# Patient Record
Sex: FEMALE | Race: WHITE | NOT HISPANIC OR LATINO | Employment: OTHER | URBAN - METROPOLITAN AREA
[De-identification: names, ages, dates, MRNs, and addresses within clinical notes are randomized per-mention and may not be internally consistent; named-entity substitution may affect disease eponyms.]

---

## 2017-01-12 ENCOUNTER — TELEPHONE (OUTPATIENT)
Dept: FAMILY MEDICINE | Facility: CLINIC | Age: 82
End: 2017-01-12

## 2017-01-12 NOTE — TELEPHONE ENCOUNTER
----- Message from Kavitha Odom sent at 1/12/2017  9:15 AM CST -----  Contact: self  Patient [phone] was discharged from [facility] on Tuesday 01 10 17 from congestive heart failure/pneumonia and shingles and is to followup with Dr Alas in two weeks from today  01 12 17/please advise

## 2017-01-14 DIAGNOSIS — F32.A DEPRESSION: ICD-10-CM

## 2017-01-16 RX ORDER — SERTRALINE HYDROCHLORIDE 50 MG/1
TABLET, FILM COATED ORAL
Qty: 30 TABLET | Refills: 10 | Status: SHIPPED | OUTPATIENT
Start: 2017-01-16 | End: 2019-10-01 | Stop reason: CLARIF

## 2017-01-18 ENCOUNTER — TELEPHONE (OUTPATIENT)
Dept: FAMILY MEDICINE | Facility: CLINIC | Age: 82
End: 2017-01-18

## 2017-01-18 RX ORDER — TELMISARTAN 80 MG/1
80 TABLET ORAL DAILY
COMMUNITY
End: 2017-10-05

## 2017-01-18 NOTE — TELEPHONE ENCOUNTER
Received request to change Telmisartan 80mg to 90 day supply from [facility] pharmacy. Med not on mar-spoke to patient-was given in [facility] by Dr. Arora- she picked up refill 1/11/17-patient has f/u appt 1/26/17-med added to mar.

## 2017-01-23 ENCOUNTER — CLINICAL SUPPORT (OUTPATIENT)
Dept: AUDIOLOGY | Facility: CLINIC | Age: 82
End: 2017-01-23
Payer: MEDICARE

## 2017-01-23 ENCOUNTER — OFFICE VISIT (OUTPATIENT)
Dept: OTOLARYNGOLOGY | Facility: CLINIC | Age: 82
End: 2017-01-23
Payer: MEDICARE

## 2017-01-23 ENCOUNTER — DOCUMENTATION ONLY (OUTPATIENT)
Dept: FAMILY MEDICINE | Facility: CLINIC | Age: 82
End: 2017-01-23

## 2017-01-23 VITALS — HEIGHT: 58 IN | WEIGHT: 99.19 LBS | BODY MASS INDEX: 20.82 KG/M2

## 2017-01-23 DIAGNOSIS — H90.A31 MIXED CONDUCTIVE AND SENSORINEURAL HEARING LOSS OF RIGHT EAR WITH RESTRICTED HEARING OF LEFT EAR: Primary | ICD-10-CM

## 2017-01-23 PROCEDURE — 99214 OFFICE O/P EST MOD 30 MIN: CPT | Mod: PBBFAC | Performed by: OTOLARYNGOLOGY

## 2017-01-23 PROCEDURE — 92567 TYMPANOMETRY: CPT | Mod: PBBFAC | Performed by: AUDIOLOGIST-HEARING AID FITTER

## 2017-01-23 PROCEDURE — 99999 PR PBB SHADOW E&M-EST. PATIENT-LVL IV: CPT | Mod: PBBFAC,,, | Performed by: OTOLARYNGOLOGY

## 2017-01-23 PROCEDURE — 99499 UNLISTED E&M SERVICE: CPT | Mod: S$PBB,,, | Performed by: OTOLARYNGOLOGY

## 2017-01-23 PROCEDURE — 92557 COMPREHENSIVE HEARING TEST: CPT | Mod: PBBFAC | Performed by: AUDIOLOGIST-HEARING AID FITTER

## 2017-01-23 NOTE — PROGRESS NOTES
Pre-Visit Chart Review  For Appointment Scheduled on 1-26-17    Health Maintenance Due   Topic Date Due    DEXA SCAN  11/26/1975    Colonoscopy  09/15/2016    Lipid Panel  01/21/2017

## 2017-01-23 NOTE — PROGRESS NOTES
Cheyanne Gomes was seen in the clinic today for a hearing evaluation. Ms. Gomes reported hearing loss worse in her right ear. She also stated she has bilateral tinnitus.      Audiological testing revealed a moderate to severe sensorineural hearing loss in the left ear and a moderately severe rising to moderate sloping to profound mixed hearing loss in the right ear. A speech reception threshold was obtained at 50 dBHL in the left ear and 55 dBHL in the right ear. Speech discrimination was 68%, bilaterally.    Tympanometry revealed a rounded, shallow Type A tympanogram in the left ear and a flat Type B tympanogram in the right ear.    Recommendations:  1. Otologic evaluation  2. Annual hearing evaluation  3. Hearing aid consult following medical clearance

## 2017-01-23 NOTE — PROGRESS NOTES
"    Pt had 3:00 appt.    Resident in to see patient @ 3:12. I went to see pt @ 3:20.    Pt left office without being examined for "waiting too long" @ 3:12          No charge.    F/U Dr Lu.  "

## 2017-01-26 ENCOUNTER — OFFICE VISIT (OUTPATIENT)
Dept: FAMILY MEDICINE | Facility: CLINIC | Age: 82
End: 2017-01-26
Payer: MEDICARE

## 2017-01-26 ENCOUNTER — HISTORICAL (OUTPATIENT)
Dept: ADMINISTRATIVE | Facility: HOSPITAL | Age: 82
End: 2017-01-26

## 2017-01-26 VITALS
DIASTOLIC BLOOD PRESSURE: 63 MMHG | BODY MASS INDEX: 22.17 KG/M2 | WEIGHT: 105.63 LBS | TEMPERATURE: 98 F | SYSTOLIC BLOOD PRESSURE: 141 MMHG | RESPIRATION RATE: 16 BRPM | HEIGHT: 58 IN | HEART RATE: 55 BPM | OXYGEN SATURATION: 98 %

## 2017-01-26 DIAGNOSIS — M05.759: ICD-10-CM

## 2017-01-26 DIAGNOSIS — I35.0 AORTIC VALVE STENOSIS, MODERATE: ICD-10-CM

## 2017-01-26 DIAGNOSIS — I10 HYPERTENSION, POOR CONTROL: ICD-10-CM

## 2017-01-26 DIAGNOSIS — M35.00 SJOGREN'S SYNDROME: ICD-10-CM

## 2017-01-26 DIAGNOSIS — I50.9 CHRONIC CONGESTIVE HEART FAILURE, UNSPECIFIED CONGESTIVE HEART FAILURE TYPE: Primary | ICD-10-CM

## 2017-01-26 DIAGNOSIS — D64.9 ANEMIA, UNSPECIFIED TYPE: ICD-10-CM

## 2017-01-26 DIAGNOSIS — E78.5 HYPERLIPIDEMIA, UNSPECIFIED HYPERLIPIDEMIA TYPE: ICD-10-CM

## 2017-01-26 LAB
ALBUMIN SERPL-MCNC: 2.6 G/DL (ref 3.1–4.7)
ALP SERPL-CCNC: 81 IU/L (ref 40–104)
ALT (SGPT): 21 IU/L (ref 3–33)
AST SERPL-CCNC: 46 IU/L (ref 10–40)
BASOPHILS NFR BLD: 0 K/UL (ref 0–0.2)
BASOPHILS NFR BLD: 0.5 %
BILIRUB SERPL-MCNC: 0.4 MG/DL (ref 0.3–1)
BNP SERPL-MCNC: 1033 PG/ML (ref 0–100)
BUN SERPL-MCNC: 17 MG/DL (ref 8–20)
CALCIUM SERPL-MCNC: 8 MG/DL (ref 7.7–10.4)
CHLORIDE: 103 MMOL/L (ref 98–110)
CO2 SERPL-SCNC: 24.8 MMOL/L (ref 22.8–31.6)
CREATININE: 0.98 MG/DL (ref 0.6–1.4)
CRP SERPL-MCNC: 0.25 MG/DL (ref 0–1.4)
EOSINOPHIL NFR BLD: 0 %
EOSINOPHIL NFR BLD: 0 K/UL (ref 0–0.7)
ERYTHROCYTE [DISTWIDTH] IN BLOOD BY AUTOMATED COUNT: 18.6 % (ref 11.7–14.9)
GLUCOSE: 75 MG/DL (ref 70–99)
GRAN #: 3.8 K/UL (ref 1.4–6.5)
GRAN%: 58.4 %
HCT VFR BLD AUTO: 26.1 % (ref 36–48)
HGB BLD-MCNC: 8.2 G/DL (ref 12–15)
IMMATURE GRANS (ABS): 0 K/UL (ref 0–1)
IMMATURE GRANULOCYTES: 0.6 %
LYMPH #: 2 K/UL (ref 1.2–3.4)
LYMPH%: 31.3 %
MCH RBC QN AUTO: 27.1 PG (ref 25–35)
MCHC RBC AUTO-ENTMCNC: 31.4 G/DL (ref 31–36)
MCV RBC AUTO: 86.1 FL (ref 79–98)
MONO #: 0.6 K/UL (ref 0.1–0.6)
MONO%: 9.2 %
NUCLEATED RBCS: 0 %
PLATELET # BLD AUTO: 172 K/UL (ref 140–440)
PMV BLD AUTO: 9.5 FL (ref 8.8–12.7)
POTASSIUM SERPL-SCNC: 4.5 MMOL/L (ref 3.5–5)
PROT SERPL-MCNC: 5.8 G/DL (ref 6–8.2)
RBC # BLD AUTO: 3.03 M/UL (ref 3.5–5.5)
SODIUM: 136 MMOL/L (ref 134–144)
VITAMIN D, 1,25 (OH)2: 32.2 NG/ML (ref 30–100)
WBC # BLD AUTO: 6.5 K/UL (ref 5–10)

## 2017-01-26 PROCEDURE — 99214 OFFICE O/P EST MOD 30 MIN: CPT | Mod: S$PBB,,, | Performed by: FAMILY MEDICINE

## 2017-01-26 PROCEDURE — 99999 PR PBB SHADOW E&M-EST. PATIENT-LVL III: CPT | Mod: PBBFAC,,, | Performed by: FAMILY MEDICINE

## 2017-01-26 PROCEDURE — 99213 OFFICE O/P EST LOW 20 MIN: CPT | Mod: PBBFAC,PO | Performed by: FAMILY MEDICINE

## 2017-01-26 RX ORDER — EPINEPHRINE 0.22MG
200 AEROSOL WITH ADAPTER (ML) INHALATION DAILY
Status: ON HOLD | COMMUNITY
End: 2020-06-03

## 2017-01-26 NOTE — PROGRESS NOTES
Subjective:       Patient ID: Cheyanne Gomes is a 81 y.o. female.    Chief Complaint: Hospital Follow Up    HPI Comments: 81 year old female recently admitted to [facility] January 3-10 for acute exacerbation of CHF due to aortic stenosis compounded by high output failure due to anemia.  She responded to diuresis and also had a workup for DVT and PE that was negative.  Gi did egd finding atrophic gastritis and a single polyp on colonoscopy that was limited by poor prep but no active bleeding was found.  She is feeling fairly well now with some shortness of breath at baseline levels.  She has a follow up with  this afternoon for rheumatoid arthritis and sjogren's syndrome and will be doing lab for them and for Dr. Blair afterward at [facility].  She is due for a lipid panel and we will give her an order to do at [facility] at the same time.    Past Medical History:    Aortic valve stenosis, moderate                               Cannon's esophagus                                           Cataract                                                        Comment:ou done    CHF (congestive heart failure)                                  Comment:EFx 35%, Dr. Spencer 12/19/13    Colitis                                                       Compression fracture                                          GERD (gastroesophageal reflux disease)                        Hyperlipidemia                                                Hypertension                                                  Occlusive coronary artery disease                             Osteoarthritis                                                  Comment:lumbar DDD    Pneumonia                                       01/03/2017    Rheumatoid arthritis                                          Shingles                                        01/03/2017    Sjogren syndrome                                              Past Surgical History:    CHOLECYSTECTOMY                                                  SECTION, CLASSIC                                        Comment:x 2    CORONARY ARTERY BYPASS GRAFT                                     Comment:3 vessel    APPENDECTOMY                                                   HYSTERECTOMY                                                   OOPHORECTOMY                                                   eyes                                                             Comment:rk ou    CATARACT EXTRACTION                                              Comment:ou od d 11-15-12/    BACK SURGERY                                                   COLONOSCOPY                                      09/15/2011      Comment:Dr Anaya     SPINE SURGERY                                    2013       Comment:Silvino Mckeon    Yag Capsulotomy                                 Right 14       FRACTURE SURGERY                                 2016      Comment:Dr Guido left hip     COLONOSCOPY                                      01/10/2017      Comment:[facility] report sent to scanning      Current Outpatient Prescriptions:     amlodipine (NORVASC) 5 MG tablet, Take 5 mg by mouth 2 (two) times daily., Disp: , Rfl:     ascorbic acid (VITAMIN C) 500 MG tablet, Take 500 mg by mouth once daily., Disp: , Rfl:     aspirin 81 mg Tab, Take 1 tablet by mouth every evening. Every day, Disp: , Rfl:     atorvastatin (LIPITOR) 40 MG tablet, Take 40 mg by mouth once daily. , Disp: , Rfl:     BYSTOLIC 5 mg Tab, Take 2.5 mg by mouth every other day. , Disp: , Rfl:     CALCIUM CARB/VIT D3/MINERALS (CALCIUM-VITAMIN D ORAL), Take 1,200 mg by mouth 2 (two) times daily. Calcium 1200 with D 3 1000, Disp: , Rfl:     clopidogrel (PLAVIX) 75 mg tablet, Take 75 mg by mouth once daily. Every morning, Disp: , Rfl:     coenzyme Q10 100 mg capsule, Take 100 mg by mouth once daily., Disp: , Rfl:     cranberry 500 mg Cap, Take 1 capsule by mouth once daily., Disp: , Rfl:      DOCUSATE CALCIUM (STOOL SOFTENER ORAL), Take 200 mg by mouth every evening. , Disp: , Rfl:     fluticasone (FLONASE) 50 mcg/actuation nasal spray, 2 sprays by Each Nare route once daily. (Patient taking differently: 2 sprays by Each Nare route daily as needed. ), Disp: 16 g, Rfl: 0    furosemide (LASIX) 40 MG tablet, Take 40 mg by mouth once daily. Dr MAMADOU Yung [facility], Disp: , Rfl:     hydroxychloroquine (PLAQUENIL) 200 mg tablet, Take 200 mg by mouth 2 (two) times daily., Disp: , Rfl: 1    isosorbide mononitrate (IMDUR) 60 MG 24 hr tablet, Take 90 mg by mouth once daily. Every day, Disp: , Rfl:     krill-omega-3-dha-epa-lipids (KRILL OIL) 181-54-04-50 mg Cap, Take 1 each by mouth once daily. Braxton krill 300mg qd, Disp: , Rfl:     lactobacillus acidophilus (PROBIOTIC) 10 billion cell Cap, Take 1 each by mouth once daily. 10 x digestive care - probiotic, Disp: , Rfl:     magnesium oxide (MAG-OX) 400 mg tablet, Take 400 mg by mouth 2 (two) times daily., Disp: , Rfl:     MULTIVITAMIN WITH MINERALS (ONE-A-DAY 50 PLUS) Tab, Take 1 tablet by mouth once daily. Every day, Disp: , Rfl:     nitroGLYCERIN (NITROLINGUAL) 0.4 mg/dose spray, Place 1 spray under the tongue every 5 (five) minutes as needed. PRN, Disp: , Rfl:     pantoprazole (PROTONIX) 40 MG tablet, Take 40 mg by mouth once daily. , Disp: , Rfl:     potassium chloride SA (K-DUR,KLOR-CON) 10 MEQ tablet, Take 1 tablet by mouth once daily., Disp: , Rfl: 3    promethazine (PHENERGAN) 25 MG tablet, Take 1 tablet (25 mg total) by mouth 2 (two) times daily as needed for Nausea., Disp: 60 tablet, Rfl: 1    ranitidine (ZANTAC) 150 MG tablet, Take 150 mg by mouth once daily. Every evening, Disp: , Rfl:     salsalate (DISALCID) 750 MG Tab, Take 1,500 mg by mouth 2 (two) times daily. , Disp: , Rfl:     sertraline (ZOLOFT) 50 MG tablet, TAKE 1 TABLET (50 MG TOTAL) BY MOUTH ONCE DAILY., Disp: 30 tablet, Rfl: 10    telmisartan (MICARDIS) 80 MG Tab, Take  80 mg by mouth once daily., Disp: , Rfl:     tramadol (ULTRAM) 50 mg tablet, Take 50 mg by mouth every 8 (eight) hours as needed. , Disp: , Rfl:     trazodone (DESYREL) 50 MG tablet, Take 0.5 tablets (25 mg total) by mouth nightly as needed for Insomnia., Disp: 30 tablet, Rfl: 1    vitamin E 400 unit Tab, Take 400 mg by mouth once daily. Every day, Disp: , Rfl:     (DISCONTINUED) hydrochlorothiazide (HYDRODIURIL) 25 MG tablet, Take 25 mg by mouth once daily., Disp: , Rfl:     DEXA SCAN due on 11/26/1975  Lipid Panel due on 01/21/2017      Review of Systems   Constitutional: Negative for chills, fatigue and fever.   Respiratory: Positive for shortness of breath. Negative for cough, chest tightness, wheezing and stridor.    Cardiovascular: Negative for chest pain and palpitations.   Gastrointestinal: Positive for anal bleeding (occasional withknown hemorrhoids). Negative for blood in stool, constipation and diarrhea.   Neurological: Negative for dizziness and light-headedness.       Objective:      Physical Exam   Constitutional: She appears well-developed and well-nourished. No distress.   Elevated blood pressure   Eyes: No scleral icterus.   Cardiovascular: Normal rate and regular rhythm.  Exam reveals no gallop. Friction rub: prominent AS murmur right sternal border.    Murmur heard.  Pulmonary/Chest: Effort normal and breath sounds normal. No respiratory distress. She has no wheezes. She has no rales. She exhibits no tenderness.   Abdominal: Soft. Bowel sounds are normal. She exhibits no distension and no mass. There is no tenderness. There is no rebound and no guarding. No hernia.   Skin: She is not diaphoretic.   Psychiatric: She has a normal mood and affect. Her behavior is normal. Judgment and thought content normal.   Nursing note and vitals reviewed.      Assessment:       1. Chronic congestive heart failure, unspecified congestive heart failure type    2. Aortic valve stenosis, moderate    3.  Hypertension, poor control    4. Hyperlipidemia, unspecified hyperlipidemia type    5. Anemia, unspecified type    6. Rheumatoid arthritis involving hip with positive rheumatoid factor, unspecified laterality    7. Sjogren's syndrome        Plan:       1. Chronic congestive heart failure, unspecified congestive heart failure type  Followed by Dr. Blair, stable/controlled    2. Aortic valve stenosis, moderate    3. Hypertension, poor control  Probably best control possible, need to beware renal impact of meds    4. Hyperlipidemia, unspecified hyperlipidemia type  Ordered for [facility] lab    5. Anemia, unspecified type  Probably due to chronic disease but small bowel angiodysplasia also possible    6. 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 Perez    7. Sjogren's syndrome  Followed by

## 2017-01-30 ENCOUNTER — TELEPHONE (OUTPATIENT)
Dept: FAMILY MEDICINE | Facility: CLINIC | Age: 82
End: 2017-01-30

## 2017-01-30 NOTE — TELEPHONE ENCOUNTER
----- Message from Hugh Thorpe sent at 1/30/2017 12:13 PM CST -----  Contact: Patient   Patient requested a call back regarding lab results that have been submitted to your office    - Please call her at [phone]

## 2017-01-30 NOTE — TELEPHONE ENCOUNTER
Patient notified of results. Patient has an appt on Thurs with Dr. Blair, per patient Elvia with Dr. Erazo office will send lab results to Dr. Blair's office

## 2017-01-30 NOTE — TELEPHONE ENCOUNTER
Still anemic, not enough for a transfusion.  Still has positive test for heart failure but I don't have prior results so I don't know if up or down.  Vitamin D is good, kidney function is fair.

## 2017-02-02 ENCOUNTER — TELEPHONE (OUTPATIENT)
Dept: FAMILY MEDICINE | Facility: CLINIC | Age: 82
End: 2017-02-02

## 2017-02-13 ENCOUNTER — TELEPHONE (OUTPATIENT)
Dept: FAMILY MEDICINE | Facility: CLINIC | Age: 82
End: 2017-02-13

## 2017-02-13 ENCOUNTER — DOCUMENTATION ONLY (OUTPATIENT)
Dept: FAMILY MEDICINE | Facility: CLINIC | Age: 82
End: 2017-02-13

## 2017-02-13 NOTE — PROGRESS NOTES
Pre-Visit Chart Review  For Appointment Scheduled on 2-14-17    Health Maintenance Due   Topic Date Due    DEXA SCAN  11/26/1975

## 2017-02-13 NOTE — TELEPHONE ENCOUNTER
----- Message from Nikki Freedman sent at 2/13/2017  9:47 AM CST -----  Contact: Self-  [phone]  Patient fell last week, asking for orders for x-ray for upper back pain.Thanks!

## 2017-02-14 ENCOUNTER — OFFICE VISIT (OUTPATIENT)
Dept: FAMILY MEDICINE | Facility: CLINIC | Age: 82
End: 2017-02-14
Payer: MEDICARE

## 2017-02-14 VITALS
RESPIRATION RATE: 16 BRPM | BODY MASS INDEX: 20.22 KG/M2 | TEMPERATURE: 98 F | HEIGHT: 58 IN | OXYGEN SATURATION: 95 % | DIASTOLIC BLOOD PRESSURE: 65 MMHG | SYSTOLIC BLOOD PRESSURE: 136 MMHG | HEART RATE: 64 BPM | WEIGHT: 96.31 LBS

## 2017-02-14 DIAGNOSIS — S20.229A CONTUSION, BACK, UNSPECIFIED LATERALITY, INITIAL ENCOUNTER: ICD-10-CM

## 2017-02-14 DIAGNOSIS — S40.012A CONTUSION SHOULDER/ARM, LEFT, INITIAL ENCOUNTER: ICD-10-CM

## 2017-02-14 DIAGNOSIS — S40.022A CONTUSION SHOULDER/ARM, LEFT, INITIAL ENCOUNTER: ICD-10-CM

## 2017-02-14 DIAGNOSIS — D64.9 ANEMIA, UNSPECIFIED TYPE: ICD-10-CM

## 2017-02-14 DIAGNOSIS — M81.0 OSTEOPOROSIS: ICD-10-CM
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
## 2017-02-14 DIAGNOSIS — S20.219A CONTUSION, CHEST WALL, UNSPECIFIED LATERALITY, INITIAL ENCOUNTER: ICD-10-CM

## 2017-02-14 PROCEDURE — 99213 OFFICE O/P EST LOW 20 MIN: CPT | Mod: PBBFAC,PO | Performed by: FAMILY MEDICINE

## 2017-02-14 PROCEDURE — 99999 PR PBB SHADOW E&M-EST. PATIENT-LVL III: CPT | Mod: PBBFAC,,, | Performed by: FAMILY MEDICINE

## 2017-02-14 PROCEDURE — 99214 OFFICE O/P EST MOD 30 MIN: CPT | Mod: S$PBB,,, | Performed by: FAMILY MEDICINE

## 2017-02-14 RX ORDER — ELECTROLYTES/DEXTROSE
1 SOLUTION, ORAL ORAL DAILY
COMMUNITY

## 2017-02-14 RX ORDER — LIDOCAINE HCL 4 G/100G
CREAM TOPICAL 2 TIMES DAILY
COMMUNITY
End: 2017-06-01 | Stop reason: ALTCHOICE

## 2017-02-14 RX ORDER — FERROUS GLUCONATE 324(38)MG
324 TABLET ORAL 2 TIMES DAILY
Status: ON HOLD | COMMUNITY
End: 2019-10-04 | Stop reason: SDUPTHER

## 2017-02-14 NOTE — MR AVS SNAPSHOT
[facility]  [address]  [place] [address]  Phone: [phone]  Fax: [phone]                  Cheyanne Gomes   2017 2:00 PM   Office Visit    Description:  Female : 1935   Provider:  Romeo Alas MD   Department:  [facility]           Reason for Visit     Fall     Back Pain           Diagnoses this Visit        Comments    Fall, initial encounter    -  Primary     Contusion shoulder/arm, left, initial encounter         Contusion, back, unspecified laterality, initial encounter         Contusion, chest wall, unspecified laterality, initial encounter         Osteoporosis         Anemia, unspecified type                To Do List           Future Appointments        Provider Department Dept Phone    3/13/2017 8:45 AM Osito Zabala MD [facility] - Otorhinolaryngology [phone]      Goals (5 Years of Data)     None      [facility] On [facility]     [facility] On Call [facility] Care Line - / Assistance  Registered nurses in the [facility] On Call Center provide clinical advisement, health education, appointment booking, and other advisory services.  Call for this free service at [phone].             Medications           Message regarding Medications     Verify the changes and/or additions to your medication regime listed below are the same as discussed with your clinician today.  If any of these changes or additions are incorrect, please notify your healthcare provider.        STOP taking these medications     ascorbic acid (VITAMIN C) 500 MG tablet Take 500 mg by mouth once daily.    cranberry 500 mg Cap Take 1 capsule by mouth once daily.    clopidogrel (PLAVIX) 75 mg tablet Take 75 mg by mouth once daily. Every morning    salsalate (DISALCID) 750 MG Tab Take 1,500 mg by mouth 2 (two) times daily.     ranitidine (ZANTAC) 150 MG tablet Take 150 mg by mouth once daily. Every evening           Verify that the below list of medications is an accurate  representation of the medications you are currently taking.  If none reported, the list may be blank. If incorrect, please contact your healthcare provider. Carry this list with you in case of emergency.           Current Medications     amlodipine (NORVASC) 5 MG tablet Take 5 mg by mouth 2 (two) times daily.    ascorbic acid-multivit-min (EMERGEN-C) 1,000 mg PwEP Take 1 Package by mouth once daily.    aspirin 81 mg Tab Take 1 tablet by mouth every evening. Every day    atorvastatin (LIPITOR) 40 MG tablet Take 40 mg by mouth once daily.     biotin 5 mg Cap Take 1 tablet by mouth 2 (two) times daily.    BYSTOLIC 5 mg Tab Take 2.5 mg by mouth every other day.     CALCIUM CARB/VIT D3/MINERALS (CALCIUM-VITAMIN D ORAL) Take 600 mg by mouth 2 (two) times daily. Calcium 1200 with D 3 1000    coenzyme Q10 100 mg capsule Take 100 mg by mouth once daily.    CRANBERRY CONC/ASCORBIC ACID (CRANBERRY PLUS VITAMIN C ORAL) Take 1 capsule by mouth once daily.    DOCUSATE CALCIUM (STOOL SOFTENER ORAL) Take 200 mg by mouth every evening.     FERROUS FUMARATE/VIT BCOMP,C (SUPER B COMPLEX ORAL) Take 1 tablet by mouth once daily.    ferrous gluconate (FERGON) 324 MG tablet Take 324 mg by mouth daily with breakfast.    fluticasone (FLONASE) 50 mcg/actuation nasal spray 2 sprays by Each Nare route once daily.    furosemide (LASIX) 40 MG tablet Take 40 mg by mouth once daily. Dr MAMADOU Yung [facility]    hydroxychloroquine (PLAQUENIL) 200 mg tablet Take 200 mg by mouth 2 (two) times daily.    isosorbide mononitrate (IMDUR) 60 MG 24 hr tablet Take 90 mg by mouth once daily. Every day    krill-omega-3-dha-epa-lipids (KRILL OIL) 557-31-28-50 mg Cap Take 1,000 mg by mouth once daily. Braxton krill 300mg qd     LACTOBACILLUS ACIDOPHILUS (PROBIOTIC ORAL) Take 1 each by mouth once daily.    lactobacillus acidophilus (PROBIOTIC) 10 billion cell Cap Take 1 each by mouth once daily. 1.5 billion    lidocaine HCl (ASPERCREME, LIDOCAINE,) 4 % Crea Apply  "topically 2 (two) times daily.    magnesium oxide (MAG-OX) 400 mg tablet Take 400 mg by mouth 2 (two) times daily.    MULTIVITAMIN WITH MINERALS (ONE-A-DAY 50 PLUS) Tab Take 1 tablet by mouth once daily. Every day    nitroGLYCERIN (NITROLINGUAL) 0.4 mg/dose spray Place 1 spray under the tongue every 5 (five) minutes as needed. PRN    pantoprazole (PROTONIX) 40 MG tablet Take 40 mg by mouth 2 (two) times daily.     potassium chloride SA (K-DUR,KLOR-CON) 10 MEQ tablet Take by mouth once daily. 3 tab q am / 1 tab q pm    promethazine (PHENERGAN) 25 MG tablet Take 1 tablet (25 mg total) by mouth 2 (two) times daily as needed for Nausea.    sertraline (ZOLOFT) 50 MG tablet TAKE 1 TABLET (50 MG TOTAL) BY MOUTH ONCE DAILY.    telmisartan (MICARDIS) 80 MG Tab Take 80 mg by mouth once daily.    tramadol (ULTRAM) 50 mg tablet Take 50 mg by mouth every 8 (eight) hours as needed.     trazodone (DESYREL) 50 MG tablet Take 0.5 tablets (25 mg total) by mouth nightly as needed for Insomnia.    VIT C/E/ZN/COPPR/LUTEIN/ZEAXAN (PRESERVISION AREDS 2 ORAL) Take 1 tablet by mouth once daily. [facility] Ocutabs with Lutein 2  Mg.    vitamin E 400 unit Tab Take 400 mg by mouth once daily. Every day           Clinical Reference Information           Your Vitals Were     BP Pulse Temp Resp Height Weight    136/65 (BP Location: Right arm, Patient Position: Sitting, BP Method: Automatic) 64 98.3 °F (36.8 °C) (Oral) 16 4' 10" (1.473 m) 43.7 kg (96 lb 5.5 oz)    SpO2 BMI             95% 20.14 kg/m2         Blood Pressure          Most Recent Value    BP  136/65      Allergies as of 2/14/2017     Macrodantin  [Nitrofurantoin Macrocrystalline]    Penicillins    Sulfa (Sulfonamide Antibiotics)      Immunizations Administered on Date of Encounter - 2/14/2017     None      Orders Placed During Today's Visit      Normal Orders This Visit    Ambulatory Referral to Physical/Occupational Therapy       Language Assistance Services     ATTENTION: " Language assistance services are available, free of charge. Please call [phone].      ATENCIÓN: Si habla monique, tiene a morales disposición servicios gratuitos de asistencia lingüística. Llame al [phone].     CHÚ Ý: N?u b?n nói Ti?ng Vi?t, có các d?ch v? h? tr? ngôn ng? mi?n phí dành cho b?n. G?i s? [phone].         [facility] complies with applicable Federal civil rights laws and does not discriminate on the basis of race, color, national origin, age, disability, or sex.

## 2017-02-14 NOTE — PROGRESS NOTES
Subjective:       Patient ID: Cheyanne Gomes is a 81 y.o. female.    Chief Complaint: Fall and Back Pain    HPI Comments: 81 year old female in for hospital follow up from an admission to [facility] 2/4-5/2017.  She presented with chest pain and shortness of breath with anemia and a prior negative gi workup including colonoscopy and egd.  She was transfused with resolution of symptoms and discharged the following day.  The next day she was staying with her daughter, got up at night to go to the bathroom and fell on a ceramic tile floor hitting her knees, left wrist, and falling back onto her spine and left shoulder.  She has some pain but no problems breathing and no crepitus.  She is markedly osteoporotic and has only two thoracic vertebrae that have not had vertebroplasty.  Her lower ribs sit on the pelvic brim.    She is on iron and has a follow up with dr. Pal for the anemia on march 8.    Past Medical History:    Anemia                                                          Comment:Dr Berkowitz     Aortic valve stenosis, moderate                               Cannon's esophagus                                           Cataract                                                        Comment:ou done    CHF (congestive heart failure)                                  Comment:EFx 35%, Dr. Spencer 12/19/13    Colitis                                                       Compression fracture                                          GERD (gastroesophageal reflux disease)                        Hyperlipidemia                                                Hypertension                                                  Occlusive coronary artery disease                             Osteoarthritis                                                  Comment:lumbar DDD    Pneumonia                                       01/03/2017    Rheumatoid arthritis                                          Shingles                                         2017    Sjogren syndrome                                              Past Surgical History:    CHOLECYSTECTOMY                                                 SECTION, CLASSIC                                        Comment:x 2    CORONARY ARTERY BYPASS GRAFT                                     Comment:3 vessel    APPENDECTOMY                                                   HYSTERECTOMY                                                   OOPHORECTOMY                                                   eyes                                                             Comment:rk ou    CATARACT EXTRACTION                                              Comment:ou od d 11-15-12//    BACK SURGERY                                                   COLONOSCOPY                                      09/15/2011      Comment:Dr Anaya     SPINE SURGERY                                    2013       Comment:Silvino Mckeon    Yag Capsulotomy                                 Right 14       FRACTURE SURGERY                                 2016      Comment:Dr Guido left hip     COLONOSCOPY                                      01/10/2017      Comment:[facility] report sent to scanning)      Current Outpatient Prescriptions:     amlodipine (NORVASC) 5 MG tablet, Take 5 mg by mouth 2 (two) times daily., Disp: , Rfl:     ascorbic acid-multivit-min (EMERGEN-C) 1,000 mg PwEP, Take 1 Package by mouth once daily., Disp: , Rfl:     aspirin 81 mg Tab, Take 1 tablet by mouth every evening. Every day, Disp: , Rfl:     atorvastatin (LIPITOR) 40 MG tablet, Take 40 mg by mouth once daily. , Disp: , Rfl:     biotin 5 mg Cap, Take 1 tablet by mouth 2 (two) times daily., Disp: , Rfl:     BYSTOLIC 5 mg Tab, Take 2.5 mg by mouth every other day. , Disp: , Rfl:     CALCIUM CARB/VIT D3/MINERALS (CALCIUM-VITAMIN D ORAL), Take 600 mg by mouth 2 (two) times daily. Calcium 1200 with D 3 1000, Disp: , Rfl:     coenzyme Q10  100 mg capsule, Take 100 mg by mouth once daily., Disp: , Rfl:     CRANBERRY CONC/ASCORBIC ACID (CRANBERRY PLUS VITAMIN C ORAL), Take 1 capsule by mouth once daily., Disp: , Rfl:     DOCUSATE CALCIUM (STOOL SOFTENER ORAL), Take 200 mg by mouth every evening. , Disp: , Rfl:     FERROUS FUMARATE/VIT BCOMP,C (SUPER B COMPLEX ORAL), Take 1 tablet by mouth once daily., Disp: , Rfl:     ferrous gluconate (FERGON) 324 MG tablet, Take 324 mg by mouth daily with breakfast., Disp: , Rfl:     fluticasone (FLONASE) 50 mcg/actuation nasal spray, 2 sprays by Each Nare route once daily. (Patient taking differently: 2 sprays by Each Nare route daily as needed. ), Disp: 16 g, Rfl: 0    furosemide (LASIX) 40 MG tablet, Take 40 mg by mouth once daily. Dr MAMADOU Yung [facility], Disp: , Rfl:     hydroxychloroquine (PLAQUENIL) 200 mg tablet, Take 200 mg by mouth 2 (two) times daily., Disp: , Rfl: 1    isosorbide mononitrate (IMDUR) 60 MG 24 hr tablet, Take 90 mg by mouth once daily. Every day, Disp: , Rfl:     krill-omega-3-dha-epa-lipids (KRILL OIL) 289-07-34-50 mg Cap, Take 1,000 mg by mouth once daily. Braxton krill 300mg qd , Disp: , Rfl:     LACTOBACILLUS ACIDOPHILUS (PROBIOTIC ORAL), Take 1 each by mouth once daily., Disp: , Rfl:     lactobacillus acidophilus (PROBIOTIC) 10 billion cell Cap, Take 1 each by mouth once daily. 1.5 billion, Disp: , Rfl:     lidocaine HCl (ASPERCREME, LIDOCAINE,) 4 % Crea, Apply topically 2 (two) times daily., Disp: , Rfl:     magnesium oxide (MAG-OX) 400 mg tablet, Take 400 mg by mouth 2 (two) times daily., Disp: , Rfl:     MULTIVITAMIN WITH MINERALS (ONE-A-DAY 50 PLUS) Tab, Take 1 tablet by mouth once daily. Every day, Disp: , Rfl:     nitroGLYCERIN (NITROLINGUAL) 0.4 mg/dose spray, Place 1 spray under the tongue every 5 (five) minutes as needed. PRN, Disp: , Rfl:     pantoprazole (PROTONIX) 40 MG tablet, Take 40 mg by mouth 2 (two) times daily. , Disp: , Rfl:     potassium chloride SA  (K-DUR,KLOR-CON) 10 MEQ tablet, Take by mouth once daily. 3 tab q am / 1 tab q pm, Disp: , Rfl: 3    promethazine (PHENERGAN) 25 MG tablet, Take 1 tablet (25 mg total) by mouth 2 (two) times daily as needed for Nausea., Disp: 60 tablet, Rfl: 1    sertraline (ZOLOFT) 50 MG tablet, TAKE 1 TABLET (50 MG TOTAL) BY MOUTH ONCE DAILY., Disp: 30 tablet, Rfl: 10    telmisartan (MICARDIS) 80 MG Tab, Take 80 mg by mouth once daily., Disp: , Rfl:     tramadol (ULTRAM) 50 mg tablet, Take 50 mg by mouth every 8 (eight) hours as needed. , Disp: , Rfl:     trazodone (DESYREL) 50 MG tablet, Take 0.5 tablets (25 mg total) by mouth nightly as needed for Insomnia., Disp: 30 tablet, Rfl: 1    VIT C/E/ZN/COPPR/LUTEIN/ZEAXAN (PRESERVISION AREDS 2 ORAL), Take 1 tablet by mouth once daily. [facility] Ocutabs with Lutein 2  Mg., Disp: , Rfl:     vitamin E 400 unit Tab, Take 400 mg by mouth once daily. Every day, Disp: , Rfl:     (DISCONTINUED) hydrochlorothiazide (HYDRODIURIL) 25 MG tablet, Take 25 mg by mouth once daily., Disp: , Rfl:     DEXA SCAN due on 11/26/1975-DONE 5/25/15 AT [facility]      Fall     Back Pain   Associated symptoms include chest pain (posterior chest wall pain).     Review of Systems   Cardiovascular: Positive for chest pain (posterior chest wall pain).   Musculoskeletal: Positive for arthralgias, back pain and myalgias.   Neurological: Negative for dizziness and light-headedness.       Objective:      Physical Exam   Constitutional: She is oriented to person, place, and time. She appears well-developed. No distress.   Good blood pressure control     HENT:   Head: Normocephalic and atraumatic.   Cardiovascular: Normal rate and regular rhythm.    Pulmonary/Chest: Effort normal. No respiratory distress. She has no wheezes. She has no rales. She exhibits tenderness (left posterior chest wall, no crepitus, good air movement without pain).   Musculoskeletal:        Cervical back: She exhibits no tenderness and  no bony tenderness.        Thoracic back: She exhibits deformity. She exhibits no tenderness, no bony tenderness and no spasm.        Lumbar back: She exhibits deformity (kyphoscoliosis). She exhibits no tenderness, no bony tenderness and no swelling.   Neurological: She is alert and oriented to person, place, and time.   Skin: She is not diaphoretic.   Contusions left arm   Psychiatric: She has a normal mood and affect. Her behavior is normal. Judgment and thought content normal.   Nursing note and vitals reviewed.      Assessment:       1. Fall, initial encounter    2. Contusion shoulder/arm, left, initial encounter    3. Contusion, back, unspecified laterality, initial encounter    4. Contusion, chest wall, unspecified laterality, initial encounter    5. Osteoporosis    6. Anemia, unspecified type        Plan:       1. Fall, initial encounter  Does not appear to have any fractures, no local bony tenderness, good movement without pain or crepitus.  Offered to do x-ray but she declined (daughter tried to talk her into it.)  - Ambulatory Referral to Physical/Occupational Therapy    2. Contusion shoulder/arm, left, initial encounter  - Ambulatory Referral to Physical/Occupational Therapy    3. Contusion, back, unspecified laterality, initial encounter  - Ambulatory Referral to Physical/Occupational Therapy    4. Contusion, chest wall, unspecified laterality, initial encounter  - Ambulatory Referral to Physical/Occupational Therapy    5. Osteoporosis  - Ambulatory Referral to Physical/Occupational Therapy    6. Anemia, unspecified type

## 2017-02-21 ENCOUNTER — PATIENT MESSAGE (OUTPATIENT)
Dept: FAMILY MEDICINE | Facility: CLINIC | Age: 82
End: 2017-02-21

## 2017-02-22 RX ORDER — DICLOFENAC SODIUM 10 MG/G
2 GEL TOPICAL 4 TIMES DAILY
Qty: 100 G | Refills: 1 | Status: SHIPPED | OUTPATIENT
Start: 2017-02-22 | End: 2017-06-01 | Stop reason: ALTCHOICE

## 2017-02-27 ENCOUNTER — HISTORICAL (OUTPATIENT)
Dept: ADMINISTRATIVE | Facility: HOSPITAL | Age: 82
End: 2017-02-27

## 2017-03-13 ENCOUNTER — OFFICE VISIT (OUTPATIENT)
Dept: OTOLARYNGOLOGY | Facility: CLINIC | Age: 82
End: 2017-03-13
Payer: MEDICARE

## 2017-03-13 VITALS — BODY MASS INDEX: 20.36 KG/M2 | WEIGHT: 97 LBS | HEIGHT: 58 IN

## 2017-03-13 DIAGNOSIS — H91.13 PRESBYCUSIS OF BOTH EARS: Primary | ICD-10-CM

## 2017-03-13 DIAGNOSIS — H90.A11 CONDUCTIVE HEARING LOSS OF RIGHT EAR WITH RESTRICTED HEARING OF LEFT EAR: ICD-10-CM

## 2017-03-13 DIAGNOSIS — H71.91 CHOLESTEATOMA OF RIGHT EAR: ICD-10-CM

## 2017-03-13 PROCEDURE — 99999 PR PBB SHADOW E&M-EST. PATIENT-LVL IV: CPT | Mod: PBBFAC,,, | Performed by: OTOLARYNGOLOGY

## 2017-03-13 PROCEDURE — 92504 EAR MICROSCOPY EXAMINATION: CPT | Mod: S$PBB,,, | Performed by: OTOLARYNGOLOGY

## 2017-03-13 PROCEDURE — 99214 OFFICE O/P EST MOD 30 MIN: CPT | Mod: S$PBB,,, | Performed by: OTOLARYNGOLOGY

## 2017-03-13 PROCEDURE — 99214 OFFICE O/P EST MOD 30 MIN: CPT | Mod: PBBFAC | Performed by: OTOLARYNGOLOGY

## 2017-03-13 PROCEDURE — 92504 EAR MICROSCOPY EXAMINATION: CPT | Mod: PBBFAC | Performed by: OTOLARYNGOLOGY

## 2017-03-13 NOTE — PROGRESS NOTES
Subjective:       Patient ID: Cheyanne Gomes is a 81 y.o. female.    Chief Complaint: Tinnitus    HPI  Review of Systems    Objective:      Physical Exam    Assessment:       1. Presbycusis of both ears    2. 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 Cholesteatoma of right ear        Plan:

## 2017-03-13 NOTE — LETTER
March 13, 2017      Mak Lu MD  [address]  [place] [address]           [facility] - Otorhinol[facility]ology  [address]  [facility] [address]  Phone: [phone]  Fax: [phone]          Patient: Cheyanne Gomes   MR Number: [id number]   YOB: 1935   Date of Visit: 3/13/2017       Dear Dr. Mak Lu:    Thank you for referring Cheyanne Gomes to me for evaluation. Attached you will find relevant portions of my assessment and plan of care.    If you have questions, please do not hesitate to call me. I look forward to following Cheyanne Gomes along with you.    Sincerely,    Osito Zabala MD    Enclosure  CC:  No Recipients    If you would like to receive this communication electronically, please contact [email or web address] or [phone] to request more information on [employer] access.    For providers and/or their staff who would like to refer a patient to [facility], please contact us through our one-stop-shop provider referral line, [facility], at [phone].    If you feel you have received this communication in error or would no longer like to receive these types of communications, please e-mail [email or web address]

## 2017-03-13 NOTE — PROGRESS NOTES
Subjective:       Patient ID: Cheyanne Gomes is a 81 y.o. female.    Chief Complaint: Tinnitus    HPI: Ref DR Lu.    Has B HL/ fullness.    R PET 1 yr ago no help.    Recent CT with KATRIN Meraz.    Past Medical History: Patient has a past medical history of Anemia; Aortic valve stenosis, moderate; Cannon's esophagus; Cataract; CHF (congestive heart failure); Colitis; Compression fracture; GERD (gastroesophageal reflux disease); Hyperlipidemia; Hypertension; Occlusive coronary artery disease; Osteoarthritis; Pneumonia (2017); Rheumatoid arthritis; Shingles (2017); and Sjogren syndrome.    Past Surgical History: Patient has a past surgical history that includes Cholecystectomy;  section, classic; Coronary artery bypass graft; Appendectomy; Hysterectomy; Oophorectomy; eyes; Cataract extraction; Back surgery; Colonoscopy (09/15/2011); Spine surgery (2013); Yag Capsulotomy (Right, 14); Fracture surgery (2016); and Colonoscopy (01/10/2017).    Social History: Patient reports that she quit smoking about 46 years ago. She has a 5.00 pack-year smoking history. She has never used smokeless tobacco. She reports that she does not drink alcohol or use illicit drugs.    Family History: family history includes Arthritis in her brother; Early death in her son; Fibromyalgia in her sister; Heart disease in her brother, father, and mother; Hypertension in her daughter, father, and sister; Pulmonary embolism in her sister; Scleroderma in her sister. There is no history of Amblyopia, Blindness, Cancer, Cataracts, Diabetes, Glaucoma, Macular degeneration, Retinal detachment, Strabismus, Stroke, or Thyroid disease.    Medications:   Current Outpatient Prescriptions   Medication Sig    amlodipine (NORVASC) 5 MG tablet Take 5 mg by mouth 2 (two) times daily.    ascorbic acid-multivit-min (EMERGEN-C) 1,000 mg PwEP Take 1 Package by mouth once daily.    aspirin 81 mg Tab Take 1 tablet by mouth  every evening. Every day    atorvastatin (LIPITOR) 40 MG tablet Take 40 mg by mouth once daily.     biotin 5 mg Cap Take 1 tablet by mouth 2 (two) times daily.    BYSTOLIC 5 mg Tab Take 2.5 mg by mouth every other day.     CALCIUM CARB/VIT D3/MINERALS (CALCIUM-VITAMIN D ORAL) Take 600 mg by mouth 2 (two) times daily. Calcium 1200 with D 3 1000    coenzyme Q10 100 mg capsule Take 100 mg by mouth once daily.    CRANBERRY CONC/ASCORBIC ACID (CRANBERRY PLUS VITAMIN C ORAL) Take 1 capsule by mouth once daily.    diclofenac sodium (VOLTAREN) 1 % Gel Apply 2 g topically 4 (four) times daily.    DOCUSATE CALCIUM (STOOL SOFTENER ORAL) Take 200 mg by mouth every evening.     FERROUS FUMARATE/VIT BCOMP,C (SUPER B COMPLEX ORAL) Take 1 tablet by mouth once daily.    ferrous gluconate (FERGON) 324 MG tablet Take 324 mg by mouth daily with breakfast.    fluticasone (FLONASE) 50 mcg/actuation nasal spray 2 sprays by Each Nare route once daily. (Patient taking differently: 2 sprays by Each Nare route daily as needed. )    furosemide (LASIX) 40 MG tablet Take 40 mg by mouth once daily. Dr MAMADOU Yung [facility]    hydroxychloroquine (PLAQUENIL) 200 mg tablet Take 200 mg by mouth 2 (two) times daily.    isosorbide mononitrate (IMDUR) 60 MG 24 hr tablet Take 90 mg by mouth once daily. Every day    krill-omega-3-dha-epa-lipids (KRILL OIL) 512-27-63-50 mg Cap Take 1,000 mg by mouth once daily. Braxton krill 300mg qd     LACTOBACILLUS ACIDOPHILUS (PROBIOTIC ORAL) Take 1 each by mouth once daily.    lactobacillus acidophilus (PROBIOTIC) 10 billion cell Cap Take 1 each by mouth once daily. 1.5 billion    lidocaine HCl (ASPERCREME, LIDOCAINE,) 4 % Crea Apply topically 2 (two) times daily.    magnesium oxide (MAG-OX) 400 mg tablet Take 400 mg by mouth 2 (two) times daily.    MULTIVITAMIN WITH MINERALS (ONE-A-DAY 50 PLUS) Tab Take 1 tablet by mouth once daily. Every day    nitroGLYCERIN (NITROLINGUAL) 0.4 mg/dose spray Place 1  spray under the tongue every 5 (five) minutes as needed. PRN    pantoprazole (PROTONIX) 40 MG tablet Take 40 mg by mouth 2 (two) times daily.     potassium chloride SA (K-DUR,KLOR-CON) 10 MEQ tablet Take by mouth once daily. 3 tab q am / 1 tab q pm    promethazine (PHENERGAN) 25 MG tablet Take 1 tablet (25 mg total) by mouth 2 (two) times daily as needed for Nausea.    sertraline (ZOLOFT) 50 MG tablet TAKE 1 TABLET (50 MG TOTAL) BY MOUTH ONCE DAILY.    telmisartan (MICARDIS) 80 MG Tab Take 80 mg by mouth once daily.    tramadol (ULTRAM) 50 mg tablet Take 50 mg by mouth every 8 (eight) hours as needed.     trazodone (DESYREL) 50 MG tablet Take 0.5 tablets (25 mg total) by mouth nightly as needed for Insomnia.    VIT C/E/ZN/COPPR/LUTEIN/ZEAXAN (PRESERVISION AREDS 2 ORAL) Take 1 tablet by mouth once daily. [facility] Ocutabs with Lutein 2  Mg.    vitamin E 400 unit Tab Take 400 mg by mouth once daily. Every day    [DISCONTINUED] hydrochlorothiazide (HYDRODIURIL) 25 MG tablet Take 25 mg by mouth once daily.     No current facility-administered medications for this visit.        Allergies: Patient is allergic to macrodantin  [nitrofurantoin macrocrystalline]; penicillins; and sulfa (sulfonamide antibiotics).      Review of Systems   Constitutional: Negative.    HENT: Positive for hearing loss. Negative for ear discharge, ear pain and tinnitus.    Neurological: Negative for dizziness, seizures, syncope, facial asymmetry, speech difficulty, weakness, light-headedness and headaches.       Objective:      Physical Exam   Constitutional: She appears well-developed and well-nourished. No distress.   HENT:   Head: Normocephalic and atraumatic.   Right Ear: No lacerations. No drainage, swelling or tenderness. No foreign bodies. No mastoid tenderness. Tympanic membrane is scarred and perforated. Tympanic membrane is not injected, not erythematous, not retracted and not bulging. Tympanic membrane mobility is  normal. No middle ear effusion. No hemotympanum. Decreased hearing is noted.   Left Ear: No lacerations. No drainage, swelling or tenderness. No foreign bodies. No mastoid tenderness. Tympanic membrane is not injected, not scarred, not perforated, not erythematous, not retracted and not bulging. Tympanic membrane mobility is normal.  No middle ear effusion. No hemotympanum. No decreased hearing is noted.   Ears:    Mouth/Throat: Oropharynx is clear and moist. No oropharyngeal exudate.   Eyes: Pupils are equal, round, and reactive to light. Right eye exhibits normal extraocular motion and no nystagmus. Left eye exhibits normal extraocular motion and no nystagmus.   Neck: Normal range of motion.   Neurological: She is alert. She has normal strength. She displays no atrophy and no tremor. No cranial nerve deficit or sensory deficit. She exhibits normal muscle tone. She displays a negative Romberg sign. She displays no seizure activity. Coordination and gait normal.   Skin: She is not diaphoretic.           Procedure note:    The patient was brought to the minor procedure room and placed in the supine position. The operating video microscope was brought on to the field and the right ear canal, tympanic membrane and other structures were visualized and shown the the patient and family. The patient tolerated the procedure well and there were no complications.      Audio: B SNHL, slt CHL Right  Assessment:       1. Presbycusis of both ears    2. 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 Cholesteatoma of right ear        Plan:         Patient to see Audiology for hearing aid evaluation.    Cheyanne was seen today for tinnitus.    Diagnoses and all orders for this visit:    Presbycusis of both ears  -     Ambulatory referral to Audiology  -     MRI Brain W WO Contrast; Future  -     Creatinine, serum; Future    Conductive hearing loss of right ear with restricted hearing of left  ear    Cholesteatoma of right ear        DWI scan to ramirez BOYKIN for Chol.

## 2017-03-20 ENCOUNTER — HOSPITAL ENCOUNTER (OUTPATIENT)
Dept: RADIOLOGY | Facility: HOSPITAL | Age: 82
Discharge: HOME | End: 2017-03-20
Attending: OTOLARYNGOLOGY
Payer: MEDICARE

## 2017-03-20 DIAGNOSIS — H91.13 PRESBYCUSIS OF BOTH EARS: ICD-10-CM

## 2017-03-20 PROCEDURE — 25500020 PHARM REV CODE 255: Performed by: OTOLARYNGOLOGY

## 2017-03-20 PROCEDURE — 70553 MRI BRAIN STEM W/O & W/DYE: CPT | Mod: 26,,, | Performed by: RADIOLOGY

## 2017-03-20 PROCEDURE — A9585 GADOBUTROL INJECTION: HCPCS | Performed by: OTOLARYNGOLOGY

## 2017-03-20 PROCEDURE — 70553 MRI BRAIN STEM W/O & W/DYE: CPT | Mod: TC

## 2017-03-20 RX ORDER — GADOBUTROL 604.72 MG/ML
INJECTION INTRAVENOUS
Status: DISPENSED
Start: 2017-03-20 | End: 2017-03-20

## 2017-03-20 RX ORDER — GADOBUTROL 604.72 MG/ML
4 INJECTION INTRAVENOUS
Status: COMPLETED | OUTPATIENT
Start: 2017-03-20 | End: 2017-03-20

## 2017-03-20 RX ADMIN — GADOBUTROL 4 ML: 604.72 INJECTION INTRAVENOUS at 09:03

## 2017-03-22 ENCOUNTER — PATIENT MESSAGE (OUTPATIENT)
Dept: OTOLARYNGOLOGY | Facility: CLINIC | Age: 82
End: 2017-03-22

## 2017-03-22 ENCOUNTER — TELEPHONE (OUTPATIENT)
Dept: OTOLARYNGOLOGY | Facility: CLINIC | Age: 82
End: 2017-03-22

## 2017-06-01 ENCOUNTER — OFFICE VISIT (OUTPATIENT)
Dept: RHEUMATOLOGY | Facility: CLINIC | Age: 82
End: 2017-06-01
Payer: MEDICARE

## 2017-06-01 VITALS
WEIGHT: 110.38 LBS | DIASTOLIC BLOOD PRESSURE: 96 MMHG | BODY MASS INDEX: 22.25 KG/M2 | SYSTOLIC BLOOD PRESSURE: 172 MMHG | HEIGHT: 59 IN

## 2017-06-01 DIAGNOSIS — M35.00 SJOGREN'S SYNDROME, WITH UNSPECIFIED ORGAN INVOLVEMENT: Primary | ICD-10-CM

## 2017-06-01 PROCEDURE — 99213 OFFICE O/P EST LOW 20 MIN: CPT | Mod: ,,, | Performed by: INTERNAL MEDICINE

## 2017-06-01 RX ORDER — TRAMADOL HYDROCHLORIDE 50 MG/1
100 TABLET ORAL EVERY 6 HOURS PRN
Qty: 180 TABLET | Refills: 3 | Status: SHIPPED | OUTPATIENT
Start: 2017-06-01 | End: 2017-12-07 | Stop reason: SDUPTHER

## 2017-06-01 RX ORDER — HYDROXYCHLOROQUINE SULFATE 200 MG/1
200 TABLET, FILM COATED ORAL 2 TIMES DAILY
Qty: 60 TABLET | Refills: 3 | Status: SHIPPED | OUTPATIENT
Start: 2017-06-01 | End: 2017-10-05 | Stop reason: SDUPTHER

## 2017-06-01 NOTE — PROGRESS NOTES
[facility] RHEUMATOLOGY            PROGRESS NOTE      Subjective:       Patient ID:   NAME: Cheyanne Gomes : 1935     81 y.o. female    Referring Doc: Self, Aaareferral  Other Physicians:    Chief Complaint:  Disease Management (follow up)  Sjogrens,Osteoarthritis,Osteoporosis    History of Present Illness:     Patient returns today for a regularly scheduled follow-up visit.     The patient is doing well. No rashes: No mucosal ulcerations.  No persistent numbness or tingling sensation in her extremities. No weight loss or night sweats            ROS:   GEN: normal without any fever, night sweats or weight loss  HEENT: normal with no HA's, sore throat, stiff neck, changes in vision,no mouth ulcers,no sicca symptoms,no scalp tenderness,jaw claudication  CV: normal with no CP, SOB, PND, SALINAS or orthopnea,no palpitations  PULM: normal with no SOB, cough, hemoptysis, sputum or pleuritic pain  GI: normal with no abdominal pain, nausea, vomiting, constipation, diarrhea, melanotic stools, BRBPR, or hematemesis,no dysphagia  : normal with no hematuria, dysuria  NEURO:no paresthesias,tremors,headaches,visual disturbances  SKIN: normal with no rash, erythema, bruising, or swelling,no Raynauds,no photosensitivity  MUSCULOSKELETAL:no joint swelling,prolonged AM stiffness,no back pain  Allergies:  Review of patient's allergies indicates:   Allergen Reactions    Macrodantin  [nitrofurantoin macrocrystalline]      Other reaction(s): very sickly    Penicillins      Other reaction(s): swelling  Other reaction(s): red skin discolorati    Sulfa (sulfonamide antibiotics)      Other reaction(s): very sickly       Medications:    Current Outpatient Prescriptions:     amlodipine (NORVASC) 5 MG tablet, Take 5 mg by mouth 2 (two) times daily., Disp: , Rfl:     ascorbic acid-multivit-min (EMERGEN-C) 1,000 mg PwEP, Take 1 Package by mouth once daily., Disp: , Rfl:     aspirin 81 mg Tab, Take 1 tablet by mouth every  evening. Every day, Disp: , Rfl:     atorvastatin (LIPITOR) 40 MG tablet, Take 40 mg by mouth once daily. , Disp: , Rfl:     biotin 5 mg Cap, Take 1 tablet by mouth 2 (two) times daily., Disp: , Rfl:     BYSTOLIC 5 mg Tab, Take 2.5 mg by mouth every other day. , Disp: , Rfl:     CALCIUM CARB/VIT D3/MINERALS (CALCIUM-VITAMIN D ORAL), Take 600 mg by mouth 2 (two) times daily. Calcium 1200 with D 3 1000, Disp: , Rfl:     coenzyme Q10 100 mg capsule, Take 100 mg by mouth once daily., Disp: , Rfl:     CRANBERRY CONC/ASCORBIC ACID (CRANBERRY PLUS VITAMIN C ORAL), Take 1 capsule by mouth once daily., Disp: , Rfl:     DOCUSATE CALCIUM (STOOL SOFTENER ORAL), Take 200 mg by mouth every evening. , Disp: , Rfl:     FERROUS FUMARATE/VIT BCOMP,C (SUPER B COMPLEX ORAL), Take 1 tablet by mouth once daily., Disp: , Rfl:     ferrous gluconate (FERGON) 324 MG tablet, Take 324 mg by mouth daily with breakfast., Disp: , Rfl:     fluticasone (FLONASE) 50 mcg/actuation nasal spray, 2 sprays by Each Nare route once daily. (Patient taking differently: 2 sprays by Each Nare route daily as needed. ), Disp: 16 g, Rfl: 0    furosemide (LASIX) 40 MG tablet, Take 40 mg by mouth once daily. Dr MAMADOU Yung [facility], Disp: , Rfl:     hydroxychloroquine (PLAQUENIL) 200 mg tablet, Take 200 mg by mouth 2 (two) times daily., Disp: , Rfl: 1    isosorbide mononitrate (IMDUR) 60 MG 24 hr tablet, Take 90 mg by mouth once daily. Every day, Disp: , Rfl:     krill-omega-3-dha-epa-lipids (KRILL OIL) 186-96-15-50 mg Cap, Take 1,000 mg by mouth once daily. Braxton krill 300mg qd , Disp: , Rfl:     LACTOBACILLUS ACIDOPHILUS (PROBIOTIC ORAL), Take 1 each by mouth once daily., Disp: , Rfl:     lactobacillus acidophilus (PROBIOTIC) 10 billion cell Cap, Take 1 each by mouth once daily. 1.5 billion, Disp: , Rfl:     magnesium oxide (MAG-OX) 400 mg tablet, Take 400 mg by mouth 2 (two) times daily., Disp: , Rfl:     MULTIVITAMIN WITH MINERALS (ONE-A-DAY  "50 PLUS) Tab, Take 1 tablet by mouth once daily. Every day, Disp: , Rfl:     nitroGLYCERIN (NITROLINGUAL) 0.4 mg/dose spray, Place 1 spray under the tongue every 5 (five) minutes as needed. PRN, Disp: , Rfl:     pantoprazole (PROTONIX) 40 MG tablet, Take 40 mg by mouth 2 (two) times daily. , Disp: , Rfl:     potassium chloride SA (K-DUR,KLOR-CON) 10 MEQ tablet, Take by mouth once daily. 3 tab q am / 1 tab q pm, Disp: , Rfl: 3    promethazine (PHENERGAN) 25 MG tablet, Take 1 tablet (25 mg total) by mouth 2 (two) times daily as needed for Nausea., Disp: 60 tablet, Rfl: 1    sertraline (ZOLOFT) 50 MG tablet, TAKE 1 TABLET (50 MG TOTAL) BY MOUTH ONCE DAILY., Disp: 30 tablet, Rfl: 10    telmisartan (MICARDIS) 80 MG Tab, Take 80 mg by mouth once daily., Disp: , Rfl:     tramadol (ULTRAM) 50 mg tablet, Take 50 mg by mouth every 8 (eight) hours as needed. , Disp: , Rfl:     trazodone (DESYREL) 50 MG tablet, Take 0.5 tablets (25 mg total) by mouth nightly as needed for Insomnia., Disp: 30 tablet, Rfl: 1    VIT C/E/ZN/COPPR/LUTEIN/ZEAXAN (PRESERVISION AREDS 2 ORAL), Take 1 tablet by mouth once daily. [facility] Ocutabs with Lutein 2  Mg., Disp: , Rfl:     vitamin E 400 unit Tab, Take 400 mg by mouth once daily. Every day, Disp: , Rfl:     PMHx/PSHx Updates:        LAST DEXA : 2 yrs ago (due this yr)    Last Eye Exam: UTD      Objective:     Vitals:  Blood pressure (!) 172/96, height 4' 11" (1.499 m), weight 50.1 kg (110 lb 6.4 oz).    Physical Examination:   GEN: no apparent distress, comfortable; AAOx3  HEAD: atraumatic and normocephalic  EYES: no pallor, no icterus, PERRLA  ENT: OMM, no pharyngeal erythema, ,slight  mucosal dryness or ulcerations  NECK: no masses, thyroid normal, trachea midline, no LAD/LN's, supple  CV: RRR with no murmur; normal pulse; normal S1 and S2; no pedal edema  CHEST: Normal respiratory effort; CTAB; normal breath sounds; no wheeze or crackles  ABDOM: nontender and " nondistended; soft; normal bowel sounds; no rebound/guarding  MUSC/Skeletal: ROM normal; no crepitus; joints normal; no deformities or arthropathy    Physical Exam  EXTREM: no clubbing, cyanosis, inflammation or swelling  SKIN: no rashes, lesions, ulcers, petechiae or subcutaneous nodules,no sclerodactyly,no Raynaud's.no periungual erythema    NEURO: grossly intact;  WNL; AAOx3; no tremors  PSYCH: normal mood, affect and behaviorLNs  LYMPH: normal cervical, supraclavicular          Labs:   Lab Results   Component Value Date    WBC 6.5 01/26/2017    HGB 8.2 (L) 01/26/2017    HCT 26.1 (L) 01/26/2017    MCV 86.1 01/26/2017     01/26/2017    CMP  Sodium   Date Value Ref Range Status   01/26/2017 136 134 - 144 mmol/L      Potassium   Date Value Ref Range Status   01/26/2017 4.5 3.5 - 5.0 mmol/L      Chloride   Date Value Ref Range Status   01/26/2017 103 98 - 110 mmol/L      CO2   Date Value Ref Range Status   01/26/2017 24.8 22.8 - 31.6 mmol/L      Glucose   Date Value Ref Range Status   01/26/2017 75 70 - 99 mg/dL      BUN, Bld   Date Value Ref Range Status   01/26/2017 17 8 - 20 mg/dL      Creatinine   Date Value Ref Range Status   03/13/2017 1.1 0.5 - 1.4 mg/dL Final   01/26/2017 0.98 0.60 - 1.40 mg/dL      Calcium   Date Value Ref Range Status   01/26/2017 8.0 7.7 - 10.4 mg/dL      Total Protein   Date Value Ref Range Status   01/26/2017 5.8 (L) 6.0 - 8.2 g/dL      Albumin   Date Value Ref Range Status   01/26/2017 2.6 (L) 3.1 - 4.7 g/dL      Total Bilirubin   Date Value Ref Range Status   01/26/2017 0.4 0.3 - 1.0 mg/dL      Alkaline Phosphatase   Date Value Ref Range Status   01/26/2017 81 40 - 104 IU/L      AST   Date Value Ref Range Status   01/26/2017 46 (H) 10 - 40 IU/L      ALT   Date Value Ref Range Status   02/26/2015 12 10 - 44 U/L Final     I have reviewed all available lab results and radiology reports.    Radiology/Diagnostic Studies:        Assessment/Plan:   (1) 81 y.o. female with diagnosis of   Sjogrens,OA She is doing well.      (2) Anemia..had GI work up,Fe supplements with marked improvement..Hgb now 11.8      3) Elevated BP..will see cardiologist soon      Discussion:     I have explained all of the above in detail and the patient understands all of the current recommendation(s). I have answered all of ashley questions to the best of my ability and to her complete satisfaction.   The patient is to continue with the current management plan Continue current medications. . Return to clinic in 3-4 months     Electronically signed by Deepak Erazo MD

## 2017-06-07 ENCOUNTER — OFFICE VISIT (OUTPATIENT)
Dept: HEMATOLOGY/ONCOLOGY | Facility: CLINIC | Age: 82
End: 2017-06-07
Payer: MEDICARE

## 2017-06-07 VITALS
BODY MASS INDEX: 22.4 KG/M2 | SYSTOLIC BLOOD PRESSURE: 130 MMHG | DIASTOLIC BLOOD PRESSURE: 69 MMHG | RESPIRATION RATE: 16 BRPM | WEIGHT: 111.13 LBS | TEMPERATURE: 98 F | HEART RATE: 51 BPM | HEIGHT: 59 IN

## 2017-06-07 DIAGNOSIS — D63.8 ANEMIA OF OTHER CHRONIC DISEASE: Chronic | ICD-10-CM

## 2017-06-07 PROCEDURE — 99213 OFFICE O/P EST LOW 20 MIN: CPT | Mod: ,,, | Performed by: INTERNAL MEDICINE

## 2017-06-07 PROCEDURE — 1159F MED LIST DOCD IN RCRD: CPT | Mod: ,,, | Performed by: INTERNAL MEDICINE

## 2017-06-07 PROCEDURE — 1157F ADVNC CARE PLAN IN RCRD: CPT | Mod: ,,, | Performed by: INTERNAL MEDICINE

## 2017-06-07 PROCEDURE — 1126F AMNT PAIN NOTED NONE PRSNT: CPT | Mod: ,,, | Performed by: INTERNAL MEDICINE

## 2017-06-07 NOTE — PROGRESS NOTES
PROGRESS NOTE    Subjective:       Patient ID: Cheyanne Gomes is a 81 y.o. female.    Chief Complaint:  Follow-up      History of Present Illness:   Cheyanne Gomes is a 81 y.o. female who presents with a history of anemia of chronic disease, significant. 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  Feeling well otherwise.         Family and Social history reviewed and is unchanged from 8/9/2016.       ROS:  Review of Systems   Constitutional: Negative for fever.   Respiratory: Negative for shortness of breath.    Cardiovascular: Negative for chest pain and leg swelling.   Gastrointestinal: Negative for abdominal pain and blood in stool.   Genitourinary: Negative for hematuria.   Skin: Negative for rash.          Current Outpatient Prescriptions:     amlodipine (NORVASC) 5 MG tablet, Take 5 mg by mouth 2 (two) times daily., Disp: , Rfl:     ascorbic acid-multivit-min (EMERGEN-C) 1,000 mg PwEP, Take 1 Package by mouth once daily., Disp: , Rfl:     aspirin 81 mg Tab, Take 1 tablet by mouth every evening. Every day, Disp: , Rfl:     atorvastatin (LIPITOR) 40 MG tablet, Take 40 mg by mouth once daily. , Disp: , Rfl:     biotin 5 mg Cap, Take 1 tablet by mouth 2 (two) times daily., Disp: , Rfl:     BYSTOLIC 5 mg Tab, Take 2.5 mg by mouth every other day. , Disp: , Rfl:     CALCIUM CARB/VIT D3/MINERALS (CALCIUM-VITAMIN D ORAL), Take 600 mg by mouth 2 (two) times daily. Calcium 1200 with D 3 1000, Disp: , Rfl:     coenzyme Q10 100 mg capsule, Take 100 mg by mouth once daily., Disp: , Rfl:     CRANBERRY CONC/ASCORBIC ACID (CRANBERRY PLUS VITAMIN C ORAL), Take 1 capsule by mouth once daily., Disp: , Rfl:     DOCUSATE CALCIUM (STOOL SOFTENER ORAL), Take 200 mg by mouth every evening. , Disp: , Rfl:     FERROUS FUMARATE/VIT BCOMP,C (SUPER B COMPLEX ORAL), Take 1 tablet by mouth once daily., Disp: , Rfl:     ferrous gluconate (FERGON)  324 MG tablet, Take 324 mg by mouth daily with breakfast., Disp: , Rfl:     fluticasone (FLONASE) 50 mcg/actuation nasal spray, 2 sprays by Each Nare route once daily. (Patient taking differently: 2 sprays by Each Nare route daily as needed. ), Disp: 16 g, Rfl: 0    furosemide (LASIX) 40 MG tablet, Take 40 mg by mouth once daily. Dr MAMADOU Yung [facility], Disp: , Rfl:     hydroxychloroquine (PLAQUENIL) 200 mg tablet, Take 1 tablet (200 mg total) by mouth 2 (two) times daily., Disp: 60 tablet, Rfl: 3    isosorbide mononitrate (IMDUR) 60 MG 24 hr tablet, Take 90 mg by mouth once daily. Every day, Disp: , Rfl:     krill-omega-3-dha-epa-lipids (KRILL OIL) 510-28-73-50 mg Cap, Take 1,000 mg by mouth once daily. Braxton krill 300mg qd , Disp: , Rfl:     LACTOBACILLUS ACIDOPHILUS (PROBIOTIC ORAL), Take 1 each by mouth once daily., Disp: , Rfl:     lactobacillus acidophilus (PROBIOTIC) 10 billion cell Cap, Take 1 each by mouth once daily. 1.5 billion, Disp: , Rfl:     magnesium oxide (MAG-OX) 400 mg tablet, Take 400 mg by mouth 2 (two) times daily., Disp: , Rfl:     MULTIVITAMIN WITH MINERALS (ONE-A-DAY 50 PLUS) Tab, Take 1 tablet by mouth once daily. Every day, Disp: , Rfl:     nitroGLYCERIN (NITROLINGUAL) 0.4 mg/dose spray, Place 1 spray under the tongue every 5 (five) minutes as needed. PRN, Disp: , Rfl:     pantoprazole (PROTONIX) 40 MG tablet, Take 40 mg by mouth 2 (two) times daily. , Disp: , Rfl:     potassium chloride SA (K-DUR,KLOR-CON) 10 MEQ tablet, Take by mouth once daily. 3 tab q am / 1 tab q pm, Disp: , Rfl: 3    promethazine (PHENERGAN) 25 MG tablet, Take 1 tablet (25 mg total) by mouth 2 (two) times daily as needed for Nausea., Disp: 60 tablet, Rfl: 1    sertraline (ZOLOFT) 50 MG tablet, TAKE 1 TABLET (50 MG TOTAL) BY MOUTH ONCE DAILY., Disp: 30 tablet, Rfl: 10    telmisartan (MICARDIS) 80 MG Tab, Take 80 mg by mouth once daily., Disp: , Rfl:     tramadol (ULTRAM) 50 mg tablet, Take 2 tablets  "(100 mg total) by mouth every 6 (six) hours as needed., Disp: 180 tablet, Rfl: 3    trazodone (DESYREL) 50 MG tablet, Take 0.5 tablets (25 mg total) by mouth nightly as needed for Insomnia., Disp: 30 tablet, Rfl: 1    VIT C/E/ZN/COPPR/LUTEIN/ZEAXAN (PRESERVISION AREDS 2 ORAL), Take 1 tablet by mouth once daily. [facility] Ocutabs with Lutein 2  Mg., Disp: , Rfl:     vitamin E 400 unit Tab, Take 400 mg by mouth once daily. Every day, Disp: , Rfl:         Objective:       Physical Examination:     /69 (BP Location: Right arm, Patient Position: Sitting, BP Method: Automatic)   Pulse (!) 51   Temp 98.1 °F (36.7 °C) (Oral)   Resp 16   Ht 4' 11" (1.499 m)   Wt 50.4 kg (111 lb 1.6 oz)   BMI 22.44 kg/m²     Physical Exam   Constitutional: She appears well-developed and well-nourished.   HENT:   Head: Normocephalic and atraumatic.   Right Ear: External ear normal.   Left Ear: External ear normal.   Mouth/Throat: Oropharynx is clear and moist.   Eyes: Conjunctivae are normal. Pupils are equal, round, and reactive to light.   Neck: No tracheal deviation present. No thyromegaly present.   Cardiovascular: Normal rate, regular rhythm and normal heart sounds.    Pulmonary/Chest: Effort normal and breath sounds normal.   Abdominal: Soft. Bowel sounds are normal. She exhibits no distension and no mass. There is no tenderness.   Musculoskeletal: She exhibits no edema.   Neurological:   Neuro intact througout   Skin: No rash noted.   Psychiatric: She has a normal mood and affect. Her behavior is normal. Judgment and thought content normal.       Labs:   No results found for this or any previous visit (from the past 336 hour(s)).  CMP  Sodium   Date Value Ref Range Status   01/26/2017 136 134 - 144 mmol/L      Potassium   Date Value Ref Range Status   01/26/2017 4.5 3.5 - 5.0 mmol/L      Chloride   Date Value Ref Range Status   01/26/2017 103 98 - 110 mmol/L      CO2   Date Value Ref Range Status   01/26/2017 " 24.8 22.8 - 31.6 mmol/L      Glucose   Date Value Ref Range Status   01/26/2017 75 70 - 99 mg/dL      BUN, Bld   Date Value Ref Range Status   01/26/2017 17 8 - 20 mg/dL      Creatinine   Date Value Ref Range Status   03/13/2017 1.1 0.5 - 1.4 mg/dL Final   01/26/2017 0.98 0.60 - 1.40 mg/dL      Calcium   Date Value Ref Range Status   01/26/2017 8.0 7.7 - 10.4 mg/dL      Total Protein   Date Value Ref Range Status   01/26/2017 5.8 (L) 6.0 - 8.2 g/dL      Albumin   Date Value Ref Range Status   01/26/2017 2.6 (L) 3.1 - 4.7 g/dL      Total Bilirubin   Date Value Ref Range Status   01/26/2017 0.4 0.3 - 1.0 mg/dL      Alkaline Phosphatase   Date Value Ref Range Status   01/26/2017 81 40 - 104 IU/L      AST   Date Value Ref Range Status   01/26/2017 46 (H) 10 - 40 IU/L      ALT   Date Value Ref Range Status   02/26/2015 12 10 - 44 U/L Final     Anion Gap   Date Value Ref Range Status   03/25/2015 12 8 - 16 mmol/L Final   03/25/2015 12 8 - 16 mmol/L Final     eGFR if    Date Value Ref Range Status   03/13/2017 54.4 (A) >60 mL/min/1.73 m^2 Final     eGFR if non    Date Value Ref Range Status   03/13/2017 47.2 (A) >60 mL/min/1.73 m^2 Final     Comment:     Calculation used to obtain the estimated glomerular filtration  rate (eGFR) is the CKD-EPI equation. Since race is unknown   in our information system, the eGFR values for   -American and Non--American patients are given   for each creatinine result.       No results found for: CEA  No results found for: PSA        Assessment/Plan:   Anemia of other chronic disease  Patient has had a significant anemia and requried blood transfusion in Feb of this year.  Labs done in [place] on May 18, 2017 show a Hb of 11.8g/dl.  Patient feeling much better at this point as well.       Had EGD which showed gastritis and colon which showed a polyp but no active site of bleeding.  Need capsule endo report from Dr. Seals but it appears that she  may have AVMs.     Continue iron treatment and lab check      Discussion:     Return in about 6 months (around 12/7/2017).      Electronically signed by David Robb

## 2017-06-07 NOTE — ASSESSMENT & PLAN NOTE
Patient has had a significant anemia and requried blood transfusion in Feb of this year.  Labs done in [place] on May 18, 2017 show a Hb of 11.8g/dl.  Patient feeling much better at this point as well.       Had EGD which showed gastritis and colon which showed a polyp but no active site of bleeding.  Need capsule endo report from Dr. Seals but it appears that she may have AVMs.     Continue iron treatment and lab check

## 2017-09-13 NOTE — PATIENT INSTRUCTIONS
Controlling High Blood Pressure  High blood pressure (hypertension) is often called the silent killer. This is because many people who have it dont know it. High blood pressure is defined as 140/90 mm Hg or higher. Know your blood pressure and remember to check it regularly. Doing so can save your life. Here are some things you can do to help control your blood pressure.    Choose heart-healthy foods  · Select low-salt, low-fat foods. Limit sodium intake to 2,400 mg per day or the amount suggested by your healthcare provider.  · Limit canned, dried, cured, packaged, and fast foods. These can contain a lot of salt.  · Eat 8 to 10 servings of fruits and vegetables every day.  · Choose lean meats, fish, or chicken.  · Eat whole-grain pasta, brown rice, and beans.  · Eat 2 to 3 servings of low-fat or fat-free dairy products.  · Ask your doctor about the DASH eating plan. This plan helps reduce blood pressure.  · When you go to a restaurant, ask that your meal be prepared with no added salt.  Maintain a healthy weight  · Ask your healthcare provider how many calories to eat a day. Then stick to that number.  · Ask your healthcare provider what weight range is healthiest for you. If you are overweight, a weight loss of only 3% to 5% of your body weight can help lower blood pressure. Generally, a good weight loss goal is to lose 10% of your body weight in a year.  · Limit snacks and sweets.  · Get regular exercise.  Get up and get active  · Choose activities you enjoy. Find ones you can do with friends or family. This includes bicycling, dancing, walking, and jogging.  · Park farther away from building entrances.  · Use stairs instead of the elevator.  · When you can, walk or bike instead of driving.  · [place] leaves, garden, or do household repairs.  · Be active at a moderate to vigorous level of physical activity for at least 40 minutes for a minimum of 3 to 4 days a week.   Manage stress  · Make time to relax and enjoy  Pt developed chills, oral temp of 37.2 and tachycardic.  Pt has stomach pain, 5/10. PA Omid made aware. life. Find time to laugh.  · Communicate your concerns with your loved ones and your healthcare provider.  · Visit with family and friends, and keep up with hobbies.  Limit alcohol and quit smoking  · Men should have no more than 2 drinks per day.  · Women should have no more than 1 drink per day.  · Talk with your healthcare provider about quitting smoking. Smoking significantly increases your risk for heart disease and stroke. Ask your healthcare provider about community smoking cessation programs and other options.  Medicines  If lifestyle changes arent enough, your healthcare provider may prescribe high blood pressure medicine. Take all medicines as prescribed. If you have any questions about your medicines, ask your healthcare provider before stopping or changing them.   © 3660-7568 [employer] [address] [place] [address], [place] [address]. All rights reserved. This information is not intended as a substitute for professional medical care. Always follow your healthcare professional's instructions.

## 2017-09-30 ENCOUNTER — NURSE TRIAGE (OUTPATIENT)
Dept: ADMINISTRATIVE | Facility: CLINIC | Age: 82
End: 2017-09-30

## 2017-09-30 ENCOUNTER — OFFICE VISIT (OUTPATIENT)
Dept: URGENT CARE | Facility: CLINIC | Age: 82
End: 2017-09-30
Payer: MEDICARE

## 2017-09-30 VITALS
WEIGHT: 107 LBS | TEMPERATURE: 98 F | HEART RATE: 62 BPM | SYSTOLIC BLOOD PRESSURE: 175 MMHG | HEIGHT: 58 IN | RESPIRATION RATE: 16 BRPM | DIASTOLIC BLOOD PRESSURE: 76 MMHG | BODY MASS INDEX: 22.46 KG/M2

## 2017-09-30 DIAGNOSIS — R10.9 ABDOMINAL PAIN, UNSPECIFIED LOCATION: Primary | ICD-10-CM

## 2017-09-30 PROCEDURE — 1125F AMNT PAIN NOTED PAIN PRSNT: CPT | Mod: S$GLB,,, | Performed by: INTERNAL MEDICINE

## 2017-09-30 PROCEDURE — 1157F ADVNC CARE PLAN IN RCRD: CPT | Mod: S$GLB,,, | Performed by: INTERNAL MEDICINE

## 2017-09-30 PROCEDURE — 1159F MED LIST DOCD IN RCRD: CPT | Mod: S$GLB,,, | Performed by: INTERNAL MEDICINE

## 2017-09-30 PROCEDURE — 3077F SYST BP >= 140 MM HG: CPT | Mod: S$GLB,,, | Performed by: INTERNAL MEDICINE

## 2017-09-30 PROCEDURE — 3078F DIAST BP <80 MM HG: CPT | Mod: S$GLB,,, | Performed by: INTERNAL MEDICINE

## 2017-09-30 PROCEDURE — 99213 OFFICE O/P EST LOW 20 MIN: CPT | Mod: S$GLB,,, | Performed by: INTERNAL MEDICINE

## 2017-09-30 NOTE — PROGRESS NOTES
"Subjective:       Patient ID: Cheyanne Gomes is a 81 y.o. female.    Vitals:  height is 4' 10" (1.473 m) and weight is 48.5 kg (107 lb). Her oral temperature is 97.9 °F (36.6 °C). Her blood pressure is 175/76 (abnormal) and her pulse is 62. Her respiration is 16.     Chief Complaint: Abdominal Pain (pt c/o LLQ pain x 3-4 day pt states she has a BM this morning , but not a regular one . States she feels like she has gas); Weakness; Fatigue; and Fever (pt states sge did not actually check her temp)    Persistent left abdominal pain for several days.  The only change in bowel habits is that she may have several movements in one day that are slightly loose.  She denies any change in diet but has decrease appetite.  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  She has a history of diverticulosis but not diverticulitis.      Abdominal Pain   This is a new problem. The current episode started in the past 7 days. The onset quality is gradual. The problem occurs daily. The problem has been gradually worsening. The pain is located in the LLQ and LUQ. The pain is at a severity of 3/10. The pain is moderate. The quality of the pain is aching, colicky and cramping. The abdominal pain radiates to the LLQ. Associated symptoms include constipation and a fever. Pertinent negatives include no belching, diarrhea, dysuria, hematochezia, melena, nausea or vomiting. Nothing aggravates the pain. The pain is relieved by bowel movements and passing flatus. The treatment provided mild relief. Her past medical history is significant for GERD.   Fatigue   Associated symptoms include abdominal pain, a change in bowel habit, fatigue, a fever and weakness. Pertinent negatives include no chest pain, chills, coughing, nausea or vomiting.   Fever    Associated symptoms include abdominal pain. Pertinent negatives include no chest pain, coughing, diarrhea, nausea, urinary pain or vomiting.     Review of " Systems   Constitution: Positive for fatigue, fever, weakness and malaise/fatigue. Negative for chills.   Cardiovascular: Negative for chest pain.   Respiratory: Negative for cough and shortness of breath.    Musculoskeletal: Negative for back pain.   Gastrointestinal: Positive for abdominal pain, change in bowel habit and constipation. Negative for diarrhea, hematochezia, melena, nausea and vomiting.   Genitourinary: Negative for dysuria.       Objective:      Physical Exam   Constitutional: She is oriented to person, place, and time. She appears well-developed and well-nourished. No distress.   HENT:   Head: Normocephalic and atraumatic.   Right Ear: External ear normal.   Left Ear: External ear normal.   Nose: Nose normal.   Mouth/Throat: Mucous membranes are normal.   Eyes: Conjunctivae and lids are normal. Pupils are equal, round, and reactive to light. No scleral icterus.   Neck: Trachea normal and full passive range of motion without pain. Neck supple.   Cardiovascular: Normal rate, regular rhythm and normal heart sounds.    Pulmonary/Chest: Effort normal and breath sounds normal. No respiratory distress.   Abdominal: Soft. Normal appearance and bowel sounds are normal. She exhibits no distension, no abdominal bruit, no pulsatile midline mass and no mass. There is tenderness. There is guarding. There is no rebound.   Diffuse left abdominal TTP without rebound (Upper greater than lower).  BS's quiet   Musculoskeletal: Normal range of motion. She exhibits no edema or tenderness.   Lymphadenopathy:     She has no cervical adenopathy.   Neurological: She is alert and oriented to person, place, and time. She has normal strength.   Skin: Skin is warm, dry and intact. No rash noted. She is not diaphoretic.   Psychiatric: She has a normal mood and affect. Her speech is normal. Cognition and memory are normal.   Nursing note and vitals reviewed.      Assessment:       1. Abdominal pain, unspecified location         Plan:         Abdominal pain, unspecified location    Other orders  -     Refer to Emergency Dept.

## 2017-09-30 NOTE — TELEPHONE ENCOUNTER
Reason for Disposition   [1] MILD-MODERATE pain AND [2] constant AND [3] present > 2 hours    Protocols used: [facility] - FEMALE-A-AH

## 2017-09-30 NOTE — TELEPHONE ENCOUNTER
"  Answer Assessment - Initial Assessment Questions  1. LOCATION: "Where does it hurt?"       Lower abdominal (under panty line)  2. RADIATION: "Does the pain shoot anywhere else?" (e.g., lower back, groin, thighs)      No   3. ONSET: "When did the pain begin?" (e.g., minutes, hours or days ago)       Three days ago  4. SUDDEN: "Gradual or sudden onset?"      Gradual   5. PATTERN "Does the pain come and go, or is it constant?"     - If constant: "Is it getting better, staying the same, or worsening?"       (Note: Constant means the pain never goes away completely; most serious pain is constant and gets worse over time)      - If intermittent: "How long does it last?" "Do you have pain now?"      (Note: Intermittent means the pain goes away completely between bouts)      Constant   6. SEVERITY: "How bad is the pain?"  (e.g., Scale 1-10; mild, moderate, or severe)    - MILD (1-3): doesn't interfere with normal activities, area soft and not tender to touch     - MODERATE (4-7): interferes with normal activities or awakens from sleep, tender to touch     - SEVERE (8-10): excruciating pain, doubled over, unable to do any normal activities       4/10  7. RECURRENT SYMPTOM: "Have you ever had this type of pelvic pain before?" If so, ask: "When was the last time?" and "What happened that time?"       Yes years ago was informed by MD "possibly had diverticulitis"    8. CAUSE: "What do you think is causing the pelvic pain?"      See aove  9. RELIEVING/AGGRAVATING FACTORS: "What makes it better or worse?" (e.g., activity/rest, sexual intercourse, voiding, passing stool)      Unanswered   10. OTHER SYMPTOMS: "Has there been any other symptoms?" (e.g., fever, vaginal bleeding, diarrhea, constipation, or voiding problems?"        No   11. PREGNANCY: "Is there any chance you are pregnant?" "When was your last menstrual period?"        --Age Consideration--    Protocols used: [facility] PAIN - FEMALE-A-AH    "

## 2017-10-03 ENCOUNTER — TELEPHONE (OUTPATIENT)
Dept: URGENT CARE | Facility: CLINIC | Age: 82
End: 2017-10-03

## 2017-10-05 ENCOUNTER — OFFICE VISIT (OUTPATIENT)
Dept: RHEUMATOLOGY | Facility: CLINIC | Age: 82
End: 2017-10-05
Payer: MEDICARE

## 2017-10-05 VITALS
HEIGHT: 58 IN | BODY MASS INDEX: 23.16 KG/M2 | WEIGHT: 110.31 LBS | DIASTOLIC BLOOD PRESSURE: 66 MMHG | SYSTOLIC BLOOD PRESSURE: 120 MMHG | RESPIRATION RATE: 16 BRPM

## 2017-10-05 DIAGNOSIS — M81.0 AGE RELATED OSTEOPOROSIS, UNSPECIFIED PATHOLOGICAL FRACTURE PRESENCE: ICD-10-CM

## 2017-10-05 DIAGNOSIS — M35.01 SJOGREN'S SYNDROME WITH KERATOCONJUNCTIVITIS SICCA: ICD-10-CM

## 2017-10-05 DIAGNOSIS — M35.00 SJOGREN'S SYNDROME, WITH UNSPECIFIED ORGAN INVOLVEMENT: ICD-10-CM

## 2017-10-05 DIAGNOSIS — M05.759: Primary | ICD-10-CM

## 2017-10-05 PROCEDURE — 99213 OFFICE O/P EST LOW 20 MIN: CPT | Mod: ,,, | Performed by: INTERNAL MEDICINE

## 2017-10-05 RX ORDER — HYDROXYCHLOROQUINE SULFATE 200 MG/1
200 TABLET, FILM COATED ORAL 2 TIMES DAILY
Qty: 60 TABLET | Refills: 3 | Status: SHIPPED | OUTPATIENT
Start: 2017-10-05 | End: 2018-03-06 | Stop reason: SDUPTHER

## 2017-10-05 RX ORDER — GABAPENTIN 100 MG/1
100 CAPSULE ORAL NIGHTLY
Qty: 30 CAPSULE | Refills: 3 | Status: SHIPPED | OUTPATIENT
Start: 2017-10-05 | End: 2017-11-06

## 2017-10-05 NOTE — PROGRESS NOTES
[facility] RHEUMATOLOGY            PROGRESS NOTE      Subjective:       Patient ID:   NAME: Cheyanne Gomes : 1935     81 y.o. female    Referring Doc: No ref. provider found  Other Physicians:    Chief Complaint:  Sjogrens Syndrome      History of Present Illness:     Patient returns today for a regularly scheduled follow-up visit for  Sjogren's/RA and Osteoporosis    The patientIs doing well except for pain in her feet mainly at night. She has a history of neuropathy in her feet. No chest pains cough shortness of breath. No joint swelling or significant pain            ROS:   GEN:  No  fever, night sweats . weight is stable   some fatigue  SKIN: no rashes, no bruising, no ulcerations, no Raynaud's  HEENT: no HA's, No visual changes, no mucosal ulcers, + sicca symptoms,  CV:   no CP, SOB, PND, SALINAS, no orthopnea, no palpitations  PULM: normal with no SOB, cough, hemoptysis, sputum or pleuritic pain  GI:  no abdominal pain, nausea, vomiting, constipation, diarrhea, melanotic stools, BRBPR, hematemesis, no dysphagia  :   no dysuria  NEURO: + paresthesias in feet, No headaches, visual disturbances, muscle weakness  MUSCULOSKELETAL:no joint swelling, prolonged AM stiffness, no back pain, no muscle pain  Allergies:  Review of patient's allergies indicates:   Allergen Reactions    Macrodantin  [nitrofurantoin macrocrystalline]      Other reaction(s): very sickly    Penicillins      Other reaction(s): swelling  Other reaction(s): red skin discolorati    Sulfa (sulfonamide antibiotics)      Other reaction(s): very sickly       Medications:    Current Outpatient Prescriptions:     ascorbic acid-multivit-min (EMERGEN-C) 1,000 mg PwEP, Take 1 Package by mouth once daily., Disp: , Rfl:     aspirin 81 mg Tab, Take 1 tablet by mouth every evening. Every day, Disp: , Rfl:     atorvastatin (LIPITOR) 40 MG tablet, Take 40 mg by mouth once daily. , Disp: , Rfl:     biotin 5 mg Cap, Take 1 tablet by mouth 2 (two)  times daily., Disp: , Rfl:     CALCIUM CARB/VIT D3/MINERALS (CALCIUM-VITAMIN D ORAL), Take 600 mg by mouth 2 (two) times daily. Calcium 1200 with D 3 1000, Disp: , Rfl:     coenzyme Q10 100 mg capsule, Take 100 mg by mouth once daily., Disp: , Rfl:     CRANBERRY CONC/ASCORBIC ACID (CRANBERRY PLUS VITAMIN C ORAL), Take 1 capsule by mouth once daily., Disp: , Rfl:     DOCUSATE CALCIUM (STOOL SOFTENER ORAL), Take 200 mg by mouth every evening. , Disp: , Rfl:     FERROUS FUMARATE/VIT BCOMP,C (SUPER B COMPLEX ORAL), Take 1 tablet by mouth once daily., Disp: , Rfl:     ferrous gluconate (FERGON) 324 MG tablet, Take 324 mg by mouth daily with breakfast., Disp: , Rfl:     fluticasone (FLONASE) 50 mcg/actuation nasal spray, 2 sprays by Each Nare route once daily. (Patient taking differently: 2 sprays by Each Nare route daily as needed. ), Disp: 16 g, Rfl: 0    hydroxychloroquine (PLAQUENIL) 200 mg tablet, Take 1 tablet (200 mg total) by mouth 2 (two) times daily., Disp: 60 tablet, Rfl: 3    isosorbide mononitrate (IMDUR) 60 MG 24 hr tablet, Take 90 mg by mouth once daily. Every day, Disp: , Rfl:     krill-omega-3-dha-epa-lipids (KRILL OIL) 350-62-81-50 mg Cap, Take 1,000 mg by mouth once daily. Braxton krill 300mg qd , Disp: , Rfl:     lactobacillus acidophilus (PROBIOTIC) 10 billion cell Cap, Take 1 each by mouth once daily. 1.5 billion, Disp: , Rfl:     magnesium oxide (MAG-OX) 400 mg tablet, Take 400 mg by mouth 2 (two) times daily., Disp: , Rfl:     MULTIVITAMIN WITH MINERALS (ONE-A-DAY 50 PLUS) Tab, Take 1 tablet by mouth once daily. Every day, Disp: , Rfl:     nitroGLYCERIN (NITROLINGUAL) 0.4 mg/dose spray, Place 1 spray under the tongue every 5 (five) minutes as needed. PRN, Disp: , Rfl:     potassium chloride SA (K-DUR,KLOR-CON) 10 MEQ tablet, Take by mouth once daily. 3 tab q am / 1 tab q pm, Disp: , Rfl: 3    promethazine (PHENERGAN) 25 MG tablet, Take 1 tablet (25 mg total) by mouth 2 (two) times  "daily as needed for Nausea., Disp: 60 tablet, Rfl: 1    sacubitril-valsartan (ENTRESTO) 49-51 mg per tablet, Take 1 tablet by mouth., Disp: , Rfl:     sertraline (ZOLOFT) 50 MG tablet, TAKE 1 TABLET (50 MG TOTAL) BY MOUTH ONCE DAILY., Disp: 30 tablet, Rfl: 10    tramadol (ULTRAM) 50 mg tablet, Take 2 tablets (100 mg total) by mouth every 6 (six) hours as needed., Disp: 180 tablet, Rfl: 3    vitamin E 400 unit Tab, Take 400 mg by mouth once daily. Every day, Disp: , Rfl:     gabapentin (NEURONTIN) 100 MG capsule, Take 1 capsule (100 mg total) by mouth every evening., Disp: 30 capsule, Rfl: 3    PMHx/PSHx Updates:        No results found for this or any previous visit.  No results found for this or any previous visit.  Last Eye Exam      Objective:     Vitals:  Blood pressure 120/66, resp. rate 16, height 4' 10" (1.473 m), weight 50 kg (110 lb 4.8 oz).    Physical Examination:   GEN: no apparent distress, comfortable; AAOx3  SKIN: no rashes,no ulceration, no Raynaud's, no petechiae, no SQ nodules,  HEAD: normal  EYES: no pallor, no icterus,   NECK: no masses, thyroid normal, trachea midline, no LAD/LN's, supple  CV: RRR with no murmur; l S1 and S2 reg. ,no gallop no rubs,   CHEST: Normal respiratory effort; CTAB; normal breath sounds; no wheeze or crackles  ABDOM: nontender and nondistended; soft; no masses; no rebound/guarding  MUSC/Skeletal: ROM normal; no crepitus; joints without synovitis,  no deformities  No joint swelling or tenderness of PIP, MCP, wrist, elbow, shoulder, or knee joints  EXTREM: no clubbing, cyanosis, no edema,normal  pulses   NEURO: grossly intact; motor WNL; AAOx3; ,   PSYCH: normal mood, affect and behavior  LYMPH: normal cervical, supraclavicular          Labs:   Lab Results   Component Value Date    WBC 6.5 01/26/2017    HGB 8.2 (L) 01/26/2017    HCT 26.1 (L) 01/26/2017    MCV 86.1 01/26/2017     01/26/2017    " CMP  @[facility](NA,K,CL,CO2,GLU,BUN,Creatinine,Calcium,PROT,Albumin,Bilitot,Alkphos,AST,ALT,CRP,ESR,RF,CCP,VANCE,SSA,CPK,uric acid) )@  I have reviewed all available lab results and radiology reports.    Radiology/Diagnostic Studies:        Assessment/Plan:   (1) 81 y.o. female with diagnosis of Sjogrens /RA  Neuropathy in feet  Can try Capsacin topical and [id number] hs  Had recent labs,pt will bring results    Osteoporosis she is due for his second infusion of Reclast She will need a bone density test as well            Discussion:     I have explained all of the above in detail and the patient understands all of the current recommendation(s). I have answered all questions to the best of my ability and to their complete satisfaction.       The patient is to continue with the current management plan         RTC in 1 month        Electronically signed by Deepak Erazo MD

## 2017-10-12 ENCOUNTER — IMMUNIZATION (OUTPATIENT)
Dept: FAMILY MEDICINE | Facility: CLINIC | Age: 82
End: 2017-10-12
Payer: MEDICARE

## 2017-10-12 PROCEDURE — G0008 ADMIN INFLUENZA VIRUS VAC: HCPCS | Mod: PBBFAC,PO

## 2017-11-06 ENCOUNTER — OFFICE VISIT (OUTPATIENT)
Dept: RHEUMATOLOGY | Facility: CLINIC | Age: 82
End: 2017-11-06
Payer: MEDICARE

## 2017-11-06 VITALS
SYSTOLIC BLOOD PRESSURE: 156 MMHG | HEIGHT: 59 IN | WEIGHT: 109 LBS | DIASTOLIC BLOOD PRESSURE: 78 MMHG | BODY MASS INDEX: 21.97 KG/M2

## 2017-11-06 DIAGNOSIS — M54.9 BACK PAIN, UNSPECIFIED BACK LOCATION, UNSPECIFIED BACK PAIN LATERALITY, UNSPECIFIED CHRONICITY: ICD-10-CM

## 2017-11-06 DIAGNOSIS — M05.759: ICD-10-CM

## 2017-11-06 DIAGNOSIS — M35.01 SJOGREN'S SYNDROME WITH KERATOCONJUNCTIVITIS SICCA: Primary | ICD-10-CM

## 2017-11-06 DIAGNOSIS — R53.83 FATIGUE, UNSPECIFIED TYPE: ICD-10-CM

## 2017-11-06 LAB
ALBUMIN SERPL-MCNC: 4.1 G/DL (ref 3.1–4.7)
ALP SERPL-CCNC: 51 IU/L (ref 40–104)
ALT (SGPT): 30 IU/L (ref 3–33)
AST SERPL-CCNC: 44 IU/L (ref 10–40)
BASOPHILS NFR BLD: 0.1 K/UL (ref 0–0.2)
BASOPHILS NFR BLD: 0.6 %
BILIRUB SERPL-MCNC: 0.7 MG/DL (ref 0.3–1)
BUN SERPL-MCNC: 23 MG/DL (ref 8–20)
CALCIUM SERPL-MCNC: 9.6 MG/DL (ref 7.7–10.4)
CHLORIDE: 101 MMOL/L (ref 98–110)
CO2 SERPL-SCNC: 30.2 MMOL/L (ref 22.8–31.6)
CREATININE: 1.11 MG/DL (ref 0.6–1.4)
CRP SERPL-MCNC: 0.02 MG/DL (ref 0–1.4)
EOSINOPHIL NFR BLD: 0 %
EOSINOPHIL NFR BLD: 0 K/UL (ref 0–0.7)
ERYTHROCYTE [DISTWIDTH] IN BLOOD BY AUTOMATED COUNT: 13.6 % (ref 11.7–14.9)
GLUCOSE: 90 MG/DL (ref 70–99)
GRAN #: 5.1 K/UL (ref 1.4–6.5)
GRAN%: 64.8 %
HCT VFR BLD AUTO: 35.8 % (ref 36–48)
HGB BLD-MCNC: 11.7 G/DL (ref 12–15)
IMMATURE GRANS (ABS): 0 K/UL (ref 0–1)
IMMATURE GRANULOCYTES: 0.5 %
LYMPH #: 2.1 K/UL (ref 1.2–3.4)
LYMPH%: 26.5 %
MCH RBC QN AUTO: 29.8 PG (ref 25–35)
MCHC RBC AUTO-ENTMCNC: 32.7 G/DL (ref 31–36)
MCV RBC AUTO: 91.1 FL (ref 79–98)
MONO #: 0.6 K/UL (ref 0.1–0.6)
MONO%: 7.6 %
NUCLEATED RBCS: 0 %
PLATELET # BLD AUTO: 158 K/UL (ref 140–440)
PMV BLD AUTO: 9.3 FL (ref 8.8–12.7)
POTASSIUM SERPL-SCNC: 4.8 MMOL/L (ref 3.5–5)
PROT SERPL-MCNC: 6.7 G/DL (ref 6–8.2)
RBC # BLD AUTO: 3.93 M/UL (ref 3.5–5.5)
SODIUM: 139 MMOL/L (ref 134–144)
TSH SERPL DL<=0.005 MIU/L-ACNC: 2.1 ULU/ML (ref 0.3–5.6)
URATE SERPL-MCNC: 4 MG/DL (ref 2.6–7.8)
WBC # BLD AUTO: 7.9 K/UL (ref 5–10)

## 2017-11-06 PROCEDURE — 99214 OFFICE O/P EST MOD 30 MIN: CPT | Mod: ,,, | Performed by: INTERNAL MEDICINE

## 2017-11-06 RX ORDER — AMLODIPINE BESYLATE 5 MG/1
5 TABLET ORAL DAILY
Refills: 5 | COMMUNITY
Start: 2017-10-23 | End: 2021-02-11

## 2017-11-06 NOTE — PROGRESS NOTES
[facility] RHEUMATOLOGY            PROGRESS NOTE      Subjective:       Patient ID:   NAME: Cheyanne Gomes : 1935     81 y.o. female    Referring Doc: No ref. provider found  Other Physicians:    Chief Complaint:  No chief complaint on file.      History of Present Illness:     Patient returns today for a regularly scheduled follow-up visit for RA/Sjogrens/OA      The patient is experiencing some abdominal distension.  No fevers,no cough,no SOB  No abd pain  Low back pain with no paresthesias.  Did not tolerate Neurontin  No peripheral joint complaint  Fatigue,interrupted sleep        ROS:   GEN:  No  fever, night sweats . weight is stable   + fatigue  SKIN: no rashes, no bruising, no ulcerations, no Raynaud's  HEENT: no HA's, No visual changes, no mucosal ulcers, + sicca symptoms,  CV:   no CP, SOB, PND, SALIANS, no orthopnea, no palpitations  PULM: normal with no SOB, cough, hemoptysis, sputum or pleuritic pain  GI:  no abdominal pain, nausea, vomiting, constipation, diarrhea, melanotic stools, BRBPR, hematemesis, no dysphagia  :   no dysuria  NEURO: no paresthesias, headaches, visual disturbances, muscle weakness  MUSCULOSKELETAL:no joint swelling, prolonged AM stiffness, + back pain, no muscle pain  Allergies:  Review of patient's allergies indicates:   Allergen Reactions    Macrodantin  [nitrofurantoin macrocrystalline]      Other reaction(s): very sickly    Penicillins      Other reaction(s): swelling  Other reaction(s): red skin discolorati    Sulfa (sulfonamide antibiotics)      Other reaction(s): very sickly       Medications:    Current Outpatient Prescriptions:     amLODIPine (NORVASC) 5 MG tablet, Take 5 mg by mouth 2 (two) times daily., Disp: , Rfl: 5    aspirin 81 mg Tab, Take 1 tablet by mouth every evening. Every day, Disp: , Rfl:     atorvastatin (LIPITOR) 40 MG tablet, Take 40 mg by mouth once daily. , Disp: , Rfl:     biotin 5 mg Cap, Take 1 tablet by mouth 2 (two) times daily.,  Disp: , Rfl:     CALCIUM CARB/VIT D3/MINERALS (CALCIUM-VITAMIN D ORAL), Take 600 mg by mouth 2 (two) times daily. Calcium 1200 with D 3 1000, Disp: , Rfl:     coenzyme Q10 100 mg capsule, Take 100 mg by mouth once daily., Disp: , Rfl:     CRANBERRY CONC/ASCORBIC ACID (CRANBERRY PLUS VITAMIN C ORAL), Take 1 capsule by mouth once daily., Disp: , Rfl:     DOCUSATE CALCIUM (STOOL SOFTENER ORAL), Take 200 mg by mouth every evening. , Disp: , Rfl:     FERROUS FUMARATE/VIT BCOMP,C (SUPER B COMPLEX ORAL), Take 1 tablet by mouth once daily., Disp: , Rfl:     ferrous gluconate (FERGON) 324 MG tablet, Take 324 mg by mouth daily with breakfast., Disp: , Rfl:     fluticasone (FLONASE) 50 mcg/actuation nasal spray, 2 sprays by Each Nare route once daily. (Patient taking differently: 2 sprays by Each Nare route daily as needed. ), Disp: 16 g, Rfl: 0    hydroxychloroquine (PLAQUENIL) 200 mg tablet, Take 1 tablet (200 mg total) by mouth 2 (two) times daily., Disp: 60 tablet, Rfl: 3    isosorbide mononitrate (IMDUR) 60 MG 24 hr tablet, Take 90 mg by mouth once daily. Every day, Disp: , Rfl:     krill-omega-3-dha-epa-lipids (KRILL OIL) 311-95-28-50 mg Cap, Take 1,000 mg by mouth once daily. Braxton krill 300mg qd , Disp: , Rfl:     lactobacillus acidophilus (PROBIOTIC) 10 billion cell Cap, Take 1 each by mouth once daily. 1.5 billion, Disp: , Rfl:     magnesium oxide (MAG-OX) 400 mg tablet, Take 400 mg by mouth 2 (two) times daily., Disp: , Rfl:     MULTIVITAMIN WITH MINERALS (ONE-A-DAY 50 PLUS) Tab, Take 1 tablet by mouth once daily. Every day, Disp: , Rfl:     nitroGLYCERIN (NITROLINGUAL) 0.4 mg/dose spray, Place 1 spray under the tongue every 5 (five) minutes as needed. PRN, Disp: , Rfl:     potassium chloride SA (K-DUR,KLOR-CON) 10 MEQ tablet, Take by mouth once daily. 3 tab q am / 1 tab q pm, Disp: , Rfl: 3    promethazine (PHENERGAN) 25 MG tablet, Take 1 tablet (25 mg total) by mouth 2 (two) times daily as needed  "for Nausea., Disp: 60 tablet, Rfl: 1    sacubitril-valsartan (ENTRESTO) 49-51 mg per tablet, Take 1 tablet by mouth., Disp: , Rfl:     sertraline (ZOLOFT) 50 MG tablet, TAKE 1 TABLET (50 MG TOTAL) BY MOUTH ONCE DAILY., Disp: 30 tablet, Rfl: 10    tramadol (ULTRAM) 50 mg tablet, Take 2 tablets (100 mg total) by mouth every 6 (six) hours as needed., Disp: 180 tablet, Rfl: 3    vitamin E 400 unit Tab, Take 400 mg by mouth once daily. Every day, Disp: , Rfl:     PMHx/PSHx Updates:      Objective:     Vitals:  Blood pressure (!) 156/78, height 4' 11" (1.499 m), weight 49.4 kg (109 lb).    Physical Examination:   GEN: no apparent distress, comfortable; AAOx3  SKIN: no rashes,no ulceration, no Raynaud's, no petechiae, no SQ nodules,  HEAD: normal  EYES: no pallor, no icterus  NECK: no masses, thyroid normal, trachea midline, no LAD/LN's, supple  CV: RRR with no murmur; l S1 and S2 reg. ,no gallop no rubs,   CHEST: Normal respiratory effort; CTAB; normal breath sounds; no wheeze or crackles  ABDOM:  soft; no masses; no rebound/guarding,sl tenderness LLQ  MUSC/Skeletal: ROM normal; no crepitus; joints without synovitis,  no deformities  No joint swelling or tenderness of PIP, MCP, wrist, elbow, shoulder, or knee joints  EXTREM: no clubbing, cyanosis, no edema,normal  pulses   NEURO: grossly intact; motor WNL; AAOx3; ,   PSYCH: normal mood, affect and behavior  LYMPH: normal cervical, supraclavicular          Labs:   Lab Results   Component Value Date    WBC 6.5 01/26/2017    HGB 8.2 (L) 01/26/2017    HCT 26.1 (L) 01/26/2017    MCV 86.1 01/26/2017     01/26/2017    CMP  @[facility](NA,K,CL,CO2,GLU,BUN,Creatinine,Calcium,PROT,Albumin,Bilitot,Alkphos,AST,ALT,CRP,ESR,RF,CCP,VANCE,SSA,CPK,uric acid) )@  I have reviewed all available lab results and radiology reports.    Radiology/Diagnostic Studies:        Assessment/Plan:   (1) 81 y.o. female with diagnosis of Sjogrens/RA/OA  This is stable.    BP sl elevated.She is to " monitor BP at home and contact PCP if it remains elevated        Discussion:     I have explained all of the above in detail and the patient understands all of the current recommendation(s). I have answered all questions to the best of my ability and to their complete satisfaction.       The patient is to continue with the current management plan         RTC in   her months or before when necessary      Electronically signed by Deepak Erazo MD

## 2017-11-14 ENCOUNTER — OFFICE VISIT (OUTPATIENT)
Dept: OPTOMETRY | Facility: CLINIC | Age: 82
End: 2017-11-14
Payer: MEDICARE

## 2017-11-14 DIAGNOSIS — Z96.1 PSEUDOPHAKIA: ICD-10-CM

## 2017-11-14 DIAGNOSIS — M35.01 KCS (KERATOCONJUNCTIVITIS SICCA): Primary | ICD-10-CM

## 2017-11-14 DIAGNOSIS — D31.31 CHOROIDAL NEVUS, RIGHT: ICD-10-CM

## 2017-11-14 DIAGNOSIS — H52.7 REFRACTIVE ERROR: ICD-10-CM

## 2017-11-14 DIAGNOSIS — H35.89 RPE MOTTLING OF MACULA: ICD-10-CM

## 2017-11-14 PROCEDURE — 92014 COMPRE OPH EXAM EST PT 1/>: CPT | Mod: S$PBB,,, | Performed by: OPTOMETRIST

## 2017-11-14 PROCEDURE — 99999 PR PBB SHADOW E&M-EST. PATIENT-LVL II: CPT | Mod: PBBFAC,,, | Performed by: OPTOMETRIST

## 2017-11-14 PROCEDURE — 99212 OFFICE O/P EST SF 10 MIN: CPT | Mod: PBBFAC,PO | Performed by: OPTOMETRIST

## 2017-11-14 NOTE — PROGRESS NOTES
HPI     Presenting Complaint: Pt here today for yearly eye exam. Pt states vision   has been stable. Pt states vision does get blurry when dry eyes get worse.    (+) Eyes started itching over the last 2 weeks and uses OTC allergy drops   2 x day    Ophthalmic medication / drops:   (+) Systane gel at bedtime  (+) Art tears 2 x day both eyes     (-) headaches  (-) diplopia   (-) flashes / (-) floaters      Last edited by Frankie Carias, OD on 11/14/2017  2:40 PM. (History)            Assessment /Plan     For exam results, see Encounter Report.    KCS (keratoconjunctivitis sicca)    Choroidal nevus, right    RPE mottling of macula    Pseudophakia    Refractive error      KCS OU, diffuse SPK. Discussed treatment options, pt prefers to continue OTC aggressive artificial tears for another week before starting prescription drops. Recommend OTC Systane gel twice daily, OTC preservative free tears at least four times day. Return if worsening.     Choroidal nevus OD, small, stable from previous. Monitor yearly.    Mild RPE mottling, stable OU. Continue OTC AREDs vitamins, no smoking. Fish and green leafy vegetables. Monitor yearly.    S/p cataract extraction with good results. Pt denies refraction, happy with current specs. Monitor yearly.      RTC in 1 year for comprehensive eye exam, or sooner prn.

## 2017-11-27 ENCOUNTER — OFFICE VISIT (OUTPATIENT)
Dept: OPTOMETRY | Facility: CLINIC | Age: 82
End: 2017-11-27
Payer: MEDICARE

## 2017-11-27 ENCOUNTER — OFFICE VISIT (OUTPATIENT)
Dept: HEMATOLOGY/ONCOLOGY | Facility: CLINIC | Age: 82
End: 2017-11-27
Payer: MEDICARE

## 2017-11-27 DIAGNOSIS — D63.8 ANEMIA OF CHRONIC DISEASE: Chronic | ICD-10-CM

## 2017-11-27 DIAGNOSIS — M35.01 KCS (KERATOCONJUNCTIVITIS SICCA): Primary | ICD-10-CM

## 2017-11-27 PROCEDURE — 99999 PR PBB SHADOW E&M-EST. PATIENT-LVL II: CPT | Mod: PBBFAC,,, | Performed by: OPTOMETRIST

## 2017-11-27 PROCEDURE — 99212 OFFICE O/P EST SF 10 MIN: CPT | Mod: PBBFAC,PO | Performed by: OPTOMETRIST

## 2017-11-27 PROCEDURE — 92012 INTRM OPH EXAM EST PATIENT: CPT | Mod: S$PBB,,, | Performed by: OPTOMETRIST

## 2017-11-27 PROCEDURE — 99213 OFFICE O/P EST LOW 20 MIN: CPT | Mod: ,,, | Performed by: INTERNAL MEDICINE

## 2017-11-27 RX ORDER — FLUOROMETHOLONE 1 MG/ML
1 SUSPENSION/ DROPS OPHTHALMIC 4 TIMES DAILY
Qty: 5 ML | Refills: 1 | Status: SHIPPED | OUTPATIENT
Start: 2017-11-27 | End: 2018-07-09

## 2017-11-27 NOTE — PROGRESS NOTES
PROGRESS NOTE    Subjective:       Patient ID: Cheyanne Gomes is a 82 y.o. female.    Chief Complaint:  Follow-up and Results (scans and labs in epic)  anemia follow up.     History of Present Illness:   Cheyanne Gomes is a 82 y.o. female who presents with a history of anemia of chronic disease.  No new complaints at this point.        Family and Social history reviewed and is unchanged from 8/9/2016.       ROS:  Review of Systems   Constitutional: Negative for fever.   Respiratory: Negative for shortness of breath.    Cardiovascular: Negative for chest pain and leg swelling.   Gastrointestinal: Negative for abdominal pain and blood in stool.   Genitourinary: Negative for hematuria.   Skin: Negative for rash.          Current Outpatient Prescriptions:     amLODIPine (NORVASC) 5 MG tablet, Take 5 mg by mouth 2 (two) times daily., Disp: , Rfl: 5    aspirin 81 mg Tab, Take 1 tablet by mouth every evening. Every day, Disp: , Rfl:     atorvastatin (LIPITOR) 40 MG tablet, Take 40 mg by mouth once daily. , Disp: , Rfl:     biotin 5 mg Cap, Take 1 tablet by mouth 2 (two) times daily., Disp: , Rfl:     CALCIUM CARB/VIT D3/MINERALS (CALCIUM-VITAMIN D ORAL), Take 600 mg by mouth 2 (two) times daily. Calcium 1200 with D 3 1000, Disp: , Rfl:     coenzyme Q10 100 mg capsule, Take 100 mg by mouth once daily., Disp: , Rfl:     CRANBERRY CONC/ASCORBIC ACID (CRANBERRY PLUS VITAMIN C ORAL), Take 1 capsule by mouth once daily., Disp: , Rfl:     DOCUSATE CALCIUM (STOOL SOFTENER ORAL), Take 200 mg by mouth every evening. , Disp: , Rfl:     FERROUS FUMARATE/VIT BCOMP,C (SUPER B COMPLEX ORAL), Take 1 tablet by mouth once daily., Disp: , Rfl:     ferrous gluconate (FERGON) 324 MG tablet, Take 324 mg by mouth daily with breakfast., Disp: , Rfl:     fluticasone (FLONASE) 50 mcg/actuation nasal spray, 2 sprays by Each Nare route once daily. (Patient taking differently:  2 sprays by Each Nare route daily as needed. ), Disp: 16 g, Rfl: 0    hydroxychloroquine (PLAQUENIL) 200 mg tablet, Take 1 tablet (200 mg total) by mouth 2 (two) times daily., Disp: 60 tablet, Rfl: 3    isosorbide mononitrate (IMDUR) 60 MG 24 hr tablet, Take 90 mg by mouth once daily. Every day, Disp: , Rfl:     krill-omega-3-dha-epa-lipids (KRILL OIL) 147-66-37-50 mg Cap, Take 1,000 mg by mouth once daily. Braxton krill 300mg qd , Disp: , Rfl:     lactobacillus acidophilus (PROBIOTIC) 10 billion cell Cap, Take 1 each by mouth once daily. 1.5 billion, Disp: , Rfl:     magnesium oxide (MAG-OX) 400 mg tablet, Take 400 mg by mouth 2 (two) times daily., Disp: , Rfl:     MULTIVITAMIN WITH MINERALS (ONE-A-DAY 50 PLUS) Tab, Take 1 tablet by mouth once daily. Every day, Disp: , Rfl:     nitroGLYCERIN (NITROLINGUAL) 0.4 mg/dose spray, Place 1 spray under the tongue every 5 (five) minutes as needed. PRN, Disp: , Rfl:     potassium chloride SA (K-DUR,KLOR-CON) 10 MEQ tablet, Take by mouth once daily. 3 tab q am / 1 tab q pm, Disp: , Rfl: 3    promethazine (PHENERGAN) 25 MG tablet, Take 1 tablet (25 mg total) by mouth 2 (two) times daily as needed for Nausea., Disp: 60 tablet, Rfl: 1    sacubitril-valsartan (ENTRESTO) 49-51 mg per tablet, Take 1 tablet by mouth., Disp: , Rfl:     sertraline (ZOLOFT) 50 MG tablet, TAKE 1 TABLET (50 MG TOTAL) BY MOUTH ONCE DAILY., Disp: 30 tablet, Rfl: 10    tramadol (ULTRAM) 50 mg tablet, Take 2 tablets (100 mg total) by mouth every 6 (six) hours as needed., Disp: 180 tablet, Rfl: 3    vitamin E 400 unit Tab, Take 400 mg by mouth once daily. Every day, Disp: , Rfl:         Objective:       Physical Examination:     There were no vitals taken for this visit.    Physical Exam   Constitutional: She appears well-developed and well-nourished.   HENT:   Head: Normocephalic and atraumatic.   Right Ear: External ear normal.   Left Ear: External ear normal.   Mouth/Throat: Oropharynx is clear  and moist.   Eyes: Conjunctivae are normal. Pupils are equal, round, and reactive to light.   Neck: No tracheal deviation present. No thyromegaly present.   Cardiovascular: Normal rate, regular rhythm and normal heart sounds.    Pulmonary/Chest: Effort normal and breath sounds normal.   Abdominal: Soft. Bowel sounds are normal. She exhibits no distension and no mass. There is no tenderness.   Musculoskeletal: She exhibits no edema.   Neurological:   Neuro intact througout   Skin: No rash noted.   Psychiatric: She has a normal mood and affect. Her behavior is normal. Judgment and thought content normal.       Labs:   No results found for this or any previous visit (from the past 336 hour(s)).  CMP  Sodium   Date Value Ref Range Status   11/06/2017 139 134 - 144 mmol/L      Potassium   Date Value Ref Range Status   11/06/2017 4.8 3.5 - 5.0 mmol/L      Chloride   Date Value Ref Range Status   11/06/2017 101 98 - 110 mmol/L      CO2   Date Value Ref Range Status   11/06/2017 30.2 22.8 - 31.6 mmol/L      Glucose   Date Value Ref Range Status   11/06/2017 90 70 - 99 mg/dL      BUN, Bld   Date Value Ref Range Status   11/06/2017 23 (H) 8 - 20 mg/dL      Creatinine   Date Value Ref Range Status   11/06/2017 1.11 0.60 - 1.40 mg/dL      Calcium   Date Value Ref Range Status   11/06/2017 9.6 7.7 - 10.4 mg/dL      Total Protein   Date Value Ref Range Status   11/06/2017 6.7 6.0 - 8.2 g/dL      Albumin   Date Value Ref Range Status   11/06/2017 4.1 3.1 - 4.7 g/dL      Total Bilirubin   Date Value Ref Range Status   11/06/2017 0.7 0.3 - 1.0 mg/dL      Alkaline Phosphatase   Date Value Ref Range Status   11/06/2017 51 40 - 104 IU/L      AST   Date Value Ref Range Status   11/06/2017 44 (H) 10 - 40 IU/L      ALT   Date Value Ref Range Status   02/26/2015 12 10 - 44 U/L Final     Anion Gap   Date Value Ref Range Status   03/25/2015 12 8 - 16 mmol/L Final   03/25/2015 12 8 - 16 mmol/L Final     eGFR if    Date Value  Ref Range Status   03/13/2017 54.4 (A) >60 mL/min/1.73 m^2 Final     eGFR if non    Date Value Ref Range Status   03/13/2017 47.2 (A) >60 mL/min/1.73 m^2 Final     Comment:     Calculation used to obtain the estimated glomerular filtration  rate (eGFR) is the CKD-EPI equation. Since race is unknown   in our information system, the eGFR values for   -American and Non--American patients are given   for each creatinine result.       No results found for: CEA  No results found for: PSA        Assessment/Plan:   Anemia of chronic disease  Patient is doing well.  Hb was 11.7g/dl on November 7th, 2017.  No new symptoms at this time and no bleeding.  Patient also likely has some component of iron def. Given her Dx of AVM.  Will continue Fergon 324mg bid as she is doing now and will check a ferritin prior to next visit. In 6 months.      Discussion:     Return in about 6 months (around 5/27/2018).      Electronically signed by David Robb

## 2017-11-27 NOTE — LETTER
November 27, 2017      Romeo Alas MD  [address]  [place] [address]           [facility]  [address]  [address]  [facility] [address]  Phone: [phone]  Fax: [phone]          Patient: Cheyanne Gomes   MR Number: [id number]   YOB: 1935   Date of Visit: 11/27/2017       Dear Dr. Romeo Alas:    Thank you for referring Cheyanne Gomes to me for evaluation. Attached you will find relevant portions of my assessment and plan of care.    If you have questions, please do not hesitate to call me. I look forward to following Cheyanne Gomes along with you.    Sincerely,    David Pal MD    Enclosure  CC:  No Recipients    If you would like to receive this communication electronically, please contact [email or web address] or [phone] to request more information on [facility] Link access.    For providers and/or their staff who would like to refer a patient to [facility], please contact us through our one-stop-shop provider referral line, [facility], at [phone].    If you feel you have received this communication in error or would no longer like to receive these types of communications, please e-mail [email or web address]

## 2017-11-27 NOTE — PROGRESS NOTES
HPI     Presenting Complaint: Pt here today because both eyes are still painful   and irritated Right worse than left. Pt states eyes are sensitive to the   sunlight. Pt states using OTC art tears.     (+) Art tears 3 to 4 day both eyes  (+) Art tear gel in the morning and night    (-) headaches  (-) diplopia   (-) flashes / (-) floaters      Last edited by Frankie Carias, OD on 11/27/2017  2:23 PM. (History)            Assessment /Plan     For exam results, see Encounter Report.    KCS (keratoconjunctivitis sicca)  -     fluorometholone 0.1% (FML) 0.1 % DrpS; Place 1 drop into both eyes 4 (four) times daily.  Dispense: 5 mL; Refill: 1      KCS OU, no alleviated with aggressive lubrication. Start fml 0.1%: 1 gt qid OU, shake well. Continue OTC artificial tears throughout day. Dispensed [employer] brochure for pt info, will discuss long-term management at f/u.       RTC in 2 weeks for OTTO f/u, sooner prn.

## 2017-11-27 NOTE — ASSESSMENT & PLAN NOTE
Patient is doing well.  Hb was 11.7g/dl on November 7th, 2017.  No new symptoms at this time and no bleeding.  Patient also likely has some component of iron def. Given her Dx of AVM.  Will continue Fergon 324mg bid as she is doing now and will check a ferritin prior to next visit. In 6 months.

## 2017-12-07 DIAGNOSIS — M35.00 SJOGREN'S SYNDROME, WITH UNSPECIFIED ORGAN INVOLVEMENT: ICD-10-CM

## 2017-12-11 ENCOUNTER — OFFICE VISIT (OUTPATIENT)
Dept: OPTOMETRY | Facility: CLINIC | Age: 82
End: 2017-12-11
Payer: MEDICARE

## 2017-12-11 DIAGNOSIS — M35.01 KCS (KERATOCONJUNCTIVITIS SICCA): Primary | ICD-10-CM

## 2017-12-11 PROCEDURE — 99212 OFFICE O/P EST SF 10 MIN: CPT | Mod: PBBFAC,PO | Performed by: OPTOMETRIST

## 2017-12-11 PROCEDURE — 99999 PR PBB SHADOW E&M-EST. PATIENT-LVL II: CPT | Mod: PBBFAC,,, | Performed by: OPTOMETRIST

## 2017-12-11 PROCEDURE — 92012 INTRM OPH EXAM EST PATIENT: CPT | Mod: S$PBB,,, | Performed by: OPTOMETRIST

## 2017-12-11 RX ORDER — TRAMADOL HYDROCHLORIDE 50 MG/1
TABLET ORAL
Qty: 180 TABLET | Refills: 2 | Status: SHIPPED | OUTPATIENT
Start: 2017-12-11 | End: 2018-03-06 | Stop reason: SDUPTHER

## 2017-12-11 NOTE — PROGRESS NOTES
HPI     Presenting Complaint: Pt here today for dry eye follow. Pt sates eyes   feel much better. Pt still have occasional itching both eyes.     (+) FML  1 drop 3 to 4 times day both eyes  (+) OTC art tears 3 to 4 times a day both eyes    (-) headaches  (-) diplopia   (-) flashes / (-) floaters      Last edited by Frankie Carias, OD on 12/11/2017  9:48 AM. (History)            Assessment /Plan     For exam results, see Encounter Report.    KCS (keratoconjunctivitis sicca)      Improved KCS. Start taper FML: 1 gt tid OU x 1 week, then decrease to 1 gt bid OU x 1 week, then decrease to 1 gt qd OU x 1 week, then d/c drops. Continue OTC artificial tears qid OU. OTC Zaditor twice daily prn for itching. Return if worsening or no resolution.

## 2018-03-06 ENCOUNTER — OFFICE VISIT (OUTPATIENT)
Dept: RHEUMATOLOGY | Facility: CLINIC | Age: 83
End: 2018-03-06
Payer: MEDICARE

## 2018-03-06 VITALS — SYSTOLIC BLOOD PRESSURE: 140 MMHG | DIASTOLIC BLOOD PRESSURE: 70 MMHG

## 2018-03-06 DIAGNOSIS — M35.00 SJOGREN'S SYNDROME, WITH UNSPECIFIED ORGAN INVOLVEMENT: Primary | ICD-10-CM

## 2018-03-06 PROCEDURE — 99213 OFFICE O/P EST LOW 20 MIN: CPT | Mod: ,,, | Performed by: INTERNAL MEDICINE

## 2018-03-06 RX ORDER — TRAMADOL HYDROCHLORIDE 50 MG/1
TABLET ORAL
Qty: 180 TABLET | Refills: 2 | Status: SHIPPED | OUTPATIENT
Start: 2018-03-06 | End: 2018-07-21 | Stop reason: SDUPTHER

## 2018-03-06 RX ORDER — HYDROXYCHLOROQUINE SULFATE 200 MG/1
200 TABLET, FILM COATED ORAL 2 TIMES DAILY
Qty: 60 TABLET | Refills: 3 | Status: SHIPPED | OUTPATIENT
Start: 2018-03-06 | End: 2018-09-30 | Stop reason: SDUPTHER

## 2018-03-06 NOTE — PROGRESS NOTES
[facility] RHEUMATOLOGY            PROGRESS NOTE      Subjective:       Patient ID:   NAME: Cheyanne Gomes : 1935     82 y.o. female    Referring Doc: No ref. provider found  Other Physicians:    Chief Complaint:  No chief complaint on file.      History of Present Illness:     Patient returns today for a regularly scheduled follow-up visit forSjogren syndrome       The patient is doing well. No pain or swelling in joints. No skin rashes. No chronic cough.  She has experienced some paresthesias in her hands mainly at night.          ROS:   GEN:  No  fever, night sweats . weight is stable   Some fatigue  SKIN: no rashes, no bruising, no ulcerations, no Raynaud's  HEENT: no HA's, No visual changes, no mucosal ulcers, no sicca symptoms,  CV:   no CP, SOB, PND, +SALINAS, no orthopnea, no palpitations  PULM: normal with no SOB, cough, hemoptysis, sputum or pleuritic pain  GI:  no abdominal pain, nausea, vomiting, constipation, diarrhea, melanotic stools, BRBPR, hematemesis, no dysphagia  :   no dysuria  NEURO: + paresthesias, headaches, visual disturbances, muscle weakness  MUSCULOSKELETAL:no joint swelling, prolonged AM stiffness, no back pain, no muscle pain  Allergies:  Review of patient's allergies indicates:   Allergen Reactions    Macrodantin  [nitrofurantoin macrocrystalline]      Other reaction(s): very sickly    Penicillins      Other reaction(s): swelling  Other reaction(s): red skin discolorati    Sulfa (sulfonamide antibiotics)      Other reaction(s): very sickly       Medications:    Current Outpatient Prescriptions:     amLODIPine (NORVASC) 5 MG tablet, Take 5 mg by mouth 2 (two) times daily., Disp: , Rfl: 5    aspirin 81 mg Tab, Take 1 tablet by mouth every evening. Every day, Disp: , Rfl:     atorvastatin (LIPITOR) 40 MG tablet, Take 40 mg by mouth once daily. , Disp: , Rfl:     biotin 5 mg Cap, Take 1 tablet by mouth 2 (two) times daily., Disp: , Rfl:     CALCIUM CARB/VIT D3/MINERALS  (CALCIUM-VITAMIN D ORAL), Take 600 mg by mouth 2 (two) times daily. Calcium 1200 with D 3 1000, Disp: , Rfl:     coenzyme Q10 100 mg capsule, Take 100 mg by mouth once daily., Disp: , Rfl:     CRANBERRY CONC/ASCORBIC ACID (CRANBERRY PLUS VITAMIN C ORAL), Take 1 capsule by mouth once daily., Disp: , Rfl:     DOCUSATE CALCIUM (STOOL SOFTENER ORAL), Take 200 mg by mouth every evening. , Disp: , Rfl:     FERROUS FUMARATE/VIT BCOMP,C (SUPER B COMPLEX ORAL), Take 1 tablet by mouth once daily., Disp: , Rfl:     ferrous gluconate (FERGON) 324 MG tablet, Take 324 mg by mouth daily with breakfast., Disp: , Rfl:     fluorometholone 0.1% (FML) 0.1 % DrpS, Place 1 drop into both eyes 4 (four) times daily., Disp: 5 mL, Rfl: 1    fluticasone (FLONASE) 50 mcg/actuation nasal spray, 2 sprays by Each Nare route once daily. (Patient taking differently: 2 sprays by Each Nare route daily as needed. ), Disp: 16 g, Rfl: 0    hydroxychloroquine (PLAQUENIL) 200 mg tablet, Take 1 tablet (200 mg total) by mouth 2 (two) times daily., Disp: 60 tablet, Rfl: 3    isosorbide mononitrate (IMDUR) 60 MG 24 hr tablet, Take 90 mg by mouth once daily. Every day, Disp: , Rfl:     krill-omega-3-dha-epa-lipids (KRILL OIL) 559-87-51-50 mg Cap, Take 1,000 mg by mouth once daily. Braxton krill 300mg qd , Disp: , Rfl:     lactobacillus acidophilus (PROBIOTIC) 10 billion cell Cap, Take 1 each by mouth once daily. 1.5 billion, Disp: , Rfl:     magnesium oxide (MAG-OX) 400 mg tablet, Take 400 mg by mouth 2 (two) times daily., Disp: , Rfl:     MULTIVITAMIN WITH MINERALS (ONE-A-DAY 50 PLUS) Tab, Take 1 tablet by mouth once daily. Every day, Disp: , Rfl:     nitroGLYCERIN (NITROLINGUAL) 0.4 mg/dose spray, Place 1 spray under the tongue every 5 (five) minutes as needed. PRN, Disp: , Rfl:     potassium chloride SA (K-DUR,KLOR-CON) 10 MEQ tablet, Take by mouth once daily. 3 tab q am / 1 tab q pm, Disp: , Rfl: 3    promethazine (PHENERGAN) 25 MG tablet,  Take 1 tablet (25 mg total) by mouth 2 (two) times daily as needed for Nausea., Disp: 60 tablet, Rfl: 1    sacubitril-valsartan (ENTRESTO) 49-51 mg per tablet, Take 1 tablet by mouth., Disp: , Rfl:     sertraline (ZOLOFT) 50 MG tablet, TAKE 1 TABLET (50 MG TOTAL) BY MOUTH ONCE DAILY., Disp: 30 tablet, Rfl: 10    traMADol (ULTRAM) 50 mg tablet, TAKE TWO TABLETS BY MOUTH EVERY 6 HOURS AS NEEDED, Disp: 180 tablet, Rfl: 2    vitamin E 400 unit Tab, Take 400 mg by mouth once daily. Every day, Disp: , Rfl:     PMHx/PSHx Updates:        Last Eye Exam: UTD      Objective:     Vitals:  There were no vitals taken for this visit. /70  Physical Examination:   GEN: no apparent distress, comfortable; AAOx3  SKIN: no rashes,no ulceration, no Raynaud's, no petechiae, no SQ nodules,  HEAD: normal  EYES: no pallor, no icterus,  NECK: no masses, thyroid normal, trachea midline, no LAD/LN's, supple  CV: RRR with unchanged  murmur; l S1 and S2 reg. ,no gallop no rubs,   CHEST: Normal respiratory effort; CTAB; normal breath sounds; no wheeze or crackles  MUSC/Skeletal: ROM normal; no crepitus; joints without synovitis,  no deformities  No joint swelling or tenderness of PIP, MCP, wrist, elbow, shoulder, or knee joints  EXTREM: no clubbing, cyanosis, no edema,normal  pulses   NEURO: grossly intact; motor WNL; AAOx3; ,   PSYCH: normal mood, affect and behavior  LYMPH: normal cervical, supraclavicular          Labs:   Lab Results   Component Value Date    WBC 7.9 11/06/2017    HGB 11.7 (L) 11/06/2017    HCT 35.8 (L) 11/06/2017    MCV 91.1 11/06/2017     11/06/2017    CMP  @LASTLAB(NA,K,CL,CO2,GLU,BUN,Creatinine,Calcium,PROT,Albumin,Bilitot,Alkphos,AST,ALT,CRP,ESR,RF,CCP,VANCE,SSA,CPK,uric acid) )@  I have reviewed all available lab results and radiology reports.    Radiology/Diagnostic Studies:        Assessment/Plan:   (1) 82 y.o. female with diagnosis of Sjogren syndrome. This is stable.  2) osteoporosis. She is due for  Reclast infusion. Will do CMP soon and if normal will write  orders for infusion  3) osteoarthritis. This is stable            Discussion:     I have explained all of the above in detail and the patient understands all of the current recommendation(s). I have answered all questions to the best of my ability and to their complete satisfaction.       The patient is to continue with the current management plan         RTC in   4 months or before if needed      Electronically signed by Deepak Erazo MD

## 2018-05-29 ENCOUNTER — OFFICE VISIT (OUTPATIENT)
Dept: HEMATOLOGY/ONCOLOGY | Facility: CLINIC | Age: 83
End: 2018-05-29
Payer: MEDICARE

## 2018-05-29 VITALS
BODY MASS INDEX: 21.9 KG/M2 | HEIGHT: 59 IN | DIASTOLIC BLOOD PRESSURE: 60 MMHG | WEIGHT: 108.63 LBS | SYSTOLIC BLOOD PRESSURE: 132 MMHG | HEART RATE: 67 BPM | TEMPERATURE: 98 F

## 2018-05-29 DIAGNOSIS — D63.8 ANEMIA OF CHRONIC DISEASE: Chronic | ICD-10-CM

## 2018-05-29 PROCEDURE — 99213 OFFICE O/P EST LOW 20 MIN: CPT | Mod: ,,, | Performed by: INTERNAL MEDICINE

## 2018-05-29 NOTE — ASSESSMENT & PLAN NOTE
Hb is 11.9g/dl on April 6th this year and 12.4g/dl on May 7th.  Patient looks and feels good.  Will observe her counts at this point and see her again in six months.

## 2018-05-29 NOTE — PROGRESS NOTES
PROGRESS NOTE    Subjective:       Patient ID: Cheyanne Gomes is a 82 y.o. female.    Chief Complaint:  Follow-up and Results  anemia follow up.     History of Present Illness:   Cheyanne Gomes is a 82 y.o. female who presents with a history of anemia of chronic disease. Patient is feeling well. No new complaints.       Family and Social history reviewed and is unchanged from 8/9/2016.       ROS:  Review of Systems   Constitutional: Negative for fever.   Respiratory: Negative for shortness of breath.    Cardiovascular: Negative for chest pain and leg swelling.   Gastrointestinal: Negative for abdominal pain and blood in stool.   Genitourinary: Negative for hematuria.   Skin: Negative for rash.          Current Outpatient Prescriptions:     amLODIPine (NORVASC) 5 MG tablet, Take 5 mg by mouth 2 (two) times daily., Disp: , Rfl: 5    aspirin 81 mg Tab, Take 1 tablet by mouth every evening. Every day, Disp: , Rfl:     atorvastatin (LIPITOR) 40 MG tablet, Take 40 mg by mouth once daily. , Disp: , Rfl:     biotin 5 mg Cap, Take 1 tablet by mouth 2 (two) times daily., Disp: , Rfl:     CALCIUM CARB/VIT D3/MINERALS (CALCIUM-VITAMIN D ORAL), Take 600 mg by mouth 2 (two) times daily. Calcium 1200 with D 3 1000, Disp: , Rfl:     coenzyme Q10 100 mg capsule, Take 100 mg by mouth once daily., Disp: , Rfl:     CRANBERRY CONC/ASCORBIC ACID (CRANBERRY PLUS VITAMIN C ORAL), Take 1 capsule by mouth once daily., Disp: , Rfl:     DOCUSATE CALCIUM (STOOL SOFTENER ORAL), Take 200 mg by mouth every evening. , Disp: , Rfl:     FERROUS FUMARATE/VIT BCOMP,C (SUPER B COMPLEX ORAL), Take 1 tablet by mouth once daily., Disp: , Rfl:     ferrous gluconate (FERGON) 324 MG tablet, Take 324 mg by mouth daily with breakfast., Disp: , Rfl:     fluorometholone 0.1% (FML) 0.1 % DrpS, Place 1 drop into both eyes 4 (four) times daily., Disp: 5 mL, Rfl: 1    fluticasone (FLONASE)  "50 mcg/actuation nasal spray, 2 sprays by Each Nare route once daily. (Patient taking differently: 2 sprays by Each Nare route daily as needed. ), Disp: 16 g, Rfl: 0    hydroxychloroquine (PLAQUENIL) 200 mg tablet, Take 1 tablet (200 mg total) by mouth 2 (two) times daily., Disp: 60 tablet, Rfl: 3    isosorbide mononitrate (IMDUR) 60 MG 24 hr tablet, Take 90 mg by mouth once daily. Every day, Disp: , Rfl:     krill-omega-3-dha-epa-lipids (KRILL OIL) 290-28-09-50 mg Cap, Take 1,000 mg by mouth once daily. Braxton krill 300mg qd , Disp: , Rfl:     lactobacillus acidophilus (PROBIOTIC) 10 billion cell Cap, Take 1 each by mouth once daily. 1.5 billion, Disp: , Rfl:     magnesium oxide (MAG-OX) 400 mg tablet, Take 400 mg by mouth 2 (two) times daily., Disp: , Rfl:     MULTIVITAMIN WITH MINERALS (ONE-A-DAY 50 PLUS) Tab, Take 1 tablet by mouth once daily. Every day, Disp: , Rfl:     nitroGLYCERIN (NITROLINGUAL) 0.4 mg/dose spray, Place 1 spray under the tongue every 5 (five) minutes as needed. PRN, Disp: , Rfl:     potassium chloride SA (K-DUR,KLOR-CON) 10 MEQ tablet, Take by mouth once daily. 3 tab q am / 1 tab q pm, Disp: , Rfl: 3    promethazine (PHENERGAN) 25 MG tablet, Take 1 tablet (25 mg total) by mouth 2 (two) times daily as needed for Nausea., Disp: 60 tablet, Rfl: 1    sacubitril-valsartan (ENTRESTO) 49-51 mg per tablet, Take 1 tablet by mouth., Disp: , Rfl:     sertraline (ZOLOFT) 50 MG tablet, TAKE 1 TABLET (50 MG TOTAL) BY MOUTH ONCE DAILY., Disp: 30 tablet, Rfl: 10    traMADol (ULTRAM) 50 mg tablet, TAKE TWO TABLETS BY MOUTH EVERY 6 HOURS AS NEEDED, Disp: 180 tablet, Rfl: 2    vitamin E 400 unit Tab, Take 400 mg by mouth once daily. Every day, Disp: , Rfl:         Objective:       Physical Examination:     /60   Pulse 67   Temp 98.3 °F (36.8 °C)   Ht 4' 11" (1.499 m)   Wt 49.3 kg (108 lb 9.6 oz)   BMI 21.93 kg/m²     Physical Exam   Constitutional: She appears well-developed and " well-nourished.   HENT:   Head: Normocephalic and atraumatic.   Right Ear: External ear normal.   Left Ear: External ear normal.   Mouth/Throat: Oropharynx is clear and moist.   Eyes: Conjunctivae are normal. Pupils are equal, round, and reactive to light.   Neck: No tracheal deviation present. No thyromegaly present.   Cardiovascular: Normal rate, regular rhythm and normal heart sounds.    Pulmonary/Chest: Effort normal and breath sounds normal.   Abdominal: Soft. Bowel sounds are normal. She exhibits no distension and no mass. There is no tenderness.   Musculoskeletal: She exhibits no edema.   Neurological:   Neuro intact througout   Skin: No rash noted.   Psychiatric: She has a normal mood and affect. Her behavior is normal. Judgment and thought content normal.       Labs:   No results found for this or any previous visit (from the past 336 hour(s)).  CMP  Sodium   Date Value Ref Range Status   11/06/2017 139 134 - 144 mmol/L      Potassium   Date Value Ref Range Status   11/06/2017 4.8 3.5 - 5.0 mmol/L      Chloride   Date Value Ref Range Status   11/06/2017 101 98 - 110 mmol/L      CO2   Date Value Ref Range Status   11/06/2017 30.2 22.8 - 31.6 mmol/L      Glucose   Date Value Ref Range Status   11/06/2017 90 70 - 99 mg/dL      BUN, Bld   Date Value Ref Range Status   11/06/2017 23 (H) 8 - 20 mg/dL      Creatinine   Date Value Ref Range Status   11/06/2017 1.11 0.60 - 1.40 mg/dL      Calcium   Date Value Ref Range Status   11/06/2017 9.6 7.7 - 10.4 mg/dL      Total Protein   Date Value Ref Range Status   11/06/2017 6.7 6.0 - 8.2 g/dL      Albumin   Date Value Ref Range Status   11/06/2017 4.1 3.1 - 4.7 g/dL      Total Bilirubin   Date Value Ref Range Status   11/06/2017 0.7 0.3 - 1.0 mg/dL      Alkaline Phosphatase   Date Value Ref Range Status   11/06/2017 51 40 - 104 IU/L      AST   Date Value Ref Range Status   11/06/2017 44 (H) 10 - 40 IU/L      ALT   Date Value Ref Range Status   02/26/2015 12 10 - 44 U/L  Final     Anion Gap   Date Value Ref Range Status   03/25/2015 12 8 - 16 mmol/L Final   03/25/2015 12 8 - 16 mmol/L Final     eGFR if    Date Value Ref Range Status   03/13/2017 54.4 (A) >60 mL/min/1.73 m^2 Final     eGFR if non    Date Value Ref Range Status   03/13/2017 47.2 (A) >60 mL/min/1.73 m^2 Final     Comment:     Calculation used to obtain the estimated glomerular filtration  rate (eGFR) is the CKD-EPI equation. Since race is unknown   in our information system, the eGFR values for   -American and Non--American patients are given   for each creatinine result.       No results found for: CEA  No results found for: PSA        Assessment/Plan:   Anemia of chronic disease  Hb is 11.9g/dl on April 6th this year and 12.4g/dl on May 7th.  Patient looks and feels good.  Will observe her counts at this point and see her again in six months.      Discussion:     Follow-up in about 6 months (around 11/29/2018).      Electronically signed by David Robb

## 2018-07-09 ENCOUNTER — OFFICE VISIT (OUTPATIENT)
Dept: RHEUMATOLOGY | Facility: CLINIC | Age: 83
End: 2018-07-09
Payer: MEDICARE

## 2018-07-09 VITALS
WEIGHT: 106.31 LBS | SYSTOLIC BLOOD PRESSURE: 132 MMHG | DIASTOLIC BLOOD PRESSURE: 64 MMHG | BODY MASS INDEX: 21.47 KG/M2

## 2018-07-09 DIAGNOSIS — M35.00 SJOGREN'S SYNDROME, WITH UNSPECIFIED ORGAN INVOLVEMENT: Primary | ICD-10-CM

## 2018-07-09 PROCEDURE — 99213 OFFICE O/P EST LOW 20 MIN: CPT | Mod: ,,, | Performed by: INTERNAL MEDICINE

## 2018-07-09 RX ORDER — HYDRALAZINE HYDROCHLORIDE 25 MG/1
25 TABLET, FILM COATED ORAL 3 TIMES DAILY
Refills: 4 | COMMUNITY
Start: 2018-06-18 | End: 2018-10-08

## 2018-07-09 RX ORDER — METOPROLOL SUCCINATE 25 MG/1
12.5 TABLET, EXTENDED RELEASE ORAL DAILY
Refills: 4 | Status: ON HOLD | COMMUNITY
Start: 2018-06-13 | End: 2019-10-04 | Stop reason: HOSPADM

## 2018-07-09 RX ORDER — FUROSEMIDE 20 MG/1
TABLET ORAL
COMMUNITY
End: 2018-10-08

## 2018-07-09 RX ORDER — ISOSORBIDE DINITRATE 20 MG/1
TABLET ORAL
Refills: 4 | COMMUNITY
Start: 2018-06-18 | End: 2018-12-11 | Stop reason: DRUGHIGH

## 2018-07-09 NOTE — PROGRESS NOTES
[facility] RHEUMATOLOGY            PROGRESS NOTE      Subjective:       Patient ID:   NAME: Cheyanne Gomes : 1935     82 y.o. female    Referring Doc: No ref. provider found  Other Physicians:    Chief Complaint:  Sjogren's syndrome      History of Present Illness:     Patient returns today for a regularly scheduled follow-up visit for Sjogren's syndrome and osteoporosis.      The patient is doing well overall. No chest pains cough or shortness of breath. Some fatigue and diffuse musculoskeletal pain. No rashes or joint swelling.            ROS:   GEN:  No  fever, night sweats . weight is stable   + fatigue  SKIN: no rashes, no bruising, no ulcerations, no Raynaud's  HEENT: no HA's, No visual changes, no mucosal ulcers, + sicca symptoms,  CV:   no CP, SOB, PND, SALINAS, no orthopnea, no palpitations  PULM: normal with no SOB, cough, hemoptysis, sputum or pleuritic pain  GI:  no abdominal pain, nausea, vomiting, constipation, diarrhea, melanotic stools, BRBPR, hematemesis, no dysphagia  :   no dysuria  NEURO: no paresthesias, headaches, visual disturbances, muscle weakness  MUSCULOSKELETAL:no joint swelling, prolonged AM stiffness, no back pain, + muscle pain  Allergies:  Review of patient's allergies indicates:   Allergen Reactions    Macrodantin  [nitrofurantoin macrocrystalline]      Other reaction(s): very sickly    Penicillins      Other reaction(s): swelling  Other reaction(s): red skin discolorati    Sulfa (sulfonamide antibiotics)      Other reaction(s): very sickly       Medications:    Current Outpatient Prescriptions:     amLODIPine (NORVASC) 5 MG tablet, Take 5 mg by mouth 2 (two) times daily., Disp: , Rfl: 5    aspirin 81 mg Tab, Take 1 tablet by mouth every evening. Every day, Disp: , Rfl:     biotin 5 mg Cap, Take 1 tablet by mouth 2 (two) times daily., Disp: , Rfl:     CALCIUM CARB/VIT D3/MINERALS (CALCIUM-VITAMIN D ORAL), Take 600 mg by mouth 2 (two) times daily. Calcium 1200 with  D 3 1000, Disp: , Rfl:     coenzyme Q10 100 mg capsule, Take 100 mg by mouth once daily., Disp: , Rfl:     CRANBERRY CONC/ASCORBIC ACID (CRANBERRY PLUS VITAMIN C ORAL), Take 1 capsule by mouth once daily., Disp: , Rfl:     DOCUSATE CALCIUM (STOOL SOFTENER ORAL), Take 200 mg by mouth every evening. , Disp: , Rfl:     FERROUS FUMARATE/VIT BCOMP,C (SUPER B COMPLEX ORAL), Take 1 tablet by mouth once daily., Disp: , Rfl:     ferrous gluconate (FERGON) 324 MG tablet, Take 324 mg by mouth daily with breakfast., Disp: , Rfl:     fluticasone (FLONASE) 50 mcg/actuation nasal spray, 2 sprays by Each Nare route once daily. (Patient taking differently: 2 sprays by Each Nare route daily as needed. ), Disp: 16 g, Rfl: 0    furosemide (LASIX) 20 MG tablet, furosemide 20 mg tablet  1 qd, Disp: , Rfl:     furosemide (LASIX) 40 MG tablet, furosemide 40 mg tablet  Take 1 tablet every day by oral route., Disp: , Rfl:     hydrALAZINE (APRESOLINE) 25 MG tablet, Take 25 mg by mouth 3 (three) times daily., Disp: , Rfl: 4    hydroxychloroquine (PLAQUENIL) 200 mg tablet, Take 1 tablet (200 mg total) by mouth 2 (two) times daily., Disp: 60 tablet, Rfl: 3    isosorbide dinitrate (ISORDIL) 10 MG tablet, TAKE ONE TABLET BY MOUTH rid, Disp: , Rfl: 4    isosorbide mononitrate (IMDUR) 60 MG 24 hr tablet, Take 90 mg by mouth once daily. Every day, Disp: , Rfl:     krill-omega-3-dha-epa-lipids (KRILL OIL) 743-92-46-50 mg Cap, Take 1,000 mg by mouth once daily. Braxton krill 300mg qd , Disp: , Rfl:     lactobacillus acidophilus (PROBIOTIC) 10 billion cell Cap, Take 1 each by mouth once daily. 1.5 billion, Disp: , Rfl:     magnesium oxide (MAG-OX) 400 mg tablet, Take 400 mg by mouth 2 (two) times daily., Disp: , Rfl:     metoprolol succinate (TOPROL-XL) 25 MG 24 hr tablet, take ONE-HALF tab BY MOUTH EVERY DAY, Disp: , Rfl: 4    MULTIVITAMIN WITH MINERALS (ONE-A-DAY 50 PLUS) Tab, Take 1 tablet by mouth once daily. Every day, Disp: , Rfl:      nitroGLYCERIN (NITROLINGUAL) 0.4 mg/dose spray, Place 1 spray under the tongue every 5 (five) minutes as needed. PRN, Disp: , Rfl:     potassium chloride SA (K-DUR,KLOR-CON) 10 MEQ tablet, Take by mouth once daily. 3 tab q am / 1 tab q pm, Disp: , Rfl: 3    promethazine (PHENERGAN) 25 MG tablet, Take 1 tablet (25 mg total) by mouth 2 (two) times daily as needed for Nausea., Disp: 60 tablet, Rfl: 1    sacubitril-valsartan (ENTRESTO) 49-51 mg per tablet, Take 1 tablet by mouth., Disp: , Rfl:     sertraline (ZOLOFT) 50 MG tablet, TAKE 1 TABLET (50 MG TOTAL) BY MOUTH ONCE DAILY., Disp: 30 tablet, Rfl: 10    traMADol (ULTRAM) 50 mg tablet, TAKE TWO TABLETS BY MOUTH EVERY 6 HOURS AS NEEDED, Disp: 180 tablet, Rfl: 2    vitamin E 400 unit Tab, Take 400 mg by mouth once daily. Every day, Disp: , Rfl:     PMHx/PSHx Updates:        Objective:     Vitals:  Blood pressure 132/64, weight 48.2 kg (106 lb 4.8 oz).    Physical Examination:   GEN: no apparent distress, comfortable; AAOx3  SKIN: no rashes,no ulceration, no Raynaud's, no petechiae, no SQ nodules,  HEAD: normal  EYES: no pallor, no icterus  NECK: no masses, thyroid normal, trachea midline, no LAD/LN's, supple  CV: RRR with no murmur; l S1 and S2 reg. ,no gallop no rubs,   CHEST: Normal respiratory effort; CTAB; normal breath sounds; no wheeze or crackles  MUSC/Skeletal: ROM normal; no crepitus; joints without synovitis,  no deformities  No joint swelling or tenderness of PIP, MCP, wrist, elbow, shoulder, or knee joints  EXTREM: no clubbing, cyanosis, no edema,normal  pulses   NEURO: grossly intact; motor WNL; AAOx3;   PSYCH: normal mood, affect and behavior  LYMPH: normal cervical, supraclavicular          Labs:   Lab Results   Component Value Date    WBC 7.9 11/06/2017    HGB 11.7 (L) 11/06/2017    HCT 35.8 (L) 11/06/2017    MCV 91.1 11/06/2017     11/06/2017     CMP  @LASTLAB(NA,K,CL,CO2,GLU,BUN,Creatinine,Calcium,PROT,Albumin,Bilitot,Alkphos,AST,ALT,CRP,ESR,RF,CCP,VANCE,SSA,CPK,uric acid) )@  I have reviewed all available lab results and radiology reports.    Radiology/Diagnostic Studies:        Assessment/Plan:   (1) 82 y.o. female with diagnosis of  Sjogren's syndrome and osteoporosis.  Sjogren's appears stable    PLAN: Prolia  every 6 months if  CMP okay.            Discussion:     I have explained all of the above in detail and the patient understands all of the current recommendation(s). I have answered all questions to the best of my ability and to their complete satisfaction.       The patient is to continue with the current management plan         RTC in   1 month.      Electronically signed by Deepak Erazo MD

## 2018-07-19 ENCOUNTER — OFFICE VISIT (OUTPATIENT)
Dept: FAMILY MEDICINE | Facility: CLINIC | Age: 83
End: 2018-07-19
Attending: FAMILY MEDICINE
Payer: MEDICARE

## 2018-07-19 VITALS
TEMPERATURE: 98 F | BODY MASS INDEX: 21.29 KG/M2 | RESPIRATION RATE: 15 BRPM | OXYGEN SATURATION: 95 % | HEART RATE: 55 BPM | HEIGHT: 59 IN | WEIGHT: 105.63 LBS | SYSTOLIC BLOOD PRESSURE: 116 MMHG | DIASTOLIC BLOOD PRESSURE: 60 MMHG

## 2018-07-19 DIAGNOSIS — E78.5 HYPERLIPIDEMIA, UNSPECIFIED HYPERLIPIDEMIA TYPE: ICD-10-CM

## 2018-07-19 DIAGNOSIS — I10 GOOD HYPERTENSION CONTROL: ICD-10-CM

## 2018-07-19 DIAGNOSIS — I35.0 AORTIC VALVE STENOSIS, MODERATE: ICD-10-CM

## 2018-07-19 DIAGNOSIS — Z23 IMMUNIZATION DUE: ICD-10-CM

## 2018-07-19 DIAGNOSIS — I25.10 OCCLUSIVE CORONARY ARTERY DISEASE: Primary | ICD-10-CM

## 2018-07-19 DIAGNOSIS — K22.70 BARRETT'S ESOPHAGUS WITHOUT DYSPLASIA: ICD-10-CM

## 2018-07-19 DIAGNOSIS — M05.759: ICD-10-CM

## 2018-07-19 DIAGNOSIS — I50.9 CONGESTIVE HEART FAILURE, UNSPECIFIED HF CHRONICITY, UNSPECIFIED HEART FAILURE TYPE: ICD-10-CM

## 2018-07-19 PROCEDURE — 99213 OFFICE O/P EST LOW 20 MIN: CPT | Mod: PBBFAC,PO | Performed by: FAMILY MEDICINE

## 2018-07-19 PROCEDURE — 99999 PR PBB SHADOW E&M-EST. PATIENT-LVL III: CPT | Mod: PBBFAC,,, | Performed by: FAMILY MEDICINE

## 2018-07-19 PROCEDURE — 99214 OFFICE O/P EST MOD 30 MIN: CPT | Mod: S$PBB,,, | Performed by: FAMILY MEDICINE

## 2018-07-19 PROCEDURE — G0009 ADMIN PNEUMOCOCCAL VACCINE: HCPCS | Mod: PBBFAC,PO

## 2018-07-19 NOTE — PROGRESS NOTES
Subjective:       Patient ID: Cheyanne Gomes is a 82 y.o. female.    Chief Complaint: Annual Exam    82-year-old female with multiple medical problems coming in for general checkup.  She has a history of coronary artery disease with congestive heart failure and aortic stenosis followed by Dr. Blair, hypertension, hyperlipidemia, rheumatoid arthritis with Sjogren's syndrome and osteoarthritis followed by Dr. Perez and reflux with Cannon's esophagus followed by Dr. Seals and Dr. Pal.  She brings in some labs most of which are satisfactory although her BNP was 878 which is up a bit.  She did not have a lipid panel which she is somewhat due for and she is also due for her final Pneumovax 23 which is a bit late.  Her only complaint at this time is of little difficulty swallowing which occurs only in the afternoons and evenings.  In the morning and lunch she is able to swallow with no difficulty but she gets a feeling of things hanging up in the lower throat area later in the day.  She doesn't seem to notice any problems with swallowing earlier to suggest that there is some inflammation occurring with earlier meals.  She's not had an EGD with Dr. Seals in some time and I would suggest it's time for a follow-up.  Her last colonoscopy was January 10, 2017 and she's having no problems there and no sign of recurrence of ischemic colitis.  She has picked up 9 pounds since I last saw her and her weight is back into the normal range.    Past Medical History:  No date: Anemia      Comment: Dr Berkowitz   No date: Aortic valve stenosis, moderate  No date: Cannon's esophagus  No date: Cataract      Comment: ou done  No date: CHF (congestive heart failure)      Comment: EFx 35%, Dr. Spencer 12/19/13  No date: Colitis  No date: Compression fracture  No date: GERD (gastroesophageal reflux disease)  No date: Hyperlipidemia  No date: Hypertension  No date: Occlusive coronary artery disease  No date: Osteoarthritis       Comment: lumbar DDD  2017: Pneumonia  No date: Rheumatoid arthritis  2017: Shingles  No date: Sjogren syndrome    Past Surgical History:  No date: APPENDECTOMY  No date: BACK SURGERY  No date: CATARACT EXTRACTION      Comment: ou od d 11-15-12//  No date:  SECTION, CLASSIC      Comment: x 2  No date: CHOLECYSTECTOMY  09/15/2011: COLONOSCOPY      Comment: Dr Anaay   01/10/2017: COLONOSCOPY      Comment: [facility] report sent to scanning  No date: CORONARY ARTERY BYPASS GRAFT      Comment: 3 vessel  No date: eyes      Comment: rk ou  2016: FRACTURE SURGERY      Comment: Dr Guido left hip   No date: HYSTERECTOMY  No date: OOPHORECTOMY  2013: SPINE SURGERY      Comment: Silvino Mckeon  14: Yag Capsulotomy Right'    Current Outpatient Prescriptions on File Prior to Visit:  amLODIPine (NORVASC) 5 MG tablet, Take 5 mg by mouth 2 (two) times daily., Disp: , Rfl: 5  aspirin 81 mg Tab, Take 1 tablet by mouth every evening. Every day, Disp: , Rfl:   biotin 5 mg Cap, Take 1 tablet by mouth 2 (two) times daily., Disp: , Rfl:   CALCIUM CARB/VIT D3/MINERALS (CALCIUM-VITAMIN D ORAL), Take 600 mg by mouth 2 (two) times daily. Calcium 1200 with D 3 1000, Disp: , Rfl:   coenzyme Q10 100 mg capsule, Take 100 mg by mouth once daily., Disp: , Rfl:   CRANBERRY CONC/ASCORBIC ACID (CRANBERRY PLUS VITAMIN C ORAL), Take 1 capsule by mouth once daily., Disp: , Rfl:   DOCUSATE CALCIUM (STOOL SOFTENER ORAL), Take 200 mg by mouth every evening. , Disp: , Rfl:   FERROUS FUMARATE/VIT BCOMP,C (SUPER B COMPLEX ORAL), Take 1 tablet by mouth once daily., Disp: , Rfl:   ferrous gluconate (FERGON) 324 MG tablet, Take 324 mg by mouth daily with breakfast., Disp: , Rfl:   fluticasone (FLONASE) 50 mcg/actuation nasal spray, 2 sprays by Each Nare route once daily. (Patient taking differently: 2 sprays by Each Nare route daily as needed. ), Disp: 16 g, Rfl: 0  furosemide (LASIX) 20 MG tablet, furosemide 20 mg tablet   1 qd, Disp:  , Rfl:   furosemide (LASIX) 40 MG tablet, furosemide 40 mg tablet   Take 1 tablet every day by oral route., Disp: , Rfl:   hydrALAZINE (APRESOLINE) 25 MG tablet, Take 25 mg by mouth 3 (three) times daily., Disp: , Rfl: 4  hydroxychloroquine (PLAQUENIL) 200 mg tablet, Take 1 tablet (200 mg total) by mouth 2 (two) times daily., Disp: 60 tablet, Rfl: 3  isosorbide dinitrate (ISORDIL) 10 MG tablet, TAKE ONE TABLET BY MOUTH rid, Disp: , Rfl: 4  isosorbide mononitrate (IMDUR) 60 MG 24 hr tablet, Take 90 mg by mouth once daily. Every day, Disp: , Rfl:   krill-omega-3-dha-epa-lipids (KRILL OIL) 538-06-17-50 mg Cap, Take 1,000 mg by mouth once daily. Braxton krill 300mg qd , Disp: , Rfl:   lactobacillus acidophilus (PROBIOTIC) 10 billion cell Cap, Take 1 each by mouth once daily. 1.5 billion, Disp: , Rfl:   magnesium oxide (MAG-OX) 400 mg tablet, Take 400 mg by mouth 2 (two) times daily., Disp: , Rfl:   metoprolol succinate (TOPROL-XL) 25 MG 24 hr tablet, take ONE-HALF tab BY MOUTH EVERY DAY, Disp: , Rfl: 4  MULTIVITAMIN WITH MINERALS (ONE-A-DAY 50 PLUS) Tab, Take 1 tablet by mouth once daily. Every day, Disp: , Rfl:   nitroGLYCERIN (NITROLINGUAL) 0.4 mg/dose spray, Place 1 spray under the tongue every 5 (five) minutes as needed. PRN, Disp: , Rfl:   potassium chloride SA (K-DUR,KLOR-CON) 10 MEQ tablet, Take by mouth once daily. 3 tab q am / 1 tab q pm, Disp: , Rfl: 3  promethazine (PHENERGAN) 25 MG tablet, Take 1 tablet (25 mg total) by mouth 2 (two) times daily as needed for Nausea., Disp: 60 tablet, Rfl: 1  sacubitril-valsartan (ENTRESTO) 49-51 mg per tablet, Take 1 tablet by mouth., Disp: , Rfl:   sertraline (ZOLOFT) 50 MG tablet, TAKE 1 TABLET (50 MG TOTAL) BY MOUTH ONCE DAILY., Disp: 30 tablet, Rfl: 10  traMADol (ULTRAM) 50 mg tablet, TAKE TWO TABLETS BY MOUTH EVERY 6 HOURS AS NEEDED, Disp: 180 tablet, Rfl: 2  vitamin E 400 unit Tab, Take 400 mg by mouth once daily. Every day, Disp: , Rfl:   (DISCONTINUED)  hydrochlorothiazide (HYDRODIURIL) 25 MG tablet, Take 25 mg by mouth once daily., Disp: , Rfl:     No current facility-administered medications on file prior to visit.       Pneumococcal (65+)(2 of 2 - PPSV23) due on 10/27/2017  Lipid Panel due on 02/05/2018-she will get copies of her cholesterol panel done at [facility] under the orders of  recently for us        Review of Systems   Constitutional: Negative for activity change and unexpected weight change.   HENT: Positive for trouble swallowing. Negative for hearing loss and rhinorrhea.    Eyes: Negative for discharge and visual disturbance.   Respiratory: Negative for chest tightness and wheezing.    Cardiovascular: Negative for chest pain and palpitations.   Gastrointestinal: Negative for blood in stool, constipation, diarrhea and vomiting.   Endocrine: Negative for polydipsia and polyuria.   Genitourinary: Negative for difficulty urinating, dysuria, hematuria and menstrual problem.   Musculoskeletal: Positive for arthralgias and joint swelling. Negative for neck pain.   Neurological: Positive for weakness. Negative for headaches.   Psychiatric/Behavioral: Negative for confusion and dysphoric mood.       Objective:      Physical Exam   Constitutional: She is oriented to person, place, and time. She appears well-developed and well-nourished. No distress.   Good blood pressure control  Mild bradycardia  Afebrile  Normal weight with a BMI of 21.3 she is up 9.3 pounds from her last visit with me February 14, 2017   HENT:   Head: Normocephalic and atraumatic.   Right Ear: External ear normal.   Left Ear: External ear normal.   Nose: Nose normal.   Mouth/Throat: Oropharynx is clear and moist. No oropharyngeal exudate.   Eyes: Conjunctivae and EOM are normal. Pupils are equal, round, and reactive to light. Right eye exhibits no discharge. Left eye exhibits no discharge. No scleral icterus.   Neck: Normal range of motion. Neck supple. No  JVD present. No tracheal deviation present. No thyromegaly present.   Cardiovascular: Normal rate and regular rhythm.  Exam reveals no gallop and no friction rub.    Murmur (low pitched decrescendo holosystolic at the upper right sternal border  consistent with mild aortic stenosi) heard.  Carotid pulses 2+ no bruit   Pulmonary/Chest: Effort normal and breath sounds normal. No respiratory distress. She has no wheezes. She has no rales. She exhibits no tenderness.   Abdominal: Soft. Bowel sounds are normal. She exhibits no distension and no mass. There is no tenderness. There is no rebound and no guarding.   Musculoskeletal: Normal range of motion. She exhibits no edema or tenderness.   Lymphadenopathy:     She has no cervical adenopathy.   Neurological: She is alert and oriented to person, place, and time. She has normal reflexes. She displays normal reflexes. No cranial nerve deficit or sensory deficit. She exhibits normal muscle tone.   Skin: Skin is warm and dry. No rash noted. She is not diaphoretic. No erythema.   Psychiatric: She has a normal mood and affect. Her behavior is normal. Judgment and thought content normal.   Nursing note and vitals reviewed.      Assessment:       1. Occlusive coronary artery disease    2. Congestive heart failure, unspecified HF chronicity, unspecified heart failure type    3. Hyperlipidemia, unspecified hyperlipidemia type    4. Good hypertension control    5. Aortic valve stenosis, moderate    6. Rheumatoid arthritis involving hip with positive rheumatoid factor, unspecified laterality    7. 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    8. Immunization due    9. BMI 21.0-21.9, adult        Plan:       1. Occlusive coronary artery disease  Asymptomatic with no active angina, followed by cardiology    2. Congestive heart failure, unspecified HF chronicity, unspecified heart failure type  Asymptomatic with no orthopnea or shortness of breath on exertion.  Last BNP elevated but may be  due to some decline in renal function as well.  Followed by cardiology    3. Hyperlipidemia, unspecified hyperlipidemia type  We'll attempt to get records before we have to repeat lab    4. Good hypertension control  Good control, no changes needed    5. Aortic valve stenosis, moderate  Appears stable and asymptomatic.  Patient reports cardiology did echocardiogram and not too distant past.     6. Rheumatoid arthritis involving hip with positive rheumatoid factor, unspecified laterality   Followed by rheumatology, no acute inflammatory changes present    7. 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  Followed by GI    8. Immunization due  Given  - (In Office Administered) Pneumococcal Polysaccharide Vaccine (23 Valent) (SQ/IM)    9. BMI 21.0-21.9, adult  Improved         Patient readiness: acceptance and barriers:none    During the course of the visit the patient was educated and counseled about the following:     Hypertension:   Medication: no change.  Dietary sodium restriction.  Regular aerobic exercise.    Goals: Hypertension: Reduce Blood Pressure    Did patient meet goals/outcomes: Yes    The following self management tools provided: blood pressure log    Patient Instructions (the written plan) was given to the patient/family.     Time spent with patient: 45 minutes

## 2018-07-20 DIAGNOSIS — M35.00 SJOGREN'S SYNDROME, WITH UNSPECIFIED ORGAN INVOLVEMENT: ICD-10-CM

## 2018-07-21 RX ORDER — TRAMADOL HYDROCHLORIDE 50 MG/1
TABLET ORAL
Qty: 90 TABLET | Refills: 3 | Status: SHIPPED | OUTPATIENT
Start: 2018-07-21 | End: 2018-08-20

## 2018-08-08 ENCOUNTER — OFFICE VISIT (OUTPATIENT)
Dept: RHEUMATOLOGY | Facility: CLINIC | Age: 83
End: 2018-08-08
Payer: MEDICARE

## 2018-08-08 VITALS
BODY MASS INDEX: 21.05 KG/M2 | WEIGHT: 104.19 LBS | SYSTOLIC BLOOD PRESSURE: 128 MMHG | DIASTOLIC BLOOD PRESSURE: 62 MMHG

## 2018-08-08 DIAGNOSIS — M35.01 SJOGREN'S SYNDROME WITH KERATOCONJUNCTIVITIS SICCA: Primary | ICD-10-CM

## 2018-08-08 LAB
ALBUMIN SERPL-MCNC: 4.1 G/DL (ref 3.1–4.7)
ALP SERPL-CCNC: 59 IU/L (ref 40–104)
ALT (SGPT): 42 IU/L (ref 3–33)
AST SERPL-CCNC: 50 IU/L (ref 10–40)
BASOPHILS NFR BLD: 0.1 K/UL (ref 0–0.2)
BASOPHILS NFR BLD: 0.8 %
BILIRUB SERPL-MCNC: 0.6 MG/DL (ref 0.3–1)
BUN SERPL-MCNC: 25 MG/DL (ref 8–20)
CALCIUM SERPL-MCNC: 10 MG/DL (ref 7.7–10.4)
CHLORIDE: 100 MMOL/L (ref 98–110)
CO2 SERPL-SCNC: 32 MMOL/L (ref 22.8–31.6)
CREATININE: 1.5 MG/DL (ref 0.6–1.4)
CRP SERPL-MCNC: 1.01 MG/DL (ref 0–1.4)
EOSINOPHIL NFR BLD: 0 %
EOSINOPHIL NFR BLD: 0 K/UL (ref 0–0.7)
ERYTHROCYTE [DISTWIDTH] IN BLOOD BY AUTOMATED COUNT: 13.4 % (ref 11.7–14.9)
GLUCOSE: 97 MG/DL (ref 70–99)
GRAN #: 4.5 K/UL (ref 1.4–6.5)
GRAN%: 68.1 %
HCT VFR BLD AUTO: 33.9 % (ref 36–48)
HGB BLD-MCNC: 10.8 G/DL (ref 12–15)
IMMATURE GRANS (ABS): 0.1 K/UL (ref 0–1)
IMMATURE GRANULOCYTES: 0.8 %
LYMPH #: 1.4 K/UL (ref 1.2–3.4)
LYMPH%: 21.1 %
MCH RBC QN AUTO: 29.1 PG (ref 25–35)
MCHC RBC AUTO-ENTMCNC: 31.9 G/DL (ref 31–36)
MCV RBC AUTO: 91.4 FL (ref 79–98)
MONO #: 0.6 K/UL (ref 0.1–0.6)
MONO%: 9.2 %
NUCLEATED RBCS: 0 %
PLATELET # BLD AUTO: 190 K/UL (ref 140–440)
PMV BLD AUTO: 9.5 FL (ref 8.8–12.7)
POTASSIUM SERPL-SCNC: 4.6 MMOL/L (ref 3.5–5)
PROT SERPL-MCNC: 7 G/DL (ref 6–8.2)
RBC # BLD AUTO: 3.71 M/UL (ref 3.5–5.5)
SODIUM: 140 MMOL/L (ref 134–144)
WBC # BLD AUTO: 6.6 K/UL (ref 5–10)

## 2018-08-08 PROCEDURE — 99213 OFFICE O/P EST LOW 20 MIN: CPT | Mod: ,,, | Performed by: INTERNAL MEDICINE

## 2018-08-08 RX ORDER — SACUBITRIL AND VALSARTAN 97; 103 MG/1; MG/1
1 TABLET, FILM COATED ORAL 2 TIMES DAILY
Status: ON HOLD | COMMUNITY
Start: 2018-08-06 | End: 2019-10-04 | Stop reason: HOSPADM

## 2018-08-08 NOTE — PROGRESS NOTES
[facility] RHEUMATOLOGY            PROGRESS NOTE      Subjective:       Patient ID:   NAME: Cheyanne Gomes : 1935     82 y.o. female    Referring Doc: No ref. provider found  Other Physicians:    Chief Complaint:  Sjogren's syndrome      History of Present Illness:     Patient returns today for a regularly scheduled follow-up visit  for  Sjogren's syndrome .     The patient past been experiencing increased dryness of her eyes and dryness of her mouth. No chest pains cough: Some SALINAS .  No GI complaints.   No rashes or joint swelling .  No paresthesias          ROS:   GEN:  No  fever, night sweats . weight is stable   + fatigue  SKIN: no rashes, no bruising, no ulcerations, no Raynaud's  HEENT: no HA's, No visual changes, no mucosal ulcers, + sicca symptoms,  CV:   no CP, SOB, PND,+ SALINAS, no orthopnea, no palpitations  PULM: normal with no SOB, cough, hemoptysis, sputum or pleuritic pain  GI:  no abdominal pain, nausea, vomiting, constipation, diarrhea, melanotic stools, BRBPR, hematemesis, no dysphagia  :   no dysuria  NEURO: no paresthesias, headaches, visual disturbances, muscle weakness  MUSCULOSKELETAL:no joint swelling, prolonged AM stiffness, + chronic ,mild back pain, + occ  muscle pain  Allergies:  Review of patient's allergies indicates:   Allergen Reactions    Macrodantin  [nitrofurantoin macrocrystalline]      Other reaction(s): very sickly    Penicillins      Other reaction(s): swelling  Other reaction(s): red skin discolorati    Sulfa (sulfonamide antibiotics)      Other reaction(s): very sickly       Medications:    Current Outpatient Prescriptions:     amLODIPine (NORVASC) 5 MG tablet, Take 5 mg by mouth 2 (two) times daily., Disp: , Rfl: 5    aspirin 81 mg Tab, Take 1 tablet by mouth every evening. Every day, Disp: , Rfl:     biotin 5 mg Cap, Take 1 tablet by mouth 2 (two) times daily., Disp: , Rfl:     CALCIUM CARB/VIT D3/MINERALS (CALCIUM-VITAMIN D ORAL), Take 600 mg by mouth 2  (two) times daily. Calcium 1200 with D 3 1000, Disp: , Rfl:     coenzyme Q10 100 mg capsule, Take 100 mg by mouth once daily., Disp: , Rfl:     CRANBERRY CONC/ASCORBIC ACID (CRANBERRY PLUS VITAMIN C ORAL), Take 1 capsule by mouth once daily., Disp: , Rfl:     DOCUSATE CALCIUM (STOOL SOFTENER ORAL), Take 200 mg by mouth every evening. , Disp: , Rfl:     FERROUS FUMARATE/VIT BCOMP,C (SUPER B COMPLEX ORAL), Take 1 tablet by mouth once daily., Disp: , Rfl:     ferrous gluconate (FERGON) 324 MG tablet, Take 324 mg by mouth daily with breakfast., Disp: , Rfl:     fluticasone (FLONASE) 50 mcg/actuation nasal spray, 2 sprays by Each Nare route once daily. (Patient taking differently: 2 sprays by Each Nare route daily as needed. ), Disp: 16 g, Rfl: 0    furosemide (LASIX) 20 MG tablet, furosemide 20 mg tablet  1 qd, Disp: , Rfl:     furosemide (LASIX) 40 MG tablet, furosemide 40 mg tablet  Take 1 tablet every day by oral route., Disp: , Rfl:     hydrALAZINE (APRESOLINE) 25 MG tablet, Take 25 mg by mouth 3 (three) times daily., Disp: , Rfl: 4    hydroxychloroquine (PLAQUENIL) 200 mg tablet, Take 1 tablet (200 mg total) by mouth 2 (two) times daily., Disp: 60 tablet, Rfl: 3    isosorbide dinitrate (ISORDIL) 20 MG tablet, TAKE ONE TABLET BY MOUTH tid, Disp: , Rfl: 4    krill-omega-3-dha-epa-lipids (KRILL OIL) 675-64-45-50 mg Cap, Take 1,000 mg by mouth once daily. Braxton krill 300mg qd , Disp: , Rfl:     lactobacillus acidophilus (PROBIOTIC) 10 billion cell Cap, Take 1 each by mouth once daily. 1.5 billion, Disp: , Rfl:     magnesium oxide (MAG-OX) 400 mg tablet, Take 400 mg by mouth 2 (two) times daily., Disp: , Rfl:     metoprolol succinate (TOPROL-XL) 25 MG 24 hr tablet, take ONE-HALF tab BY MOUTH EVERY DAY, Disp: , Rfl: 4    MULTIVITAMIN WITH MINERALS (ONE-A-DAY 50 PLUS) Tab, Take 1 tablet by mouth once daily. Every day, Disp: , Rfl:     nitroGLYCERIN (NITROLINGUAL) 0.4 mg/dose spray, Place 1 spray under the  tongue every 5 (five) minutes as needed. PRN, Disp: , Rfl:     potassium chloride SA (K-DUR,KLOR-CON) 10 MEQ tablet, Take by mouth once daily. 3 tab q am / 1 tab q pm, Disp: , Rfl: 3    promethazine (PHENERGAN) 25 MG tablet, Take 1 tablet (25 mg total) by mouth 2 (two) times daily as needed for Nausea., Disp: 60 tablet, Rfl: 1    sertraline (ZOLOFT) 50 MG tablet, TAKE 1 TABLET (50 MG TOTAL) BY MOUTH ONCE DAILY., Disp: 30 tablet, Rfl: 10    traMADol (ULTRAM) 50 mg tablet, TAKE TWO TABLETS BY MOUTH EVERY 6 HOURS AS NEEDED, Disp: 90 tablet, Rfl: 3    vitamin E 400 unit Tab, Take 400 mg by mouth once daily. Every day, Disp: , Rfl:     ENTRESTO  mg per tablet, , Disp: , Rfl:     PMHx/PSHx Updates:      Last Eye Exam ; UTD      Objective:     Vitals:  Blood pressure 128/62, weight 47.3 kg (104 lb 3.2 oz).    Physical Examination:   GEN: no apparent distress, comfortable; AAOx3  SKIN: no rashes,no ulceration, no Raynaud's, no petechiae, no SQ nodules,  HEAD: normal  EYES: no pallor, no icterus,   ENT:  ,+ mucosal dryness no ulcerations  NECK: no masses, thyroid normal, trachea midline, no LAD/LN's, supple  CV: RRR with + murmur; l S1 and S2 reg. ,no gallop no rubs,   CHEST: Normal respiratory effort; CTAB; normal breath sounds; no wheeze or crackles  ABDOM: nontender and nondistended; soft; no masses; no rebound/guarding  MUSC/Skeletal: ROM normal; no crepitus; joints without synovitis,  no deformities  No joint swelling or tenderness of PIP, MCP, wrist, elbow, shoulder, or knee joints  EXTREM: no clubbing, cyanosis, no edema,normal  pulses   NEURO: grossly intact; motor WNL; AAOx3; ,   PSYCH: normal mood, affect and behavior  LYMPH: normal cervical, supraclavicular          Labs:   Lab Results   Component Value Date    WBC 6.6 08/08/2018    HGB 10.8 (L) 08/08/2018    HCT 33.9 (L) 08/08/2018    MCV 91.4 08/08/2018     08/08/2018     CMP  @LASTLAB(NA,K,CL,CO2,GLU,BUN,Creatinine,Calcium,PROT,Albumin,Bilitot,Alkphos,AST,ALT,CRP,ESR,RF,CCP,VANCE,SSA,CPK,uric acid) )@  I have reviewed all available lab results and radiology reports.    Radiology/Diagnostic Studies:        Assessment/Plan:   (1) 82 y.o. female with diagnosis of Sjogren's syndrome.  Worsening of sicca sxs.  PLAN: see ophthalmologist might benefit from tear duct plugging  2) osteoarthritis stable.Symptomatic but better with Tylenol            Discussion:     I have explained all of the above in detail and the patient understands all of the current recommendation(s). I have answered all questions to the best of my ability and to their complete satisfaction.       The patient is to continue with the current management plan         RTC in         Electronically signed by Deepak Erazo MD

## 2018-08-09 DIAGNOSIS — R79.89 ELEVATED LIVER FUNCTION TESTS: Primary | ICD-10-CM

## 2018-08-09 DIAGNOSIS — Z79.899 ENCOUNTER FOR LONG-TERM (CURRENT) USE OF MEDICATIONS: ICD-10-CM

## 2018-08-09 NOTE — PROGRESS NOTES
Patient notified of results. Patient instructed no NSAIDS. Patient denies taking any NSAIDS. Lab order to repeat CMP mailed to patient. Copy of labs faxed to patients pcp.

## 2018-08-30 RX ORDER — TRAMADOL HYDROCHLORIDE 50 MG/1
TABLET ORAL
Qty: 180 TABLET | Refills: 2 | Status: SHIPPED | OUTPATIENT
Start: 2018-08-30 | End: 2019-02-19 | Stop reason: SDUPTHER

## 2018-09-11 ENCOUNTER — TELEPHONE (OUTPATIENT)
Dept: FAMILY MEDICINE | Facility: CLINIC | Age: 83
End: 2018-09-11

## 2018-09-11 NOTE — TELEPHONE ENCOUNTER
Cough, sporadic hoarseness, nasal mucus is clear, afebrile-no other symptoms- taken Claritin for 2 wks- advised to try Mucinex or the DM and push fluids-most likely viral.

## 2018-09-11 NOTE — TELEPHONE ENCOUNTER
----- Message from Alejandra Gomez sent at 9/11/2018  3:18 PM CDT -----  Contact: EDWARDO DOHERTY [[id number]]            Name of Who is Calling:EDWARDO DOHERTY [[id number]]      What is the request in detail: Returning a call.    Can the clinic reply by [facility]: no      What Number to [facility] Back [facility] not [facility] [phone]

## 2018-09-11 NOTE — TELEPHONE ENCOUNTER
----- Message from Virginia Ghotra sent at 9/11/2018 12:47 PM CDT -----  Contact: Patient  Type:  Same Day Appointment Request    Caller is requesting a same day appointment.  Caller declined first available appointment listed below.      Name of Caller:  Cheyanne  When is the first available appointment?  10/5/18  Symptoms:  Hoarsness  Best Call Back Number:  [phone]  Additional Information:   Patient is requesting to be seen tomorrow. Please advise. Thanks.

## 2018-09-29 DIAGNOSIS — M35.00 SJOGREN'S SYNDROME, WITH UNSPECIFIED ORGAN INVOLVEMENT: ICD-10-CM

## 2018-09-30 ENCOUNTER — EXTERNAL CHRONIC CARE MANAGEMENT (OUTPATIENT)
Dept: PRIMARY CARE CLINIC | Facility: CLINIC | Age: 83
End: 2018-09-30
Payer: MEDICARE

## 2018-09-30 PROCEDURE — 99490 CHRNC CARE MGMT STAFF 1ST 20: CPT | Mod: S$PBB,,, | Performed by: FAMILY MEDICINE

## 2018-09-30 PROCEDURE — 99490 CHRNC CARE MGMT STAFF 1ST 20: CPT | Mod: PBBFAC,PO | Performed by: FAMILY MEDICINE

## 2018-09-30 RX ORDER — HYDROXYCHLOROQUINE SULFATE 200 MG/1
200 TABLET, FILM COATED ORAL 2 TIMES DAILY
Qty: 60 TABLET | Refills: 3 | Status: SHIPPED | OUTPATIENT
Start: 2018-09-30 | End: 2019-04-10 | Stop reason: SDUPTHER

## 2018-10-08 PROBLEM — S72.351A DISPLACED COMMINUTED FRACTURE OF SHAFT OF RIGHT FEMUR, INITIAL ENCOUNTER FOR CLOSED FRACTURE: Status: ACTIVE | Noted: 2018-10-08

## 2018-10-11 PROBLEM — D62 ACUTE BLOOD LOSS ANEMIA: Status: ACTIVE | Noted: 2018-10-11

## 2018-10-12 PROBLEM — D62 ACUTE BLOOD LOSS ANEMIA: Status: RESOLVED | Noted: 2018-10-11 | Resolved: 2018-10-12

## 2018-10-31 ENCOUNTER — EXTERNAL CHRONIC CARE MANAGEMENT (OUTPATIENT)
Dept: PRIMARY CARE CLINIC | Facility: CLINIC | Age: 83
End: 2018-10-31
Payer: MEDICARE

## 2018-10-31 PROCEDURE — 99490 CHRNC CARE MGMT STAFF 1ST 20: CPT | Mod: S$PBB,,, | Performed by: FAMILY MEDICINE

## 2018-10-31 PROCEDURE — 99490 CHRNC CARE MGMT STAFF 1ST 20: CPT | Mod: PBBFAC,PO | Performed by: FAMILY MEDICINE

## 2018-11-27 RX ORDER — GABAPENTIN 100 MG/1
100 CAPSULE ORAL NIGHTLY
Qty: 30 CAPSULE | Refills: 3 | Status: SHIPPED | OUTPATIENT
Start: 2018-11-27 | End: 2019-03-02 | Stop reason: SDUPTHER

## 2018-11-28 ENCOUNTER — OFFICE VISIT (OUTPATIENT)
Dept: HEMATOLOGY/ONCOLOGY | Facility: CLINIC | Age: 83
End: 2018-11-28
Payer: MEDICARE

## 2018-11-28 VITALS
BODY MASS INDEX: 19.41 KG/M2 | DIASTOLIC BLOOD PRESSURE: 70 MMHG | TEMPERATURE: 98 F | HEART RATE: 65 BPM | WEIGHT: 96.13 LBS | SYSTOLIC BLOOD PRESSURE: 128 MMHG | RESPIRATION RATE: 20 BRPM

## 2018-11-28 DIAGNOSIS — D63.8 ANEMIA OF CHRONIC DISEASE: Chronic | ICD-10-CM

## 2018-11-28 PROCEDURE — 99213 OFFICE O/P EST LOW 20 MIN: CPT | Mod: ,,, | Performed by: INTERNAL MEDICINE

## 2018-11-28 NOTE — LETTER
November 28, 2018      Romeo Alas MD  [address]  [place] [address]           [facility] - Hematology Oncology  [address]  [address]  [place] [address]  Phone: [phone]  Fax: [phone]          Patient: Cheyanne Gomes   MR Number: [id number]   YOB: 1935   Date of Visit: 11/28/2018       Dear Dr. Romeo Alas:    Thank you for referring Cheyanne Gomes to me for evaluation. Attached you will find relevant portions of my assessment and plan of care.    If you have questions, please do not hesitate to call me. I look forward to following Cheyanne Gomes along with you.    Sincerely,    David Pal MD    Enclosure  CC:  No Recipients    If you would like to receive this communication electronically, please contact [email or web address] or [phone] to request more information on [facility] access.    For providers and/or their staff who would like to refer a patient to [facility], please contact us through our one-stop-shop provider referral line, [facility], at [phone].    If you feel you have received this communication in error or would no longer like to receive these types of communications, please e-mail [email or web address]

## 2018-11-28 NOTE — PROGRESS NOTES
PROGRESS NOTE    Subjective:       Patient ID: Cheyanne Gomes is a 83 y.o. female.    Chief Complaint:  Follow-up and Results  anemia follow up.     History of Present Illness:   Cheyanne Gomes is a 83 y.o. female who presents with a history of anemia of chronic disease. Patient is feeling well. No new complaints although she fell and fractured her right hip and had surgery since last visit.        Family and Social history reviewed and is unchanged from 8/9/2016.       ROS:  Review of Systems   Constitutional: Negative for fever.   Respiratory: Negative for shortness of breath.    Cardiovascular: Negative for chest pain and leg swelling.   Gastrointestinal: Negative for abdominal pain and blood in stool.   Genitourinary: Negative for hematuria.   Skin: Negative for rash.          Current Outpatient Medications:     amLODIPine (NORVASC) 5 MG tablet, Take 5 mg by mouth once daily. , Disp: , Rfl: 5    aspirin 81 mg Tab, Take 1 tablet by mouth every evening. Every day, Disp: , Rfl:     biotin 5 mg Cap, Take 1 tablet by mouth 2 (two) times daily., Disp: , Rfl:     CALCIUM CARB/VIT D3/MINERALS (CALCIUM-VITAMIN D ORAL), Take 600 mg by mouth 2 (two) times daily. Calcium 1200 with D 3 1000, Disp: , Rfl:     coenzyme Q10 100 mg capsule, Take 100 mg by mouth once daily., Disp: , Rfl:     CRANBERRY CONC/ASCORBIC ACID (CRANBERRY PLUS VITAMIN C ORAL), Take 1 capsule by mouth once daily., Disp: , Rfl:     DOCUSATE CALCIUM (STOOL SOFTENER ORAL), Take 200 mg by mouth every evening. , Disp: , Rfl:     ENTRESTO  mg per tablet, , Disp: , Rfl:     FERROUS FUMARATE/VIT BCOMP,C (SUPER B COMPLEX ORAL), Take 1 tablet by mouth once daily., Disp: , Rfl:     ferrous gluconate (FERGON) 324 MG tablet, Take 324 mg by mouth 2 (two) times daily. At noon and in the evening, Disp: , Rfl:     fluticasone (FLONASE) 50 mcg/actuation nasal spray, 2 sprays by Each Nare  route once daily. (Patient taking differently: 2 sprays by Each Nare route daily as needed. ), Disp: 16 g, Rfl: 0    furosemide (LASIX) 40 MG tablet, furosemide 40 mg tablet  Take 1 tablet every day by oral route., Disp: , Rfl:     gabapentin (NEURONTIN) 100 MG capsule, Take 1 capsule (100 mg total) by mouth every evening., Disp: 30 capsule, Rfl: 3    hydroxychloroquine (PLAQUENIL) 200 mg tablet, Take 1 tablet (200 mg total) by mouth 2 (two) times daily., Disp: 60 tablet, Rfl: 3    isosorbide dinitrate (ISORDIL) 20 MG tablet, TAKE ONE TABLET BY MOUTH tid, Disp: , Rfl: 4    krill-omega-3-dha-epa-lipids (KRILL OIL) 615-25-25-50 mg Cap, Take 1,000 mg by mouth once daily. Braxton krill 300mg qd , Disp: , Rfl:     lactobacillus acidophilus (PROBIOTIC) 10 billion cell Cap, Take 1 each by mouth once daily. 1.5 billion, Disp: , Rfl:     magnesium oxide (MAG-OX) 400 mg tablet, Take 400 mg by mouth 2 (two) times daily., Disp: , Rfl:     metoprolol succinate (TOPROL-XL) 25 MG 24 hr tablet, take ONE-HALF tab BY MOUTH EVERY DAY, Disp: , Rfl: 4    MULTIVITAMIN WITH MINERALS (ONE-A-DAY 50 PLUS) Tab, Take 1 tablet by mouth once daily. Every day, Disp: , Rfl:     nitroGLYCERIN (NITROLINGUAL) 0.4 mg/dose spray, Place 1 spray under the tongue every 5 (five) minutes as needed. PRN, Disp: , Rfl:     pantoprazole (PROTONIX) 40 MG tablet, Take 40 mg by mouth 2 (two) times daily., Disp: , Rfl:     potassium chloride SA (K-DUR,KLOR-CON) 10 MEQ tablet, Take 10 mEq by mouth 2 (two) times daily. , Disp: , Rfl: 3    promethazine (PHENERGAN) 25 MG tablet, Take 1 tablet (25 mg total) by mouth 2 (two) times daily as needed for Nausea., Disp: 60 tablet, Rfl: 1    sertraline (ZOLOFT) 50 MG tablet, TAKE 1 TABLET (50 MG TOTAL) BY MOUTH ONCE DAILY., Disp: 30 tablet, Rfl: 10    traMADol (ULTRAM) 50 mg tablet, TAKE TWO TABLETS BY MOUTH EVERY 6 HOURS AS NEEDED, Disp: 180 tablet, Rfl: 2    vitamin E 400 unit Tab, Take 400 mg by mouth once  daily. Every day, Disp: , Rfl:         Objective:       Physical Examination:     /70   Pulse 65   Temp 98 °F (36.7 °C)   Resp 20   Wt 43.6 kg (96 lb 1.6 oz)   LMP  (LMP Unknown)   BMI 19.41 kg/m²     Physical Exam   Constitutional: She appears well-developed and well-nourished.   HENT:   Head: Normocephalic and atraumatic.   Right Ear: External ear normal.   Left Ear: External ear normal.   Mouth/Throat: Oropharynx is clear and moist.   Eyes: Conjunctivae are normal. Pupils are equal, round, and reactive to light.   Neck: No tracheal deviation present. No thyromegaly present.   Cardiovascular: Normal rate, regular rhythm and normal heart sounds.   Pulmonary/Chest: Effort normal and breath sounds normal.   Abdominal: Soft. Bowel sounds are normal. She exhibits no distension and no mass. There is no tenderness.   Musculoskeletal: She exhibits no edema.   Neurological:   Neuro intact througout   Skin: No rash noted.   Psychiatric: She has a normal mood and affect. Her behavior is normal. Judgment and thought content normal.       Labs:   No results found for this or any previous visit (from the past 336 hour(s)).  CMP  Sodium   Date Value Ref Range Status   10/11/2018 137 136 - 145 mmol/L Final   08/08/2018 140 134 - 144 mmol/L      Potassium   Date Value Ref Range Status   10/11/2018 4.4 3.5 - 5.1 mmol/L Final     Chloride   Date Value Ref Range Status   10/11/2018 105 95 - 110 mmol/L Final   08/08/2018 100 98 - 110 mmol/L      CO2   Date Value Ref Range Status   10/11/2018 26 22 - 31 mmol/L Final     Glucose   Date Value Ref Range Status   10/11/2018 100 70 - 110 mg/dL Final     Comment:     The ADA recommends the following guidelines for fasting glucose:  Normal:       less than 100 mg/dL  Prediabetes:  100 mg/dL to 125 mg/dL  Diabetes:     126 mg/dL or higher     08/08/2018 97 70 - 99 mg/dL      BUN, Bld   Date Value Ref Range Status   10/11/2018 20 (H) 7 - 18 mg/dL Final     Creatinine   Date Value  Ref Range Status   10/11/2018 0.84 0.50 - 1.40 mg/dL Final   08/08/2018 1.50 (H) 0.60 - 1.40 mg/dL      Calcium   Date Value Ref Range Status   10/11/2018 8.6 8.4 - 10.2 mg/dL Final     Total Protein   Date Value Ref Range Status   10/08/2018 5.7 (L) 6.0 - 8.4 g/dL Final     Albumin   Date Value Ref Range Status   10/08/2018 3.3 (L) 3.5 - 5.2 g/dL Final   08/08/2018 4.1 3.1 - 4.7 g/dL      Total Bilirubin   Date Value Ref Range Status   10/08/2018 0.4 0.2 - 1.3 mg/dL Final     Alkaline Phosphatase   Date Value Ref Range Status   10/08/2018 57 38 - 145 U/L Final     AST   Date Value Ref Range Status   10/08/2018 69 (H) 14 - 36 U/L Final     ALT   Date Value Ref Range Status   10/08/2018 62 (H) 10 - 44 U/L Final     Anion Gap   Date Value Ref Range Status   10/11/2018 6 (L) 8 - 16 mmol/L Final     eGFR if    Date Value Ref Range Status   10/11/2018 >60 >60 mL/min/1.73 m^2 Final     eGFR if non    Date Value Ref Range Status   10/11/2018 >60 >60 mL/min/1.73 m^2 Final     Comment:     Calculation used to obtain the estimated glomerular filtration  rate (eGFR) is the CKD-EPI equation.        No results found for: CEA  No results found for: PSA        Assessment/Plan:   Anemia of chronic disease  Hb done on 11/26 this year showed her Hb at 12/6g/dl.  She if feeling well and recovering well from hip surgery.  Will continue to watch her on a six month basis and watch CBC at that time as well.      Discussion:     Follow-up in about 6 months (around 5/28/2019).      Electronically signed by David Robb

## 2018-11-28 NOTE — ASSESSMENT & PLAN NOTE
Hb done on 11/26 this year showed her Hb at 12/6g/dl.  She if feeling well and recovering well from hip surgery.  Will continue to watch her on a six month basis and watch CBC at that time as well.

## 2018-11-30 ENCOUNTER — EXTERNAL CHRONIC CARE MANAGEMENT (OUTPATIENT)
Dept: PRIMARY CARE CLINIC | Facility: CLINIC | Age: 83
End: 2018-11-30
Payer: MEDICARE

## 2018-11-30 PROCEDURE — 99490 CHRNC CARE MGMT STAFF 1ST 20: CPT | Mod: S$PBB,,, | Performed by: FAMILY MEDICINE

## 2018-11-30 PROCEDURE — 99490 CHRNC CARE MGMT STAFF 1ST 20: CPT | Mod: PBBFAC,PO | Performed by: FAMILY MEDICINE

## 2018-12-11 ENCOUNTER — OFFICE VISIT (OUTPATIENT)
Dept: RHEUMATOLOGY | Facility: CLINIC | Age: 83
End: 2018-12-11
Payer: MEDICARE

## 2018-12-11 VITALS — DIASTOLIC BLOOD PRESSURE: 89 MMHG | BODY MASS INDEX: 19.51 KG/M2 | WEIGHT: 96.63 LBS | SYSTOLIC BLOOD PRESSURE: 141 MMHG

## 2018-12-11 DIAGNOSIS — M35.01 SJOGREN'S SYNDROME WITH KERATOCONJUNCTIVITIS SICCA: Primary | ICD-10-CM

## 2018-12-11 DIAGNOSIS — M81.0 OSTEOPOROSIS, UNSPECIFIED OSTEOPOROSIS TYPE, UNSPECIFIED PATHOLOGICAL FRACTURE PRESENCE: ICD-10-CM

## 2018-12-11 LAB
ALBUMIN SERPL-MCNC: 3.9 G/DL (ref 3.1–4.7)
ALP SERPL-CCNC: 78 IU/L (ref 40–104)
ALT (SGPT): 45 IU/L (ref 3–33)
AST SERPL-CCNC: 63 IU/L (ref 10–40)
BASOPHILS NFR BLD: 0 K/UL (ref 0–0.2)
BASOPHILS NFR BLD: 0.7 %
BILIRUB SERPL-MCNC: 0.4 MG/DL (ref 0.3–1)
BUN SERPL-MCNC: 18 MG/DL (ref 8–20)
CALCIUM SERPL-MCNC: 9.9 MG/DL (ref 7.7–10.4)
CHLORIDE: 96 MMOL/L (ref 98–110)
CO2 SERPL-SCNC: 33.6 MMOL/L (ref 22.8–31.6)
CREATININE: 0.95 MG/DL (ref 0.6–1.4)
CRP SERPL-MCNC: 0.14 MG/DL (ref 0–1.4)
EOSINOPHIL NFR BLD: 0 %
EOSINOPHIL NFR BLD: 0 K/UL (ref 0–0.7)
ERYTHROCYTE [DISTWIDTH] IN BLOOD BY AUTOMATED COUNT: 14.4 % (ref 11.7–14.9)
GLUCOSE: 97 MG/DL (ref 70–99)
GRAN #: 3.6 K/UL (ref 1.4–6.5)
GRAN%: 64 %
HCT VFR BLD AUTO: 37 % (ref 36–48)
HGB BLD-MCNC: 11.2 G/DL (ref 12–15)
IMMATURE GRANS (ABS): 0 K/UL (ref 0–1)
IMMATURE GRANULOCYTES: 0.4 %
LYMPH #: 1.5 K/UL (ref 1.2–3.4)
LYMPH%: 26.6 %
MCH RBC QN AUTO: 28 PG (ref 25–35)
MCHC RBC AUTO-ENTMCNC: 30.3 G/DL (ref 31–36)
MCV RBC AUTO: 92.5 FL (ref 79–98)
MONO #: 0.5 K/UL (ref 0.1–0.6)
MONO%: 8.3 %
NUCLEATED RBCS: 0 %
PLATELET # BLD AUTO: 157 K/UL (ref 140–440)
PMV BLD AUTO: 10.3 FL (ref 8.8–12.7)
POTASSIUM SERPL-SCNC: 5.4 MMOL/L (ref 3.5–5)
PROT SERPL-MCNC: 6.6 G/DL (ref 6–8.2)
RBC # BLD AUTO: 4 M/UL (ref 3.5–5.5)
SODIUM: 139 MMOL/L (ref 134–144)
VITAMIN D, 1,25 (OH)2: 58.3 NG/ML (ref 30–100)
WBC # BLD AUTO: 5.6 K/UL (ref 5–10)

## 2018-12-11 PROCEDURE — 99213 OFFICE O/P EST LOW 20 MIN: CPT | Mod: ,,, | Performed by: INTERNAL MEDICINE

## 2018-12-11 RX ORDER — ISOSORBIDE DINITRATE 10 MG/1
10 TABLET ORAL 3 TIMES DAILY
Refills: 5 | COMMUNITY
Start: 2018-11-19 | End: 2020-11-02

## 2018-12-11 NOTE — PROGRESS NOTES
[facility] RHEUMATOLOGY            PROGRESS NOTE      Subjective:       Patient ID:   NAME: Cheyanne Gomes : 1935     83 y.o. female    Referring Doc: No ref. provider found  Other Physicians:    Chief Complaint:  Sjogren's syndrome      History of Present Illness:     Patient returns today for a regularly scheduled follow-up visit for    Sjogren's syndrome and Osteoporosis    The patient is doing well recovering from surgery for right hip fracture. No chest pains cough or shortness of breath. No GI complaints. No mucosal ulcerations. No paresthesias or joint swelling.            ROS:   GEN:  No  fever, night sweats . weight is stable   + fatigue  SKIN: no rashes, no bruising, no ulcerations, no Raynaud's  HEENT: no HA's, No visual changes, no mucosal ulcers, no sicca symptoms,  CV:   no CP, SOB, PND, SALINAS, no orthopnea, no palpitations  PULM: normal with no SOB, cough, hemoptysis, sputum or pleuritic pain  GI:  no abdominal pain, nausea, vomiting, constipation, diarrhea, melanotic stools, BRBPR, hematemesis, no dysphagia  :   no dysuria  NEURO: no paresthesias, headaches, visual disturbances, muscle weakness  MUSCULOSKELETAL:no joint swelling, prolonged AM stiffness, + back pain, no muscle pain  Allergies:  Review of patient's allergies indicates:   Allergen Reactions    Macrodantin  [nitrofurantoin macrocrystalline]      Other reaction(s): very sickly    Penicillins      Other reaction(s): swelling  Other reaction(s): red skin discolorati    Sulfa (sulfonamide antibiotics)      Other reaction(s): very sickly       Medications:    Current Outpatient Medications:     amLODIPine (NORVASC) 5 MG tablet, Take 5 mg by mouth once daily. , Disp: , Rfl: 5    aspirin 81 mg Tab, Take 1 tablet by mouth every evening. Every day, Disp: , Rfl:     CALCIUM CARB/VIT D3/MINERALS (CALCIUM-VITAMIN D ORAL), Take 600 mg by mouth 2 (two) times daily. Calcium 1200 with D 3 1000, Disp: , Rfl:     coenzyme Q10 100 mg  capsule, Take 100 mg by mouth once daily., Disp: , Rfl:     CRANBERRY CONC/ASCORBIC ACID (CRANBERRY PLUS VITAMIN C ORAL), Take 1 capsule by mouth once daily., Disp: , Rfl:     DOCUSATE CALCIUM (STOOL SOFTENER ORAL), Take 200 mg by mouth every evening. , Disp: , Rfl:     ENTRESTO  mg per tablet, , Disp: , Rfl:     FERROUS FUMARATE/VIT BCOMP,C (SUPER B COMPLEX ORAL), Take 1 tablet by mouth once daily., Disp: , Rfl:     ferrous gluconate (FERGON) 324 MG tablet, Take 324 mg by mouth 2 (two) times daily. At noon and in the evening, Disp: , Rfl:     fluticasone (FLONASE) 50 mcg/actuation nasal spray, 2 sprays by Each Nare route once daily. (Patient taking differently: 2 sprays by Each Nare route daily as needed. ), Disp: 16 g, Rfl: 0    FUROSEMIDE ORAL, furosemide 60 mg tablet  Take 1 tablet every day by oral route., Disp: , Rfl:     gabapentin (NEURONTIN) 100 MG capsule, Take 1 capsule (100 mg total) by mouth every evening., Disp: 30 capsule, Rfl: 3    hydroxychloroquine (PLAQUENIL) 200 mg tablet, Take 1 tablet (200 mg total) by mouth 2 (two) times daily., Disp: 60 tablet, Rfl: 3    isosorbide dinitrate (ISORDIL) 10 MG tablet, Take 10 mg by mouth 3 (three) times daily., Disp: , Rfl: 5    lactobacillus acidophilus (PROBIOTIC) 10 billion cell Cap, Take 1 each by mouth once daily. 1.5 billion, Disp: , Rfl:     magnesium oxide (MAG-OX) 400 mg tablet, Take 400 mg by mouth 2 (two) times daily., Disp: , Rfl:     metoprolol succinate (TOPROL-XL) 25 MG 24 hr tablet, take ONE-HALF tab BY MOUTH EVERY DAY, Disp: , Rfl: 4    MULTIVITAMIN WITH MINERALS (ONE-A-DAY 50 PLUS) Tab, Take 1 tablet by mouth once daily. Every day, Disp: , Rfl:     nitroGLYCERIN (NITROLINGUAL) 0.4 mg/dose spray, Place 1 spray under the tongue every 5 (five) minutes as needed. PRN, Disp: , Rfl:     pantoprazole (PROTONIX) 40 MG tablet, Take 40 mg by mouth 2 (two) times daily., Disp: , Rfl:     potassium chloride SA (K-DUR,KLOR-CON) 10 MEQ  tablet, Take 10 mEq by mouth 2 (two) times daily. , Disp: , Rfl: 3    promethazine (PHENERGAN) 25 MG tablet, Take 1 tablet (25 mg total) by mouth 2 (two) times daily as needed for Nausea., Disp: 60 tablet, Rfl: 1    sertraline (ZOLOFT) 50 MG tablet, TAKE 1 TABLET (50 MG TOTAL) BY MOUTH ONCE DAILY., Disp: 30 tablet, Rfl: 10    traMADol (ULTRAM) 50 mg tablet, TAKE TWO TABLETS BY MOUTH EVERY 6 HOURS AS NEEDED, Disp: 180 tablet, Rfl: 2    vitamin E 400 unit Tab, Take 400 mg by mouth once daily. Every day, Disp: , Rfl:     biotin 5 mg Cap, Take 1 tablet by mouth 2 (two) times daily., Disp: , Rfl:     krill-omega-3-dha-epa-lipids (KRILL OIL) 039-63-96-50 mg Cap, Take 1,000 mg by mouth once daily. Braxton krill 300mg qd , Disp: , Rfl:     PMHx/PSHx Updates:        Objective:     Vitals:  Blood pressure (!) 141/89, weight 43.8 kg (96 lb 9.6 oz).    Physical Examination:   GEN: no apparent distress, comfortable; AAOx3  SKIN: no rashes,no ulceration, no Raynaud's, no petechiae, no SQ nodules,  HEAD: normal  EYES: no pallor, no icterus,  NECK: no masses, thyroid normal, trachea midline, no LAD/LN's, supple  CV: RRR with no murmur; l S1 and S2 reg. ,no gallop no rubs,   CHEST: Normal respiratory effort; CTAB; normal breath sounds; no wheeze or crackles  MUSC/Skeletal: ROM normal; no crepitus; joints without synovitis,  no deformities  No joint swelling or tenderness of PIP, MCP, wrist, elbow, shoulder, or knee joints  EXTREM: no clubbing, cyanosis, no edema,normal  pulses   NEURO: grossly intact; motor WNL; AAOx3;   PSYCH: normal mood, affect and behavior  LYMPH: normal cervical, supraclavicular          Labs:   Lab Results   Component Value Date    WBC 7.22 10/12/2018    HGB 7.0 (L) 10/12/2018    HCT 21.5 (L) 10/12/2018    MCV 89 10/12/2018    PLT 83 (L) 10/12/2018    CMP  @LASTLAB(NA,K,CL,CO2,GLU,BUN,Creatinine,Calcium,PROT,Albumin,Bilitot,Alkphos,AST,ALT,CRP,ESR,RF,CCP,VANCE,SSA,CPK,uric acid) )@  I have reviewed all  available lab results and radiology reports.    Radiology/Diagnostic Studies:        Assessment/Plan:   (1) 83 y.o. female with diagnosis of Sjogren's syndrome..stable  OA/FM stable  3) s/p R hip fx  4)osteoporosis. She was on Reclast; last infusion about a year ago. We'll switch to Prolia every 6 months.    CBC CMP vitamin D    Eye exam is up-to-date      Discussion:     I have explained all of the above in detail and the patient understands all of the current recommendation(s). I have answered all questions to the best of my ability and to their complete satisfaction.       The patient is to continue with the current management plan         RTC in   4 months      Electronically signed by Deepak Erazo MD

## 2018-12-11 NOTE — PROGRESS NOTES
Patient notified of elevated potassium level. Instructed to stop any potassium supplements and repeat potassium level on Thursday. Pt verbalizes  understanding of all. Pt has order for BMP.

## 2018-12-31 ENCOUNTER — EXTERNAL CHRONIC CARE MANAGEMENT (OUTPATIENT)
Dept: PRIMARY CARE CLINIC | Facility: CLINIC | Age: 83
End: 2018-12-31
Payer: MEDICARE

## 2018-12-31 PROCEDURE — 99490 PR CHRONIC CARE MGMT, 1ST 20 MIN: ICD-10-PCS | Mod: S$PBB,,, | Performed by: FAMILY MEDICINE

## 2018-12-31 PROCEDURE — 99490 CHRNC CARE MGMT STAFF 1ST 20: CPT | Mod: PBBFAC,PO | Performed by: FAMILY MEDICINE

## 2018-12-31 PROCEDURE — 99490 CHRNC CARE MGMT STAFF 1ST 20: CPT | Mod: S$PBB,,, | Performed by: FAMILY MEDICINE

## 2019-02-20 RX ORDER — TRAMADOL HYDROCHLORIDE 50 MG/1
TABLET ORAL
Qty: 180 TABLET | Refills: 1 | Status: SHIPPED | OUTPATIENT
Start: 2019-02-20 | End: 2019-04-10

## 2019-02-28 ENCOUNTER — EXTERNAL CHRONIC CARE MANAGEMENT (OUTPATIENT)
Dept: PRIMARY CARE CLINIC | Facility: CLINIC | Age: 84
End: 2019-02-28
Payer: MEDICARE

## 2019-02-28 PROCEDURE — 99490 CHRNC CARE MGMT STAFF 1ST 20: CPT | Mod: S$PBB,,, | Performed by: FAMILY MEDICINE

## 2019-02-28 PROCEDURE — 99490 CHRNC CARE MGMT STAFF 1ST 20: CPT | Mod: PBBFAC,PO | Performed by: FAMILY MEDICINE

## 2019-02-28 PROCEDURE — 99490 PR CHRONIC CARE MGMT, 1ST 20 MIN: ICD-10-PCS | Mod: S$PBB,,, | Performed by: FAMILY MEDICINE

## 2019-03-02 RX ORDER — GABAPENTIN 100 MG/1
100 CAPSULE ORAL NIGHTLY
Qty: 30 CAPSULE | Refills: 3 | Status: SHIPPED | OUTPATIENT
Start: 2019-03-02 | End: 2019-07-01 | Stop reason: SDUPTHER

## 2019-03-11 ENCOUNTER — TELEPHONE (OUTPATIENT)
Dept: GASTROENTEROLOGY | Facility: CLINIC | Age: 84
End: 2019-03-11

## 2019-03-19 ENCOUNTER — TELEPHONE (OUTPATIENT)
Dept: GASTROENTEROLOGY | Facility: CLINIC | Age: 84
End: 2019-03-19

## 2019-03-19 NOTE — TELEPHONE ENCOUNTER
Spoke w pt. Will obtain scopes from 2015 and 2013 from yaneli Breaux. Once received will give pt a call back to schedule. Pt aware of schedule 3 mths out.

## 2019-03-25 NOTE — LETTER
May 29, 2018      Romeo Alas MD  [address]  [place] [address]           [facility] Hematology Oncology  [address]  [address]  [place] [address]  Phone: [phone]  Fax: [phone]          Patient: Cheyanne Gomes   MR Number: [id number]   YOB: 1935   Date of Visit: 5/29/2018       Dear Dr. Romeo Alas:    Thank you for referring Cheyanne Gomes to me for evaluation. Attached you will find relevant portions of my assessment and plan of care.    If you have questions, please do not hesitate to call me. I look forward to following Cheyanne Gomes along with you.    Sincerely,    David Pal MD    Enclosure  CC:  No Recipients    If you would like to receive this communication electronically, please contact [email or web address] or [phone] to request more information on [employer] Link access.    For providers and/or their staff who would like to refer a patient to [facility], please contact us through our one-stop-shop provider referral line, [facility] [place], at [phone].    If you feel you have received this communication in error or would no longer like to receive these types of communications, please e-mail [email or web address]         
Neurologic evaluation

## 2019-03-27 ENCOUNTER — TELEPHONE (OUTPATIENT)
Dept: GASTROENTEROLOGY | Facility: CLINIC | Age: 84
End: 2019-03-27

## 2019-03-27 NOTE — TELEPHONE ENCOUNTER
Called pt and scheduled NP appointment for 6/18/19 at 9:30 am. Discussed with pt that Dr. Oglesby is a consultant who will send them back to their general GI provider once their symptoms improved and plan of care is established. Pt was told the logistics of the appointment (arrival time, length of visit, total time spent at facility, and Jael Iyer, NP role). Pt was also told that Dr. Oglesby will review their previous workup but may order additional test and perform her own procedures and that this may require an overnight stay at a local hotel to complete the workup.

## 2019-03-31 ENCOUNTER — EXTERNAL CHRONIC CARE MANAGEMENT (OUTPATIENT)
Dept: PRIMARY CARE CLINIC | Facility: CLINIC | Age: 84
End: 2019-03-31
Payer: MEDICARE

## 2019-03-31 PROCEDURE — 99490 PR CHRONIC CARE MGMT, 1ST 20 MIN: ICD-10-PCS | Mod: S$PBB,,, | Performed by: FAMILY MEDICINE

## 2019-03-31 PROCEDURE — 99490 CHRNC CARE MGMT STAFF 1ST 20: CPT | Mod: PBBFAC,PO | Performed by: FAMILY MEDICINE

## 2019-03-31 PROCEDURE — 99490 CHRNC CARE MGMT STAFF 1ST 20: CPT | Mod: S$PBB,,, | Performed by: FAMILY MEDICINE

## 2019-04-10 ENCOUNTER — OFFICE VISIT (OUTPATIENT)
Dept: RHEUMATOLOGY | Facility: CLINIC | Age: 84
End: 2019-04-10
Payer: MEDICARE

## 2019-04-10 VITALS — WEIGHT: 97.19 LBS | SYSTOLIC BLOOD PRESSURE: 100 MMHG | DIASTOLIC BLOOD PRESSURE: 61 MMHG | BODY MASS INDEX: 19.63 KG/M2

## 2019-04-10 DIAGNOSIS — M35.00 SJOGREN'S SYNDROME WITHOUT EXTRAGLANDULAR INVOLVEMENT: Primary | ICD-10-CM

## 2019-04-10 DIAGNOSIS — M81.0 OSTEOPOROSIS, UNSPECIFIED OSTEOPOROSIS TYPE, UNSPECIFIED PATHOLOGICAL FRACTURE PRESENCE: ICD-10-CM

## 2019-04-10 DIAGNOSIS — M35.00 SJOGREN'S SYNDROME, WITH UNSPECIFIED ORGAN INVOLVEMENT: ICD-10-CM

## 2019-04-10 PROCEDURE — 99213 PR OFFICE/OUTPT VISIT, EST, LEVL III, 20-29 MIN: ICD-10-PCS | Mod: ,,, | Performed by: INTERNAL MEDICINE

## 2019-04-10 PROCEDURE — 99213 OFFICE O/P EST LOW 20 MIN: CPT | Mod: ,,, | Performed by: INTERNAL MEDICINE

## 2019-04-10 RX ORDER — HYDROXYCHLOROQUINE SULFATE 200 MG/1
200 TABLET, FILM COATED ORAL DAILY
Qty: 30 TABLET | Refills: 3 | Status: SHIPPED | OUTPATIENT
Start: 2019-04-10 | End: 2019-07-06 | Stop reason: SDUPTHER

## 2019-04-10 RX ORDER — TRAMADOL HYDROCHLORIDE 50 MG/1
TABLET ORAL
Qty: 120 TABLET | Refills: 3 | Status: SHIPPED | OUTPATIENT
Start: 2019-04-10 | End: 2019-08-28

## 2019-04-10 NOTE — PROGRESS NOTES
[facility] RHEUMATOLOGY            PROGRESS NOTE      Subjective:       Patient ID:   NAME: Cheyanne Gomes : 1935     83 y.o. female    Referring Doc: No ref. provider found  Other Physicians:    Chief Complaint:  Sjogren's syndrome      History of Present Illness:     Patient returns today for a regularly scheduled follow-up visit for  Sjogren's     The patient is feeling fatigued, ,arthralgias.  No CP,no cough,No change in SALINAS  No GI complaints.No rashes.+ sicca  No Raynaud's          ROS:   GEN:  No  fever, night sweats . weight is stable   + fatigue  SKIN: no rashes, no bruising, no ulcerations, no Raynaud's  HEENT: no HA's, No visual changes, no mucosal ulcers, + sicca symptoms,  CV:   no CP, SOB, PND,+ SALINAS, no orthopnea, no palpitations  PULM: normal with no SOB, cough, hemoptysis, sputum or pleuritic pain  GI:  no abdominal pain, nausea, vomiting, constipation, diarrhea, melanotic stools, BRBPR, hematemesis, no dysphagia  :   no dysuria  NEURO: no paresthesias, headaches, visual disturbances, muscle weakness  MUSCULOSKELETAL:no joint swelling, prolonged AM stiffness, no back pain, + muscle pain  Allergies:  Review of patient's allergies indicates:   Allergen Reactions    Macrodantin  [nitrofurantoin macrocrystalline]      Other reaction(s): very sickly    Penicillins      Other reaction(s): swelling  Other reaction(s): red skin discolorati    Sulfa (sulfonamide antibiotics)      Other reaction(s): very sickly       Medications:    Current Outpatient Medications:     amLODIPine (NORVASC) 5 MG tablet, Take 5 mg by mouth once daily. , Disp: , Rfl: 5    aspirin 81 mg Tab, Take 1 tablet by mouth every evening. Every day, Disp: , Rfl:     biotin 5 mg Cap, Take 1 tablet by mouth 2 (two) times daily., Disp: , Rfl:     CALCIUM CARB/VIT D3/MINERALS (CALCIUM-VITAMIN D ORAL), Take 600 mg by mouth 2 (two) times daily. Calcium 1200 with D 3 1000, Disp: , Rfl:     coenzyme Q10 100 mg capsule, Take  100 mg by mouth once daily., Disp: , Rfl:     CRANBERRY CONC/ASCORBIC ACID (CRANBERRY PLUS VITAMIN C ORAL), Take 1 capsule by mouth once daily., Disp: , Rfl:     DOCUSATE CALCIUM (STOOL SOFTENER ORAL), Take 200 mg by mouth every evening. , Disp: , Rfl:     ENTRESTO  mg per tablet, , Disp: , Rfl:     FERROUS FUMARATE/VIT BCOMP,C (SUPER B COMPLEX ORAL), Take 1 tablet by mouth once daily., Disp: , Rfl:     ferrous gluconate (FERGON) 324 MG tablet, Take 324 mg by mouth 2 (two) times daily. At noon and in the evening, Disp: , Rfl:     fluticasone (FLONASE) 50 mcg/actuation nasal spray, 2 sprays by Each Nare route once daily. (Patient taking differently: 2 sprays by Each Nare route daily as needed. ), Disp: 16 g, Rfl: 0    FUROSEMIDE ORAL, furosemide 60 mg tablet  Take 1 tablet every day by oral route., Disp: , Rfl:     gabapentin (NEURONTIN) 100 MG capsule, Take 1 capsule (100 mg total) by mouth every evening., Disp: 30 capsule, Rfl: 3    hydroxychloroquine (PLAQUENIL) 200 mg tablet, Take 1 tablet (200 mg total) by mouth 2 (two) times daily. (Patient taking differently: Take 200 mg by mouth once daily. ), Disp: 60 tablet, Rfl: 3    isosorbide dinitrate (ISORDIL) 10 MG tablet, Take 10 mg by mouth 3 (three) times daily., Disp: , Rfl: 5    krill-omega-3-dha-epa-lipids (KRILL OIL) 597-66-09-50 mg Cap, Take 1,000 mg by mouth once daily. Braxton krill 300mg qd , Disp: , Rfl:     lactobacillus acidophilus (PROBIOTIC) 10 billion cell Cap, Take 1 each by mouth once daily. 1.5 billion, Disp: , Rfl:     magnesium oxide (MAG-OX) 400 mg tablet, Take 400 mg by mouth 2 (two) times daily., Disp: , Rfl:     metoprolol succinate (TOPROL-XL) 25 MG 24 hr tablet, take ONE-HALF tab BY MOUTH EVERY DAY, Disp: , Rfl: 4    MULTIVITAMIN WITH MINERALS (ONE-A-DAY 50 PLUS) Tab, Take 1 tablet by mouth once daily. Every day, Disp: , Rfl:     nitroGLYCERIN (NITROLINGUAL) 0.4 mg/dose spray, Place 1 spray under the tongue every 5 (five)  minutes as needed. PRN, Disp: , Rfl:     pantoprazole (PROTONIX) 40 MG tablet, Take 40 mg by mouth 2 (two) times daily., Disp: , Rfl:     potassium chloride SA (K-DUR,KLOR-CON) 10 MEQ tablet, Take 10 mEq by mouth 2 (two) times daily. , Disp: , Rfl: 3    promethazine (PHENERGAN) 25 MG tablet, Take 1 tablet (25 mg total) by mouth 2 (two) times daily as needed for Nausea., Disp: 60 tablet, Rfl: 1    sertraline (ZOLOFT) 50 MG tablet, TAKE 1 TABLET (50 MG TOTAL) BY MOUTH ONCE DAILY., Disp: 30 tablet, Rfl: 10    traMADol (ULTRAM) 50 mg tablet, TAKE TWO TABLETS BY MOUTH EVERY 6 HOURS AS NEEDED, Disp: 180 tablet, Rfl: 1    vitamin E 400 unit Tab, Take 400 mg by mouth once daily. Every day, Disp: , Rfl:     PMHx/PSHx Updates:      Last Eye Exam: recently ok      Objective:     Vitals:  Blood pressure 100/61, weight 44.1 kg (97 lb 3.2 oz).    Physical Examination:   GEN: no apparent distress, comfortable; AAOx3  SKIN: no rashes,no ulceration, no Raynaud's, no petechiae, no SQ nodules,  HEAD: normal  EYES: no pallor, no icterus,  NECK: no masses, thyroid normal, trachea midline, no LAD/LN's, supple  CV: RRR with no murmur; l S1 and S2 reg. ,no gallop no rubs,   CHEST: Normal respiratory effort; CTAB; normal breath sounds; no wheeze or crackles  ABDOM: nontender and nondistended; soft; no masses; no rebound/guarding  MUSC/Skeletal: ROM normal; no crepitus; joints without synovitis,  + deformities  No joint swelling or tenderness of PIP, MCP, wrist, elbow, shoulder, or knee joints  EXTREM: no clubbing, cyanosis, no edema,normal  pulses   NEURO: grossly intact; motor WNL; AAOx3; ,  PSYCH: normal mood, affect and behavior  LYMPH: normal cervical, supraclavicular          Labs:   Lab Results   Component Value Date    WBC 5.6 12/11/2018    HGB 11.2 (L) 12/11/2018    HCT 37.0 12/11/2018    MCV 92.5 12/11/2018     12/11/2018     CMP  @LASTLAB(NA,K,CL,CO2,GLU,BUN,Creatinine,Calcium,PROT,Albumin,Bilitot,Alkphos,AST,ALT,CRP,ESR,RF,CCP,VANCE,SSA,CPK,uric acid) )@  I have reviewed all available lab results and radiology reports.    Radiology/Diagnostic Studies:        Assessment/Plan:   (1) 83 y.o. female with diagnosis of Sjogren's  She is stable  2)Osteoarthritis  3) Osteoporosis.On Prolia  Next DEXA due in October  CBC ok CMP ,creat 1.3    UA,TSH ,U acid      Discussion:     I have explained all of the above in detail and the patient understands all of the current recommendation(s). I have answered all questions to the best of my ability and to their complete satisfaction.       The patient is to continue with the current management plan         RTC in   4 months      Electronically signed by Deepak Erazo MD

## 2019-04-30 ENCOUNTER — EXTERNAL CHRONIC CARE MANAGEMENT (OUTPATIENT)
Dept: PRIMARY CARE CLINIC | Facility: CLINIC | Age: 84
End: 2019-04-30
Payer: MEDICARE

## 2019-04-30 PROCEDURE — 99490 CHRNC CARE MGMT STAFF 1ST 20: CPT | Mod: S$PBB,,, | Performed by: FAMILY MEDICINE

## 2019-04-30 PROCEDURE — 99490 PR CHRONIC CARE MGMT, 1ST 20 MIN: ICD-10-PCS | Mod: S$PBB,,, | Performed by: FAMILY MEDICINE

## 2019-04-30 PROCEDURE — 99490 CHRNC CARE MGMT STAFF 1ST 20: CPT | Mod: PBBFAC,PO | Performed by: FAMILY MEDICINE

## 2019-05-17 ENCOUNTER — TELEPHONE (OUTPATIENT)
Dept: HEMATOLOGY/ONCOLOGY | Facility: CLINIC | Age: 84
End: 2019-05-17

## 2019-05-17 NOTE — TELEPHONE ENCOUNTER
Called patient to see if she completed her labs. Left her a message to make sure she completes them before her appointment Wed 5/22 @ 10:15

## 2019-05-23 ENCOUNTER — OFFICE VISIT (OUTPATIENT)
Dept: HEMATOLOGY/ONCOLOGY | Facility: CLINIC | Age: 84
End: 2019-05-23
Payer: MEDICARE

## 2019-05-23 VITALS
SYSTOLIC BLOOD PRESSURE: 116 MMHG | WEIGHT: 97.38 LBS | TEMPERATURE: 98 F | HEART RATE: 60 BPM | DIASTOLIC BLOOD PRESSURE: 72 MMHG | RESPIRATION RATE: 20 BRPM | BODY MASS INDEX: 19.67 KG/M2

## 2019-05-23 DIAGNOSIS — D50.0 IRON DEFICIENCY ANEMIA DUE TO CHRONIC BLOOD LOSS: ICD-10-CM

## 2019-05-23 DIAGNOSIS — I50.9 CONGESTIVE HEART FAILURE, UNSPECIFIED HF CHRONICITY, UNSPECIFIED HEART FAILURE TYPE: ICD-10-CM

## 2019-05-23 DIAGNOSIS — D63.8 ANEMIA OF CHRONIC DISEASE: Chronic | ICD-10-CM

## 2019-05-23 PROCEDURE — 99214 OFFICE O/P EST MOD 30 MIN: CPT | Mod: ,,, | Performed by: INTERNAL MEDICINE

## 2019-05-23 PROCEDURE — 99214 PR OFFICE/OUTPT VISIT, EST, LEVL IV, 30-39 MIN: ICD-10-PCS | Mod: ,,, | Performed by: INTERNAL MEDICINE

## 2019-05-23 NOTE — ASSESSMENT & PLAN NOTE
Hb is 10g/dl on 5/13 and patient doing ok from this standpoint.  She has multifactorial anemia and acd is contributing.

## 2019-05-23 NOTE — PROGRESS NOTES
PROGRESS NOTE    Subjective:       Patient ID: Cheyanne Gomes is a 83 y.o. female.    Chief Complaint:  No chief complaint on file.  anemia follow up.     History of Present Illness:   Cheyanne Gomes is a 83 y.o. female who presents with a history of anemia of chronic disease.  Patient is doing well and her hip has recovered nicely.  More active now.          Family and Social history reviewed and is unchanged from 8/9/2016.       ROS:  Review of Systems   Constitutional: Negative for fever.   Respiratory: Negative for shortness of breath.    Cardiovascular: Negative for chest pain and leg swelling.   Gastrointestinal: Negative for abdominal pain and blood in stool.   Genitourinary: Negative for hematuria.   Skin: Negative for rash.          Current Outpatient Medications:     amLODIPine (NORVASC) 5 MG tablet, Take 5 mg by mouth once daily. , Disp: , Rfl: 5    aspirin 81 mg Tab, Take 1 tablet by mouth every evening. Every day, Disp: , Rfl:     biotin 5 mg Cap, Take 1 tablet by mouth 2 (two) times daily., Disp: , Rfl:     CALCIUM CARB/VIT D3/MINERALS (CALCIUM-VITAMIN D ORAL), Take 600 mg by mouth 2 (two) times daily. Calcium 1200 with D 3 1000, Disp: , Rfl:     coenzyme Q10 100 mg capsule, Take 100 mg by mouth once daily., Disp: , Rfl:     CRANBERRY CONC/ASCORBIC ACID (CRANBERRY PLUS VITAMIN C ORAL), Take 1 capsule by mouth once daily., Disp: , Rfl:     DOCUSATE CALCIUM (STOOL SOFTENER ORAL), Take 200 mg by mouth every evening. , Disp: , Rfl:     ENTRESTO  mg per tablet, , Disp: , Rfl:     FERROUS FUMARATE/VIT BCOMP,C (SUPER B COMPLEX ORAL), Take 1 tablet by mouth once daily., Disp: , Rfl:     ferrous gluconate (FERGON) 324 MG tablet, Take 324 mg by mouth 2 (two) times daily. At noon and in the evening, Disp: , Rfl:     fluticasone (FLONASE) 50 mcg/actuation nasal spray, 2 sprays by Each Nare route once daily. (Patient taking  differently: 2 sprays by Each Nare route daily as needed. ), Disp: 16 g, Rfl: 0    FUROSEMIDE ORAL, furosemide 60 mg tablet  Take 1 tablet every day by oral route., Disp: , Rfl:     gabapentin (NEURONTIN) 100 MG capsule, Take 1 capsule (100 mg total) by mouth every evening., Disp: 30 capsule, Rfl: 3    hydroxychloroquine (PLAQUENIL) 200 mg tablet, Take 1 tablet (200 mg total) by mouth once daily., Disp: 30 tablet, Rfl: 3    isosorbide dinitrate (ISORDIL) 10 MG tablet, Take 10 mg by mouth 3 (three) times daily., Disp: , Rfl: 5    krill-omega-3-dha-epa-lipids (KRILL OIL) 305-56-27-50 mg Cap, Take 1,000 mg by mouth once daily. Braxton krill 300mg qd , Disp: , Rfl:     lactobacillus acidophilus (PROBIOTIC) 10 billion cell Cap, Take 1 each by mouth once daily. 1.5 billion, Disp: , Rfl:     magnesium oxide (MAG-OX) 400 mg tablet, Take 400 mg by mouth 2 (two) times daily., Disp: , Rfl:     metoprolol succinate (TOPROL-XL) 25 MG 24 hr tablet, take ONE-HALF tab BY MOUTH EVERY DAY, Disp: , Rfl: 4    MULTIVITAMIN WITH MINERALS (ONE-A-DAY 50 PLUS) Tab, Take 1 tablet by mouth once daily. Every day, Disp: , Rfl:     nitroGLYCERIN (NITROLINGUAL) 0.4 mg/dose spray, Place 1 spray under the tongue every 5 (five) minutes as needed. PRN, Disp: , Rfl:     pantoprazole (PROTONIX) 40 MG tablet, Take 40 mg by mouth 2 (two) times daily., Disp: , Rfl:     potassium chloride SA (K-DUR,KLOR-CON) 10 MEQ tablet, Take 10 mEq by mouth 2 (two) times daily. , Disp: , Rfl: 3    promethazine (PHENERGAN) 25 MG tablet, Take 1 tablet (25 mg total) by mouth 2 (two) times daily as needed for Nausea., Disp: 60 tablet, Rfl: 1    sertraline (ZOLOFT) 50 MG tablet, TAKE 1 TABLET (50 MG TOTAL) BY MOUTH ONCE DAILY., Disp: 30 tablet, Rfl: 10    traMADol (ULTRAM) 50 mg tablet, 1-2 po bid prn, Disp: 120 tablet, Rfl: 3    vitamin E 400 unit Tab, Take 400 mg by mouth once daily. Every day, Disp: , Rfl:         Objective:       Physical Examination:     BP  116/72   Pulse 60   Temp 98.1 °F (36.7 °C) (Oral)   Resp 20   Wt 44.2 kg (97 lb 6.4 oz)   LMP  (LMP Unknown)   BMI 19.67 kg/m²     Physical Exam   Constitutional: She appears well-developed and well-nourished.   HENT:   Head: Normocephalic and atraumatic.   Right Ear: External ear normal.   Left Ear: External ear normal.   Mouth/Throat: Oropharynx is clear and moist.   Eyes: Pupils are equal, round, and reactive to light. Conjunctivae are normal.   Neck: No tracheal deviation present. No thyromegaly present.   Cardiovascular: Normal rate, regular rhythm and normal heart sounds.   Pulmonary/Chest: Effort normal and breath sounds normal.   Abdominal: Soft. Bowel sounds are normal. She exhibits no distension and no mass. There is no tenderness.   Musculoskeletal: She exhibits no edema.   Neurological:   Neuro intact througout   Skin: No rash noted.   Psychiatric: She has a normal mood and affect. Her behavior is normal. Judgment and thought content normal.       Labs:   No results found for this or any previous visit (from the past 336 hour(s)).  CMP  Sodium   Date Value Ref Range Status   12/11/2018 139 134 - 144 mmol/L      Potassium   Date Value Ref Range Status   12/11/2018 5.4 (H) 3.5 - 5.0 mmol/L      Chloride   Date Value Ref Range Status   12/11/2018 96 (L) 98 - 110 mmol/L      CO2   Date Value Ref Range Status   12/11/2018 33.6 (H) 22.8 - 31.6 mmol/L      Glucose   Date Value Ref Range Status   12/11/2018 97 70 - 99 mg/dL      BUN, Bld   Date Value Ref Range Status   12/11/2018 18 8 - 20 mg/dL      Creatinine   Date Value Ref Range Status   12/11/2018 0.95 0.60 - 1.40 mg/dL      Calcium   Date Value Ref Range Status   12/11/2018 9.9 7.7 - 10.4 mg/dL      Total Protein   Date Value Ref Range Status   12/11/2018 6.6 6.0 - 8.2 g/dL      Albumin   Date Value Ref Range Status   12/11/2018 3.9 3.1 - 4.7 g/dL      Total Bilirubin   Date Value Ref Range Status   12/11/2018 0.4 0.3 - 1.0 mg/dL      Alkaline  Phosphatase   Date Value Ref Range Status   12/11/2018 78 40 - 104 IU/L      AST   Date Value Ref Range Status   12/11/2018 63 (H) 10 - 40 IU/L      ALT   Date Value Ref Range Status   10/08/2018 62 (H) 10 - 44 U/L Final     Anion Gap   Date Value Ref Range Status   10/11/2018 6 (L) 8 - 16 mmol/L Final     eGFR if    Date Value Ref Range Status   10/11/2018 >60 >60 mL/min/1.73 m^2 Final     eGFR if non    Date Value Ref Range Status   10/11/2018 >60 >60 mL/min/1.73 m^2 Final     Comment:     Calculation used to obtain the estimated glomerular filtration  rate (eGFR) is the CKD-EPI equation.        No results found for: CEA  No results found for: PSA        Assessment/Plan:   Anemia of chronic disease  Hb is 10g/dl on 5/13 and patient doing ok from this standpoint.  She has multifactorial anemia and acd is contributing.      Iron deficiency anemia due to chronic blood loss  Patient has one iron treatment last week and will have another this week.  She has not seen bleeding and overall feels ok.  Will get colonoscopy done and EGD in July this year.  Will plan on having her back after this to eval result of testing.      CHF (congestive heart failure)  Must always keep this in consideration when patient needs to get blood as volume overload can become an issues.  She seems well compensated currently and discussed this today.     Discussion:     Follow up in about 6 months (around 11/23/2019).      Electronically signed by David Robb

## 2019-05-23 NOTE — ASSESSMENT & PLAN NOTE
Must always keep this in consideration when patient needs to get blood as volume overload can become an issues.  She seems well compensated currently and discussed this today.

## 2019-05-23 NOTE — ASSESSMENT & PLAN NOTE
Patient has one iron treatment last week and will have another this week.  She has not seen bleeding and overall feels ok.  Will get colonoscopy done and EGD in July this year.  Will plan on having her back after this to eval result of testing.

## 2019-05-31 ENCOUNTER — EXTERNAL CHRONIC CARE MANAGEMENT (OUTPATIENT)
Dept: PRIMARY CARE CLINIC | Facility: CLINIC | Age: 84
End: 2019-05-31
Payer: MEDICARE

## 2019-05-31 PROCEDURE — 99490 CHRNC CARE MGMT STAFF 1ST 20: CPT | Mod: S$PBB,,, | Performed by: FAMILY MEDICINE

## 2019-05-31 PROCEDURE — 99490 PR CHRONIC CARE MGMT, 1ST 20 MIN: ICD-10-PCS | Mod: S$PBB,,, | Performed by: FAMILY MEDICINE

## 2019-05-31 PROCEDURE — 99490 CHRNC CARE MGMT STAFF 1ST 20: CPT | Mod: PBBFAC,PO | Performed by: FAMILY MEDICINE

## 2019-06-18 ENCOUNTER — OFFICE VISIT (OUTPATIENT)
Dept: GASTROENTEROLOGY | Facility: CLINIC | Age: 84
End: 2019-06-18
Payer: MEDICARE

## 2019-06-18 ENCOUNTER — TELEPHONE (OUTPATIENT)
Dept: GASTROENTEROLOGY | Facility: CLINIC | Age: 84
End: 2019-06-18

## 2019-06-18 ENCOUNTER — LAB VISIT (OUTPATIENT)
Dept: LAB | Facility: HOSPITAL | Age: 84
End: 2019-06-18
Payer: MEDICARE

## 2019-06-18 VITALS
HEART RATE: 52 BPM | WEIGHT: 95 LBS | SYSTOLIC BLOOD PRESSURE: 106 MMHG | DIASTOLIC BLOOD PRESSURE: 61 MMHG | BODY MASS INDEX: 19.94 KG/M2 | HEIGHT: 58 IN

## 2019-06-18 DIAGNOSIS — R13.19 ESOPHAGEAL DYSPHAGIA: Primary | ICD-10-CM

## 2019-06-18 DIAGNOSIS — D50.9 IRON DEFICIENCY ANEMIA, UNSPECIFIED IRON DEFICIENCY ANEMIA TYPE: ICD-10-CM

## 2019-06-18 DIAGNOSIS — R74.8 ELEVATED LIVER ENZYMES: ICD-10-CM

## 2019-06-18 DIAGNOSIS — R19.7 DIARRHEA, UNSPECIFIED TYPE: ICD-10-CM

## 2019-06-18 DIAGNOSIS — R79.0 LOW MAGNESIUM LEVEL: ICD-10-CM

## 2019-06-18 DIAGNOSIS — K21.00 GASTROESOPHAGEAL REFLUX DISEASE WITH ESOPHAGITIS: ICD-10-CM

## 2019-06-18 DIAGNOSIS — R14.0 BLOATING: ICD-10-CM

## 2019-06-18 DIAGNOSIS — R13.19 ESOPHAGEAL DYSPHAGIA: ICD-10-CM

## 2019-06-18 LAB
FERRITIN SERPL-MCNC: 1290 NG/ML (ref 20–300)
IGA SERPL-MCNC: 354 MG/DL (ref 40–350)
IRON SERPL-MCNC: 56 UG/DL (ref 30–160)
MAGNESIUM SERPL-MCNC: 2.3 MG/DL (ref 1.6–2.6)
SATURATED IRON: 18 % (ref 20–50)
TOTAL IRON BINDING CAPACITY: 315 UG/DL (ref 250–450)
TRANSFERRIN SERPL-MCNC: 213 MG/DL (ref 200–375)
TSH SERPL DL<=0.005 MIU/L-ACNC: 1.71 UIU/ML (ref 0.4–4)

## 2019-06-18 PROCEDURE — 99205 PR OFFICE/OUTPT VISIT, NEW, LEVL V, 60-74 MIN: ICD-10-PCS | Mod: S$PBB,,, | Performed by: NURSE PRACTITIONER

## 2019-06-18 PROCEDURE — 83540 ASSAY OF IRON: CPT

## 2019-06-18 PROCEDURE — 99205 OFFICE O/P NEW HI 60 MIN: CPT | Mod: S$PBB,,, | Performed by: NURSE PRACTITIONER

## 2019-06-18 PROCEDURE — 83735 ASSAY OF MAGNESIUM: CPT

## 2019-06-18 PROCEDURE — 82784 ASSAY IGA/IGD/IGG/IGM EACH: CPT

## 2019-06-18 PROCEDURE — 99999 PR PBB SHADOW E&M-EST. PATIENT-LVL V: ICD-10-PCS | Mod: PBBFAC,,, | Performed by: NURSE PRACTITIONER

## 2019-06-18 PROCEDURE — 84443 ASSAY THYROID STIM HORMONE: CPT

## 2019-06-18 PROCEDURE — 86803 HEPATITIS C AB TEST: CPT

## 2019-06-18 PROCEDURE — 87340 HEPATITIS B SURFACE AG IA: CPT

## 2019-06-18 PROCEDURE — 99999 PR PBB SHADOW E&M-EST. PATIENT-LVL V: CPT | Mod: PBBFAC,,, | Performed by: NURSE PRACTITIONER

## 2019-06-18 PROCEDURE — 86706 HEP B SURFACE ANTIBODY: CPT

## 2019-06-18 PROCEDURE — 83516 IMMUNOASSAY NONANTIBODY: CPT

## 2019-06-18 PROCEDURE — 99215 OFFICE O/P EST HI 40 MIN: CPT | Mod: PBBFAC | Performed by: NURSE PRACTITIONER

## 2019-06-18 PROCEDURE — 82728 ASSAY OF FERRITIN: CPT

## 2019-06-18 PROCEDURE — 86704 HEP B CORE ANTIBODY TOTAL: CPT

## 2019-06-18 PROCEDURE — 86790 VIRUS ANTIBODY NOS: CPT

## 2019-06-18 NOTE — PROGRESS NOTES
[facility] Consultation Note    Reason for Consult:    Chief Complaint   Patient presents with    Heartburn    Chest Pain    Dysphagia    Gas    Bloated    Diarrhea    Melena    Weight Loss         PCP:   Romeo Alas   [address] / [place] [address]    Referring MD:  Fany Seals [place]  [address] [place] [address]      HPI:  Cheyanne Gomes is a 83 y.o. female with a PMH of CHF, HTN, HLD, aortic valve stenosis, RA, WILLIAMS, Sjogren's, raynauds, s/p angelica referred to motility clinic for second opinion regarding the following problems:    GERD.  Patient reports bothersome heartburn  Onset:10+yrs  Retrosternal pyrosis:yes  Regurgitation:yes  Belching:yes  Frequency: only if forgetting to take PPI; rarely  Improve with:    Well controlled with pantoprazole 40 mg once or twice daily.    Cannon's esophagus. Diagnosed several years ago.     Chest pain. Angina. Followed by cardiology. Only occurs rarely.     Dysphagia.  Reports difficulty swallowing.  Onset of symptoms:5 yrs ago  Problems with solids:yes. And pills  Problems with liquids:no  Choking while eating:no   Coughing while eating: no  Location: [place]  Frequency:  1-2 days weekly  Improves with:no improvement with dilation.  Some improvement with washing with liquids, chin tuck  Narcotic pain meds:tramadol  History of food impactions requiring ED visit:no  History of allergies:medications  History of seasonal allergies:yes  History of food allergies:no  History of eczema:no    Gas and bloating.   Onset:several yrs ago  Bloating: yes  Excessive gas: yes  Abdominal distension: yes   Symptoms get worse after meals:yes  Symptoms get worse as the day progresses:  yes  Consumes lactose:yes  Consumes artificial sugars:yes    Diarrhea.  Reports loose to watery stools on occasion.     Symptoms started:several yrs ago  Eldridge: 6  Frequency:few times monthly with 3-4 BM/day  Nocturnal symptoms: on  occ.  On probiotic    Some improvement with imodium PRN  Has not tried lomotil   cholestyramine  Not a candidate for viberzi d/t s/p angelica    Fecal incontinence: yes. No awareness. Once every 2-3 months. Postprandial.    H/o C.diff in 2013 after back sx. Treated with abx with complete s/s resolution.      Black stool since on iron x 1.5 yrs.     Weight loss.  Reports unintentional weight loss.   Lost (lb):  30 lbs. Weight stable for for the last year  Period of time: since 2013. usually after surgeries    Depression. On zoloft 50 mg daily. Per cardiology. Has not seen psych.    Insomnia. Awakes several times nightly to void urine. Is able to fall back asleep quickly. Has not seen a sleep specialist.     WILLIAMS. On PO iron 325 mg BID. Also had 2 iron infusions recently. Is scheduled for EGD/colonoscopy in 7/2019 per Dr. Seals.    RA. Back. Sjogren's. Raynaud's. On plaquenil 200 mg daily, on tramadol 100 mg am and on occasion 100 mg later in the day. per rheumatology    Neuropathy. On gabapentin 100 mg HS. Per rheumatology    Hypokalemia. On K 10 mEq BID. Hypomagnesia. On mag 400 mg BID.  Felt to be d/t diuretic use. Per cards.    Elevated liver enzymes in 2018.     Denies  nausea, vomiting, early satiety, abdominal pain,  constipation, BRBPR,  Anxiety    Total visit time was 90 minutes, more than 50% of which was spent in face-to-face counseling with patient regarding symptoms, diagnostic results, prognosis, risks and benefits of treatment options, instructions for management, importance of compliance with chosen treatment options, risk factor reduction, stress reduction, coping strategies.      Previous Studies:   Per pt MBSS 2019: stricture in esophagus, no therapy recommended.  EGD 10/5/18: small HH. mild Schatzki ring in lower 1/3 esophagus dilated with 54 fr Villeda. [place] grade A esophagitis (reflux changes. Chr inflamm. Foveolar hyperplasia). Atrophic gastric mucosa (reactive gastropathy). Nl duodenum.   MRI brain  3/20/17: Moderate material within the right mastoid air cells and likely the middle ear, which is nonspecific.  In the postoperative setting much of this most certainly represents scar tissue.  No associated restricted diffusion to favor cholesteatoma.  VCE 3/14/17: mid gastritis.  Small bowel xanthoma  Ct chest 2/27/17: RESOLUTION OF THE BILATERAL PLEURAL EFFUSIONS AND AIRSPACE OPACITIES IN THE LOWER LUNGS WITH RESIDUAL AREAS OF SUBSEGMENTAL ATELECTASIS IN THE RIGHT MIDDLE LOBE LINGULA AND LOWER LUNGS.  Chest xray 1/3/17: atelectasis vs infiltrate, tiny pleural effusion. Prior cabg  CT chest 1/3/17: renal cysts. Right lower lung lobe consolidation suspect acute infection but cannot r/o malignancy  Colon 1/3/17: PPTC. Tortuous colon. Small IH. SC tics. 4 mm TC polyp (TA).   EGD 1/3/17: irreg z line (min chr infamm). Atrophic gastric mucosa w cobblestoning (min chr gastritis). s/p gastric ulcer. Nl duodenum.   EGD 5/13/15:irreg z line (?). Gastric inflammation w adherent blood (?). Nl duodenum.   EGD 9/30/13: gastric atrophy. Gastric nodule (?). Grade A esophagitis. Nodular duodenum (?).   Colon 9/15/11: GPTC. SC and DC tics. Rpt 5 yrs  EGD 1/11/11: esophageal mucosal changes c/w short seg franciscos (-). Nl stomach (mild reactive gastropathy). Nl duodenum (-).  Retroperitoneal US 12/17/10: septated renal cysts    Labs:  12/11/18: vit D nl, ESR nl, CRP nl, CMP K high 5.4, cl low 96, alt high 45, ast high 63, CBC hgb low 11.2,   11/6/17: TSH nl    Relevant Surgeries:  Cholecystectomy (1964)      ROS:  ROS   Constitutional: No fevers, no chills, + night sweats, no weight loss  ENT: No congestion, no rhinorrhea, + chronic sinus problems  CV: No chest pain, no palpitations  Pulm: No cough, + shortness of breath  Ophtho: + blurry vision, + eye redness  GI: see HPI  Derm: No rash  Heme: No lymphadenopathy, + bruising  MSK: No joint pain, no joint swelling, + Raynauds  : No dysuria, + frequent urination, no blood in  urine  Endo:+ cold intolerance  Neuro: + dizziness, no syncope, no seizure  Psych: No anxiety, no depression        Medical History:   Past Medical History:   Diagnosis Date    Abdominal hernia     Anemia     Dr Berkowitz     Aortic valve stenosis, moderate     Cannon's esophagus     Cataract     ou done    CHF (congestive heart failure)     EFx 35%, Dr. Spencer 13    Colitis     Compression fracture     Diverticulosis     GERD (gastroesophageal reflux disease)     Hyperlipidemia     Hypertension     Irritable bowel syndrome     Occlusive coronary artery disease     Osteoarthritis     lumbar DDD    Pneumonia 2017    Rheumatoid arteritis     Rheumatoid arthritis     Shingles 2017    Sjogren syndrome     Ulcerative colitis         Surgical History:   Past Surgical History:   Procedure Laterality Date    APPENDECTOMY      BACK SURGERY      CATARACT EXTRACTION      ou od d 11-15-12//     SECTION, CLASSIC      x 2    CHOLECYSTECTOMY      COLONOSCOPY  09/15/2011    Dr Anaya     COLONOSCOPY  01/10/2017    [facility] report sent to scanning    CORONARY ARTERY BYPASS GRAFT      3 vessel    CORONARY ARTERY BYPASS GRAFT      EXTRACTION, CATARACT, WITH IOL INSERTION Right 11/15/2012    [facility]    EXTRACTION, CATARACT, WITH IOL INSERTION Left 2012    [facility]    eyes      rk ou    FRACTURE SURGERY  2016    Dr Guido left hip     HYSTERECTOMY      INSERTION, INTRAMEDULLARY KELSI, FEMUR, TROCHANTER Right 10/8/2018    [facility] OR    OOPHORECTOMY      SPINE SURGERY  2013    Silvino Mckeon    UPPER GASTROINTESTINAL ENDOSCOPY      Yag Capsulotomy Right 14        Family History:   Family History   Problem Relation Age of Onset    Hypertension Father     Heart disease Father         MI    Heart disease Mother         aortic stenosis    Skin  cancer Mother     Heart disease Brother         2 brothers CABG    Arthritis Brother     Crohn's disease Brother     Hypertension Sister     Fibromyalgia Sister     Scleroderma Sister     Pulmonary embolism Sister     Hypertension Daughter     Early death Son         accident    Lupus Cousin     Lupus Cousin     Irritable bowel syndrome Sister     Crohn's disease Brother     Crohn's disease Brother     Amblyopia Neg Hx     Blindness Neg Hx     Cancer Neg Hx     Cataracts Neg Hx     Diabetes Neg Hx     Glaucoma Neg Hx     Macular degeneration Neg Hx     Retinal detachment Neg Hx     Strabismus Neg Hx     Stroke Neg Hx     Thyroid disease Neg Hx     Celiac disease Neg Hx     Cirrhosis Neg Hx     Psoriasis Neg Hx     Melanoma Neg Hx     Multiple sclerosis Neg Hx     Rheum arthritis Neg Hx     Tuberculosis Neg Hx     Lymphoma Neg Hx     Ulcerative colitis Neg Hx     Moses's disease Neg Hx     Stomach cancer Neg Hx     Rectal cancer Neg Hx     Liver disease Neg Hx     Liver cancer Neg Hx     Inflammatory bowel disease Neg Hx     Hemochromatosis Neg Hx     Esophageal cancer Neg Hx     Cystic fibrosis Neg Hx     Colon cancer Neg Hx         Social History:   Social History     Socioeconomic History    Marital status:      Spouse name: Not on file    Number of children: Not on file    Years of education: Not on file    Highest education level: Not on file   Occupational History    Not on file   Social Needs    Financial resource strain: Not on file    Food insecurity:     Worry: Not on file     Inability: Not on file    Transportation needs:     Medical: Not on file     Non-medical: Not on file   Tobacco Use    Smoking status: Former Smoker     Packs/day: 1.00     Years: 5.00     Pack years: 5.00     Last attempt to quit: 1971     Years since quittin.4    Smokeless tobacco: Never Used   Substance and Sexual Activity    Alcohol use: No    Drug use: No     Sexual activity: Not on file   Lifestyle    Physical activity:     Days per week: Not on file     Minutes per session: Not on file    Stress: Not on file   Relationships    Social connections:     Talks on phone: Not on file     Gets together: Not on file     Attends Anabaptism service: Not on file     Active member of club or organization: Not on file     Attends meetings of clubs or organizations: Not on file     Relationship status: Not on file   Other Topics Concern    Not on file   Social History Narrative    Not on file        Review of patient's allergies indicates:   Allergen Reactions    Macrodantin  [nitrofurantoin macrocrystalline]      Other reaction(s): very sickly    Penicillins      Other reaction(s): swelling  Other reaction(s): red skin discolorati    Sulfa (sulfonamide antibiotics)      Other reaction(s): very sickly       Current Outpatient Medications   Medication Sig Dispense Refill    amLODIPine (NORVASC) 5 MG tablet Take 5 mg by mouth once daily.   5    aspirin 81 mg Tab Take 1 tablet by mouth every evening. Every day      biotin 5 mg Cap Take 1 tablet by mouth 2 (two) times daily.      CALCIUM CARB/VIT D3/MINERALS (CALCIUM-VITAMIN D ORAL) Take 600 mg by mouth 2 (two) times daily. Calcium 1200 with D 3 1000      coenzyme Q10 100 mg capsule Take 100 mg by mouth once daily.      CRANBERRY CONC/ASCORBIC ACID (CRANBERRY PLUS VITAMIN C ORAL) Take 1 capsule by mouth once daily.      ENTRESTO  mg per tablet       FERROUS FUMARATE/VIT BCOMP,C (SUPER B COMPLEX ORAL) Take 1 tablet by mouth once daily.      ferrous gluconate (FERGON) 324 MG tablet Take 324 mg by mouth 2 (two) times daily. At noon and in the evening      FUROSEMIDE ORAL furosemide 60 mg tablet   Take 1 tablet every day by oral route.      gabapentin (NEURONTIN) 100 MG capsule Take 1 capsule (100 mg total) by mouth every evening. 30 capsule 3    hydroxychloroquine (PLAQUENIL) 200 mg tablet Take 1 tablet (200 mg  "total) by mouth once daily. 30 tablet 3    isosorbide dinitrate (ISORDIL) 10 MG tablet Take 10 mg by mouth 3 (three) times daily.  5    lactobacillus acidophilus (PROBIOTIC) 10 billion cell Cap Take 1 each by mouth once daily. 1.5 billion      magnesium oxide (MAG-OX) 400 mg tablet Take 400 mg by mouth 2 (two) times daily.      metoprolol succinate (TOPROL-XL) 25 MG 24 hr tablet take ONE-HALF tab BY MOUTH EVERY DAY  4    MULTIVITAMIN WITH MINERALS (ONE-A-DAY 50 PLUS) Tab Take 1 tablet by mouth once daily. Every day      pantoprazole (PROTONIX) 40 MG tablet Take 40 mg by mouth 2 (two) times daily.      potassium chloride SA (K-DUR,KLOR-CON) 10 MEQ tablet Take 10 mEq by mouth 2 (two) times daily.   3    sertraline (ZOLOFT) 50 MG tablet TAKE 1 TABLET (50 MG TOTAL) BY MOUTH ONCE DAILY. 30 tablet 10    traMADol (ULTRAM) 50 mg tablet 1-2 po bid prn 120 tablet 3    vitamin E 400 unit Tab Take 400 mg by mouth once daily. Every day      DOCUSATE CALCIUM (STOOL SOFTENER ORAL) Take 200 mg by mouth every evening.       fluticasone (FLONASE) 50 mcg/actuation nasal spray 2 sprays by Each Nare route once daily. (Patient taking differently: 2 sprays by Each Nare route daily as needed. ) 16 g 0    krill-omega-3-dha-epa-lipids (KRILL OIL) 900-43-73-50 mg Cap Take 1,000 mg by mouth once daily. Braxton krill 300mg qd       nitroGLYCERIN (NITROLINGUAL) 0.4 mg/dose spray Place 1 spray under the tongue every 5 (five) minutes as needed. PRN      promethazine (PHENERGAN) 25 MG tablet Take 1 tablet (25 mg total) by mouth 2 (two) times daily as needed for Nausea. 60 tablet 1     No current facility-administered medications for this visit.         Objective Findings:  Vital Signs:  /61   Pulse (!) 52   Ht 4' 10" (1.473 m)   Wt 43.1 kg (95 lb 0.3 oz)   LMP  (LMP Unknown)   BMI 19.86 kg/m²   Body mass index is 19.86 kg/m².    Physical Exam:  General appearance: alert, cooperative, no distress  HENT: Normocephalic, " atraumatic, neck symmetrical, no nasal discharge  Eyes: conjunctivae/corneas clear, PERRL, EOM's intact  Lungs: clear to auscultation bilaterally, no dullness to percussion bilaterally  Heart: regular rate and rhythm without rub; no displacement of the PMI  Abdomen: soft, non-tender; bowel sounds normoactive; no organomegaly  Extremities: extremities symmetric; no clubbing, cyanosis, or edema  Integument: Skin color, texture, turgor normal; no rashes; hair distrubution normal  Neurologic: Alert and oriented X 3, normal strength, normal coordination and gait  Psychiatric: no pressured speech; normal affect; no evidence of impaired cognition    Labs:  Lab Results   Component Value Date    WBC 5.6 12/11/2018    HGB 11.2 (L) 12/11/2018    HCT 37.0 12/11/2018    MCV 92.5 12/11/2018     12/11/2018     Lab Results   Component Value Date    FERRITIN 66 08/11/2009     Lab Results   Component Value Date     12/11/2018    K 5.4 (H) 12/11/2018    CL 96 (L) 12/11/2018    CO2 33.6 (H) 12/11/2018    GLU 97 12/11/2018    BUN 18 12/11/2018    CREATININE 0.95 12/11/2018    CALCIUM 9.9 12/11/2018    PROT 6.6 12/11/2018    ALBUMIN 3.9 12/11/2018    BILITOT 0.4 12/11/2018    ALKPHOS 78 12/11/2018    AST 63 (H) 12/11/2018    ALT 62 (H) 10/08/2018     Lab Results   Component Value Date    TSH 2.10 11/06/2017     Lab Results   Component Value Date    SEDRATE 33 (H) 02/26/2015     Lab Results   Component Value Date    CRP 0.14 12/11/2018     Lab Results   Component Value Date    HGBA1C 5.3 02/26/2015           Assessment and Plan:  Cheyanne Gomes is a 83 y.o. female with a PMH of CHF, HTN, HLD, aortic valve stenosis, RA, WILLIAMS, Sjogren's, raynauds, s/p angelica referred to motility clinic for second opinion regarding the following problems:    GERD.small hiatal hernia.  LA grade A esophagitis. Well controlled with pantoprazole 40 mg once or twice daily.    Cannon's esophagus. Diagnosed several years ago. Recent EGD w bx  negative for francisco's.    Chest pain. Angina. Followed by cardiology. Only occurs rarely.     Dysphagia.  PT reports MBSS with evidence of stricture.  On Tramadol  No improvement with dilation.    Some improvement with washing with liquids, chin tuck  -EGD w e/GEJ/d bx, endoFlip and possible dilation  -Esophageal manometry  -Esophagram with 13 mm barium tablet    Gas and bloating. Distention.   -Eliminate lactose and artifical sugars.    Diarrhea.  Infrequent.   On probiotic  Not a candidate for viberzi d/t s/p angelica  Some improvement with imodium PRN. Continue.  -Has not tried lomotil, cholestyramine    Fecal incontinence. No awareness. Once every 2-3 months. Postprandial.    History of C.diff in 2013 after back sxurgery. Treated with abx with complete resolution of symptoms.      Black stool since on iron x 1.5 yrs.     Weight loss.  Lost 30 lbs since 2013 (after back surgeries). Weight stable for the last year.    Depression. On zoloft 50 mg daily. Per cardiology. Has not seen psych.    Insomnia. Awakes several times nightly to void urine. Is able to fall back asleep quickly. Has not seen a sleep specialist.     WILLIAMS. On PO iron 325 mg BID. Also had 2 iron infusions recently. Previous VCE unrevealing. Is scheduled for EGD/colonoscopy in 7/2019 per Dr. Saels.  -Check labs.  -Proceed with colonoscopy with Dr. Seals    RA. Back. Sjogren's. Raynaud's. On plaquenil 200 mg daily, on tramadol 100 mg am and on occasion 100 mg later in the day. per rheumatology    Neuropathy. On gabapentin 100 mg HS. Per rheumatology    Hypokalemia. On K 10 mEq BID. Hypomagnesia. On mag 400 mg BID.  Felt to be due to diuretic use. Per cards.  -Discussed the possibility that PPI use can be negatively contributing to low magnesium levels.  -Check magnesium level  -Will consider changing PPI to H2RA pending EGD results.    Elevated liver enzymes in 2018.   -Check labs  -Check abd US    *MA to reach out to cardiologist for clearance prior to  EGD*    Follow up in about 3 months (around 9/18/2019) for Motility w ELISE Burnette and 5 months with Dr. Oglesby.    1. Esophageal dysphagia    2. Gastroesophageal reflux disease with esophagitis    3. Bloating    4. Diarrhea, unspecified type    5. Elevated liver enzymes    6. Low magnesium level    7. Iron deficiency anemia, unspecified iron deficiency anemia type          Order summary:  Orders Placed This Encounter    US Abdomen Complete    FL Esophagram Complete    TSH    TISSUE TRANSGLUTAMINASE (TTG), IGA    IgA    Magnesium    Ferritin    Iron and TIBC    Hepatitis B Surface Antibody, Qual/Quant    Hepatitis B surface antigen    Hepatitis B core antibody, total    Hepatitis C antibody    Hepatitis A antibody, IgG    Case request GI: EGD (ESOPHAGOGASTRODUODENOSCOPY)    Case request GI: MANOMETRY, ESOPHAGUS, WITH IMPEDANCE MEASUREMENT         Thank you so much for allowing me to participate in the care of Cheyanne DAVID Bob, KATHERINE, FNP-C    I have personally reviewed history, performed physical exam, and educated the patient.  I have reviewed and agree with today's findings and the care plan outlined by Jael Bob NP. PLAN: EGD  E/gej/g/d bx w Flip pending cardiac clearance, manometry, esophagogram, US, labs, colon w ref MD, elim lactose/art sugars.        Zuleyka Oglesby MD

## 2019-06-18 NOTE — LETTER
June 18, 2019        Fany Seals MD  [address]  [facility] [address]             [facility]  [address]  [facility] [address]  Phone: [phone]  Fax: [phone]   Patient: Cheyanne Gomes   MR Number: [id number]   YOB: 1935   Date of Visit: 6/18/2019       Dear Dr. Seals:    Thank you for referring Cheyanne Gomes to me for evaluation. Attached you will find relevant portions of my assessment and plan of care.    If you have questions, please do not hesitate to call me. I look forward to following Cheyanne Gomes along with you.    Sincerely,      Dr. Oglesby            CC  No Recipients    Enclosure

## 2019-06-18 NOTE — TELEPHONE ENCOUNTER
MOTILITY CLINIC PROCEDURE ORDERS    CLEARANCE FOR PROCEDURES:  Cardiac    PROCEDURES  EGD with EndoFlip   Esophageal manometry with impedance - dysphagia       FLOOR:     2nd Floor    Reason for 2nd Floor:   Other: CHF, aortic stenosis, angina    PREP  Standard Prep    MEDICATIONS  Motility Studies (esophageal manometry/anorectal manometry)  Hold Narcotics x 3 days   Hold TCA x 3 days  Propofol only. No fentanyl of benzodiazepine during sedation . If additional sedation needed, discuss with Dr. Oglesby.    ORDER OF TESTING:  Day 1: EGD, then manometry  Day 2:esophagram

## 2019-06-19 LAB
HBV SURFACE AB SER QL IA: NEGATIVE
HBV SURFACE AB SERPL IA-ACNC: <3 MIU/ML
TTG IGA SER-ACNC: 8 UNITS

## 2019-06-20 LAB
HBV CORE AB SERPL QL IA: NEGATIVE
HBV SURFACE AG SERPL QL IA: NEGATIVE
HCV AB SERPL QL IA: NEGATIVE
HEPATITIS A ANTIBODY, IGG: POSITIVE

## 2019-06-27 DIAGNOSIS — R74.8 ELEVATED LIVER ENZYMES: Primary | ICD-10-CM

## 2019-06-27 DIAGNOSIS — R79.89 ELEVATED FERRITIN: ICD-10-CM

## 2019-06-28 ENCOUNTER — TELEPHONE (OUTPATIENT)
Dept: GASTROENTEROLOGY | Facility: CLINIC | Age: 84
End: 2019-06-28

## 2019-06-28 NOTE — TELEPHONE ENCOUNTER
----- Message from Jael Bob NP sent at 6/27/2019  3:09 PM CDT -----  Referral to hepatology placed. Pt informed via [facility]. Please coordinate visit with PT.    Thanks  ELISE Arzola

## 2019-06-30 ENCOUNTER — EXTERNAL CHRONIC CARE MANAGEMENT (OUTPATIENT)
Dept: PRIMARY CARE CLINIC | Facility: CLINIC | Age: 84
End: 2019-06-30
Payer: MEDICARE

## 2019-06-30 PROCEDURE — 99490 CHRNC CARE MGMT STAFF 1ST 20: CPT | Mod: S$PBB,,, | Performed by: FAMILY MEDICINE

## 2019-06-30 PROCEDURE — 99490 CHRNC CARE MGMT STAFF 1ST 20: CPT | Mod: PBBFAC,PO | Performed by: FAMILY MEDICINE

## 2019-06-30 PROCEDURE — 99490 PR CHRONIC CARE MGMT, 1ST 20 MIN: ICD-10-PCS | Mod: S$PBB,,, | Performed by: FAMILY MEDICINE

## 2019-07-01 RX ORDER — GABAPENTIN 100 MG/1
100 CAPSULE ORAL NIGHTLY
Qty: 30 CAPSULE | Refills: 3 | Status: SHIPPED | OUTPATIENT
Start: 2019-07-01 | End: 2019-11-18 | Stop reason: SDUPTHER

## 2019-07-06 DIAGNOSIS — M35.00 SJOGREN'S SYNDROME, WITH UNSPECIFIED ORGAN INVOLVEMENT: ICD-10-CM

## 2019-07-08 RX ORDER — HYDROXYCHLOROQUINE SULFATE 200 MG/1
200 TABLET, FILM COATED ORAL DAILY
Qty: 30 TABLET | Refills: 3 | Status: SHIPPED | OUTPATIENT
Start: 2019-07-08 | End: 2019-08-28

## 2019-07-22 ENCOUNTER — PROCEDURE VISIT (OUTPATIENT)
Dept: HEPATOLOGY | Facility: CLINIC | Age: 84
End: 2019-07-22
Attending: PHYSICIAN ASSISTANT
Payer: MEDICARE

## 2019-07-22 ENCOUNTER — OFFICE VISIT (OUTPATIENT)
Dept: HEPATOLOGY | Facility: CLINIC | Age: 84
End: 2019-07-22
Payer: MEDICARE

## 2019-07-22 ENCOUNTER — PATIENT OUTREACH (OUTPATIENT)
Dept: ADMINISTRATIVE | Facility: HOSPITAL | Age: 84
End: 2019-07-22

## 2019-07-22 ENCOUNTER — LAB VISIT (OUTPATIENT)
Dept: LAB | Facility: HOSPITAL | Age: 84
End: 2019-07-22
Payer: MEDICARE

## 2019-07-22 VITALS
HEIGHT: 58 IN | OXYGEN SATURATION: 99 % | BODY MASS INDEX: 20.36 KG/M2 | HEART RATE: 62 BPM | SYSTOLIC BLOOD PRESSURE: 131 MMHG | WEIGHT: 97 LBS | RESPIRATION RATE: 18 BRPM | DIASTOLIC BLOOD PRESSURE: 60 MMHG

## 2019-07-22 DIAGNOSIS — R74.8 ELEVATED LIVER ENZYMES: ICD-10-CM

## 2019-07-22 DIAGNOSIS — K76.9 LIVER DISEASE: ICD-10-CM

## 2019-07-22 DIAGNOSIS — R14.0 ABDOMINAL DISTENSION: ICD-10-CM

## 2019-07-22 DIAGNOSIS — R74.8 ELEVATED LIVER ENZYMES: Primary | ICD-10-CM

## 2019-07-22 DIAGNOSIS — R79.89 ELEVATED FERRITIN: ICD-10-CM

## 2019-07-22 LAB
ALBUMIN SERPL BCP-MCNC: 4 G/DL (ref 3.5–5.2)
ALP SERPL-CCNC: 82 U/L (ref 55–135)
ALT SERPL W/O P-5'-P-CCNC: 25 U/L (ref 10–44)
ANION GAP SERPL CALC-SCNC: 10 MMOL/L (ref 8–16)
AST SERPL-CCNC: 50 U/L (ref 10–40)
BASOPHILS # BLD AUTO: 0.06 K/UL (ref 0–0.2)
BASOPHILS NFR BLD: 1 % (ref 0–1.9)
BILIRUB DIRECT SERPL-MCNC: 0.2 MG/DL (ref 0.1–0.3)
BILIRUB SERPL-MCNC: 0.3 MG/DL (ref 0.1–1)
BUN SERPL-MCNC: 41 MG/DL (ref 8–23)
CALCIUM SERPL-MCNC: 9.7 MG/DL (ref 8.7–10.5)
CHLORIDE SERPL-SCNC: 94 MMOL/L (ref 95–110)
CO2 SERPL-SCNC: 34 MMOL/L (ref 23–29)
CREAT SERPL-MCNC: 1.4 MG/DL (ref 0.5–1.4)
DIFFERENTIAL METHOD: ABNORMAL
EOSINOPHIL # BLD AUTO: 0.1 K/UL (ref 0–0.5)
EOSINOPHIL NFR BLD: 2.1 % (ref 0–8)
ERYTHROCYTE [DISTWIDTH] IN BLOOD BY AUTOMATED COUNT: 14.4 % (ref 11.5–14.5)
EST. GFR  (AFRICAN AMERICAN): 40.1 ML/MIN/1.73 M^2
EST. GFR  (NON AFRICAN AMERICAN): 34.8 ML/MIN/1.73 M^2
GLUCOSE SERPL-MCNC: 95 MG/DL (ref 70–110)
HCT VFR BLD AUTO: 34.8 % (ref 37–48.5)
HGB BLD-MCNC: 11 G/DL (ref 12–16)
IGG SERPL-MCNC: 718 MG/DL (ref 650–1600)
IMM GRANULOCYTES # BLD AUTO: 0.02 K/UL (ref 0–0.04)
IMM GRANULOCYTES NFR BLD AUTO: 0.3 % (ref 0–0.5)
INR PPP: 1 (ref 0.8–1.2)
IRON SERPL-MCNC: 59 UG/DL (ref 30–160)
LYMPHOCYTES # BLD AUTO: 1.7 K/UL (ref 1–4.8)
LYMPHOCYTES NFR BLD: 26.5 % (ref 18–48)
MCH RBC QN AUTO: 29.6 PG (ref 27–31)
MCHC RBC AUTO-ENTMCNC: 31.6 G/DL (ref 32–36)
MCV RBC AUTO: 94 FL (ref 82–98)
MONOCYTES # BLD AUTO: 0.6 K/UL (ref 0.3–1)
MONOCYTES NFR BLD: 8.8 % (ref 4–15)
NEUTROPHILS # BLD AUTO: 3.8 K/UL (ref 1.8–7.7)
NEUTROPHILS NFR BLD: 61.3 % (ref 38–73)
NRBC BLD-RTO: 0 /100 WBC
PLATELET # BLD AUTO: 164 K/UL (ref 150–350)
PMV BLD AUTO: 9.6 FL (ref 9.2–12.9)
POTASSIUM SERPL-SCNC: 3.5 MMOL/L (ref 3.5–5.1)
PROT SERPL-MCNC: 7.1 G/DL (ref 6–8.4)
PROTHROMBIN TIME: 10 SEC (ref 9–12.5)
RBC # BLD AUTO: 3.71 M/UL (ref 4–5.4)
SATURATED IRON: 17 % (ref 20–50)
SODIUM SERPL-SCNC: 138 MMOL/L (ref 136–145)
TOTAL IRON BINDING CAPACITY: 354 UG/DL (ref 250–450)
TRANSFERRIN SERPL-MCNC: 239 MG/DL (ref 200–375)
WBC # BLD AUTO: 6.26 K/UL (ref 3.9–12.7)

## 2019-07-22 PROCEDURE — 80053 COMPREHEN METABOLIC PANEL: CPT

## 2019-07-22 PROCEDURE — 86039 ANTINUCLEAR ANTIBODIES (ANA): CPT

## 2019-07-22 PROCEDURE — 82784 ASSAY IGA/IGD/IGG/IGM EACH: CPT

## 2019-07-22 PROCEDURE — 99999 PR PBB SHADOW E&M-EST. PATIENT-LVL IV: CPT | Mod: PBBFAC,,, | Performed by: PHYSICIAN ASSISTANT

## 2019-07-22 PROCEDURE — 36415 COLL VENOUS BLD VENIPUNCTURE: CPT

## 2019-07-22 PROCEDURE — 99214 OFFICE O/P EST MOD 30 MIN: CPT | Mod: PBBFAC,25 | Performed by: PHYSICIAN ASSISTANT

## 2019-07-22 PROCEDURE — 91200 LIVER ELASTOGRAPHY: CPT | Mod: 26,S$PBB,, | Performed by: PHYSICIAN ASSISTANT

## 2019-07-22 PROCEDURE — 86235 NUCLEAR ANTIGEN ANTIBODY: CPT | Mod: 59

## 2019-07-22 PROCEDURE — 91200 PR LIVER ELASTOGRAPHY W/OUT IMAG W/INTERP & REPORT: ICD-10-PCS | Mod: 26,S$PBB,, | Performed by: PHYSICIAN ASSISTANT

## 2019-07-22 PROCEDURE — 86038 ANTINUCLEAR ANTIBODIES: CPT

## 2019-07-22 PROCEDURE — 80321 ALCOHOLS BIOMARKERS 1OR 2: CPT

## 2019-07-22 PROCEDURE — 99999 PR PBB SHADOW E&M-EST. PATIENT-LVL IV: ICD-10-PCS | Mod: PBBFAC,,, | Performed by: PHYSICIAN ASSISTANT

## 2019-07-22 PROCEDURE — 85025 COMPLETE CBC W/AUTO DIFF WBC: CPT

## 2019-07-22 PROCEDURE — 85610 PROTHROMBIN TIME: CPT

## 2019-07-22 PROCEDURE — 86235 NUCLEAR ANTIGEN ANTIBODY: CPT

## 2019-07-22 PROCEDURE — 83540 ASSAY OF IRON: CPT

## 2019-07-22 PROCEDURE — 86256 FLUORESCENT ANTIBODY TITER: CPT | Mod: 91

## 2019-07-22 PROCEDURE — 91200 LIVER ELASTOGRAPHY: CPT | Mod: PBBFAC | Performed by: PHYSICIAN ASSISTANT

## 2019-07-22 PROCEDURE — 99204 OFFICE O/P NEW MOD 45 MIN: CPT | Mod: S$PBB,,, | Performed by: PHYSICIAN ASSISTANT

## 2019-07-22 PROCEDURE — 82248 BILIRUBIN DIRECT: CPT

## 2019-07-22 PROCEDURE — 99204 PR OFFICE/OUTPT VISIT, NEW, LEVL IV, 45-59 MIN: ICD-10-PCS | Mod: S$PBB,,, | Performed by: PHYSICIAN ASSISTANT

## 2019-07-22 RX ORDER — METOLAZONE 2.5 MG/1
2.5 TABLET ORAL
Status: ON HOLD | COMMUNITY
End: 2019-09-29 | Stop reason: HOSPADM

## 2019-07-22 NOTE — PROGRESS NOTES
I have reviewed and concur with the BRAD's history, physical, assessment, and plan.  I have personally interviewed and examined the patient at bedside.  See below addendum for my evaluation and additional findings.     83 y.o. female that presents for evaluation of elevated liver tests.  Patient with known history of systolic heart failure.   Initial work-up for chronic liver disease negative.  We will complete serologic work-up.  Fibroscan for fibrosis staging     Patient will return to clinic with EVELYN Piper

## 2019-07-22 NOTE — PROGRESS NOTES
HEPATOLOGY CLINIC VISIT NOTE     REFERRING PROVIDER: Jael Bob    REASON FOR VISIT: elevated liver enzymes     HISTORY: This is a 83 y.o. White female here for evaluation of elevated liver enzymes and ferritin, referred by GI. On most recent labs, AST 63, ALT 45. She has normal synthetic liver function. Enzymes have been elevated since 2017, AST >ALT.  Ferritin 1,290, previously 66 in 2009, saturated iron 18. 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 She takes PO Iron BID, recent iron infusion x 2.     No know h/o liver disease. Elevated liver enzymes since 01/2017.  H/o CHF, EF 35% in 2013, seeing cardiology once a month, now going to every other month. No recent abdominal imaging or formal fibrosis staging.     H/o Sjogren's, followed by rheumatology     Pt denies signs of hepatic decompensation including: jaundice, dark urine, hematemesis, melena, slowed mentation.    H/o abdominal distention, well controlled on diuretics. Pt has never required paracentesis     Liver staging:  No formal staging  AST 63, ALT 45  Normal synthetic liver function  PLTs     Past Medical History:   Diagnosis Date    Abdominal hernia     Anemia     Dr Berkowitz     Aortic valve stenosis, moderate     Cannon's esophagus     Cataract     ou done    CHF (congestive heart failure)     EFx 35%, Dr. Spencer 12/19/13    Colitis     Compression fracture     Diverticulosis     GERD (gastroesophageal reflux disease)     Hyperlipidemia     Hypertension     Irritable bowel syndrome     Occlusive coronary artery disease     Osteoarthritis     lumbar DDD    Pneumonia 01/03/2017    Rheumatoid arteritis     Rheumatoid arthritis     Shingles 01/03/2017    Sjogren syndrome     Ulcerative colitis      Past Surgical History:   Procedure Laterality Date    APPENDECTOMY      BACK SURGERY      CATARACT  EXTRACTION      ou od d 11-15-12//     SECTION, CLASSIC      x 2    CHOLECYSTECTOMY      COLONOSCOPY  09/15/2011    Dr Anaya     COLONOSCOPY  01/10/2017    [facility] report sent to scanning    CORONARY ARTERY BYPASS GRAFT      3 vessel    CORONARY ARTERY BYPASS GRAFT      EXTRACTION, CATARACT, WITH IOL INSERTION Right 11/15/2012    [facility]    EXTRACTION, CATARACT, WITH IOL INSERTION Left 2012    [facility]    eyes      rk ou    FRACTURE SURGERY  2016    Dr Guido left hip     HYSTERECTOMY      INSERTION, INTRAMEDULLARY KELSI, FEMUR, TROCHANTER Right 10/8/2018    [facility] OR    OOPHORECTOMY      SPINE SURGERY  2013    Silvino Mike    UPPER GASTROINTESTINAL ENDOSCOPY      Yag Capsulotomy Right 14     FAMILY HISTORY: Negative for liver disease    SOCIAL HISTORY:   Drove school bus for 32, still does office work     Social History     Tobacco Use   Smoking Status Former Smoker    Packs/day: 1.00    Years: 5.00    Pack years: 5.00    Last attempt to quit: 1971    Years since quittin.5   Smokeless Tobacco Never Used     Social History     Substance and Sexual Activity   Alcohol Use No   glass wine on occasion     Social History     Substance and Sexual Activity   Drug Use No     ROS:   No fever, chills, weight loss, fatigue  No chest pain, palpitations, dyspnea, cough  No abdominal pain,  nausea, vomiting  No skin rashes   No headaches, visual changes  No lower extremity edema    PHYSICAL EXAM:  Friendly White female, in no acute distress; alert and oriented to person, place and time  VITALS: reviewed  HEENT: Sclerae anicteric.   NECK: Supple  CVS: Regular rate and rhythm. No murmurs  LUNGS: Normal respiratory effort. Clear bilaterally  ABDOMEN: Flat, soft, nontender. No organomegaly or masses. (+) abdominal distention, non bulging flanks.   SKIN: Warm and  dry. No jaundice, No obvious rashes.   EXTREMITIES: No lower extremity edema  NEURO/PSYCH: Normal gate. Memory intact. Thought and speech pattern appropriate. Behavior normal. No depression or anxiety noted.    RECENT LABS:  Lab Results   Component Value Date    WBC 5.6 12/11/2018    HGB 11.2 (L) 12/11/2018     12/11/2018     Lab Results   Component Value Date    INR 1.0 10/08/2018     Lab Results   Component Value Date    AST 63 (H) 12/11/2018    ALT 62 (H) 10/08/2018    BILITOT 0.4 12/11/2018    ALBUMIN 3.9 12/11/2018    ALKPHOS 78 12/11/2018    CREATININE 0.95 12/11/2018    BUN 18 12/11/2018     12/11/2018    K 5.4 (H) 12/11/2018     RECENT IMAGING:  U/S abdomen    ASSESSMENT  83 y.o. White female with:  1. ELEVATED LIVER ENZYMES  -- etiology unclear, does have h/o AI disease with Sjogren's  -- h/o CHF, last EF 2013 at 35%, h/o occlusive CAD  -- negative viral hep panel  -- attempt fibroscan, U/S with doppler  -- likely will require q6 month HCC screening    2. ELEVATED FERRITIN  -- suspect reactive related to iron infusions and exogenous iron supplementation    3. ABDOMINAL DISTENTION  -- h/o abdominal distention and CHF, suspect cardiac ascites, not enough today for diagnostic para  -- U/S with doppler    PLAN:  1. Labs today  2. U/S, fibroscan  3. F/u TBD     Thank you for allowing me to participate in the care of Cheyanne Gomez PA-C

## 2019-07-22 NOTE — LETTER
July 22, 2019      Jael Bob, ELISE  [address]  [facility] [address]           [facility] - Hepatology  [address]  [facility] [address]  Phone: [phone]  Fax: [phone]          Patient: Cheyanne Gomes   MR Number: [id number]   YOB: 1935   Date of Visit: 7/22/2019       Dear Jael Bob:    Thank you for referring Cheyanne Gomes to me for evaluation. Attached you will find relevant portions of my assessment and plan of care.    If you have questions, please do not hesitate to call me. I look forward to following Cheyanne Gomes along with you.    Sincerely,    Robert Gomez PA-C    Enclosure  CC:  No Recipients    If you would like to receive this communication electronically, please contact [email or web address] or [phone] to request more information on [employer] access.    For providers and/or their staff who would like to refer a patient to [facility], please contact us through our one-stop-shop provider referral line, [facility], at [phone].    If you feel you have received this communication in error or would no longer like to receive these types of communications, please e-mail [email or web address]

## 2019-07-23 LAB
ANA SER QL IF: POSITIVE
ANA TITR SER IF: NORMAL {TITER}
MITOCHONDRIA AB TITR SER IF: NORMAL {TITER}

## 2019-07-23 NOTE — PROCEDURES
Procedures   Vibration-controlled Transient Elastography Procedure (Fibroscan)    Name: Cheyanne Gomes  Date of Procedure : 2019   :: Robert Gomez PA-C  Diagnosis: CRYPTOGENIC    Probe: M    Findings  Median liver stiffness score: 19.1 KPa  CAP readin dB/m    IQR/med: 8 %    Interpretation  Fibrosis interpretation is based on medial liver stiffness - Kilopascal (kPa).     Fibrosis stage: F4     Steatosis interpretation is based on controlled attenuation parameter - (dB/m).    Steatosis grade: <S1       Robert Gomez PA-C  Hepatology/HCV  [facility] [place]

## 2019-07-24 ENCOUNTER — TELEPHONE (OUTPATIENT)
Dept: ENDOSCOPY | Facility: HOSPITAL | Age: 84
End: 2019-07-24

## 2019-07-24 ENCOUNTER — TELEPHONE (OUTPATIENT)
Dept: GASTROENTEROLOGY | Facility: CLINIC | Age: 84
End: 2019-07-24

## 2019-07-24 LAB
ANTI SM ANTIBODY: 0.56 EU (ref 0–19.99)
ANTI SM/RNP ANTIBODY: 2 EU (ref 0–19.99)
ANTI-SM INTERPRETATION: NEGATIVE
ANTI-SM/RNP INTERPRETATION: NEGATIVE
ANTI-SSA ANTIBODY: 34.93 EU (ref 0–19.99)
ANTI-SSA INTERPRETATION: POSITIVE
ANTI-SSB ANTIBODY: 0.8 EU (ref 0–19.99)
ANTI-SSB INTERPRETATION: NEGATIVE
DSDNA AB SER-ACNC: ABNORMAL [IU]/ML
SMOOTH MUSCLE AB TITR SER IF: NORMAL {TITER}

## 2019-07-25 LAB
A1AT PHENOTYP SERPL-IMP: NORMAL BANDS
A1AT SERPL NEPH-MCNC: 158 MG/DL (ref 100–190)
A2 MACROGLOB SERPL-MCNC: 232 MG/DL (ref 100–280)
ALT SERPL W P-5'-P-CCNC: 24 U/L (ref 7–45)
APO A-I SERPL-MCNC: 188 MG/DL
BILIRUB SERPL-MCNC: 0.2 MG/DL
BIOPREDICTIVE SERIAL NUMBER: ABNORMAL
FIBROSIS STAGE SERPL QL: ABNORMAL
FIBROTEST INTERPRETATION: ABNORMAL
FIBROTEST-ACTITEST COMMENT: ABNORMAL
GGT SERPL-CCNC: 45 U/L (ref 5–36)
HAPTOGLOB SERPL-MCNC: 116 MG/DL (ref 30–200)
LIVER FIBR SCORE SERPL CALC.FIBROSURE: 0.22
NECROINFLAMMAT INTERP: ABNORMAL
NECROINFLAMMATORY ACT GRADE SERPL QL: ABNORMAL
NECROINFLAMMATORY ACT SCORE SERPL: 0.09

## 2019-07-26 ENCOUNTER — TELEPHONE (OUTPATIENT)
Dept: ENDOSCOPY | Facility: HOSPITAL | Age: 84
End: 2019-07-26

## 2019-07-26 NOTE — TELEPHONE ENCOUNTER
Patient scheduled for EGd w/Endoflip procedure on 10/15/19 at 9:00 am on 2nd floor endoscopy Unit with Dr. Oglesby, and Esophageal Manometry procedure will follow EGD procedure on 10/15/19 on 4th floor Endoscopy Unit.

## 2019-07-29 LAB — PHOSPHATIDYLETHANOL (PETH): NEGATIVE NG/ML

## 2019-08-01 ENCOUNTER — HOSPITAL ENCOUNTER (OUTPATIENT)
Dept: RADIOLOGY | Facility: HOSPITAL | Age: 84
Discharge: HOME | End: 2019-08-01
Attending: PHYSICIAN ASSISTANT
Payer: MEDICARE

## 2019-08-01 DIAGNOSIS — K76.9 LIVER DISEASE: ICD-10-CM

## 2019-08-01 DIAGNOSIS — R74.8 ELEVATED LIVER ENZYMES: ICD-10-CM

## 2019-08-01 PROCEDURE — 93975 VASCULAR STUDY: CPT | Mod: 26,,, | Performed by: RADIOLOGY

## 2019-08-01 PROCEDURE — 93975 VASCULAR STUDY: CPT | Mod: TC

## 2019-08-01 PROCEDURE — 93975 US ABDOMEN COMP WITH DOPPLER (XPD): ICD-10-PCS | Mod: 26,,, | Performed by: RADIOLOGY

## 2019-08-06 ENCOUNTER — TELEPHONE (OUTPATIENT)
Dept: FAMILY MEDICINE | Facility: CLINIC | Age: 84
End: 2019-08-06

## 2019-08-06 ENCOUNTER — HOSPITAL ENCOUNTER (OUTPATIENT)
Facility: HOSPITAL | Age: 84
Discharge: HOME | End: 2019-08-08
Attending: EMERGENCY MEDICINE | Admitting: INTERNAL MEDICINE
Payer: MEDICARE

## 2019-08-06 ENCOUNTER — TELEPHONE (OUTPATIENT)
Dept: HEPATOLOGY | Facility: CLINIC | Age: 84
End: 2019-08-06

## 2019-08-06 DIAGNOSIS — I50.9 CONGESTIVE HEART FAILURE, UNSPECIFIED HF CHRONICITY, UNSPECIFIED HEART FAILURE TYPE: ICD-10-CM

## 2019-08-06 DIAGNOSIS — I50.9 ACUTE ON CHRONIC CONGESTIVE HEART FAILURE, UNSPECIFIED HEART FAILURE TYPE: Primary | ICD-10-CM

## 2019-08-06 DIAGNOSIS — K74.00 LIVER FIBROSIS: ICD-10-CM

## 2019-08-06 DIAGNOSIS — R74.8 ELEVATED LIVER ENZYMES: Primary | ICD-10-CM

## 2019-08-06 DIAGNOSIS — R06.02 SOB (SHORTNESS OF BREATH): ICD-10-CM

## 2019-08-06 DIAGNOSIS — I50.9 CHF (CONGESTIVE HEART FAILURE): ICD-10-CM

## 2019-08-06 PROBLEM — N18.4 CKD (CHRONIC KIDNEY DISEASE), STAGE IV: Status: ACTIVE | Noted: 2019-08-06

## 2019-08-06 LAB
ALBUMIN SERPL BCP-MCNC: 4.4 G/DL (ref 3.5–5.2)
ALP SERPL-CCNC: 66 U/L (ref 55–135)
ALT SERPL W/O P-5'-P-CCNC: 25 U/L (ref 10–44)
ANION GAP SERPL CALC-SCNC: 10 MMOL/L (ref 8–16)
AST SERPL-CCNC: 45 U/L (ref 10–40)
BASOPHILS # BLD AUTO: 0.05 K/UL (ref 0–0.2)
BASOPHILS NFR BLD: 1 % (ref 0–1.9)
BILIRUB SERPL-MCNC: 0.7 MG/DL (ref 0.1–1)
BNP SERPL-MCNC: 1927 PG/ML (ref 0–99)
BUN SERPL-MCNC: 42 MG/DL (ref 8–23)
CALCIUM SERPL-MCNC: 10.2 MG/DL (ref 8.7–10.5)
CHLORIDE SERPL-SCNC: 91 MMOL/L (ref 95–110)
CO2 SERPL-SCNC: 35 MMOL/L (ref 23–29)
CREAT SERPL-MCNC: 1.8 MG/DL (ref 0.5–1.4)
DIFFERENTIAL METHOD: ABNORMAL
EOSINOPHIL # BLD AUTO: 0.2 K/UL (ref 0–0.5)
EOSINOPHIL NFR BLD: 3.1 % (ref 0–8)
ERYTHROCYTE [DISTWIDTH] IN BLOOD BY AUTOMATED COUNT: 13.7 % (ref 11.5–14.5)
EST. GFR  (AFRICAN AMERICAN): 29.6 ML/MIN/1.73 M^2
EST. GFR  (NON AFRICAN AMERICAN): 25.7 ML/MIN/1.73 M^2
GLUCOSE SERPL-MCNC: 211 MG/DL (ref 70–110)
HCT VFR BLD AUTO: 33.1 % (ref 37–48.5)
HGB BLD-MCNC: 10.7 G/DL (ref 12–16)
IMM GRANULOCYTES # BLD AUTO: 0.02 K/UL (ref 0–0.04)
IMM GRANULOCYTES NFR BLD AUTO: 0.4 % (ref 0–0.5)
INR PPP: 1.1
LYMPHOCYTES # BLD AUTO: 1.2 K/UL (ref 1–4.8)
LYMPHOCYTES NFR BLD: 23.3 % (ref 18–48)
MCH RBC QN AUTO: 30.5 PG (ref 27–31)
MCHC RBC AUTO-ENTMCNC: 32.3 G/DL (ref 32–36)
MCV RBC AUTO: 94 FL (ref 82–98)
MONOCYTES # BLD AUTO: 0.5 K/UL (ref 0.3–1)
MONOCYTES NFR BLD: 9.6 % (ref 4–15)
NEUTROPHILS # BLD AUTO: 3.3 K/UL (ref 1.8–7.7)
NEUTROPHILS NFR BLD: 62.6 % (ref 38–73)
NRBC BLD-RTO: 0 /100 WBC
PLATELET # BLD AUTO: 166 K/UL (ref 150–350)
PMV BLD AUTO: 9.6 FL (ref 9.2–12.9)
POTASSIUM SERPL-SCNC: 3.9 MMOL/L (ref 3.5–5.1)
PROT SERPL-MCNC: 7.1 G/DL (ref 6–8.4)
PROTHROMBIN TIME: 13.6 SEC (ref 11.7–14)
RBC # BLD AUTO: 3.51 M/UL (ref 4–5.4)
SODIUM SERPL-SCNC: 136 MMOL/L (ref 136–145)
TROPONIN I SERPL DL<=0.01 NG/ML-MCNC: 0.45 NG/ML (ref 0.02–0.5)
WBC # BLD AUTO: 5.19 K/UL (ref 3.9–12.7)

## 2019-08-06 PROCEDURE — 93005 ELECTROCARDIOGRAM TRACING: CPT

## 2019-08-06 PROCEDURE — 85025 COMPLETE CBC W/AUTO DIFF WBC: CPT

## 2019-08-06 PROCEDURE — 63600175 PHARM REV CODE 636 W HCPCS: Performed by: EMERGENCY MEDICINE

## 2019-08-06 PROCEDURE — 80053 COMPREHEN METABOLIC PANEL: CPT

## 2019-08-06 PROCEDURE — 96374 THER/PROPH/DIAG INJ IV PUSH: CPT

## 2019-08-06 PROCEDURE — G0378 HOSPITAL OBSERVATION PER HR: HCPCS

## 2019-08-06 PROCEDURE — 83880 ASSAY OF NATRIURETIC PEPTIDE: CPT

## 2019-08-06 PROCEDURE — 85610 PROTHROMBIN TIME: CPT

## 2019-08-06 PROCEDURE — 99285 EMERGENCY DEPT VISIT HI MDM: CPT | Mod: 25

## 2019-08-06 PROCEDURE — 84484 ASSAY OF TROPONIN QUANT: CPT

## 2019-08-06 RX ORDER — FUROSEMIDE 10 MG/ML
40 INJECTION INTRAMUSCULAR; INTRAVENOUS
Status: COMPLETED | OUTPATIENT
Start: 2019-08-06 | End: 2019-08-06

## 2019-08-06 RX ADMIN — FUROSEMIDE 40 MG: 10 INJECTION, SOLUTION INTRAMUSCULAR; INTRAVENOUS at 04:08

## 2019-08-06 NOTE — TELEPHONE ENCOUNTER
----- Message from Robert Gomez PA-C sent at 8/6/2019 11:22 AM CDT -----  HCC screening with clinic visit in 6 months  Thanks

## 2019-08-06 NOTE — ED PROVIDER NOTES
Encounter Date: 2019       History     Chief Complaint   Patient presents with    Shortness of Breath     ONSET FRIDAY    Fatigue     Patient presents complaining of shortness of breath and weakness. Patient states she has been feeling increasingly weak for the last 5 days.  Patient states yesterday she nearly fainted she was so weak.  She complains of persisting worsening shortness of breath. She has a history of congestive heart failure.  Patient's cardiologist is Dr. Lopez.  She denies fever.  She denies nausea or vomiting.  He denies abdominal pain or chest pain. No headache. No sore throat        Review of patient's allergies indicates:   Allergen Reactions    Macrodantin  [nitrofurantoin macrocrystalline]      Other reaction(s): very sickly    Penicillins      Other reaction(s): swelling  Other reaction(s): red skin discolorati    Sulfa (sulfonamide antibiotics)      Other reaction(s): very sickly     Past Medical History:   Diagnosis Date    Abdominal hernia     Anemia     Dr Berkowitz     Aortic valve stenosis, moderate     Cannon's esophagus     Cataract     ou done    CHF (congestive heart failure)     EFx 35%, Dr. Spencer 13    Colitis     Compression fracture     Diverticulosis     GERD (gastroesophageal reflux disease)     Hyperlipidemia     Hypertension     Irritable bowel syndrome     Occlusive coronary artery disease     Osteoarthritis     lumbar DDD    Pneumonia 2017    Rheumatoid arteritis     Rheumatoid arthritis     Shingles 2017    Sjogren syndrome     Ulcerative colitis      Past Surgical History:   Procedure Laterality Date    APPENDECTOMY      BACK SURGERY      CATARACT EXTRACTION      ou od d 11-15-12/     SECTION, CLASSIC      x 2    CHOLECYSTECTOMY      COLONOSCOPY  09/15/2011    Dr Anaya     COLONOSCOPY  01/10/2017    [facility] report sent to scanning    CORONARY ARTERY BYPASS GRAFT      3 vessel    CORONARY ARTERY BYPASS  GRAFT      EXTRACTION, CATARACT, WITH IOL INSERTION Right 11/15/2012    [facility]    EXTRACTION, CATARACT, WITH IOL INSERTION Left 8/23/2012    [facility]    eyes      rk ou    FRACTURE SURGERY  06/21/2016    Dr Guido left hip     HYSTERECTOMY      INSERTION, INTRAMEDULLARY KELSI, FEMUR, TROCHANTER Right 10/8/2018    [facility]    OOPHORECTOMY      SPINE SURGERY  8-    Silvino Mckeon    UPPER GASTROINTESTINAL ENDOSCOPY      Yag Capsulotomy Right 9/25/14     Family History   Problem Relation Age of Onset    Hypertension Father     Heart disease Father         MI    Heart disease Mother         aortic stenosis    Skin cancer Mother     Heart disease Brother         2 brothers CABG    Arthritis Brother     Crohn's disease Brother     Hypertension Sister     Fibromyalgia Sister     Scleroderma Sister     Pulmonary embolism Sister     Hypertension Daughter     Early death Son         accident    Lupus Cousin     Lupus Cousin     Irritable bowel syndrome Sister     Crohn's disease Brother     Crohn's disease Brother     Amblyopia Neg Hx     Blindness Neg Hx     Cancer Neg Hx     Cataracts Neg Hx     Diabetes Neg Hx     Glaucoma Neg Hx     Macular degeneration Neg Hx     Retinal detachment Neg Hx     Strabismus Neg Hx     Stroke Neg Hx     Thyroid disease Neg Hx     Celiac disease Neg Hx     Cirrhosis Neg Hx     Psoriasis Neg Hx     Melanoma Neg Hx     Multiple sclerosis Neg Hx     Rheum arthritis Neg Hx     Tuberculosis Neg Hx     Lymphoma Neg Hx     Ulcerative colitis Neg Hx     Moses's disease Neg Hx     Stomach cancer Neg Hx     Rectal cancer Neg Hx     Liver disease Neg Hx     Liver cancer Neg Hx     Inflammatory bowel disease Neg Hx     Hemochromatosis Neg Hx     Esophageal cancer Neg Hx     Cystic fibrosis Neg Hx     Colon cancer Neg Hx       Social History     Tobacco Use    Smoking status: Former Smoker     Packs/day: 1.00     Years: 5.00     Pack years: 5.00     Last attempt to quit: 1971     Years since quittin.6    Smokeless tobacco: Never Used   Substance Use Topics    Alcohol use: No    Drug use: No     Review of Systems   Respiratory: Positive for shortness of breath.    Neurological: Positive for weakness.   All other systems reviewed and are negative.      Physical Exam     Initial Vitals [19 1457]   BP Pulse Resp Temp SpO2   136/63 (!) 58 18 98.2 °F (36.8 °C) 99 %      MAP       --         Physical Exam    Nursing note and vitals reviewed.  Constitutional: She appears well-developed and well-nourished.   HENT:   Head: Normocephalic and atraumatic.   Eyes: EOM are normal. Pupils are equal, round, and reactive to light.   Neck: Normal range of motion. Neck supple.   Cardiovascular: Normal rate, regular rhythm, normal heart sounds and intact distal pulses.   Pulmonary/Chest: No respiratory distress.   Diminished breath sounds bilateral   Musculoskeletal: Normal range of motion. She exhibits no edema or tenderness.   Neurological: She is alert and oriented to person, place, and time. She has normal strength and normal reflexes. She displays normal reflexes. No cranial nerve deficit or sensory deficit.   Skin: Skin is warm and dry. Capillary refill takes less than 2 seconds.   Psychiatric: She has a normal mood and affect. Her behavior is normal. Judgment and thought content normal.         ED Course   Procedures  Labs Reviewed   CBC W/ AUTO DIFFERENTIAL - Abnormal; Notable for the following components:       Result Value    RBC 3.51 (*)     Hemoglobin 10.7 (*)     Hematocrit 33.1 (*)     All other components within normal limits   COMPREHENSIVE METABOLIC PANEL - Abnormal; Notable for the following components:    Chloride 91 (*)     CO2 35 (*)     Glucose 211 (*)     BUN, Bld 42 (*)     Creatinine 1.8 (*)     AST 45 (*)      eGFR if  29.6 (*)     eGFR if non  25.7 (*)     All other components within normal limits   B-TYPE NATRIURETIC PEPTIDE - Abnormal; Notable for the following components:    BNP 1,927 (*)     All other components within normal limits   TROPONIN I   PROTIME-INR        ECG Results          EKG 12-lead (Shortness of Breath) Age > 50 (In process)  Result time 08/06/19 16:49:50    In process by Interface, Lab In [facility] (08/06/19 16:49:50)                 Narrative:    Test Reason : R06.02,    Vent. Rate : 057 BPM     Atrial Rate : 057 BPM     P-R Int : 176 ms          QRS Dur : 106 ms      QT Int : 426 ms       P-R-T Axes : 103 -06 132 degrees     QTc Int : 414 ms    Sinus bradycardia  LVH with repolarization abnormality  Abnormal ECG  When compared with ECG of 20-SEP-2013 16:00,  Vent. rate has decreased BY  32 BPM  QRS duration has increased  ST no longer elevated in Inferior leads  Non-specific change in ST segment in Anterior leads  T wave inversion less evident in Lateral leads    Referred By: [email or web address]   SELF           Confirmed By:                   In process by Interface, Lab In [facility] (08/06/19 15:46:19)                 Narrative:    Test Reason : R06.02,    Vent. Rate : 057 BPM     Atrial Rate : 057 BPM     P-R Int : 176 ms          QRS Dur : 106 ms      QT Int : 426 ms       P-R-T Axes : 103 -06 132 degrees     QTc Int : 414 ms    Sinus bradycardia  LVH with repolarization abnormality  Abnormal ECG  When compared with ECG of 20-SEP-2013 16:00,  Vent. rate has decreased BY  32 BPM  QRS duration has increased  ST no longer elevated in Inferior leads  Non-specific change in ST segment in Anterior leads  T wave inversion less evident in Lateral leads    Referred By: [email or web address]   SELF           Confirmed By:                             Imaging Results          X-Ray Chest PA And Lateral (Final result)  Result time 08/06/19 16:37:38    Final result by Nicanor Ying IV, MD (08/06/19  16:37:38)                 Impression:      Stable cardiomegaly and mild central pulmonary vascular prominence.    Scattered linear atelectasis or scarring within the lower lung zones.      Electronically signed by: Nicanor Ying IV., MD  Date:    08/06/2019  Time:    16:37             Narrative:    EXAMINATION:  XR CHEST PA AND LATERAL    CLINICAL HISTORY:  Shortness of breath    COMPARISON:  01/13/2019.    FINDINGS:  The heart is mildly enlarged but stable.  Mild central pulmonary vascular prominence is unchanged.  There are surgical changes within the sternum.  There is arteriosclerotic calcification within the aortic arch.    There are minor scattered changes of platelike atelectasis or scarring within the lower lung zones.  No confluent parenchymal infiltrates are observed.  No significant pleural fluid accumulation is demonstrated.  There has been multilevel vertebroplasty within the thoracolumbar spine.  There is an unchanged compression deformity within the lower thoracic spine.  Surgical changes are noted within the lumbar spine.                                 Medical Decision Making:   ED Management:  Patient presenting with weakness in shortness of breath.  Patient's workup is consistent with congestive heart failure which is the likely etiology.  There is central prominent vascular congestion as well as a markedly elevated BNP.  Patient already on diuretics at home.  She will require IV diuresis.  She remains clinically stable.                   ED Course as of Aug 06 1657   Tue Aug 06, 2019   1627 Patient's BNP elevated at 1927  Which is about twice as much as 2 years ago    [AP]   1629 This is a    [AP]      ED Course User Index  [AP] Giancarlo Snider MD     Clinical Impression:       ICD-10-CM ICD-9-CM   1. Congestive heart failure, unspecified HF chronicity, unspecified heart failure type I50.9 428.0   2. SOB (shortness of breath) R06.02 786.05                                Giancarlo Snider MD  08/06/19  [id number]

## 2019-08-06 NOTE — TELEPHONE ENCOUNTER
VALERIE Cameron Staff   Phone Number: [phone]             Please see message below    Previous Messages      ----- Message -----   From: Sury Calderon   Sent: 8/6/2019  12:27 PM   To: Chayo Cameron LPN   Subject: pt question                                       Cheyanne Gomes   MRN:[id number]     Hi, The pt reports to be feeling weak, dizzy and to be having some nausea since Friday.  Pt would like to see if she can have labs drawn.     Thank you   Sury Calderon LPN-Care Coordinator   [facility]   [phone] ext [phone]         Called patient- she is on her way to emergency room.

## 2019-08-07 LAB
ANION GAP SERPL CALC-SCNC: 10 MMOL/L (ref 8–16)
BNP SERPL-MCNC: 1457 PG/ML (ref 0–99)
BUN SERPL-MCNC: 39 MG/DL (ref 8–23)
CALCIUM SERPL-MCNC: 9.4 MG/DL (ref 8.7–10.5)
CHLORIDE SERPL-SCNC: 91 MMOL/L (ref 95–110)
CO2 SERPL-SCNC: 35 MMOL/L (ref 23–29)
CREAT SERPL-MCNC: 1.5 MG/DL (ref 0.5–1.4)
ERYTHROCYTE [DISTWIDTH] IN BLOOD BY AUTOMATED COUNT: 13.4 % (ref 11.5–14.5)
EST. GFR  (AFRICAN AMERICAN): 36.9 ML/MIN/1.73 M^2
EST. GFR  (NON AFRICAN AMERICAN): 32 ML/MIN/1.73 M^2
GLUCOSE SERPL-MCNC: 94 MG/DL (ref 70–110)
HCT VFR BLD AUTO: 32.9 % (ref 37–48.5)
HGB BLD-MCNC: 10.6 G/DL (ref 12–16)
MAGNESIUM SERPL-MCNC: 2 MG/DL (ref 1.6–2.6)
MCH RBC QN AUTO: 29.6 PG (ref 27–31)
MCHC RBC AUTO-ENTMCNC: 32.2 G/DL (ref 32–36)
MCV RBC AUTO: 92 FL (ref 82–98)
PHOSPHATE SERPL-MCNC: 4.6 MG/DL (ref 2.7–4.5)
PLATELET # BLD AUTO: 141 K/UL (ref 150–350)
PMV BLD AUTO: 9.3 FL (ref 9.2–12.9)
POTASSIUM SERPL-SCNC: 3 MMOL/L (ref 3.5–5.1)
POTASSIUM SERPL-SCNC: 3.8 MMOL/L (ref 3.5–5.1)
RBC # BLD AUTO: 3.58 M/UL (ref 4–5.4)
SODIUM SERPL-SCNC: 136 MMOL/L (ref 136–145)
WBC # BLD AUTO: 4.53 K/UL (ref 3.9–12.7)

## 2019-08-07 PROCEDURE — 93005 ELECTROCARDIOGRAM TRACING: CPT

## 2019-08-07 PROCEDURE — 25000003 PHARM REV CODE 250: Performed by: INTERNAL MEDICINE

## 2019-08-07 PROCEDURE — 83735 ASSAY OF MAGNESIUM: CPT

## 2019-08-07 PROCEDURE — G0378 HOSPITAL OBSERVATION PER HR: HCPCS

## 2019-08-07 PROCEDURE — 84100 ASSAY OF PHOSPHORUS: CPT

## 2019-08-07 PROCEDURE — 36415 COLL VENOUS BLD VENIPUNCTURE: CPT

## 2019-08-07 PROCEDURE — 80048 BASIC METABOLIC PNL TOTAL CA: CPT

## 2019-08-07 PROCEDURE — 63600175 PHARM REV CODE 636 W HCPCS: Performed by: INTERNAL MEDICINE

## 2019-08-07 PROCEDURE — 83880 ASSAY OF NATRIURETIC PEPTIDE: CPT

## 2019-08-07 PROCEDURE — 85027 COMPLETE CBC AUTOMATED: CPT

## 2019-08-07 PROCEDURE — 84132 ASSAY OF SERUM POTASSIUM: CPT | Mod: 59

## 2019-08-07 RX ORDER — ONDANSETRON 2 MG/ML
4 INJECTION INTRAMUSCULAR; INTRAVENOUS EVERY 8 HOURS PRN
Status: DISCONTINUED | OUTPATIENT
Start: 2019-08-07 | End: 2019-08-08 | Stop reason: HOSPADM

## 2019-08-07 RX ORDER — ENOXAPARIN SODIUM 100 MG/ML
30 INJECTION SUBCUTANEOUS EVERY 24 HOURS
Status: DISCONTINUED | OUTPATIENT
Start: 2019-08-07 | End: 2019-08-08 | Stop reason: HOSPADM

## 2019-08-07 RX ORDER — POTASSIUM CHLORIDE 20 MEQ/1
40 TABLET, EXTENDED RELEASE ORAL ONCE
Status: COMPLETED | OUTPATIENT
Start: 2019-08-07 | End: 2019-08-07

## 2019-08-07 RX ORDER — POTASSIUM CHLORIDE 750 MG/1
10 CAPSULE, EXTENDED RELEASE ORAL ONCE
Status: DISCONTINUED | OUTPATIENT
Start: 2019-08-07 | End: 2019-08-07 | Stop reason: CLARIF

## 2019-08-07 RX ORDER — GABAPENTIN 100 MG/1
100 CAPSULE ORAL NIGHTLY
Status: DISCONTINUED | OUTPATIENT
Start: 2019-08-07 | End: 2019-08-08 | Stop reason: HOSPADM

## 2019-08-07 RX ORDER — ASPIRIN 81 MG/1
81 TABLET ORAL ONCE
Status: COMPLETED | OUTPATIENT
Start: 2019-08-07 | End: 2019-08-07

## 2019-08-07 RX ORDER — FUROSEMIDE 10 MG/ML
40 INJECTION INTRAMUSCULAR; INTRAVENOUS ONCE
Status: COMPLETED | OUTPATIENT
Start: 2019-08-07 | End: 2019-08-07

## 2019-08-07 RX ORDER — LANOLIN ALCOHOL/MO/W.PET/CERES
400 CREAM (GRAM) TOPICAL 2 TIMES DAILY
Status: DISCONTINUED | OUTPATIENT
Start: 2019-08-07 | End: 2019-08-08 | Stop reason: HOSPADM

## 2019-08-07 RX ORDER — PANTOPRAZOLE SODIUM 40 MG/1
40 TABLET, DELAYED RELEASE ORAL 2 TIMES DAILY
Status: DISCONTINUED | OUTPATIENT
Start: 2019-08-07 | End: 2019-08-08 | Stop reason: HOSPADM

## 2019-08-07 RX ORDER — POLYETHYLENE GLYCOL 3350 17 G/17G
17 POWDER, FOR SOLUTION ORAL DAILY
Status: DISCONTINUED | OUTPATIENT
Start: 2019-08-07 | End: 2019-08-08 | Stop reason: HOSPADM

## 2019-08-07 RX ORDER — AMLODIPINE BESYLATE 5 MG/1
5 TABLET ORAL DAILY
Status: DISCONTINUED | OUTPATIENT
Start: 2019-08-07 | End: 2019-08-08 | Stop reason: HOSPADM

## 2019-08-07 RX ORDER — FUROSEMIDE 10 MG/ML
40 INJECTION INTRAMUSCULAR; INTRAVENOUS DAILY
Status: DISCONTINUED | OUTPATIENT
Start: 2019-08-07 | End: 2019-08-08 | Stop reason: HOSPADM

## 2019-08-07 RX ORDER — PROMETHAZINE HYDROCHLORIDE 12.5 MG/1
12.5 TABLET ORAL EVERY 6 HOURS PRN
Status: DISCONTINUED | OUTPATIENT
Start: 2019-08-07 | End: 2019-08-08 | Stop reason: HOSPADM

## 2019-08-07 RX ORDER — POTASSIUM CHLORIDE 750 MG/1
10 CAPSULE, EXTENDED RELEASE ORAL 2 TIMES DAILY
Status: DISCONTINUED | OUTPATIENT
Start: 2019-08-07 | End: 2019-08-08 | Stop reason: HOSPADM

## 2019-08-07 RX ORDER — ACETAMINOPHEN 325 MG/1
650 TABLET ORAL EVERY 4 HOURS PRN
Status: DISCONTINUED | OUTPATIENT
Start: 2019-08-07 | End: 2019-08-08 | Stop reason: HOSPADM

## 2019-08-07 RX ORDER — SODIUM CHLORIDE 0.9 % (FLUSH) 0.9 %
10 SYRINGE (ML) INJECTION
Status: DISCONTINUED | OUTPATIENT
Start: 2019-08-07 | End: 2019-08-08 | Stop reason: HOSPADM

## 2019-08-07 RX ORDER — ISOSORBIDE DINITRATE 10 MG/1
10 TABLET ORAL 3 TIMES DAILY
Status: DISCONTINUED | OUTPATIENT
Start: 2019-08-07 | End: 2019-08-08 | Stop reason: HOSPADM

## 2019-08-07 RX ORDER — POTASSIUM CHLORIDE 20 MEQ/1
40 TABLET, EXTENDED RELEASE ORAL ONCE
Status: DISCONTINUED | OUTPATIENT
Start: 2019-08-07 | End: 2019-08-07 | Stop reason: CLARIF

## 2019-08-07 RX ORDER — PROMETHAZINE HYDROCHLORIDE 25 MG/1
25 TABLET ORAL 2 TIMES DAILY PRN
Status: DISCONTINUED | OUTPATIENT
Start: 2019-08-07 | End: 2019-08-08 | Stop reason: HOSPADM

## 2019-08-07 RX ORDER — HYDROXYCHLOROQUINE SULFATE 200 MG/1
200 TABLET, FILM COATED ORAL DAILY
Status: DISCONTINUED | OUTPATIENT
Start: 2019-08-07 | End: 2019-08-08 | Stop reason: HOSPADM

## 2019-08-07 RX ORDER — SERTRALINE HYDROCHLORIDE 50 MG/1
50 TABLET, FILM COATED ORAL DAILY
Status: DISCONTINUED | OUTPATIENT
Start: 2019-08-07 | End: 2019-08-08 | Stop reason: HOSPADM

## 2019-08-07 RX ORDER — METOLAZONE 2.5 MG/1
2.5 TABLET ORAL
Status: DISCONTINUED | OUTPATIENT
Start: 2019-08-07 | End: 2019-08-08 | Stop reason: HOSPADM

## 2019-08-07 RX ADMIN — METOPROLOL SUCCINATE 12.5 MG: 25 TABLET, EXTENDED RELEASE ORAL at 08:08

## 2019-08-07 RX ADMIN — NITROGLYCERIN 0.5 INCH: 20 OINTMENT TOPICAL at 02:08

## 2019-08-07 RX ADMIN — POTASSIUM CHLORIDE 40 MEQ: 20 TABLET, EXTENDED RELEASE ORAL at 08:08

## 2019-08-07 RX ADMIN — MAGNESIUM OXIDE 400 MG: 400 TABLET ORAL at 08:08

## 2019-08-07 RX ADMIN — FUROSEMIDE 40 MG: 10 INJECTION, SOLUTION INTRAMUSCULAR; INTRAVENOUS at 08:08

## 2019-08-07 RX ADMIN — PANTOPRAZOLE SODIUM 40 MG: 40 TABLET, DELAYED RELEASE ORAL at 08:08

## 2019-08-07 RX ADMIN — SACUBITRIL AND VALSARTAN 2 TABLET: 49; 51 TABLET, FILM COATED ORAL at 09:08

## 2019-08-07 RX ADMIN — MAGNESIUM OXIDE 400 MG: 400 TABLET ORAL at 01:08

## 2019-08-07 RX ADMIN — MAGNESIUM OXIDE 400 MG: 400 TABLET ORAL at 09:08

## 2019-08-07 RX ADMIN — FUROSEMIDE 40 MG: 10 INJECTION, SOLUTION INTRAMUSCULAR; INTRAVENOUS at 09:08

## 2019-08-07 RX ADMIN — ISOSORBIDE DINITRATE 10 MG: 10 TABLET ORAL at 04:08

## 2019-08-07 RX ADMIN — POTASSIUM CHLORIDE 10 MEQ: 750 CAPSULE, EXTENDED RELEASE ORAL at 09:08

## 2019-08-07 RX ADMIN — GABAPENTIN 100 MG: 100 CAPSULE ORAL at 12:08

## 2019-08-07 RX ADMIN — SERTRALINE HYDROCHLORIDE 50 MG: 50 TABLET ORAL at 09:08

## 2019-08-07 RX ADMIN — PANTOPRAZOLE SODIUM 40 MG: 40 TABLET, DELAYED RELEASE ORAL at 09:08

## 2019-08-07 RX ADMIN — ISOSORBIDE DINITRATE 10 MG: 10 TABLET ORAL at 08:08

## 2019-08-07 RX ADMIN — ENOXAPARIN SODIUM 30 MG: 100 INJECTION SUBCUTANEOUS at 06:08

## 2019-08-07 RX ADMIN — METOLAZONE 2.5 MG: 2.5 TABLET ORAL at 06:08

## 2019-08-07 RX ADMIN — HYDROXYCHLOROQUINE SULFATE 200 MG: 200 TABLET, FILM COATED ORAL at 09:08

## 2019-08-07 RX ADMIN — GABAPENTIN 100 MG: 100 CAPSULE ORAL at 08:08

## 2019-08-07 RX ADMIN — POTASSIUM CHLORIDE 40 MEQ: 20 TABLET, EXTENDED RELEASE ORAL at 11:08

## 2019-08-07 RX ADMIN — AMLODIPINE BESYLATE 5 MG: 5 TABLET ORAL at 09:08

## 2019-08-07 RX ADMIN — ASPIRIN 81 MG: 81 TABLET, COATED ORAL at 12:08

## 2019-08-07 RX ADMIN — ISOSORBIDE DINITRATE 10 MG: 10 TABLET ORAL at 09:08

## 2019-08-07 RX ADMIN — POLYETHYLENE GLYCOL 3350 17 G: 17 POWDER, FOR SOLUTION ORAL at 09:08

## 2019-08-07 NOTE — ASSESSMENT & PLAN NOTE
Known CHF  BNP 1927(h)  Received Furosamide 40 mg IV in the ER at 1700,   Consulted Dr Blair, recommended monitoring overnight, Advised to give 2nd dose at 8am tomorrow as pt is CKD 3-4   Repeat BNP in am  NTG 1/2 inch paste x 1  I/O monitoring  Cardiac monitoring  EKG in am as well  No need for ECHO as pt recently had Echo with Dr Blair

## 2019-08-07 NOTE — HPI
The patient is an 83-year-old female with history of coronary artery disease, chronic systolic congestive heart failure an ejection fraction of approximately 20% and an apical aneurysm.  Approximately 4 days ago she started feeling weak.  Approximately 2 days ago she went to charge and then went to the pharmacy and she thought she was going to pass out.  When she got home she had an episode of watery diarrhea.  Monday morning she will call point she had dry heaves.  Yesterday she continues to have the same symptoms of generalized weakness fatigue and shortness of breath.  She recently had a colonoscopy done in the early part of July she had 2 polyps removed

## 2019-08-07 NOTE — SUBJECTIVE & OBJECTIVE
Past Medical History:   Diagnosis Date    Abdominal hernia     Anemia     Dr Berkowitz     Aortic valve stenosis, moderate     Cannon's esophagus     Cataract     ou done    CHF (congestive heart failure)     EFx 35%, Dr. Spencer 13    Colitis     Compression fracture     Diverticulosis     Encounter for blood transfusion     GERD (gastroesophageal reflux disease)     Hyperlipidemia     Hypertension     Irritable bowel syndrome     Occlusive coronary artery disease     Osteoarthritis     lumbar DDD    Pneumonia 2017    Rheumatoid arteritis     Rheumatoid arthritis     Shingles 2017    Sjogren syndrome     Ulcerative colitis        Past Surgical History:   Procedure Laterality Date    APPENDECTOMY      BACK SURGERY      CARDIAC SURGERY      CABGx3 in     CATARACT EXTRACTION      ou od d 11-15-12//     SECTION, CLASSIC      x 2    CHOLECYSTECTOMY      COLONOSCOPY  09/15/2011    Dr Anaya     COLONOSCOPY  01/10/2017    [facility] report sent to scanning    CORONARY ANGIOPLASTY      PCI x 1 in     CORONARY ARTERY BYPASS GRAFT      3 vessel    CORONARY ARTERY BYPASS GRAFT      EXTRACTION, CATARACT, WITH IOL INSERTION Right 11/15/2012    [facility]    EXTRACTION, CATARACT, WITH IOL INSERTION Left 2012    [facility]    eyes      rk ou    FRACTURE SURGERY  2016    Dr Guido left hip     FRACTURE SURGERY  2018    Right Hip    HYSTERECTOMY      tubal ligation, oopherectomy    INSERTION, INTRAMEDULLARY KELSI, FEMUR, TROCHANTER Right 10/8/2018    [facility] OR    OOPHORECTOMY      SPINE SURGERY  2013    Silvino Mckeon    UPPER GASTROINTESTINAL ENDOSCOPY      Yag Capsulotomy Right 14       Review of patient's allergies indicates:   Allergen Reactions    Macrodantin  [nitrofurantoin macrocrystalline]      Other reaction(s): very sickly     Penicillins      Other reaction(s): swelling  Other reaction(s): red skin discolorati    Sulfa (sulfonamide antibiotics)      Other reaction(s): very sickly       No current facility-administered medications on file prior to encounter.      Current Outpatient Medications on File Prior to Encounter   Medication Sig    amLODIPine (NORVASC) 5 MG tablet Take 5 mg by mouth once daily.     aspirin 81 mg Tab Take 1 tablet by mouth every evening. Every day    biotin 5 mg Cap Take 1 tablet by mouth 2 (two) times daily.    CALCIUM CARB/VIT D3/MINERALS (CALCIUM-VITAMIN D ORAL) Take 600 mg by mouth 2 (two) times daily. Calcium 1200 with D 3 1000    coenzyme Q10 100 mg capsule Take 100 mg by mouth once daily.    CRANBERRY CONC/ASCORBIC ACID (CRANBERRY PLUS VITAMIN C ORAL) Take 1 capsule by mouth once daily.    DOCUSATE CALCIUM (STOOL SOFTENER ORAL) Take 200 mg by mouth every evening.     ENTRESTO  mg per tablet Take 1 tablet by mouth 2 (two) times daily.     FERROUS FUMARATE/VIT BCOMP,C (SUPER B COMPLEX ORAL) Take 1 tablet by mouth once daily.    ferrous gluconate (FERGON) 324 MG tablet Take 324 mg by mouth 2 (two) times daily. At noon and in the evening    fluticasone (FLONASE) 50 mcg/actuation nasal spray 2 sprays by Each Nare route once daily. (Patient taking differently: 2 sprays by Each Nare route daily as needed. )    FUROSEMIDE ORAL furosemide 60 mg tablet   Take 1 tablet every day by oral route.    gabapentin (NEURONTIN) 100 MG capsule Take 1 capsule (100 mg total) by mouth every evening.    hydroxychloroquine (PLAQUENIL) 200 mg tablet Take 1 tablet (200 mg total) by mouth once daily.    isosorbide dinitrate (ISORDIL) 10 MG tablet Take 10 mg by mouth 3 (three) times daily.    krill-omega-3-dha-epa-lipids (KRILL OIL) 906-20-74-50 mg Cap Take 1,000 mg by mouth once daily. Braxton krill 300mg qd     lactobacillus acidophilus (PROBIOTIC) 10 billion cell Cap Take 1 each by mouth once daily. 1.5  billion    magnesium oxide (MAG-OX) 400 mg tablet Take 400 mg by mouth 2 (two) times daily.    metOLazone (ZAROXOLYN) 2.5 MG tablet Take 2.5 mg by mouth every Mon, Wed, Fri.    metoprolol succinate (TOPROL-XL) 25 MG 24 hr tablet take ONE-HALF tab BY MOUTH EVERY DAY    MULTIVITAMIN WITH MINERALS (ONE-A-DAY 50 PLUS) Tab Take 1 tablet by mouth once daily. Every day    nitroGLYCERIN (NITROLINGUAL) 0.4 mg/dose spray Place 1 spray under the tongue every 5 (five) minutes as needed. PRN    pantoprazole (PROTONIX) 40 MG tablet Take 40 mg by mouth 2 (two) times daily.    potassium chloride SA (K-DUR,KLOR-CON) 10 MEQ tablet Take 10 mEq by mouth 2 (two) times daily.     promethazine (PHENERGAN) 25 MG tablet Take 1 tablet (25 mg total) by mouth 2 (two) times daily as needed for Nausea.    sertraline (ZOLOFT) 50 MG tablet TAKE 1 TABLET (50 MG TOTAL) BY MOUTH ONCE DAILY.    traMADol (ULTRAM) 50 mg tablet 1-2 po bid prn    vitamin E 400 unit Tab Take 400 mg by mouth once daily. Every day    [DISCONTINUED] hydrochlorothiazide (HYDRODIURIL) 25 MG tablet Take 25 mg by mouth once daily.     Family History     Problem Relation (Age of Onset)    Arthritis Brother    Crohn's disease Brother, Brother, Brother    Early death Son    Fibromyalgia Sister    Heart disease Father, Mother, Brother    Hypertension Father, Sister, Daughter    Irritable bowel syndrome Sister    Lupus Cousin, Cousin    Pulmonary embolism Sister    Scleroderma Sister    Skin cancer Mother        Tobacco Use    Smoking status: Former Smoker     Packs/day: 1.00     Years: 5.00     Pack years: 5.00     Last attempt to quit: 1971     Years since quittin.6    Smokeless tobacco: Never Used   Substance and Sexual Activity    Alcohol use: No    Drug use: No    Sexual activity: Not on file     Review of Systems   Constitutional: Positive for activity change (in the last 5 days due to SOB and weakness). Negative for chills, fatigue and fever.   HENT:  Negative for mouth sores, sore throat and trouble swallowing.    Eyes: Negative for pain, discharge and visual disturbance.   Respiratory: Positive for shortness of breath (progessively getting worse x 5 days ). Negative for chest tightness and wheezing.    Cardiovascular: Positive for leg swelling (off and on, not currently). Negative for chest pain and palpitations.   Gastrointestinal: Negative for abdominal pain, constipation, diarrhea, nausea and vomiting.   Endocrine: Negative for cold intolerance and heat intolerance.   Genitourinary: Negative for difficulty urinating and menstrual problem.   Musculoskeletal: Positive for back pain (multiple vertebral surgeries for compression fractures ). Negative for arthralgias and gait problem.   Skin: Negative for rash and wound.        Easy bruising    Allergic/Immunologic: Negative for environmental allergies and food allergies.   Neurological: Positive for weakness (with SOB last 5 days ). Negative for dizziness and speech difficulty.   Hematological: Bruises/bleeds easily.   Psychiatric/Behavioral: Negative for agitation, behavioral problems and suicidal ideas.     Objective:     Vital Signs (Most Recent):  Temp: 98.2 °F (36.8 °C) (08/06/19 1457)  Pulse: (!) 52 (08/06/19 1700)  Resp: (!) 22 (08/06/19 1700)  BP: (!) 114/55 (08/06/19 1700)  SpO2: (!) 94 % (08/06/19 1700) Vital Signs (24h Range):  Temp:  [98.2 °F (36.8 °C)] 98.2 °F (36.8 °C)  Pulse:  [52-58] 52  Resp:  [18-31] 22  SpO2:  [94 %-100 %] 94 %  BP: (114-157)/(55-87) 114/55     Weight: 44.5 kg (98 lb)  Body mass index is 29.91 kg/m².    Physical Exam   Constitutional: She is oriented to person, place, and time. She appears well-developed and well-nourished. No distress.   Good historian    HENT:   Head: Normocephalic and atraumatic.   Eyes: Pupils are equal, round, and reactive to light. Conjunctivae and EOM are normal.   Neck: Normal range of motion. Neck supple.   Cardiovascular: Normal rate, regular rhythm  and intact distal pulses. Exam reveals no gallop and no friction rub.   Murmur (2/6 ystolic murmur ) heard.  Pulmonary/Chest: Effort normal and breath sounds normal.   Fine basal crackles    Abdominal: Soft. Bowel sounds are normal.   Musculoskeletal: Normal range of motion. She exhibits no edema.   Neurological: She is alert and oriented to person, place, and time.   Skin: Skin is warm and dry. No rash noted.   Multiple ecchymoses B.L forearms, right dorsum of the foot    Psychiatric: She has a normal mood and affect.   Nursing note and vitals reviewed.        CRANIAL NERVES     CN III, IV, VI   Pupils are equal, round, and reactive to light.  Extraocular motions are normal.        Significant Labs: All pertinent labs within the past 24 hours have been reviewed.    Significant Imaging: I have reviewed all pertinent imaging results/findings within the past 24 hours.

## 2019-08-07 NOTE — HPI
82 y/o White female with known hx of CHF, presented to ER with 5 days complaint of SOB and weakness. Pt states the SOB got progressively worse with weakness yesterday that she thought she may pass out, but she didn't. Pt states she is very compliant with her meds and also with her salt intake as she knows it exacerbates her CHF. Pt denies recent ankle or leg swelling, though reports she get ankle swelling from time to time.   States she recently had ECHO done with Dr Blair. Pt knows all her meds.   Known Hx of HTN, HLP (not on statins anymore), CAD with CABG x 3 and PCI x1 and RA   Pt denies CP

## 2019-08-07 NOTE — PLAN OF CARE
08/07/19 1407   Discharge Assessment   Assessment Type Discharge Planning Assessment   Confirmed/corrected address and phone number on facesheet? Yes   Assessment information obtained from? Patient;Medical Record   Communicated expected length of stay with patient/caregiver yes   Prior to hospitilization cognitive status: Alert/Oriented   Prior to hospitalization functional status: Assistive Equipment   Facility Arrived From: Home   Lives With child(billy), adult   Able to Return to Prior Arrangements yes   Is patient able to care for self after discharge? Yes   Who are your caregiver(s) and their phone number(s)? Pt is  and has one living adult child.  She has not addressed advance directives and lists Lizabeth Ivory, sister ([phone]) and Brandi Falcon, daughter ([phone]) as her discharge advocates if needed.   Patient's perception of discharge disposition home or selfcare   Readmission Within the Last 30 Days no previous admission in last 30 days   Patient currently being followed by outpatient case management? No   Patient currently receives any other outside agency services? No   Equipment Currently Used at Home walker, rolling;cane, straight;raised toilet;shower chair   Do you have any problems affording any of your prescribed medications? No   Is the patient taking medications as prescribed? yes   Does the patient have transportation home? Yes   Transportation Anticipated family or friend will provide   Does the patient receive services at the [facility]? No   Discharge Plan A Home  (Pt refusing HH at this time and reports she will follow up with outpatient services after discharge.  )   DME Needed Upon Discharge  none   Patient/Family in Agreement with Plan yes

## 2019-08-07 NOTE — H&P
[facility]  History & Physical    Patient Name: Cheyanne Gomes  MRN: [id number]  Admission Date: 8/6/2019  Attending Physician: Quincy Liu MD   Primary Care Provider: Romeo Alas MD         Patient information was obtained from ER, Patient and her daughter .     Subjective:     Principal Problem:Acute exacerbation of CHF (congestive heart failure)    Chief Complaint:   Chief Complaint   Patient presents with    Shortness of Breath     ONSET FRIDAY    Fatigue        HPI: 82 y/o White female with known hx of CHF, presented to ER with 5 days complaint of SOB and weakness. Pt states the SOB got progressively worse with weakness yesterday that she thought she may pass out, but she didn't. Pt states she is very compliant with her meds and also with her salt intake as she knows it exacerbates her CHF. Pt denies recent ankle or leg swelling, though reports she get ankle swelling from time to time.   States she recently had ECHO done with Dr Blair. Pt knows all her meds.   Known Hx of HTN, HLP (not on statins anymore), CAD with CABG x 3 and PCI x1 and RA   Pt denies CP    Past Medical History:   Diagnosis Date    Abdominal hernia     Anemia     Dr Berkowitz     Aortic valve stenosis, moderate     Cannon's esophagus     Cataract     ou done    CHF (congestive heart failure)     EFx 35%, Dr. Spencer 12/19/13    Colitis     Compression fracture     Diverticulosis     Encounter for blood transfusion     GERD (gastroesophageal reflux disease)     Hyperlipidemia     Hypertension     Irritable bowel syndrome     Occlusive coronary artery disease     Osteoarthritis     lumbar DDD    Pneumonia 01/03/2017    Rheumatoid arteritis     Rheumatoid arthritis     Shingles 01/03/2017    Sjogren syndrome     Ulcerative colitis        Past Surgical History:   Procedure Laterality Date    APPENDECTOMY      BACK SURGERY      CARDIAC SURGERY      CABGx3 in 2000    CATARACT  EXTRACTION      ou od d 11-15-12/     SECTION, CLASSIC      x 2    CHOLECYSTECTOMY      COLONOSCOPY  09/15/2011    Dr Anaya     COLONOSCOPY  01/10/2017    [facility] report sent to scanning    CORONARY ANGIOPLASTY      PCI x 1 in     CORONARY ARTERY BYPASS GRAFT      3 vessel    CORONARY ARTERY BYPASS GRAFT      EXTRACTION, CATARACT, WITH IOL INSERTION Right 11/15/2012    [facility]    EXTRACTION, CATARACT, WITH IOL INSERTION Left 2012    [facility] at [facility]    eyes      rk ou    FRACTURE SURGERY  2016    Dr Guido left hip     FRACTURE SURGERY  2018    Right Hip    HYSTERECTOMY      tubal ligation, oopherectomy    INSERTION, INTRAMEDULLARY KELSI, FEMUR, TROCHANTER Right 10/8/2018    [facility]    OOPHORECTOMY      SPINE SURGERY  2013    Silvino Mckeon    UPPER GASTROINTESTINAL ENDOSCOPY      Yag Capsulotomy Right 14       Review of patient's allergies indicates:   Allergen Reactions    Macrodantin  [nitrofurantoin macrocrystalline]      Other reaction(s): very sickly    Penicillins      Other reaction(s): swelling  Other reaction(s): red skin discolorati    Sulfa (sulfonamide antibiotics)      Other reaction(s): very sickly       No current facility-administered medications on file prior to encounter.      Current Outpatient Medications on File Prior to Encounter   Medication Sig    amLODIPine (NORVASC) 5 MG tablet Take 5 mg by mouth once daily.     aspirin 81 mg Tab Take 1 tablet by mouth every evening. Every day    biotin 5 mg Cap Take 1 tablet by mouth 2 (two) times daily.    CALCIUM CARB/VIT D3/MINERALS (CALCIUM-VITAMIN D ORAL) Take 600 mg by mouth 2 (two) times daily. Calcium 1200 with D 3 1000    coenzyme Q10 100 mg capsule Take 100 mg by mouth once daily.    CRANBERRY CONC/ASCORBIC ACID (CRANBERRY PLUS VITAMIN C ORAL) Take 1 capsule by mouth once daily.     DOCUSATE CALCIUM (STOOL SOFTENER ORAL) Take 200 mg by mouth every evening.     ENTRESTO  mg per tablet Take 1 tablet by mouth 2 (two) times daily.     FERROUS FUMARATE/VIT BCOMP,C (SUPER B COMPLEX ORAL) Take 1 tablet by mouth once daily.    ferrous gluconate (FERGON) 324 MG tablet Take 324 mg by mouth 2 (two) times daily. At noon and in the evening    fluticasone (FLONASE) 50 mcg/actuation nasal spray 2 sprays by Each Nare route once daily. (Patient taking differently: 2 sprays by Each Nare route daily as needed. )    FUROSEMIDE ORAL furosemide 60 mg tablet   Take 1 tablet every day by oral route.    gabapentin (NEURONTIN) 100 MG capsule Take 1 capsule (100 mg total) by mouth every evening.    hydroxychloroquine (PLAQUENIL) 200 mg tablet Take 1 tablet (200 mg total) by mouth once daily.    isosorbide dinitrate (ISORDIL) 10 MG tablet Take 10 mg by mouth 3 (three) times daily.    krill-omega-3-dha-epa-lipids (KRILL OIL) 812-48-24-50 mg Cap Take 1,000 mg by mouth once daily. Braxton krill 300mg qd     lactobacillus acidophilus (PROBIOTIC) 10 billion cell Cap Take 1 each by mouth once daily. 1.5 billion    magnesium oxide (MAG-OX) 400 mg tablet Take 400 mg by mouth 2 (two) times daily.    metOLazone (ZAROXOLYN) 2.5 MG tablet Take 2.5 mg by mouth every Mon, Wed, Fri.    metoprolol succinate (TOPROL-XL) 25 MG 24 hr tablet take ONE-HALF tab BY MOUTH EVERY DAY    MULTIVITAMIN WITH MINERALS (ONE-A-DAY 50 PLUS) Tab Take 1 tablet by mouth once daily. Every day    nitroGLYCERIN (NITROLINGUAL) 0.4 mg/dose spray Place 1 spray under the tongue every 5 (five) minutes as needed. PRN    pantoprazole (PROTONIX) 40 MG tablet Take 40 mg by mouth 2 (two) times daily.    potassium chloride SA (K-DUR,KLOR-CON) 10 MEQ tablet Take 10 mEq by mouth 2 (two) times daily.     promethazine (PHENERGAN) 25 MG tablet Take 1 tablet (25 mg total) by mouth 2 (two) times daily as needed for Nausea.    sertraline (ZOLOFT) 50 MG  tablet TAKE 1 TABLET (50 MG TOTAL) BY MOUTH ONCE DAILY.    traMADol (ULTRAM) 50 mg tablet 1-2 po bid prn    vitamin E 400 unit Tab Take 400 mg by mouth once daily. Every day    [DISCONTINUED] hydrochlorothiazide (HYDRODIURIL) 25 MG tablet Take 25 mg by mouth once daily.     Family History     Problem Relation (Age of Onset)    Arthritis Brother    Crohn's disease Brother, Brother, Brother    Early death Son    Fibromyalgia Sister    Heart disease Father, Mother, Brother    Hypertension Father, Sister, Daughter    Irritable bowel syndrome Sister    Lupus Cousin, Cousin    Pulmonary embolism Sister    Scleroderma Sister    Skin cancer Mother        Tobacco Use    Smoking status: Former Smoker     Packs/day: 1.00     Years: 5.00     Pack years: 5.00     Last attempt to quit: 1971     Years since quittin.6    Smokeless tobacco: Never Used   Substance and Sexual Activity    Alcohol use: No    Drug use: No    Sexual activity: Not on file     Review of Systems   Constitutional: Positive for activity change (in the last 5 days due to SOB and weakness). Negative for chills, fatigue and fever.   HENT: Negative for mouth sores, sore throat and trouble swallowing.    Eyes: Negative for pain, discharge and visual disturbance.   Respiratory: Positive for shortness of breath (progessively getting worse x 5 days ). Negative for chest tightness and wheezing.    Cardiovascular: Positive for leg swelling (off and on, not currently). Negative for chest pain and palpitations.   Gastrointestinal: Negative for abdominal pain, constipation, diarrhea, nausea and vomiting.   Endocrine: Negative for cold intolerance and heat intolerance.   Genitourinary: Negative for difficulty urinating and menstrual problem.   Musculoskeletal: Positive for back pain (multiple vertebral surgeries for compression fractures ). Negative for arthralgias and gait problem.   Skin: Negative for rash and wound.        Easy bruising     Allergic/Immunologic: Negative for environmental allergies and food allergies.   Neurological: Positive for weakness (with SOB last 5 days ). Negative for dizziness and speech difficulty.   Hematological: Bruises/bleeds easily.   Psychiatric/Behavioral: Negative for agitation, behavioral problems and suicidal ideas.     Objective:     Vital Signs (Most Recent):  Temp: 98.2 °F (36.8 °C) (08/06/19 1457)  Pulse: (!) 52 (08/06/19 1700)  Resp: (!) 22 (08/06/19 1700)  BP: (!) 114/55 (08/06/19 1700)  SpO2: (!) 94 % (08/06/19 1700) Vital Signs (24h Range):  Temp:  [98.2 °F (36.8 °C)] 98.2 °F (36.8 °C)  Pulse:  [52-58] 52  Resp:  [18-31] 22  SpO2:  [94 %-100 %] 94 %  BP: (114-157)/(55-87) 114/55     Weight: 44.5 kg (98 lb)  Body mass index is 29.91 kg/m².    Physical Exam   Constitutional: She is oriented to person, place, and time. She appears well-developed and well-nourished. No distress.   Good historian    HENT:   Head: Normocephalic and atraumatic.   Eyes: Pupils are equal, round, and reactive to light. Conjunctivae and EOM are normal.   Neck: Normal range of motion. Neck supple.   Cardiovascular: Normal rate, regular rhythm and intact distal pulses. Exam reveals no gallop and no friction rub.   Murmur (2/6 ystolic murmur ) heard.  Pulmonary/Chest: Effort normal and breath sounds normal.   Fine basal crackles    Abdominal: Soft. Bowel sounds are normal.   Musculoskeletal: Normal range of motion. She exhibits no edema.   Neurological: She is alert and oriented to person, place, and time.   Skin: Skin is warm and dry. No rash noted.   Multiple ecchymoses B.L forearms, right dorsum of the foot    Psychiatric: She has a normal mood and affect.   Nursing note and vitals reviewed.        CRANIAL NERVES     CN III, IV, VI   Pupils are equal, round, and reactive to light.  Extraocular motions are normal.        Significant Labs: All pertinent labs within the past 24 hours have been reviewed.    Significant Imaging: I have  reviewed all pertinent imaging results/findings within the past 24 hours.    Assessment/Plan:     * Acute exacerbation of CHF (congestive heart failure)  Known CHF  BNP 1927(h)  Received Furosamide 40 mg IV in the ER at 1700,   Consulted Dr Blair, recommended monitoring overnight, Advised to give 2nd dose at 8am tomorrow as pt is CKD 3-4   Repeat BNP in am  NTG 1/2 inch paste x 1  I/O monitoring  Cardiac monitoring  EKG in am as well  No need for ECHO as pt recently had Echo with Dr Blair       Hypertension  Cont home meds  NTG 1/2 inch paste topical per Dr Blair   Repeat CMP in am with Mag and Phos       CKD (chronic kidney disease), stage IV  Chronic  Will continue monitoring  BMP in am       DVT Prophylaxis:  Lovenox renal dose       Patient Active Problem List   Diagnosis    Chronic anticoagulation    Sjogren's syndrome    High risk medication use    Choroidal nevus - Right Eye    History of non-cataract eye surgery    Rheumatoid arthritis    Hypokalemia    Hypophosphatemia    Hypomagnesemia    Hyponatremia    Hypertension    Intravascular volume depletion    GERD (gastroesophageal reflux disease)    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    Acute exacerbation of CHF (congestive heart failure)    Aortic valve stenosis, moderate    Occlusive coronary artery disease    Ischemic colitis    Hyperlipidemia    Anemia of chronic disease    Displaced comminuted fracture of shaft of right femur, initial encounter for closed fracture    Iron deficiency anemia due to chronic blood loss    CKD (chronic kidney disease), stage IV       VTE Risk Mitigation (From admission, onward)    None             Quincy Liu MD  Department of Hospital Medicine   [facility]

## 2019-08-07 NOTE — SUBJECTIVE & OBJECTIVE
Past Medical History:   Diagnosis Date    Abdominal hernia     Anemia     Dr Berkowitz     Aortic valve stenosis, moderate     Cannon's esophagus     Cataract     ou done    CHF (congestive heart failure)     EFx 35%, Dr. Spencer 13    Colitis     Compression fracture     Diverticulosis     Encounter for blood transfusion     GERD (gastroesophageal reflux disease)     Hyperlipidemia     Hypertension     Irritable bowel syndrome     Occlusive coronary artery disease     Osteoarthritis     lumbar DDD    Pneumonia 2017    Rheumatoid arteritis     Rheumatoid arthritis     Shingles 2017    Sjogren syndrome     Ulcerative colitis        Past Surgical History:   Procedure Laterality Date    APPENDECTOMY      BACK SURGERY      CARDIAC SURGERY      CABGx3 in     CATARACT EXTRACTION      ou od d 11-15-12//     SECTION, CLASSIC      x 2    CHOLECYSTECTOMY      COLONOSCOPY  09/15/2011    Dr Anaya     COLONOSCOPY  01/10/2017    [facility] scanning    CORONARY ANGIOPLASTY      PCI x 1 in     CORONARY ARTERY BYPASS GRAFT      3 vessel    CORONARY ARTERY BYPASS GRAFT      EXTRACTION, CATARACT, WITH IOL INSERTION Right 11/15/2012    [facility]    EXTRACTION, CATARACT, WITH IOL INSERTION Left 2012    [facility]    eyes      rk ou    FRACTURE SURGERY  2016    Dr Guido left hip     FRACTURE SURGERY  2018    Right Hip    HYSTERECTOMY      tubal ligation, oopherectomy    INSERTION, INTRAMEDULLARY KELSI, FEMUR, TROCHANTER Right 10/8/2018    [facility]    OOPHORECTOMY      SPINE SURGERY  2013    Silvino Mckeon    UPPER GASTROINTESTINAL ENDOSCOPY      Yag Capsulotomy Right 14       Review of patient's allergies indicates:   Allergen Reactions    Macrodantin  [nitrofurantoin macrocrystalline]      Other reaction(s): very sickly     Penicillins      Other reaction(s): swelling  Other reaction(s): red skin discolorati    Sulfa (sulfonamide antibiotics)      Other reaction(s): very sickly       No current facility-administered medications on file prior to encounter.      Current Outpatient Medications on File Prior to Encounter   Medication Sig    amLODIPine (NORVASC) 5 MG tablet Take 5 mg by mouth once daily.     aspirin 81 mg Tab Take 1 tablet by mouth every evening. Every day    biotin 5 mg Cap Take 1 tablet by mouth 2 (two) times daily.    CALCIUM CARB/VIT D3/MINERALS (CALCIUM-VITAMIN D ORAL) Take 600 mg by mouth 2 (two) times daily. Calcium 1200 with D 3 1000    coenzyme Q10 100 mg capsule Take 100 mg by mouth once daily.    CRANBERRY CONC/ASCORBIC ACID (CRANBERRY PLUS VITAMIN C ORAL) Take 1 capsule by mouth once daily.    DOCUSATE CALCIUM (STOOL SOFTENER ORAL) Take 200 mg by mouth every evening.     ENTRESTO  mg per tablet Take 1 tablet by mouth 2 (two) times daily.     FERROUS FUMARATE/VIT BCOMP,C (SUPER B COMPLEX ORAL) Take 1 tablet by mouth once daily.    ferrous gluconate (FERGON) 324 MG tablet Take 324 mg by mouth 2 (two) times daily. At noon and in the evening    fluticasone (FLONASE) 50 mcg/actuation nasal spray 2 sprays by Each Nare route once daily. (Patient taking differently: 2 sprays by Each Nare route daily as needed. )    FUROSEMIDE ORAL furosemide 60 mg tablet   Take 1 tablet every day by oral route.    gabapentin (NEURONTIN) 100 MG capsule Take 1 capsule (100 mg total) by mouth every evening.    hydroxychloroquine (PLAQUENIL) 200 mg tablet Take 1 tablet (200 mg total) by mouth once daily.    isosorbide dinitrate (ISORDIL) 10 MG tablet Take 10 mg by mouth 3 (three) times daily.    krill-omega-3-dha-epa-lipids (KRILL OIL) 779-78-68-50 mg Cap Take 1,000 mg by mouth once daily. Braxton krill 300mg qd     lactobacillus acidophilus (PROBIOTIC) 10 billion cell Cap Take 1 each by mouth once daily. 1.5  billion    magnesium oxide (MAG-OX) 400 mg tablet Take 400 mg by mouth 2 (two) times daily.    metOLazone (ZAROXOLYN) 2.5 MG tablet Take 2.5 mg by mouth every Mon, Wed, Fri.    metoprolol succinate (TOPROL-XL) 25 MG 24 hr tablet take ONE-HALF tab BY MOUTH EVERY DAY    MULTIVITAMIN WITH MINERALS (ONE-A-DAY 50 PLUS) Tab Take 1 tablet by mouth once daily. Every day    nitroGLYCERIN (NITROLINGUAL) 0.4 mg/dose spray Place 1 spray under the tongue every 5 (five) minutes as needed. PRN    pantoprazole (PROTONIX) 40 MG tablet Take 40 mg by mouth 2 (two) times daily.    potassium chloride SA (K-DUR,KLOR-CON) 10 MEQ tablet Take 10 mEq by mouth 2 (two) times daily.     promethazine (PHENERGAN) 25 MG tablet Take 1 tablet (25 mg total) by mouth 2 (two) times daily as needed for Nausea.    sertraline (ZOLOFT) 50 MG tablet TAKE 1 TABLET (50 MG TOTAL) BY MOUTH ONCE DAILY.    traMADol (ULTRAM) 50 mg tablet 1-2 po bid prn    vitamin E 400 unit Tab Take 400 mg by mouth once daily. Every day    [DISCONTINUED] hydrochlorothiazide (HYDRODIURIL) 25 MG tablet Take 25 mg by mouth once daily.     Family History     Problem Relation (Age of Onset)    Arthritis Brother    Crohn's disease Brother, Brother, Brother    Early death Son    Fibromyalgia Sister    Heart disease Father, Mother, Brother    Hypertension Father, Sister, Daughter    Irritable bowel syndrome Sister    Lupus Cousin, Cousin    Pulmonary embolism Sister    Scleroderma Sister    Skin cancer Mother        Tobacco Use    Smoking status: Former Smoker     Packs/day: 1.00     Years: 5.00     Pack years: 5.00     Last attempt to quit: 1971     Years since quittin.6    Smokeless tobacco: Never Used   Substance and Sexual Activity    Alcohol use: Yes     Alcohol/week: 0.6 oz     Types: 1 Glasses of wine per week    Drug use: Yes     Frequency: 1.0 times per week    Sexual activity: Not Currently     Partners: Male     Review of Systems   Constitution:  Positive for malaise/fatigue and weight gain. Negative for fever.   Cardiovascular: Positive for dyspnea on exertion and paroxysmal nocturnal dyspnea. Negative for chest pain and leg swelling.   Respiratory: Positive for shortness of breath. Negative for cough.    Endocrine: Positive for heat intolerance.   Skin: Negative.  Negative for flushing and rash.   Musculoskeletal: Positive for muscle cramps. Negative for back pain.   Gastrointestinal: Positive for diarrhea and nausea. Negative for abdominal pain and constipation.   Genitourinary: Negative for dysuria and frequency.   Neurological: Positive for dizziness and weakness.   Psychiatric/Behavioral: Negative.  Negative for altered mental status and hallucinations.     Objective:     Vital Signs (Most Recent):  Temp: 98.3 °F (36.8 °C) (08/07/19 1122)  Pulse: (!) 57 (08/07/19 1122)  Resp: 20 (08/07/19 1122)  BP: 127/62 (08/07/19 1122)  SpO2: 96 % (08/07/19 1122) Vital Signs (24h Range):  Temp:  [98 °F (36.7 °C)-98.8 °F (37.1 °C)] 98.3 °F (36.8 °C)  Pulse:  [49-88] 57  Resp:  [14-31] 20  SpO2:  [94 %-100 %] 96 %  BP: (112-157)/(54-87) 127/62     Weight: 42 kg (92 lb 9.5 oz)  Body mass index is 27.11 kg/m².    SpO2: 96 %  O2 Device (Oxygen Therapy): nasal cannula      Intake/Output Summary (Last 24 hours) at 8/7/2019 1149  Last data filed at 8/7/2019 1122  Gross per 24 hour   Intake 360 ml   Output 500 ml   Net -140 ml       Lines/Drains/Airways     Peripheral Intravenous Line                 Peripheral IV - Single Lumen 08/06/19 1603 20 G Left Antecubital less than 1 day                Physical Exam   Constitutional: She is oriented to person, place, and time. She appears well-developed and well-nourished.   HENT:   Head: Normocephalic and atraumatic.   Eyes: Pupils are equal, round, and reactive to light. Conjunctivae and EOM are normal.   Cardiovascular: Normal rate and regular rhythm.   Murmur (Grade 3/6 systolic murmur at the base) heard.  Pulmonary/Chest:  Effort normal. She has rales (Bilateral basal crackles).   Abdominal: Soft. Bowel sounds are normal.   Musculoskeletal: Normal range of motion. She exhibits no edema.   Neurological: She is alert and oriented to person, place, and time.   Skin: Skin is warm and dry.   Psychiatric: She has a normal mood and affect. Her behavior is normal. Judgment and thought content normal.   Nursing note and vitals reviewed.      Significant Labs:   BMP:   Recent Labs   Lab 08/06/19  1515 08/07/19  0430   * 94    136   K 3.9 3.0*   CL 91* 91*   CO2 35* 35*   BUN 42* 39*   CREATININE 1.8* 1.5*   CALCIUM 10.2 9.4   MG  --  2.0   , CMP   Recent Labs   Lab 08/06/19  1515 08/07/19  0430    136   K 3.9 3.0*   CL 91* 91*   CO2 35* 35*   * 94   BUN 42* 39*   CREATININE 1.8* 1.5*   CALCIUM 10.2 9.4   PROT 7.1  --    ALBUMIN 4.4  --    BILITOT 0.7  --    ALKPHOS 66  --    AST 45*  --    ALT 25  --    ANIONGAP 10 10   ESTGFRAFRICA 29.6* 36.9*   EGFRNONAA 25.7* 32.0*    and CBC   Recent Labs   Lab 08/06/19  1515 08/07/19  0430   WBC 5.19 4.53   HGB 10.7* 10.6*   HCT 33.1* 32.9*    141*       Significant Imaging: X-Ray: CXR: X-Ray Chest 1 View (CXR): No results found for this visit on 08/06/19.     Impression       Stable cardiomegaly and mild central pulmonary vascular prominence.    Scattered linear atelectasis or scarring within the lower lung zones.

## 2019-08-07 NOTE — HOSPITAL COURSE
She was admitted to the hospital treated with IV Lasix in the emergency room she had a good diuresis during the night and so far this morning.  She is feeling a little bit better today although continues to feel somewhat weak.

## 2019-08-07 NOTE — CONSULTS
[facility]  Cardiology  Consult Note    Patient Name: Cheyanne Gomes  MRN: [id number]  Admission Date: 8/6/2019  Hospital Length of Stay: 0 days  Code Status: Full Code   Attending Provider: Quincy Liu MD   Consulting Provider: Tera Blair MD  Primary Care Physician: Romeo Alas MD  Principal Problem:Acute exacerbation of CHF (congestive heart failure)    Patient information was obtained from patient and ER records.     Consults  Subjective:     Chief Complaint:  Shortness of breath and generalized weakness     HPI:   The patient is an 83-year-old female with history of coronary artery disease, chronic systolic congestive heart failure an ejection fraction of approximately 20% and an apical aneurysm.  Approximately 4 days ago she started feeling weak.  Approximately 2 days ago she went to charge and then went to the pharmacy and she thought she was going to pass out.  When she got home she had an episode of watery diarrhea.  Monday morning she will call point she had dry heaves.  Yesterday she continues to have the same symptoms of generalized weakness fatigue and shortness of breath.  She recently had a colonoscopy done in the early part of July she had 2 polyps removed    Past Medical History:   Diagnosis Date    Abdominal hernia     Anemia     Dr Berkowitz     Aortic valve stenosis, moderate     Cannon's esophagus     Cataract     ou done    CHF (congestive heart failure)     EFx 35%, Dr. Spencer 12/19/13    Colitis     Compression fracture     Diverticulosis     Encounter for blood transfusion     GERD (gastroesophageal reflux disease)     Hyperlipidemia     Hypertension     Irritable bowel syndrome     Occlusive coronary artery disease     Osteoarthritis     lumbar DDD    Pneumonia 01/03/2017    Rheumatoid arteritis     Rheumatoid arthritis     Shingles 01/03/2017    Sjogren syndrome     Ulcerative colitis        Past Surgical History:   Procedure Laterality  Date    APPENDECTOMY      BACK SURGERY      CARDIAC SURGERY      CABGx3 in     CATARACT EXTRACTION      ou od d 11-15-12/     SECTION, CLASSIC      x 2    CHOLECYSTECTOMY      COLONOSCOPY  09/15/2011    Dr Anaya     COLONOSCOPY  01/10/2017    [facility] report sent to scanning    CORONARY ANGIOPLASTY      PCI x 1 in     CORONARY ARTERY BYPASS GRAFT      3 vessel    CORONARY ARTERY BYPASS GRAFT      EXTRACTION, CATARACT, WITH IOL INSERTION Right 11/15/2012    [facility]    EXTRACTION, CATARACT, WITH IOL INSERTION Left 2012    [facility]    eyes      rk ou    FRACTURE SURGERY  2016    Dr Guido left hip     FRACTURE SURGERY  2018    Right Hip    HYSTERECTOMY      tubal ligation, oopherectomy    INSERTION, INTRAMEDULLARY KELSI, FEMUR, TROCHANTER Right 10/8/2018    [facility]    OOPHORECTOMY      SPINE SURGERY  2013    Silvino Mckeon    UPPER GASTROINTESTINAL ENDOSCOPY      Yag Capsulotomy Right 14       Review of patient's allergies indicates:   Allergen Reactions    Macrodantin  [nitrofurantoin macrocrystalline]      Other reaction(s): very sickly    Penicillins      Other reaction(s): swelling  Other reaction(s): red skin discolorati    Sulfa (sulfonamide antibiotics)      Other reaction(s): very sickly       No current facility-administered medications on file prior to encounter.      Current Outpatient Medications on File Prior to Encounter   Medication Sig    amLODIPine (NORVASC) 5 MG tablet Take 5 mg by mouth once daily.     aspirin 81 mg Tab Take 1 tablet by mouth every evening. Every day    biotin 5 mg Cap Take 1 tablet by mouth 2 (two) times daily.    CALCIUM CARB/VIT D3/MINERALS (CALCIUM-VITAMIN D ORAL) Take 600 mg by mouth 2 (two) times daily. Calcium 1200 with D 3 1000    coenzyme Q10 100 mg capsule Take 100 mg by mouth once daily.     CRANBERRY CONC/ASCORBIC ACID (CRANBERRY PLUS VITAMIN C ORAL) Take 1 capsule by mouth once daily.    DOCUSATE CALCIUM (STOOL SOFTENER ORAL) Take 200 mg by mouth every evening.     ENTRESTO  mg per tablet Take 1 tablet by mouth 2 (two) times daily.     FERROUS FUMARATE/VIT BCOMP,C (SUPER B COMPLEX ORAL) Take 1 tablet by mouth once daily.    ferrous gluconate (FERGON) 324 MG tablet Take 324 mg by mouth 2 (two) times daily. At noon and in the evening    fluticasone (FLONASE) 50 mcg/actuation nasal spray 2 sprays by Each Nare route once daily. (Patient taking differently: 2 sprays by Each Nare route daily as needed. )    FUROSEMIDE ORAL furosemide 60 mg tablet   Take 1 tablet every day by oral route.    gabapentin (NEURONTIN) 100 MG capsule Take 1 capsule (100 mg total) by mouth every evening.    hydroxychloroquine (PLAQUENIL) 200 mg tablet Take 1 tablet (200 mg total) by mouth once daily.    isosorbide dinitrate (ISORDIL) 10 MG tablet Take 10 mg by mouth 3 (three) times daily.    krill-omega-3-dha-epa-lipids (KRILL OIL) 737-35-48-50 mg Cap Take 1,000 mg by mouth once daily. Braxton krill 300mg qd     lactobacillus acidophilus (PROBIOTIC) 10 billion cell Cap Take 1 each by mouth once daily. 1.5 billion    magnesium oxide (MAG-OX) 400 mg tablet Take 400 mg by mouth 2 (two) times daily.    metOLazone (ZAROXOLYN) 2.5 MG tablet Take 2.5 mg by mouth every Mon, Wed, Fri.    metoprolol succinate (TOPROL-XL) 25 MG 24 hr tablet take ONE-HALF tab BY MOUTH EVERY DAY    MULTIVITAMIN WITH MINERALS (ONE-A-DAY 50 PLUS) Tab Take 1 tablet by mouth once daily. Every day    nitroGLYCERIN (NITROLINGUAL) 0.4 mg/dose spray Place 1 spray under the tongue every 5 (five) minutes as needed. PRN    pantoprazole (PROTONIX) 40 MG tablet Take 40 mg by mouth 2 (two) times daily.    potassium chloride SA (K-DUR,KLOR-CON) 10 MEQ tablet Take 10 mEq by mouth 2 (two) times daily.     promethazine (PHENERGAN) 25 MG tablet Take 1 tablet  (25 mg total) by mouth 2 (two) times daily as needed for Nausea.    sertraline (ZOLOFT) 50 MG tablet TAKE 1 TABLET (50 MG TOTAL) BY MOUTH ONCE DAILY.    traMADol (ULTRAM) 50 mg tablet 1-2 po bid prn    vitamin E 400 unit Tab Take 400 mg by mouth once daily. Every day    [DISCONTINUED] hydrochlorothiazide (HYDRODIURIL) 25 MG tablet Take 25 mg by mouth once daily.     Family History     Problem Relation (Age of Onset)    Arthritis Brother    Crohn's disease Brother, Brother, Brother    Early death Son    Fibromyalgia Sister    Heart disease Father, Mother, Brother    Hypertension Father, Sister, Daughter    Irritable bowel syndrome Sister    Lupus Cousin, Cousin    Pulmonary embolism Sister    Scleroderma Sister    Skin cancer Mother        Tobacco Use    Smoking status: Former Smoker     Packs/day: 1.00     Years: 5.00     Pack years: 5.00     Last attempt to quit: 1971     Years since quittin.6    Smokeless tobacco: Never Used   Substance and Sexual Activity    Alcohol use: Yes     Alcohol/week: 0.6 oz     Types: 1 Glasses of wine per week    Drug use: Yes     Frequency: 1.0 times per week    Sexual activity: Not Currently     Partners: Male     Review of Systems   Constitution: Positive for malaise/fatigue and weight gain. Negative for fever.   Cardiovascular: Positive for dyspnea on exertion and paroxysmal nocturnal dyspnea. Negative for chest pain and leg swelling.   Respiratory: Positive for shortness of breath. Negative for cough.    Endocrine: Positive for heat intolerance.   Skin: Negative.  Negative for flushing and rash.   Musculoskeletal: Positive for muscle cramps. Negative for back pain.   Gastrointestinal: Positive for diarrhea and nausea. Negative for abdominal pain and constipation.   Genitourinary: Negative for dysuria and frequency.   Neurological: Positive for dizziness and weakness.   Psychiatric/Behavioral: Negative.  Negative for altered mental status and hallucinations.      Objective:     Vital Signs (Most Recent):  Temp: 98.3 °F (36.8 °C) (08/07/19 1122)  Pulse: (!) 57 (08/07/19 1122)  Resp: 20 (08/07/19 1122)  BP: 127/62 (08/07/19 1122)  SpO2: 96 % (08/07/19 1122) Vital Signs (24h Range):  Temp:  [98 °F (36.7 °C)-98.8 °F (37.1 °C)] 98.3 °F (36.8 °C)  Pulse:  [49-88] 57  Resp:  [14-31] 20  SpO2:  [94 %-100 %] 96 %  BP: (112-157)/(54-87) 127/62     Weight: 42 kg (92 lb 9.5 oz)  Body mass index is 27.11 kg/m².    SpO2: 96 %  O2 Device (Oxygen Therapy): nasal cannula      Intake/Output Summary (Last 24 hours) at 8/7/2019 1149  Last data filed at 8/7/2019 1122  Gross per 24 hour   Intake 360 ml   Output 500 ml   Net -140 ml       Lines/Drains/Airways     Peripheral Intravenous Line                 Peripheral IV - Single Lumen 08/06/19 1603 20 G Left Antecubital less than 1 day                Physical Exam   Constitutional: She is oriented to person, place, and time. She appears well-developed and well-nourished.   HENT:   Head: Normocephalic and atraumatic.   Eyes: Pupils are equal, round, and reactive to light. Conjunctivae and EOM are normal.   Cardiovascular: Normal rate and regular rhythm.   Murmur (Grade 3/6 systolic murmur at the base) heard.  Pulmonary/Chest: Effort normal. She has rales (Bilateral basal crackles).   Abdominal: Soft. Bowel sounds are normal.   Musculoskeletal: Normal range of motion. She exhibits no edema.   Neurological: She is alert and oriented to person, place, and time.   Skin: Skin is warm and dry.   Psychiatric: She has a normal mood and affect. Her behavior is normal. Judgment and thought content normal.   Nursing note and vitals reviewed.      Significant Labs:   BMP:   Recent Labs   Lab 08/06/19  1515 08/07/19  0430   * 94    136   K 3.9 3.0*   CL 91* 91*   CO2 35* 35*   BUN 42* 39*   CREATININE 1.8* 1.5*   CALCIUM 10.2 9.4   MG  --  2.0   , CMP   Recent Labs   Lab 08/06/19  1515 08/07/19  0430    136   K 3.9 3.0*   CL 91* 91*   CO2  35* 35*   * 94   BUN 42* 39*   CREATININE 1.8* 1.5*   CALCIUM 10.2 9.4   PROT 7.1  --    ALBUMIN 4.4  --    BILITOT 0.7  --    ALKPHOS 66  --    AST 45*  --    ALT 25  --    ANIONGAP 10 10   ESTGFRAFRICA 29.6* 36.9*   EGFRNONAA 25.7* 32.0*    and CBC   Recent Labs   Lab 08/06/19  1515 08/07/19  0430   WBC 5.19 4.53   HGB 10.7* 10.6*   HCT 33.1* 32.9*    141*       Significant Imaging: X-Ray: CXR: X-Ray Chest 1 View (CXR): No results found for this visit on 08/06/19.     Impression       Stable cardiomegaly and mild central pulmonary vascular prominence.    Scattered linear atelectasis or scarring within the lower lung zones.           Assessment and Plan:     * Acute exacerbation of CHF (congestive heart failure)  Acute on chronic systolic heart failure.  Doing better on IV diuretics    CKD (chronic kidney disease), stage IV  Has improved with IV diuretics.  Follow renal function.    Anemia of chronic disease  Stable    Hypertension  Better control since admission    Hypokalemia  Will give supplement potassium        VTE Risk Mitigation (From admission, onward)        Ordered     enoxaparin injection 30 mg  Daily      08/07/19 0036     IP VTE HIGH RISK PATIENT  Once      08/07/19 0036          Thank you for allowing us to participate in her care I will follow-up with patient. Please contact us if you have any additional questions.    Tera Blair MD  Cardiology   [facility]

## 2019-08-08 VITALS
RESPIRATION RATE: 16 BRPM | WEIGHT: 90.19 LBS | HEIGHT: 55 IN | TEMPERATURE: 99 F | BODY MASS INDEX: 20.87 KG/M2 | HEART RATE: 63 BPM | OXYGEN SATURATION: 95 % | SYSTOLIC BLOOD PRESSURE: 128 MMHG | DIASTOLIC BLOOD PRESSURE: 61 MMHG

## 2019-08-08 LAB
ANION GAP SERPL CALC-SCNC: 10 MMOL/L (ref 8–16)
BNP SERPL-MCNC: 1156 PG/ML (ref 0–99)
BUN SERPL-MCNC: 47 MG/DL (ref 8–23)
CALCIUM SERPL-MCNC: 8.9 MG/DL (ref 8.7–10.5)
CHLORIDE SERPL-SCNC: 94 MMOL/L (ref 95–110)
CO2 SERPL-SCNC: 32 MMOL/L (ref 23–29)
CREAT SERPL-MCNC: 1.5 MG/DL (ref 0.5–1.4)
ERYTHROCYTE [DISTWIDTH] IN BLOOD BY AUTOMATED COUNT: 13.4 % (ref 11.5–14.5)
EST. GFR  (AFRICAN AMERICAN): 36.9 ML/MIN/1.73 M^2
EST. GFR  (NON AFRICAN AMERICAN): 32 ML/MIN/1.73 M^2
GLUCOSE SERPL-MCNC: 104 MG/DL (ref 70–110)
HCT VFR BLD AUTO: 34.8 % (ref 37–48.5)
HGB BLD-MCNC: 11.5 G/DL (ref 12–16)
MAGNESIUM SERPL-MCNC: 2.1 MG/DL (ref 1.6–2.6)
MCH RBC QN AUTO: 29.7 PG (ref 27–31)
MCHC RBC AUTO-ENTMCNC: 33 G/DL (ref 32–36)
MCV RBC AUTO: 90 FL (ref 82–98)
PHOSPHATE SERPL-MCNC: 3.2 MG/DL (ref 2.7–4.5)
PLATELET # BLD AUTO: 169 K/UL (ref 150–350)
PMV BLD AUTO: 9.7 FL (ref 9.2–12.9)
POTASSIUM SERPL-SCNC: 4.1 MMOL/L (ref 3.5–5.1)
RBC # BLD AUTO: 3.87 M/UL (ref 4–5.4)
SODIUM SERPL-SCNC: 136 MMOL/L (ref 136–145)
WBC # BLD AUTO: 5.25 K/UL (ref 3.9–12.7)

## 2019-08-08 PROCEDURE — 85027 COMPLETE CBC AUTOMATED: CPT

## 2019-08-08 PROCEDURE — 80048 BASIC METABOLIC PNL TOTAL CA: CPT

## 2019-08-08 PROCEDURE — 36415 COLL VENOUS BLD VENIPUNCTURE: CPT

## 2019-08-08 PROCEDURE — 83735 ASSAY OF MAGNESIUM: CPT

## 2019-08-08 PROCEDURE — 84100 ASSAY OF PHOSPHORUS: CPT

## 2019-08-08 PROCEDURE — 83880 ASSAY OF NATRIURETIC PEPTIDE: CPT

## 2019-08-08 PROCEDURE — 63600175 PHARM REV CODE 636 W HCPCS: Performed by: INTERNAL MEDICINE

## 2019-08-08 PROCEDURE — G0378 HOSPITAL OBSERVATION PER HR: HCPCS

## 2019-08-08 PROCEDURE — 25000003 PHARM REV CODE 250: Performed by: INTERNAL MEDICINE

## 2019-08-08 RX ADMIN — ISOSORBIDE DINITRATE 10 MG: 10 TABLET ORAL at 09:08

## 2019-08-08 RX ADMIN — ISOSORBIDE DINITRATE 10 MG: 10 TABLET ORAL at 04:08

## 2019-08-08 RX ADMIN — HYDROXYCHLOROQUINE SULFATE 200 MG: 200 TABLET, FILM COATED ORAL at 09:08

## 2019-08-08 RX ADMIN — AMLODIPINE BESYLATE 5 MG: 5 TABLET ORAL at 09:08

## 2019-08-08 RX ADMIN — MAGNESIUM OXIDE 400 MG: 400 TABLET ORAL at 09:08

## 2019-08-08 RX ADMIN — SACUBITRIL AND VALSARTAN 2 TABLET: 49; 51 TABLET, FILM COATED ORAL at 09:08

## 2019-08-08 RX ADMIN — PANTOPRAZOLE SODIUM 40 MG: 40 TABLET, DELAYED RELEASE ORAL at 09:08

## 2019-08-08 RX ADMIN — SERTRALINE HYDROCHLORIDE 50 MG: 50 TABLET ORAL at 09:08

## 2019-08-08 RX ADMIN — ENOXAPARIN SODIUM 30 MG: 100 INJECTION SUBCUTANEOUS at 05:08

## 2019-08-08 RX ADMIN — POTASSIUM CHLORIDE 10 MEQ: 750 CAPSULE, EXTENDED RELEASE ORAL at 10:08

## 2019-08-08 RX ADMIN — FUROSEMIDE 40 MG: 10 INJECTION, SOLUTION INTRAMUSCULAR; INTRAVENOUS at 09:08

## 2019-08-08 NOTE — SUBJECTIVE & OBJECTIVE
Interval History: Pt seen and examined today. Was sleeping comfortably on 1 pillow. Reports she feels much better today as compared to yesterday. Discussed Dr Casillas's plan for 1 more dose of lasix today, with repeat KCL 40 meq. Discussed if all looks good by tomorrow, she may be discharged home. Pt agrees with the plan     Review of Systems   Constitutional: Negative for chills, fatigue and fever. Activity change: improving.   HENT: Negative for mouth sores, sore throat and trouble swallowing.    Eyes: Negative for pain, discharge and visual disturbance.   Respiratory: Positive for shortness of breath (mild today). Negative for chest tightness and wheezing.    Cardiovascular: Positive for leg swelling (off and on, not currently). Negative for chest pain and palpitations.   Gastrointestinal: Negative for abdominal pain, constipation, diarrhea, nausea and vomiting.   Endocrine: Negative for cold intolerance and heat intolerance.   Genitourinary: Negative for difficulty urinating and menstrual problem.   Musculoskeletal: Positive for back pain (multiple vertebral surgeries for compression fractures ). Negative for arthralgias and gait problem.   Skin: Negative for rash and wound.        Easy bruising    Allergic/Immunologic: Negative for environmental allergies and food allergies.   Neurological: Positive for weakness ( improving ). Negative for dizziness and speech difficulty.   Hematological: Bruises/bleeds easily.   Psychiatric/Behavioral: Negative for agitation, behavioral problems and suicidal ideas.     Objective:     Vital Signs (Most Recent):  Temp: 98.4 °F (36.9 °C) (08/07/19 1900)  Pulse: 61 (08/07/19 1900)  Resp: 16 (08/07/19 1900)  BP: (!) 117/58 (08/07/19 1900)  SpO2: (!) 94 % (08/07/19 1900) Vital Signs (24h Range):  Temp:  [98 °F (36.7 °C)-99 °F (37.2 °C)] 98.4 °F (36.9 °C)  Pulse:  [49-88] 61  Resp:  [16-22] 16  SpO2:  [94 %-100 %] 94 %  BP: (114-157)/(58-76) 117/58     Weight: 42 kg (92 lb 9.5  oz)  Body mass index is 27.11 kg/m².    Intake/Output Summary (Last 24 hours) at 8/7/2019 2213  Last data filed at 8/7/2019 1258  Gross per 24 hour   Intake 600 ml   Output 500 ml   Net 100 ml      Physical Exam   Constitutional: She is oriented to person, place, and time. She appears well-developed and well-nourished. No distress.   Good historian    HENT:   Head: Normocephalic and atraumatic.   Eyes: Pupils are equal, round, and reactive to light. Conjunctivae and EOM are normal.   Neck: Normal range of motion. Neck supple.   Cardiovascular: Normal rate, regular rhythm and intact distal pulses. Exam reveals no gallop and no friction rub.   Murmur (2/6 ystolic murmur ) heard.  Pulmonary/Chest: Effort normal and breath sounds normal.   Fine basal crackles    Abdominal: Soft. Bowel sounds are normal.   Musculoskeletal: Normal range of motion. She exhibits no edema.   Neurological: She is alert and oriented to person, place, and time.   Skin: Skin is warm and dry. No rash noted.   Multiple ecchymoses B.L forearms, right dorsum of the foot    Psychiatric: She has a normal mood and affect.   Nursing note and vitals reviewed.      Significant Labs: All pertinent labs within the past 24 hours have been reviewed.    Significant Imaging: I have reviewed all pertinent imaging results/findings within the past 24 hours.

## 2019-08-08 NOTE — PROGRESS NOTES
[facility] Medicine  Progress Note    Patient Name: Cheyanne Gomes  MRN: [id number]  Patient Class: OP- Observation   Admission Date: 8/6/2019  Length of Stay: 0 days  Attending Physician: Quincy Liu MD  Primary Care Provider: Romeo Alas MD        Subjective:     Principal Problem:Acute exacerbation of CHF (congestive heart failure)      HPI:  82 y/o White female with known hx of CHF, presented to ER with 5 days complaint of SOB and weakness. Pt states the SOB got progressively worse with weakness yesterday that she thought she may pass out, but she didn't. Pt states she is very compliant with her meds and also with her salt intake as she knows it exacerbates her CHF. Pt denies recent ankle or leg swelling, though reports she get ankle swelling from time to time.   States she recently had ECHO done with Dr Blair. Pt knows all her meds.   Known Hx of HTN, HLP (not on statins anymore), CAD with CABG x 3 and PCI x1 and RA   Pt denies CP    Overview/Hospital Course:  No notes on file    Interval History: Pt seen and examined today. Was sleeping comfortably on 1 pillow. Reports she feels much better today as compared to yesterday. Discussed Dr Casillas's plan for 1 more dose of lasix today, with repeat KCL 40 meq. Discussed if all looks good by tomorrow, she may be discharged home. Pt agrees with the plan     Review of Systems   Constitutional: Negative for chills, fatigue and fever. Activity change: improving.   HENT: Negative for mouth sores, sore throat and trouble swallowing.    Eyes: Negative for pain, discharge and visual disturbance.   Respiratory: Positive for shortness of breath (mild today). Negative for chest tightness and wheezing.    Cardiovascular: Positive for leg swelling (off and on, not currently). Negative for chest pain and palpitations.   Gastrointestinal: Negative for abdominal pain, constipation, diarrhea, nausea and vomiting.   Endocrine: Negative for cold  intolerance and heat intolerance.   Genitourinary: Negative for difficulty urinating and menstrual problem.   Musculoskeletal: Positive for back pain (multiple vertebral surgeries for compression fractures ). Negative for arthralgias and gait problem.   Skin: Negative for rash and wound.        Easy bruising    Allergic/Immunologic: Negative for environmental allergies and food allergies.   Neurological: Positive for weakness ( improving ). Negative for dizziness and speech difficulty.   Hematological: Bruises/bleeds easily.   Psychiatric/Behavioral: Negative for agitation, behavioral problems and suicidal ideas.     Objective:     Vital Signs (Most Recent):  Temp: 98.4 °F (36.9 °C) (08/07/19 1900)  Pulse: 61 (08/07/19 1900)  Resp: 16 (08/07/19 1900)  BP: (!) 117/58 (08/07/19 1900)  SpO2: (!) 94 % (08/07/19 1900) Vital Signs (24h Range):  Temp:  [98 °F (36.7 °C)-99 °F (37.2 °C)] 98.4 °F (36.9 °C)  Pulse:  [49-88] 61  Resp:  [16-22] 16  SpO2:  [94 %-100 %] 94 %  BP: (114-157)/(58-76) 117/58     Weight: 42 kg (92 lb 9.5 oz)  Body mass index is 27.11 kg/m².    Intake/Output Summary (Last 24 hours) at 8/7/2019 2213  Last data filed at 8/7/2019 1258  Gross per 24 hour   Intake 600 ml   Output 500 ml   Net 100 ml      Physical Exam   Constitutional: She is oriented to person, place, and time. She appears well-developed and well-nourished. No distress.   Good historian    HENT:   Head: Normocephalic and atraumatic.   Eyes: Pupils are equal, round, and reactive to light. Conjunctivae and EOM are normal.   Neck: Normal range of motion. Neck supple.   Cardiovascular: Normal rate, regular rhythm and intact distal pulses. Exam reveals no gallop and no friction rub.   Murmur (2/6 ystolic murmur ) heard.  Pulmonary/Chest: Effort normal and breath sounds normal.   Fine basal crackles    Abdominal: Soft. Bowel sounds are normal.   Musculoskeletal: Normal range of motion. She exhibits no edema.   Neurological: She is alert and  oriented to person, place, and time.   Skin: Skin is warm and dry. No rash noted.   Multiple ecchymoses B.L forearms, right dorsum of the foot    Psychiatric: She has a normal mood and affect.   Nursing note and vitals reviewed.      Significant Labs: All pertinent labs within the past 24 hours have been reviewed.    Significant Imaging: I have reviewed all pertinent imaging results/findings within the past 24 hours.      Assessment/Plan:      * Acute exacerbation of CHF (congestive heart failure)  Known CHF  Pt seen by Dr Blair   BNP  1457, was 1927(h) on admit  Received Furosamide 40 mg IV this am and recommended 1 more dose this evening by by Rossy   Will replace K as well  Kidney functions improved over night   Repeat BNP in am  Cardiac monitoring  EKG stable   No need for ECHO as pt recently had Echo with Dr Blair       Hypertension  Cont home meds  NTG 1/2 inch paste topical per Dr Blair yesterday   Repeat BMP in am with Mag and Phos in AM    CKD (chronic kidney disease), stage IV  Chronic, Improving  Will continue monitoring  BMP in am         Patient Active Problem List   Diagnosis    Chronic anticoagulation    Sjogren's syndrome    High risk medication use    Choroidal nevus - Right Eye    History of non-cataract eye surgery    Rheumatoid arthritis    Hypokalemia    Hypophosphatemia    Hypomagnesemia    Hyponatremia    Hypertension    Intravascular volume depletion    GERD (gastroesophageal reflux disease)    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    Acute exacerbation of CHF (congestive heart failure)    Aortic valve stenosis, moderate    Occlusive coronary artery disease    Ischemic colitis    Hyperlipidemia    Anemia of chronic disease    Displaced comminuted fracture of shaft of right femur, initial encounter for closed fracture    Iron deficiency anemia due to chronic blood loss    CKD (chronic kidney disease), stage IV     VTE Risk Mitigation (From admission,  onward)        Ordered     enoxaparin injection 30 mg  Daily      08/07/19 0036     IP VTE HIGH RISK PATIENT  Once      08/07/19 0036                Quincy Liu MD  Department of Hospital Medicine   [facility]

## 2019-08-08 NOTE — ASSESSMENT & PLAN NOTE
Acute on chronic systolic heart failure.  Doing better on IV diuretics and clinically she feels she is ready to go home

## 2019-08-08 NOTE — PROGRESS NOTES
[facility]  Cardiology  Progress Note    Patient Name: Cheyanne Gomes  MRN: [id number]  Admission Date: 8/6/2019  Hospital Length of Stay: 0 days  Code Status: Full Code   Attending Physician: Quincy Liu MD   Primary Care Physician: Romeo Alas MD  Expected Discharge Date: 8/8/2019  Principal Problem:Acute exacerbation of CHF (congestive heart failure)    Subjective:     Hospital Course:   She was admitted to the hospital treated with IV Lasix in the emergency room she had a good diuresis during the night and so far this morning.  She is feeling a little bit better today although continues to feel somewhat weak.    Interval History: continues to have some weakness although her shortness of breath has improved.  She feels she is ready to go home.  She is going to stay with her sister that lives nearby for few days before she goes to home.    Review of Systems   Constitution: Negative. Negative for fever and weight gain.   Cardiovascular: Negative.  Negative for chest pain and leg swelling.   Respiratory: Negative.  Negative for cough and shortness of breath.    Skin: Negative.  Negative for flushing and rash.   Musculoskeletal: Negative.  Negative for back pain and muscle cramps.   Gastrointestinal: Negative.  Negative for abdominal pain and constipation.   Neurological: Positive for weakness. Negative for dizziness.   Psychiatric/Behavioral: Negative.  Negative for altered mental status and hallucinations.     Objective:     Vital Signs (Most Recent):  Temp: 98.6 °F (37 °C) (08/08/19 1500)  Pulse: 63 (08/08/19 1500)  Resp: 16 (08/08/19 1500)  BP: 128/61 (08/08/19 1500)  SpO2: 95 % (08/08/19 1500) Vital Signs (24h Range):  Temp:  [97.9 °F (36.6 °C)-98.9 °F (37.2 °C)] 98.6 °F (37 °C)  Pulse:  [54-98] 63  Resp:  [14-16] 16  SpO2:  [94 %-98 %] 95 %  BP: (117-149)/(56-65) 128/61     Weight: 40.9 kg (90 lb 2.7 oz)  Body mass index is 26.4 kg/m².     SpO2: 95 %  O2 Device (Oxygen Therapy): room  air      Intake/Output Summary (Last 24 hours) at 8/8/2019 1639  Last data filed at 8/8/2019 0452  Gross per 24 hour   Intake 240 ml   Output 600 ml   Net -360 ml       Lines/Drains/Airways     Peripheral Intravenous Line                 Peripheral IV - Single Lumen 08/06/19 1603 20 G Left Antecubital 2 days                Physical Exam   Constitutional: She is oriented to person, place, and time. She appears well-developed and well-nourished.   HENT:   Head: Normocephalic and atraumatic.   Eyes: Pupils are equal, round, and reactive to light. Conjunctivae and EOM are normal.   Cardiovascular: Normal rate and regular rhythm.   Murmur (Grade 2/6 systolic murmur at the base) heard.  Pulmonary/Chest: Effort normal and breath sounds normal.   Abdominal: Soft. Bowel sounds are normal.   Musculoskeletal: Normal range of motion.   Neurological: She is alert and oriented to person, place, and time.   Skin: Skin is warm and dry.   Psychiatric: She has a normal mood and affect. Her behavior is normal. Judgment and thought content normal.   Nursing note and vitals reviewed.      Significant Labs:   BMP:   Recent Labs   Lab 08/07/19  0430 08/07/19 2138 08/08/19 0429   GLU 94  --  104     --  136   K 3.0* 3.8 4.1   CL 91*  --  94*   CO2 35*  --  32*   BUN 39*  --  47*   CREATININE 1.5*  --  1.5*   CALCIUM 9.4  --  8.9   MG 2.0  --  2.1    and CMP   Recent Labs   Lab 08/07/19 0430 08/07/19 2138 08/08/19 0429     --  136   K 3.0* 3.8 4.1   CL 91*  --  94*   CO2 35*  --  32*   GLU 94  --  104   BUN 39*  --  47*   CREATININE 1.5*  --  1.5*   CALCIUM 9.4  --  8.9   ANIONGAP 10  --  10   ESTGFRAFRICA 36.9*  --  36.9*   EGFRNONAA 32.0*  --  32.0*       Significant Imaging:     Assessment and Plan:         * Acute exacerbation of CHF (congestive heart failure)  Acute on chronic systolic heart failure.  Doing better on IV diuretics and clinically she feels she is ready to go home    CKD (chronic kidney disease), stage  IV  Has improved with IV diuretics.  Stable    Anemia of chronic disease  Stable    Hypertension  Better control since admission    Hypokalemia  Will give supplement potassium        VTE Risk Mitigation (From admission, onward)        Ordered     enoxaparin injection 30 mg  Daily      08/07/19 0036     IP VTE HIGH RISK PATIENT  Once      08/07/19 0036          Tera Blair MD  Cardiology  [facility]

## 2019-08-08 NOTE — ASSESSMENT & PLAN NOTE
Known CHF  Pt seen by Dr Blair   BNP  1457, was 1927(h) on admit  Received Furosamide 40 mg IV this am and recommended 1 more dose this evening by by Rossy   Will replace K as well  Kidney functions improved over night   Repeat BNP in am  Cardiac monitoring  EKG stable   No need for ECHO as pt recently had Echo with Dr Blair

## 2019-08-08 NOTE — NURSING
D/c instructions provided to pt. Pt verbalized understanding of information provided. Pt transported downstairs to private vehicle via wheelchair without incident. IV and telemetry box removed

## 2019-08-08 NOTE — SUBJECTIVE & OBJECTIVE
Interval History: continues to have some weakness although her shortness of breath has improved.  She feels she is ready to go home.  She is going to stay with her sister that lives nearby for few days before she goes to home.    Review of Systems   Constitution: Negative. Negative for fever and weight gain.   Cardiovascular: Negative.  Negative for chest pain and leg swelling.   Respiratory: Negative.  Negative for cough and shortness of breath.    Skin: Negative.  Negative for flushing and rash.   Musculoskeletal: Negative.  Negative for back pain and muscle cramps.   Gastrointestinal: Negative.  Negative for abdominal pain and constipation.   Neurological: Positive for weakness. Negative for dizziness.   Psychiatric/Behavioral: Negative.  Negative for altered mental status and hallucinations.     Objective:     Vital Signs (Most Recent):  Temp: 98.6 °F (37 °C) (08/08/19 1500)  Pulse: 63 (08/08/19 1500)  Resp: 16 (08/08/19 1500)  BP: 128/61 (08/08/19 1500)  SpO2: 95 % (08/08/19 1500) Vital Signs (24h Range):  Temp:  [97.9 °F (36.6 °C)-98.9 °F (37.2 °C)] 98.6 °F (37 °C)  Pulse:  [54-98] 63  Resp:  [14-16] 16  SpO2:  [94 %-98 %] 95 %  BP: (117-149)/(56-65) 128/61     Weight: 40.9 kg (90 lb 2.7 oz)  Body mass index is 26.4 kg/m².     SpO2: 95 %  O2 Device (Oxygen Therapy): room air      Intake/Output Summary (Last 24 hours) at 8/8/2019 1639  Last data filed at 8/8/2019 0452  Gross per 24 hour   Intake 240 ml   Output 600 ml   Net -360 ml       Lines/Drains/Airways     Peripheral Intravenous Line                 Peripheral IV - Single Lumen 08/06/19 1603 20 G Left Antecubital 2 days                Physical Exam   Constitutional: She is oriented to person, place, and time. She appears well-developed and well-nourished.   HENT:   Head: Normocephalic and atraumatic.   Eyes: Pupils are equal, round, and reactive to light. Conjunctivae and EOM are normal.   Cardiovascular: Normal rate and regular rhythm.   Murmur (Grade  2/6 systolic murmur at the base) heard.  Pulmonary/Chest: Effort normal and breath sounds normal.   Abdominal: Soft. Bowel sounds are normal.   Musculoskeletal: Normal range of motion.   Neurological: She is alert and oriented to person, place, and time.   Skin: Skin is warm and dry.   Psychiatric: She has a normal mood and affect. Her behavior is normal. Judgment and thought content normal.   Nursing note and vitals reviewed.      Significant Labs:   BMP:   Recent Labs   Lab 08/07/19 0430 08/07/19 2138 08/08/19 0429   GLU 94  --  104     --  136   K 3.0* 3.8 4.1   CL 91*  --  94*   CO2 35*  --  32*   BUN 39*  --  47*   CREATININE 1.5*  --  1.5*   CALCIUM 9.4  --  8.9   MG 2.0  --  2.1    and CMP   Recent Labs   Lab 08/07/19 0430 08/07/19 2138 08/08/19 0429     --  136   K 3.0* 3.8 4.1   CL 91*  --  94*   CO2 35*  --  32*   GLU 94  --  104   BUN 39*  --  47*   CREATININE 1.5*  --  1.5*   CALCIUM 9.4  --  8.9   ANIONGAP 10  --  10   ESTGFRAFRICA 36.9*  --  36.9*   EGFRNONAA 32.0*  --  32.0*       Significant Imaging:

## 2019-08-08 NOTE — ASSESSMENT & PLAN NOTE
Cont home meds  NTG 1/2 inch paste topical per Dr Blair yesterday   Repeat BMP in am with Mag and Phos in AM

## 2019-08-08 NOTE — DISCHARGE SUMMARY
[facility] Medicine  Discharge Summary      Patient Name: Cheyanne Gomes  MRN: [id number]  Admission Date: 8/6/2019  Hospital Length of Stay: 0 days  Discharge Date and Time:  08/08/2019 5:17 PM  Attending Physician: Quincy Adame MD   Discharging Provider: Quincy Adame MD  Primary Care Provider: Romeo Alas MD      HPI:   82 y/o White female with known hx of CHF, presented to ER with 5 days complaint of SOB and weakness. Pt states the SOB got progressively worse with weakness yesterday that she thought she may pass out, but she didn't. Pt states she is very compliant with her meds and also with her salt intake as she knows it exacerbates her CHF. Pt denies recent ankle or leg swelling, though reports she get ankle swelling from time to time.   States she recently had ECHO done with Dr Blair. Pt knows all her meds.   Known Hx of HTN, HLP (not on statins anymore), CAD with CABG x 3 and PCI x1 and RA   Pt denies CP    * No surgery found *      Hospital Course:   83-year-old white female was admitted with congestive heart failure and weakness.  Over the hospital stay, patient was given IV furosemide b.i.d. yesterday and it improved her BNP as well as her shortness of breath.  Potassium was replaced.  Patient still complained of slight weakness.  No chest pain, no shortness of breath today, no headache, patient was evaluated by Dr. Blair and recommended discharge today.  Patient will be staying with her sister for couple of days before going back home.     Consults:   Consults (From admission, onward)        Status Ordering Provider     Inpatient consult to Cardiology  Once     Provider:  Tera Blair MD    Acknowledged QUINCY ADAME     Inpatient consult to Social Work/Case Management  Once     Provider:  (Not yet assigned)    Completed QUINCY ADAME          No new Assessment & Plan notes have been filed under this hospital service since the last note was generated.  Service:  Hospital Medicine    Final Active Diagnoses:    Diagnosis Date Noted POA    Hypertension [I10]  Yes    CKD (chronic kidney disease), stage IV [N18.4] 08/06/2019 Yes      Problems Resolved During this Admission:    Diagnosis Date Noted Date Resolved POA    PRINCIPAL PROBLEM:  Acute exacerbation of CHF (congestive heart failure) [I50.9]  08/08/2019 Yes       Discharged Condition: stable    Disposition: Home or Self Care    Follow Up:  Follow-up Information     Tera Blair MD. Schedule an appointment as soon as possible for a visit in 2 weeks.    Specialties:  Cardiovascular Disease, Cardiology  Contact information:  [address]  [address]  [facility]  [place] [address]  [phone]             Romeo Alas MD. Schedule an appointment as soon as possible for a visit in 2 weeks.    Specialty:  Family Medicine  Why:  If symptoms worsen, As needed  Contact information:  [address]  [place] [address]  [phone]                 Patient Instructions:      Diet Cardiac     Notify your health care provider if you experience any of the following:  temperature >100.4     Notify your health care provider if you experience any of the following:  difficulty breathing or increased cough     Notify your health care provider if you experience any of the following:  persistent dizziness, light-headedness, or visual disturbances     Activity as tolerated       Significant Diagnostic Studies: Labs: All labs within the past 24 hours have been reviewed    Pending Diagnostic Studies:     None         Medications:  Reconciled Home Medications:      Medication List      CHANGE how you take these medications    fluticasone propionate 50 mcg/actuation nasal spray  Commonly known as:  FLONASE  2 sprays by Each Nare route once daily.  What changed:    · when to take this  · reasons to take this        CONTINUE taking these medications    amLODIPine 5 MG tablet  Commonly known as:  NORVASC  Take 5 mg by mouth  once daily.     aspirin 81 mg Tab  Take 1 tablet by mouth every evening. Every day     biotin 5 mg Cap  Take 1 tablet by mouth 2 (two) times daily.     CALCIUM-VITAMIN D ORAL  Take 600 mg by mouth 2 (two) times daily. Calcium 1200 with D 3 1000     coenzyme Q10 100 mg capsule  Take 100 mg by mouth once daily.     CRANBERRY PLUS VITAMIN C ORAL  Take 1 capsule by mouth once daily.     ENTRESTO  mg per tablet  Generic drug:  sacubitril-valsartan  Take 1 tablet by mouth 2 (two) times daily.     ferrous gluconate 324 MG tablet  Commonly known as:  FERGON  Take 324 mg by mouth 2 (two) times daily. At noon and in the evening     FUROSEMIDE ORAL  furosemide 60 mg tablet   Take 1 tablet every day by oral route.     gabapentin 100 MG capsule  Commonly known as:  NEURONTIN  Take 1 capsule (100 mg total) by mouth every evening.     hydroxychloroquine 200 mg tablet  Commonly known as:  PLAQUENIL  Take 1 tablet (200 mg total) by mouth once daily.     isosorbide dinitrate 10 MG tablet  Commonly known as:  ISORDIL  Take 10 mg by mouth 3 (three) times daily.     KRILL -19-50-50 mg Cap  Generic drug:  krill-omega-3-dha-epa-lipids  Take 1,000 mg by mouth once daily. Braxton krill 300mg qd     magnesium oxide 400 mg (241.3 mg magnesium) tablet  Commonly known as:  MAG-OX  Take 400 mg by mouth 2 (two) times daily.     metOLazone 2.5 MG tablet  Commonly known as:  ZAROXOLYN  Take 2.5 mg by mouth every Mon, Wed, Fri.     metoprolol succinate 25 MG 24 hr tablet  Commonly known as:  TOPROL-XL  take ONE-HALF tab BY MOUTH EVERY DAY     NITROLINGUAL 400 mcg/spray spray  Generic drug:  nitroGLYCERIN 0.4 MG/DOSE TL SPRY  Place 1 spray under the tongue every 5 (five) minutes as needed. PRN     ONE-A-DAY 50 PLUS tablet  Generic drug:  geriatric multivitamin-min  Take 1 tablet by mouth once daily. Every day     pantoprazole 40 MG tablet  Commonly known as:  PROTONIX  Take 40 mg by mouth 2 (two) times daily.     potassium chloride SA 10  MEQ tablet  Commonly known as:  K-DUR,KLOR-CON  Take 10 mEq by mouth 2 (two) times daily.     PROBIOTIC 10 billion cell Cap  Generic drug:  Lactobacillus acidophilus  Take 1 each by mouth once daily. 1.5 billion     promethazine 25 MG tablet  Commonly known as:  PHENERGAN  Take 1 tablet (25 mg total) by mouth 2 (two) times daily as needed for Nausea.     sertraline 50 MG tablet  Commonly known as:  ZOLOFT  TAKE 1 TABLET (50 MG TOTAL) BY MOUTH ONCE DAILY.     STOOL SOFTENER ORAL  Take 200 mg by mouth every evening.     SUPER B COMPLEX ORAL  Take 1 tablet by mouth once daily.     traMADol 50 mg tablet  Commonly known as:  ULTRAM  1-2 po bid prn     vitamin E 400 unit Tab  Take 400 mg by mouth once daily. Every day            Indwelling Lines/Drains at time of discharge:   Lines/Drains/Airways          None          Time spent on the discharge of patient: 33 minutes  Patient was seen and examined on the date of discharge and determined to be suitable for discharge.         Quincy Liu MD  Department of Hospital Medicine  [facility]

## 2019-08-08 NOTE — HOSPITAL COURSE
83-year-old white female was admitted with congestive heart failure and weakness.  Over the hospital stay, patient was given IV furosemide b.i.d. yesterday and it improved her BNP as well as her shortness of breath.  Potassium was replaced.  Patient still complained of slight weakness.  No chest pain, no shortness of breath today, no headache, patient was evaluated by Dr. Blair and recommended discharge today.  Patient will be staying with her sister for couple of days before going back home.

## 2019-08-09 NOTE — PLAN OF CARE
08/09/19 0957   Final Note   Assessment Type Final Discharge Note   Anticipated Discharge Disposition Home     Pt discharged home on 8/8/2019 with no needs.

## 2019-08-12 DIAGNOSIS — N18.9 CHRONIC KIDNEY DISEASE, UNSPECIFIED CKD STAGE: ICD-10-CM

## 2019-08-28 ENCOUNTER — OFFICE VISIT (OUTPATIENT)
Dept: RHEUMATOLOGY | Facility: CLINIC | Age: 84
End: 2019-08-28
Payer: MEDICARE

## 2019-08-28 VITALS — DIASTOLIC BLOOD PRESSURE: 73 MMHG | BODY MASS INDEX: 29.05 KG/M2 | WEIGHT: 99.19 LBS | SYSTOLIC BLOOD PRESSURE: 123 MMHG

## 2019-08-28 DIAGNOSIS — M35.00 SJOGREN'S SYNDROME, WITH UNSPECIFIED ORGAN INVOLVEMENT: ICD-10-CM

## 2019-08-28 PROCEDURE — 99213 PR OFFICE/OUTPT VISIT, EST, LEVL III, 20-29 MIN: ICD-10-PCS | Mod: S$GLB,,, | Performed by: INTERNAL MEDICINE

## 2019-08-28 PROCEDURE — 99213 OFFICE O/P EST LOW 20 MIN: CPT | Mod: S$GLB,,, | Performed by: INTERNAL MEDICINE

## 2019-08-28 RX ORDER — DEXLANSOPRAZOLE 60 MG/1
60 CAPSULE, DELAYED RELEASE ORAL DAILY
Refills: 11 | COMMUNITY
Start: 2019-08-14

## 2019-08-28 RX ORDER — TRAMADOL HYDROCHLORIDE 50 MG/1
TABLET ORAL
Qty: 120 TABLET | Refills: 3 | Status: SHIPPED | OUTPATIENT
Start: 2019-08-28 | End: 2019-10-01 | Stop reason: CLARIF

## 2019-08-28 RX ORDER — HYDROXYCHLOROQUINE SULFATE 200 MG/1
200 TABLET, FILM COATED ORAL DAILY
Qty: 30 TABLET | Refills: 3 | Status: ON HOLD | OUTPATIENT
Start: 2019-08-28 | End: 2020-06-03

## 2019-08-28 RX ORDER — ONDANSETRON 4 MG/1
4 TABLET, FILM COATED ORAL EVERY 8 HOURS PRN
Refills: 2 | COMMUNITY
Start: 2019-08-23

## 2019-08-28 NOTE — PROGRESS NOTES
[facility] RHEUMATOLOGY            PROGRESS NOTE      Subjective:       Patient ID:   NAME: Cheyanne Gomes : 1935     83 y.o. female    Referring Doc: No ref. provider found  Other Physicians:    Chief Complaint:  Sjogren's syndrome      History of Present Illness:     Patient returns today for a regularly scheduled follow-up visit for  Sjogren's     The patient has been  in the hospital since her last office visit for CHF. No chronic cough.No rashes. No chest pains  + fatigue        ROS:   GEN:  No  fever, night sweats . weight is stable   + fatigue  SKIN: no rashes, no bruising, no ulcerations, no Raynaud's  HEENT: no HA's, No visual changes, no mucosal ulcers, + sicca symptoms,  CV:   no CP, SOB, PND, +SALINAS, no orthopnea, no palpitations  PULM: normal with no SOB, cough, hemoptysis, sputum or pleuritic pain  GI:  no abdominal pain, nausea, vomiting, constipation, diarrhea, melanotic stools, BRBPR, hematemesis, no dysphagia  :   no dysuria  NEURO: no paresthesias, headaches, visual disturbances, muscle weakness  MUSCULOSKELETAL:no joint swelling, prolonged AM stiffness, + back pain, + muscle pain  Allergies:  Review of patient's allergies indicates:   Allergen Reactions    Macrodantin  [nitrofurantoin macrocrystalline]      Other reaction(s): very sickly    Penicillins      Other reaction(s): swelling  Other reaction(s): red skin discolorati    Sulfa (sulfonamide antibiotics)      Other reaction(s): very sickly       Medications:    Current Outpatient Medications:     amLODIPine (NORVASC) 5 MG tablet, Take 5 mg by mouth once daily. , Disp: , Rfl: 5    aspirin 81 mg Tab, Take 1 tablet by mouth every evening. Every day, Disp: , Rfl:     biotin 5 mg Cap, Take 1 tablet by mouth 2 (two) times daily., Disp: , Rfl:     CALCIUM CARB/VIT D3/MINERALS (CALCIUM-VITAMIN D ORAL), Take 600 mg by mouth 2 (two) times daily. Calcium 1200 with D 3 1000, Disp: , Rfl:     coenzyme Q10 100 mg capsule, Take 100 mg  by mouth once daily., Disp: , Rfl:     CRANBERRY CONC/ASCORBIC ACID (CRANBERRY PLUS VITAMIN C ORAL), Take 1 capsule by mouth once daily., Disp: , Rfl:     DEXILANT 60 mg capsule, Take 1 capsule(s) every day by oral route in the morning for 30 days., Disp: , Rfl: 11    DOCUSATE CALCIUM (STOOL SOFTENER ORAL), Take 200 mg by mouth every evening. , Disp: , Rfl:     ENTRESTO  mg per tablet, Take 1 tablet by mouth 2 (two) times daily. , Disp: , Rfl:     FERROUS FUMARATE/VIT BCOMP,C (SUPER B COMPLEX ORAL), Take 1 tablet by mouth once daily., Disp: , Rfl:     ferrous gluconate (FERGON) 324 MG tablet, Take 324 mg by mouth 2 (two) times daily. At noon and in the evening, Disp: , Rfl:     fluticasone (FLONASE) 50 mcg/actuation nasal spray, 2 sprays by Each Nare route once daily. (Patient taking differently: 2 sprays by Each Nare route daily as needed. ), Disp: 16 g, Rfl: 0    FUROSEMIDE ORAL, furosemide 60 mg tablet  Take 1 tablet every day by oral route., Disp: , Rfl:     gabapentin (NEURONTIN) 100 MG capsule, Take 1 capsule (100 mg total) by mouth every evening., Disp: 30 capsule, Rfl: 3    hydroxychloroquine (PLAQUENIL) 200 mg tablet, Take 1 tablet (200 mg total) by mouth once daily., Disp: 30 tablet, Rfl: 3    isosorbide dinitrate (ISORDIL) 10 MG tablet, Take 10 mg by mouth 3 (three) times daily., Disp: , Rfl: 5    krill-omega-3-dha-epa-lipids (KRILL OIL) 482-18-60-50 mg Cap, Take 1,000 mg by mouth once daily. Braxton krill 300mg qd , Disp: , Rfl:     lactobacillus acidophilus (PROBIOTIC) 10 billion cell Cap, Take 1 each by mouth once daily. 1.5 billion, Disp: , Rfl:     magnesium oxide (MAG-OX) 400 mg tablet, Take 400 mg by mouth 2 (two) times daily., Disp: , Rfl:     metOLazone (ZAROXOLYN) 2.5 MG tablet, Take 2.5 mg by mouth every Mon, Wed, Fri., Disp: , Rfl:     metoprolol succinate (TOPROL-XL) 25 MG 24 hr tablet, take ONE-HALF tab BY MOUTH EVERY DAY, Disp: , Rfl: 4    MULTIVITAMIN WITH MINERALS  (ONE-A-DAY 50 PLUS) Tab, Take 1 tablet by mouth once daily. Every day, Disp: , Rfl:     nitroGLYCERIN (NITROLINGUAL) 0.4 mg/dose spray, Place 1 spray under the tongue every 5 (five) minutes as needed. PRN, Disp: , Rfl:     ondansetron (ZOFRAN) 4 MG tablet, Take 1 tablet(s) 3 times a day by oral route as needed for 15 days., Disp: , Rfl: 2    potassium chloride SA (K-DUR,KLOR-CON) 10 MEQ tablet, Take 10 mEq by mouth 2 (two) times daily. , Disp: , Rfl: 3    promethazine (PHENERGAN) 25 MG tablet, Take 1 tablet (25 mg total) by mouth 2 (two) times daily as needed for Nausea., Disp: 60 tablet, Rfl: 1    sertraline (ZOLOFT) 50 MG tablet, TAKE 1 TABLET (50 MG TOTAL) BY MOUTH ONCE DAILY., Disp: 30 tablet, Rfl: 10    traMADol (ULTRAM) 50 mg tablet, 1-2 po bid prn, Disp: 120 tablet, Rfl: 3    vitamin E 400 unit Tab, Take 400 mg by mouth once daily. Every day, Disp: , Rfl:     PMHx/PSHx Updates:          Objective:     Vitals:  Blood pressure 123/73, weight 45 kg (99 lb 3.2 oz).    Physical Examination:   GEN: no apparent distress, comfortable; AAOx3  SKIN: no rashes,no ulceration, no Raynaud's, no petechiae, no SQ nodules,  HEAD: normal  EYES: no pallor, no icterus,   ENT:  ,+ mucosal dryness or ulcerations  NECK: no masses, thyroid normal, trachea midline, no LAD/LN's, supple  CV: RRR with no murmur; l S1 and S2 reg. ,no gallop no rubs,   CHEST: Normal respiratory effort; CTAB; normal breath sounds; no wheeze or crackles  ABDOM: nontender ; soft; no masses; no rebound/guarding  MUSC/Skeletal: ROM normal; no crepitus; joints without synovitis,  no deformities  No joint swelling or tenderness of PIP, MCP, wrist, elbow, shoulder, or knee joints  EXTREM: no clubbing, cyanosis, no edema,normal  pulses   NEURO: grossly intact; motor WNL; AAOx3; ,  PSYCH: normal mood, affect and behavior  LYMPH: normal cervical, supraclavicular          Labs:   Lab Results   Component Value Date    WBC 5.25 08/08/2019    HGB 11.5 (L)  08/08/2019    HCT 34.8 (L) 08/08/2019    MCV 90 08/08/2019     08/08/2019    CMP  @LASTLAB(NA,K,CL,CO2,GLU,BUN,Creatinine,Calcium,PROT,Albumin,Bilitot,Alkphos,AST,ALT,CRP,ESR,RF,CCP,VANCE,SSA,CPK,uric acid) )@  I have reviewed all available lab results and radiology reports.    Radiology/Diagnostic Studies:        Assessment/Plan:   (1) 83 y.o. female with diagnosis of  Sjogren's syndrome. She is stable  Reviewed recent lab work. CBC CMP okay.              Discussion:     I have explained all of the above in detail and the patient understands all of the current recommendation(s). I have answered all questions to the best of my ability and to their complete satisfaction.       The patient is to continue with the current management plan         RTC in   4 months      Electronically signed by Deepak Erazo MD

## 2019-08-31 ENCOUNTER — EXTERNAL CHRONIC CARE MANAGEMENT (OUTPATIENT)
Dept: PRIMARY CARE CLINIC | Facility: CLINIC | Age: 84
End: 2019-08-31
Payer: MEDICARE

## 2019-08-31 PROCEDURE — 99490 CHRNC CARE MGMT STAFF 1ST 20: CPT | Mod: S$PBB,,, | Performed by: FAMILY MEDICINE

## 2019-08-31 PROCEDURE — 99490 PR CHRONIC CARE MGMT, 1ST 20 MIN: ICD-10-PCS | Mod: S$PBB,,, | Performed by: FAMILY MEDICINE

## 2019-08-31 PROCEDURE — 99490 CHRNC CARE MGMT STAFF 1ST 20: CPT | Mod: PBBFAC,PO | Performed by: FAMILY MEDICINE

## 2019-09-11 ENCOUNTER — TELEPHONE (OUTPATIENT)
Dept: HEMATOLOGY/ONCOLOGY | Facility: CLINIC | Age: 84
End: 2019-09-11

## 2019-09-11 DIAGNOSIS — D64.9 SYMPTOMATIC ANEMIA: Primary | ICD-10-CM

## 2019-09-11 RX ORDER — FUROSEMIDE 10 MG/ML
40 INJECTION INTRAMUSCULAR; INTRAVENOUS
Status: CANCELLED | OUTPATIENT
Start: 2019-09-11

## 2019-09-11 RX ORDER — HYDROCODONE BITARTRATE AND ACETAMINOPHEN 500; 5 MG/1; MG/1
TABLET ORAL ONCE
Status: CANCELLED | OUTPATIENT
Start: 2019-09-11 | End: 2019-09-11

## 2019-09-11 RX ORDER — ACETAMINOPHEN 325 MG/1
650 TABLET ORAL
Status: CANCELLED | OUTPATIENT
Start: 2019-09-11

## 2019-09-11 NOTE — TELEPHONE ENCOUNTER
Spoke to Lizabeth Ivory about getting a blood transfusion set up for Ms. Du. Ana Laura will type and cross tomorrow and transfuse the 2 units Friday. She will be staying at her daughter [facility] here the telephone number is [phone]

## 2019-09-12 ENCOUNTER — LAB VISIT (OUTPATIENT)
Dept: LAB | Facility: HOSPITAL | Age: 84
End: 2019-09-12
Attending: INTERNAL MEDICINE
Payer: MEDICARE

## 2019-09-12 DIAGNOSIS — D64.9 SYMPTOMATIC ANEMIA: ICD-10-CM

## 2019-09-12 LAB
ABO + RH BLD: NORMAL
BLD GP AB SCN CELLS X3 SERPL QL: NORMAL

## 2019-09-12 PROCEDURE — 36415 COLL VENOUS BLD VENIPUNCTURE: CPT

## 2019-09-12 PROCEDURE — 86850 RBC ANTIBODY SCREEN: CPT

## 2019-09-12 PROCEDURE — 86920 COMPATIBILITY TEST SPIN: CPT

## 2019-09-13 ENCOUNTER — INFUSION (OUTPATIENT)
Dept: INFUSION THERAPY | Facility: HOSPITAL | Age: 84
End: 2019-09-13
Attending: INTERNAL MEDICINE
Payer: MEDICARE

## 2019-09-13 VITALS
SYSTOLIC BLOOD PRESSURE: 153 MMHG | DIASTOLIC BLOOD PRESSURE: 84 MMHG | TEMPERATURE: 98 F | OXYGEN SATURATION: 97 % | RESPIRATION RATE: 18 BRPM | HEART RATE: 59 BPM

## 2019-09-13 DIAGNOSIS — R74.8 ELEVATED LIVER ENZYMES: ICD-10-CM

## 2019-09-13 DIAGNOSIS — K74.00 LIVER FIBROSIS: ICD-10-CM

## 2019-09-13 DIAGNOSIS — D64.9 SYMPTOMATIC ANEMIA: ICD-10-CM

## 2019-09-13 LAB
BLD PROD TYP BPU: NORMAL
BLOOD UNIT EXPIRATION DATE: NORMAL
BLOOD UNIT TYPE CODE: 5100
BLOOD UNIT TYPE: NORMAL
CODING SYSTEM: NORMAL
DISPENSE STATUS: NORMAL
NUM UNITS TRANS PACKED RBC: NORMAL

## 2019-09-13 PROCEDURE — 25000003 PHARM REV CODE 250: Performed by: INTERNAL MEDICINE

## 2019-09-13 PROCEDURE — 36430 TRANSFUSION BLD/BLD COMPNT: CPT

## 2019-09-13 PROCEDURE — P9016 RBC LEUKOCYTES REDUCED: HCPCS

## 2019-09-13 PROCEDURE — 96374 THER/PROPH/DIAG INJ IV PUSH: CPT

## 2019-09-13 PROCEDURE — 63600175 PHARM REV CODE 636 W HCPCS: Performed by: INTERNAL MEDICINE

## 2019-09-13 RX ORDER — ACETAMINOPHEN 325 MG/1
650 TABLET ORAL
Status: COMPLETED | OUTPATIENT
Start: 2019-09-13 | End: 2019-09-13

## 2019-09-13 RX ORDER — HYDROCODONE BITARTRATE AND ACETAMINOPHEN 500; 5 MG/1; MG/1
TABLET ORAL ONCE
Status: DISCONTINUED | OUTPATIENT
Start: 2019-09-13 | End: 2019-09-29 | Stop reason: HOSPADM

## 2019-09-13 RX ORDER — FUROSEMIDE 10 MG/ML
40 INJECTION INTRAMUSCULAR; INTRAVENOUS
Status: DISCONTINUED | OUTPATIENT
Start: 2019-09-13 | End: 2019-10-04 | Stop reason: HOSPADM

## 2019-09-13 RX ADMIN — ACETAMINOPHEN 650 MG: 325 TABLET ORAL at 07:09

## 2019-09-13 RX ADMIN — FUROSEMIDE 40 MG: 10 INJECTION, SOLUTION INTRAMUSCULAR; INTRAVENOUS at 11:09

## 2019-09-13 NOTE — PROGRESS NOTES
1130 completed prbc's without incident. Vsstable. Saline lock d/c'd with catheter intact. D/c'd home with family.

## 2019-09-27 ENCOUNTER — HOSPITAL ENCOUNTER (OUTPATIENT)
Facility: HOSPITAL | Age: 84
Discharge: HOME | End: 2019-09-29
Attending: EMERGENCY MEDICINE | Admitting: INTERNAL MEDICINE
Payer: MEDICARE

## 2019-09-27 DIAGNOSIS — I50.9 CHF (CONGESTIVE HEART FAILURE): Primary | ICD-10-CM

## 2019-09-27 DIAGNOSIS — R07.9 CHEST PAIN: ICD-10-CM

## 2019-09-27 PROBLEM — R06.02 SHORTNESS OF BREATH: Status: ACTIVE | Noted: 2019-09-27

## 2019-09-27 LAB
ALBUMIN SERPL BCP-MCNC: 3.9 G/DL (ref 3.5–5.2)
ALP SERPL-CCNC: 69 U/L (ref 55–135)
ALT SERPL W/O P-5'-P-CCNC: 32 U/L (ref 10–44)
ANION GAP SERPL CALC-SCNC: 8 MMOL/L (ref 8–16)
AST SERPL-CCNC: 84 U/L (ref 10–40)
BASOPHILS # BLD AUTO: 0.06 K/UL (ref 0–0.2)
BASOPHILS NFR BLD: 0.9 % (ref 0–1.9)
BILIRUB SERPL-MCNC: 0.6 MG/DL (ref 0.1–1)
BNP SERPL-MCNC: 1750 PG/ML (ref 0–99)
BUN SERPL-MCNC: 45 MG/DL (ref 8–23)
CALCIUM SERPL-MCNC: 9 MG/DL (ref 8.7–10.5)
CHLORIDE SERPL-SCNC: 96 MMOL/L (ref 95–110)
CO2 SERPL-SCNC: 32 MMOL/L (ref 23–29)
CREAT SERPL-MCNC: 1.8 MG/DL (ref 0.5–1.4)
DIFFERENTIAL METHOD: ABNORMAL
EOSINOPHIL # BLD AUTO: 0.2 K/UL (ref 0–0.5)
EOSINOPHIL NFR BLD: 2.3 % (ref 0–8)
ERYTHROCYTE [DISTWIDTH] IN BLOOD BY AUTOMATED COUNT: 13.5 % (ref 11.5–14.5)
EST. GFR  (AFRICAN AMERICAN): 29.6 ML/MIN/1.73 M^2
EST. GFR  (NON AFRICAN AMERICAN): 25.7 ML/MIN/1.73 M^2
GLUCOSE SERPL-MCNC: 94 MG/DL (ref 70–110)
HCT VFR BLD AUTO: 30.2 % (ref 37–48.5)
HGB BLD-MCNC: 9.7 G/DL (ref 12–16)
IMM GRANULOCYTES # BLD AUTO: 0.03 K/UL (ref 0–0.04)
IMM GRANULOCYTES NFR BLD AUTO: 0.4 % (ref 0–0.5)
INR PPP: 1.1
LYMPHOCYTES # BLD AUTO: 1.4 K/UL (ref 1–4.8)
LYMPHOCYTES NFR BLD: 19.3 % (ref 18–48)
MCH RBC QN AUTO: 29.8 PG (ref 27–31)
MCHC RBC AUTO-ENTMCNC: 32.1 G/DL (ref 32–36)
MCV RBC AUTO: 93 FL (ref 82–98)
MONOCYTES # BLD AUTO: 0.5 K/UL (ref 0.3–1)
MONOCYTES NFR BLD: 7.4 % (ref 4–15)
NEUTROPHILS # BLD AUTO: 4.9 K/UL (ref 1.8–7.7)
NEUTROPHILS NFR BLD: 69.7 % (ref 38–73)
NRBC BLD-RTO: 0 /100 WBC
PLATELET # BLD AUTO: 121 K/UL (ref 150–350)
PMV BLD AUTO: 10.6 FL (ref 9.2–12.9)
POTASSIUM SERPL-SCNC: 4.1 MMOL/L (ref 3.5–5.1)
PROT SERPL-MCNC: 6.5 G/DL (ref 6–8.4)
PROTHROMBIN TIME: 13.3 SEC (ref 11.7–14)
RBC # BLD AUTO: 3.25 M/UL (ref 4–5.4)
SODIUM SERPL-SCNC: 136 MMOL/L (ref 136–145)
TROPONIN I SERPL DL<=0.01 NG/ML-MCNC: 0.53 NG/ML (ref 0.02–0.04)
WBC # BLD AUTO: 7.03 K/UL (ref 3.9–12.7)

## 2019-09-27 PROCEDURE — 99285 EMERGENCY DEPT VISIT HI MDM: CPT | Mod: 25

## 2019-09-27 PROCEDURE — 27000221 HC OXYGEN, UP TO 24 HOURS

## 2019-09-27 PROCEDURE — G0378 HOSPITAL OBSERVATION PER HR: HCPCS

## 2019-09-27 PROCEDURE — 80053 COMPREHEN METABOLIC PANEL: CPT

## 2019-09-27 PROCEDURE — 93005 ELECTROCARDIOGRAM TRACING: CPT

## 2019-09-27 PROCEDURE — 84484 ASSAY OF TROPONIN QUANT: CPT

## 2019-09-27 PROCEDURE — 85610 PROTHROMBIN TIME: CPT

## 2019-09-27 PROCEDURE — 83880 ASSAY OF NATRIURETIC PEPTIDE: CPT

## 2019-09-27 PROCEDURE — 94760 N-INVAS EAR/PLS OXIMETRY 1: CPT

## 2019-09-27 PROCEDURE — 85025 COMPLETE CBC W/AUTO DIFF WBC: CPT

## 2019-09-27 RX ORDER — GABAPENTIN 100 MG/1
100 CAPSULE ORAL NIGHTLY
Status: DISCONTINUED | OUTPATIENT
Start: 2019-09-27 | End: 2019-09-29 | Stop reason: HOSPADM

## 2019-09-27 RX ORDER — ONDANSETRON 4 MG/1
4 TABLET, ORALLY DISINTEGRATING ORAL EVERY 6 HOURS PRN
Status: DISCONTINUED | OUTPATIENT
Start: 2019-09-27 | End: 2019-09-29 | Stop reason: HOSPADM

## 2019-09-27 RX ORDER — NAPROXEN SODIUM 220 MG/1
81 TABLET, FILM COATED ORAL NIGHTLY
Status: DISCONTINUED | OUTPATIENT
Start: 2019-09-27 | End: 2019-09-29 | Stop reason: HOSPADM

## 2019-09-27 RX ORDER — ISOSORBIDE DINITRATE 10 MG/1
10 TABLET ORAL 3 TIMES DAILY
Status: DISCONTINUED | OUTPATIENT
Start: 2019-09-27 | End: 2019-09-29 | Stop reason: HOSPADM

## 2019-09-27 RX ORDER — AMLODIPINE BESYLATE 5 MG/1
5 TABLET ORAL DAILY
Status: DISCONTINUED | OUTPATIENT
Start: 2019-09-28 | End: 2019-09-29 | Stop reason: HOSPADM

## 2019-09-27 RX ORDER — TEMAZEPAM 7.5 MG/1
15 CAPSULE ORAL NIGHTLY PRN
COMMUNITY
End: 2022-02-25

## 2019-09-27 RX ORDER — PROMETHAZINE HYDROCHLORIDE 25 MG/1
25 TABLET ORAL EVERY 4 HOURS PRN
Status: DISCONTINUED | OUTPATIENT
Start: 2019-09-27 | End: 2019-09-29 | Stop reason: HOSPADM

## 2019-09-27 RX ORDER — FERROUS SULFATE 325(65) MG
325 TABLET ORAL 2 TIMES DAILY
Status: DISCONTINUED | OUTPATIENT
Start: 2019-09-27 | End: 2019-09-29 | Stop reason: HOSPADM

## 2019-09-27 RX ORDER — POTASSIUM CHLORIDE 750 MG/1
10 TABLET, EXTENDED RELEASE ORAL NIGHTLY
COMMUNITY
End: 2020-01-24 | Stop reason: SDUPTHER

## 2019-09-27 RX ORDER — ZOLPIDEM TARTRATE 5 MG/1
5 TABLET ORAL NIGHTLY PRN
Status: DISCONTINUED | OUTPATIENT
Start: 2019-09-27 | End: 2019-09-29 | Stop reason: HOSPADM

## 2019-09-27 RX ORDER — SODIUM CHLORIDE 0.9 % (FLUSH) 0.9 %
10 SYRINGE (ML) INJECTION
Status: DISCONTINUED | OUTPATIENT
Start: 2019-09-27 | End: 2019-09-29 | Stop reason: HOSPADM

## 2019-09-27 RX ORDER — PANTOPRAZOLE SODIUM 40 MG/1
40 TABLET, DELAYED RELEASE ORAL DAILY
Status: ON HOLD | COMMUNITY
End: 2020-06-03

## 2019-09-27 RX ORDER — PANTOPRAZOLE SODIUM 40 MG/1
40 TABLET, DELAYED RELEASE ORAL DAILY
Status: DISCONTINUED | OUTPATIENT
Start: 2019-09-28 | End: 2019-09-29 | Stop reason: HOSPADM

## 2019-09-27 RX ORDER — HYDROXYCHLOROQUINE SULFATE 200 MG/1
200 TABLET, FILM COATED ORAL DAILY
Status: DISCONTINUED | OUTPATIENT
Start: 2019-09-28 | End: 2019-09-29 | Stop reason: HOSPADM

## 2019-09-27 RX ORDER — LANOLIN ALCOHOL/MO/W.PET/CERES
400 CREAM (GRAM) TOPICAL 2 TIMES DAILY
Status: DISCONTINUED | OUTPATIENT
Start: 2019-09-27 | End: 2019-09-29 | Stop reason: HOSPADM

## 2019-09-27 RX ORDER — DOCUSATE CALCIUM 240 MG
240 CAPSULE ORAL DAILY
Status: DISCONTINUED | OUTPATIENT
Start: 2019-09-28 | End: 2019-09-29 | Stop reason: HOSPADM

## 2019-09-27 RX ORDER — ACETAMINOPHEN 325 MG/1
650 TABLET ORAL EVERY 4 HOURS PRN
Status: DISCONTINUED | OUTPATIENT
Start: 2019-09-27 | End: 2019-09-29 | Stop reason: HOSPADM

## 2019-09-27 RX ORDER — FUROSEMIDE 40 MG/1
40 TABLET ORAL 2 TIMES DAILY
Status: ON HOLD | COMMUNITY
End: 2019-10-04 | Stop reason: SDUPTHER

## 2019-09-27 RX ORDER — METOLAZONE 2.5 MG/1
2.5 TABLET ORAL
Status: DISCONTINUED | OUTPATIENT
Start: 2019-09-30 | End: 2019-09-28

## 2019-09-27 RX ORDER — FUROSEMIDE 40 MG/1
40 TABLET ORAL 2 TIMES DAILY
Status: DISCONTINUED | OUTPATIENT
Start: 2019-09-28 | End: 2019-09-29 | Stop reason: HOSPADM

## 2019-09-27 RX ORDER — FLUTICASONE PROPIONATE 50 MCG
2 SPRAY, SUSPENSION (ML) NASAL DAILY
Status: DISCONTINUED | OUTPATIENT
Start: 2019-09-28 | End: 2019-09-29 | Stop reason: HOSPADM

## 2019-09-27 RX ORDER — NITROGLYCERIN 400 UG/1
1 SPRAY ORAL EVERY 5 MIN PRN
Status: DISCONTINUED | OUTPATIENT
Start: 2019-09-27 | End: 2019-09-29 | Stop reason: HOSPADM

## 2019-09-27 RX ORDER — SERTRALINE HYDROCHLORIDE 50 MG/1
50 TABLET, FILM COATED ORAL DAILY
Status: DISCONTINUED | OUTPATIENT
Start: 2019-09-28 | End: 2019-09-29 | Stop reason: HOSPADM

## 2019-09-28 ENCOUNTER — CLINICAL SUPPORT (OUTPATIENT)
Dept: CARDIOLOGY | Facility: HOSPITAL | Age: 84
End: 2019-09-28
Attending: INTERNAL MEDICINE
Payer: MEDICARE

## 2019-09-28 PROBLEM — I50.9 ACUTE ON CHRONIC HEART FAILURE: Status: ACTIVE | Noted: 2019-09-28

## 2019-09-28 LAB
ALBUMIN SERPL BCP-MCNC: 3.6 G/DL (ref 3.5–5.2)
ALP SERPL-CCNC: 62 U/L (ref 55–135)
ALT SERPL W/O P-5'-P-CCNC: 28 U/L (ref 10–44)
ANION GAP SERPL CALC-SCNC: 11 MMOL/L (ref 8–16)
ANION GAP SERPL CALC-SCNC: 13 MMOL/L (ref 8–16)
AST SERPL-CCNC: 53 U/L (ref 10–40)
BASOPHILS # BLD AUTO: 0.06 K/UL (ref 0–0.2)
BASOPHILS NFR BLD: 1.3 % (ref 0–1.9)
BILIRUB SERPL-MCNC: 1 MG/DL (ref 0.1–1)
BUN SERPL-MCNC: 44 MG/DL (ref 8–23)
BUN SERPL-MCNC: 44 MG/DL (ref 8–23)
CALCIUM SERPL-MCNC: 8.9 MG/DL (ref 8.7–10.5)
CALCIUM SERPL-MCNC: 9.1 MG/DL (ref 8.7–10.5)
CHLORIDE SERPL-SCNC: 94 MMOL/L (ref 95–110)
CHLORIDE SERPL-SCNC: 95 MMOL/L (ref 95–110)
CK MB SERPL-MCNC: 10.5 NG/ML (ref 0.1–6.5)
CK MB SERPL-MCNC: 8.5 NG/ML (ref 0.1–6.5)
CO2 SERPL-SCNC: 30 MMOL/L (ref 23–29)
CO2 SERPL-SCNC: 31 MMOL/L (ref 23–29)
CREAT SERPL-MCNC: 1.5 MG/DL (ref 0.5–1.4)
CREAT SERPL-MCNC: 1.6 MG/DL (ref 0.5–1.4)
DIFFERENTIAL METHOD: ABNORMAL
EOSINOPHIL # BLD AUTO: 0.2 K/UL (ref 0–0.5)
EOSINOPHIL NFR BLD: 4.3 % (ref 0–8)
ERYTHROCYTE [DISTWIDTH] IN BLOOD BY AUTOMATED COUNT: 13.3 % (ref 11.5–14.5)
EST. GFR  (AFRICAN AMERICAN): 34.1 ML/MIN/1.73 M^2
EST. GFR  (AFRICAN AMERICAN): 36.9 ML/MIN/1.73 M^2
EST. GFR  (NON AFRICAN AMERICAN): 29.6 ML/MIN/1.73 M^2
EST. GFR  (NON AFRICAN AMERICAN): 32 ML/MIN/1.73 M^2
GLUCOSE SERPL-MCNC: 73 MG/DL (ref 70–110)
GLUCOSE SERPL-MCNC: 83 MG/DL (ref 70–110)
HCT VFR BLD AUTO: 29.7 % (ref 37–48.5)
HGB BLD-MCNC: 9.5 G/DL (ref 12–16)
IMM GRANULOCYTES # BLD AUTO: 0.01 K/UL (ref 0–0.04)
IMM GRANULOCYTES NFR BLD AUTO: 0.2 % (ref 0–0.5)
LYMPHOCYTES # BLD AUTO: 1.2 K/UL (ref 1–4.8)
LYMPHOCYTES NFR BLD: 25.9 % (ref 18–48)
MAGNESIUM SERPL-MCNC: 1.9 MG/DL (ref 1.6–2.6)
MCH RBC QN AUTO: 29.6 PG (ref 27–31)
MCHC RBC AUTO-ENTMCNC: 32 G/DL (ref 32–36)
MCV RBC AUTO: 93 FL (ref 82–98)
MONOCYTES # BLD AUTO: 0.4 K/UL (ref 0.3–1)
MONOCYTES NFR BLD: 9.4 % (ref 4–15)
NEUTROPHILS # BLD AUTO: 2.8 K/UL (ref 1.8–7.7)
NEUTROPHILS NFR BLD: 58.9 % (ref 38–73)
NRBC BLD-RTO: 0 /100 WBC
PHOSPHATE SERPL-MCNC: 4.1 MG/DL (ref 2.7–4.5)
PLATELET # BLD AUTO: 110 K/UL (ref 150–350)
PMV BLD AUTO: 10.5 FL (ref 9.2–12.9)
POTASSIUM SERPL-SCNC: 3 MMOL/L (ref 3.5–5.1)
POTASSIUM SERPL-SCNC: 3.2 MMOL/L (ref 3.5–5.1)
PROT SERPL-MCNC: 6.2 G/DL (ref 6–8.4)
RBC # BLD AUTO: 3.21 M/UL (ref 4–5.4)
SODIUM SERPL-SCNC: 137 MMOL/L (ref 136–145)
SODIUM SERPL-SCNC: 137 MMOL/L (ref 136–145)
TROPONIN I SERPL DL<=0.01 NG/ML-MCNC: 0.57 NG/ML (ref 0.02–0.04)
WBC # BLD AUTO: 4.67 K/UL (ref 3.9–12.7)

## 2019-09-28 PROCEDURE — 84100 ASSAY OF PHOSPHORUS: CPT

## 2019-09-28 PROCEDURE — 84484 ASSAY OF TROPONIN QUANT: CPT

## 2019-09-28 PROCEDURE — 36415 COLL VENOUS BLD VENIPUNCTURE: CPT

## 2019-09-28 PROCEDURE — 82553 CREATINE MB FRACTION: CPT

## 2019-09-28 PROCEDURE — 93306 TTE W/DOPPLER COMPLETE: CPT

## 2019-09-28 PROCEDURE — 25000003 PHARM REV CODE 250: Performed by: STUDENT IN AN ORGANIZED HEALTH CARE EDUCATION/TRAINING PROGRAM

## 2019-09-28 PROCEDURE — 82553 CREATINE MB FRACTION: CPT | Mod: 91

## 2019-09-28 PROCEDURE — G0378 HOSPITAL OBSERVATION PER HR: HCPCS

## 2019-09-28 PROCEDURE — 85025 COMPLETE CBC W/AUTO DIFF WBC: CPT

## 2019-09-28 PROCEDURE — 93005 ELECTROCARDIOGRAM TRACING: CPT

## 2019-09-28 PROCEDURE — 25000242 PHARM REV CODE 250 ALT 637 W/ HCPCS: Performed by: NURSE PRACTITIONER

## 2019-09-28 PROCEDURE — 94761 N-INVAS EAR/PLS OXIMETRY MLT: CPT

## 2019-09-28 PROCEDURE — 25000003 PHARM REV CODE 250: Performed by: NURSE PRACTITIONER

## 2019-09-28 PROCEDURE — 25000003 PHARM REV CODE 250: Performed by: INTERNAL MEDICINE

## 2019-09-28 PROCEDURE — 83735 ASSAY OF MAGNESIUM: CPT

## 2019-09-28 PROCEDURE — 80053 COMPREHEN METABOLIC PANEL: CPT

## 2019-09-28 PROCEDURE — 27000221 HC OXYGEN, UP TO 24 HOURS

## 2019-09-28 PROCEDURE — 80048 BASIC METABOLIC PNL TOTAL CA: CPT

## 2019-09-28 RX ORDER — POTASSIUM CHLORIDE 20 MEQ/15ML
60 SOLUTION ORAL
Status: DISCONTINUED | OUTPATIENT
Start: 2019-09-28 | End: 2019-09-29 | Stop reason: HOSPADM

## 2019-09-28 RX ORDER — LANOLIN ALCOHOL/MO/W.PET/CERES
800 CREAM (GRAM) TOPICAL
Status: DISCONTINUED | OUTPATIENT
Start: 2019-09-28 | End: 2019-09-29 | Stop reason: HOSPADM

## 2019-09-28 RX ORDER — ENOXAPARIN SODIUM 100 MG/ML
40 INJECTION SUBCUTANEOUS EVERY 24 HOURS
Status: DISCONTINUED | OUTPATIENT
Start: 2019-09-28 | End: 2019-09-28

## 2019-09-28 RX ORDER — METOLAZONE 2.5 MG/1
2.5 TABLET ORAL
Status: DISCONTINUED | OUTPATIENT
Start: 2019-09-28 | End: 2019-09-29 | Stop reason: HOSPADM

## 2019-09-28 RX ORDER — POTASSIUM CHLORIDE 20 MEQ/15ML
40 SOLUTION ORAL
Status: DISCONTINUED | OUTPATIENT
Start: 2019-09-28 | End: 2019-09-29 | Stop reason: HOSPADM

## 2019-09-28 RX ORDER — POTASSIUM CHLORIDE 20 MEQ/1
20 TABLET, EXTENDED RELEASE ORAL ONCE
Status: COMPLETED | OUTPATIENT
Start: 2019-09-28 | End: 2019-09-28

## 2019-09-28 RX ORDER — SODIUM,POTASSIUM PHOSPHATES 280-250MG
2 POWDER IN PACKET (EA) ORAL
Status: DISCONTINUED | OUTPATIENT
Start: 2019-09-28 | End: 2019-09-29 | Stop reason: HOSPADM

## 2019-09-28 RX ADMIN — ISOSORBIDE DINITRATE 10 MG: 10 TABLET ORAL at 01:09

## 2019-09-28 RX ADMIN — MAGNESIUM OXIDE 400 MG: 400 TABLET ORAL at 09:09

## 2019-09-28 RX ADMIN — POTASSIUM CHLORIDE 20 MEQ: 20 TABLET, EXTENDED RELEASE ORAL at 09:09

## 2019-09-28 RX ADMIN — FERROUS SULFATE TAB 325 MG (65 MG ELEMENTAL FE) 325 MG: 325 (65 FE) TAB at 09:09

## 2019-09-28 RX ADMIN — SERTRALINE HYDROCHLORIDE 50 MG: 50 TABLET ORAL at 09:09

## 2019-09-28 RX ADMIN — METOLAZONE 2.5 MG: 2.5 TABLET ORAL at 09:09

## 2019-09-28 RX ADMIN — MAGNESIUM OXIDE 400 MG: 400 TABLET ORAL at 01:09

## 2019-09-28 RX ADMIN — FERROUS SULFATE TAB 325 MG (65 MG ELEMENTAL FE) 325 MG: 325 (65 FE) TAB at 01:09

## 2019-09-28 RX ADMIN — SACUBITRIL AND VALSARTAN 2 TABLET: 49; 51 TABLET, FILM COATED ORAL at 08:09

## 2019-09-28 RX ADMIN — ASPIRIN 81 MG 81 MG: 81 TABLET ORAL at 09:09

## 2019-09-28 RX ADMIN — LACTOBACILLUS TAB 1 TABLET: TAB at 08:09

## 2019-09-28 RX ADMIN — GABAPENTIN 100 MG: 100 CAPSULE ORAL at 01:09

## 2019-09-28 RX ADMIN — HYDROXYCHLOROQUINE SULFATE 200 MG: 200 TABLET, FILM COATED ORAL at 08:09

## 2019-09-28 RX ADMIN — DOCUSATE CALCIUM 240 MG: 240 CAPSULE, LIQUID FILLED ORAL at 08:09

## 2019-09-28 RX ADMIN — PANTOPRAZOLE SODIUM 40 MG: 40 TABLET, DELAYED RELEASE ORAL at 08:09

## 2019-09-28 RX ADMIN — METOPROLOL SUCCINATE 12.5 MG: 25 TABLET, EXTENDED RELEASE ORAL at 08:09

## 2019-09-28 RX ADMIN — FERROUS SULFATE TAB 325 MG (65 MG ELEMENTAL FE) 325 MG: 325 (65 FE) TAB at 08:09

## 2019-09-28 RX ADMIN — ONDANSETRON 4 MG: 4 TABLET, ORALLY DISINTEGRATING ORAL at 07:09

## 2019-09-28 RX ADMIN — AMLODIPINE BESYLATE 5 MG: 5 TABLET ORAL at 08:09

## 2019-09-28 RX ADMIN — POTASSIUM CHLORIDE 60 MEQ: 20 SOLUTION ORAL at 05:09

## 2019-09-28 RX ADMIN — ZOLPIDEM TARTRATE 5 MG: 5 TABLET ORAL at 09:09

## 2019-09-28 RX ADMIN — ISOSORBIDE DINITRATE 10 MG: 10 TABLET ORAL at 08:09

## 2019-09-28 RX ADMIN — ISOSORBIDE DINITRATE 10 MG: 10 TABLET ORAL at 09:09

## 2019-09-28 RX ADMIN — ISOSORBIDE DINITRATE 10 MG: 10 TABLET ORAL at 03:09

## 2019-09-28 RX ADMIN — GABAPENTIN 100 MG: 100 CAPSULE ORAL at 09:09

## 2019-09-28 RX ADMIN — SACUBITRIL AND VALSARTAN 2 TABLET: 49; 51 TABLET, FILM COATED ORAL at 09:09

## 2019-09-28 RX ADMIN — MAGNESIUM OXIDE 400 MG: 400 TABLET ORAL at 08:09

## 2019-09-28 RX ADMIN — ASPIRIN 81 MG 81 MG: 81 TABLET ORAL at 01:09

## 2019-09-28 RX ADMIN — FUROSEMIDE 40 MG: 40 TABLET ORAL at 08:09

## 2019-09-28 RX ADMIN — FLUTICASONE PROPIONATE 100 MCG: 50 SPRAY, METERED NASAL at 08:09

## 2019-09-28 NOTE — PLAN OF CARE
09/27/19 2014   PRE-TX-O2   O2 Device (Oxygen Therapy) nasal cannula   Flow (L/min) 2   SpO2 96 %   Pulse Oximetry Type Intermittent

## 2019-09-28 NOTE — ASSESSMENT & PLAN NOTE
Due to acute and chronic systolic heart failure  Improved with IV lasix given at another facility  Does not appear to be significantly volume overload  May due to worsening anemia as well  Continue home medication including aspirin, entresto, amlodipine, lasix, metolazone, metoprolol, isosorbide  Continue PPI  Trend cardiac enzymes  Consult cardiology

## 2019-09-28 NOTE — PROGRESS NOTES
"[facility] Medicine  Progress Note    Patient Name: Cheyanne Gomes  MRN: [id number]  Patient Class: OP- Observation   Admission Date: 9/27/2019  Length of Stay: 0 days  Attending Physician: Tracey Bustillos DO  Primary Care Provider: Romeo Alas MD    Subjective:     Principal Problem:Shortness of breath    HPI:  The patient is an 83 year old  female with history of heart failure, CAD-sp CABG in 2000 and PCI in 2007, hypertension, hyperlipidemia, RA, and anemia due to chronic blood loss.  She presented to the emergency department with shortness of breath.  She was initially seen at [facility] in [place].  She was given Lasix IV.  She was noted to have elevated troponin at 0.61. She was subsequently transferred here for further evaluation and treatment.    She states that she has not been feeling well since this morning.  She reports severe "trouble breathing", worse with activities, associated with lightheadedness and nausea around 2:00 p.m..  She went to the emergency department in [place].  She denies weight gain.  She reports some swelling in the left leg ans slight swelling in her right leg. She denies orthopnea or PND.  She reports compliant with her medication.  She denies chest pain. She reports chronic abdominal discomfort, described as a bloating.  She denies vomiting or diarrhea.  She is taking iron supplement.  She states that she had blood transfusion earlier this month.    Repeat troponin here was 0.053 with BNP 1750.  Reviewed of records showed that she has  chronic baseline troponin elevation.    January 13, 2019 04:08 0.667 CH ng/ml   January 12, 2019 22:16 0.826 CH ng/ml   January 12, 2019 16:54 0.673 CH ng/ml   April 8, 2018 04:11 0.129 H ng/ml   April 7, 2018 00:24 0.215 H ng/ml   April 6, 2018 19:28 0.242 H ng/ml   April 6, 2018 12:58 0.236 H ng/ml    Echocardiogram on 1/13/2019:  Severe left ventral dysfunction with distal septal apical and " lateral wall hypokinesis. With overall left ventral ejection fraction of probably around 35-40%. Mild left atrial enlargement Right atrial chamber size is within normal limits Normal right ventricular size and function. Mild thickening of the aortic valve leaflets. Trace attic insufficiency. Mild thickening of mitral valve leaflets noted. Mild to Moderate mitral regurgitation. Tricuspid valve is structurally normal. Mild tricuspid regurgitation. The pulmonic valve was not well visualized. No evidence of any pericardial effusion. Aortic root dimension within normal limits.       Chest radiography here showed no acute pulmonary edema. EKG showed sinus bradycardia, left ventricular hypertrophy, no acute changes.  Currently she reports improvement in her symptoms.      Overview/Hospital Course:  9/28 Transferred from OSH yesterday for continued cardiac work up. Chronically elevated trops. ECHO performed this morning, read pending. Pt seen by cardiology today with recommendations to increase metalazone 3x week, follow up ECHO findings to determine Lexiscan v. Dobuatamine Stress test. Patient seen and examined. Chest pain resolved, SOB improved but still on 2LNC, tolerated diet without n/v or abdominal pain.     Interval History: Denies any CP, SOB, N/V/D, headaches, fever or chills.     Review of Systems   Per interval history, all other systems reviewed and negative.     Objective:     Vital Signs (Most Recent):  Temp: 97.6 °F (36.4 °C) (09/28/19 1100)  Pulse: (!) 58 (09/28/19 1100)  Resp: 20 (09/28/19 1100)  BP: (!) 127/58 (09/28/19 1100)  SpO2: 98 % (09/28/19 1100) Vital Signs (24h Range):  Temp:  [97.6 °F (36.4 °C)-98.4 °F (36.9 °C)] 97.6 °F (36.4 °C)  Pulse:  [45-62] 58  Resp:  [14-24] 20  SpO2:  [94 %-99 %] 98 %  BP: (126-170)/(58-84) 127/58     Weight: 42.2 kg (93 lb 0.6 oz)  Body mass index is 19.44 kg/m².  No intake or output data in the 24 hours ending 09/28/19 1341   Physical Exam   Constitutional: She is  oriented to person, place, and time. No distress.   Thin chronically ill elderly female   HENT:   Head: Normocephalic and atraumatic.   Eyes: EOM are normal.   Neck: Normal range of motion. Neck supple. No JVD present.   Cardiovascular: Normal rate and regular rhythm. Exam reveals no gallop.   Murmur heard.  Pulmonary/Chest: Effort normal. No stridor. She has no wheezes.   Bronchial breath sounds right base   Abdominal: Soft. Bowel sounds are normal.   Genitourinary: Vagina normal.   Musculoskeletal: She exhibits no edema.   Neurological: She is alert and oriented to person, place, and time.   Skin: Skin is warm and dry. Capillary refill takes less than 2 seconds. She is not diaphoretic.   Small bruises in LEs   Psychiatric: She has a normal mood and affect.   Vitals reviewed.      Significant Labs:   BMP:   Recent Labs   Lab 09/28/19 0129 09/28/19  0554   GLU 83 73    137   K 3.2* 3.0*   CL 95 94*   CO2 31* 30*   BUN 44* 44*   CREATININE 1.6* 1.5*   CALCIUM 8.9 9.1   MG 1.9  --      CBC:   Recent Labs   Lab 09/27/19 1914 09/28/19 0129   WBC 7.03 4.67   HGB 9.7* 9.5*   HCT 30.2* 29.7*   * 110*     Cardiac Markers:   Recent Labs   Lab 09/27/19 1914   BNP 1,750*     Troponin:   Recent Labs   Lab 09/27/19 1914 09/28/19  0554   TROPONINI 0.534* 0.575*     All pertinent labs within the past 24 hours have been reviewed.    Significant Imaging: I have reviewed all pertinent imaging results/findings within the past 24 hours.      Assessment/Plan:      * Shortness of breath  Improving  Due to acute and chronic systolic heart failure  Improved with IV lasix given at another facility  Does not appear to be significantly volume overload  Continue home medication including aspirin, entresto, amlodipine, lasix, metolazone, metoprolol, isosorbide  Continue PPI  Trend cardiac enzymes  Consult cardiology      Acute on chronic heart failure  - BNP 1750  - Echo pending  - Strict in/out  - Daily weight to help assess  volume status  -  Metolazone 3 times a week  - Oxygen PRN to keep O2 > 88%  - Cardiology consulted      CKD (chronic kidney disease), stage IV  Monitor  Avoid NSAIDs      Anemia of chronic disease  Monitor      CAD (coronary artery disease)  Denies chest pain at this time  Chronically elevated trop per chart review  Resume home medication  Monitor  Cardiology consulted  ECHO pending  Plans for lexiscan vs dobuatamine stress per cards pending echo    Hypokalemia  Replete prn sliding scale        VTE Risk Mitigation (From admission, onward)         Ordered     Place sequential compression device  Until discontinued      09/27/19 2123     IP VTE HIGH RISK PATIENT  Once      09/27/19 2123              Diet: cardiac then NPO @MN  Code: full  Dispo: follow up cardiology    Tracey Bustillos DO  Department of Hospital Medicine   [facility]

## 2019-09-28 NOTE — H&P
"[facility] Medicine  History & Physical  Date of service: 9/27/2019 at 2100    Patient Name: Cheyanne Gomes  MRN: [id number]  Admission Date: 9/27/2019  Attending Physician: Luís Reno MD   Primary Care Provider: Romeo Alas MD         Patient information was obtained from patient, past medical records and ER records.     Subjective:     Principal Problem:Shortness of breath    Chief Complaint:   Chief Complaint   Patient presents with    Chest Pain     pt transfer from [place] for CHF exacerbation and elevated troponin of 0.61. pt denies any chest pain upon arrival. reports shortness of breath.         HPI: The patient is an 83 year old  female with history of heart failure, CAD-sp CABG in 2000 and PCI in 2007, hypertension, hyperlipidemia, RA, and anemia due to chronic blood loss.  She presented to the emergency department with shortness of breath.  She was initially seen at [facility] in [place].  She was given Lasix IV.  She was noted to have elevated troponin at 0.61. She was subsequently transferred here for further evaluation and treatment.    She states that she has not been feeling well since this morning.  She reports severe "trouble breathing", worse with activities, associated with lightheadedness and nausea around 2:00 p.m..  She went to the emergency department in [place].  She denies weight gain.  She reports some swelling in the left leg ans slight swelling in her right leg. She denies orthopnea or PND.  She reports compliant with her medication.  She denies chest pain. She reports chronic abdominal discomfort, described as a bloating.  She denies vomiting or diarrhea.  She is taking iron supplement.  She states that she had blood transfusion earlier this month.    Repeat troponin here was 0.053 with BNP 1750.  Reviewed of records showed that she has  chronic baseline troponin elevation.    January 13, 2019 04:08 0.667 CH ng/ml   January 12, 2019 " 22:16 0.826 CH ng/ml   January 12, 2019 16:54 0.673 CH ng/ml   April 8, 2018 04:11 0.129 H ng/ml   April 7, 2018 00:24 0.215 H ng/ml   April 6, 2018 19:28 0.242 H ng/ml   April 6, 2018 12:58 0.236 H ng/ml    Echocardiogram on 1/13/2019:  Severe left ventral dysfunction with distal septal apical and lateral wall hypokinesis. With overall left ventral ejection fraction of probably around 35-40%. Mild left atrial enlargement Right atrial chamber size is within normal limits Normal right ventricular size and function. Mild thickening of the aortic valve leaflets. Trace attic insufficiency. Mild thickening of mitral valve leaflets noted. Mild to Moderate mitral regurgitation. Tricuspid valve is structurally normal. Mild tricuspid regurgitation. The pulmonic valve was not well visualized. No evidence of any pericardial effusion. Aortic root dimension within normal limits.       Chest radiography here showed no acute pulmonary edema. EKG showed sinus bradycardia, left ventricular hypertrophy, no acute changes.  Currently she reports improvement in her symptoms.                    Past Medical History:   Diagnosis Date    Abdominal hernia     Anemia     Dr Berkowitz     Aortic valve stenosis, moderate     Cannon's esophagus     Cataract     ou done    CHF (congestive heart failure)     EFx 35%, Dr. Spencer 12/19/13    Colitis     Compression fracture     Diverticulosis     Encounter for blood transfusion     GERD (gastroesophageal reflux disease)     Hyperlipidemia     Hypertension     Irritable bowel syndrome     Occlusive coronary artery disease     Osteoarthritis     lumbar DDD    Pneumonia 01/03/2017    Rheumatoid arteritis     Rheumatoid arthritis     Shingles 01/03/2017    Sjogren syndrome     Ulcerative colitis        Past Surgical History:   Procedure Laterality Date    APPENDECTOMY      BACK SURGERY      CARDIAC SURGERY      CABGx3 in 2000    CATARACT EXTRACTION      ou od d 11-15-12//      SECTION, CLASSIC      x 2    CHOLECYSTECTOMY      COLONOSCOPY  09/15/2011    Dr Anaya     COLONOSCOPY  01/10/2017    [facility] report sent to scanning    CORONARY ANGIOPLASTY      PCI x 1 in     CORONARY ARTERY BYPASS GRAFT      3 vessel    CORONARY ARTERY BYPASS GRAFT      eyes      rk ou    FRACTURE SURGERY  2016    Dr Guido left hip     FRACTURE SURGERY  2018    Right Hip    HYSTERECTOMY      tubal ligation, oopherectomy    INTRAMEDULLARY RODDING OF TROCHANTER OF FEMUR Right 10/8/2018    Procedure: INSERTION, INTRAMEDULLARY KELSI, FEMUR, TROCHANTER;  Surgeon: Valerio Luther MD;  Location: [facility];  Service: Orthopedics;  Laterality: Right;    OOPHORECTOMY      SPINE SURGERY  2013    Silvino Mckeon    UPPER GASTROINTESTINAL ENDOSCOPY      Yag Capsulotomy Right 14       Review of patient's allergies indicates:   Allergen Reactions    Macrodantin  [nitrofurantoin macrocrystalline]      Other reaction(s): very sickly    Penicillins      Other reaction(s): swelling  Other reaction(s): red skin discolorati    Sulfa (sulfonamide antibiotics)      Other reaction(s): very sickly       Current Facility-Administered Medications on File Prior to Encounter   Medication    0.9%  NaCl infusion (for blood administration)    furosemide injection 40 mg     Current Outpatient Medications on File Prior to Encounter   Medication Sig    amLODIPine (NORVASC) 5 MG tablet Take 5 mg by mouth once daily.     aspirin 81 mg Tab Take 1 tablet by mouth every evening. Every day    biotin 5 mg Cap Take 1 tablet by mouth 2 (two) times daily.    CALCIUM CARB/VIT D3/MINERALS (CALCIUM-VITAMIN D ORAL) Take 600 mg by mouth 2 (two) times daily. Calcium 1200 with D 3 1000    coenzyme Q10 100 mg capsule Take 100 mg by mouth once daily.    CRANBERRY CONC/ASCORBIC ACID (CRANBERRY PLUS VITAMIN C ORAL) Take 1 capsule by mouth once daily.    DEXILANT 60 mg capsule Take 1 capsule(s) every day by oral route in the  morning for 30 days.    DOCUSATE CALCIUM (STOOL SOFTENER ORAL) Take 200 mg by mouth every evening.     ENTRESTO  mg per tablet Take 1 tablet by mouth 2 (two) times daily.     FERROUS FUMARATE/VIT BCOMP,C (SUPER B COMPLEX ORAL) Take 1 tablet by mouth once daily.    ferrous gluconate (FERGON) 324 MG tablet Take 324 mg by mouth 2 (two) times daily. At noon and in the evening    furosemide (LASIX) 40 MG tablet Take 40 mg by mouth 2 (two) times daily.    gabapentin (NEURONTIN) 100 MG capsule Take 1 capsule (100 mg total) by mouth every evening.    hydroxychloroquine (PLAQUENIL) 200 mg tablet Take 1 tablet (200 mg total) by mouth once daily.    isosorbide dinitrate (ISORDIL) 10 MG tablet Take 10 mg by mouth 3 (three) times daily.    krill-omega-3-dha-epa-lipids (KRILL OIL) 088-77-84-50 mg Cap Take 1,000 mg by mouth once daily. Braxton krill 300mg qd     lactobacillus acidophilus (PROBIOTIC) 10 billion cell Cap Take 1 each by mouth once daily. 1.5 billion    magnesium oxide (MAG-OX) 400 mg tablet Take 400 mg by mouth 2 (two) times daily.    metOLazone (ZAROXOLYN) 2.5 MG tablet Take 2.5 mg by mouth twice a week.     metoprolol succinate (TOPROL-XL) 25 MG 24 hr tablet Take 12.5 mg by mouth once daily.     MULTIVITAMIN WITH MINERALS (ONE-A-DAY 50 PLUS) Tab Take 1 tablet by mouth once daily. Every day    pantoprazole (PROTONIX) 40 MG tablet Take 40 mg by mouth once daily.    potassium chloride SA (K-DUR,KLOR-CON) 10 MEQ tablet Take 20 mEq by mouth once daily.     potassium chloride SA (K-DUR,KLOR-CON) 10 MEQ tablet Take 10 mEq by mouth every evening.    sertraline (ZOLOFT) 50 MG tablet TAKE 1 TABLET (50 MG TOTAL) BY MOUTH ONCE DAILY.    temazepam (RESTORIL) 7.5 MG Cap Take 15 mg by mouth nightly as needed.    vitamin E 400 unit Tab Take 400 mg by mouth once daily. Every day    fluticasone (FLONASE) 50 mcg/actuation nasal spray 2 sprays by Each Nare route once daily. (Patient taking differently: 2 sprays  by Each Nare route daily as needed. )    nitroGLYCERIN (NITROLINGUAL) 0.4 mg/dose spray Place 1 spray under the tongue every 5 (five) minutes as needed. PRN    ondansetron (ZOFRAN) 4 MG tablet Take 1 tablet(s) 3 times a day by oral route as needed for 15 days.    promethazine (PHENERGAN) 25 MG tablet Take 1 tablet (25 mg total) by mouth 2 (two) times daily as needed for Nausea.    traMADol (ULTRAM) 50 mg tablet 1-2 po bid prn    [DISCONTINUED] FUROSEMIDE ORAL furosemide 60 mg tablet   Take 1 tablet every day by oral route.    [DISCONTINUED] hydrochlorothiazide (HYDRODIURIL) 25 MG tablet Take 25 mg by mouth once daily.     Family History     Problem Relation (Age of Onset)    Arthritis Brother    Crohn's disease Brother, Brother, Brother    Early death Son    Fibromyalgia Sister    Heart disease Father, Mother, Brother    Hypertension Father, Sister, Daughter    Irritable bowel syndrome Sister    Lupus Cousin, Cousin    Pulmonary embolism Sister    Scleroderma Sister    Skin cancer Mother        Tobacco Use    Smoking status: Former Smoker     Packs/day: 1.00     Years: 5.00     Pack years: 5.00     Last attempt to quit: 1971     Years since quittin.7    Smokeless tobacco: Never Used   Substance and Sexual Activity    Alcohol use: Yes     Alcohol/week: 1.0 standard drinks     Types: 1 Glasses of wine per week    Drug use: Yes     Frequency: 1.0 times per week    Sexual activity: Not Currently     Partners: Male     Review of Systems   Constitutional: Positive for fatigue. Negative for activity change, chills, fever and unexpected weight change.   HENT: Negative.    Eyes: Negative.    Respiratory: Positive for shortness of breath. Negative for cough and wheezing.    Cardiovascular: Positive for palpitations and leg swelling. Negative for chest pain.   Gastrointestinal: Positive for nausea. Negative for constipation and vomiting. Abdominal pain: Bloating.   Endocrine: Negative.    Genitourinary:  Negative.    Musculoskeletal: Negative.    Allergic/Immunologic: Negative.    Neurological: Positive for dizziness, weakness and light-headedness.   Hematological: Negative.    Psychiatric/Behavioral: Negative.      Objective:     Vital Signs (Most Recent):  Temp: 98.1 °F (36.7 °C) (09/27/19 1900)  Pulse: (!) 58 (09/27/19 2130)  Resp: 14 (09/27/19 2130)  BP: (!) 147/66 (09/27/19 2130)  SpO2: 95 % (09/27/19 2130) Vital Signs (24h Range):  Temp:  [98.1 °F (36.7 °C)-98.4 °F (36.9 °C)] 98.1 °F (36.7 °C)  Pulse:  [45-59] 58  Resp:  [14-24] 14  SpO2:  [95 %-98 %] 95 %  BP: (132-147)/(61-84) 147/66     Weight: 42.5 kg (93 lb 12.8 oz)  Body mass index is 19.6 kg/m².    Physical Exam   Constitutional: She is oriented to person, place, and time. No distress.   Thin chronically ill elderly female   HENT:   Head: Normocephalic and atraumatic.   Eyes: EOM are normal.   Neck: Normal range of motion. Neck supple. No JVD present.   Cardiovascular: Normal rate and regular rhythm. Exam reveals no gallop.   No murmur heard.  Pulmonary/Chest: Effort normal. No stridor. She has no wheezes.   Bronchial breath sounds right base   Abdominal: Soft. Bowel sounds are normal.   Genitourinary: Vagina normal.   Musculoskeletal: She exhibits no edema.   Neurological: She is alert and oriented to person, place, and time.   Skin: Skin is warm and dry. Capillary refill takes less than 2 seconds. She is not diaphoretic.   Small bruises in LEs   Psychiatric: She has a normal mood and affect.   Vitals reviewed.        CRANIAL NERVES     CN III, IV, VI   Extraocular motions are normal.        Significant Labs:   CBC:   Recent Labs   Lab 09/27/19 1914   WBC 7.03   HGB 9.7*   HCT 30.2*   *     CMP:   Recent Labs   Lab 09/27/19 1914      K 4.1   CL 96   CO2 32*   GLU 94   BUN 45*   CREATININE 1.8*   CALCIUM 9.0   PROT 6.5   ALBUMIN 3.9   BILITOT 0.6   ALKPHOS 69   AST 84*   ALT 32   ANIONGAP 8   EGFRNONAA 25.7*     Cardiac Markers:   Recent  Labs   Lab 09/27/19 1914   BNP 1,750*     Magnesium: No results for input(s): MG in the last 48 hours.  Troponin:   Recent Labs   Lab 09/27/19 1914   TROPONINI 0.534*     All pertinent labs within the past 24 hours have been reviewed.    Significant Imaging:   X-ray Chest Ap Portable    Result Date: 9/27/2019  EXAMINATION: XR CHEST AP PORTABLE CLINICAL HISTORY: chest pain; FINDINGS: Portable chest at 1922 compared with 08/06/2019 shows unchanged enlarged cardiac silhouette size with normal mediastinal contours.  Postsurgical changes of CABG again evident. Linear opacities in left lower lung zones show no significant change.  Right lung is clear.  Pulmonary vasculature is normal. Thoracolumbar spine vertebroplasties are evident with poly methylmethacrylate at numerous levels.     Unchanged left lung base scarring or subsegmental atelectasis and cardiomegaly. Electronically signed by: Lionel Balderas MD Date:    09/27/2019 Time:    19:50      Assessment/Plan:     * Shortness of breath  Due to acute and chronic systolic heart failure  Improved with IV lasix given at another facility  Does not appear to be significantly volume overload  May due to worsening anemia as well  Continue home medication including aspirin, entresto, amlodipine, lasix, metolazone, metoprolol, isosorbide  Continue PPI  Trend cardiac enzymes  Consult cardiology      CKD (chronic kidney disease), stage IV  Monitor  Avoid NSAIDs      Anemia of chronic disease  Monitor      CAD (coronary artery disease)  Denies chest pain  Resume home medication  Monitor      VTE Risk Mitigation (From admission, onward)         Ordered     Place sequential compression device  Until discontinued      09/27/19 2123     IP VTE HIGH RISK PATIENT  Once      09/27/19 2123                   KATHERINE Guillen  Department of Hospital Medicine   [facility]

## 2019-09-28 NOTE — ASSESSMENT & PLAN NOTE
- BNP 1750  - Echo pending  - Strict in/out  - Daily weight to help assess volume status  -  Metolazone 3 times a week  - Oxygen PRN to keep O2 > 88%  - Cardiology consulted

## 2019-09-28 NOTE — HPI
"The patient is an 83 year old  female with history of heart failure, CAD-sp CABG in 2000 and PCI in 2007, hypertension, hyperlipidemia, RA, and anemia due to chronic blood loss.  She presented to the emergency department with shortness of breath.  She was initially seen at [facility] the [facility] in [place].  She was given Lasix IV.  She was noted to have elevated troponin at 0.61. She was subsequently transferred here for further evaluation and treatment.    She states that she has not been feeling well since this morning.  She reports severe "trouble breathing", worse with activities, associated with lightheadedness and nausea around 2:00 p.m..  She went to the emergency department in [place].  She denies weight gain.  She reports some swelling in the left leg ans slight swelling in her right leg. She denies orthopnea or PND.  She reports compliant with her medication.  She denies chest pain. She reports chronic abdominal discomfort, described as a bloating.  She denies vomiting or diarrhea.  She is taking iron supplement.  She states that she had blood transfusion earlier this month.    Repeat troponin here was 0.053 with BNP 1750.  Reviewed of records showed that she has  chronic baseline troponin elevation.    January 13, 2019 04:08 0.667 CH ng/ml   January 12, 2019 22:16 0.826 CH ng/ml   January 12, 2019 16:54 0.673 CH ng/ml   April 8, 2018 04:11 0.129 H ng/ml   April 7, 2018 00:24 0.215 H ng/ml   April 6, 2018 19:28 0.242 H ng/ml   April 6, 2018 12:58 0.236 H ng/ml    Echocardiogram on 1/13/2019:  Severe left ventral dysfunction with distal septal apical and lateral wall hypokinesis. With overall left ventral ejection fraction of probably around 35-40%. Mild left atrial enlargement Right atrial chamber size is within normal limits Normal right ventricular size and function. Mild thickening of the aortic valve leaflets. Trace attic insufficiency. Mild thickening of mitral valve leaflets noted. Mild to " Moderate mitral regurgitation. Tricuspid valve is structurally normal. Mild tricuspid regurgitation. The pulmonic valve was not well visualized. No evidence of any pericardial effusion. Aortic root dimension within normal limits.       Chest radiography here showed no acute pulmonary edema. EKG showed sinus bradycardia, left ventricular hypertrophy, no acute changes.  Currently she reports improvement in her symptoms.

## 2019-09-28 NOTE — ED PROVIDER NOTES
"Encounter Date: 9/27/2019       History     Chief Complaint   Patient presents with    Chest Pain     pt transfer from [facility] for CHF exacerbation and elevated troponin of 0.61. pt denies any chest pain upon arrival. reports shortness of breath.      Chief complaint is shortness of breath HPI the patient states she "got short of breath" earlier today and was seen at [facility].  She was diagnosed with CHF exacerbation and had an elevated troponin.  It was 0.61.  She was transferred here because her cardiologist Dr. Valverde who practices at a hospital.  She describes a tightness across anterior chest radiating to right jaw right side of her neck.  She took an adult aspirin she states.  She also did deny true glycerin spray.  She states at the hospital she received a small baby aspirin and was then transferred here.  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  She has hypertension or diabetes.  She has high cholesterol.  Family history is positive mom had aortic stenosis.  Her father had an MI as well. As I walk into the room the patient is alert cooperative no distress no complaints of pain.  She is comfortable vital signs stable heart rate is bradycardic at 45        Review of patient's allergies indicates:   Allergen Reactions    Macrodantin  [nitrofurantoin macrocrystalline]      Other reaction(s): very sickly    Penicillins      Other reaction(s): swelling  Other reaction(s): red skin discolorati    Sulfa (sulfonamide antibiotics)      Other reaction(s): very sickly     Past Medical History:   Diagnosis Date    Abdominal hernia     Anemia     Dr Berkowitz     Aortic valve stenosis, moderate     Cannon's esophagus     Cataract     ou done    CHF (congestive heart failure)     EFx 35%, Dr. Spencer 12/19/13    Colitis     Compression fracture     Diverticulosis     Encounter for blood transfusion     GERD (gastroesophageal reflux disease)     Hyperlipidemia     " Hypertension     Irritable bowel syndrome     Occlusive coronary artery disease     Osteoarthritis     lumbar DDD    Pneumonia 2017    Rheumatoid arteritis     Rheumatoid arthritis     Shingles 2017    Sjogren syndrome     Ulcerative colitis      Past Surgical History:   Procedure Laterality Date    APPENDECTOMY      BACK SURGERY      CARDIAC SURGERY      CABGx3 in     CATARACT EXTRACTION      ou od d 11-15-12/     SECTION, CLASSIC      x 2    CHOLECYSTECTOMY      COLONOSCOPY  09/15/2011    Dr Anaya     COLONOSCOPY  01/10/2017    [facility] report sent to scanning    CORONARY ANGIOPLASTY      PCI x 1 in     CORONARY ARTERY BYPASS GRAFT      3 vessel    CORONARY ARTERY BYPASS GRAFT      eyes      rk ou    FRACTURE SURGERY  2016    Dr Guido left hip     FRACTURE SURGERY  2018    Right Hip    HYSTERECTOMY      tubal ligation, oopherectomy    INTRAMEDULLARY RODDING OF TROCHANTER OF FEMUR Right 10/8/2018    Procedure: INSERTION, INTRAMEDULLARY KELSI, FEMUR, TROCHANTER;  Surgeon: Valerio Luther MD;  Location: [facility];  Service: Orthopedics;  Laterality: Right;    OOPHORECTOMY      SPINE SURGERY  2013    Silvino Mckeon    UPPER GASTROINTESTINAL ENDOSCOPY      Yag Capsulotomy Right 14     Family History   Problem Relation Age of Onset    Hypertension Father     Heart disease Father         MI    Heart disease Mother         aortic stenosis    Skin cancer Mother     Heart disease Brother         2 brothers CABG    Arthritis Brother     Crohn's disease Brother     Hypertension Sister     Fibromyalgia Sister     Scleroderma Sister     Pulmonary embolism Sister     Hypertension Daughter     Early death Son         accident    Lupus Cousin     Lupus Cousin     Irritable bowel syndrome Sister     Crohn's disease Brother     Crohn's disease Brother     Amblyopia Neg Hx     Blindness Neg Hx     Cancer Neg Hx     Cataracts Neg Hx     Diabetes Neg Hx      Glaucoma Neg Hx     Macular degeneration Neg Hx     Retinal detachment Neg Hx     Strabismus Neg Hx     Stroke Neg Hx     Thyroid disease Neg Hx     Celiac disease Neg Hx     Cirrhosis Neg Hx     Psoriasis Neg Hx     Melanoma Neg Hx     Multiple sclerosis Neg Hx     Rheum arthritis Neg Hx     Tuberculosis Neg Hx     Lymphoma Neg Hx     Ulcerative colitis Neg Hx     Moses's disease Neg Hx     Stomach cancer Neg Hx     Rectal cancer Neg Hx     Liver disease Neg Hx     Liver cancer Neg Hx     Inflammatory bowel disease Neg Hx     Hemochromatosis Neg Hx     Esophageal cancer Neg Hx     Cystic fibrosis Neg Hx     Colon cancer Neg Hx      Social History     Tobacco Use    Smoking status: Former Smoker     Packs/day: 1.00     Years: 5.00     Pack years: 5.00     Last attempt to quit: 1971     Years since quittin.7    Smokeless tobacco: Never Used   Substance Use Topics    Alcohol use: Yes     Alcohol/week: 1.0 standard drinks     Types: 1 Glasses of wine per week    Drug use: Yes     Frequency: 1.0 times per week     Review of Systems   Constitutional: Negative for chills and fever.   HENT: Negative for ear pain, rhinorrhea and sore throat.    Eyes: Negative for pain and visual disturbance.   Respiratory: Negative for cough and shortness of breath.    Cardiovascular: Positive for chest pain. Negative for palpitations.   Gastrointestinal: Negative for abdominal pain, constipation, diarrhea, nausea and vomiting.   Genitourinary: Negative for dysuria, frequency, hematuria and urgency.   Musculoskeletal: Negative for back pain, joint swelling and myalgias.   Skin: Negative for rash.   Neurological: Positive for dizziness. Negative for seizures, weakness and headaches.   Psychiatric/Behavioral: Negative for dysphoric mood. The patient is not nervous/anxious.        Physical Exam     Initial Vitals [19 1900]   BP Pulse Resp Temp SpO2   -- -- -- 98.1 °F (36.7 °C) --      MAP        --         Physical Exam    Nursing note and vitals reviewed.  Constitutional: She appears well-developed and well-nourished.   HENT:   Head: Normocephalic and atraumatic.   Eyes: Conjunctivae, EOM and lids are normal. Pupils are equal, round, and reactive to light.   Neck: Trachea normal and normal range of motion. Neck supple. No thyroid mass and no thyromegaly present.   Cardiovascular: Normal rate, regular rhythm and normal heart sounds.   Pulmonary/Chest: Effort normal and breath sounds normal.   Abdominal: Soft. Normal appearance and bowel sounds are normal. There is no tenderness.   Musculoskeletal: Normal range of motion.   Neurological: She is alert and oriented to person, place, and time. She has normal strength and normal reflexes. No cranial nerve deficit or sensory deficit.   Skin: Skin is warm and dry.   Psychiatric: She has a normal mood and affect. Her speech is normal and behavior is normal. Judgment and thought content normal.         ED Course   Procedures  Labs Reviewed   CBC W/ AUTO DIFFERENTIAL - Abnormal; Notable for the following components:       Result Value    RBC 3.25 (*)     Hemoglobin 9.7 (*)     Hematocrit 30.2 (*)     Platelets 121 (*)     All other components within normal limits   COMPREHENSIVE METABOLIC PANEL - Abnormal; Notable for the following components:    BUN, Bld 45 (*)     Creatinine 1.8 (*)     AST 84 (*)     eGFR if  29.6 (*)     eGFR if non  25.7 (*)     All other components within normal limits   B-TYPE NATRIURETIC PEPTIDE - Abnormal; Notable for the following components:    BNP 1,750 (*)     All other components within normal limits   TROPONIN I - Abnormal; Notable for the following components:    Troponin I 0.534 (*)     All other components within normal limits    Narrative:        Troponin critical result(s) called and verbal readback obtained   from Dominic Harrison RN ER, 09/27/2019 20:03     Troponin critical result(s) called and  verbal readback obtained   from Dominic Harrison RN ER , 09/27/2019 20:02   PROTIME-INR          Imaging Results          X-Ray Chest AP Portable (Final result)  Result time 09/27/19 19:50:02    Final result by Lionel Balderas MD (09/27/19 19:50:02)                 Impression:      Unchanged left lung base scarring or subsegmental atelectasis and cardiomegaly.      Electronically signed by: Lionel Balderas MD  Date:    09/27/2019  Time:    19:50             Narrative:    EXAMINATION:  XR CHEST AP PORTABLE    CLINICAL HISTORY:  chest pain;    FINDINGS:  Portable chest at 1922 compared with 08/06/2019 shows unchanged enlarged cardiac silhouette size with normal mediastinal contours.  Postsurgical changes of CABG again evident.    Linear opacities in left lower lung zones show no significant change.  Right lung is clear.  Pulmonary vasculature is normal. Thoracolumbar spine vertebroplasties are evident with poly methylmethacrylate at numerous levels.                                              Attending Attestation:             Attending ED Notes:   I discussed the case in detail with the nurse practitioner working tonight.  She will see the patient in consult.                 I have spoken to the hospitalist/consultant and have discussed the patient's chief complaint as well as physical exam and labs.  Pending recommendation.          Clinical Impression:       ICD-10-CM ICD-9-CM   1. Chest pain R07.9 786.50                                Kimber Nolen MD  09/27/19 2052

## 2019-09-28 NOTE — ASSESSMENT & PLAN NOTE
Denies chest pain at this time  Chronically elevated trop per chart review  Resume home medication  Monitor  Cardiology consulted  ECHO pending  Plans for lexiscan vs dobuatamine stress per cards pending echo

## 2019-09-28 NOTE — CONSULTS
[facility]  Cardiology  Consult Note    Patient Name: Cheyanne Gomes  MRN: [id number]  Admission Date: 9/27/2019  Hospital Length of Stay: 0 days  Code Status: Full Code   Attending Provider: Tracey Bustillos DO   Consulting Provider: Regina Giron NP  Primary Care Physician: Romeo Alas MD  Principal Problem:Shortness of breath    Patient information was obtained from patient, past medical records and ER records.     Inpatient consult to Cardiology  Consult performed by: Rodríguez Lanza MD  Consult ordered by: KATHERINE De Leon        Subjective:     REASON FOR CONSULT:  shortens of breath      HPI:  Ms. Gomes is a 83 year old female with past medical history significant for CAD, HFrEF, CKD, anemia, and hypertension. She states that yesterday around 2 pm she started feeling more short of breath than usual. She noticed some dependent edema. She felt she could not get a deep breath and felt tightness in her chest that radiated to her jaw. She denies any associated palpitations, diaphoresis, nausea, or vomiting. She did take 1 NTG. She states she did feel dizzy at that time and when she checked her blood pressure it was 103/58 and the heart rate was 40 bpm. After 1 hour with no relief of symptoms she decided to go to the ER in [facility]. Troponin was 0.6. She was given Lasix IV at that facility and states she began to feel better after that. She was transferred to [facility] for cardiology services. This morning she states she is back to her baseline. Denies any further shortness of breath or chest pain. She is able to lie flat on the bed with 1 pillow. She does tell me that she has baseline shortness of breath that is worsening over the past few months with exertional activities such as walking a short distance. The dizzy feeling has passed. Denies syncopal episodes. Denies palpitations. She reports compliance with all of her medications. She states she does try to watch the salt in her  diet. Denies weight gain. Leg swelling is improved.     Past Medical History:   Diagnosis Date    Abdominal hernia     Anemia     Dr Berkowitz     Aortic valve stenosis, moderate     Cannon's esophagus     Cataract     ou done    CHF (congestive heart failure)     EFx 35%, Dr. Spencer 13    Colitis     Compression fracture     Diverticulosis     Encounter for blood transfusion     GERD (gastroesophageal reflux disease)     Hyperlipidemia     Hypertension     Irritable bowel syndrome     Occlusive coronary artery disease     Osteoarthritis     lumbar DDD    Pneumonia 2017    Rheumatoid arteritis     Rheumatoid arthritis     Shingles 2017    Sjogren syndrome     Ulcerative colitis        Past Surgical History:   Procedure Laterality Date    APPENDECTOMY      BACK SURGERY      CARDIAC SURGERY      CABGx3 in     CATARACT EXTRACTION      ou od d 11-15-12//     SECTION, CLASSIC      x 2    CHOLECYSTECTOMY      COLONOSCOPY  09/15/2011    Dr Anaya     COLONOSCOPY  01/10/2017    [facility] report sent to scanning    CORONARY ANGIOPLASTY      PCI x 1 in     CORONARY ARTERY BYPASS GRAFT      3 vessel    CORONARY ARTERY BYPASS GRAFT      eyes      rk ou    FRACTURE SURGERY  2016    Dr Guido left hip     FRACTURE SURGERY  2018    Right Hip    HYSTERECTOMY      tubal ligation, oopherectomy    INTRAMEDULLARY RODDING OF TROCHANTER OF FEMUR Right 10/8/2018    Procedure: INSERTION, INTRAMEDULLARY KELSI, FEMUR, TROCHANTER;  Surgeon: Valerio Luther MD;  Location: [facility];  Service: Orthopedics;  Laterality: Right;    OOPHORECTOMY      SPINE SURGERY  2013    Silvino Mckeon    UPPER GASTROINTESTINAL ENDOSCOPY      Yag Capsulotomy Right 14       Review of patient's allergies indicates:   Allergen Reactions    Macrodantin  [nitrofurantoin macrocrystalline]      Other reaction(s): very sickly    Penicillins      Other reaction(s): swelling  Other reaction(s):  red skin discolorati    Sulfa (sulfonamide antibiotics)      Other reaction(s): very sickly       No current facility-administered medications on file prior to encounter.      Current Outpatient Medications on File Prior to Encounter   Medication Sig    amLODIPine (NORVASC) 5 MG tablet Take 5 mg by mouth once daily.     aspirin 81 mg Tab Take 1 tablet by mouth every evening. Every day    biotin 5 mg Cap Take 1 tablet by mouth 2 (two) times daily.    CALCIUM CARB/VIT D3/MINERALS (CALCIUM-VITAMIN D ORAL) Take 600 mg by mouth 2 (two) times daily. Calcium 1200 with D 3 1000    coenzyme Q10 100 mg capsule Take 100 mg by mouth once daily.    CRANBERRY CONC/ASCORBIC ACID (CRANBERRY PLUS VITAMIN C ORAL) Take 1 capsule by mouth once daily.    DEXILANT 60 mg capsule Take 1 capsule(s) every day by oral route in the morning for 30 days.    DOCUSATE CALCIUM (STOOL SOFTENER ORAL) Take 200 mg by mouth every evening.     ENTRESTO  mg per tablet Take 1 tablet by mouth 2 (two) times daily.     FERROUS FUMARATE/VIT BCOMP,C (SUPER B COMPLEX ORAL) Take 1 tablet by mouth once daily.    ferrous gluconate (FERGON) 324 MG tablet Take 324 mg by mouth 2 (two) times daily. At noon and in the evening    furosemide (LASIX) 40 MG tablet Take 40 mg by mouth 2 (two) times daily.    gabapentin (NEURONTIN) 100 MG capsule Take 1 capsule (100 mg total) by mouth every evening.    hydroxychloroquine (PLAQUENIL) 200 mg tablet Take 1 tablet (200 mg total) by mouth once daily.    isosorbide dinitrate (ISORDIL) 10 MG tablet Take 10 mg by mouth 3 (three) times daily.    krill-omega-3-dha-epa-lipids (KRILL OIL) 245-85-06-50 mg Cap Take 1,000 mg by mouth once daily. Braxton krill 300mg qd     lactobacillus acidophilus (PROBIOTIC) 10 billion cell Cap Take 1 each by mouth once daily. 1.5 billion    magnesium oxide (MAG-OX) 400 mg tablet Take 400 mg by mouth 2 (two) times daily.    metOLazone (ZAROXOLYN) 2.5 MG tablet Take 2.5 mg by mouth  twice a week.     metoprolol succinate (TOPROL-XL) 25 MG 24 hr tablet Take 12.5 mg by mouth once daily.     MULTIVITAMIN WITH MINERALS (ONE-A-DAY 50 PLUS) Tab Take 1 tablet by mouth once daily. Every day    pantoprazole (PROTONIX) 40 MG tablet Take 40 mg by mouth once daily.    potassium chloride SA (K-DUR,KLOR-CON) 10 MEQ tablet Take 20 mEq by mouth once daily.     potassium chloride SA (K-DUR,KLOR-CON) 10 MEQ tablet Take 10 mEq by mouth every evening.    sertraline (ZOLOFT) 50 MG tablet TAKE 1 TABLET (50 MG TOTAL) BY MOUTH ONCE DAILY.    temazepam (RESTORIL) 7.5 MG Cap Take 15 mg by mouth nightly as needed.    vitamin E 400 unit Tab Take 400 mg by mouth once daily. Every day    fluticasone (FLONASE) 50 mcg/actuation nasal spray 2 sprays by Each Nare route once daily. (Patient taking differently: 2 sprays by Each Nare route daily as needed. )    nitroGLYCERIN (NITROLINGUAL) 0.4 mg/dose spray Place 1 spray under the tongue every 5 (five) minutes as needed. PRN    ondansetron (ZOFRAN) 4 MG tablet Take 1 tablet(s) 3 times a day by oral route as needed for 15 days.    promethazine (PHENERGAN) 25 MG tablet Take 1 tablet (25 mg total) by mouth 2 (two) times daily as needed for Nausea.    traMADol (ULTRAM) 50 mg tablet 1-2 po bid prn    [DISCONTINUED] hydrochlorothiazide (HYDRODIURIL) 25 MG tablet Take 25 mg by mouth once daily.       Scheduled Meds:   amLODIPine  5 mg Oral Daily    aspirin  81 mg Oral QHS    docusate calcium  240 mg Oral Daily    ferrous sulfate  325 mg Oral BID    fluticasone propionate  2 spray Each Nostril Daily    furosemide  40 mg Oral BID    gabapentin  100 mg Oral QHS    hydroxychloroquine  200 mg Oral Daily    isosorbide dinitrate  10 mg Oral TID    Lactobacillus acidoph-L.bulgar  1 tablet Oral Daily    magnesium oxide  400 mg Oral BID    [START ON 9/30/2019] metOLazone  2.5 mg Oral Twice Weekly    metoprolol succinate  12.5 mg Oral Daily    pantoprazole  40 mg Oral  Daily    sacubitril-valsartan  2 tablet Oral BID    sertraline  50 mg Oral Daily     Continuous Infusions:  PRN Meds:.acetaminophen, nitroGLYCERIN 0.4 MG/DOSE TL SPRY, ondansetron, promethazine, sodium chloride 0.9%, zolpidem    Family History     Problem Relation (Age of Onset)    Arthritis Brother    Crohn's disease Brother, Brother, Brother    Early death Son    Fibromyalgia Sister    Heart disease Father, Mother, Brother    Hypertension Father, Sister, Daughter    Irritable bowel syndrome Sister    Lupus Cousin, Cousin    Pulmonary embolism Sister    Scleroderma Sister    Skin cancer Mother          Tobacco Use    Smoking status: Former Smoker     Packs/day: 1.00     Years: 5.00     Pack years: 5.00     Last attempt to quit: 1971     Years since quittin.7    Smokeless tobacco: Never Used   Substance and Sexual Activity    Alcohol use: Yes     Alcohol/week: 1.0 standard drinks     Types: 1 Glasses of wine per week    Drug use: Yes     Frequency: 1.0 times per week    Sexual activity: Not Currently     Partners: Male       ROS   No significant headaches or sore throat or runny nose.   No recent changes in vision.   No recent changes in hearing.  No dysphagia or odynophagia.  Reports shortness of breath at baseline.   Denies any cough or hemoptysis.   Denies any abdominal pain, nausea, vomiting, diarrhea or constipation.   Denies any dysuria or polyuria.   Denies any fevers or chills.   Denies any recent significant weight changes.   Denies bleeding diathesis    Objective:     Vital Signs (Most Recent):  Temp: 97.7 °F (36.5 °C) (19)  Pulse: 62 (19)  Resp: 16 (19)  BP: (!) 169/74 (19)  SpO2: 97 % (19) Vital Signs (24h Range):  Temp:  [97.7 °F (36.5 °C)-98.4 °F (36.9 °C)] 97.7 °F (36.5 °C)  Pulse:  [45-62] 62  Resp:  [14-24] 16  SpO2:  [94 %-99 %] 97 %  BP: (126-170)/(60-84) 169/74     Weight: 42.2 kg (93 lb 0.6 oz)  Body mass index is 19.44  kg/m².    SpO2: 97 %  O2 Device (Oxygen Therapy): room air    No intake or output data in the 24 hours ending 09/28/19 0823    Lines/Drains/Airways     Peripheral Intravenous Line                 Peripheral IV - Single Lumen 09/27/19 1914 18 G Right Forearm less than 1 day                Physical Exam  HEENT: Normocephalic, atraumatic, PERRL, Conjunctiva pink, no scleral icterus.   CVS: S1S2+, RRR, +SM,, rubs or gallops, JVP: Elevated.  LUNGS: Clear  ABDOMEN: Soft, NT, BS+  EXTREMITIES: No cyanosis, clubbing or edema  NEURO: AAO X 3.       Significant Labs:   BMP:   Recent Labs   Lab 09/27/19 1914 09/28/19 0129 09/28/19  0554   GLU 94 83 73    137 137   K 4.1 3.2* 3.0*   CL 96 95 94*   CO2 32* 31* 30*   BUN 45* 44* 44*   CREATININE 1.8* 1.6* 1.5*   CALCIUM 9.0 8.9 9.1   MG  --  1.9  --    , CMP   Recent Labs   Lab 09/27/19 1914 09/28/19 0129 09/28/19  0554    137 137   K 4.1 3.2* 3.0*   CL 96 95 94*   CO2 32* 31* 30*   GLU 94 83 73   BUN 45* 44* 44*   CREATININE 1.8* 1.6* 1.5*   CALCIUM 9.0 8.9 9.1   PROT 6.5 6.2  --    ALBUMIN 3.9 3.6  --    BILITOT 0.6 1.0  --    ALKPHOS 69 62  --    AST 84* 53*  --    ALT 32 28  --    ANIONGAP 8 11 13   ESTGFRAFRICA 29.6* 34.1* 36.9*   EGFRNONAA 25.7* 29.6* 32.0*   , CBC   Recent Labs   Lab 09/27/19 1914 09/28/19 0129   WBC 7.03 4.67   HGB 9.7* 9.5*   HCT 30.2* 29.7*   * 110*   , INR   Recent Labs   Lab 09/27/19 1914   INR 1.1   , Lipid Panel No results for input(s): CHOL, HDL, LDLCALC, TRIG, CHOLHDL in the last 48 hours., Troponin   Recent Labs   Lab 09/27/19 1914   TROPONINI 0.534*   , All pertinent lab results from the last 24 hours have been reviewed. and   Recent Lab Results       09/28/19  0554   09/28/19  0129   09/27/19 1914        Albumin   3.6 3.9     Alkaline Phosphatase   62 69     ALT   28 32     Anion Gap 13 11 8     AST   53 84     Baso #   0.06 0.06     Basophil%   1.3 0.9     BILIRUBIN TOTAL   1.0  Comment:  For infants and newborns,  interpretation of results should be based  on gestational age, weight and in agreement with clinical  observations.  Premature Infant recommended reference ranges:  Up to 24 hours.............<8.0 mg/dL  Up to 48 hours............<12.0 mg/dL  3-5 days..................<15.0 mg/dL  6-29 days.................<15.0 mg/dL   0.6  Comment:  For infants and newborns, interpretation of results should be based  on gestational age, weight and in agreement with clinical  observations.  Premature Infant recommended reference ranges:  Up to 24 hours.............<8.0 mg/dL  Up to 48 hours............<12.0 mg/dL  3-5 days..................<15.0 mg/dL  6-29 days.................<15.0 mg/dL       BNP     1,750  Comment:  Values of less than 100 pg/ml are consistent with non-CHF populations.     BUN, Bld 44 44 45     Calcium 9.1 8.9 9.0     Chloride 94 95 96     CO2 30 31 32     CPK MB 10.5 8.5       Creatinine 1.5 1.6 1.8     Differential Method   Automated Automated     eGFR if  36.9 34.1 29.6     eGFR if non  32.0  Comment:  Calculation used to obtain the estimated glomerular filtration  rate (eGFR) is the CKD-EPI equation.    29.6  Comment:  Calculation used to obtain the estimated glomerular filtration  rate (eGFR) is the CKD-EPI equation.    25.7  Comment:  Calculation used to obtain the estimated glomerular filtration  rate (eGFR) is the CKD-EPI equation.        Eos #   0.2 0.2     Eosinophil%   4.3 2.3     Glucose 73 83 94     Gran # (ANC)   2.8 4.9     Gran%   58.9 69.7     Hematocrit   29.7 30.2     Hemoglobin   9.5 9.7     Immature Grans (Abs)   0.01  Comment:  Mild elevation in immature granulocytes is non specific and   can be seen in a variety of conditions including stress response,   acute inflammation, trauma and pregnancy. Correlation with other   laboratory and clinical findings is essential.   0.03  Comment:  Mild elevation in immature granulocytes is non specific and   can be seen  in a variety of conditions including stress response,   acute inflammation, trauma and pregnancy. Correlation with other   laboratory and clinical findings is essential.       Immature Granulocytes   0.2 0.4     Coumadin Monitoring INR     1.1  Comment:  Coumadin Therapy:  INR: 2.0-3.0 conventional anticoagulation  INR: 2.5-3.5 intensive anticoagulation       Lymph #   1.2 1.4     Lymph%   25.9 19.3     Magnesium   1.9       MCH   29.6 29.8     MCHC   32.0 32.1     MCV   93 93     Mono #   0.4 0.5     Mono%   9.4 7.4     MPV   10.5 10.6     nRBC   0 0     Phosphorus   4.1       Platelets   110 121     Potassium 3.0 3.2 4.1     PROTEIN TOTAL   6.2 6.5     PT     13.3     RBC   3.21 3.25     RDW   13.3 13.5     Sodium 137 137 136     Troponin I     0.534  Comment:  Troponin critical result(s) called and verbal readback obtained   from Dominic Harrison RN ER, 09/27/2019 20:03  Troponin critical result(s) called and verbal readback obtained   from Dominic Harrison RN ER , 09/27/2019 20:02       WBC   4.67 7.03           Significant Imaging: X-Ray: CXR: X-Ray Chest 1 View (CXR): No results found for this visit on 09/27/19.  I have reviewed the imaging all significant imaging for the last 24 hours.  Procedure Component Value Units Date/Time   X-Ray Chest AP Portable [[id number]] Resulted: 09/27/19 1950   Order Status: Completed Updated: 09/27/19 1952   Narrative:     EXAMINATION:  XR CHEST AP PORTABLE    CLINICAL HISTORY:  chest pain;    FINDINGS:  Portable chest at 1922 compared with 08/06/2019 shows unchanged enlarged cardiac silhouette size with normal mediastinal contours.  Postsurgical changes of CABG again evident.    Linear opacities in left lower lung zones show no significant change.  Right lung is clear.  Pulmonary vasculature is normal. Thoracolumbar spine vertebroplasties are evident with poly methylmethacrylate at numerous levels.   Impression:       Unchanged left lung base scarring or subsegmental atelectasis and  cardiomegaly.      Electronically signed by: Lionel Balderas MD  Date: 09/27/2019       Assessment and Plan:     IMPRESSION:    CHF. Systolic. Acute on chronic. Last Echo 1/2019 with LVEF 35-40%.  CAD s/p PCI s/p CABG  Chest tightness.   Elevated troponin. Appears chronic according to review.  Anemia.   Hypokalemia   CKD. Near her baseline.   HTN      RECOMMENDATIONS:  1. Home meds have been resumed, will increase metalazone back to 3x per week.  2. Replace potassium.  3. Check echo, evaluate aortic stenosis and LV function ..  4. Accurate I and Os.   5. Depending on echo findings will plan on Lexiscan vs. Dobutamine stress echo after volume status has been optimized.   6. Further decisions be based upon the hospital course.     Thank you for your consult. I will follow-up with patient. Please contact us if you have any additional questions.    Regina Giron, ELISE  Cardiology   [facility]    I have personally seen and examined the patient. I reviewed the notes, assessments, and/or procedures performed by Ms Regina Giron, I concur with her documentation of Cheyanne Gomes.

## 2019-09-28 NOTE — HOSPITAL COURSE
9/28 Transferred from OSH yesterday for continued cardiac work up. Chronically elevated trops. ECHO performed this morning, read pending. Pt seen by cardiology today with recommendations to increase metalazone 3x week, follow up ECHO findings to determine Lexiscan v. Dobuatamine Stress test. Patient seen and examined. Chest pain resolved, SOB improved but still on 2LNC, tolerated diet without n/v or abdominal pain.

## 2019-09-28 NOTE — SUBJECTIVE & OBJECTIVE
Past Medical History:   Diagnosis Date    Abdominal hernia     Anemia     Dr Berkowitz     Aortic valve stenosis, moderate     Cannon's esophagus     Cataract     ou done    CHF (congestive heart failure)     EFx 35%, Dr. Spencer 13    Colitis     Compression fracture     Diverticulosis     Encounter for blood transfusion     GERD (gastroesophageal reflux disease)     Hyperlipidemia     Hypertension     Irritable bowel syndrome     Occlusive coronary artery disease     Osteoarthritis     lumbar DDD    Pneumonia 2017    Rheumatoid arteritis     Rheumatoid arthritis     Shingles 2017    Sjogren syndrome     Ulcerative colitis        Past Surgical History:   Procedure Laterality Date    APPENDECTOMY      BACK SURGERY      CARDIAC SURGERY      CABGx3 in     CATARACT EXTRACTION      ou od d 11-15-12//     SECTION, CLASSIC      x 2    CHOLECYSTECTOMY      COLONOSCOPY  09/15/2011    Dr Anaya     COLONOSCOPY  01/10/2017    [facility] report sent to scanning    CORONARY ANGIOPLASTY      PCI x 1 in     CORONARY ARTERY BYPASS GRAFT      3 vessel    CORONARY ARTERY BYPASS GRAFT      eyes      rk ou    FRACTURE SURGERY  2016    Dr Guido left hip     FRACTURE SURGERY      Right Hip    HYSTERECTOMY      tubal ligation, oopherectomy    INTRAMEDULLARY RODDING OF TROCHANTER OF FEMUR Right 10/8/2018    Procedure: INSERTION, INTRAMEDULLARY KELSI, FEMUR, TROCHANTER;  Surgeon: Valerio Luther MD;  Location: [facility];  Service: Orthopedics;  Laterality: Right;    OOPHORECTOMY      SPINE SURGERY  2013    Silvino Mike    UPPER GASTROINTESTINAL ENDOSCOPY      Yag Capsulotomy Right 14       Review of patient's allergies indicates:   Allergen Reactions    Macrodantin  [nitrofurantoin macrocrystalline]      Other reaction(s): very sickly    Penicillins      Other reaction(s): swelling  Other reaction(s): red skin discolorati    Sulfa (sulfonamide antibiotics)       Other reaction(s): very sickly       Current Facility-Administered Medications on File Prior to Encounter   Medication    0.9%  NaCl infusion (for blood administration)    furosemide injection 40 mg     Current Outpatient Medications on File Prior to Encounter   Medication Sig    amLODIPine (NORVASC) 5 MG tablet Take 5 mg by mouth once daily.     aspirin 81 mg Tab Take 1 tablet by mouth every evening. Every day    biotin 5 mg Cap Take 1 tablet by mouth 2 (two) times daily.    CALCIUM CARB/VIT D3/MINERALS (CALCIUM-VITAMIN D ORAL) Take 600 mg by mouth 2 (two) times daily. Calcium 1200 with D 3 1000    coenzyme Q10 100 mg capsule Take 100 mg by mouth once daily.    CRANBERRY CONC/ASCORBIC ACID (CRANBERRY PLUS VITAMIN C ORAL) Take 1 capsule by mouth once daily.    DEXILANT 60 mg capsule Take 1 capsule(s) every day by oral route in the morning for 30 days.    DOCUSATE CALCIUM (STOOL SOFTENER ORAL) Take 200 mg by mouth every evening.     ENTRESTO  mg per tablet Take 1 tablet by mouth 2 (two) times daily.     FERROUS FUMARATE/VIT BCOMP,C (SUPER B COMPLEX ORAL) Take 1 tablet by mouth once daily.    ferrous gluconate (FERGON) 324 MG tablet Take 324 mg by mouth 2 (two) times daily. At noon and in the evening    furosemide (LASIX) 40 MG tablet Take 40 mg by mouth 2 (two) times daily.    gabapentin (NEURONTIN) 100 MG capsule Take 1 capsule (100 mg total) by mouth every evening.    hydroxychloroquine (PLAQUENIL) 200 mg tablet Take 1 tablet (200 mg total) by mouth once daily.    isosorbide dinitrate (ISORDIL) 10 MG tablet Take 10 mg by mouth 3 (three) times daily.    krill-omega-3-dha-epa-lipids (KRILL OIL) 098-88-91-50 mg Cap Take 1,000 mg by mouth once daily. Braxton krill 300mg qd     lactobacillus acidophilus (PROBIOTIC) 10 billion cell Cap Take 1 each by mouth once daily. 1.5 billion    magnesium oxide (MAG-OX) 400 mg tablet Take 400 mg by mouth 2 (two) times daily.    metOLazone (ZAROXOLYN) 2.5  MG tablet Take 2.5 mg by mouth twice a week.     metoprolol succinate (TOPROL-XL) 25 MG 24 hr tablet Take 12.5 mg by mouth once daily.     MULTIVITAMIN WITH MINERALS (ONE-A-DAY 50 PLUS) Tab Take 1 tablet by mouth once daily. Every day    pantoprazole (PROTONIX) 40 MG tablet Take 40 mg by mouth once daily.    potassium chloride SA (K-DUR,KLOR-CON) 10 MEQ tablet Take 20 mEq by mouth once daily.     potassium chloride SA (K-DUR,KLOR-CON) 10 MEQ tablet Take 10 mEq by mouth every evening.    sertraline (ZOLOFT) 50 MG tablet TAKE 1 TABLET (50 MG TOTAL) BY MOUTH ONCE DAILY.    temazepam (RESTORIL) 7.5 MG Cap Take 15 mg by mouth nightly as needed.    vitamin E 400 unit Tab Take 400 mg by mouth once daily. Every day    fluticasone (FLONASE) 50 mcg/actuation nasal spray 2 sprays by Each Nare route once daily. (Patient taking differently: 2 sprays by Each Nare route daily as needed. )    nitroGLYCERIN (NITROLINGUAL) 0.4 mg/dose spray Place 1 spray under the tongue every 5 (five) minutes as needed. PRN    ondansetron (ZOFRAN) 4 MG tablet Take 1 tablet(s) 3 times a day by oral route as needed for 15 days.    promethazine (PHENERGAN) 25 MG tablet Take 1 tablet (25 mg total) by mouth 2 (two) times daily as needed for Nausea.    traMADol (ULTRAM) 50 mg tablet 1-2 po bid prn    [DISCONTINUED] FUROSEMIDE ORAL furosemide 60 mg tablet   Take 1 tablet every day by oral route.    [DISCONTINUED] hydrochlorothiazide (HYDRODIURIL) 25 MG tablet Take 25 mg by mouth once daily.     Family History     Problem Relation (Age of Onset)    Arthritis Brother    Crohn's disease Brother, Brother, Brother    Early death Son    Fibromyalgia Sister    Heart disease Father, Mother, Brother    Hypertension Father, Sister, Daughter    Irritable bowel syndrome Sister    Lupus Cousin, Cousin    Pulmonary embolism Sister    Scleroderma Sister    Skin cancer Mother        Tobacco Use    Smoking status: Former Smoker     Packs/day: 1.00      Years: 5.00     Pack years: 5.00     Last attempt to quit: 1971     Years since quittin.7    Smokeless tobacco: Never Used   Substance and Sexual Activity    Alcohol use: Yes     Alcohol/week: 1.0 standard drinks     Types: 1 Glasses of wine per week    Drug use: Yes     Frequency: 1.0 times per week    Sexual activity: Not Currently     Partners: Male     Review of Systems   Constitutional: Positive for fatigue. Negative for activity change, chills, fever and unexpected weight change.   HENT: Negative.    Eyes: Negative.    Respiratory: Positive for shortness of breath. Negative for cough and wheezing.    Cardiovascular: Positive for palpitations and leg swelling. Negative for chest pain.   Gastrointestinal: Positive for nausea. Negative for constipation and vomiting. Abdominal pain: Bloating.   Endocrine: Negative.    Genitourinary: Negative.    Musculoskeletal: Negative.    Allergic/Immunologic: Negative.    Neurological: Positive for dizziness, weakness and light-headedness.   Hematological: Negative.    Psychiatric/Behavioral: Negative.      Objective:     Vital Signs (Most Recent):  Temp: 98.1 °F (36.7 °C) (19)  Pulse: (!) 58 (19)  Resp: 14 (19)  BP: (!) 147/66 (19)  SpO2: 95 % (19) Vital Signs (24h Range):  Temp:  [98.1 °F (36.7 °C)-98.4 °F (36.9 °C)] 98.1 °F (36.7 °C)  Pulse:  [45-59] 58  Resp:  [14-24] 14  SpO2:  [95 %-98 %] 95 %  BP: (132-147)/(61-84) 147/66     Weight: 42.5 kg (93 lb 12.8 oz)  Body mass index is 19.6 kg/m².    Physical Exam   Constitutional: She is oriented to person, place, and time. No distress.   Thin chronically ill elderly female   HENT:   Head: Normocephalic and atraumatic.   Eyes: EOM are normal.   Neck: Normal range of motion. Neck supple. No JVD present.   Cardiovascular: Normal rate and regular rhythm. Exam reveals no gallop.   No murmur heard.  Pulmonary/Chest: Effort normal. No stridor. She has no wheezes.    Bronchial breath sounds right base   Abdominal: Soft. Bowel sounds are normal.   Genitourinary: Vagina normal.   Musculoskeletal: She exhibits no edema.   Neurological: She is alert and oriented to person, place, and time.   Skin: Skin is warm and dry. Capillary refill takes less than 2 seconds. She is not diaphoretic.   Small bruises in LEs   Psychiatric: She has a normal mood and affect.   Vitals reviewed.        CRANIAL NERVES     CN III, IV, VI   Extraocular motions are normal.        Significant Labs:   CBC:   Recent Labs   Lab 09/27/19 1914   WBC 7.03   HGB 9.7*   HCT 30.2*   *     CMP:   Recent Labs   Lab 09/27/19 1914      K 4.1   CL 96   CO2 32*   GLU 94   BUN 45*   CREATININE 1.8*   CALCIUM 9.0   PROT 6.5   ALBUMIN 3.9   BILITOT 0.6   ALKPHOS 69   AST 84*   ALT 32   ANIONGAP 8   EGFRNONAA 25.7*     Cardiac Markers:   Recent Labs   Lab 09/27/19 1914   BNP 1,750*     Magnesium: No results for input(s): MG in the last 48 hours.  Troponin:   Recent Labs   Lab 09/27/19 1914   TROPONINI 0.534*     All pertinent labs within the past 24 hours have been reviewed.    Significant Imaging:   X-ray Chest Ap Portable    Result Date: 9/27/2019  EXAMINATION: XR CHEST AP PORTABLE CLINICAL HISTORY: chest pain; FINDINGS: Portable chest at 1922 compared with 08/06/2019 shows unchanged enlarged cardiac silhouette size with normal mediastinal contours.  Postsurgical changes of CABG again evident. Linear opacities in left lower lung zones show no significant change.  Right lung is clear.  Pulmonary vasculature is normal. Thoracolumbar spine vertebroplasties are evident with poly methylmethacrylate at numerous levels.     Unchanged left lung base scarring or subsegmental atelectasis and cardiomegaly. Electronically signed by: Lionel Balderas MD Date:    09/27/2019 Time:    19:50

## 2019-09-28 NOTE — PLAN OF CARE
Cardiac, vital signs, and lab monitoring. Increase activity as tolerated. Bed alarm on. Watch for falls. Possible stress test in am. Strict I&O. Daily weights.

## 2019-09-28 NOTE — ED NOTES
Patient identifiers for Cheyanne Gomes checked and correct.  LOC:  Cheyanne Gomes is awake, alert, and aware of environment with an appropriate affect. She is oriented x 3 and speaking appropriately.  APPEARANCE:  She is resting comfortably and in no acute distress. She is clean and well groomed, patient's clothing is properly fastened.  SKIN:  The skin is warm and dry. She has normal skin turgor and moist mucus membranes. Skin is intact; no bruising or breakdown noted.  MUSCULOSKELETAL:  She is moving all extremities well, no obvious deformities noted. Pulses intact.   RESPIRATORY:  Airway is open and patent. Respirations are spontaneous and non-labored with normal effort and rate.  CARDIAC:  She has a normal rate and rhythm. Capillary refill < 3 seconds. Pt reports CP prior to arrival.   ABDOMEN:  No distention noted.  Soft and non-tender upon palpation.  NEUROLOGICAL:  PERRL. Facial expression is symmetrical. Hand grasps are equal bilaterally. Normal sensation in all extremities when touched with finger.  Allergies reported:    Review of patient's allergies indicates:   Allergen Reactions    Macrodantin  [nitrofurantoin macrocrystalline]      Other reaction(s): very sickly    Penicillins      Other reaction(s): swelling  Other reaction(s): red skin discolorati    Sulfa (sulfonamide antibiotics)      Other reaction(s): very sickly     OTHER NOTES:    Transfer pt for elevated troponin

## 2019-09-28 NOTE — SUBJECTIVE & OBJECTIVE
Interval History: Denies any CP, SOB, N/V/D, headaches, fever or chills.     Review of Systems   Per interval history, all other systems reviewed and negative.     Objective:     Vital Signs (Most Recent):  Temp: 97.6 °F (36.4 °C) (09/28/19 1100)  Pulse: (!) 58 (09/28/19 1100)  Resp: 20 (09/28/19 1100)  BP: (!) 127/58 (09/28/19 1100)  SpO2: 98 % (09/28/19 1100) Vital Signs (24h Range):  Temp:  [97.6 °F (36.4 °C)-98.4 °F (36.9 °C)] 97.6 °F (36.4 °C)  Pulse:  [45-62] 58  Resp:  [14-24] 20  SpO2:  [94 %-99 %] 98 %  BP: (126-170)/(58-84) 127/58     Weight: 42.2 kg (93 lb 0.6 oz)  Body mass index is 19.44 kg/m².  No intake or output data in the 24 hours ending 09/28/19 1341   Physical Exam   Constitutional: She is oriented to person, place, and time. No distress.   Thin chronically ill elderly female   HENT:   Head: Normocephalic and atraumatic.   Eyes: EOM are normal.   Neck: Normal range of motion. Neck supple. No JVD present.   Cardiovascular: Normal rate and regular rhythm. Exam reveals no gallop.   Murmur heard.  Pulmonary/Chest: Effort normal. No stridor. She has no wheezes.   Bronchial breath sounds right base   Abdominal: Soft. Bowel sounds are normal.   Genitourinary: Vagina normal.   Musculoskeletal: She exhibits no edema.   Neurological: She is alert and oriented to person, place, and time.   Skin: Skin is warm and dry. Capillary refill takes less than 2 seconds. She is not diaphoretic.   Small bruises in LEs   Psychiatric: She has a normal mood and affect.   Vitals reviewed.      Significant Labs:   BMP:   Recent Labs   Lab 09/28/19  0129 09/28/19  0554   GLU 83 73    137   K 3.2* 3.0*   CL 95 94*   CO2 31* 30*   BUN 44* 44*   CREATININE 1.6* 1.5*   CALCIUM 8.9 9.1   MG 1.9  --      CBC:   Recent Labs   Lab 09/27/19 1914 09/28/19  0129   WBC 7.03 4.67   HGB 9.7* 9.5*   HCT 30.2* 29.7*   * 110*     Cardiac Markers:   Recent Labs   Lab 09/27/19 1914   BNP 1,750*     Troponin:   Recent Labs   Lab  09/27/19  1914 09/28/19  0554   TROPONINI 0.534* 0.575*     All pertinent labs within the past 24 hours have been reviewed.    Significant Imaging: I have reviewed all pertinent imaging results/findings within the past 24 hours.

## 2019-09-28 NOTE — ASSESSMENT & PLAN NOTE
Improving  Due to acute and chronic systolic heart failure  Improved with IV lasix given at another facility  Does not appear to be significantly volume overload  Continue home medication including aspirin, entresto, amlodipine, lasix, metolazone, metoprolol, isosorbide  Continue PPI  Trend cardiac enzymes  Consult cardiology

## 2019-09-29 VITALS
HEIGHT: 58 IN | TEMPERATURE: 98 F | OXYGEN SATURATION: 99 % | DIASTOLIC BLOOD PRESSURE: 72 MMHG | SYSTOLIC BLOOD PRESSURE: 167 MMHG | BODY MASS INDEX: 19.53 KG/M2 | HEART RATE: 57 BPM | WEIGHT: 93.06 LBS | RESPIRATION RATE: 18 BRPM

## 2019-09-29 PROBLEM — R06.02 SHORTNESS OF BREATH: Status: RESOLVED | Noted: 2019-09-27 | Resolved: 2019-09-29

## 2019-09-29 LAB
ANION GAP SERPL CALC-SCNC: 9 MMOL/L (ref 8–16)
AORTIC ROOT ANNULUS: 2.53 CM
AORTIC VALVE CUSP SEPERATION: 0.94 CM
AV INDEX (PROSTH): 0.42
AV MEAN GRADIENT: 13 MMHG
AV PEAK GRADIENT: 23 MMHG
AV VALVE AREA: 1.31 CM2
AV VELOCITY RATIO: 44.68
BSA FOR ECHO PROCEDURE: 1.31 M2
BUN SERPL-MCNC: 44 MG/DL (ref 8–23)
CALCIUM SERPL-MCNC: 9 MG/DL (ref 8.7–10.5)
CHLORIDE SERPL-SCNC: 96 MMOL/L (ref 95–110)
CO2 SERPL-SCNC: 32 MMOL/L (ref 23–29)
CREAT SERPL-MCNC: 1.4 MG/DL (ref 0.5–1.4)
CV ECHO LV RWT: 0.9 CM
DOP CALC AO PEAK VEL: 2.38 M/S
DOP CALC AO VTI: 44.03 CM
DOP CALC LVOT AREA: 3.1 CM2
DOP CALC LVOT DIAMETER: 2 CM
DOP CALC LVOT PEAK VEL: 106.33 M/S
DOP CALC LVOT STROKE VOLUME: 57.81 CM3
DOP CALCLVOT PEAK VEL VTI: 18.41 CM
E WAVE DECELERATION TIME: 176.78 MSEC
E/A RATIO: 1.1
E/E' RATIO: 17.33 M/S
ECHO LV POSTERIOR WALL: 1.69 CM (ref 0.6–1.1)
EST. GFR  (AFRICAN AMERICAN): 40.1 ML/MIN/1.73 M^2
EST. GFR  (NON AFRICAN AMERICAN): 34.8 ML/MIN/1.73 M^2
FRACTIONAL SHORTENING: 38 % (ref 28–44)
GLUCOSE SERPL-MCNC: 101 MG/DL (ref 70–110)
INTERVENTRICULAR SEPTUM: 1.47 CM (ref 0.6–1.1)
LEFT ATRIUM SIZE: 4.42 CM
LEFT INTERNAL DIMENSION IN SYSTOLE: 2.31 CM (ref 2.1–4)
LEFT VENTRICLE MASS INDEX: 176 G/M2
LEFT VENTRICULAR INTERNAL DIMENSION IN DIASTOLE: 3.75 CM (ref 3.5–6)
LEFT VENTRICULAR MASS: 231.23 G
LV LATERAL E/E' RATIO: 15.6 M/S
LV SEPTAL E/E' RATIO: 19.5 M/S
MV PEAK A VEL: 0.71 M/S
MV PEAK E VEL: 0.78 M/S
PISA TR MAX VEL: 2.93 M/S
POTASSIUM SERPL-SCNC: 4 MMOL/L (ref 3.5–5.1)
PV PEAK VELOCITY: 89.22 CM/S
RA PRESSURE: 8 MMHG
RIGHT VENTRICULAR END-DIASTOLIC DIMENSION: 360 CM
SODIUM SERPL-SCNC: 137 MMOL/L (ref 136–145)
TDI LATERAL: 0.05 M/S
TDI SEPTAL: 0.04 M/S
TDI: 0.05 M/S
TR MAX PG: 34 MMHG
TROPONIN I SERPL DL<=0.01 NG/ML-MCNC: 0.6 NG/ML (ref 0.02–0.04)
TV REST PULMONARY ARTERY PRESSURE: 42 MMHG

## 2019-09-29 PROCEDURE — G0378 HOSPITAL OBSERVATION PER HR: HCPCS

## 2019-09-29 PROCEDURE — 25000003 PHARM REV CODE 250: Performed by: INTERNAL MEDICINE

## 2019-09-29 PROCEDURE — 25000003 PHARM REV CODE 250: Performed by: NURSE PRACTITIONER

## 2019-09-29 PROCEDURE — 94761 N-INVAS EAR/PLS OXIMETRY MLT: CPT

## 2019-09-29 PROCEDURE — 93005 ELECTROCARDIOGRAM TRACING: CPT

## 2019-09-29 PROCEDURE — 80048 BASIC METABOLIC PNL TOTAL CA: CPT

## 2019-09-29 PROCEDURE — 84484 ASSAY OF TROPONIN QUANT: CPT

## 2019-09-29 RX ORDER — METOLAZONE 2.5 MG/1
2.5 TABLET ORAL
Qty: 12 TABLET | Refills: 11 | Status: SHIPPED | OUTPATIENT
Start: 2019-09-30 | End: 2020-01-24

## 2019-09-29 RX ADMIN — DOCUSATE CALCIUM 240 MG: 240 CAPSULE, LIQUID FILLED ORAL at 08:09

## 2019-09-29 RX ADMIN — AMLODIPINE BESYLATE 5 MG: 5 TABLET ORAL at 08:09

## 2019-09-29 RX ADMIN — SACUBITRIL AND VALSARTAN 2 TABLET: 49; 51 TABLET, FILM COATED ORAL at 08:09

## 2019-09-29 RX ADMIN — PANTOPRAZOLE SODIUM 40 MG: 40 TABLET, DELAYED RELEASE ORAL at 08:09

## 2019-09-29 RX ADMIN — FUROSEMIDE 40 MG: 40 TABLET ORAL at 08:09

## 2019-09-29 RX ADMIN — SERTRALINE HYDROCHLORIDE 50 MG: 50 TABLET ORAL at 08:09

## 2019-09-29 RX ADMIN — METOPROLOL SUCCINATE 12.5 MG: 25 TABLET, EXTENDED RELEASE ORAL at 08:09

## 2019-09-29 RX ADMIN — MAGNESIUM OXIDE 400 MG: 400 TABLET ORAL at 08:09

## 2019-09-29 RX ADMIN — LACTOBACILLUS TAB 1 TABLET: TAB at 08:09

## 2019-09-29 RX ADMIN — HYDROXYCHLOROQUINE SULFATE 200 MG: 200 TABLET, FILM COATED ORAL at 08:09

## 2019-09-29 RX ADMIN — FLUTICASONE PROPIONATE 100 MCG: 50 SPRAY, METERED NASAL at 08:09

## 2019-09-29 RX ADMIN — FERROUS SULFATE TAB 325 MG (65 MG ELEMENTAL FE) 325 MG: 325 (65 FE) TAB at 08:09

## 2019-09-29 RX ADMIN — ISOSORBIDE DINITRATE 10 MG: 10 TABLET ORAL at 08:09

## 2019-09-29 NOTE — PLAN OF CARE
Cardiac, vital signs, and lab monitoring. Strict I&O. Daily weights. Discharge today per patient request. MD aware.

## 2019-09-29 NOTE — DISCHARGE SUMMARY
"[facility]  Discharge Summary    Patient Name: Cheyanne Gomes  MRN: [id number]  Admission Date: 9/27/2019  Hospital Length of Stay: 0 days  Discharge Date and Time:  09/29/2019 9:39 AM  Attending Physician: Tracey Bustillos DO   Discharging Provider: Tracey Bustillos DO  Primary Care Provider: Romeo Alas MD      HPI:   The patient is an 83 year old  female with history of heart failure, CAD-sp CABG in 2000 and PCI in 2007, hypertension, hyperlipidemia, RA, and anemia due to chronic blood loss.  She presented to the emergency department with shortness of breath.  She was initially seen at [facility] in [place].  She was given Lasix IV.  She was noted to have elevated troponin at 0.61. She was subsequently transferred here for further evaluation and treatment.    She states that she has not been feeling well since this morning.  She reports severe "trouble breathing", worse with activities, associated with lightheadedness and nausea around 2:00 p.m..  She went to the emergency department in [place].  She denies weight gain.  She reports some swelling in the left leg ans slight swelling in her right leg. She denies orthopnea or PND.  She reports compliant with her medication.  She denies chest pain. She reports chronic abdominal discomfort, described as a bloating.  She denies vomiting or diarrhea.  She is taking iron supplement.  She states that she had blood transfusion earlier this month.    Repeat troponin here was 0.053 with BNP 1750.  Reviewed of records showed that she has  chronic baseline troponin elevation.    January 13, 2019 04:08 0.667 CH ng/ml   January 12, 2019 22:16 0.826 CH ng/ml   January 12, 2019 16:54 0.673 CH ng/ml   April 8, 2018 04:11 0.129 H ng/ml   April 7, 2018 00:24 0.215 H ng/ml   April 6, 2018 19:28 0.242 H ng/ml   April 6, 2018 12:58 0.236 H ng/ml    Echocardiogram on 1/13/2019:  Severe left ventral dysfunction with distal septal " "apical and lateral wall hypokinesis. With overall left ventral ejection fraction of probably around 35-40%. Mild left atrial enlargement Right atrial chamber size is within normal limits Normal right ventricular size and function. Mild thickening of the aortic valve leaflets. Trace attic insufficiency. Mild thickening of mitral valve leaflets noted. Mild to Moderate mitral regurgitation. Tricuspid valve is structurally normal. Mild tricuspid regurgitation. The pulmonic valve was not well visualized. No evidence of any pericardial effusion. Aortic root dimension within normal limits.       Chest radiography here showed no acute pulmonary edema. EKG showed sinus bradycardia, left ventricular hypertrophy, no acute changes.  Currently she reports improvement in her symptoms.    Hospital Course:   Transferred from OSH yesterday for continued cardiac work up and admitted for acute on chronic congestive heart failure exacerbation.. Chronically elevated trops. ECHO performed this morning, read pending. Pt seen by cardiology today with recommendations to increase metalazone 3x week, follow up ECHO findings to determine Lexiscan v. Dobuatamine Stress test. Patient seen and examined. Chest pain resolved, SOB improved,  tolerated diet, ambulating around room. Patient stated that she did not want further workup here at [facility] and would like to continue further care with her outpatient cardiologist.  All risks were discussed with patient including worsening heart failure, MI,.  Patient voiced understanding of risks and will make appointment with outpatient cardiology.     Physical exam on day of discharge  BP (!) 167/72   Pulse (!) 57   Temp 97.8 °F (36.6 °C) (Oral)   Resp 18   Ht 4' 10" (1.473 m)   Wt 42.2 kg (93 lb 0.6 oz)   LMP  (Exact Date)   SpO2 99%   Breastfeeding? No   BMI 19.44 kg/m²   Physical Exam   Constitutional:  Awake, alert and oriented x 3, NAD  HENT: ncat, mmm, perrla, eomi, neck " normal rom and supple   Cardiovascular: rrr +murmur  Pulmonary/Chest: Effort normal, CTA b/l no wheezes, rales, rhonchi   Abdominal: Soft. +bs, ntnd  Musculoskeletal: Normal range of motion.  no edema, tenderness or deformity.   Neurological:  no focal deficits noted, CN II-XII grossly intact  Skin: Skin is warm and dry. not diaphoretic.   Psychiatric:  normal mood and affect.  behavior is normal. Judgment and thought content normal.   Nursing note and vitals reviewed.      Consults:   Consults (From admission, onward)        Status Ordering Provider     Inpatient consult to Cardiology  Once     Provider:  Tera Blair MD    Completed EDGAR العلي          No new Assessment & Plan notes have been filed under this hospital service since the last note was generated.  Service: Hospital Medicine    Final Active Diagnoses:    Diagnosis Date Noted POA    PRINCIPAL PROBLEM:  Acute on chronic heart failure [I50.9] 09/28/2019 Yes    CKD (chronic kidney disease), stage IV [N18.4] 08/06/2019 Yes    Anemia of chronic disease [D63.8] 06/07/2017 Yes     Chronic    CAD (coronary artery disease) [I25.10]  Yes    Hypokalemia [E87.6] 09/20/2013 Yes      Problems Resolved During this Admission:    Diagnosis Date Noted Date Resolved POA    Shortness of breath [R06.02] 09/27/2019 09/29/2019 Yes       Discharged Condition: stable    Disposition: Home or Self Care    Follow Up:  Follow-up Information     Romeo Alas MD.    Specialty:  Family Medicine  Contact information:  [address] [facility] [id number] [phone]             Tera Blair MD.    Specialties:  Cardiovascular Disease, Cardiology  Contact information:  [address] [facility]  [address]  [facility]  [place] [id number] [phone]                 Patient Instructions:      Diet Cardiac     Notify your health care provider if you experience any of the following:  persistent nausea and vomiting or diarrhea     Notify your health care  provider if you experience any of the following:  severe uncontrolled pain     Notify your health care provider if you experience any of the following:  redness, tenderness, or signs of infection (pain, swelling, redness, odor or green/yellow discharge around incision site)     Notify your health care provider if you experience any of the following:  difficulty breathing or increased cough     Notify your health care provider if you experience any of the following:  severe persistent headache     Notify your health care provider if you experience any of the following:  persistent dizziness, light-headedness, or visual disturbances     Activity as tolerated       Significant Diagnostic Studies:  ECHO:   Moderate concentric left ventricular hypertrophy.  Mildly decreased left ventricular systolic function. The estimated ejection fraction is 45%  Grade II (moderate) left ventricular diastolic dysfunction consistent with pseudonormalization.  Mild right ventricular enlargement.  Low normal right ventricular systolic function.  Mild aortic regurgitation.  Mild-to-moderate aortic valve stenosis.  Aortic valve area is 1.31 cm2; peak velocity is 2.38 m/s; mean gradient is 13 mmHg.  Mild-to-moderate mitral regurgitation.  Mild tricuspid regurgitation.  Mild pulmonic regurgitation.  Intermediate central venous pressure (8 mm Hg).  The estimated PA systolic pressure is 42 mm Hg  Pulmonary hypertension present.  Local segmental wall motion abnormalities.    Medications:  Reconciled Home Medications:      Medication List      CHANGE how you take these medications    fluticasone propionate 50 mcg/actuation nasal spray  Commonly known as:  FLONASE  2 sprays by Each Nare route once daily.  What changed:    · when to take this  · reasons to take this     metOLazone 2.5 MG tablet  Commonly known as:  ZAROXOLYN  Take 1 tablet (2.5 mg total) by mouth 3 (three) times a week.  Start taking on:  September 30, 2019  What changed:  when to  take this        CONTINUE taking these medications    amLODIPine 5 MG tablet  Commonly known as:  NORVASC  Take 5 mg by mouth once daily.     aspirin 81 mg Tab  Take 1 tablet by mouth every evening. Every day     biotin 5 mg Cap  Take 1 tablet by mouth 2 (two) times daily.     CALCIUM-VITAMIN D ORAL  Take 600 mg by mouth 2 (two) times daily. Calcium 1200 with D 3 1000     coenzyme Q10 100 mg capsule  Take 100 mg by mouth once daily.     CRANBERRY PLUS VITAMIN C ORAL  Take 1 capsule by mouth once daily.     DEXILANT 60 mg capsule  Generic drug:  dexlansoprazole  Take 1 capsule(s) every day by oral route in the morning for 30 days.     ENTRESTO  mg per tablet  Generic drug:  sacubitril-valsartan  Take 1 tablet by mouth 2 (two) times daily.     ferrous gluconate 324 MG tablet  Commonly known as:  FERGON  Take 324 mg by mouth 2 (two) times daily. At noon and in the evening     furosemide 40 MG tablet  Commonly known as:  LASIX  Take 40 mg by mouth 2 (two) times daily.     gabapentin 100 MG capsule  Commonly known as:  NEURONTIN  Take 1 capsule (100 mg total) by mouth every evening.     hydroxychloroquine 200 mg tablet  Commonly known as:  PLAQUENIL  Take 1 tablet (200 mg total) by mouth once daily.     isosorbide dinitrate 10 MG tablet  Commonly known as:  ISORDIL  Take 10 mg by mouth 3 (three) times daily.     KRILL -39-11-50 mg Cap  Generic drug:  krill-omega-3-dha-epa-lipids  Take 1,000 mg by mouth once daily. Braxton krill 300mg qd     magnesium oxide 400 mg (241.3 mg magnesium) tablet  Commonly known as:  MAG-OX  Take 400 mg by mouth 2 (two) times daily.     metoprolol succinate 25 MG 24 hr tablet  Commonly known as:  TOPROL-XL  Take 12.5 mg by mouth once daily.     NITROLINGUAL 400 mcg/spray spray  Generic drug:  nitroGLYCERIN 0.4 MG/DOSE TL SPRY  Place 1 spray under the tongue every 5 (five) minutes as needed. PRN     ondansetron 4 MG tablet  Commonly known as:  ZOFRAN  Take 1 tablet(s) 3 times a day by  oral route as needed for 15 days.     ONE-A-DAY 50 PLUS tablet  Generic drug:  geriatric multivitamin-min  Take 1 tablet by mouth once daily. Every day     * potassium chloride SA 10 MEQ tablet  Commonly known as:  K-DUR,KLOR-CON  Take 10 mEq by mouth every evening.     * potassium chloride SA 10 MEQ tablet  Commonly known as:  K-DUR,KLOR-CON  Take 20 mEq by mouth once daily.     PROBIOTIC 10 billion cell Cap  Generic drug:  Lactobacillus acidophilus  Take 1 each by mouth once daily. 1.5 billion     promethazine 25 MG tablet  Commonly known as:  PHENERGAN  Take 1 tablet (25 mg total) by mouth 2 (two) times daily as needed for Nausea.     PROTONIX 40 MG tablet  Generic drug:  pantoprazole  Take 40 mg by mouth once daily.     sertraline 50 MG tablet  Commonly known as:  ZOLOFT  TAKE 1 TABLET (50 MG TOTAL) BY MOUTH ONCE DAILY.     STOOL SOFTENER ORAL  Take 200 mg by mouth every evening.     SUPER B COMPLEX ORAL  Take 1 tablet by mouth once daily.     temazepam 7.5 MG Cap  Commonly known as:  RESTORIL  Take 15 mg by mouth nightly as needed.     traMADol 50 mg tablet  Commonly known as:  ULTRAM  1-2 po bid prn     vitamin E 400 unit Tab  Take 400 mg by mouth once daily. Every day         * This list has 2 medication(s) that are the same as other medications prescribed for you. Read the directions carefully, and ask your doctor or other care provider to review them with you.                Indwelling Lines/Drains at time of discharge:   Lines/Drains/Airways     None                 Time spent on the discharge of patient: 35 minutes  Patient was seen and examined on the date of discharge and determined to be suitable for discharge.         Tracey Bustillos DO  Department of Hospital Medicine  [facility]

## 2019-09-29 NOTE — PROGRESS NOTES
Critical access hospital  Cardiology  Progress Note    Patient Name: Cheyanne Gomes  MRN: [id number]  Admission Date: 9/27/2019  Hospital Length of Stay: 0 days  Code Status: Full Code   Attending Physician: Tracey Bustilols DO   Primary Care Physician: Romeo Alas MD  Expected Discharge Date:   Principal Problem:Shortness of breath    Subjective:       Interval History: Denies any chest pain. Shortness of breath is better.  She wants to go home. She wants to have further wok up as an outpatient.     ROS   Denies any abdominal pain.   Denies any seizures.  Denies any dysuria.      Objective:     Vital Signs (Most Recent):  Temp: 97.5 °F (36.4 °C) (09/29/19 0300)  Pulse: 66 (09/29/19 0300)  Resp: 18 (09/29/19 0300)  BP: (!) 171/73 (09/29/19 0300)  SpO2: 96 % (09/29/19 0300) Vital Signs (24h Range):  Temp:  [97.5 °F (36.4 °C)-98.7 °F (37.1 °C)] 97.5 °F (36.4 °C)  Pulse:  [53-66] 66  Resp:  [18-20] 18  SpO2:  [95 %-98 %] 96 %  BP: (108-171)/(54-73) 171/73     Weight: 42.2 kg (93 lb 0.6 oz)  Body mass index is 19.44 kg/m².    SpO2: 96 %  O2 Device (Oxygen Therapy): nasal cannula      Intake/Output Summary (Last 24 hours) at 9/29/2019 0836  Last data filed at 9/29/2019 0000  Gross per 24 hour   Intake 120 ml   Output --   Net 120 ml       Lines/Drains/Airways     Peripheral Intravenous Line                 Peripheral IV - Single Lumen 09/27/19 1914 18 G Right Forearm 1 day                Scheduled Meds:   amLODIPine  5 mg Oral Daily    aspirin  81 mg Oral QHS    docusate calcium  240 mg Oral Daily    ferrous sulfate  325 mg Oral BID    fluticasone propionate  2 spray Each Nostril Daily    furosemide  40 mg Oral BID    gabapentin  100 mg Oral QHS    hydroxychloroquine  200 mg Oral Daily    isosorbide dinitrate  10 mg Oral TID    Lactobacillus acidoph-L.bulgar  1 tablet Oral Daily    magnesium oxide  400 mg Oral BID    metOLazone  2.5 mg Oral Once per day on Mon Wed Fri    metoprolol succinate  12.5 mg  Oral Daily    pantoprazole  40 mg Oral Daily    sacubitril-valsartan  2 tablet Oral BID    sertraline  50 mg Oral Daily     Continuous Infusions:  PRN Meds:.acetaminophen, magnesium oxide, magnesium oxide, nitroGLYCERIN 0.4 MG/DOSE TL SPRY, ondansetron, potassium chloride 10%, potassium chloride 10%, potassium chloride 10%, potassium, sodium phosphates, potassium, sodium phosphates, potassium, sodium phosphates, promethazine, sodium chloride 0.9%, zolpidem     Physical Exam  HEENT: Normocephalic, atraumatic, PERRL, Conjunctiva pink, no scleral icterus.   CVS: S1S2+, RRR, +SM,, rubs or gallops, JVP: Improved.   LUNGS: Clear  ABDOMEN: Soft, NT, BS+  EXTREMITIES: No cyanosis, clubbing or edema  NEURO: AAO X 3.     Significant Labs:   BMP:   Recent Labs   Lab 09/28/19 0129 09/28/19  0554 09/29/19  0438   GLU 83 73 101    137 137   K 3.2* 3.0* 4.0   CL 95 94* 96   CO2 31* 30* 32*   BUN 44* 44* 44*   CREATININE 1.6* 1.5* 1.4   CALCIUM 8.9 9.1 9.0   MG 1.9  --   --    , CMP   Recent Labs   Lab 09/27/19  1914 09/28/19 0129 09/28/19  0554 09/29/19  0438    137 137 137   K 4.1 3.2* 3.0* 4.0   CL 96 95 94* 96   CO2 32* 31* 30* 32*   GLU 94 83 73 101   BUN 45* 44* 44* 44*   CREATININE 1.8* 1.6* 1.5* 1.4   CALCIUM 9.0 8.9 9.1 9.0   PROT 6.5 6.2  --   --    ALBUMIN 3.9 3.6  --   --    BILITOT 0.6 1.0  --   --    ALKPHOS 69 62  --   --    AST 84* 53*  --   --    ALT 32 28  --   --    ANIONGAP 8 11 13 9   ESTGFRAFRICA 29.6* 34.1* 36.9* 40.1*   EGFRNONAA 25.7* 29.6* 32.0* 34.8*   , CBC   Recent Labs   Lab 09/27/19 1914 09/28/19  0129   WBC 7.03 4.67   HGB 9.7* 9.5*   HCT 30.2* 29.7*   * 110*   , INR   Recent Labs   Lab 09/27/19 1914   INR 1.1   , Lipid Panel No results for input(s): CHOL, HDL, LDLCALC, TRIG, CHOLHDL in the last 48 hours. and Troponin   Recent Labs   Lab 09/27/19 1914 09/28/19  0554 09/29/19  0438   TROPONINI 0.534* 0.575* 0.599*       Significant Imaging: Reviewed  Assessment and Plan:      IMPRESSION:  CHF. Systolic. Acute on chronic. Last Echo 1/2019 with LVEF 35-40%. Repeat echo on 9/27/19 with LVEF 45%.  CAD s/p PCI s/p CABG  Chest tightness.   Elevated troponin. No significant rise or fall.  Anemia.   Hypokalemia   CKD. Near her baseline.   HTN    PLAN:  1. Ok to discharge home as patient refuses further work up and wants to get it done as an outpatient after she sees Dr Blair. She reportedly has an appointment in the next week.   2. Continue her home medications.   3. Discussed with Hospitalist.       Rodríguez Lanza MD  Cardiology  [facility]

## 2019-09-30 ENCOUNTER — EXTERNAL CHRONIC CARE MANAGEMENT (OUTPATIENT)
Dept: PRIMARY CARE CLINIC | Facility: CLINIC | Age: 84
End: 2019-09-30
Payer: MEDICARE

## 2019-09-30 PROCEDURE — 99490 CHRNC CARE MGMT STAFF 1ST 20: CPT | Mod: S$PBB,,, | Performed by: FAMILY MEDICINE

## 2019-09-30 PROCEDURE — 99490 CHRNC CARE MGMT STAFF 1ST 20: CPT | Mod: PBBFAC,PO | Performed by: FAMILY MEDICINE

## 2019-09-30 PROCEDURE — 99490 PR CHRONIC CARE MGMT, 1ST 20 MIN: ICD-10-PCS | Mod: S$PBB,,, | Performed by: FAMILY MEDICINE

## 2019-10-01 ENCOUNTER — TELEPHONE (OUTPATIENT)
Dept: FAMILY MEDICINE | Facility: CLINIC | Age: 84
End: 2019-10-01

## 2019-10-01 ENCOUNTER — HOSPITAL ENCOUNTER (INPATIENT)
Facility: HOSPITAL | Age: 84
LOS: 3 days | Discharge: HOME | DRG: 308 | End: 2019-10-04
Attending: EMERGENCY MEDICINE | Admitting: INTERNAL MEDICINE
Payer: MEDICARE

## 2019-10-01 DIAGNOSIS — R00.1 BRADYCARDIA WITH 31-40 BEATS PER MINUTE: ICD-10-CM

## 2019-10-01 DIAGNOSIS — Z95.0 CARDIAC PACEMAKER: ICD-10-CM

## 2019-10-01 DIAGNOSIS — R07.9 CHEST PAIN, UNSPECIFIED TYPE: ICD-10-CM

## 2019-10-01 DIAGNOSIS — R00.1 AV JUNCTIONAL BRADYCARDIA: Primary | ICD-10-CM

## 2019-10-01 DIAGNOSIS — R07.9 CHEST PAIN: ICD-10-CM

## 2019-10-01 DIAGNOSIS — R00.1 BRADYCARDIA: ICD-10-CM

## 2019-10-01 PROBLEM — E87.5 HYPERKALEMIA: Status: ACTIVE | Noted: 2019-10-01

## 2019-10-01 PROBLEM — I45.9 HEART BLOCK: Status: ACTIVE | Noted: 2019-10-01

## 2019-10-01 PROBLEM — N17.9 AKI (ACUTE KIDNEY INJURY): Status: ACTIVE | Noted: 2019-10-01

## 2019-10-01 PROBLEM — R57.0 CARDIOGENIC SHOCK: Status: ACTIVE | Noted: 2019-10-01

## 2019-10-01 LAB
ALBUMIN SERPL BCP-MCNC: 3.4 G/DL (ref 3.5–5.2)
ALBUMIN SERPL BCP-MCNC: 3.8 G/DL (ref 3.5–5.2)
ALP SERPL-CCNC: 75 U/L (ref 55–135)
ALP SERPL-CCNC: 77 U/L (ref 55–135)
ALT SERPL W/O P-5'-P-CCNC: 40 U/L (ref 10–44)
ALT SERPL W/O P-5'-P-CCNC: 50 U/L (ref 10–44)
AMYLASE SERPL-CCNC: 69 U/L (ref 20–110)
ANION GAP SERPL CALC-SCNC: 10 MMOL/L (ref 8–16)
ANION GAP SERPL CALC-SCNC: 12 MMOL/L (ref 8–16)
APTT PPP: 31.8 SEC (ref 26.2–34.7)
AST SERPL-CCNC: 116 U/L (ref 10–40)
AST SERPL-CCNC: 89 U/L (ref 10–40)
BASOPHILS # BLD AUTO: 0.05 K/UL (ref 0–0.2)
BASOPHILS NFR BLD: 0.8 % (ref 0–1.9)
BILIRUB SERPL-MCNC: 0.6 MG/DL (ref 0.1–1)
BILIRUB SERPL-MCNC: 0.7 MG/DL (ref 0.1–1)
BILIRUB UR QL STRIP: NEGATIVE
BNP SERPL-MCNC: 1821 PG/ML (ref 0–99)
BUN SERPL-MCNC: 54 MG/DL (ref 8–23)
BUN SERPL-MCNC: 54 MG/DL (ref 8–23)
CALCIUM SERPL-MCNC: 8.3 MG/DL (ref 8.7–10.5)
CALCIUM SERPL-MCNC: 8.4 MG/DL (ref 8.7–10.5)
CHLORIDE SERPL-SCNC: 89 MMOL/L (ref 95–110)
CHLORIDE SERPL-SCNC: 95 MMOL/L (ref 95–110)
CK MB SERPL-MCNC: 7.3 NG/ML (ref 0.1–6.5)
CK SERPL-CCNC: 182 U/L (ref 20–180)
CLARITY UR: CLEAR
CO2 SERPL-SCNC: 30 MMOL/L (ref 23–29)
CO2 SERPL-SCNC: 31 MMOL/L (ref 23–29)
COLOR UR: COLORLESS
CREAT SERPL-MCNC: 2.6 MG/DL (ref 0.5–1.4)
CREAT SERPL-MCNC: 2.8 MG/DL (ref 0.5–1.4)
DIFFERENTIAL METHOD: ABNORMAL
EOSINOPHIL # BLD AUTO: 0.2 K/UL (ref 0–0.5)
EOSINOPHIL NFR BLD: 3.3 % (ref 0–8)
ERYTHROCYTE [DISTWIDTH] IN BLOOD BY AUTOMATED COUNT: 13.5 % (ref 11.5–14.5)
EST. GFR  (AFRICAN AMERICAN): 17.3 ML/MIN/1.73 M^2
EST. GFR  (AFRICAN AMERICAN): 19 ML/MIN/1.73 M^2
EST. GFR  (NON AFRICAN AMERICAN): 15 ML/MIN/1.73 M^2
EST. GFR  (NON AFRICAN AMERICAN): 16.5 ML/MIN/1.73 M^2
GLUCOSE SERPL-MCNC: 56 MG/DL (ref 70–110)
GLUCOSE SERPL-MCNC: 92 MG/DL (ref 70–110)
GLUCOSE UR QL STRIP: NEGATIVE
HCT VFR BLD AUTO: 30.6 % (ref 37–48.5)
HGB BLD-MCNC: 9.8 G/DL (ref 12–16)
HGB UR QL STRIP: NEGATIVE
IMM GRANULOCYTES # BLD AUTO: 0.01 K/UL (ref 0–0.04)
IMM GRANULOCYTES NFR BLD AUTO: 0.2 % (ref 0–0.5)
INR PPP: 1
KETONES UR QL STRIP: NEGATIVE
LEUKOCYTE ESTERASE UR QL STRIP: NEGATIVE
LIPASE SERPL-CCNC: 24 U/L (ref 4–60)
LYMPHOCYTES # BLD AUTO: 1.7 K/UL (ref 1–4.8)
LYMPHOCYTES NFR BLD: 27.3 % (ref 18–48)
MAGNESIUM SERPL-MCNC: 2.4 MG/DL (ref 1.6–2.6)
MCH RBC QN AUTO: 29.7 PG (ref 27–31)
MCHC RBC AUTO-ENTMCNC: 32 G/DL (ref 32–36)
MCV RBC AUTO: 93 FL (ref 82–98)
MONOCYTES # BLD AUTO: 0.5 K/UL (ref 0.3–1)
MONOCYTES NFR BLD: 8.5 % (ref 4–15)
NEUTROPHILS # BLD AUTO: 3.8 K/UL (ref 1.8–7.7)
NEUTROPHILS NFR BLD: 59.9 % (ref 38–73)
NITRITE UR QL STRIP: NEGATIVE
NRBC BLD-RTO: 0 /100 WBC
PH UR STRIP: 8 [PH] (ref 5–8)
PLATELET # BLD AUTO: 170 K/UL (ref 150–350)
PMV BLD AUTO: 10.1 FL (ref 9.2–12.9)
POTASSIUM SERPL-SCNC: 3.8 MMOL/L (ref 3.5–5.1)
POTASSIUM SERPL-SCNC: 5.7 MMOL/L (ref 3.5–5.1)
PROT SERPL-MCNC: 6 G/DL (ref 6–8.4)
PROT SERPL-MCNC: 6.5 G/DL (ref 6–8.4)
PROT UR QL STRIP: NEGATIVE
PROTHROMBIN TIME: 13.1 SEC (ref 11.7–14)
RBC # BLD AUTO: 3.3 M/UL (ref 4–5.4)
SODIUM SERPL-SCNC: 131 MMOL/L (ref 136–145)
SODIUM SERPL-SCNC: 136 MMOL/L (ref 136–145)
SP GR UR STRIP: 1 (ref 1–1.03)
TROPONIN I SERPL DL<=0.01 NG/ML-MCNC: 0.43 NG/ML (ref 0.02–0.04)
TROPONIN I SERPL DL<=0.01 NG/ML-MCNC: 0.51 NG/ML (ref 0.02–0.04)
URN SPEC COLLECT METH UR: ABNORMAL
UROBILINOGEN UR STRIP-ACNC: NEGATIVE EU/DL
WBC # BLD AUTO: 6.27 K/UL (ref 3.9–12.7)

## 2019-10-01 PROCEDURE — 94761 N-INVAS EAR/PLS OXIMETRY MLT: CPT

## 2019-10-01 PROCEDURE — 33210 INSERT ELECTRD/PM CATH SNGL: CPT

## 2019-10-01 PROCEDURE — 96376 TX/PRO/DX INJ SAME DRUG ADON: CPT

## 2019-10-01 PROCEDURE — 93005 ELECTROCARDIOGRAM TRACING: CPT

## 2019-10-01 PROCEDURE — 83690 ASSAY OF LIPASE: CPT

## 2019-10-01 PROCEDURE — 85025 COMPLETE CBC W/AUTO DIFF WBC: CPT

## 2019-10-01 PROCEDURE — 80053 COMPREHEN METABOLIC PANEL: CPT | Mod: 91

## 2019-10-01 PROCEDURE — 25000003 PHARM REV CODE 250: Performed by: EMERGENCY MEDICINE

## 2019-10-01 PROCEDURE — 25000242 PHARM REV CODE 250 ALT 637 W/ HCPCS: Performed by: EMERGENCY MEDICINE

## 2019-10-01 PROCEDURE — C1894 INTRO/SHEATH, NON-LASER: HCPCS | Performed by: INTERNAL MEDICINE

## 2019-10-01 PROCEDURE — 84484 ASSAY OF TROPONIN QUANT: CPT | Mod: 91

## 2019-10-01 PROCEDURE — 83735 ASSAY OF MAGNESIUM: CPT

## 2019-10-01 PROCEDURE — 96374 THER/PROPH/DIAG INJ IV PUSH: CPT

## 2019-10-01 PROCEDURE — 20000000 HC ICU ROOM

## 2019-10-01 PROCEDURE — 96375 TX/PRO/DX INJ NEW DRUG ADDON: CPT

## 2019-10-01 PROCEDURE — 82553 CREATINE MB FRACTION: CPT

## 2019-10-01 PROCEDURE — 63600175 PHARM REV CODE 636 W HCPCS: Performed by: INTERNAL MEDICINE

## 2019-10-01 PROCEDURE — 94644 CONT INHLJ TX 1ST HOUR: CPT

## 2019-10-01 PROCEDURE — 99291 CRITICAL CARE FIRST HOUR: CPT

## 2019-10-01 PROCEDURE — 33208 INSRT HEART PM ATRIAL & VENT: CPT

## 2019-10-01 PROCEDURE — 27000221 HC OXYGEN, UP TO 24 HOURS

## 2019-10-01 PROCEDURE — 82550 ASSAY OF CK (CPK): CPT

## 2019-10-01 PROCEDURE — 85730 THROMBOPLASTIN TIME PARTIAL: CPT

## 2019-10-01 PROCEDURE — 84484 ASSAY OF TROPONIN QUANT: CPT

## 2019-10-01 PROCEDURE — 83880 ASSAY OF NATRIURETIC PEPTIDE: CPT

## 2019-10-01 PROCEDURE — 36415 COLL VENOUS BLD VENIPUNCTURE: CPT

## 2019-10-01 PROCEDURE — 81003 URINALYSIS AUTO W/O SCOPE: CPT

## 2019-10-01 PROCEDURE — 80053 COMPREHEN METABOLIC PANEL: CPT

## 2019-10-01 PROCEDURE — 85610 PROTHROMBIN TIME: CPT

## 2019-10-01 PROCEDURE — 63600175 PHARM REV CODE 636 W HCPCS: Performed by: EMERGENCY MEDICINE

## 2019-10-01 PROCEDURE — C1751 CATH, INF, PER/CENT/MIDLINE: HCPCS | Performed by: INTERNAL MEDICINE

## 2019-10-01 PROCEDURE — 25000003 PHARM REV CODE 250: Performed by: INTERNAL MEDICINE

## 2019-10-01 PROCEDURE — 82150 ASSAY OF AMYLASE: CPT

## 2019-10-01 RX ORDER — ATROPINE SULFATE 0.1 MG/ML
0.5 INJECTION INTRAVENOUS ONCE
Status: DISCONTINUED | OUTPATIENT
Start: 2019-10-01 | End: 2019-10-01

## 2019-10-01 RX ORDER — SODIUM CHLORIDE 9 MG/ML
1000 INJECTION, SOLUTION INTRAVENOUS
Status: DISCONTINUED | OUTPATIENT
Start: 2019-10-01 | End: 2019-10-01

## 2019-10-01 RX ORDER — FERROUS SULFATE 325(65) MG
325 TABLET ORAL 2 TIMES DAILY
Status: DISCONTINUED | OUTPATIENT
Start: 2019-10-01 | End: 2019-10-03

## 2019-10-01 RX ORDER — TRAMADOL HYDROCHLORIDE 50 MG/1
50 TABLET ORAL 2 TIMES DAILY PRN
Status: DISCONTINUED | OUTPATIENT
Start: 2019-10-01 | End: 2019-10-04 | Stop reason: HOSPADM

## 2019-10-01 RX ORDER — ACETAMINOPHEN 325 MG/1
650 TABLET ORAL EVERY 4 HOURS PRN
Status: DISCONTINUED | OUTPATIENT
Start: 2019-10-01 | End: 2019-10-04 | Stop reason: HOSPADM

## 2019-10-01 RX ORDER — HYDROXYCHLOROQUINE SULFATE 200 MG/1
200 TABLET, FILM COATED ORAL DAILY
Status: DISCONTINUED | OUTPATIENT
Start: 2019-10-02 | End: 2019-10-04 | Stop reason: HOSPADM

## 2019-10-01 RX ORDER — ATROPINE SULFATE 0.1 MG/ML
1 INJECTION INTRAVENOUS ONCE
Status: COMPLETED | OUTPATIENT
Start: 2019-10-01 | End: 2019-10-01

## 2019-10-01 RX ORDER — ATROPINE SULFATE 0.1 MG/ML
0.5 INJECTION INTRAVENOUS ONCE
Status: COMPLETED | OUTPATIENT
Start: 2019-10-01 | End: 2019-10-01

## 2019-10-01 RX ORDER — PANTOPRAZOLE SODIUM 40 MG/1
40 TABLET, DELAYED RELEASE ORAL DAILY
Status: DISCONTINUED | OUTPATIENT
Start: 2019-10-02 | End: 2019-10-01

## 2019-10-01 RX ORDER — IBUPROFEN 200 MG
16 TABLET ORAL
Status: DISCONTINUED | OUTPATIENT
Start: 2019-10-01 | End: 2019-10-04 | Stop reason: HOSPADM

## 2019-10-01 RX ORDER — ALBUTEROL SULFATE 0.83 MG/ML
10 SOLUTION RESPIRATORY (INHALATION)
Status: COMPLETED | OUTPATIENT
Start: 2019-10-01 | End: 2019-10-01

## 2019-10-01 RX ORDER — LIDOCAINE HYDROCHLORIDE 10 MG/ML
INJECTION, SOLUTION EPIDURAL; INFILTRATION; INTRACAUDAL; PERINEURAL
Status: DISCONTINUED | OUTPATIENT
Start: 2019-10-01 | End: 2019-10-04 | Stop reason: HOSPADM

## 2019-10-01 RX ORDER — LANOLIN ALCOHOL/MO/W.PET/CERES
400 CREAM (GRAM) TOPICAL 2 TIMES DAILY
Status: DISCONTINUED | OUTPATIENT
Start: 2019-10-01 | End: 2019-10-04 | Stop reason: HOSPADM

## 2019-10-01 RX ORDER — SERTRALINE HYDROCHLORIDE 50 MG/1
50 TABLET, FILM COATED ORAL DAILY
Status: DISCONTINUED | OUTPATIENT
Start: 2019-10-02 | End: 2019-10-04 | Stop reason: HOSPADM

## 2019-10-01 RX ORDER — NAPROXEN SODIUM 220 MG/1
81 TABLET, FILM COATED ORAL DAILY
Status: DISCONTINUED | OUTPATIENT
Start: 2019-10-02 | End: 2019-10-04 | Stop reason: HOSPADM

## 2019-10-01 RX ORDER — FUROSEMIDE 40 MG/1
60 TABLET ORAL DAILY
Status: ON HOLD | COMMUNITY
End: 2019-10-04 | Stop reason: HOSPADM

## 2019-10-01 RX ORDER — ATROPINE SULFATE 0.1 MG/ML
1 INJECTION INTRAVENOUS ONCE
Status: DISCONTINUED | OUTPATIENT
Start: 2019-10-01 | End: 2019-10-01

## 2019-10-01 RX ORDER — FLUTICASONE PROPIONATE 50 MCG
2 SPRAY, SUSPENSION (ML) NASAL DAILY PRN
Status: DISCONTINUED | OUTPATIENT
Start: 2019-10-01 | End: 2019-10-04 | Stop reason: HOSPADM

## 2019-10-01 RX ORDER — SODIUM CHLORIDE 0.9 % (FLUSH) 0.9 %
10 SYRINGE (ML) INJECTION
Status: DISCONTINUED | OUTPATIENT
Start: 2019-10-01 | End: 2019-10-04 | Stop reason: HOSPADM

## 2019-10-01 RX ORDER — SERTRALINE HYDROCHLORIDE 50 MG/1
50 TABLET, FILM COATED ORAL DAILY
COMMUNITY
End: 2021-02-11

## 2019-10-01 RX ORDER — IBUPROFEN 200 MG
24 TABLET ORAL
Status: DISCONTINUED | OUTPATIENT
Start: 2019-10-01 | End: 2019-10-04 | Stop reason: HOSPADM

## 2019-10-01 RX ORDER — GABAPENTIN 100 MG/1
100 CAPSULE ORAL NIGHTLY
Status: DISCONTINUED | OUTPATIENT
Start: 2019-10-01 | End: 2019-10-04 | Stop reason: HOSPADM

## 2019-10-01 RX ORDER — INDOMETHACIN 25 MG/1
50 CAPSULE ORAL
Status: COMPLETED | OUTPATIENT
Start: 2019-10-01 | End: 2019-10-01

## 2019-10-01 RX ORDER — POLYETHYLENE GLYCOL 3350 17 G/17G
17 POWDER, FOR SOLUTION ORAL 2 TIMES DAILY PRN
Status: DISCONTINUED | OUTPATIENT
Start: 2019-10-01 | End: 2019-10-04 | Stop reason: HOSPADM

## 2019-10-01 RX ORDER — SODIUM CHLORIDE 9 MG/ML
INJECTION, SOLUTION INTRAVENOUS CONTINUOUS
Status: DISCONTINUED | OUTPATIENT
Start: 2019-10-01 | End: 2019-10-02

## 2019-10-01 RX ORDER — SODIUM CHLORIDE 9 MG/ML
500 INJECTION, SOLUTION INTRAVENOUS
Status: COMPLETED | OUTPATIENT
Start: 2019-10-01 | End: 2019-10-01

## 2019-10-01 RX ORDER — GLUCAGON 1 MG
1 KIT INJECTION
Status: DISCONTINUED | OUTPATIENT
Start: 2019-10-01 | End: 2019-10-04 | Stop reason: HOSPADM

## 2019-10-01 RX ORDER — PANTOPRAZOLE SODIUM 40 MG/1
40 TABLET, DELAYED RELEASE ORAL
Status: DISCONTINUED | OUTPATIENT
Start: 2019-10-02 | End: 2019-10-04 | Stop reason: HOSPADM

## 2019-10-01 RX ORDER — ASPIRIN 325 MG
325 TABLET ORAL
Status: COMPLETED | OUTPATIENT
Start: 2019-10-01 | End: 2019-10-01

## 2019-10-01 RX ORDER — DOPAMINE HYDROCHLORIDE 160 MG/100ML
2 INJECTION, SOLUTION INTRAVENOUS CONTINUOUS
Status: DISCONTINUED | OUTPATIENT
Start: 2019-10-01 | End: 2019-10-03

## 2019-10-01 RX ORDER — TRAMADOL HYDROCHLORIDE 50 MG/1
50 TABLET ORAL 2 TIMES DAILY PRN
COMMUNITY
End: 2019-12-18 | Stop reason: SDUPTHER

## 2019-10-01 RX ORDER — ONDANSETRON 2 MG/ML
4 INJECTION INTRAMUSCULAR; INTRAVENOUS EVERY 6 HOURS PRN
Status: DISCONTINUED | OUTPATIENT
Start: 2019-10-01 | End: 2019-10-04 | Stop reason: HOSPADM

## 2019-10-01 RX ADMIN — ATROPINE SULFATE 1 MG: 0.1 INJECTION PARENTERAL at 05:10

## 2019-10-01 RX ADMIN — DOPAMINE HYDROCHLORIDE 2 MCG/KG/MIN: 160 INJECTION, SOLUTION INTRAVENOUS at 07:10

## 2019-10-01 RX ADMIN — ALBUTEROL SULFATE 10 MG: 2.5 SOLUTION RESPIRATORY (INHALATION) at 05:10

## 2019-10-01 RX ADMIN — DEXTROSE 25 G: 50 INJECTION, SOLUTION INTRAVENOUS at 05:10

## 2019-10-01 RX ADMIN — HUMAN INSULIN 10 UNITS: 100 INJECTION, SOLUTION SUBCUTANEOUS at 05:10

## 2019-10-01 RX ADMIN — SODIUM CHLORIDE 500 ML: 0.9 INJECTION, SOLUTION INTRAVENOUS at 04:10

## 2019-10-01 RX ADMIN — CALCIUM GLUCONATE 1 G: 98 INJECTION, SOLUTION INTRAVENOUS at 05:10

## 2019-10-01 RX ADMIN — ASPIRIN 325 MG ORAL TABLET 325 MG: 325 PILL ORAL at 05:10

## 2019-10-01 RX ADMIN — SODIUM CHLORIDE: 0.9 INJECTION, SOLUTION INTRAVENOUS at 09:10

## 2019-10-01 RX ADMIN — ATROPINE SULFATE 0.5 MG: 0.1 INJECTION PARENTERAL at 05:10

## 2019-10-01 RX ADMIN — SODIUM BICARBONATE 50 MEQ: 84 INJECTION, SOLUTION INTRAVENOUS at 05:10

## 2019-10-01 NOTE — Clinical Note
A dressing is applied to the right femoral vein puncture site. Sheath sutured in with 0 silk. Gauze and tegaderm applied.

## 2019-10-01 NOTE — HPI
The patient is an 83-year-old female with history of coronary artery disease, previous coronary artery bypass, apical aneurysm, recurring admissions for congestive heart failure, hypertension, chronic anemia.  She was recently admitted to [facility] and was discharged home on medical therapy.  This morning she went to work she was not feeling well, she was weak dizzy nauseated and subsequently developed chest pain. She took Phenergan for her nausea and has made her very sleepy and incoherent. The family noted that her pulse was low as well as her blood pressure she was  brought to the emergency room.  She was noted to be in a junctional rhythm rate in the 20s as well as markedly hypotensive.

## 2019-10-01 NOTE — ED NOTES
Cath lab team at the bedside. Pacer pads on HR 53. Pt sating 95% on breathing treatment. Consent being obtained at this time.

## 2019-10-01 NOTE — TELEPHONE ENCOUNTER
----- Message from Tiffani Francis sent at 10/1/2019 10:58 AM CDT -----  Contact: pt  Pt states that she would like for Nurse Santoro to give her a call please...[phone]

## 2019-10-01 NOTE — ED PROVIDER NOTES
Encounter Date: 10/1/2019       History     Chief Complaint   Patient presents with    Chest Pain     ONSET THIS AM,    Abdominal Pain    GEN WEAKNESS     Patient here with reported onset of chest pain this morning states she felt generally weak, short of breath patient was admitted for shortness of breath no released from the hospital on Sunday states that at that time they recommended patient have a angiogram performed however patient's cardiologist was not in town so she did not wish to have the procedure performed they did start her on Zaroxolyn but made no other changes to medications arrival in the emergency department patient's blood pressure is found to be low and her heart rate was in the 30s she is in a junctional rhythm she denies any current chest pain states she just feels weak and sleepy        Review of patient's allergies indicates:   Allergen Reactions    Nitrofurantoin macrocrystalline Nausea And Vomiting     Other reaction(s): very sickly    Penicillins Swelling     Other reaction(s): swelling  Other reaction(s): red skin discolorati    Sulfa (sulfonamide antibiotics) Nausea And Vomiting     Other reaction(s): very sickly     Past Medical History:   Diagnosis Date    Abdominal hernia     Anemia     Dr Berkowitz     Aortic valve stenosis, moderate     Cannon's esophagus     Cataract     ou done    CHF (congestive heart failure)     EFx 35%, Dr. Spencer 12/19/13    Colitis     Compression fracture     Diverticulosis     Encounter for blood transfusion     GERD (gastroesophageal reflux disease)     Hyperlipidemia     Hypertension     Irritable bowel syndrome     Occlusive coronary artery disease     Osteoarthritis     lumbar DDD    Pneumonia 01/03/2017    Rheumatoid arteritis     Rheumatoid arthritis     Shingles 01/03/2017    Sjogren syndrome     Ulcerative colitis      Past Surgical History:   Procedure Laterality Date    APPENDECTOMY      BACK SURGERY      CARDIAC  SURGERY      CABGx3 in     CATARACT EXTRACTION      ou od d 11-15-12//     SECTION, CLASSIC      x 2    CHOLECYSTECTOMY      COLONOSCOPY  09/15/2011    Dr Anaya     COLONOSCOPY  01/10/2017    [facility] report sent to scanning    CORONARY ANGIOPLASTY      PCI x 1 in     CORONARY ARTERY BYPASS GRAFT      3 vessel    CORONARY ARTERY BYPASS GRAFT      eyes      rk ou    FRACTURE SURGERY  2016    Dr Guido left hip     FRACTURE SURGERY  2018    Right Hip    HYSTERECTOMY      tubal ligation, oopherectomy    INTRAMEDULLARY RODDING OF TROCHANTER OF FEMUR Right 10/8/2018    Procedure: INSERTION, INTRAMEDULLARY KELSI, FEMUR, TROCHANTER;  Surgeon: Valerio Luther MD;  Location: [facility];  Service: Orthopedics;  Laterality: Right;    OOPHORECTOMY      SPINE SURGERY  2013    Silvino Mckeon    UPPER GASTROINTESTINAL ENDOSCOPY      Yag Capsulotomy Right 14     Family History   Problem Relation Age of Onset    Hypertension Father     Heart disease Father         MI    Heart disease Mother         aortic stenosis    Skin cancer Mother     Heart disease Brother         2 brothers CABG    Arthritis Brother     Crohn's disease Brother     Hypertension Sister     Fibromyalgia Sister     Scleroderma Sister     Pulmonary embolism Sister     Hypertension Daughter     Early death Son         accident    Lupus Cousin     Lupus Cousin     Irritable bowel syndrome Sister     Crohn's disease Brother     Crohn's disease Brother     Amblyopia Neg Hx     Blindness Neg Hx     Cancer Neg Hx     Cataracts Neg Hx     Diabetes Neg Hx     Glaucoma Neg Hx     Macular degeneration Neg Hx     Retinal detachment Neg Hx     Strabismus Neg Hx     Stroke Neg Hx     Thyroid disease Neg Hx     Celiac disease Neg Hx     Cirrhosis Neg Hx     Psoriasis Neg Hx     Melanoma Neg Hx     Multiple sclerosis Neg Hx     Rheum arthritis Neg Hx     Tuberculosis Neg Hx     Lymphoma Neg Hx     Ulcerative  colitis Neg Hx     Moses's disease Neg Hx     Stomach cancer Neg Hx     Rectal cancer Neg Hx     Liver disease Neg Hx     Liver cancer Neg Hx     Inflammatory bowel disease Neg Hx     Hemochromatosis Neg Hx     Esophageal cancer Neg Hx     Cystic fibrosis Neg Hx     Colon cancer Neg Hx      Social History     Tobacco Use    Smoking status: Former Smoker     Packs/day: 1.00     Years: 5.00     Pack years: 5.00     Last attempt to quit: 1971     Years since quittin.7    Smokeless tobacco: Never Used   Substance Use Topics    Alcohol use: Yes     Alcohol/week: 1.0 standard drinks     Types: 1 Glasses of wine per week    Drug use: Yes     Frequency: 1.0 times per week     Review of Systems   Constitutional: Positive for fatigue. Negative for activity change, chills and fever.   HENT: Negative.    Eyes: Negative.    Respiratory: Positive for shortness of breath. Negative for cough and stridor.    Cardiovascular: Positive for chest pain and leg swelling. Negative for palpitations.   Gastrointestinal: Positive for nausea. Negative for abdominal pain, blood in stool, constipation, diarrhea and rectal pain.   Endocrine: Negative.    Genitourinary: Negative.    Musculoskeletal: Negative.    Skin: Negative.    Allergic/Immunologic: Negative.    Neurological: Negative.    Hematological: Negative.    Psychiatric/Behavioral: Negative.        Physical Exam     Initial Vitals   BP Pulse Resp Temp SpO2   10/01/19 1627 10/01/19 1627 10/01/19 1627 10/01/19 1637 10/01/19 1645   (!) 83/47 (!) 39 18 97.6 °F (36.4 °C) (!) 94 %      MAP       --                Physical Exam    Constitutional: She appears well-developed and well-nourished. She appears lethargic. She appears distressed.   HENT:   Head: Normocephalic and atraumatic.   Right Ear: External ear normal.   Left Ear: External ear normal.   Mouth/Throat: Oropharynx is clear and moist.   Eyes: Conjunctivae and EOM are normal. Pupils are equal, round, and  reactive to light.   Neck: Normal range of motion. Neck supple.   Cardiovascular: Regular rhythm, S1 normal, S2 normal, normal heart sounds and intact distal pulses. Frequent extrasystoles are present.  Bradycardia present.    Pulmonary/Chest: Breath sounds normal.   Abdominal: Soft. Bowel sounds are normal.   Musculoskeletal: Normal range of motion. She exhibits edema.   Neurological: She is oriented to person, place, and time. She appears lethargic. GCS score is 15. GCS eye subscore is 4. GCS verbal subscore is 5. GCS motor subscore is 6.   Skin: Skin is warm and dry. Capillary refill takes less than 2 seconds.   Psychiatric: She has a normal mood and affect. Her behavior is normal.         ED Course   Procedures  Labs Reviewed   TROPONIN I - Abnormal; Notable for the following components:       Result Value    Troponin I 0.511 (*)     All other components within normal limits    Narrative:     Trop   critical result(s) called and verbal readback obtained from   Dr. Farzad Garcia/ED, 10/01/2019 17:40   CK - Abnormal; Notable for the following components:     (*)     All other components within normal limits   COMPREHENSIVE METABOLIC PANEL - Abnormal; Notable for the following components:    Sodium 131 (*)     Potassium 5.7 (*)     Chloride 89 (*)     CO2 30 (*)     BUN, Bld 54 (*)     Creatinine 2.8 (*)     Calcium 8.4 (*)     AST 89 (*)     eGFR if  17.3 (*)     eGFR if non  15.0 (*)     All other components within normal limits   CK-MB - Abnormal; Notable for the following components:    CPK MB 7.3 (*)     All other components within normal limits   CBC W/ AUTO DIFFERENTIAL - Abnormal; Notable for the following components:    RBC 3.30 (*)     Hemoglobin 9.8 (*)     Hematocrit 30.6 (*)     All other components within normal limits   B-TYPE NATRIURETIC PEPTIDE - Abnormal; Notable for the following components:    BNP 1,821 (*)     All other components within normal limits    APTT   PROTIME-INR   AMYLASE   LIPASE   MAGNESIUM   B-TYPE NATRIURETIC PEPTIDE   DRUG SCREEN PANEL, URINE EMERGENCY   URINALYSIS, REFLEX TO URINE CULTURE          Imaging Results          X-Ray Chest AP Portable (Final result)  Result time 10/01/19 17:04:19   Procedure changed from X-Ray Chest PA And Lateral     Final result by Nicanor Ying IV, MD (10/01/19 17:04:19)                 Impression:      Linear atelectasis or scarring in the left lower lung zone.    Stable cardiomegaly and central pulmonary vascular prominence.      Electronically signed by: Nicanor Ying IV., MD  Date:    10/01/2019  Time:    17:04             Narrative:    EXAMINATION:  XR CHEST AP PORTABLE    CLINICAL HISTORY:  Pain; Chest pain, unspecified    COMPARISON:  09/27/2019.    FINDINGS:  The cardiac silhouette is enlarged but stable.  Central pulmonary vascular prominence remains unchanged.  Postoperative changes and arteriosclerosis are again noted.    There are persistent linear opacities in the left lower lung zone.  There are no new confluent infiltrates or significant volume loss.  No significant effusion is observed.  There has been previous multilevel vertebroplasty within the spine.  Instrumentation is observed within the lower lumbar spine.                                 Medical Decision Making:   ED Management:  I consult to Dr. Blair patient received a mg of atropine with little improvement in heart rate her blood work did reveal evidence of congestive heart failure and hyperkalemia I treated her hyperkalemia and administered another mg of atropine patient's heart rate is now 51 but she will have periods of bradycardia and hypotension persists despite fluid resuscitation I have started dopamine the the cath lab has been notified with plans to place a temporary pacemaker              Attending Attestation:         Attending Critical Care:   Critical Care Times:   Direct Patient Care (initial evaluation, reassessments, and  time considering the case)................................................................15 minutes.   Ordering, Reviewing, and Interpreting Diagnostic Studies...............................................................................................................5 minutes.   Documentation..................................................................................................................................................................................5 minutes.   Consultation with other Physicians. .................................................................................................................................................5 minutes.   ==============================================================  · Total Critical Care Time - exclusive of procedural time: 30 minutes.  ==============================================================                 Clinical Impression:       ICD-10-CM ICD-9-CM   1. Chest pain, unspecified type R07.9 786.50   2. Chest pain R07.9 786.50   3. Bradycardia R00.1 427.89   4. Bradycardia with 31-40 beats per minute R00.1 427.89                                Alvaro Maciel MD  10/01/19 1596

## 2019-10-02 ENCOUNTER — DOCUMENTATION ONLY (OUTPATIENT)
Dept: FAMILY MEDICINE | Facility: CLINIC | Age: 84
End: 2019-10-02

## 2019-10-02 PROBLEM — N18.30 ACUTE RENAL FAILURE SUPERIMPOSED ON STAGE 3 CHRONIC KIDNEY DISEASE: Status: ACTIVE | Noted: 2019-10-01

## 2019-10-02 PROBLEM — E87.5 HYPERKALEMIA: Status: RESOLVED | Noted: 2019-10-01 | Resolved: 2019-10-02

## 2019-10-02 PROBLEM — R57.0 CARDIOGENIC SHOCK: Status: RESOLVED | Noted: 2019-10-01 | Resolved: 2019-10-02

## 2019-10-02 LAB
ALBUMIN SERPL BCP-MCNC: 3.2 G/DL (ref 3.5–5.2)
ALP SERPL-CCNC: 68 U/L (ref 55–135)
ALT SERPL W/O P-5'-P-CCNC: 45 U/L (ref 10–44)
ANION GAP SERPL CALC-SCNC: 10 MMOL/L (ref 8–16)
AST SERPL-CCNC: 80 U/L (ref 10–40)
BASOPHILS # BLD AUTO: 0.03 K/UL (ref 0–0.2)
BASOPHILS NFR BLD: 0.6 % (ref 0–1.9)
BILIRUB SERPL-MCNC: 0.6 MG/DL (ref 0.1–1)
BUN SERPL-MCNC: 49 MG/DL (ref 8–23)
CALCIUM SERPL-MCNC: 8.3 MG/DL (ref 8.7–10.5)
CHLORIDE SERPL-SCNC: 96 MMOL/L (ref 95–110)
CK MB SERPL-MCNC: 8.9 NG/ML (ref 0.1–6.5)
CK SERPL-CCNC: 148 U/L (ref 20–180)
CO2 SERPL-SCNC: 31 MMOL/L (ref 23–29)
CREAT SERPL-MCNC: 2.1 MG/DL (ref 0.5–1.4)
DIFFERENTIAL METHOD: ABNORMAL
EOSINOPHIL # BLD AUTO: 0.1 K/UL (ref 0–0.5)
EOSINOPHIL NFR BLD: 1.7 % (ref 0–8)
ERYTHROCYTE [DISTWIDTH] IN BLOOD BY AUTOMATED COUNT: 13.5 % (ref 11.5–14.5)
EST. GFR  (AFRICAN AMERICAN): 24.6 ML/MIN/1.73 M^2
EST. GFR  (NON AFRICAN AMERICAN): 21.3 ML/MIN/1.73 M^2
GLUCOSE SERPL-MCNC: 92 MG/DL (ref 70–110)
GLUCOSE SERPL-MCNC: 95 MG/DL (ref 70–110)
HCT VFR BLD AUTO: 26.9 % (ref 37–48.5)
HGB BLD-MCNC: 8.8 G/DL (ref 12–16)
IMM GRANULOCYTES # BLD AUTO: 0.02 K/UL (ref 0–0.04)
IMM GRANULOCYTES NFR BLD AUTO: 0.4 % (ref 0–0.5)
LYMPHOCYTES # BLD AUTO: 0.9 K/UL (ref 1–4.8)
LYMPHOCYTES NFR BLD: 17.2 % (ref 18–48)
MAGNESIUM SERPL-MCNC: 2 MG/DL (ref 1.6–2.6)
MCH RBC QN AUTO: 30.4 PG (ref 27–31)
MCHC RBC AUTO-ENTMCNC: 32.7 G/DL (ref 32–36)
MCV RBC AUTO: 93 FL (ref 82–98)
MONOCYTES # BLD AUTO: 0.4 K/UL (ref 0.3–1)
MONOCYTES NFR BLD: 6.5 % (ref 4–15)
NEUTROPHILS # BLD AUTO: 4 K/UL (ref 1.8–7.7)
NEUTROPHILS NFR BLD: 73.6 % (ref 38–73)
NRBC BLD-RTO: 0 /100 WBC
PHOSPHATE SERPL-MCNC: 4.6 MG/DL (ref 2.7–4.5)
PLATELET # BLD AUTO: 119 K/UL (ref 150–350)
PMV BLD AUTO: 10.4 FL (ref 9.2–12.9)
POTASSIUM SERPL-SCNC: 3.8 MMOL/L (ref 3.5–5.1)
PROT SERPL-MCNC: 5.5 G/DL (ref 6–8.4)
RBC # BLD AUTO: 2.89 M/UL (ref 4–5.4)
SODIUM SERPL-SCNC: 137 MMOL/L (ref 136–145)
TROPONIN I SERPL DL<=0.01 NG/ML-MCNC: 0.42 NG/ML (ref 0.02–0.04)
WBC # BLD AUTO: 5.41 K/UL (ref 3.9–12.7)

## 2019-10-02 PROCEDURE — 20000000 HC ICU ROOM

## 2019-10-02 PROCEDURE — 94761 N-INVAS EAR/PLS OXIMETRY MLT: CPT

## 2019-10-02 PROCEDURE — 84100 ASSAY OF PHOSPHORUS: CPT

## 2019-10-02 PROCEDURE — 80053 COMPREHEN METABOLIC PANEL: CPT

## 2019-10-02 PROCEDURE — 85025 COMPLETE CBC W/AUTO DIFF WBC: CPT

## 2019-10-02 PROCEDURE — 83735 ASSAY OF MAGNESIUM: CPT

## 2019-10-02 PROCEDURE — 25000003 PHARM REV CODE 250

## 2019-10-02 PROCEDURE — 36415 COLL VENOUS BLD VENIPUNCTURE: CPT

## 2019-10-02 PROCEDURE — 84484 ASSAY OF TROPONIN QUANT: CPT

## 2019-10-02 PROCEDURE — 63600175 PHARM REV CODE 636 W HCPCS: Performed by: INTERNAL MEDICINE

## 2019-10-02 PROCEDURE — 25000003 PHARM REV CODE 250: Performed by: INTERNAL MEDICINE

## 2019-10-02 PROCEDURE — 82553 CREATINE MB FRACTION: CPT

## 2019-10-02 PROCEDURE — 27000221 HC OXYGEN, UP TO 24 HOURS

## 2019-10-02 PROCEDURE — 82550 ASSAY OF CK (CPK): CPT

## 2019-10-02 RX ORDER — POTASSIUM CHLORIDE 20 MEQ/1
20 TABLET, EXTENDED RELEASE ORAL ONCE
Status: COMPLETED | OUTPATIENT
Start: 2019-10-02 | End: 2019-10-02

## 2019-10-02 RX ORDER — CHLORHEXIDINE GLUCONATE ORAL RINSE 1.2 MG/ML
15 SOLUTION DENTAL 2 TIMES DAILY
Status: DISCONTINUED | OUTPATIENT
Start: 2019-10-02 | End: 2019-10-04 | Stop reason: HOSPADM

## 2019-10-02 RX ORDER — ENOXAPARIN SODIUM 100 MG/ML
30 INJECTION SUBCUTANEOUS
Status: DISCONTINUED | OUTPATIENT
Start: 2019-10-02 | End: 2019-10-04 | Stop reason: HOSPADM

## 2019-10-02 RX ORDER — MUPIROCIN 20 MG/G
OINTMENT TOPICAL 2 TIMES DAILY
Status: DISCONTINUED | OUTPATIENT
Start: 2019-10-02 | End: 2019-10-04 | Stop reason: HOSPADM

## 2019-10-02 RX ADMIN — FERROUS SULFATE TAB 325 MG (65 MG ELEMENTAL FE) 325 MG: 325 (65 FE) TAB at 09:10

## 2019-10-02 RX ADMIN — MAGNESIUM OXIDE 400 MG: 400 TABLET ORAL at 09:10

## 2019-10-02 RX ADMIN — MUPIROCIN: 20 OINTMENT TOPICAL at 09:10

## 2019-10-02 RX ADMIN — HYDROXYCHLOROQUINE SULFATE 200 MG: 200 TABLET, FILM COATED ORAL at 09:10

## 2019-10-02 RX ADMIN — POTASSIUM CHLORIDE 20 MEQ: 20 TABLET, EXTENDED RELEASE ORAL at 11:10

## 2019-10-02 RX ADMIN — LACTOBACILLUS TAB 1 TABLET: TAB at 09:10

## 2019-10-02 RX ADMIN — CHLORHEXIDINE GLUCONATE 15 ML: 1.2 RINSE ORAL at 09:10

## 2019-10-02 RX ADMIN — ENOXAPARIN SODIUM 30 MG: 100 INJECTION SUBCUTANEOUS at 09:10

## 2019-10-02 RX ADMIN — GABAPENTIN 100 MG: 100 CAPSULE ORAL at 09:10

## 2019-10-02 RX ADMIN — PANTOPRAZOLE SODIUM 40 MG: 40 TABLET, DELAYED RELEASE ORAL at 06:10

## 2019-10-02 RX ADMIN — ASPIRIN 81 MG 81 MG: 81 TABLET ORAL at 09:10

## 2019-10-02 RX ADMIN — SERTRALINE HYDROCHLORIDE 50 MG: 50 TABLET ORAL at 09:10

## 2019-10-02 NOTE — ASSESSMENT & PLAN NOTE
Chronic condition.  Unclear to me if this could be RA associated heart disease.  Will need to discuss with Dr. Blair.

## 2019-10-02 NOTE — H&P
[facility]  History & Physical    Patient Name: Cheyanne Gomes  MRN: [id number]  Admission Date: 10/1/2019  Attending Physician: Ralhp Brown MD  Primary Care Provider: Romeo Alas MD         Patient information was obtained from patient and ER records.     Subjective:     Principal Problem:AV junctional bradycardia    Chief Complaint:   Chief Complaint   Patient presents with    Chest Pain     ONSET THIS AM,    Abdominal Pain    GEN WEAKNESS        HPI: 83 year old female with PMHx Chronic SHF 45%, CAD s/p Hx of CABG, HTN, HLD, RA presented to the ED with bradycardia.  I am seeing patient after she went to the cath lab and she is currently sedated from the procedure and is unable to provide history. History obtained from chart and Dr. Blair. Patient was recently admitted for elevated troponin.  She was also noted to have HR in the 40s at times and possible acute on chronic HF.  Dr. Ohara saw her, recommended optimization of volume status and then possible stress test. Patient declined further workup stating she felt better and would like to see Dr. Blair in clinic.  Today, she went to work but felt weak, dizzy, nauseated.  Family noted that her pulse rate was low.  Per Dr. Blair, she was found to have a HR in the 20s as well as had a 5 second pause.  She was given Atropine.  Patient was noted to be hypotensive with SBP in the 70s and then started on Dopamine gtt.  Patient was brought to the cath lab and a temporary pacer was put in.  In the cath lab, she was found to actually be on her own rhythm and thus Dr. Connor has the temporary pacemaker as a back up.  Patient is on Metoprolol at home.    Past Medical History:   Diagnosis Date    Abdominal hernia     Anemia     Dr Berkowitz     Aortic valve stenosis, moderate     Cannon's esophagus     Cataract     ou done    CHF (congestive heart failure)     EFx 35%, Dr. Spencer 12/19/13    Colitis      Compression fracture     Diverticulosis     Encounter for blood transfusion     GERD (gastroesophageal reflux disease)     Hyperlipidemia     Hypertension     Irritable bowel syndrome     Occlusive coronary artery disease     Osteoarthritis     lumbar DDD    Pneumonia 2017    Rheumatoid arteritis     Rheumatoid arthritis     Shingles 2017    Sjogren syndrome     Ulcerative colitis        Past Surgical History:   Procedure Laterality Date    APPENDECTOMY      BACK SURGERY      CARDIAC SURGERY      CABGx3 in     CATARACT EXTRACTION      ou od d 11-15-12//     SECTION, CLASSIC      x 2    CHOLECYSTECTOMY      COLONOSCOPY  09/15/2011    Dr Anaya     COLONOSCOPY  01/10/2017    [facility] report sent to scanning    CORONARY ANGIOPLASTY      PCI x 1 in     CORONARY ARTERY BYPASS GRAFT      3 vessel    CORONARY ARTERY BYPASS GRAFT      eyes      rk ou    FRACTURE SURGERY  2016    Dr Guido left hip     FRACTURE SURGERY  2018    Right Hip    HYSTERECTOMY      tubal ligation, oopherectomy    INTRAMEDULLARY RODDING OF TROCHANTER OF FEMUR Right 10/8/2018    Procedure: INSERTION, INTRAMEDULLARY KELSI, FEMUR, TROCHANTER;  Surgeon: Valerio Luther MD;  Location: [facility];  Service: Orthopedics;  Laterality: Right;    OOPHORECTOMY      SPINE SURGERY  2013    Silvino Mckeon    UPPER GASTROINTESTINAL ENDOSCOPY      Yag Capsulotomy Right 14       Review of patient's allergies indicates:   Allergen Reactions    Nitrofurantoin macrocrystalline Nausea And Vomiting     Other reaction(s): very sickly    Penicillins Swelling     Other reaction(s): swelling  Other reaction(s): red skin discolorati    Sulfa (sulfonamide antibiotics) Nausea And Vomiting     Other reaction(s): very sickly       No current facility-administered medications on file prior to encounter.      Current Outpatient Medications on File Prior to Encounter   Medication Sig    amLODIPine (NORVASC) 5 MG tablet  Take 5 mg by mouth once daily.     aspirin 81 mg Tab Take 1 tablet by mouth every evening. Every day    biotin 5 mg Cap Take 1 tablet by mouth 2 (two) times daily.    CALCIUM CARB/VIT D3/MINERALS (CALCIUM-VITAMIN D ORAL) Take 600 mg by mouth 2 (two) times daily. Calcium 1200 with D 3 1000    CRANBERRY CONC/ASCORBIC ACID (CRANBERRY PLUS VITAMIN C ORAL) Take 1 capsule by mouth once daily.    DEXILANT 60 mg capsule Take 60 mg by mouth once daily.     DOCUSATE CALCIUM (STOOL SOFTENER ORAL) Take 200 mg by mouth every evening.     ENTRESTO  mg per tablet Take 1 tablet by mouth 2 (two) times daily.     FERROUS FUMARATE/VIT BCOMP,C (SUPER B COMPLEX ORAL) Take 1 tablet by mouth once daily.    ferrous gluconate (FERGON) 324 MG tablet Take 324 mg by mouth 2 (two) times daily. In the am and at noon    fluticasone (FLONASE) 50 mcg/actuation nasal spray 2 sprays by Each Nare route once daily. (Patient taking differently: 2 sprays by Each Nare route daily as needed. )    furosemide (LASIX) 40 MG tablet Take 60 mg by mouth once daily.    gabapentin (NEURONTIN) 100 MG capsule Take 1 capsule (100 mg total) by mouth every evening.    hydroxychloroquine (PLAQUENIL) 200 mg tablet Take 1 tablet (200 mg total) by mouth once daily.    isosorbide dinitrate (ISORDIL) 10 MG tablet Take 10 mg by mouth 3 (three) times daily.    krill-omega-3-dha-epa-lipids (KRILL OIL) 135-34-85-50 mg Cap Take 1,000 mg by mouth once daily. Braxton krill 300mg qd     lactobacillus acidophilus (PROBIOTIC) 10 billion cell Cap Take 1 each by mouth once daily. 1.5 billion    magnesium oxide (MAG-OX) 400 mg tablet Take 400 mg by mouth 2 (two) times daily.    metoprolol succinate (TOPROL-XL) 25 MG 24 hr tablet Take 12.5 mg by mouth once daily.     MULTIVITAMIN WITH MINERALS (ONE-A-DAY 50 PLUS) Tab Take 1 tablet by mouth once daily. Every day    ondansetron (ZOFRAN) 4 MG tablet Take 4 mg by mouth every 8 (eight) hours as needed for Nausea.      pantoprazole (PROTONIX) 40 MG tablet Take 40 mg by mouth once daily.    potassium chloride SA (K-DUR,KLOR-CON) 10 MEQ tablet Take 20 mEq by mouth once daily.     potassium chloride SA (K-DUR,KLOR-CON) 10 MEQ tablet Take 10 mEq by mouth every evening.    sertraline (ZOLOFT) 50 MG tablet Take 50 mg by mouth once daily.    temazepam (RESTORIL) 7.5 MG Cap Take 15 mg by mouth nightly as needed.    traMADol (ULTRAM) 50 mg tablet Take 50 mg by mouth 2 (two) times daily as needed for Pain. 1-2 tabs    vitamin E 400 unit Tab Take 400 mg by mouth once daily. Every day    coenzyme Q10 100 mg capsule Take 100 mg by mouth once daily.    furosemide (LASIX) 40 MG tablet Take 40 mg by mouth 2 (two) times daily.    metOLazone (ZAROXOLYN) 2.5 MG tablet Take 1 tablet (2.5 mg total) by mouth 3 (three) times a week. (Patient taking differently: Take 2.5 mg by mouth 3 (three) times a week. Monday, Wednesday and Friday)    nitroGLYCERIN (NITROLINGUAL) 0.4 mg/dose spray Place 1 spray under the tongue every 5 (five) minutes as needed. PRN    promethazine (PHENERGAN) 25 MG tablet Take 1 tablet (25 mg total) by mouth 2 (two) times daily as needed for Nausea.    [DISCONTINUED] hydrochlorothiazide (HYDRODIURIL) 25 MG tablet Take 25 mg by mouth once daily.    [DISCONTINUED] sertraline (ZOLOFT) 50 MG tablet TAKE 1 TABLET (50 MG TOTAL) BY MOUTH ONCE DAILY. (Patient taking differently: Take 50 mg by mouth. )    [DISCONTINUED] traMADol (ULTRAM) 50 mg tablet 1-2 po bid prn (Patient taking differently: Take by mouth. 1-2 po bid prn)     Family History     Problem Relation (Age of Onset)    Arthritis Brother    Crohn's disease Brother, Brother, Brother    Early death Son    Fibromyalgia Sister    Heart disease Father, Mother, Brother    Hypertension Father, Sister, Daughter    Irritable bowel syndrome Sister    Lupus Cousin, Cousin    Pulmonary embolism Sister    Scleroderma Sister    Skin cancer Mother        Tobacco Use    Smoking  status: Former Smoker     Packs/day: 1.00     Years: 5.00     Pack years: 5.00     Last attempt to quit: 1971     Years since quittin.7    Smokeless tobacco: Never Used   Substance and Sexual Activity    Alcohol use: Yes     Alcohol/week: 1.0 standard drinks     Types: 1 Glasses of wine per week    Drug use: Yes     Frequency: 1.0 times per week    Sexual activity: Not Currently     Partners: Male     Review of Systems   Unable to perform ROS: Other (Sedated)     Objective:     Vital Signs (Most Recent):  Temp: 98.4 °F (36.9 °C) (10/01/19 2001)  Pulse: 68 (10/01/19 2001)  Resp: 16 (10/01/19 2001)  BP: (!) 96/50 (10/01/19 2001)  SpO2: 98 % (10/01/19 2001) Vital Signs (24h Range):  Temp:  [97.6 °F (36.4 °C)-98.4 °F (36.9 °C)] 98.4 °F (36.9 °C)  Pulse:  [38-89] 68  Resp:  [13-21] 16  SpO2:  [92 %-100 %] 98 %  BP: ()/(41-58) 96/50     Weight: 46.2 kg (101 lb 13.6 oz)  Body mass index is 21.29 kg/m².    Physical Exam   Constitutional: She appears well-developed. No distress.   HENT:   Head: Normocephalic and atraumatic.   Mouth/Throat: Oropharynx is clear and moist.   O2 per NC   Eyes: Pupils are equal, round, and reactive to light. EOM are normal.   Neck: Normal range of motion.   Cardiovascular: Regular rhythm.   No murmur heard.  Pulmonary/Chest: Effort normal and breath sounds normal. She has no wheezes.   Abdominal: Soft. She exhibits no distension. There is no tenderness. There is no rebound and no guarding.   Musculoskeletal: Normal range of motion.   Neurological:   Sedated post procedure   Skin: No rash noted.   BLE mild bruising   Psychiatric:   Unable to assess secondary to sedation     Nursing note and vitals reviewed.        CRANIAL NERVES     CN III, IV, VI   Pupils are equal, round, and reactive to light.  Extraocular motions are normal.        Significant Labs:   CBC:   Recent Labs   Lab 10/01/19  1637   WBC 6.27   HGB 9.8*   HCT 30.6*        CMP:   Recent Labs   Lab  10/01/19  1637   *   K 5.7*   CL 89*   CO2 30*   GLU 92   BUN 54*   CREATININE 2.8*   CALCIUM 8.4*   PROT 6.5   ALBUMIN 3.8   BILITOT 0.7   ALKPHOS 77   AST 89*   ALT 40   ANIONGAP 12   EGFRNONAA 15.0*       Significant Imaging: CXR:  No acute process   Tele at the time of my face to face interview is personally reviewed and is in NSR with rate of 60s.    Assessment/Plan:     * AV junctional bradycardia  S/p temporary pacemaker.  Patient admitted to the MICU as inpatient. Dr. Blair following.  Holding agnes blocking agents and antihypertensives.  Will monitor clinical course to determine if needs a more permanent pacemaker.        CRISTO (acute kidney injury)  CRISTO likely due to bradycardia induced hypotension.  Gentle IVFs given HF. Echo done 9/28/19 and EF 45%.  Monitoring.  I suspect restoration of adequate blood pressure will also improve renal function.  Low threshold to consult renal if not improving. Holding diuretics and nephrotoxic medication.      Hyperkalemia  Likely due to CRISTO.  Repeating labs now.  Holding any potassium supplements.      Hyponatremia  Gentle IVFs.  Repeating electrolytes now and labs in the AM.  Monitoring.       Cardiogenic shock  Secondary to bradycardia. On dopamine gtt.  Continuous hemodynamic monitoring in the MICU.      CKD (chronic kidney disease), stage IV  CRISTO on CKD IV.  See CRISTO.      Anemia of chronic disease  Monitoring.  No acute issues.      Hyperlipidemia  Chronic medical condition.  Continuing home medication.  Monitoring.        CAD (coronary artery disease)  Chronic condition.  Holding some home medication due to bradycardia, shock and CRISTO.      Aortic valve stenosis, moderate    Chronic.  No acute issues.    GERD (gastroesophageal reflux disease)  Chronic medical condition.  Continuing home medication.  Monitoring.        Hypertension  Currently hypotensive due to symptomatic bradycardia.  Holding antihypertensives while on Dopamine gtt.      Rheumatoid  arthritis  Chronic condition.  Unclear to me if this could be RA associated heart disease.  Will need to discuss with Dr. Blair.      Sjogren's syndrome  Chronic condition.        VTE Risk Mitigation (From admission, onward)         Ordered     Place COSTA hose  Until discontinued      10/01/19 1937     IP VTE HIGH RISK PATIENT  Once      10/01/19 1937                   Ralph Brown MD  Department of Hospital Medicine   [facility]

## 2019-10-02 NOTE — ASSESSMENT & PLAN NOTE
Likely prerenal in the setting of bradycardia and hypotension   Continue IVFs (gently given HF)  eGFR improving   Holding diuretics and nephrotoxic medication

## 2019-10-02 NOTE — SUBJECTIVE & OBJECTIVE
Past Medical History:   Diagnosis Date    Abdominal hernia     Anemia     Dr Berkowitz     Aortic valve stenosis, moderate     Cannon's esophagus     Cataract     ou done    CHF (congestive heart failure)     EFx 35%, Dr. Spencer 13    Colitis     Compression fracture     Diverticulosis     Encounter for blood transfusion     GERD (gastroesophageal reflux disease)     Hyperlipidemia     Hypertension     Irritable bowel syndrome     Occlusive coronary artery disease     Osteoarthritis     lumbar DDD    Pneumonia 2017    Rheumatoid arteritis     Rheumatoid arthritis     Shingles 2017    Sjogren syndrome     Ulcerative colitis        Past Surgical History:   Procedure Laterality Date    APPENDECTOMY      BACK SURGERY      CARDIAC SURGERY      CABGx3 in     CATARACT EXTRACTION      ou od d 11-15-12//     SECTION, CLASSIC      x 2    CHOLECYSTECTOMY      COLONOSCOPY  09/15/2011    Dr Anaya     COLONOSCOPY  01/10/2017    [facility] report sent to scanning    CORONARY ANGIOPLASTY      PCI x 1 in     CORONARY ARTERY BYPASS GRAFT      3 vessel    CORONARY ARTERY BYPASS GRAFT      eyes      rk ou    FRACTURE SURGERY  2016    Dr Guido left hip     FRACTURE SURGERY      Right Hip    HYSTERECTOMY      tubal ligation, oopherectomy    INTRAMEDULLARY RODDING OF TROCHANTER OF FEMUR Right 10/8/2018    Procedure: INSERTION, INTRAMEDULLARY KELSI, FEMUR, TROCHANTER;  Surgeon: Valerio Luther MD;  Location: [facility];  Service: Orthopedics;  Laterality: Right;    OOPHORECTOMY      SPINE SURGERY  2013    Silvino Mckeon    UPPER GASTROINTESTINAL ENDOSCOPY      Yag Capsulotomy Right 14       Review of patient's allergies indicates:   Allergen Reactions    Nitrofurantoin macrocrystalline Nausea And Vomiting     Other reaction(s): very sickly    Penicillins Swelling     Other reaction(s): swelling  Other reaction(s): red skin discolorati    Sulfa (sulfonamide  antibiotics) Nausea And Vomiting     Other reaction(s): very sickly       No current facility-administered medications on file prior to encounter.      Current Outpatient Medications on File Prior to Encounter   Medication Sig    amLODIPine (NORVASC) 5 MG tablet Take 5 mg by mouth once daily.     aspirin 81 mg Tab Take 1 tablet by mouth every evening. Every day    biotin 5 mg Cap Take 1 tablet by mouth 2 (two) times daily.    CALCIUM CARB/VIT D3/MINERALS (CALCIUM-VITAMIN D ORAL) Take 600 mg by mouth 2 (two) times daily. Calcium 1200 with D 3 1000    CRANBERRY CONC/ASCORBIC ACID (CRANBERRY PLUS VITAMIN C ORAL) Take 1 capsule by mouth once daily.    DEXILANT 60 mg capsule Take 60 mg by mouth once daily.     DOCUSATE CALCIUM (STOOL SOFTENER ORAL) Take 200 mg by mouth every evening.     ENTRESTO  mg per tablet Take 1 tablet by mouth 2 (two) times daily.     FERROUS FUMARATE/VIT BCOMP,C (SUPER B COMPLEX ORAL) Take 1 tablet by mouth once daily.    ferrous gluconate (FERGON) 324 MG tablet Take 324 mg by mouth 2 (two) times daily. In the am and at noon    fluticasone (FLONASE) 50 mcg/actuation nasal spray 2 sprays by Each Nare route once daily. (Patient taking differently: 2 sprays by Each Nare route daily as needed. )    furosemide (LASIX) 40 MG tablet Take 60 mg by mouth once daily.    gabapentin (NEURONTIN) 100 MG capsule Take 1 capsule (100 mg total) by mouth every evening.    hydroxychloroquine (PLAQUENIL) 200 mg tablet Take 1 tablet (200 mg total) by mouth once daily.    isosorbide dinitrate (ISORDIL) 10 MG tablet Take 10 mg by mouth 3 (three) times daily.    krill-omega-3-dha-epa-lipids (KRILL OIL) 408-75-01-50 mg Cap Take 1,000 mg by mouth once daily. Braxton krill 300mg qd     lactobacillus acidophilus (PROBIOTIC) 10 billion cell Cap Take 1 each by mouth once daily. 1.5 billion    magnesium oxide (MAG-OX) 400 mg tablet Take 400 mg by mouth 2 (two) times daily.    metoprolol succinate  (TOPROL-XL) 25 MG 24 hr tablet Take 12.5 mg by mouth once daily.     MULTIVITAMIN WITH MINERALS (ONE-A-DAY 50 PLUS) Tab Take 1 tablet by mouth once daily. Every day    ondansetron (ZOFRAN) 4 MG tablet Take 4 mg by mouth every 8 (eight) hours as needed for Nausea.     pantoprazole (PROTONIX) 40 MG tablet Take 40 mg by mouth once daily.    potassium chloride SA (K-DUR,KLOR-CON) 10 MEQ tablet Take 20 mEq by mouth once daily.     potassium chloride SA (K-DUR,KLOR-CON) 10 MEQ tablet Take 10 mEq by mouth every evening.    sertraline (ZOLOFT) 50 MG tablet Take 50 mg by mouth once daily.    temazepam (RESTORIL) 7.5 MG Cap Take 15 mg by mouth nightly as needed.    traMADol (ULTRAM) 50 mg tablet Take 50 mg by mouth 2 (two) times daily as needed for Pain. 1-2 tabs    vitamin E 400 unit Tab Take 400 mg by mouth once daily. Every day    coenzyme Q10 100 mg capsule Take 100 mg by mouth once daily.    furosemide (LASIX) 40 MG tablet Take 40 mg by mouth 2 (two) times daily.    metOLazone (ZAROXOLYN) 2.5 MG tablet Take 1 tablet (2.5 mg total) by mouth 3 (three) times a week. (Patient taking differently: Take 2.5 mg by mouth 3 (three) times a week. Monday, Wednesday and Friday)    nitroGLYCERIN (NITROLINGUAL) 0.4 mg/dose spray Place 1 spray under the tongue every 5 (five) minutes as needed. PRN    promethazine (PHENERGAN) 25 MG tablet Take 1 tablet (25 mg total) by mouth 2 (two) times daily as needed for Nausea.    [DISCONTINUED] hydrochlorothiazide (HYDRODIURIL) 25 MG tablet Take 25 mg by mouth once daily.    [DISCONTINUED] sertraline (ZOLOFT) 50 MG tablet TAKE 1 TABLET (50 MG TOTAL) BY MOUTH ONCE DAILY. (Patient taking differently: Take 50 mg by mouth. )    [DISCONTINUED] traMADol (ULTRAM) 50 mg tablet 1-2 po bid prn (Patient taking differently: Take by mouth. 1-2 po bid prn)     Family History     Problem Relation (Age of Onset)    Arthritis Brother    Crohn's disease Brother, Brother, Brother    Early death Son     Fibromyalgia Sister    Heart disease Father, Mother, Brother    Hypertension Father, Sister, Daughter    Irritable bowel syndrome Sister    Lupus Cousin, Cousin    Pulmonary embolism Sister    Scleroderma Sister    Skin cancer Mother        Tobacco Use    Smoking status: Former Smoker     Packs/day: 1.00     Years: 5.00     Pack years: 5.00     Last attempt to quit: 1971     Years since quittin.7    Smokeless tobacco: Never Used   Substance and Sexual Activity    Alcohol use: Yes     Alcohol/week: 1.0 standard drinks     Types: 1 Glasses of wine per week    Drug use: Yes     Frequency: 1.0 times per week    Sexual activity: Not Currently     Partners: Male     Review of Systems   Unable to perform ROS: Other (Sedated)     Objective:     Vital Signs (Most Recent):  Temp: 98.4 °F (36.9 °C) (10/01/19 2001)  Pulse: 68 (10/01/19 2001)  Resp: 16 (10/01/19 2001)  BP: (!) 96/50 (10/01/19 2001)  SpO2: 98 % (10/01/19 2001) Vital Signs (24h Range):  Temp:  [97.6 °F (36.4 °C)-98.4 °F (36.9 °C)] 98.4 °F (36.9 °C)  Pulse:  [38-89] 68  Resp:  [13-21] 16  SpO2:  [92 %-100 %] 98 %  BP: ()/(41-58) 96/50     Weight: 46.2 kg (101 lb 13.6 oz)  Body mass index is 21.29 kg/m².    Physical Exam   Constitutional: She appears well-developed. No distress.   HENT:   Head: Normocephalic and atraumatic.   Mouth/Throat: Oropharynx is clear and moist.   O2 per NC   Eyes: Pupils are equal, round, and reactive to light. EOM are normal.   Neck: Normal range of motion.   Cardiovascular: Regular rhythm.   No murmur heard.  Pulmonary/Chest: Effort normal and breath sounds normal. She has no wheezes.   Abdominal: Soft. She exhibits no distension. There is no tenderness. There is no rebound and no guarding.   Musculoskeletal: Normal range of motion.   Neurological:   Sedated post procedure   Skin: No rash noted.   BLE mild bruising   Psychiatric:   Unable to assess secondary to sedation     Nursing note and vitals  reviewed.        CRANIAL NERVES     CN III, IV, VI   Pupils are equal, round, and reactive to light.  Extraocular motions are normal.        Significant Labs:   CBC:   Recent Labs   Lab 10/01/19  1637   WBC 6.27   HGB 9.8*   HCT 30.6*        CMP:   Recent Labs   Lab 10/01/19  1637   *   K 5.7*   CL 89*   CO2 30*   GLU 92   BUN 54*   CREATININE 2.8*   CALCIUM 8.4*   PROT 6.5   ALBUMIN 3.8   BILITOT 0.7   ALKPHOS 77   AST 89*   ALT 40   ANIONGAP 12   EGFRNONAA 15.0*       Significant Imaging: CXR:  No acute process   Tele at the time of my face to face interview is personally reviewed and is in NSR with rate of 60s.

## 2019-10-02 NOTE — NURSING
Dr perrin present  Removed  Pacemaker and  Venous  Sheath  20 min  Manual pressure tegrederm over 4/4  brant   Pt tolerated  Well

## 2019-10-02 NOTE — ASSESSMENT & PLAN NOTE
Temporary pacemaker placed to the right femoral.  Patient is in normal sinus rhythm of beta-blockers.  We will decrease the rate of the pacemaker to 40 if there is no pacing throughout the day most likely remove the temporary pacemaker later on today.  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

## 2019-10-02 NOTE — PLAN OF CARE
10/02/19 0931   Discharge Assessment   Assessment Type Discharge Planning Assessment   Confirmed/corrected address and phone number on facesheet? Yes   Assessment information obtained from? Patient   Communicated expected length of stay with patient/caregiver no   Prior to hospitilization cognitive status: Alert/Oriented   Prior to hospitalization functional status: Assistive Equipment  (Uses walker and cane all the time when ambulating. )   Current cognitive status: Alert/Oriented   Current Functional Status: Needs Assistance;Partially Dependent   Lives With child(billy), adult  (Livesw with dtg Brandi Falcon and son in law)   Able to Return to Prior Arrangements yes   Is patient able to care for self after discharge? Yes   Who are your caregiver(s) and their phone number(s)? Brandi Falcon [phone]  Lizabeth Ivory [phone]  (Cm gave pt 5 Wishes booklet and explained how to fill out. )   Patient's perception of discharge disposition home or selfcare   Readmission Within the Last 30 Days previous discharge plan unsuccessful;no previous admission in last 30 days   If yes, most recent facility [facility]: [facility]   Patient currently being followed by outpatient case management? No   Patient currently receives any other outside agency services? No   Equipment Currently Used at Home walker, rolling;rollator;cane, straight;shower chair;3-in-1 commode;raised toilet;wheelchair   Do you have any problems affording any of your prescribed medications? No   Is the patient taking medications as prescribed? yes   Does the patient have transportation home? Yes   Transportation Anticipated family or friend will provide   Does the patient receive services at the [facility]? No   Discharge Plan A Home with family   Discharge Plan B Home Health   DME Needed Upon Discharge  none   Patient/Family in Agreement with Plan yes   Readmission Questionnaire   At the time of your discharge, did someone talk to you about what your health problems  were? Yes   At the time of discharge, did someone talk to you about what to watch out for regarding worsening of your health problem? Yes   At the time of discharge, did someone talk to you about what to do if you experienced worsening of your health problem? Yes   At the time of discharge, did someone talk to you about which medication to take when you left the hospital and which ones to stop taking? Yes   At the time of discharge, did someone talk to you about when and where to follow up with a doctor after you left the hospital? Yes   What do you believe caused you to be sick enough to be re-admitted? dizziness and slow heart rate   How often do you need to have someone help you when you read instructions, pamphlets, or other written material from your doctor or pharmacy? Rarely   Do you have problems taking your medications as prescribed? No   Do you have any problems affording any of  your prescribed medications? No   Do you have problems obtaining/receiving your medications? No   Does the patient have transportation to healthcare appointments? Yes   Living Arrangements house   Does the patient have family/friends to help with healtcare needs after discharge? yes   Does your caregiver provide all the help you need? Yes   Are you currently feeling confused? No   Are you currently having problems thinking? No   Are you currently having memory problems? No   Have you felt down, depressed, or hopeless? 0   Have you felt little interest or pleasure in doing things? 1   In the last 7 days, my sleep quality was: fair   Cm to follow until discharge from hospital.

## 2019-10-02 NOTE — PROGRESS NOTES
Critical access hospital Medicine  Progress Note    Patient Name: Cheyanne Gomes  MRN: [id number]  Patient Class: IP- Inpatient   Admission Date: 10/1/2019  Length of Stay: 1 days  Attending Physician: Octavio Evans MD  Primary Care Provider: Romeo Alas MD        Subjective:     Principal Problem:AV junctional bradycardia        HPI:  83 year old female with PMHx Chronic SHF 45%, CAD s/p Hx of CABG, HTN, HLD, RA presented to the ED with bradycardia.  I am seeing patient after she went to the cath lab and she is currently sedated from the procedure and is unable to provide history. History obtained from chart and Dr. Blair. Patient was recently admitted for elevated troponin.  She was also noted to have HR in the 40s at times and possible acute on chronic HF.  Dr. Ohara saw her, recommended optimization of volume status and then possible stress test. Patient declined further workup stating she felt better and would like to see Dr. Blair in clinic.  Today, she went to work but felt weak, dizzy, nauseated.  Family noted that her pulse rate was low.  Per Dr. Blair, she was found to have a HR in the 20s as well as had a 5 second pause.  She was given Atropine.  Patient was noted to be hypotensive with SBP in the 70s and then started on Dopamine gtt.  Patient was brought to the cath lab and a temporary pacer was put in.  In the cath lab, she was found to actually be on her own rhythm and thus Dr. Connor has the temporary pacemaker as a back up.  Patient is on Metoprolol at home.    Overview/Hospital Course:  No notes on file    Interval History:  Patient seen and examined at bedside in ICU after being admitted overnight for junctional bradycardia status post placement of temporary pacemaker.  Patient is awake alert and cooperative this morning.  Denies chest pain/pressure, palpitations, dizziness/lightheadedness or nausea/vomiting.  Denies pain at the right groin site.    ROS is  otherwise negative.  Objective:     Vital Signs (Most Recent):  Temp: 98.2 °F (36.8 °C) (10/02/19 0701)  Pulse: (!) 59 (10/02/19 0742)  Resp: 20 (10/02/19 0742)  BP: (!) 123/56 (10/02/19 0701)  SpO2: 100 % (10/02/19 0742) Vital Signs (24h Range):  Temp:  [97.6 °F (36.4 °C)-98.8 °F (37.1 °C)] 98.2 °F (36.8 °C)  Pulse:  [38-89] 59  Resp:  [13-36] 20  SpO2:  [89 %-100 %] 100 %  BP: ()/(41-61) 123/56     Weight: 45.9 kg (101 lb 3.1 oz)  Body mass index is 21.15 kg/m².    Intake/Output Summary (Last 24 hours) at 10/2/2019 0811  Last data filed at 10/2/2019 0600  Gross per 24 hour   Intake 965.66 ml   Output 1300 ml   Net -334.34 ml      Physical Exam   Constitutional: She is oriented to person, place, and time. She appears well-developed and well-nourished. No distress.   HENT:   Head: Normocephalic and atraumatic.   Mouth/Throat: Oropharynx is clear and moist.   Eyes: Pupils are equal, round, and reactive to light. EOM are normal.   Neck: Neck supple. No thyromegaly present.   Cardiovascular: Regular rhythm. Bradycardia present.   Murmur heard.   Systolic murmur is present with a grade of 3/6.  Pulmonary/Chest: Effort normal and breath sounds normal. She has no wheezes.   Abdominal: Soft. She exhibits no distension. There is no tenderness. There is no rebound and no guarding.   Musculoskeletal: Normal range of motion.   Neurological: She is alert and oriented to person, place, and time. She has normal strength. No sensory deficit.   Skin: Skin is warm and dry. Capillary refill takes less than 2 seconds. No rash noted.   Psychiatric: She has a normal mood and affect. Her behavior is normal.        Nursing note and vitals reviewed.      Significant Labs:   CBC:   Recent Labs   Lab 10/01/19  1637 10/02/19  0138   WBC 6.27 5.41   HGB 9.8* 8.8*   HCT 30.6* 26.9*    119*     CMP:   Recent Labs   Lab 10/01/19  1637 10/01/19  2030 10/02/19  0138   * 136 137   K 5.7* 3.8 3.8   CL 89* 95 96   CO2 30* 31* 31*    GLU 92 56* 92   BUN 54* 54* 49*   CREATININE 2.8* 2.6* 2.1*   CALCIUM 8.4* 8.3* 8.3*   PROT 6.5 6.0 5.5*   ALBUMIN 3.8 3.4* 3.2*   BILITOT 0.7 0.6 0.6   ALKPHOS 77 75 68   AST 89* 116* 80*   ALT 40 50* 45*   ANIONGAP 12 10 10   EGFRNONAA 15.0* 16.5* 21.3*     Cardiac Markers:   Recent Labs   Lab 10/01/19  1637   BNP 1,821*       Significant Imaging: I have reviewed all pertinent imaging results/findings within the past 24 hours.      Assessment/Plan:      * AV junctional bradycardia  S/p temporary pacemaker  Bradycardia - Sick sinus syndrome vs drug induced (metoprolol in the setting of CRISTO on CKD stage 3)    Hold AV agnes blocking agents   Will monitor clinical course to determine if she will need a permanent pacemaker  Anticipate bradycardia should improve with clearance of metoprolol and improvement in renal function       Acute renal failure superimposed on stage 3 chronic kidney disease  Likely prerenal in the setting of bradycardia and hypotension   Continue IVFs (gently given HF)  eGFR improving   Holding diuretics and nephrotoxic medication      Anemia of chronic disease  Monitoring.  No acute issues.  Continue home ferrous sulfate    Hyperlipidemia  Chronic medical condition.  Continuing home medication.  Monitoring.        CAD (coronary artery disease)  Chronic condition  Continue home aspirin       Aortic valve stenosis, moderate  Aware   Chronic issue    GERD (gastroesophageal reflux disease)  Chronic medical condition.  Continuing home medication.  Monitoring.        Hypertension  Stable   Resume home meds when able except for AV agnes blocking agents       Rheumatoid arthritis  Chronic condition  Continue home hydroxychloroquine    Sjogren's syndrome  Chronic condition.      VTE Risk Mitigation (From admission, onward)         Ordered     Place COSTA hose  Until discontinued      10/01/19 1937     IP VTE HIGH RISK PATIENT  Once      10/01/19 1937                      Octavio Evans MD  Department of  [facility] Medicine   [facility]

## 2019-10-02 NOTE — CONSULTS
ELECTROLYTE MANAGEMENT PROGRESS NOTE    Objective:  83 y.o., female, Actual Body Weight = 45.9 kg (101 lb 3.1 oz)    The patient has the following labs:  Lab Results   Component Value Date    K 3.8 10/02/2019    MG 2.0 10/02/2019    PHOS 4.6 (H) 10/02/2019    CALCIUM 8.3 (L) 10/02/2019    ALBUMIN 3.2 (L) 10/02/2019    CREATININE 2.1 (H) 10/02/2019      Diet:  Cardiac  IV Access:  Peripheral    Assessment:    Electrolyte Deficiency:  Hypokalemia    Patient type:  Critical Care    Plan:  Replace potassium with Potassium chloride 20 mEq PO     Will continue to monitor electrolytes daily.    Thank you for allowing us to participate in this patient's care.     Jeannie Contreras, Yu 10/2/2019 10:21 AM  ID Clinical Pharmacist, Ext [phone] or [phone]

## 2019-10-02 NOTE — PROGRESS NOTES
Pre-Visit Chart Review  For Appointment Scheduled on 10-10-19    Health Maintenance Due   Topic Date Due    Lipid Panel  05/07/2019

## 2019-10-02 NOTE — ASSESSMENT & PLAN NOTE
S/p temporary pacemaker  Bradycardia - Sick sinus syndrome vs drug induced (metoprolol in the setting of CRISTO on CKD stage 3)    Hold AV agnes blocking agents   Will monitor clinical course to determine if she will need a permanent pacemaker  Anticipate bradycardia should improve with clearance of metoprolol and improvement in renal function

## 2019-10-02 NOTE — HPI
83 year old female with PMHx Chronic SHF 45%, CAD s/p Hx of CABG, HTN, HLD, RA presented to the ED with bradycardia.  I am seeing patient after she went to the cath lab and she is currently sedated from the procedure and is unable to provide history. History obtained from chart and Dr. Blair. Patient was recently admitted for elevated troponin.  She was also noted to have HR in the 40s at times and possible acute on chronic HF.  Dr. Ohara saw her, recommended optimization of volume status and then possible stress test. Patient declined further workup stating she felt better and would like to see Dr. Blair in clinic.  Today, she went to work but felt weak, dizzy, nauseated.  Family noted that her pulse rate was low.  Per Dr. Blair, she was found to have a HR in the 20s as well as had a 5 second pause.  She was given Atropine.  Patient was noted to be hypotensive with SBP in the 70s and then started on Dopamine gtt.  Patient was brought to the cath lab and a temporary pacer was put in.  In the cath lab, she was found to actually be on her own rhythm and thus Dr. Connor has the temporary pacemaker as a back up.  Patient is on Metoprolol at home.

## 2019-10-02 NOTE — ASSESSMENT & PLAN NOTE
Elevation of troponin probably secondary to CHF as well as transient ischemia secondary to Lukasz arrhythmias.

## 2019-10-02 NOTE — HOSPITAL COURSE
She was treated with IV atropine, IV fluids, with some improvement of her symptoms.  She remained hypotensive bradycardic and was decided to bring her to the cardiac catheterization laboratory for pacemaker implant.  She was started on dopamine.  By the time she arrived to the cath lab her heart rate was approximately 57 beats per minute pressure in the low 100s.  A temporary pacemaker was placed through the right femoral vein.

## 2019-10-02 NOTE — SUBJECTIVE & OBJECTIVE
Past Medical History:   Diagnosis Date    Abdominal hernia     Anemia     Dr Berkowitz     Aortic valve stenosis, moderate     Cannon's esophagus     Cataract     ou done    CHF (congestive heart failure)     EFx 35%, Dr. Spencer 13    Colitis     Compression fracture     Diverticulosis     Encounter for blood transfusion     GERD (gastroesophageal reflux disease)     Hyperlipidemia     Hypertension     Irritable bowel syndrome     Occlusive coronary artery disease     Osteoarthritis     lumbar DDD    Pneumonia 2017    Rheumatoid arteritis     Rheumatoid arthritis     Shingles 2017    Sjogren syndrome     Ulcerative colitis        Past Surgical History:   Procedure Laterality Date    APPENDECTOMY      BACK SURGERY      CARDIAC SURGERY      CABGx3 in     CATARACT EXTRACTION      ou od d 11-15-12//     SECTION, CLASSIC      x 2    CHOLECYSTECTOMY      COLONOSCOPY  09/15/2011    Dr Anaya     COLONOSCOPY  01/10/2017    [facility] report sent to scanning    CORONARY ANGIOPLASTY      PCI x 1 in     CORONARY ARTERY BYPASS GRAFT      3 vessel    CORONARY ARTERY BYPASS GRAFT      eyes      rk ou    FRACTURE SURGERY  2016    Dr Guido left hip     FRACTURE SURGERY      Right Hip    HYSTERECTOMY      tubal ligation, oopherectomy    INTRAMEDULLARY RODDING OF TROCHANTER OF FEMUR Right 10/8/2018    Procedure: INSERTION, INTRAMEDULLARY KELSI, FEMUR, TROCHANTER;  Surgeon: Valerio Luther MD;  Location: [facility];  Service: Orthopedics;  Laterality: Right;    OOPHORECTOMY      SPINE SURGERY  2013    Silvino Mckeon    UPPER GASTROINTESTINAL ENDOSCOPY      Yag Capsulotomy Right 14       Review of patient's allergies indicates:   Allergen Reactions    Nitrofurantoin macrocrystalline Nausea And Vomiting     Other reaction(s): very sickly    Penicillins Swelling     Other reaction(s): swelling  Other reaction(s): red skin discolorati    Sulfa (sulfonamide  antibiotics) Nausea And Vomiting     Other reaction(s): very sickly       Current Facility-Administered Medications on File Prior to Encounter   Medication    furosemide injection 40 mg     Current Outpatient Medications on File Prior to Encounter   Medication Sig    amLODIPine (NORVASC) 5 MG tablet Take 5 mg by mouth once daily.     aspirin 81 mg Tab Take 1 tablet by mouth every evening. Every day    biotin 5 mg Cap Take 1 tablet by mouth 2 (two) times daily.    CALCIUM CARB/VIT D3/MINERALS (CALCIUM-VITAMIN D ORAL) Take 600 mg by mouth 2 (two) times daily. Calcium 1200 with D 3 1000    CRANBERRY CONC/ASCORBIC ACID (CRANBERRY PLUS VITAMIN C ORAL) Take 1 capsule by mouth once daily.    DEXILANT 60 mg capsule Take 60 mg by mouth once daily.     DOCUSATE CALCIUM (STOOL SOFTENER ORAL) Take 200 mg by mouth every evening.     ENTRESTO  mg per tablet Take 1 tablet by mouth 2 (two) times daily.     FERROUS FUMARATE/VIT BCOMP,C (SUPER B COMPLEX ORAL) Take 1 tablet by mouth once daily.    ferrous gluconate (FERGON) 324 MG tablet Take 324 mg by mouth 2 (two) times daily. In the am and at noon    fluticasone (FLONASE) 50 mcg/actuation nasal spray 2 sprays by Each Nare route once daily. (Patient taking differently: 2 sprays by Each Nare route daily as needed. )    furosemide (LASIX) 40 MG tablet Take 60 mg by mouth once daily.    gabapentin (NEURONTIN) 100 MG capsule Take 1 capsule (100 mg total) by mouth every evening.    hydroxychloroquine (PLAQUENIL) 200 mg tablet Take 1 tablet (200 mg total) by mouth once daily.    isosorbide dinitrate (ISORDIL) 10 MG tablet Take 10 mg by mouth 3 (three) times daily.    krill-omega-3-dha-epa-lipids (KRILL OIL) 046-11-13-50 mg Cap Take 1,000 mg by mouth once daily. Braxton krill 300mg qd     lactobacillus acidophilus (PROBIOTIC) 10 billion cell Cap Take 1 each by mouth once daily. 1.5 billion    magnesium oxide (MAG-OX) 400 mg tablet Take 400 mg by mouth 2 (two) times  daily.    metoprolol succinate (TOPROL-XL) 25 MG 24 hr tablet Take 12.5 mg by mouth once daily.     MULTIVITAMIN WITH MINERALS (ONE-A-DAY 50 PLUS) Tab Take 1 tablet by mouth once daily. Every day    ondansetron (ZOFRAN) 4 MG tablet Take 4 mg by mouth every 8 (eight) hours as needed for Nausea.     pantoprazole (PROTONIX) 40 MG tablet Take 40 mg by mouth once daily.    potassium chloride SA (K-DUR,KLOR-CON) 10 MEQ tablet Take 20 mEq by mouth once daily.     potassium chloride SA (K-DUR,KLOR-CON) 10 MEQ tablet Take 10 mEq by mouth every evening.    sertraline (ZOLOFT) 50 MG tablet Take 50 mg by mouth once daily.    temazepam (RESTORIL) 7.5 MG Cap Take 15 mg by mouth nightly as needed.    traMADol (ULTRAM) 50 mg tablet Take 50 mg by mouth 2 (two) times daily as needed for Pain. 1-2 tabs    vitamin E 400 unit Tab Take 400 mg by mouth once daily. Every day    coenzyme Q10 100 mg capsule Take 100 mg by mouth once daily.    furosemide (LASIX) 40 MG tablet Take 40 mg by mouth 2 (two) times daily.    metOLazone (ZAROXOLYN) 2.5 MG tablet Take 1 tablet (2.5 mg total) by mouth 3 (three) times a week. (Patient taking differently: Take 2.5 mg by mouth 3 (three) times a week. Monday, Wednesday and Friday)    nitroGLYCERIN (NITROLINGUAL) 0.4 mg/dose spray Place 1 spray under the tongue every 5 (five) minutes as needed. PRN    promethazine (PHENERGAN) 25 MG tablet Take 1 tablet (25 mg total) by mouth 2 (two) times daily as needed for Nausea.    [DISCONTINUED] hydrochlorothiazide (HYDRODIURIL) 25 MG tablet Take 25 mg by mouth once daily.    [DISCONTINUED] sertraline (ZOLOFT) 50 MG tablet TAKE 1 TABLET (50 MG TOTAL) BY MOUTH ONCE DAILY. (Patient taking differently: Take 50 mg by mouth. )    [DISCONTINUED] traMADol (ULTRAM) 50 mg tablet 1-2 po bid prn (Patient taking differently: Take by mouth. 1-2 po bid prn)     Family History     Problem Relation (Age of Onset)    Arthritis Brother    Crohn's disease Brother,  Brother, Brother    Early death Son    Fibromyalgia Sister    Heart disease Father, Mother, Brother    Hypertension Father, Sister, Daughter    Irritable bowel syndrome Sister    Lupus Cousin, Cousin    Pulmonary embolism Sister    Scleroderma Sister    Skin cancer Mother        Tobacco Use    Smoking status: Former Smoker     Packs/day: 1.00     Years: 5.00     Pack years: 5.00     Last attempt to quit: 1971     Years since quittin.7    Smokeless tobacco: Never Used   Substance and Sexual Activity    Alcohol use: Yes     Alcohol/week: 1.0 standard drinks     Types: 1 Glasses of wine per week    Drug use: Yes     Frequency: 1.0 times per week    Sexual activity: Not Currently     Partners: Male     Review of Systems   Unable to perform ROS: mental status change     Objective:     Vital Signs (Most Recent):  Temp: 97.6 °F (36.4 °C) (10/01/19 172)  Pulse: (!) 50 (10/01/19 182)  Resp: 20 (10/01/19 175)  BP: (!) 102/51 (10/01/19 183)  SpO2: 97 % (10/01/19 1820) Vital Signs (24h Range):  Temp:  [97.6 °F (36.4 °C)] 97.6 °F (36.4 °C)  Pulse:  [38-89] 50  Resp:  [13-21] 20  SpO2:  [92 %-100 %] 97 %  BP: ()/(41-58) 102/51     Weight: 42.2 kg (93 lb)  Body mass index is 19.44 kg/m².    SpO2: 97 %  O2 Device (Oxygen Therapy): nasal cannula      Intake/Output Summary (Last 24 hours) at 10/1/2019 1909  Last data filed at 10/1/2019 1657  Gross per 24 hour   Intake 500 ml   Output --   Net 500 ml       Lines/Drains/Airways     Drain                 Sheath 10/01/19 1844 Right less than 1 day          Peripheral Intravenous Line                 Peripheral IV - Single Lumen 10/01/19 1806 18 G Left Antecubital less than 1 day                Physical Exam   Constitutional:   S sleepy slurred speech   Cardiovascular:   Murmur: Grade 4 /6 systolic murmur at the base.  Pulmonary/Chest: She has rales.   Abdominal: Soft. Bowel sounds are normal.   Musculoskeletal: She exhibits no edema or deformity.   Skin: Skin is  warm and dry.   Nursing note and vitals reviewed.      Significant Labs:   CMP   Recent Labs   Lab 10/01/19  1637   *   K 5.7*   CL 89*   CO2 30*   GLU 92   BUN 54*   CREATININE 2.8*   CALCIUM 8.4*   PROT 6.5   ALBUMIN 3.8   BILITOT 0.7   ALKPHOS 77   AST 89*   ALT 40   ANIONGAP 12   ESTGFRAFRICA 17.3*   EGFRNONAA 15.0*   , CBC   Recent Labs   Lab 10/01/19  1637   WBC 6.27   HGB 9.8*   HCT 30.6*       and Troponin   Recent Labs   Lab 10/01/19  1637   TROPONINI 0.511*       Significant Imaging: X-Ray: CXR: X-Ray Chest 1 View (CXR): No results found for this visit on 10/01/19.

## 2019-10-02 NOTE — ASSESSMENT & PLAN NOTE
CRISTO likely due to bradycardia induced hypotension.  Gentle IVFs given HF. Echo done 9/28/19 and EF 45%.  Monitoring.  I suspect restoration of adequate blood pressure will also improve renal function.  Low threshold to consult renal if not improving. Holding diuretics and nephrotoxic medication.

## 2019-10-02 NOTE — SUBJECTIVE & OBJECTIVE
Interval History:  She is feeling better today.  Denies any shortness of breath and chest pain. In the nausea has resolved.    Review of Systems   Constitution: Negative. Negative for fever and weight gain.   Eyes: Negative for blurred vision and double vision.   Cardiovascular: Negative.  Negative for chest pain and leg swelling.   Respiratory: Negative.  Negative for cough and shortness of breath.    Skin: Negative.  Negative for flushing and rash.   Musculoskeletal: Negative.  Negative for back pain and muscle cramps.   Gastrointestinal: Negative.  Negative for abdominal pain and constipation.   Neurological: Negative.  Negative for dizziness and weakness.   Psychiatric/Behavioral: Negative.  Negative for altered mental status and hallucinations.     Objective:     Vital Signs (Most Recent):  Temp: 98.2 °F (36.8 °C) (10/02/19 0701)  Pulse: (!) 59 (10/02/19 0742)  Resp: 20 (10/02/19 0742)  BP: (!) 123/56 (10/02/19 0701)  SpO2: 100 % (10/02/19 0742) Vital Signs (24h Range):  Temp:  [97.6 °F (36.4 °C)-98.8 °F (37.1 °C)] 98.2 °F (36.8 °C)  Pulse:  [38-89] 59  Resp:  [13-36] 20  SpO2:  [89 %-100 %] 100 %  BP: ()/(41-61) 123/56     Weight: 45.9 kg (101 lb 3.1 oz)  Body mass index is 21.15 kg/m².     SpO2: 100 %  O2 Device (Oxygen Therapy): nasal cannula      Intake/Output Summary (Last 24 hours) at 10/2/2019 0930  Last data filed at 10/2/2019 0600  Gross per 24 hour   Intake 965.66 ml   Output 1300 ml   Net -334.34 ml       Lines/Drains/Airways     Drain                 Sheath 10/01/19 1844 Right less than 1 day         Urethral Catheter 10/01/19 2030 Non-latex 16 Fr. less than 1 day          Peripheral Intravenous Line                 Peripheral IV - Single Lumen 10/01/19 1800 20 G Anterior;Right Forearm less than 1 day         Peripheral IV - Single Lumen 10/01/19 1806 18 G Left Antecubital less than 1 day                Physical Exam   Constitutional: She is oriented to person, place, and time. She appears  well-developed and well-nourished.   HENT:   Head: Normocephalic and atraumatic.   Eyes: Pupils are equal, round, and reactive to light. Conjunctivae and EOM are normal.   Cardiovascular: Normal rate and regular rhythm.   Murmur: Grade 2/6 systolic murmur at the base.  Pulmonary/Chest: Effort normal and breath sounds normal.   Abdominal: Soft. Bowel sounds are normal.   Musculoskeletal: Normal range of motion.   Neurological: She is alert and oriented to person, place, and time.   Skin: Skin is warm and dry.   Psychiatric: She has a normal mood and affect. Her behavior is normal. Judgment and thought content normal.   Nursing note and vitals reviewed.      Significant Labs:   BMP:   Recent Labs   Lab 10/01/19  1637 10/01/19  2030 10/02/19  0138   GLU 92 56* 92   * 136 137   K 5.7* 3.8 3.8   CL 89* 95 96   CO2 30* 31* 31*   BUN 54* 54* 49*   CREATININE 2.8* 2.6* 2.1*   CALCIUM 8.4* 8.3* 8.3*   MG 2.4  --  2.0   , CMP   Recent Labs   Lab 10/01/19  1637 10/01/19  2030 10/02/19  0138   * 136 137   K 5.7* 3.8 3.8   CL 89* 95 96   CO2 30* 31* 31*   GLU 92 56* 92   BUN 54* 54* 49*   CREATININE 2.8* 2.6* 2.1*   CALCIUM 8.4* 8.3* 8.3*   PROT 6.5 6.0 5.5*   ALBUMIN 3.8 3.4* 3.2*   BILITOT 0.7 0.6 0.6   ALKPHOS 77 75 68   AST 89* 116* 80*   ALT 40 50* 45*   ANIONGAP 12 10 10   ESTGFRAFRICA 17.3* 19.0* 24.6*   EGFRNONAA 15.0* 16.5* 21.3*   , CBC   Recent Labs   Lab 10/01/19  1637 10/02/19  0138   WBC 6.27 5.41   HGB 9.8* 8.8*   HCT 30.6* 26.9*    119*    and Troponin   Recent Labs   Lab 10/01/19  1637 10/01/19  2030 10/02/19  0138   TROPONINI 0.511* 0.434* 0.417*       Significant Imaging: X-Ray: CXR: X-Ray Chest 1 View (CXR): No results found for this visit on 10/01/19.    Coronary arteriogram performed on 2016 was reviewed.  The patient had a patent saphenous vein graft to the 1st diagonal,  the obtuse marginal as well as the LIMA to the left anterior descending artery.  The ventriculogram showed of the  apical aneurysm.

## 2019-10-02 NOTE — ASSESSMENT & PLAN NOTE
S/p temporary pacemaker.  Patient admitted to the MICU as inpatient. Dr. Blair following.  Holding agnes blocking agents and antihypertensives.  Will monitor clinical course to determine if needs a more permanent pacemaker.

## 2019-10-02 NOTE — PROGRESS NOTES
[facility]  Cardiology  Progress Note    Patient Name: Cheyanne Gomes  MRN: [id number]  Admission Date: 10/1/2019  Hospital Length of Stay: 1 days  Code Status: Full Code   Attending Physician: Octavio Evans MD   Primary Care Physician: Romeo Alas MD  Expected Discharge Date:   Principal Problem:AV junctional bradycardia    Subjective:     Hospital Course:   She was treated with IV atropine, IV fluids, with some improvement of her symptoms.  She remained hypotensive bradycardic and was decided to bring her to the cardiac catheterization laboratory for pacemaker implant.  She was started on dopamine.  By the time she arrived to the cath lab her heart rate was approximately 57 beats per minute pressure in the low 100s.  A temporary pacemaker was placed through the right femoral vein.    Interval History:  She is feeling better today.  Denies any shortness of breath and chest pain. In the nausea has resolved.    Review of Systems   Constitution: Negative. Negative for fever and weight gain.   Eyes: Negative for blurred vision and double vision.   Cardiovascular: Negative.  Negative for chest pain and leg swelling.   Respiratory: Negative.  Negative for cough and shortness of breath.    Skin: Negative.  Negative for flushing and rash.   Musculoskeletal: Negative.  Negative for back pain and muscle cramps.   Gastrointestinal: Negative.  Negative for abdominal pain and constipation.   Neurological: Negative.  Negative for dizziness and weakness.   Psychiatric/Behavioral: Negative.  Negative for altered mental status and hallucinations.     Objective:     Vital Signs (Most Recent):  Temp: 98.2 °F (36.8 °C) (10/02/19 0701)  Pulse: (!) 59 (10/02/19 0742)  Resp: 20 (10/02/19 0742)  BP: (!) 123/56 (10/02/19 0701)  SpO2: 100 % (10/02/19 0742) Vital Signs (24h Range):  Temp:  [97.6 °F (36.4 °C)-98.8 °F (37.1 °C)] 98.2 °F (36.8 °C)  Pulse:  [38-89] 59  Resp:  [13-36] 20  SpO2:  [89 %-100 %] 100 %  BP:  ()/(41-61) 123/56     Weight: 45.9 kg (101 lb 3.1 oz)  Body mass index is 21.15 kg/m².     SpO2: 100 %  O2 Device (Oxygen Therapy): nasal cannula      Intake/Output Summary (Last 24 hours) at 10/2/2019 0930  Last data filed at 10/2/2019 0600  Gross per 24 hour   Intake 965.66 ml   Output 1300 ml   Net -334.34 ml       Lines/Drains/Airways     Drain                 Sheath 10/01/19 1844 Right less than 1 day         Urethral Catheter 10/01/19 2030 Non-latex 16 Fr. less than 1 day          Peripheral Intravenous Line                 Peripheral IV - Single Lumen 10/01/19 1800 20 G Anterior;Right Forearm less than 1 day         Peripheral IV - Single Lumen 10/01/19 1806 18 G Left Antecubital less than 1 day                Physical Exam   Constitutional: She is oriented to person, place, and time. She appears well-developed and well-nourished.   HENT:   Head: Normocephalic and atraumatic.   Eyes: Pupils are equal, round, and reactive to light. Conjunctivae and EOM are normal.   Cardiovascular: Normal rate and regular rhythm.   Murmur: Grade 2/6 systolic murmur at the base.  Pulmonary/Chest: Effort normal and breath sounds normal.   Abdominal: Soft. Bowel sounds are normal.   Musculoskeletal: Normal range of motion.   Neurological: She is alert and oriented to person, place, and time.   Skin: Skin is warm and dry.   Psychiatric: She has a normal mood and affect. Her behavior is normal. Judgment and thought content normal.   Nursing note and vitals reviewed.      Significant Labs:   BMP:   Recent Labs   Lab 10/01/19  1637 10/01/19  2030 10/02/19  0138   GLU 92 56* 92   * 136 137   K 5.7* 3.8 3.8   CL 89* 95 96   CO2 30* 31* 31*   BUN 54* 54* 49*   CREATININE 2.8* 2.6* 2.1*   CALCIUM 8.4* 8.3* 8.3*   MG 2.4  --  2.0   , CMP   Recent Labs   Lab 10/01/19  1637 10/01/19  2030 10/02/19  0138   * 136 137   K 5.7* 3.8 3.8   CL 89* 95 96   CO2 30* 31* 31*   GLU 92 56* 92   BUN 54* 54* 49*   CREATININE 2.8* 2.6*  2.1*   CALCIUM 8.4* 8.3* 8.3*   PROT 6.5 6.0 5.5*   ALBUMIN 3.8 3.4* 3.2*   BILITOT 0.7 0.6 0.6   ALKPHOS 77 75 68   AST 89* 116* 80*   ALT 40 50* 45*   ANIONGAP 12 10 10   ESTGFRAFRICA 17.3* 19.0* 24.6*   EGFRNONAA 15.0* 16.5* 21.3*   , CBC   Recent Labs   Lab 10/01/19  1637 10/02/19  0138   WBC 6.27 5.41   HGB 9.8* 8.8*   HCT 30.6* 26.9*    119*    and Troponin   Recent Labs   Lab 10/01/19  1637 10/01/19  2030 10/02/19  0138   TROPONINI 0.511* 0.434* 0.417*       Significant Imaging: X-Ray: CXR: X-Ray Chest 1 View (CXR): No results found for this visit on 10/01/19.    Coronary arteriogram performed on 2016 was reviewed.  The patient had a patent saphenous vein graft to the 1st diagonal,  the obtuse marginal as well as the LIMA to the left anterior descending artery.  The ventriculogram showed of the apical aneurysm.    Assessment and Plan:       * AV junctional bradycardia  Temporary pacemaker placed to the right femoral.  Patient is in normal sinus rhythm of beta-blockers.  We will decrease the rate of the pacemaker to 40 if there is no pacing throughout the day most likely remove the temporary pacemaker later on today.  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    Acute renal failure superimposed on stage 3 chronic kidney disease  Renal function has been improving since admission.    Anemia of chronic disease  Stable    CAD (coronary artery disease)  Elevation of troponin probably secondary to CHF as well as transient ischemia secondary to Lukasz arrhythmias.    Aortic valve stenosis, moderate  Degree of aortic stenosis remained stable    Hypertension  Of medication.  She appears to be normotensive now.  Of dopamine.        VTE Risk Mitigation (From admission, onward)         Ordered     Place COSTA hose  Until discontinued       10/01/19 1937     IP VTE HIGH RISK PATIENT  Once      10/01/19 1937                Tera Blair MD  Cardiology  On license of [facility]

## 2019-10-02 NOTE — ASSESSMENT & PLAN NOTE
Currently hypotensive due to symptomatic bradycardia.  Holding antihypertensives while on Dopamine gtt.

## 2019-10-02 NOTE — CONSULTS
[facility]  Cardiology  Consult Note    Patient Name: Cheyanne Gomes  MRN: [id number]  Admission Date: 10/1/2019  Hospital Length of Stay: 0 days  Code Status: Prior   Attending Provider: Alvaro Maciel MD   Consulting Provider: Tera Blair MD  Primary Care Physician: Romeo Alas MD  Principal Problem:AV junctional bradycardia    Patient information was obtained from relative(s) and ER records.     Consults  Subjective:     Chief Complaint:  Weakness nausea vomiting     HPI:   The patient is an 83-year-old female with history of coronary artery disease, previous coronary artery bypass, apical aneurysm, recurring admissions for congestive heart failure, hypertension, chronic anemia.  She was recently admitted to [facility] and was discharged home on medical therapy.  This morning she went to work she was not feeling well, she was weak dizzy nauseated and subsequently developed chest pain. She took Phenergan for her nausea and has made her very sleepy and incoherent. The family noted that her pulse was low as well as her blood pressure she was  brought to the emergency room.  She was noted to be in a junctional rhythm rate in the 20s as well as markedly hypotensive.    Past Medical History:   Diagnosis Date    Abdominal hernia     Anemia     Dr Berkowitz     Aortic valve stenosis, moderate     Cannon's esophagus     Cataract     ou done    CHF (congestive heart failure)     EFx 35%, Dr. Spencer 12/19/13    Colitis     Compression fracture     Diverticulosis     Encounter for blood transfusion     GERD (gastroesophageal reflux disease)     Hyperlipidemia     Hypertension     Irritable bowel syndrome     Occlusive coronary artery disease     Osteoarthritis     lumbar DDD    Pneumonia 01/03/2017    Rheumatoid arteritis     Rheumatoid arthritis     Shingles 01/03/2017    Sjogren syndrome     Ulcerative colitis        Past Surgical History:   Procedure  Laterality Date    APPENDECTOMY      BACK SURGERY      CARDIAC SURGERY      CABGx3 in     CATARACT EXTRACTION      ou od d 11-15-/     SECTION, CLASSIC      x 2    CHOLECYSTECTOMY      COLONOSCOPY  09/15/2011    Dr Anaya     COLONOSCOPY  01/10/2017    [facility] report sent to scanning    CORONARY ANGIOPLASTY      PCI x 1 in     CORONARY ARTERY BYPASS GRAFT      3 vessel    CORONARY ARTERY BYPASS GRAFT      eyes      rk ou    FRACTURE SURGERY  2016    Dr Guido left hip     FRACTURE SURGERY  2018    Right Hip    HYSTERECTOMY      tubal ligation, oopherectomy    INTRAMEDULLARY RODDING OF TROCHANTER OF FEMUR Right 10/8/2018    Procedure: INSERTION, INTRAMEDULLARY KELSI, FEMUR, TROCHANTER;  Surgeon: Valerio Luther MD;  Location[facility];  Service: Orthopedics;  Laterality: Right;    OOPHORECTOMY      SPINE SURGERY  2013    Silvino Mckeon    UPPER GASTROINTESTINAL ENDOSCOPY      Yag Capsulotomy Right 14       Review of patient's allergies indicates:   Allergen Reactions    Nitrofurantoin macrocrystalline Nausea And Vomiting     Other reaction(s): very sickly    Penicillins Swelling     Other reaction(s): swelling  Other reaction(s): red skin discolorati    Sulfa (sulfonamide antibiotics) Nausea And Vomiting     Other reaction(s): very sickly       Current Facility-Administered Medications on File Prior to Encounter   Medication    furosemide injection 40 mg     Current Outpatient Medications on File Prior to Encounter   Medication Sig    amLODIPine (NORVASC) 5 MG tablet Take 5 mg by mouth once daily.     aspirin 81 mg Tab Take 1 tablet by mouth every evening. Every day    biotin 5 mg Cap Take 1 tablet by mouth 2 (two) times daily.    CALCIUM CARB/VIT D3/MINERALS (CALCIUM-VITAMIN D ORAL) Take 600 mg by mouth 2 (two) times daily. Calcium 1200 with D 3 1000    CRANBERRY CONC/ASCORBIC ACID (CRANBERRY PLUS VITAMIN C ORAL) Take 1 capsule by mouth once daily.    DEXILANT 60 mg  capsule Take 60 mg by mouth once daily.     DOCUSATE CALCIUM (STOOL SOFTENER ORAL) Take 200 mg by mouth every evening.     ENTRESTO  mg per tablet Take 1 tablet by mouth 2 (two) times daily.     FERROUS FUMARATE/VIT BCOMP,C (SUPER B COMPLEX ORAL) Take 1 tablet by mouth once daily.    ferrous gluconate (FERGON) 324 MG tablet Take 324 mg by mouth 2 (two) times daily. In the am and at noon    fluticasone (FLONASE) 50 mcg/actuation nasal spray 2 sprays by Each Nare route once daily. (Patient taking differently: 2 sprays by Each Nare route daily as needed. )    furosemide (LASIX) 40 MG tablet Take 60 mg by mouth once daily.    gabapentin (NEURONTIN) 100 MG capsule Take 1 capsule (100 mg total) by mouth every evening.    hydroxychloroquine (PLAQUENIL) 200 mg tablet Take 1 tablet (200 mg total) by mouth once daily.    isosorbide dinitrate (ISORDIL) 10 MG tablet Take 10 mg by mouth 3 (three) times daily.    krill-omega-3-dha-epa-lipids (KRILL OIL) 327-96-26-50 mg Cap Take 1,000 mg by mouth once daily. Braxton krill 300mg qd     lactobacillus acidophilus (PROBIOTIC) 10 billion cell Cap Take 1 each by mouth once daily. 1.5 billion    magnesium oxide (MAG-OX) 400 mg tablet Take 400 mg by mouth 2 (two) times daily.    metoprolol succinate (TOPROL-XL) 25 MG 24 hr tablet Take 12.5 mg by mouth once daily.     MULTIVITAMIN WITH MINERALS (ONE-A-DAY 50 PLUS) Tab Take 1 tablet by mouth once daily. Every day    ondansetron (ZOFRAN) 4 MG tablet Take 4 mg by mouth every 8 (eight) hours as needed for Nausea.     pantoprazole (PROTONIX) 40 MG tablet Take 40 mg by mouth once daily.    potassium chloride SA (K-DUR,KLOR-CON) 10 MEQ tablet Take 20 mEq by mouth once daily.     potassium chloride SA (K-DUR,KLOR-CON) 10 MEQ tablet Take 10 mEq by mouth every evening.    sertraline (ZOLOFT) 50 MG tablet Take 50 mg by mouth once daily.    temazepam (RESTORIL) 7.5 MG Cap Take 15 mg by mouth nightly as needed.    traMADol  (ULTRAM) 50 mg tablet Take 50 mg by mouth 2 (two) times daily as needed for Pain. 1-2 tabs    vitamin E 400 unit Tab Take 400 mg by mouth once daily. Every day    coenzyme Q10 100 mg capsule Take 100 mg by mouth once daily.    furosemide (LASIX) 40 MG tablet Take 40 mg by mouth 2 (two) times daily.    metOLazone (ZAROXOLYN) 2.5 MG tablet Take 1 tablet (2.5 mg total) by mouth 3 (three) times a week. (Patient taking differently: Take 2.5 mg by mouth 3 (three) times a week. Monday, Wednesday and Friday)    nitroGLYCERIN (NITROLINGUAL) 0.4 mg/dose spray Place 1 spray under the tongue every 5 (five) minutes as needed. PRN    promethazine (PHENERGAN) 25 MG tablet Take 1 tablet (25 mg total) by mouth 2 (two) times daily as needed for Nausea.    [DISCONTINUED] hydrochlorothiazide (HYDRODIURIL) 25 MG tablet Take 25 mg by mouth once daily.    [DISCONTINUED] sertraline (ZOLOFT) 50 MG tablet TAKE 1 TABLET (50 MG TOTAL) BY MOUTH ONCE DAILY. (Patient taking differently: Take 50 mg by mouth. )    [DISCONTINUED] traMADol (ULTRAM) 50 mg tablet 1-2 po bid prn (Patient taking differently: Take by mouth. 1-2 po bid prn)     Family History     Problem Relation (Age of Onset)    Arthritis Brother    Crohn's disease Brother, Brother, Brother    Early death Son    Fibromyalgia Sister    Heart disease Father, Mother, Brother    Hypertension Father, Sister, Daughter    Irritable bowel syndrome Sister    Lupus Cousin, Cousin    Pulmonary embolism Sister    Scleroderma Sister    Skin cancer Mother        Tobacco Use    Smoking status: Former Smoker     Packs/day: 1.00     Years: 5.00     Pack years: 5.00     Last attempt to quit: 1971     Years since quittin.7    Smokeless tobacco: Never Used   Substance and Sexual Activity    Alcohol use: Yes     Alcohol/week: 1.0 standard drinks     Types: 1 Glasses of wine per week    Drug use: Yes     Frequency: 1.0 times per week    Sexual activity: Not Currently     Partners: Male      Review of Systems   Unable to perform ROS: mental status change     Objective:     Vital Signs (Most Recent):  Temp: 97.6 °F (36.4 °C) (10/01/19 1726)  Pulse: (!) 50 (10/01/19 1820)  Resp: 20 (10/01/19 1751)  BP: (!) 102/51 (10/01/19 1832)  SpO2: 97 % (10/01/19 1820) Vital Signs (24h Range):  Temp:  [97.6 °F (36.4 °C)] 97.6 °F (36.4 °C)  Pulse:  [38-89] 50  Resp:  [13-21] 20  SpO2:  [92 %-100 %] 97 %  BP: ()/(41-58) 102/51     Weight: 42.2 kg (93 lb)  Body mass index is 19.44 kg/m².    SpO2: 97 %  O2 Device (Oxygen Therapy): nasal cannula      Intake/Output Summary (Last 24 hours) at 10/1/2019 1909  Last data filed at 10/1/2019 1657  Gross per 24 hour   Intake 500 ml   Output --   Net 500 ml       Lines/Drains/Airways     Drain                 Sheath 10/01/19 1844 Right less than 1 day          Peripheral Intravenous Line                 Peripheral IV - Single Lumen 10/01/19 1806 18 G Left Antecubital less than 1 day                Physical Exam   Constitutional:   S sleepy slurred speech   Cardiovascular:   Murmur: Grade 4 /6 systolic murmur at the base.  Pulmonary/Chest: She has rales.   Abdominal: Soft. Bowel sounds are normal.   Musculoskeletal: She exhibits no edema or deformity.   Skin: Skin is warm and dry.   Nursing note and vitals reviewed.      Significant Labs:   CMP   Recent Labs   Lab 10/01/19  1637   *   K 5.7*   CL 89*   CO2 30*   GLU 92   BUN 54*   CREATININE 2.8*   CALCIUM 8.4*   PROT 6.5   ALBUMIN 3.8   BILITOT 0.7   ALKPHOS 77   AST 89*   ALT 40   ANIONGAP 12   ESTGFRAFRICA 17.3*   EGFRNONAA 15.0*   , CBC   Recent Labs   Lab 10/01/19  1637   WBC 6.27   HGB 9.8*   HCT 30.6*       and Troponin   Recent Labs   Lab 10/01/19  1637   TROPONINI 0.511*       Significant Imaging: X-Ray: CXR: X-Ray Chest 1 View (CXR): No results found for this visit on 10/01/19.    Assessment and Plan:     * AV junctional bradycardia  Temporary pacemaker placed to the right femoral.  The patient is on  IV dopamine    CKD (chronic kidney disease), stage IV  A stable    Anemia of chronic disease  Stable    Hypertension  Hypotension at present on dopamine        VTE Risk Mitigation (From admission, onward)    None          Thank you for your consult. I will follow-up with patient. Please contact us if you have any additional questions.    Tera Blair MD  Cardiology   [facility]

## 2019-10-02 NOTE — SUBJECTIVE & OBJECTIVE
Interval History:  Patient seen and examined at bedside in ICU after being admitted overnight for junctional bradycardia status post placement of temporary pacemaker.  Patient is awake alert and cooperative this morning.  Denies chest pain/pressure, palpitations, dizziness/lightheadedness or nausea/vomiting.  Denies pain at the right groin site.    ROS is otherwise negative.  Objective:     Vital Signs (Most Recent):  Temp: 98.2 °F (36.8 °C) (10/02/19 0701)  Pulse: (!) 59 (10/02/19 0742)  Resp: 20 (10/02/19 0742)  BP: (!) 123/56 (10/02/19 0701)  SpO2: 100 % (10/02/19 0742) Vital Signs (24h Range):  Temp:  [97.6 °F (36.4 °C)-98.8 °F (37.1 °C)] 98.2 °F (36.8 °C)  Pulse:  [38-89] 59  Resp:  [13-36] 20  SpO2:  [89 %-100 %] 100 %  BP: ()/(41-61) 123/56     Weight: 45.9 kg (101 lb 3.1 oz)  Body mass index is 21.15 kg/m².    Intake/Output Summary (Last 24 hours) at 10/2/2019 0811  Last data filed at 10/2/2019 0600  Gross per 24 hour   Intake 965.66 ml   Output 1300 ml   Net -334.34 ml      Physical Exam   Constitutional: She is oriented to person, place, and time. She appears well-developed and well-nourished. No distress.   HENT:   Head: Normocephalic and atraumatic.   Mouth/Throat: Oropharynx is clear and moist.   Eyes: Pupils are equal, round, and reactive to light. EOM are normal.   Neck: Neck supple. No thyromegaly present.   Cardiovascular: Regular rhythm. Bradycardia present.   Murmur heard.   Systolic murmur is present with a grade of 3/6.  Pulmonary/Chest: Effort normal and breath sounds normal. She has no wheezes.   Abdominal: Soft. She exhibits no distension. There is no tenderness. There is no rebound and no guarding.   Musculoskeletal: Normal range of motion.   Neurological: She is alert and oriented to person, place, and time. She has normal strength. No sensory deficit.   Skin: Skin is warm and dry. Capillary refill takes less than 2 seconds. No rash noted.   Psychiatric: She has a normal mood and  affect. Her behavior is normal.        Nursing note and vitals reviewed.      Significant Labs:   CBC:   Recent Labs   Lab 10/01/19  1637 10/02/19  0138   WBC 6.27 5.41   HGB 9.8* 8.8*   HCT 30.6* 26.9*    119*     CMP:   Recent Labs   Lab 10/01/19  1637 10/01/19  2030 10/02/19  0138   * 136 137   K 5.7* 3.8 3.8   CL 89* 95 96   CO2 30* 31* 31*   GLU 92 56* 92   BUN 54* 54* 49*   CREATININE 2.8* 2.6* 2.1*   CALCIUM 8.4* 8.3* 8.3*   PROT 6.5 6.0 5.5*   ALBUMIN 3.8 3.4* 3.2*   BILITOT 0.7 0.6 0.6   ALKPHOS 77 75 68   AST 89* 116* 80*   ALT 40 50* 45*   ANIONGAP 12 10 10   EGFRNONAA 15.0* 16.5* 21.3*     Cardiac Markers:   Recent Labs   Lab 10/01/19  1637   BNP 1,821*       Significant Imaging: I have reviewed all pertinent imaging results/findings within the past 24 hours.

## 2019-10-03 PROBLEM — R07.9 CHEST PAIN: Status: ACTIVE | Noted: 2019-10-03

## 2019-10-03 LAB
ALBUMIN SERPL BCP-MCNC: 3.3 G/DL (ref 3.5–5.2)
ALP SERPL-CCNC: 68 U/L (ref 55–135)
ALT SERPL W/O P-5'-P-CCNC: 33 U/L (ref 10–44)
ANION GAP SERPL CALC-SCNC: 7 MMOL/L (ref 8–16)
AST SERPL-CCNC: 48 U/L (ref 10–40)
BASOPHILS # BLD AUTO: 0.05 K/UL (ref 0–0.2)
BASOPHILS NFR BLD: 1 % (ref 0–1.9)
BILIRUB SERPL-MCNC: 0.7 MG/DL (ref 0.1–1)
BUN SERPL-MCNC: 33 MG/DL (ref 8–23)
CALCIUM SERPL-MCNC: 8.2 MG/DL (ref 8.7–10.5)
CHLORIDE SERPL-SCNC: 98 MMOL/L (ref 95–110)
CO2 SERPL-SCNC: 31 MMOL/L (ref 23–29)
CREAT SERPL-MCNC: 1.2 MG/DL (ref 0.5–1.4)
DIFFERENTIAL METHOD: ABNORMAL
EOSINOPHIL # BLD AUTO: 0.2 K/UL (ref 0–0.5)
EOSINOPHIL NFR BLD: 4.1 % (ref 0–8)
ERYTHROCYTE [DISTWIDTH] IN BLOOD BY AUTOMATED COUNT: 13.3 % (ref 11.5–14.5)
EST. GFR  (AFRICAN AMERICAN): 48.3 ML/MIN/1.73 M^2
EST. GFR  (NON AFRICAN AMERICAN): 41.9 ML/MIN/1.73 M^2
GLUCOSE SERPL-MCNC: 96 MG/DL (ref 70–110)
HCT VFR BLD AUTO: 28.9 % (ref 37–48.5)
HGB BLD-MCNC: 9.2 G/DL (ref 12–16)
IMM GRANULOCYTES # BLD AUTO: 0.01 K/UL (ref 0–0.04)
IMM GRANULOCYTES NFR BLD AUTO: 0.2 % (ref 0–0.5)
LYMPHOCYTES # BLD AUTO: 0.9 K/UL (ref 1–4.8)
LYMPHOCYTES NFR BLD: 17.7 % (ref 18–48)
MAGNESIUM SERPL-MCNC: 2 MG/DL (ref 1.6–2.6)
MCH RBC QN AUTO: 29.4 PG (ref 27–31)
MCHC RBC AUTO-ENTMCNC: 31.8 G/DL (ref 32–36)
MCV RBC AUTO: 92 FL (ref 82–98)
MONOCYTES # BLD AUTO: 0.4 K/UL (ref 0.3–1)
MONOCYTES NFR BLD: 8.7 % (ref 4–15)
NEUTROPHILS # BLD AUTO: 3.5 K/UL (ref 1.8–7.7)
NEUTROPHILS NFR BLD: 68.3 % (ref 38–73)
NRBC BLD-RTO: 0 /100 WBC
PHOSPHATE SERPL-MCNC: 2.6 MG/DL (ref 2.7–4.5)
PLATELET # BLD AUTO: 130 K/UL (ref 150–350)
PMV BLD AUTO: 9.8 FL (ref 9.2–12.9)
POTASSIUM SERPL-SCNC: 3.6 MMOL/L (ref 3.5–5.1)
PROT SERPL-MCNC: 5.8 G/DL (ref 6–8.4)
RBC # BLD AUTO: 3.13 M/UL (ref 4–5.4)
SODIUM SERPL-SCNC: 136 MMOL/L (ref 136–145)
WBC # BLD AUTO: 5.08 K/UL (ref 3.9–12.7)

## 2019-10-03 PROCEDURE — 25000003 PHARM REV CODE 250: Performed by: INTERNAL MEDICINE

## 2019-10-03 PROCEDURE — 84100 ASSAY OF PHOSPHORUS: CPT

## 2019-10-03 PROCEDURE — 80053 COMPREHEN METABOLIC PANEL: CPT

## 2019-10-03 PROCEDURE — 94761 N-INVAS EAR/PLS OXIMETRY MLT: CPT

## 2019-10-03 PROCEDURE — 85025 COMPLETE CBC W/AUTO DIFF WBC: CPT

## 2019-10-03 PROCEDURE — 63600175 PHARM REV CODE 636 W HCPCS: Performed by: INTERNAL MEDICINE

## 2019-10-03 PROCEDURE — 83735 ASSAY OF MAGNESIUM: CPT

## 2019-10-03 PROCEDURE — 63600175 PHARM REV CODE 636 W HCPCS

## 2019-10-03 PROCEDURE — 21400001 HC TELEMETRY ROOM

## 2019-10-03 PROCEDURE — 25000003 PHARM REV CODE 250

## 2019-10-03 PROCEDURE — 27000221 HC OXYGEN, UP TO 24 HOURS

## 2019-10-03 PROCEDURE — 36415 COLL VENOUS BLD VENIPUNCTURE: CPT

## 2019-10-03 RX ORDER — FUROSEMIDE 40 MG/1
40 TABLET ORAL DAILY
Status: DISCONTINUED | OUTPATIENT
Start: 2019-10-03 | End: 2019-10-04 | Stop reason: HOSPADM

## 2019-10-03 RX ORDER — FERROUS SULFATE 325(65) MG
325 TABLET ORAL DAILY
Status: DISCONTINUED | OUTPATIENT
Start: 2019-10-03 | End: 2019-10-04 | Stop reason: HOSPADM

## 2019-10-03 RX ORDER — POTASSIUM CHLORIDE 20 MEQ/1
20 TABLET, EXTENDED RELEASE ORAL ONCE
Status: COMPLETED | OUTPATIENT
Start: 2019-10-03 | End: 2019-10-03

## 2019-10-03 RX ORDER — AMLODIPINE BESYLATE 5 MG/1
5 TABLET ORAL DAILY
Status: DISCONTINUED | OUTPATIENT
Start: 2019-10-03 | End: 2019-10-04 | Stop reason: HOSPADM

## 2019-10-03 RX ADMIN — GABAPENTIN 100 MG: 100 CAPSULE ORAL at 09:10

## 2019-10-03 RX ADMIN — ASPIRIN 81 MG 81 MG: 81 TABLET ORAL at 08:10

## 2019-10-03 RX ADMIN — FERROUS SULFATE TAB 325 MG (65 MG ELEMENTAL FE) 325 MG: 325 (65 FE) TAB at 08:10

## 2019-10-03 RX ADMIN — PANTOPRAZOLE SODIUM 40 MG: 40 TABLET, DELAYED RELEASE ORAL at 05:10

## 2019-10-03 RX ADMIN — SERTRALINE HYDROCHLORIDE 50 MG: 50 TABLET ORAL at 08:10

## 2019-10-03 RX ADMIN — POTASSIUM CHLORIDE 20 MEQ: 20 TABLET, EXTENDED RELEASE ORAL at 08:10

## 2019-10-03 RX ADMIN — TRAZODONE HYDROCHLORIDE 25 MG: 50 TABLET ORAL at 09:10

## 2019-10-03 RX ADMIN — AMLODIPINE BESYLATE 5 MG: 5 TABLET ORAL at 03:10

## 2019-10-03 RX ADMIN — LACTOBACILLUS TAB 1 TABLET: TAB at 08:10

## 2019-10-03 RX ADMIN — ENOXAPARIN SODIUM 30 MG: 100 INJECTION SUBCUTANEOUS at 09:10

## 2019-10-03 RX ADMIN — FUROSEMIDE 40 MG: 40 TABLET ORAL at 08:10

## 2019-10-03 RX ADMIN — SACUBITRIL AND VALSARTAN 1 TABLET: 24; 26 TABLET, FILM COATED ORAL at 08:10

## 2019-10-03 RX ADMIN — MAGNESIUM OXIDE 400 MG: 400 TABLET ORAL at 08:10

## 2019-10-03 RX ADMIN — CHLORHEXIDINE GLUCONATE 15 ML: 1.2 RINSE ORAL at 08:10

## 2019-10-03 RX ADMIN — CHLORHEXIDINE GLUCONATE 15 ML: 1.2 RINSE ORAL at 09:10

## 2019-10-03 RX ADMIN — MUPIROCIN: 20 OINTMENT TOPICAL at 09:10

## 2019-10-03 RX ADMIN — HYDROXYCHLOROQUINE SULFATE 200 MG: 200 TABLET, FILM COATED ORAL at 08:10

## 2019-10-03 RX ADMIN — SODIUM PHOSPHATE, MONOBASIC, MONOHYDRATE AND SODIUM PHOSPHATE, DIBASIC, ANHYDROUS 15 MMOL: 276; 142 INJECTION, SOLUTION INTRAVENOUS at 01:10

## 2019-10-03 RX ADMIN — SACUBITRIL AND VALSARTAN 1 TABLET: 24; 26 TABLET, FILM COATED ORAL at 09:10

## 2019-10-03 RX ADMIN — MAGNESIUM OXIDE 400 MG: 400 TABLET ORAL at 09:10

## 2019-10-03 NOTE — ASSESSMENT & PLAN NOTE
Her blood pressure is starting to go up.  We will resume lower doses of her previous medications.  If her pressure is stable today she could conceivable go home and follow up in the office on Monday.  She was instructed if her blood pressure is greater than 150 mm of mercury resume amlodipine 5 mg daily.

## 2019-10-03 NOTE — PROGRESS NOTES
[facility]  Progress Note    Patient Name: Cheyanne Gomes  MRN: [id number]  Patient Class: IP- Inpatient   Admission Date: 10/1/2019  Length of Stay: 2 days  Attending Physician: Octavio Evans MD  Primary Care Provider: Romeo Alas MD        Subjective:     Principal Problem:AV junctional bradycardia        HPI:  83 year old female with PMHx Chronic SHF 45%, CAD s/p Hx of CABG, HTN, HLD, RA presented to the ED with bradycardia.  I am seeing patient after she went to the cath lab and she is currently sedated from the procedure and is unable to provide history. History obtained from chart and Dr. Blair. Patient was recently admitted for elevated troponin.  She was also noted to have HR in the 40s at times and possible acute on chronic HF.  Dr. Ohara saw her, recommended optimization of volume status and then possible stress test. Patient declined further workup stating she felt better and would like to see Dr. Blair in clinic.  Today, she went to work but felt weak, dizzy, nauseated.  Family noted that her pulse rate was low.  Per Dr. Blair, she was found to have a HR in the 20s as well as had a 5 second pause.  She was given Atropine.  Patient was noted to be hypotensive with SBP in the 70s and then started on Dopamine gtt.  Patient was brought to the cath lab and a temporary pacer was put in.  In the cath lab, she was found to actually be on her own rhythm and thus Dr. Connor has the temporary pacemaker as a back up.  Patient is on Metoprolol at home.    Overview/Hospital Course:  No notes on file    Interval History: No acute overnight events. Heart rate improved now that she is off metoprolol. Temporary pacemaker removed yesterday. Blood pressure increasing; home BP meds being restarted as tolerated. Denies chest pain, SOB or lightheadedness.       Objective:     Vital Signs (Most Recent):  Temp: 98.9 °F (37.2 °C) (10/03/19 1500)  Pulse: 70 (10/03/19  1500)  Resp: 20 (10/03/19 1500)  BP: (!) 157/72 (10/03/19 1500)  SpO2: 95 % (10/03/19 1500) Vital Signs (24h Range):  Temp:  [98 °F (36.7 °C)-98.9 °F (37.2 °C)] 98.9 °F (37.2 °C)  Pulse:  [29-75] 70  Resp:  [10-32] 20  SpO2:  [83 %-100 %] 95 %  BP: (123-176)/(58-77) 157/72     Weight: 45.9 kg (101 lb 3.1 oz)  Body mass index is 21.15 kg/m².    Intake/Output Summary (Last 24 hours) at 10/3/2019 1545  Last data filed at 10/3/2019 0800  Gross per 24 hour   Intake 240 ml   Output 600 ml   Net -360 ml      Physical Exam   Constitutional: She is oriented to person, place, and time. She appears well-developed and well-nourished. No distress.   HENT:   Head: Normocephalic and atraumatic.   Mouth/Throat: Oropharynx is clear and moist.   Eyes: Pupils are equal, round, and reactive to light. EOM are normal.   Neck: Neck supple. No thyromegaly present.   Cardiovascular: Normal rate and regular rhythm.   Murmur heard.   Systolic murmur is present with a grade of 3/6.  Pulmonary/Chest: Effort normal and breath sounds normal. She has no wheezes.   Abdominal: Soft. She exhibits no distension. There is no tenderness. There is no rebound and no guarding.   Musculoskeletal: Normal range of motion.   Neurological: She is alert and oriented to person, place, and time. She has normal strength. No sensory deficit.   Skin: Skin is warm and dry. Capillary refill takes less than 2 seconds. No rash noted.   Psychiatric: She has a normal mood and affect. Her behavior is normal.        Nursing note and vitals reviewed.      Significant Labs:   CBC:   Recent Labs   Lab 10/01/19  1637 10/02/19  0138 10/03/19  0340   WBC 6.27 5.41 5.08   HGB 9.8* 8.8* 9.2*   HCT 30.6* 26.9* 28.9*    119* 130*     CMP:   Recent Labs   Lab 10/01/19  2030 10/02/19  0138 10/03/19  0340    137 136   K 3.8 3.8 3.6   CL 95 96 98   CO2 31* 31* 31*   GLU 56* 92 96   BUN 54* 49* 33*   CREATININE 2.6* 2.1* 1.2   CALCIUM 8.3* 8.3* 8.2*   PROT 6.0 5.5* 5.8*    ALBUMIN 3.4* 3.2* 3.3*   BILITOT 0.6 0.6 0.7   ALKPHOS 75 68 68   * 80* 48*   ALT 50* 45* 33   ANIONGAP 10 10 7*   EGFRNONAA 16.5* 21.3* 41.9*       Significant Imaging: I have reviewed all pertinent imaging results/findings within the past 24 hours.      Assessment/Plan:      * AV junctional bradycardia  S/p temporary pacemaker which has been since removed   Improved   Bradycardia - Likely drug induced (metoprolol in the setting of CRISTO on CKD stage 3)    Hold AV agnes blocking agents          Acute renal failure superimposed on stage 3 chronic kidney disease  Likely prerenal in the setting of bradycardia and hypotension   eGFR improving   Hold diuretics and nephrotoxic medication      Anemia of chronic disease  Monitoring.  No acute issues.  Continue home ferrous sulfate    Hyperlipidemia  Chronic medical condition.  Continuing home medication.  Monitoring.        CAD (coronary artery disease)  Chronic condition  Continue home aspirin       Aortic valve stenosis, moderate  Aware   Chronic issue    GERD (gastroesophageal reflux disease)  Chronic medical condition.  Continuing home medication.  Monitoring.        Hypertension  Stable   Resume home meds as tolerated     Rheumatoid arthritis  Chronic condition  Continue home hydroxychloroquine    Sjogren's syndrome  Chronic condition.        VTE Risk Mitigation (From admission, onward)         Ordered     enoxaparin injection 30 mg  Every 24 hours (non-standard times)      10/02/19 1616     Place COSTA hose  Until discontinued      10/01/19 1937     IP VTE HIGH RISK PATIENT  Once      10/01/19 1937                      Octavio Evans MD  Department of Hospital Medicine   [facility]

## 2019-10-03 NOTE — SUBJECTIVE & OBJECTIVE
Interval History:  She is feeling better today denies any chest pains or any shortness of breath.  She denies any weakness.    Review of Systems   Constitution: Negative. Negative for fever and weight gain.   Cardiovascular: Negative.  Negative for chest pain and leg swelling.   Respiratory: Negative.  Negative for cough and shortness of breath.    Skin: Negative.  Negative for flushing and rash.   Musculoskeletal: Negative.  Negative for back pain and muscle cramps.   Gastrointestinal: Negative.  Negative for abdominal pain and constipation.   Neurological: Negative.  Negative for dizziness and weakness.   Psychiatric/Behavioral: Negative.  Negative for altered mental status and hallucinations.     Objective:     Vital Signs (Most Recent):  Temp: 98 °F (36.7 °C) (10/03/19 0600)  Pulse: 65 (10/03/19 0600)  Resp: 16 (10/03/19 0600)  BP: (!) 166/67 (10/03/19 0600)  SpO2: 100 % (10/03/19 0600) Vital Signs (24h Range):  Temp:  [98 °F (36.7 °C)-98.6 °F (37 °C)] 98 °F (36.7 °C)  Pulse:  [29-75] 65  Resp:  [10-34] 16  SpO2:  [83 %-100 %] 100 %  BP: (123-166)/(58-86) 166/67     Weight: 45.9 kg (101 lb 3.1 oz)  Body mass index is 21.15 kg/m².     SpO2: 100 %  O2 Device (Oxygen Therapy): nasal cannula      Intake/Output Summary (Last 24 hours) at 10/3/2019 0804  Last data filed at 10/2/2019 1800  Gross per 24 hour   Intake 480 ml   Output 300 ml   Net 180 ml       Lines/Drains/Airways     Peripheral Intravenous Line                 Peripheral IV - Single Lumen 10/01/19 1800 20 G Anterior;Right Forearm 1 day         Peripheral IV - Single Lumen 10/01/19 1806 18 G Left Antecubital 1 day                Physical Exam   Constitutional: She is oriented to person, place, and time. She appears well-developed and well-nourished.   HENT:   Head: Normocephalic and atraumatic.   Eyes: Pupils are equal, round, and reactive to light. Conjunctivae and EOM are normal.   Cardiovascular: Normal rate, regular rhythm and normal heart sounds.    Murmur: Grade 3/6 systolic murmur at the base.  Pulmonary/Chest: Effort normal and breath sounds normal.   Abdominal: Soft. Bowel sounds are normal.   Musculoskeletal: Normal range of motion.   Neurological: She is alert and oriented to person, place, and time.   Skin: Skin is warm and dry.   Psychiatric: She has a normal mood and affect. Her behavior is normal. Judgment and thought content normal.   Nursing note and vitals reviewed.      Significant Labs:   BMP:   Recent Labs   Lab 10/01/19  1637 10/01/19  2030 10/02/19  0138 10/03/19  0340   GLU 92 56* 92 96   * 136 137 136   K 5.7* 3.8 3.8 3.6   CL 89* 95 96 98   CO2 30* 31* 31* 31*   BUN 54* 54* 49* 33*   CREATININE 2.8* 2.6* 2.1* 1.2   CALCIUM 8.4* 8.3* 8.3* 8.2*   MG 2.4  --  2.0 2.0   , CMP   Recent Labs   Lab 10/01/19  2030 10/02/19  0138 10/03/19  0340    137 136   K 3.8 3.8 3.6   CL 95 96 98   CO2 31* 31* 31*   GLU 56* 92 96   BUN 54* 49* 33*   CREATININE 2.6* 2.1* 1.2   CALCIUM 8.3* 8.3* 8.2*   PROT 6.0 5.5* 5.8*   ALBUMIN 3.4* 3.2* 3.3*   BILITOT 0.6 0.6 0.7   ALKPHOS 75 68 68   * 80* 48*   ALT 50* 45* 33   ANIONGAP 10 10 7*   ESTGFRAFRICA 19.0* 24.6* 48.3*   EGFRNONAA 16.5* 21.3* 41.9*    and CBC   Recent Labs   Lab 10/01/19  1637 10/02/19  0138 10/03/19  0340   WBC 6.27 5.41 5.08   HGB 9.8* 8.8* 9.2*   HCT 30.6* 26.9* 28.9*    119* 130*       Significant Imaging: X-Ray: CXR: X-Ray Chest 1 View (CXR): No results found for this visit on 10/01/19.

## 2019-10-03 NOTE — PROGRESS NOTES
[facility]  Cardiology  Progress Note    Patient Name: Cheyanne Gomes  MRN: [id number]  Admission Date: 10/1/2019  Hospital Length of Stay: 2 days  Code Status: Full Code   Attending Physician: Octavio Evans MD   Primary Care Physician: Romeo Alas MD  Expected Discharge Date:   Principal Problem:AV junctional bradycardia    Subjective:     Hospital Course:   She was treated with IV atropine, IV fluids, with some improvement of her symptoms.  She remained hypotensive bradycardic and was decided to bring her to the cardiac catheterization laboratory for pacemaker implant.  She was started on dopamine.  By the time she arrived to the cath lab her heart rate was approximately 57 beats per minute pressure in the low 100s.  A temporary pacemaker was placed through the right femoral vein.    Interval History:  She is feeling better today denies any chest pains or any shortness of breath.  She denies any weakness.    Review of Systems   Constitution: Negative. Negative for fever and weight gain.   Cardiovascular: Negative.  Negative for chest pain and leg swelling.   Respiratory: Negative.  Negative for cough and shortness of breath.    Skin: Negative.  Negative for flushing and rash.   Musculoskeletal: Negative.  Negative for back pain and muscle cramps.   Gastrointestinal: Negative.  Negative for abdominal pain and constipation.   Neurological: Negative.  Negative for dizziness and weakness.   Psychiatric/Behavioral: Negative.  Negative for altered mental status and hallucinations.     Objective:     Vital Signs (Most Recent):  Temp: 98 °F (36.7 °C) (10/03/19 0600)  Pulse: 65 (10/03/19 0600)  Resp: 16 (10/03/19 0600)  BP: (!) 166/67 (10/03/19 0600)  SpO2: 100 % (10/03/19 0600) Vital Signs (24h Range):  Temp:  [98 °F (36.7 °C)-98.6 °F (37 °C)] 98 °F (36.7 °C)  Pulse:  [29-75] 65  Resp:  [10-34] 16  SpO2:  [83 %-100 %] 100 %  BP: (123-166)/(58-86) 166/67     Weight: 45.9 kg (101 lb 3.1 oz)  Body  mass index is 21.15 kg/m².     SpO2: 100 %  O2 Device (Oxygen Therapy): nasal cannula      Intake/Output Summary (Last 24 hours) at 10/3/2019 0804  Last data filed at 10/2/2019 1800  Gross per 24 hour   Intake 480 ml   Output 300 ml   Net 180 ml       Lines/Drains/Airways     Peripheral Intravenous Line                 Peripheral IV - Single Lumen 10/01/19 1800 20 G Anterior;Right Forearm 1 day         Peripheral IV - Single Lumen 10/01/19 1806 18 G Left Antecubital 1 day                Physical Exam   Constitutional: She is oriented to person, place, and time. She appears well-developed and well-nourished.   HENT:   Head: Normocephalic and atraumatic.   Eyes: Pupils are equal, round, and reactive to light. Conjunctivae and EOM are normal.   Cardiovascular: Normal rate, regular rhythm and normal heart sounds.   Murmur: Grade 3/6 systolic murmur at the base.  Pulmonary/Chest: Effort normal and breath sounds normal.   Abdominal: Soft. Bowel sounds are normal.   Musculoskeletal: Normal range of motion.   Neurological: She is alert and oriented to person, place, and time.   Skin: Skin is warm and dry.   Psychiatric: She has a normal mood and affect. Her behavior is normal. Judgment and thought content normal.   Nursing note and vitals reviewed.      Significant Labs:   BMP:   Recent Labs   Lab 10/01/19  1637 10/01/19  2030 10/02/19  0138 10/03/19  0340   GLU 92 56* 92 96   * 136 137 136   K 5.7* 3.8 3.8 3.6   CL 89* 95 96 98   CO2 30* 31* 31* 31*   BUN 54* 54* 49* 33*   CREATININE 2.8* 2.6* 2.1* 1.2   CALCIUM 8.4* 8.3* 8.3* 8.2*   MG 2.4  --  2.0 2.0   , CMP   Recent Labs   Lab 10/01/19  2030 10/02/19  0138 10/03/19  0340    137 136   K 3.8 3.8 3.6   CL 95 96 98   CO2 31* 31* 31*   GLU 56* 92 96   BUN 54* 49* 33*   CREATININE 2.6* 2.1* 1.2   CALCIUM 8.3* 8.3* 8.2*   PROT 6.0 5.5* 5.8*   ALBUMIN 3.4* 3.2* 3.3*   BILITOT 0.6 0.6 0.7   ALKPHOS 75 68 68   * 80* 48*   ALT 50* 45* 33   ANIONGAP 10 10 7*    ESTGFRAFRICA 19.0* 24.6* 48.3*   EGFRNONAA 16.5* 21.3* 41.9*    and CBC   Recent Labs   Lab 10/01/19  1637 10/02/19  0138 10/03/19  0340   WBC 6.27 5.41 5.08   HGB 9.8* 8.8* 9.2*   HCT 30.6* 26.9* 28.9*    119* 130*       Significant Imaging: X-Ray: CXR: X-Ray Chest 1 View (CXR): No results found for this visit on 10/01/19.    Assessment and Plan:         * AV junctional bradycardia  Temporary pacemaker placed to the right femoral.  Patient is in normal sinus rhythm of beta-blockers.  We will decrease the rate of the pacemaker to 40 if there is no pacing throughout the day most likely remove the temporary pacemaker later on today.  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    She has remained in normal sinus rhythm off metoprolol.  She can be discharged home off metoprolol and follow up as an outpatient.    Acute renal failure superimposed on stage 3 chronic kidney disease  Renal function has been improving since admission.    Anemia of chronic disease  Stable    CAD (coronary artery disease)  Elevation of troponin probably secondary to CHF as well as transient ischemia secondary to Lukasz arrhythmias.    Aortic valve stenosis, moderate  Degree of aortic stenosis remained stable    GERD (gastroesophageal reflux disease)  Stable    Hypertension  Her blood pressure is starting to go up.  We will resume lower doses of her previous medications.  If her pressure is stable today she could conceivable go home and follow up in the office on Monday.  She was instructed if her blood pressure is greater than 150 mm of mercury resume amlodipine 5 mg daily.        VTE Risk Mitigation (From admission, onward)         Ordered     enoxaparin injection 30 mg  Every 24 hours (non-standard times)      10/02/19 1616     Place COSTA hose  Until  discontinued      10/01/19 1937     IP VTE HIGH RISK PATIENT  Once      10/01/19 1937                Tera Blair MD  [facility]

## 2019-10-03 NOTE — PROGRESS NOTES
ELECTROLYTE MANAGEMENT PROGRESS NOTE    Objective:  83 y.o., female, Actual Body Weight = 45.9 kg (101 lb 3.1 oz)    The patient has the following labs:  Lab Results   Component Value Date    K 3.6 10/03/2019    MG 2.0 10/03/2019    PHOS 2.6 (L) 10/03/2019    CALCIUM 8.2 (L) 10/03/2019    ALBUMIN 3.3 (L) 10/03/2019    CREATININE 1.2 10/03/2019      Diet:  Cardiac  IV Access:  Peripheral    Assessment:  Electrolyte Deficiency:  Hypokalemia, Hypophosphatemia  Corrected calcium for low albumin = 8.76    Patient type:  Critical Care    Plan:  Potassium supplemental was ordered by Dr. Blair this morning.  Potassium chloride 40 mEq PO was given at 08:57.  Replace phosphorus with Sodium phosphate 15 mMol IVPB IVPB.    Will continue to monitor electrolytes daily.    Thank you for allowing us to participate in this patient's care.     Jeannie Contreras, Yu 10/3/2019 11:18 AM  ID Clinical Pharmacist, Ext [phone]

## 2019-10-03 NOTE — CARE UPDATE
10/02/19 2103   Patient Assessment/Suction   Level of Consciousness (AVPU) alert   Respiratory Effort Unlabored   Expansion/Accessory Muscles/Retractions no use of accessory muscles   All Lung Fields Breath Sounds clear   PRE-TX-O2   O2 Device (Oxygen Therapy) nasal cannula   $ Is the patient on Low Flow Oxygen? Yes   Flow (L/min) 3   SpO2 96 %   Pulse Oximetry Type Continuous   Pulse 69   Resp (!) 23

## 2019-10-03 NOTE — ASSESSMENT & PLAN NOTE
Likely prerenal in the setting of bradycardia and hypotension   eGFR improving   Hold diuretics and nephrotoxic medication

## 2019-10-03 NOTE — SUBJECTIVE & OBJECTIVE
Interval History: No acute overnight events. Heart rate improved now that she is off metoprolol. Temporary pacemaker removed yesterday. Blood pressure increasing; home BP meds being restarted as tolerated. Denies chest pain, SOB or lightheadedness.       Objective:     Vital Signs (Most Recent):  Temp: 98.9 °F (37.2 °C) (10/03/19 1500)  Pulse: 70 (10/03/19 1500)  Resp: 20 (10/03/19 1500)  BP: (!) 157/72 (10/03/19 1500)  SpO2: 95 % (10/03/19 1500) Vital Signs (24h Range):  Temp:  [98 °F (36.7 °C)-98.9 °F (37.2 °C)] 98.9 °F (37.2 °C)  Pulse:  [29-75] 70  Resp:  [10-32] 20  SpO2:  [83 %-100 %] 95 %  BP: (123-176)/(58-77) 157/72     Weight: 45.9 kg (101 lb 3.1 oz)  Body mass index is 21.15 kg/m².    Intake/Output Summary (Last 24 hours) at 10/3/2019 1545  Last data filed at 10/3/2019 0800  Gross per 24 hour   Intake 240 ml   Output 600 ml   Net -360 ml      Physical Exam   Constitutional: She is oriented to person, place, and time. She appears well-developed and well-nourished. No distress.   HENT:   Head: Normocephalic and atraumatic.   Mouth/Throat: Oropharynx is clear and moist.   Eyes: Pupils are equal, round, and reactive to light. EOM are normal.   Neck: Neck supple. No thyromegaly present.   Cardiovascular: Normal rate and regular rhythm.   Murmur heard.   Systolic murmur is present with a grade of 3/6.  Pulmonary/Chest: Effort normal and breath sounds normal. She has no wheezes.   Abdominal: Soft. She exhibits no distension. There is no tenderness. There is no rebound and no guarding.   Musculoskeletal: Normal range of motion.   Neurological: She is alert and oriented to person, place, and time. She has normal strength. No sensory deficit.   Skin: Skin is warm and dry. Capillary refill takes less than 2 seconds. No rash noted.   Psychiatric: She has a normal mood and affect. Her behavior is normal.        Nursing note and vitals reviewed.      Significant Labs:   CBC:   Recent Labs   Lab 10/01/19  1637  10/02/19  0138 10/03/19  0340   WBC 6.27 5.41 5.08   HGB 9.8* 8.8* 9.2*   HCT 30.6* 26.9* 28.9*    119* 130*     CMP:   Recent Labs   Lab 10/01/19  2030 10/02/19  0138 10/03/19  0340    137 136   K 3.8 3.8 3.6   CL 95 96 98   CO2 31* 31* 31*   GLU 56* 92 96   BUN 54* 49* 33*   CREATININE 2.6* 2.1* 1.2   CALCIUM 8.3* 8.3* 8.2*   PROT 6.0 5.5* 5.8*   ALBUMIN 3.4* 3.2* 3.3*   BILITOT 0.6 0.6 0.7   ALKPHOS 75 68 68   * 80* 48*   ALT 50* 45* 33   ANIONGAP 10 10 7*   EGFRNONAA 16.5* 21.3* 41.9*       Significant Imaging: I have reviewed all pertinent imaging results/findings within the past 24 hours.

## 2019-10-03 NOTE — PLAN OF CARE
10/03/19 1200   Patient Assessment/Suction   Level of Consciousness (AVPU) alert   Respiratory Effort Normal;Unlabored   Expansion/Accessory Muscles/Retractions no use of accessory muscles   All Lung Fields Breath Sounds clear   Rhythm/Pattern, Respiratory pattern regular   PRE-TX-O2   O2 Device (Oxygen Therapy) nasal cannula   $ Is the patient on Low Flow Oxygen? Yes   Flow (L/min) 3   SpO2 97 %   Pulse Oximetry Type Continuous   $ Pulse Oximetry - Multiple Charge Pulse Oximetry - Multiple   Pulse 67   BP (!) 176/76

## 2019-10-03 NOTE — ASSESSMENT & PLAN NOTE
S/p temporary pacemaker which has been since removed   Improved   Bradycardia - Likely drug induced (metoprolol in the setting of CRISTO on CKD stage 3)    Hold AV agnes blocking agents

## 2019-10-04 VITALS
TEMPERATURE: 98 F | HEART RATE: 44 BPM | DIASTOLIC BLOOD PRESSURE: 67 MMHG | BODY MASS INDEX: 19.2 KG/M2 | WEIGHT: 91.5 LBS | SYSTOLIC BLOOD PRESSURE: 162 MMHG | RESPIRATION RATE: 20 BRPM | OXYGEN SATURATION: 96 % | HEIGHT: 58 IN

## 2019-10-04 PROBLEM — R07.9 CHEST PAIN: Status: RESOLVED | Noted: 2019-10-03 | Resolved: 2019-10-04

## 2019-10-04 PROBLEM — N17.9 ACUTE RENAL FAILURE SUPERIMPOSED ON STAGE 3 CHRONIC KIDNEY DISEASE: Status: RESOLVED | Noted: 2019-10-01 | Resolved: 2019-10-04

## 2019-10-04 PROBLEM — R00.1 AV JUNCTIONAL BRADYCARDIA: Status: RESOLVED | Noted: 2019-10-01 | Resolved: 2019-10-04

## 2019-10-04 PROBLEM — N18.30 ACUTE RENAL FAILURE SUPERIMPOSED ON STAGE 3 CHRONIC KIDNEY DISEASE: Status: RESOLVED | Noted: 2019-10-01 | Resolved: 2019-10-04

## 2019-10-04 LAB
ALBUMIN SERPL BCP-MCNC: 3.1 G/DL (ref 3.5–5.2)
ALP SERPL-CCNC: 55 U/L (ref 55–135)
ALT SERPL W/O P-5'-P-CCNC: 26 U/L (ref 10–44)
ANION GAP SERPL CALC-SCNC: 9 MMOL/L (ref 8–16)
AST SERPL-CCNC: 42 U/L (ref 10–40)
BASOPHILS # BLD AUTO: 0.03 K/UL (ref 0–0.2)
BASOPHILS NFR BLD: 0.7 % (ref 0–1.9)
BILIRUB SERPL-MCNC: 0.8 MG/DL (ref 0.1–1)
BUN SERPL-MCNC: 21 MG/DL (ref 8–23)
CALCIUM SERPL-MCNC: 7.9 MG/DL (ref 8.7–10.5)
CHLORIDE SERPL-SCNC: 98 MMOL/L (ref 95–110)
CO2 SERPL-SCNC: 31 MMOL/L (ref 23–29)
CREAT SERPL-MCNC: 1 MG/DL (ref 0.5–1.4)
DIFFERENTIAL METHOD: ABNORMAL
EOSINOPHIL # BLD AUTO: 0.3 K/UL (ref 0–0.5)
EOSINOPHIL NFR BLD: 5.8 % (ref 0–8)
ERYTHROCYTE [DISTWIDTH] IN BLOOD BY AUTOMATED COUNT: 13.3 % (ref 11.5–14.5)
EST. GFR  (AFRICAN AMERICAN): >60 ML/MIN/1.73 M^2
EST. GFR  (NON AFRICAN AMERICAN): 52.2 ML/MIN/1.73 M^2
GLUCOSE SERPL-MCNC: 90 MG/DL (ref 70–110)
HCT VFR BLD AUTO: 27.8 % (ref 37–48.5)
HGB BLD-MCNC: 9 G/DL (ref 12–16)
IMM GRANULOCYTES # BLD AUTO: 0.02 K/UL (ref 0–0.04)
IMM GRANULOCYTES NFR BLD AUTO: 0.4 % (ref 0–0.5)
LYMPHOCYTES # BLD AUTO: 0.9 K/UL (ref 1–4.8)
LYMPHOCYTES NFR BLD: 19.9 % (ref 18–48)
MAGNESIUM SERPL-MCNC: 1.9 MG/DL (ref 1.6–2.6)
MCH RBC QN AUTO: 29.8 PG (ref 27–31)
MCHC RBC AUTO-ENTMCNC: 32.4 G/DL (ref 32–36)
MCV RBC AUTO: 92 FL (ref 82–98)
MONOCYTES # BLD AUTO: 0.5 K/UL (ref 0.3–1)
MONOCYTES NFR BLD: 10.6 % (ref 4–15)
NEUTROPHILS # BLD AUTO: 2.8 K/UL (ref 1.8–7.7)
NEUTROPHILS NFR BLD: 62.6 % (ref 38–73)
NRBC BLD-RTO: 0 /100 WBC
PHOSPHATE SERPL-MCNC: 2.3 MG/DL (ref 2.7–4.5)
PLATELET # BLD AUTO: 140 K/UL (ref 150–350)
PMV BLD AUTO: 10 FL (ref 9.2–12.9)
POTASSIUM SERPL-SCNC: 3.3 MMOL/L (ref 3.5–5.1)
PROT SERPL-MCNC: 5.7 G/DL (ref 6–8.4)
RBC # BLD AUTO: 3.02 M/UL (ref 4–5.4)
SODIUM SERPL-SCNC: 138 MMOL/L (ref 136–145)
WBC # BLD AUTO: 4.52 K/UL (ref 3.9–12.7)

## 2019-10-04 PROCEDURE — 25000003 PHARM REV CODE 250: Performed by: INTERNAL MEDICINE

## 2019-10-04 PROCEDURE — 63600175 PHARM REV CODE 636 W HCPCS: Performed by: INTERNAL MEDICINE

## 2019-10-04 PROCEDURE — 80053 COMPREHEN METABOLIC PANEL: CPT

## 2019-10-04 PROCEDURE — 83735 ASSAY OF MAGNESIUM: CPT

## 2019-10-04 PROCEDURE — G0008 ADMIN INFLUENZA VIRUS VAC: HCPCS | Performed by: INTERNAL MEDICINE

## 2019-10-04 PROCEDURE — 84100 ASSAY OF PHOSPHORUS: CPT

## 2019-10-04 PROCEDURE — 85025 COMPLETE CBC W/AUTO DIFF WBC: CPT

## 2019-10-04 PROCEDURE — 36415 COLL VENOUS BLD VENIPUNCTURE: CPT

## 2019-10-04 PROCEDURE — 90662 IIV NO PRSV INCREASED AG IM: CPT | Performed by: INTERNAL MEDICINE

## 2019-10-04 PROCEDURE — 25000003 PHARM REV CODE 250

## 2019-10-04 PROCEDURE — 90471 IMMUNIZATION ADMIN: CPT | Performed by: INTERNAL MEDICINE

## 2019-10-04 RX ORDER — FUROSEMIDE 40 MG/1
40 TABLET ORAL DAILY
Qty: 30 TABLET | Refills: 0 | Status: SHIPPED | OUTPATIENT
Start: 2019-10-04 | End: 2019-12-24

## 2019-10-04 RX ORDER — FERROUS GLUCONATE 324(38)MG
324 TABLET ORAL
COMMUNITY
Start: 2019-10-04 | End: 2021-08-12

## 2019-10-04 RX ORDER — POTASSIUM CHLORIDE 20 MEQ/1
20 TABLET, EXTENDED RELEASE ORAL ONCE
Status: COMPLETED | OUTPATIENT
Start: 2019-10-04 | End: 2019-10-04

## 2019-10-04 RX ORDER — FUROSEMIDE 40 MG/1
40 TABLET ORAL DAILY
Qty: 30 TABLET | Refills: 0 | Status: SHIPPED | OUTPATIENT
Start: 2019-10-04 | End: 2019-10-04 | Stop reason: SDUPTHER

## 2019-10-04 RX ORDER — POTASSIUM CHLORIDE 750 MG/1
30 CAPSULE, EXTENDED RELEASE ORAL DAILY
Status: DISCONTINUED | OUTPATIENT
Start: 2019-10-04 | End: 2019-10-04 | Stop reason: HOSPADM

## 2019-10-04 RX ADMIN — LACTOBACILLUS TAB 1 TABLET: TAB at 08:10

## 2019-10-04 RX ADMIN — POTASSIUM CHLORIDE 30 MEQ: 750 CAPSULE, EXTENDED RELEASE ORAL at 08:10

## 2019-10-04 RX ADMIN — ASPIRIN 81 MG 81 MG: 81 TABLET ORAL at 08:10

## 2019-10-04 RX ADMIN — POTASSIUM CHLORIDE 20 MEQ: 20 TABLET, EXTENDED RELEASE ORAL at 08:10

## 2019-10-04 RX ADMIN — CHLORHEXIDINE GLUCONATE 15 ML: 1.2 RINSE ORAL at 08:10

## 2019-10-04 RX ADMIN — SERTRALINE HYDROCHLORIDE 50 MG: 50 TABLET ORAL at 08:10

## 2019-10-04 RX ADMIN — SACUBITRIL AND VALSARTAN 2 TABLET: 49; 51 TABLET, FILM COATED ORAL at 11:10

## 2019-10-04 RX ADMIN — MAGNESIUM OXIDE 400 MG: 400 TABLET ORAL at 08:10

## 2019-10-04 RX ADMIN — PANTOPRAZOLE SODIUM 40 MG: 40 TABLET, DELAYED RELEASE ORAL at 06:10

## 2019-10-04 RX ADMIN — HYDROXYCHLOROQUINE SULFATE 200 MG: 200 TABLET, FILM COATED ORAL at 08:10

## 2019-10-04 RX ADMIN — FUROSEMIDE 40 MG: 40 TABLET ORAL at 08:10

## 2019-10-04 RX ADMIN — AMLODIPINE BESYLATE 5 MG: 5 TABLET ORAL at 08:10

## 2019-10-04 RX ADMIN — INFLUENZA A VIRUS A/MICHIGAN/45/2015 X-275 (H1N1) ANTIGEN (FORMALDEHYDE INACTIVATED), INFLUENZA A VIRUS A/SINGAPORE/INFIMH-16-0019/2016 IVR-186 (H3N2) ANTIGEN (FORMALDEHYDE INACTIVATED), AND INFLUENZA B VIRUS B/MARYLAND/15/2016 BX-69A (A B/COLORADO/6/2017-LIKE VIRUS) ANTIGEN (FORMALDEHYDE INACTIVATED) 0.5 ML: 60; 60; 60 INJECTION, SUSPENSION INTRAMUSCULAR at 12:10

## 2019-10-04 RX ADMIN — FERROUS SULFATE TAB 325 MG (65 MG ELEMENTAL FE) 325 MG: 325 (65 FE) TAB at 08:10

## 2019-10-04 NOTE — ASSESSMENT & PLAN NOTE
Temporary pacemaker placed to the right femoral.  Patient is in normal sinus rhythm of beta-blockers.  We will decrease the rate of the pacemaker to 40 if there is no pacing throughout the day most likely remove the temporary pacemaker later on today.  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    She has remained in normal sinus rhythm off metoprolol.  She can be discharged home off metoprolol and follow up as an outpatient.

## 2019-10-04 NOTE — PROGRESS NOTES
[facility]  Cardiology  Progress Note    Patient Name: Cheyanne Gomes  MRN: [id number]  Admission Date: 10/1/2019  Hospital Length of Stay: 3 days  Code Status: Full Code   Attending Physician: Octavio Evans MD   Primary Care Physician: Romeo Alas MD  Expected Discharge Date:   Principal Problem:AV junctional bradycardia    Subjective:     Hospital Course:   She was treated with IV atropine, IV fluids, with some improvement of her symptoms.  She remained hypotensive bradycardic and was decided to bring her to the cardiac catheterization laboratory for pacemaker implant.  She was started on dopamine.  By the time she arrived to the cath lab her heart rate was approximately 57 beats per minute pressure in the low 100s.  A temporary pacemaker was placed through the right femoral vein.    Interval History:  She denies any shortness of breath any chest pains.  No sensation of weakness.  Monitor continues to be normal sinus rhythm. Blood pressure is mildly elevated will increase the doses of entresto    Review of Systems   Constitution: Negative. Negative for fever and weight gain.   Cardiovascular: Negative.  Negative for chest pain and leg swelling.   Respiratory: Negative.  Negative for cough and shortness of breath.    Skin: Negative.  Negative for flushing and rash.   Musculoskeletal: Negative.  Negative for back pain and muscle cramps.   Gastrointestinal: Negative.  Negative for abdominal pain and constipation.   Neurological: Negative.  Negative for dizziness, light-headedness and weakness.   Psychiatric/Behavioral: Negative.  Negative for altered mental status and hallucinations.     Objective:     Vital Signs (Most Recent):  Temp: 98.1 °F (36.7 °C) (10/04/19 0754)  Pulse: 69 (10/04/19 0754)  Resp: 20 (10/04/19 0754)  BP: (!) 179/74 (10/04/19 0754)  SpO2: (!) 92 % (10/04/19 0300) Vital Signs (24h Range):  Temp:  [97.9 °F (36.6 °C)-98.9 °F (37.2 °C)] 98.1 °F (36.7 °C)  Pulse:  [61-82]  69  Resp:  [18-20] 20  SpO2:  [91 %-99 %] 92 %  BP: (132-179)/(2-76) 179/74     Weight: 41.5 kg (91 lb 7.9 oz)  Body mass index is 19.12 kg/m².     SpO2: (!) 92 %  O2 Device (Oxygen Therapy): room air      Intake/Output Summary (Last 24 hours) at 10/4/2019 0829  Last data filed at 10/4/2019 0600  Gross per 24 hour   Intake 720 ml   Output --   Net 720 ml       Lines/Drains/Airways     Peripheral Intravenous Line                 Peripheral IV - Single Lumen 10/01/19 1800 20 G Anterior;Right Forearm 2 days         Peripheral IV - Single Lumen 10/01/19 1806 18 G Left Antecubital 2 days                Physical Exam   Constitutional: She is oriented to person, place, and time. She appears well-developed and well-nourished.   HENT:   Head: Normocephalic and atraumatic.   Eyes: Pupils are equal, round, and reactive to light. Conjunctivae and EOM are normal.   Cardiovascular: Normal rate, regular rhythm and normal heart sounds.   Murmur: Grade 4/6 systolic murmur at the base.  Pulmonary/Chest: Effort normal and breath sounds normal. Rales: Minimal basal rales.   Abdominal: Soft. Bowel sounds are normal.   Musculoskeletal: Normal range of motion. Edema: No edema.   Neurological: She is alert and oriented to person, place, and time.   Skin: Skin is warm and dry.   Psychiatric: She has a normal mood and affect. Her behavior is normal. Judgment and thought content normal.   Nursing note and vitals reviewed.      Significant Labs:   BMP:   Recent Labs   Lab 10/03/19  0340 10/04/19  0445   GLU 96 90    138   K 3.6 3.3*   CL 98 98   CO2 31* 31*   BUN 33* 21   CREATININE 1.2 1.0   CALCIUM 8.2* 7.9*   MG 2.0 1.9   , CMP   Recent Labs   Lab 10/03/19  0340 10/04/19  0445    138   K 3.6 3.3*   CL 98 98   CO2 31* 31*   GLU 96 90   BUN 33* 21   CREATININE 1.2 1.0   CALCIUM 8.2* 7.9*   PROT 5.8* 5.7*   ALBUMIN 3.3* 3.1*   BILITOT 0.7 0.8   ALKPHOS 68 55   AST 48* 42*   ALT 33 26   ANIONGAP 7* 9   ESTGFRAFRICA 48.3* >60.0    EGFRNONAA 41.9* 52.2*    and CBC   Recent Labs   Lab 10/03/19  0340 10/04/19  0445   WBC 5.08 4.52   HGB 9.2* 9.0*   HCT 28.9* 27.8*   * 140*       Significant Imaging: X-Ray: CXR: X-Ray Chest 1 View (CXR): No results found for this visit on 10/01/19.    Assessment and Plan:     * AV junctional bradycardia  Temporary pacemaker placed to the right femoral.  Patient is in normal sinus rhythm of beta-blockers.  We will decrease the rate of the pacemaker to 40 if there is no pacing throughout the day most likely remove the temporary pacemaker later on today.  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    She has remained in normal sinus rhythm off metoprolol.  She can be discharged home off metoprolol and follow up as an outpatient.    Acute renal failure superimposed on stage 3 chronic kidney disease  Renal function has been improving since admission.    Anemia of chronic disease  Stable    CAD (coronary artery disease)  Elevation of troponin probably secondary to CHF as well as transient ischemia secondary to Lukasz arrhythmias.    Aortic valve stenosis, moderate  Degree of aortic stenosis remained stable    GERD (gastroesophageal reflux disease)  Stable    Hypertension  Her blood pressure is starting to go up.  We will resume lower doses of her previous medications.  If her pressure is stable today she could conceivable go home and follow up in the office on Monday.  She was instructed if her blood pressure is greater than 150 mm of mercury resume amlodipine 5 mg daily.  Blood pressure remains elevated we will increase the doses of entresto    Hypokalemia  Will replace potassium orally.  Blood work will be done on Monday and follow up in the office        VTE Risk Mitigation (From admission, onward)         Ordered     enoxaparin  injection 30 mg  Every 24 hours (non-standard times)      10/02/19 1616     Place COSTA hose  Until discontinued      10/01/19 1937     IP VTE HIGH RISK PATIENT  Once      10/01/19 1937                Tera Blair MD  Cardiology  [facility]

## 2019-10-04 NOTE — PLAN OF CARE
10/04/19 1330   Final Note   Assessment Type Final Discharge Note   Anticipated Discharge Disposition Home

## 2019-10-04 NOTE — PROGRESS NOTES
ELECTROLYTE MANAGEMENT PROGRESS NOTE    Objective:  83 y.o., female, Actual Body Weight = 41.5 kg (91 lb 7.9 oz)    Recent Labs     10/03/19  0340 10/04/19  0445   K 3.6 3.3*   MG 2.0 1.9   PHOS 2.6* 2.3*   CALCIUM 8.2* 7.9*   ALBUMIN 3.3* 3.1*   CREATININE 1.2 1.0      Received Potassium chloride 40 mEq oral tablets 10/03/19 at 08:57.  Received Potassium chloride 30 mEq oral capsules today, 10/04/19 at 08:49.    Diet:  Cariac  IV Access:  Peripheral    Assessment:  Electrolyte Deficiency:  Hypokalemia  Calcium corrected for low albumin = 8.6    Patient type:  Acute Care (not in an ICU)    Plan:  Will recheck serum potassium again this evening for additional replacement.     Will continue to monitor electrolytes daily.    Thank you for allowing us to participate in this patient's care.     Jeannie Contreras, PharmD 10/4/2019 9:34 AM  ID Clinical Pharmacist, Ext [phone]

## 2019-10-04 NOTE — NURSING
D/c instructions reviewed with pt. Pt verbalized understanding of information provided. Pt transported downstairs via wheelchair to private vehicle without incident

## 2019-10-04 NOTE — SUBJECTIVE & OBJECTIVE
Interval History:  She denies any shortness of breath any chest pains.  No sensation of weakness.  Monitor continues to be normal sinus rhythm. Blood pressure is mildly elevated will increase the doses of entresto    Review of Systems   Constitution: Negative. Negative for fever and weight gain.   Cardiovascular: Negative.  Negative for chest pain and leg swelling.   Respiratory: Negative.  Negative for cough and shortness of breath.    Skin: Negative.  Negative for flushing and rash.   Musculoskeletal: Negative.  Negative for back pain and muscle cramps.   Gastrointestinal: Negative.  Negative for abdominal pain and constipation.   Neurological: Negative.  Negative for dizziness, light-headedness and weakness.   Psychiatric/Behavioral: Negative.  Negative for altered mental status and hallucinations.     Objective:     Vital Signs (Most Recent):  Temp: 98.1 °F (36.7 °C) (10/04/19 0754)  Pulse: 69 (10/04/19 0754)  Resp: 20 (10/04/19 0754)  BP: (!) 179/74 (10/04/19 0754)  SpO2: (!) 92 % (10/04/19 0300) Vital Signs (24h Range):  Temp:  [97.9 °F (36.6 °C)-98.9 °F (37.2 °C)] 98.1 °F (36.7 °C)  Pulse:  [61-82] 69  Resp:  [18-20] 20  SpO2:  [91 %-99 %] 92 %  BP: (132-179)/(2-76) 179/74     Weight: 41.5 kg (91 lb 7.9 oz)  Body mass index is 19.12 kg/m².     SpO2: (!) 92 %  O2 Device (Oxygen Therapy): room air      Intake/Output Summary (Last 24 hours) at 10/4/2019 0829  Last data filed at 10/4/2019 0600  Gross per 24 hour   Intake 720 ml   Output --   Net 720 ml       Lines/Drains/Airways     Peripheral Intravenous Line                 Peripheral IV - Single Lumen 10/01/19 1800 20 G Anterior;Right Forearm 2 days         Peripheral IV - Single Lumen 10/01/19 1806 18 G Left Antecubital 2 days                Physical Exam   Constitutional: She is oriented to person, place, and time. She appears well-developed and well-nourished.   HENT:   Head: Normocephalic and atraumatic.   Eyes: Pupils are equal, round, and reactive to  light. Conjunctivae and EOM are normal.   Cardiovascular: Normal rate, regular rhythm and normal heart sounds.   Murmur: Grade 4/6 systolic murmur at the base.  Pulmonary/Chest: Effort normal and breath sounds normal. Rales: Minimal basal rales.   Abdominal: Soft. Bowel sounds are normal.   Musculoskeletal: Normal range of motion. Edema: No edema.   Neurological: She is alert and oriented to person, place, and time.   Skin: Skin is warm and dry.   Psychiatric: She has a normal mood and affect. Her behavior is normal. Judgment and thought content normal.   Nursing note and vitals reviewed.      Significant Labs:   BMP:   Recent Labs   Lab 10/03/19  0340 10/04/19  0445   GLU 96 90    138   K 3.6 3.3*   CL 98 98   CO2 31* 31*   BUN 33* 21   CREATININE 1.2 1.0   CALCIUM 8.2* 7.9*   MG 2.0 1.9   , CMP   Recent Labs   Lab 10/03/19  0340 10/04/19  0445    138   K 3.6 3.3*   CL 98 98   CO2 31* 31*   GLU 96 90   BUN 33* 21   CREATININE 1.2 1.0   CALCIUM 8.2* 7.9*   PROT 5.8* 5.7*   ALBUMIN 3.3* 3.1*   BILITOT 0.7 0.8   ALKPHOS 68 55   AST 48* 42*   ALT 33 26   ANIONGAP 7* 9   ESTGFRAFRICA 48.3* >60.0   EGFRNONAA 41.9* 52.2*    and CBC   Recent Labs   Lab 10/03/19  0340 10/04/19  0445   WBC 5.08 4.52   HGB 9.2* 9.0*   HCT 28.9* 27.8*   * 140*       Significant Imaging: X-Ray: CXR: X-Ray Chest 1 View (CXR): No results found for this visit on 10/01/19.

## 2019-10-04 NOTE — ASSESSMENT & PLAN NOTE
Her blood pressure is starting to go up.  We will resume lower doses of her previous medications.  If her pressure is stable today she could conceivable go home and follow up in the office on Monday.  She was instructed if her blood pressure is greater than 150 mm of mercury resume amlodipine 5 mg daily.  Blood pressure remains elevated we will increase the doses of entresto

## 2019-10-05 NOTE — DISCHARGE SUMMARY
[facility] Medicine  Discharge Summary      Patient Name: Cheyanne Gomes  MRN: [id number]  Admission Date: 10/1/2019  Hospital Length of Stay: 3 days  Discharge Date and Time: 10/4/2019  1:52 PM  Attending Physician: No att. providers found   Discharging Provider: Octavio Evans MD  Primary Care Provider: Romeo Alas MD      HPI:   83 year old female with PMHx Chronic SHF 45%, CAD s/p Hx of CABG, HTN, HLD, RA presented to the ED with bradycardia.  I am seeing patient after she went to the cath lab and she is currently sedated from the procedure and is unable to provide history. History obtained from chart and Dr. Blair. Patient was recently admitted for elevated troponin.  She was also noted to have HR in the 40s at times and possible acute on chronic HF.  Dr. Ohara saw her, recommended optimization of volume status and then possible stress test. Patient declined further workup stating she felt better and would like to see Dr. Blair in clinic.  Today, she went to work but felt weak, dizzy, nauseated.  Family noted that her pulse rate was low.  Per Dr. Blair, she was found to have a HR in the 20s as well as had a 5 second pause.  She was given Atropine.  Patient was noted to be hypotensive with SBP in the 70s and then started on Dopamine gtt.  Patient was brought to the cath lab and a temporary pacer was put in.  In the cath lab, she was found to actually be on her own rhythm and thus Dr. Connor has the temporary pacemaker as a back up.  Patient is on Metoprolol at home.    Procedure(s) (LRB):  Insertion, Pacemaker, Temporary Transvenous (N/A)      Hospital Course:   Patient is 83-year-old  female with known history of systolic congestive heart failure, CAD status post CABG, benign essential hypertension, hyperlipidemia and rheumatoid arthritis presented to the ED with chief complaint of chest pain, shortness of breath and generalized weakness.  On admission  she was noted to have bradycardia with the lowest heart rate in the 20s as well as a 5 sec pause.  She was given atropine.  Underwent emergent placement of temporary pacemaker and was initiated on dopamine drip for hypotension.  On admission patient was also noted to be in acute renal failure; likely prerenal secondary to hypotension.  Bradycardia is thought to be likely secondary to metoprolol in the setting of acute renal failure.  With improvement in renal function her bradycardia resolved likely due to clearance of metoprolol.  Home antihypertensive regimen has been adjusted as outlined below.  Advised to avoid AV agnes blocking agents.  Patient had improvement in symptoms and no further episodes of bradycardia and has been deemed medically stable to be discharged home.  Advised to follow up with Cardiology within 7-10 days with BNP and BMP; for order provided.    Instructions provided to follow up with primary care physician as outpatient. Patient verbalized understanding and is aware to contact primary care physician or return to ED if new or worsening symptoms.    Physical exam on the day of discharge:  General: Patient resting comfortably in no acute distress.  Lungs: CTA. Good air entry.  Cor: Regular rate and rhythm. No murmurs. No pedal edema.  Abd: Soft. Nontender. Non-distended.  Neuro: A&O x3. Moving all 4 extremities equally  Ext: No clubbing. No cyanosis.          Consults:     No new Assessment & Plan notes have been filed under this hospital service since the last note was generated.  Service: Hospital Medicine    Final Active Diagnoses:    Diagnosis Date Noted POA    Anemia of chronic disease [D63.8] 06/07/2017 Yes     Chronic    Hyperlipidemia [E78.5] 01/26/2017 Yes    CAD (coronary artery disease) [I25.10]  Yes    Aortic valve stenosis, moderate [I35.0]  Yes    GERD (gastroesophageal reflux disease) [K21.9]  Yes    Hypertension [I10]  Yes    Rheumatoid arthritis [M06.9] 01/25/2013 Yes     Sjogren's syndrome [M35.00] 02/09/2012 Yes      Problems Resolved During this Admission:    Diagnosis Date Noted Date Resolved POA    PRINCIPAL PROBLEM:  AV junctional bradycardia [R00.1] 10/01/2019 10/04/2019 Yes    Acute renal failure superimposed on stage 3 chronic kidney disease [N17.9, N18.3] 10/01/2019 10/04/2019 Yes    Chest pain [R07.9] 10/03/2019 10/04/2019 Yes    Cardiogenic shock [R57.0] 10/01/2019 10/02/2019 Yes    Hyperkalemia [E87.5] 10/01/2019 10/02/2019 Yes    Hyponatremia [E87.1] 09/21/2013 10/02/2019 Yes    Hypokalemia [E87.6] 09/20/2013 10/04/2019 Yes       Discharged Condition: fair    Disposition: Home or Self Care    Follow Up:  Follow-up Information     Romeo Alas MD In 2 weeks.    Specialty:  Family Medicine  Why:  As needed - hospital discharge follow-up   Contact information:  [address] [facility] [address]  [phone]             Tera Blair MD. Go in 4 days.    Specialties:  Cardiovascular Disease, Cardiology  Why:  Cardiology follow-up   Contact information:  [address] [facility]  [address]  [place] [address]  [phone]                 Patient Instructions:      Diet Cardiac     Notify your health care provider if you experience any of the following:  temperature >100.4     Notify your health care provider if you experience any of the following:  persistent nausea and vomiting or diarrhea     Notify your health care provider if you experience any of the following:  persistent dizziness, light-headedness, or visual disturbances     Activity as tolerated       Significant Diagnostic Studies: Labs: All labs within the past 24 hours have been reviewed    Pending Diagnostic Studies:     None         Medications:  Reconciled Home Medications:      Medication List      START taking these medications    sacubitril-valsartan 49-51 mg per tablet  Commonly known as:  ENTRESTO  Take 1 tablet by mouth 2 (two) times daily.  Replaces:  ENTRESTO  mg  per tablet        CHANGE how you take these medications    ferrous gluconate 324 MG tablet  Commonly known as:  FERGON  Take 1 tablet (324 mg total) by mouth daily with breakfast. In the am and at noon  What changed:  when to take this     fluticasone propionate 50 mcg/actuation nasal spray  Commonly known as:  FLONASE  2 sprays by Each Nare route once daily.  What changed:    · when to take this  · reasons to take this     furosemide 40 MG tablet  Commonly known as:  LASIX  Take 1 tablet (40 mg total) by mouth once daily.  What changed:  how much to take     metOLazone 2.5 MG tablet  Commonly known as:  ZAROXOLYN  Take 1 tablet (2.5 mg total) by mouth 3 (three) times a week.  What changed:  additional instructions        CONTINUE taking these medications    amLODIPine 5 MG tablet  Commonly known as:  NORVASC  Take 5 mg by mouth once daily.     aspirin 81 mg Tab  Take 1 tablet by mouth every evening. Every day     biotin 5 mg Cap  Take 1 tablet by mouth 2 (two) times daily.     CALCIUM-VITAMIN D ORAL  Take 600 mg by mouth 2 (two) times daily. Calcium 1200 with D 3 1000     coenzyme Q10 100 mg capsule  Take 100 mg by mouth once daily.     CRANBERRY PLUS VITAMIN C ORAL  Take 1 capsule by mouth once daily.     DEXILANT 60 mg capsule  Generic drug:  dexlansoprazole  Take 60 mg by mouth once daily.     gabapentin 100 MG capsule  Commonly known as:  NEURONTIN  Take 1 capsule (100 mg total) by mouth every evening.     hydroxychloroquine 200 mg tablet  Commonly known as:  PLAQUENIL  Take 1 tablet (200 mg total) by mouth once daily.     isosorbide dinitrate 10 MG tablet  Commonly known as:  ISORDIL  Take 10 mg by mouth 3 (three) times daily.     KRILL -95-62-50 mg Cap  Generic drug:  krill-omega-3-dha-epa-lipids  Take 1,000 mg by mouth once daily. Braxton krill 300mg qd     magnesium oxide 400 mg (241.3 mg magnesium) tablet  Commonly known as:  MAG-OX  Take 400 mg by mouth 2 (two) times daily.     NITROLINGUAL 400  mcg/spray spray  Generic drug:  nitroGLYCERIN 0.4 MG/DOSE TL SPRY  Place 1 spray under the tongue every 5 (five) minutes as needed. PRN     ondansetron 4 MG tablet  Commonly known as:  ZOFRAN  Take 4 mg by mouth every 8 (eight) hours as needed for Nausea.     ONE-A-DAY 50 PLUS tablet  Generic drug:  geriatric multivitamin-min  Take 1 tablet by mouth once daily. Every day     * potassium chloride SA 10 MEQ tablet  Commonly known as:  K-DUR,KLOR-CON  Take 10 mEq by mouth every evening.     * potassium chloride SA 10 MEQ tablet  Commonly known as:  K-DUR,KLOR-CON  Take 20 mEq by mouth once daily.     PROBIOTIC 10 billion cell Cap  Generic drug:  Lactobacillus acidophilus  Take 1 each by mouth once daily. 1.5 billion     promethazine 25 MG tablet  Commonly known as:  PHENERGAN  Take 1 tablet (25 mg total) by mouth 2 (two) times daily as needed for Nausea.     PROTONIX 40 MG tablet  Generic drug:  pantoprazole  Take 40 mg by mouth once daily.     sertraline 50 MG tablet  Commonly known as:  ZOLOFT  Take 50 mg by mouth once daily.     STOOL SOFTENER ORAL  Take 200 mg by mouth every evening.     SUPER B COMPLEX ORAL  Take 1 tablet by mouth once daily.     temazepam 7.5 MG Cap  Commonly known as:  RESTORIL  Take 15 mg by mouth nightly as needed.     traMADol 50 mg tablet  Commonly known as:  ULTRAM  Take 50 mg by mouth 2 (two) times daily as needed for Pain. 1-2 tabs     vitamin E 400 unit Tab  Take 400 mg by mouth once daily. Every day         * This list has 2 medication(s) that are the same as other medications prescribed for you. Read the directions carefully, and ask your doctor or other care provider to review them with you.            STOP taking these medications    ENTRESTO  mg per tablet  Generic drug:  sacubitril-valsartan  Replaced by:  sacubitril-valsartan 49-51 mg per tablet     metoprolol succinate 25 MG 24 hr tablet  Commonly known as:  TOPROL-XL            Indwelling Lines/Drains at time of discharge:    Lines/Drains/Airways     None                 Time spent on the discharge of patient: 35 minutes  Patient was seen and examined on the date of discharge and determined to be suitable for discharge.         Octavio Evans MD  Department of Hospital Medicine  [facility]

## 2019-10-05 NOTE — HOSPITAL COURSE
Patient is 83-year-old  female with known history of systolic congestive heart failure, CAD status post CABG, benign essential hypertension, hyperlipidemia and rheumatoid arthritis presented to the ED with chief complaint of chest pain, shortness of breath and generalized weakness.  On admission she was noted to have bradycardia with the lowest heart rate in the 20s as well as a 5 sec pause.  She was given atropine.  Underwent emergent placement of temporary pacemaker and was initiated on dopamine drip for hypotension.  On admission patient was also noted to be in acute renal failure; likely prerenal secondary to hypotension.  Bradycardia is thought to be likely secondary to metoprolol in the setting of acute renal failure.  With improvement in renal function her bradycardia resolved likely due to clearance of metoprolol.  Home antihypertensive regimen has been adjusted as outlined below.  Advised to avoid AV agnes blocking agents.  Patient had improvement in symptoms and no further episodes of bradycardia and has been deemed medically stable to be discharged home.  Advised to follow up with Cardiology within 7-10 days with BNP and BMP; for order provided.    Instructions provided to follow up with primary care physician as outpatient. Patient verbalized understanding and is aware to contact primary care physician or return to ED if new or worsening symptoms.    Physical exam on the day of discharge:  General: Patient resting comfortably in no acute distress.  Lungs: CTA. Good air entry.  Cor: Regular rate and rhythm. No murmurs. No pedal edema.  Abd: Soft. Nontender. Non-distended.  Neuro: A&O x3. Moving all 4 extremities equally  Ext: No clubbing. No cyanosis.

## 2019-10-07 ENCOUNTER — LAB VISIT (OUTPATIENT)
Dept: LAB | Facility: HOSPITAL | Age: 84
End: 2019-10-07
Attending: INTERNAL MEDICINE
Payer: MEDICARE

## 2019-10-07 DIAGNOSIS — D64.9 ANEMIA: ICD-10-CM

## 2019-10-07 DIAGNOSIS — I50.9 CHF (CONGESTIVE HEART FAILURE): Primary | ICD-10-CM

## 2019-10-07 LAB
ANION GAP SERPL CALC-SCNC: 10 MMOL/L (ref 8–16)
BNP SERPL-MCNC: 1839 PG/ML (ref 0–99)
BUN SERPL-MCNC: 26 MG/DL (ref 8–23)
CALCIUM SERPL-MCNC: 9.5 MG/DL (ref 8.7–10.5)
CHLORIDE SERPL-SCNC: 98 MMOL/L (ref 95–110)
CO2 SERPL-SCNC: 29 MMOL/L (ref 23–29)
CREAT SERPL-MCNC: 1.2 MG/DL (ref 0.5–1.4)
ERYTHROCYTE [DISTWIDTH] IN BLOOD BY AUTOMATED COUNT: 13.4 % (ref 11.5–14.5)
EST. GFR  (AFRICAN AMERICAN): 48.3 ML/MIN/1.73 M^2
EST. GFR  (NON AFRICAN AMERICAN): 41.9 ML/MIN/1.73 M^2
GLUCOSE SERPL-MCNC: 121 MG/DL (ref 70–110)
HCT VFR BLD AUTO: 27.4 % (ref 37–48.5)
HGB BLD-MCNC: 8.6 G/DL (ref 12–16)
MCH RBC QN AUTO: 29.6 PG (ref 27–31)
MCHC RBC AUTO-ENTMCNC: 31.4 G/DL (ref 32–36)
MCV RBC AUTO: 94 FL (ref 82–98)
PLATELET # BLD AUTO: 190 K/UL (ref 150–350)
PLATELET BLD QL SMEAR: NORMAL
PMV BLD AUTO: 9.8 FL (ref 9.2–12.9)
POTASSIUM SERPL-SCNC: 4.4 MMOL/L (ref 3.5–5.1)
RBC # BLD AUTO: 2.91 M/UL (ref 4–5.4)
SODIUM SERPL-SCNC: 137 MMOL/L (ref 136–145)
WBC # BLD AUTO: 5.27 K/UL (ref 3.9–12.7)

## 2019-10-07 PROCEDURE — 83880 ASSAY OF NATRIURETIC PEPTIDE: CPT

## 2019-10-07 PROCEDURE — 36415 COLL VENOUS BLD VENIPUNCTURE: CPT

## 2019-10-07 PROCEDURE — 85027 COMPLETE CBC AUTOMATED: CPT

## 2019-10-07 PROCEDURE — 80048 BASIC METABOLIC PNL TOTAL CA: CPT

## 2019-10-10 ENCOUNTER — OFFICE VISIT (OUTPATIENT)
Dept: FAMILY MEDICINE | Facility: CLINIC | Age: 84
End: 2019-10-10
Attending: FAMILY MEDICINE
Payer: MEDICARE

## 2019-10-10 VITALS
OXYGEN SATURATION: 98 % | TEMPERATURE: 98 F | SYSTOLIC BLOOD PRESSURE: 130 MMHG | DIASTOLIC BLOOD PRESSURE: 60 MMHG | BODY MASS INDEX: 20.03 KG/M2 | HEART RATE: 64 BPM | WEIGHT: 95.44 LBS | HEIGHT: 58 IN

## 2019-10-10 DIAGNOSIS — E78.5 HYPERLIPIDEMIA, UNSPECIFIED HYPERLIPIDEMIA TYPE: ICD-10-CM

## 2019-10-10 DIAGNOSIS — N18.30 CKD (CHRONIC KIDNEY DISEASE) STAGE 3, GFR 30-59 ML/MIN: ICD-10-CM

## 2019-10-10 DIAGNOSIS — I10 HYPERTENSION, UNSPECIFIED TYPE: ICD-10-CM

## 2019-10-10 DIAGNOSIS — I50.23 ACUTE ON CHRONIC SYSTOLIC HEART FAILURE: Primary | ICD-10-CM

## 2019-10-10 DIAGNOSIS — N17.9 AKI (ACUTE KIDNEY INJURY): ICD-10-CM

## 2019-10-10 DIAGNOSIS — I25.10 CORONARY ARTERY DISEASE, ANGINA PRESENCE UNSPECIFIED, UNSPECIFIED VESSEL OR LESION TYPE, UNSPECIFIED WHETHER NATIVE OR TRANSPLANTED HEART: ICD-10-CM

## 2019-10-10 DIAGNOSIS — I35.0 AORTIC VALVE STENOSIS, MODERATE: ICD-10-CM

## 2019-10-10 PROBLEM — S72.351A DISPLACED COMMINUTED FRACTURE OF SHAFT OF RIGHT FEMUR, INITIAL ENCOUNTER FOR CLOSED FRACTURE: Status: RESOLVED | Noted: 2018-10-08 | Resolved: 2019-10-10

## 2019-10-10 PROCEDURE — 99213 OFFICE O/P EST LOW 20 MIN: CPT | Mod: PBBFAC,PO | Performed by: FAMILY MEDICINE

## 2019-10-10 PROCEDURE — 99214 PR OFFICE/OUTPT VISIT, EST, LEVL IV, 30-39 MIN: ICD-10-PCS | Mod: S$PBB,,, | Performed by: FAMILY MEDICINE

## 2019-10-10 PROCEDURE — 99999 PR PBB SHADOW E&M-EST. PATIENT-LVL III: CPT | Mod: PBBFAC,,, | Performed by: FAMILY MEDICINE

## 2019-10-10 PROCEDURE — 99214 OFFICE O/P EST MOD 30 MIN: CPT | Mod: S$PBB,,, | Performed by: FAMILY MEDICINE

## 2019-10-10 PROCEDURE — 99999 PR PBB SHADOW E&M-EST. PATIENT-LVL III: ICD-10-PCS | Mod: PBBFAC,,, | Performed by: FAMILY MEDICINE

## 2019-10-10 NOTE — PROGRESS NOTES
Subjective:       Patient ID: Cheyanne Gomes is a 83 y.o. female.    Chief Complaint: Follow-up    83-year-old female comes in for hospital follow-up from several recent hospital visits.  She was seen in the emergency room at [facility] in [facility] with shortness of breath and was found to be in acute congestive heart failure.  She was transferred to [facility] where she was admitted from September 27 to September 29 with acute on chronic systolic heart failure resolved past diuresis and an ejection fraction between 35 and 40%.  The patient was in her a to be discharged and was discharged to follow up with Dr. Blair as an outpatient with plans to perform a chemical stress test but she declined to do the follow-up and presented to the hospital at [facility] on October 1 and was admitted through October 4 with heart rate in the 20s and 5 sec pauses.  She was given atropine and taken to the cath lab for a temporary pacemaker using dopamine to support her low blood pressure with a systolic in the 70s.  The temporary pacemaker was placed but she responded to treatment and it actually did not have to be used.  Medications were adjusted she was taken off of her beta-blocker and in the process she developed acute renal failure with the GFR going down to 15 presume secondary to decreased blood flow and low blood pressure.  GFR has returned to her normal 50s now and she is feeling well.  She has had follow-up lab with Dr. Blair and results have returned to her baseline.  She has chronic anemia with current hemoglobin of 8.6 and was scheduled to have an upper endoscopy next week with Dr. Seals but that has been postponed due to her recent severe illness.  Today she denies any shortness of breath, she has no orthopnea sleeping with one pillow only, she has no chest pain no nausea or vomiting and no bowel changes.  She does take iron and has melanotic stool secondary to that but has not seen any gross blood.    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  No date: Rheumatoid arthritis  2017: Shingles  No date: Sjogren syndrome  No date: Ulcerative colitis    Past Surgical History:  No date: APPENDECTOMY  No date: BACK SURGERY  No date: CARDIAC SURGERY      Comment:  CABGx3 in   No date: CATARACT EXTRACTION      Comment:  ou od d 11-15-12//  No date:  SECTION, CLASSIC      Comment:  x 2  No date: CHOLECYSTECTOMY  09/15/2011: COLONOSCOPY      Comment:  Dr Anaya   01/10/2017: COLONOSCOPY      Comment:  [facility] report sent to scanning  No date: CORONARY ANGIOPLASTY      Comment:  PCI x 1 in   No date: CORONARY ARTERY BYPASS GRAFT      Comment:  3 vessel  No date: CORONARY ARTERY BYPASS GRAFT  No date: eyes      Comment:  rk ou  2016: FRACTURE SURGERY      Comment:  Dr Guido left hip   2018: FRACTURE SURGERY      Comment:  Right Hip  No date: HYSTERECTOMY      Comment:  tubal ligation, oopherectomy  10/8/2018: INTRAMEDULLARY RODDING OF TROCHANTER OF FEMUR; Right      Comment:  Procedure: INSERTION, INTRAMEDULLARY KELSI, FEMUR,                TROCHANTER;  Surgeon: Valerio Luther MD;  Location: [facility];               Service: Orthopedics;  Laterality: Right;  No date: OOPHORECTOMY  2013: SPINE SURGERY      Comment:  Silvino Mckeon  No date: UPPER GASTROINTESTINAL  ENDOSCOPY  9/25/14: Yag Capsulotomy; Right    Current Outpatient Medications on File Prior to Visit:  amLODIPine (NORVASC) 5 MG tablet, Take 5 mg by mouth once daily. , Disp: , Rfl: 5  aspirin 81 mg Tab, Take 1 tablet by mouth every evening. Every day, Disp: , Rfl:   biotin 5 mg Cap, Take 1 tablet by mouth 2 (two) times daily., Disp: , Rfl:   CALCIUM CARB/VIT D3/MINERALS (CALCIUM-VITAMIN D ORAL), Take 600 mg by mouth 2 (two) times daily. Calcium 1200 with D 3 1000, Disp: , Rfl:   coenzyme Q10 100 mg capsule, Take 100 mg by mouth once daily., Disp: , Rfl:   CRANBERRY CONC/ASCORBIC ACID (CRANBERRY PLUS VITAMIN C ORAL), Take 1 capsule by mouth once daily., Disp: , Rfl:   DEXILANT 60 mg capsule, Take 60 mg by mouth once daily. , Disp: , Rfl: 11  DOCUSATE CALCIUM (STOOL SOFTENER ORAL), Take 200 mg by mouth every evening. , Disp: , Rfl:   FERROUS FUMARATE/VIT BCOMP,C (SUPER B COMPLEX ORAL), Take 1 tablet by mouth once daily., Disp: , Rfl:   ferrous gluconate (FERGON) 324 MG tablet, Take 1 tablet (324 mg total) by mouth daily with breakfast. In the am and at noon, Disp: , Rfl:   fluticasone (FLONASE) 50 mcg/actuation nasal spray, 2 sprays by Each Nare route once daily. (Patient taking differently: 2 sprays by Each Nare route daily as needed. ), Disp: 16 g, Rfl: 0  furosemide (LASIX) 40 MG tablet, Take 1 tablet (40 mg total) by mouth once daily., Disp: 30 tablet, Rfl: 0  gabapentin (NEURONTIN) 100 MG capsule, Take 1 capsule (100 mg total) by mouth every evening., Disp: 30 capsule, Rfl: 3  hydroxychloroquine (PLAQUENIL) 200 mg tablet, Take 1 tablet (200 mg total) by mouth once daily., Disp: 30 tablet, Rfl: 3  isosorbide dinitrate (ISORDIL) 10 MG tablet, Take 10 mg by mouth 3 (three) times daily., Disp: , Rfl: 5  krill-omega-3-dha-epa-lipids (KRILL OIL) 801-91-06-50 mg Cap, Take 1,000 mg by mouth once daily. Braxton krill 300mg qd , Disp: , Rfl:   lactobacillus acidophilus (PROBIOTIC) 10 billion cell Cap, Take 1 each by mouth  once daily. 1.5 billion, Disp: , Rfl:   magnesium oxide (MAG-OX) 400 mg tablet, Take 400 mg by mouth 2 (two) times daily., Disp: , Rfl:   metOLazone (ZAROXOLYN) 2.5 MG tablet, Take 1 tablet (2.5 mg total) by mouth 3 (three) times a week. (Patient taking differently: Take 2.5 mg by mouth 3 (three) times a week. Monday, Wednesday and Friday), Disp: 12 tablet, Rfl: 11  MULTIVITAMIN WITH MINERALS (ONE-A-DAY 50 PLUS) Tab, Take 1 tablet by mouth once daily. Every day, Disp: , Rfl:   nitroGLYCERIN (NITROLINGUAL) 0.4 mg/dose spray, Place 1 spray under the tongue every 5 (five) minutes as needed. PRN, Disp: , Rfl:   ondansetron (ZOFRAN) 4 MG tablet, Take 4 mg by mouth every 8 (eight) hours as needed for Nausea. , Disp: , Rfl: 2  pantoprazole (PROTONIX) 40 MG tablet, Take 40 mg by mouth once daily., Disp: , Rfl:   potassium chloride SA (K-DUR,KLOR-CON) 10 MEQ tablet, Take 20 mEq by mouth once daily. , Disp: , Rfl: 3  potassium chloride SA (K-DUR,KLOR-CON) 10 MEQ tablet, Take 10 mEq by mouth every evening., Disp: , Rfl:   promethazine (PHENERGAN) 25 MG tablet, Take 1 tablet (25 mg total) by mouth 2 (two) times daily as needed for Nausea., Disp: 60 tablet, Rfl: 1  sacubitril-valsartan (ENTRESTO) 49-51 mg per tablet, Take 1 tablet by mouth 2 (two) times daily., Disp: 60 tablet, Rfl: 0  sertraline (ZOLOFT) 50 MG tablet, Take 50 mg by mouth once daily., Disp: , Rfl:   temazepam (RESTORIL) 7.5 MG Cap, Take 15 mg by mouth nightly as needed., Disp: , Rfl:   traMADol (ULTRAM) 50 mg tablet, Take 50 mg by mouth 2 (two) times daily as needed for Pain. 1-2 tabs, Disp: , Rfl:   vitamin E 400 unit Tab, Take 400 mg by mouth once daily. Every day, Disp: , Rfl:   (DISCONTINUED) hydrochlorothiazide (HYDRODIURIL) 25 MG tablet, Take 25 mg by mouth once daily., Disp: , Rfl:     No current facility-administered medications on file prior to visit.       Review of Systems   Constitutional: Negative for chills, diaphoresis, fatigue and fever.    Respiratory: Negative for cough, chest tightness and shortness of breath.    Cardiovascular: Negative for chest pain, palpitations and leg swelling.   Gastrointestinal: Negative for abdominal pain, anal bleeding, blood in stool, constipation, diarrhea, nausea and vomiting.   Genitourinary: Negative for dysuria, frequency and hematuria.       Objective:      Physical Exam   Constitutional: She is oriented to person, place, and time. She appears well-developed and well-nourished. No distress.   Good blood pressure control  Mildly underweight with a BMI of 19.95 and a drop in her weight of 10.2 lb from her last visit with me July 19, 2018   HENT:   Head: Normocephalic and atraumatic.   Right Ear: External ear normal.   Left Ear: External ear normal.   Nose: Nose normal.   Mouth/Throat: Oropharynx is clear and moist. No oropharyngeal exudate.   Eyes: Pupils are equal, round, and reactive to light. EOM are normal. No scleral icterus.   Neck: Normal range of motion. Neck supple. No JVD present. No tracheal deviation present. No thyromegaly present.   Cardiovascular: Normal rate and regular rhythm. Exam reveals no gallop.   Murmur (Two to 3/6 decrescendo systolic murmur at upper right sternal border consistent with aortic stenosis) heard.  Pulmonary/Chest: Effort normal and breath sounds normal. No stridor. No respiratory distress. She has no wheezes. She has no rales. She exhibits no tenderness.   Abdominal: Soft. Bowel sounds are normal. She exhibits distension (Mild distension with palpable air bubbles in the GI tract but no symptoms). She exhibits no mass. There is no tenderness. There is no rebound and no guarding. No hernia.   Musculoskeletal: Normal range of motion. She exhibits no edema, tenderness or deformity.   Lymphadenopathy:     She has no cervical adenopathy.   Neurological: She is alert and oriented to person, place, and time.   Skin: She is not diaphoretic.   Psychiatric: She has a normal mood and  affect. Her behavior is normal. Judgment and thought content normal.   Nursing note and vitals reviewed.      Assessment:       1. Acute on chronic systolic heart failure    2. Aortic valve stenosis, moderate    3. Coronary artery disease, angina presence unspecified, unspecified vessel or lesion type, unspecified whether native or transplanted heart    4. Hypertension, unspecified type    5. Hyperlipidemia, unspecified hyperlipidemia type    6. CRISTO (acute kidney injury)    7. CKD (chronic kidney disease) stage 3, GFR 30-59 ml/min        Plan:       1. Acute on chronic systolic heart failure  Acute episode appears resolved and is being followed by Dr. Blari with the last ejection fraction 45% obtained in the cath lab    2. Aortic valve stenosis, moderate  Followed by Dr. Blair    3. Coronary artery disease, angina presence unspecified, unspecified vessel or lesion type, unspecified whether native or transplanted heart  No angina present currently, followed by Dr. Blair    4. Hypertension, unspecified type  Good control with no changes needed    5. Hyperlipidemia, unspecified hyperlipidemia type  Due for a lipid panel but I am trying to avoid unnecessary lab sticks due to her persistent anemia.  When she has reason for other lab will get a lipid panel in the process    6. CRISTO (acute kidney injury)  Appears resolved    7. CKD (chronic kidney disease) stage 3, GFR 30-59 ml/min  BMP  Lab Results   Component Value Date     10/07/2019    K 4.4 10/07/2019    CL 98 10/07/2019    CO2 29 10/07/2019    BUN 26 (H) 10/07/2019    CREATININE 1.2 10/07/2019    CALCIUM 9.5 10/07/2019    ANIONGAP 10 10/07/2019    ESTGFRAFRICA 48.3 (A) 10/07/2019    EGFRNONAA 41.9 (A) 10/07/2019

## 2019-10-31 ENCOUNTER — EXTERNAL CHRONIC CARE MANAGEMENT (OUTPATIENT)
Dept: PRIMARY CARE CLINIC | Facility: CLINIC | Age: 84
End: 2019-10-31
Payer: MEDICARE

## 2019-10-31 PROCEDURE — 99490 PR CHRONIC CARE MGMT, 1ST 20 MIN: ICD-10-PCS | Mod: S$PBB,,, | Performed by: FAMILY MEDICINE

## 2019-10-31 PROCEDURE — 99490 CHRNC CARE MGMT STAFF 1ST 20: CPT | Mod: S$PBB,,, | Performed by: FAMILY MEDICINE

## 2019-10-31 PROCEDURE — 99490 CHRNC CARE MGMT STAFF 1ST 20: CPT | Mod: PBBFAC,PO | Performed by: FAMILY MEDICINE

## 2019-11-04 DIAGNOSIS — I50.9 HEART FAILURE, UNSPECIFIED: Primary | ICD-10-CM

## 2019-11-04 DIAGNOSIS — R00.1 SEVERE SINUS BRADYCARDIA: ICD-10-CM

## 2019-11-04 PROBLEM — D50.9 IRON DEFICIENCY ANEMIA, UNSPECIFIED: Status: ACTIVE | Noted: 2019-11-04

## 2019-11-07 ENCOUNTER — CLINICAL SUPPORT (OUTPATIENT)
Dept: CARDIOLOGY | Facility: HOSPITAL | Age: 84
End: 2019-11-07
Attending: INTERNAL MEDICINE
Payer: MEDICARE

## 2019-11-07 ENCOUNTER — INFUSION (OUTPATIENT)
Dept: INFUSION THERAPY | Facility: HOSPITAL | Age: 84
End: 2019-11-07
Attending: INTERNAL MEDICINE
Payer: MEDICARE

## 2019-11-07 VITALS
DIASTOLIC BLOOD PRESSURE: 70 MMHG | HEIGHT: 59 IN | RESPIRATION RATE: 20 BRPM | TEMPERATURE: 64 F | SYSTOLIC BLOOD PRESSURE: 147 MMHG | HEART RATE: 81 BPM | WEIGHT: 93.81 LBS | OXYGEN SATURATION: 98 % | BODY MASS INDEX: 18.91 KG/M2

## 2019-11-07 DIAGNOSIS — I50.9 HEART FAILURE, UNSPECIFIED HF CHRONICITY, UNSPECIFIED HEART FAILURE TYPE: ICD-10-CM

## 2019-11-07 DIAGNOSIS — D50.9 IRON DEFICIENCY ANEMIA, UNSPECIFIED IRON DEFICIENCY ANEMIA TYPE: Primary | ICD-10-CM

## 2019-11-07 DIAGNOSIS — I50.9 HEART FAILURE, UNSPECIFIED: ICD-10-CM

## 2019-11-07 DIAGNOSIS — R00.1 SEVERE SINUS BRADYCARDIA: ICD-10-CM

## 2019-11-07 PROCEDURE — 25500020 PHARM REV CODE 255: Performed by: INTERNAL MEDICINE

## 2019-11-07 PROCEDURE — 96374 THER/PROPH/DIAG INJ IV PUSH: CPT

## 2019-11-07 PROCEDURE — 63600175 PHARM REV CODE 636 W HCPCS: Performed by: INTERNAL MEDICINE

## 2019-11-07 PROCEDURE — 96365 THER/PROPH/DIAG IV INF INIT: CPT

## 2019-11-07 RX ORDER — MEGESTROL ACETATE 40 MG/ML
SUSPENSION ORAL
Refills: 3 | Status: ON HOLD | COMMUNITY
Start: 2019-10-17 | End: 2020-06-03

## 2019-11-07 RX ADMIN — FERRIC CARBOXYMALTOSE INJECTION 750 MG: 50 INJECTION, SOLUTION INTRAVENOUS at 02:11

## 2019-11-07 RX ADMIN — PERFLUTREN 0.2 ML: 6.52 INJECTION, SUSPENSION INTRAVENOUS at 12:11

## 2019-11-07 NOTE — PLAN OF CARE
Problem: Anemia  Goal: Anemia Symptom Improvement  Outcome: Ongoing, Progressing     Problem: Fall Injury Risk  Goal: Absence of Fall and Fall-Related Injury  Outcome: Ongoing, Progressing

## 2019-11-11 ENCOUNTER — LAB VISIT (OUTPATIENT)
Dept: LAB | Facility: HOSPITAL | Age: 84
End: 2019-11-11
Attending: INTERNAL MEDICINE
Payer: MEDICARE

## 2019-11-11 ENCOUNTER — TELEPHONE (OUTPATIENT)
Dept: HEMATOLOGY/ONCOLOGY | Facility: CLINIC | Age: 84
End: 2019-11-11

## 2019-11-11 ENCOUNTER — OFFICE VISIT (OUTPATIENT)
Dept: HEMATOLOGY/ONCOLOGY | Facility: CLINIC | Age: 84
End: 2019-11-11
Payer: MEDICARE

## 2019-11-11 VITALS
HEART RATE: 81 BPM | DIASTOLIC BLOOD PRESSURE: 79 MMHG | RESPIRATION RATE: 18 BRPM | BODY MASS INDEX: 18.1 KG/M2 | TEMPERATURE: 99 F | SYSTOLIC BLOOD PRESSURE: 140 MMHG | WEIGHT: 89.63 LBS

## 2019-11-11 DIAGNOSIS — D64.9 SYMPTOMATIC ANEMIA: ICD-10-CM

## 2019-11-11 DIAGNOSIS — I50.9 CONGESTIVE HEART FAILURE, UNSPECIFIED HF CHRONICITY, UNSPECIFIED HEART FAILURE TYPE: ICD-10-CM

## 2019-11-11 DIAGNOSIS — D63.8 ANEMIA OF CHRONIC DISEASE: Chronic | ICD-10-CM

## 2019-11-11 DIAGNOSIS — I50.22 CHRONIC SYSTOLIC CONGESTIVE HEART FAILURE: ICD-10-CM

## 2019-11-11 DIAGNOSIS — D63.8 ANEMIA OF CHRONIC DISEASE: Primary | ICD-10-CM

## 2019-11-11 DIAGNOSIS — D63.8 ANEMIA OF CHRONIC DISEASE: ICD-10-CM

## 2019-11-11 LAB
ABO + RH BLD: NORMAL
ALBUMIN SERPL BCP-MCNC: 3.8 G/DL (ref 3.5–5.2)
ALP SERPL-CCNC: 44 U/L (ref 55–135)
ALT SERPL W/O P-5'-P-CCNC: 22 U/L (ref 10–44)
ANION GAP SERPL CALC-SCNC: 11 MMOL/L (ref 8–16)
AST SERPL-CCNC: 36 U/L (ref 10–40)
BASOPHILS # BLD AUTO: 0.01 K/UL (ref 0–0.2)
BASOPHILS NFR BLD: 0.1 % (ref 0–1.9)
BILIRUB SERPL-MCNC: 0.7 MG/DL (ref 0.1–1)
BLD GP AB SCN CELLS X3 SERPL QL: NORMAL
BNP SERPL-MCNC: 2230 PG/ML (ref 0–99)
BUN SERPL-MCNC: 22 MG/DL (ref 8–23)
CALCIUM SERPL-MCNC: 9 MG/DL (ref 8.7–10.5)
CHLORIDE SERPL-SCNC: 104 MMOL/L (ref 95–110)
CO2 SERPL-SCNC: 26 MMOL/L (ref 23–29)
CREAT SERPL-MCNC: 1.1 MG/DL (ref 0.5–1.4)
DIFFERENTIAL METHOD: ABNORMAL
EOSINOPHIL # BLD AUTO: 0.1 K/UL (ref 0–0.5)
EOSINOPHIL NFR BLD: 1.6 % (ref 0–8)
ERYTHROCYTE [DISTWIDTH] IN BLOOD BY AUTOMATED COUNT: 16.6 % (ref 11.5–14.5)
EST. GFR  (AFRICAN AMERICAN): 53.7 ML/MIN/1.73 M^2
EST. GFR  (NON AFRICAN AMERICAN): 46.5 ML/MIN/1.73 M^2
GLUCOSE SERPL-MCNC: 93 MG/DL (ref 70–110)
HCT VFR BLD AUTO: 29 % (ref 37–48.5)
HGB BLD-MCNC: 8.9 G/DL (ref 12–16)
IMM GRANULOCYTES # BLD AUTO: 0.04 K/UL (ref 0–0.04)
IMM GRANULOCYTES NFR BLD AUTO: 0.6 % (ref 0–0.5)
LYMPHOCYTES # BLD AUTO: 1 K/UL (ref 1–4.8)
LYMPHOCYTES NFR BLD: 14.3 % (ref 18–48)
MCH RBC QN AUTO: 28.8 PG (ref 27–31)
MCHC RBC AUTO-ENTMCNC: 30.7 G/DL (ref 32–36)
MCV RBC AUTO: 94 FL (ref 82–98)
MONOCYTES # BLD AUTO: 0.5 K/UL (ref 0.3–1)
MONOCYTES NFR BLD: 6.9 % (ref 4–15)
NEUTROPHILS # BLD AUTO: 5.4 K/UL (ref 1.8–7.7)
NEUTROPHILS NFR BLD: 76.5 % (ref 38–73)
NRBC BLD-RTO: 0 /100 WBC
PLATELET # BLD AUTO: 247 K/UL (ref 150–350)
PMV BLD AUTO: 9.4 FL (ref 9.2–12.9)
POTASSIUM SERPL-SCNC: 4 MMOL/L (ref 3.5–5.1)
PROT SERPL-MCNC: 6.1 G/DL (ref 6–8.4)
RBC # BLD AUTO: 3.09 M/UL (ref 4–5.4)
SODIUM SERPL-SCNC: 141 MMOL/L (ref 136–145)
WBC # BLD AUTO: 7.07 K/UL (ref 3.9–12.7)

## 2019-11-11 PROCEDURE — 80053 COMPREHEN METABOLIC PANEL: CPT

## 2019-11-11 PROCEDURE — 99214 OFFICE O/P EST MOD 30 MIN: CPT | Mod: S$GLB,,, | Performed by: INTERNAL MEDICINE

## 2019-11-11 PROCEDURE — 85025 COMPLETE CBC W/AUTO DIFF WBC: CPT

## 2019-11-11 PROCEDURE — 86920 COMPATIBILITY TEST SPIN: CPT

## 2019-11-11 PROCEDURE — 36415 COLL VENOUS BLD VENIPUNCTURE: CPT

## 2019-11-11 PROCEDURE — 99214 PR OFFICE/OUTPT VISIT, EST, LEVL IV, 30-39 MIN: ICD-10-PCS | Mod: S$GLB,,, | Performed by: INTERNAL MEDICINE

## 2019-11-11 PROCEDURE — 83880 ASSAY OF NATRIURETIC PEPTIDE: CPT

## 2019-11-11 PROCEDURE — 86901 BLOOD TYPING SEROLOGIC RH(D): CPT

## 2019-11-11 RX ORDER — ACETAMINOPHEN 325 MG/1
650 TABLET ORAL
Status: CANCELLED | OUTPATIENT
Start: 2019-11-11

## 2019-11-11 RX ORDER — FUROSEMIDE 10 MG/ML
20 INJECTION INTRAMUSCULAR; INTRAVENOUS
Status: CANCELLED | OUTPATIENT
Start: 2019-11-11

## 2019-11-11 RX ORDER — HYDROCODONE BITARTRATE AND ACETAMINOPHEN 500; 5 MG/1; MG/1
TABLET ORAL ONCE
Status: CANCELLED | OUTPATIENT
Start: 2019-11-11 | End: 2019-11-11

## 2019-11-11 RX ORDER — DIPHENHYDRAMINE HYDROCHLORIDE 50 MG/ML
25 INJECTION INTRAMUSCULAR; INTRAVENOUS
Status: CANCELLED | OUTPATIENT
Start: 2019-11-11

## 2019-11-11 NOTE — ASSESSMENT & PLAN NOTE
Patient has fallen to 8.6g/dl and given her CHF with EF of 45%, I think she could benefit from one unit of blood.  Discussed this in detail today.  Will arrange for this today be done.  Will have her back with me in 4 weeks to see how she is doing.

## 2019-11-11 NOTE — ASSESSMENT & PLAN NOTE
Patient's EF was 45% in September of this year.  Discussed this today.  Need to continue to follow this process as it will impact her anemia symptoms, need for blood and safety of blood transfusion.

## 2019-11-11 NOTE — PROGRESS NOTES
PROGRESS NOTE    Subjective:       Patient ID: Cheyanne Gomes is a 83 y.o. female.    Chief Complaint:  No chief complaint on file.  anemia follow up.     History of Present Illness:   Cheyanne Gomes is a 83 y.o. female who presents with a history of anemia of chronic disease.  Patient is doing well and her hip has recovered nicely.  More active now.        Injectafer given May 2019    Patient is feeling very fatigued today.  No bleeding noted.      10/7/2019:  Hb 8.6-9-9.2      Family and Social history reviewed and is unchanged from 8/9/2016.       ROS:  Review of Systems   Constitutional: Negative for fever.   Respiratory: Negative for shortness of breath.    Cardiovascular: Negative for chest pain and leg swelling.   Gastrointestinal: Negative for abdominal pain and blood in stool.   Genitourinary: Negative for hematuria.   Skin: Negative for rash.          Current Outpatient Medications:     amLODIPine (NORVASC) 5 MG tablet, Take 5 mg by mouth once daily. , Disp: , Rfl: 5    aspirin 81 mg Tab, Take 1 tablet by mouth every evening. Every day, Disp: , Rfl:     biotin 5 mg Cap, Take 1 tablet by mouth 2 (two) times daily., Disp: , Rfl:     CALCIUM CARB/VIT D3/MINERALS (CALCIUM-VITAMIN D ORAL), Take 600 mg by mouth 2 (two) times daily. Calcium 1200 with D 3 1000, Disp: , Rfl:     coenzyme Q10 100 mg capsule, Take 100 mg by mouth once daily., Disp: , Rfl:     CRANBERRY CONC/ASCORBIC ACID (CRANBERRY PLUS VITAMIN C ORAL), Take 1 capsule by mouth once daily., Disp: , Rfl:     DEXILANT 60 mg capsule, Take 60 mg by mouth once daily. , Disp: , Rfl: 11    DOCUSATE CALCIUM (STOOL SOFTENER ORAL), Take 200 mg by mouth every evening. , Disp: , Rfl:     FERROUS FUMARATE/VIT BCOMP,C (SUPER B COMPLEX ORAL), Take 1 tablet by mouth once daily., Disp: , Rfl:     ferrous gluconate (FERGON) 324 MG tablet, Take 1 tablet (324 mg total) by mouth daily with  breakfast. In the am and at noon, Disp: , Rfl:     fluticasone (FLONASE) 50 mcg/actuation nasal spray, 2 sprays by Each Nare route once daily. (Patient taking differently: 2 sprays by Each Nare route daily as needed. ), Disp: 16 g, Rfl: 0    gabapentin (NEURONTIN) 100 MG capsule, Take 1 capsule (100 mg total) by mouth every evening., Disp: 30 capsule, Rfl: 3    hydroxychloroquine (PLAQUENIL) 200 mg tablet, Take 1 tablet (200 mg total) by mouth once daily., Disp: 30 tablet, Rfl: 3    isosorbide dinitrate (ISORDIL) 10 MG tablet, Take 10 mg by mouth 3 (three) times daily., Disp: , Rfl: 5    krill-omega-3-dha-epa-lipids (KRILL OIL) 104-14-25-50 mg Cap, Take 1,000 mg by mouth once daily. Braxton krill 300mg qd , Disp: , Rfl:     lactobacillus acidophilus (PROBIOTIC) 10 billion cell Cap, Take 1 each by mouth once daily. 1.5 billion, Disp: , Rfl:     magnesium oxide (MAG-OX) 400 mg tablet, Take 400 mg by mouth 2 (two) times daily., Disp: , Rfl:     megestrol (MEGACE) 400 mg/10 mL (40 mg/mL) Susp, take 1 TABLESPOONFUL EVERY DAY, Disp: , Rfl: 3    metOLazone (ZAROXOLYN) 2.5 MG tablet, Take 1 tablet (2.5 mg total) by mouth 3 (three) times a week. (Patient taking differently: Take 2.5 mg by mouth 3 (three) times a week. Monday, Wednesday and Friday), Disp: 12 tablet, Rfl: 11    MULTIVITAMIN WITH MINERALS (ONE-A-DAY 50 PLUS) Tab, Take 1 tablet by mouth once daily. Every day, Disp: , Rfl:     nitroGLYCERIN (NITROLINGUAL) 0.4 mg/dose spray, Place 1 spray under the tongue every 5 (five) minutes as needed. PRN, Disp: , Rfl:     ondansetron (ZOFRAN) 4 MG tablet, Take 4 mg by mouth every 8 (eight) hours as needed for Nausea. , Disp: , Rfl: 2    pantoprazole (PROTONIX) 40 MG tablet, Take 40 mg by mouth once daily., Disp: , Rfl:     potassium chloride SA (K-DUR,KLOR-CON) 10 MEQ tablet, Take 20 mEq by mouth once daily. , Disp: , Rfl: 3    potassium chloride SA (K-DUR,KLOR-CON) 10 MEQ tablet, Take 10 mEq by mouth every  evening., Disp: , Rfl:     promethazine (PHENERGAN) 25 MG tablet, Take 1 tablet (25 mg total) by mouth 2 (two) times daily as needed for Nausea., Disp: 60 tablet, Rfl: 1    sertraline (ZOLOFT) 50 MG tablet, Take 50 mg by mouth once daily., Disp: , Rfl:     temazepam (RESTORIL) 7.5 MG Cap, Take 15 mg by mouth nightly as needed., Disp: , Rfl:     traMADol (ULTRAM) 50 mg tablet, Take 50 mg by mouth 2 (two) times daily as needed for Pain. 1-2 tabs, Disp: , Rfl:     vitamin E 400 unit Tab, Take 400 mg by mouth once daily. Every day, Disp: , Rfl:     furosemide (LASIX) 40 MG tablet, Take 1 tablet (40 mg total) by mouth once daily., Disp: 30 tablet, Rfl: 0        Objective:       Physical Examination:     BP (!) 140/79   Pulse 81   Temp 98.6 °F (37 °C) (Oral)   Resp 18   Wt 40.6 kg (89 lb 9.6 oz)   BMI 18.10 kg/m²     Physical Exam   Constitutional: She appears well-developed and well-nourished.   HENT:   Head: Normocephalic and atraumatic.   Right Ear: External ear normal.   Left Ear: External ear normal.   Mouth/Throat: Oropharynx is clear and moist.   Eyes: Pupils are equal, round, and reactive to light. Conjunctivae are normal.   Neck: No tracheal deviation present. No thyromegaly present.   Cardiovascular: Normal rate, regular rhythm and normal heart sounds.   Pulmonary/Chest: Effort normal and breath sounds normal.   Abdominal: Soft. Bowel sounds are normal. She exhibits no distension and no mass. There is no tenderness.   Musculoskeletal: She exhibits no edema.   Neurological:   Neuro intact througout   Skin: No rash noted.   Psychiatric: She has a normal mood and affect. Her behavior is normal. Judgment and thought content normal.       Labs:   No results found for this or any previous visit (from the past 336 hour(s)).  CMP  Sodium   Date Value Ref Range Status   10/07/2019 137 136 - 145 mmol/L Final   12/11/2018 139 134 - 144 mmol/L      Potassium   Date Value Ref Range Status   10/07/2019 4.4 3.5 -  5.1 mmol/L Final     Chloride   Date Value Ref Range Status   10/07/2019 98 95 - 110 mmol/L Final   12/11/2018 96 (L) 98 - 110 mmol/L      CO2   Date Value Ref Range Status   10/07/2019 29 23 - 29 mmol/L Final     Glucose   Date Value Ref Range Status   10/07/2019 121 (H) 70 - 110 mg/dL Final   12/11/2018 97 70 - 99 mg/dL      BUN, Bld   Date Value Ref Range Status   10/07/2019 26 (H) 8 - 23 mg/dL Final     Creatinine   Date Value Ref Range Status   10/07/2019 1.2 0.5 - 1.4 mg/dL Final   12/11/2018 0.95 0.60 - 1.40 mg/dL      Calcium   Date Value Ref Range Status   10/07/2019 9.5 8.7 - 10.5 mg/dL Final     Total Protein   Date Value Ref Range Status   10/04/2019 5.7 (L) 6.0 - 8.4 g/dL Final     Albumin   Date Value Ref Range Status   10/04/2019 3.1 (L) 3.5 - 5.2 g/dL Final   12/11/2018 3.9 3.1 - 4.7 g/dL      Total Bilirubin   Date Value Ref Range Status   10/04/2019 0.8 0.1 - 1.0 mg/dL Final     Comment:     For infants and newborns, interpretation of results should be based  on gestational age, weight and in agreement with clinical  observations.  Premature Infant recommended reference ranges:  Up to 24 hours.............<8.0 mg/dL  Up to 48 hours............<12.0 mg/dL  3-5 days..................<15.0 mg/dL  6-29 days.................<15.0 mg/dL       Alkaline Phosphatase   Date Value Ref Range Status   10/04/2019 55 55 - 135 U/L Final     AST   Date Value Ref Range Status   10/04/2019 42 (H) 10 - 40 U/L Final     ALT   Date Value Ref Range Status   10/04/2019 26 10 - 44 U/L Final     Anion Gap   Date Value Ref Range Status   10/07/2019 10 8 - 16 mmol/L Final     eGFR if    Date Value Ref Range Status   10/07/2019 48.3 (A) >60 mL/min/1.73 m^2 Final     eGFR if non    Date Value Ref Range Status   10/07/2019 41.9 (A) >60 mL/min/1.73 m^2 Final     Comment:     Calculation used to obtain the estimated glomerular filtration  rate (eGFR) is the CKD-EPI equation.        No results found  for: CEA  No results found for: PSA        Assessment/Plan:   Anemia of chronic disease  Patient has fallen to 8.6g/dl and given her CHF with EF of 45%, I think she could benefit from one unit of blood.  Discussed this in detail today.  Will arrange for this today be done.  Will have her back with me in 4 weeks to see how she is doing.      Chronic systolic congestive heart failure  Patient's EF was 45% in September of this year.  Discussed this today.  Need to continue to follow this process as it will impact her anemia symptoms, need for blood and safety of blood transfusion.      Discussion:     Follow up in about 4 weeks (around 12/9/2019).      Electronically signed by David Robb

## 2019-11-11 NOTE — TELEPHONE ENCOUNTER
CBC, CMP, BNP ordered for Dr. Park. So that his labs can be drawn at same time as her type and cross. Will send results to him.

## 2019-11-12 ENCOUNTER — INFUSION (OUTPATIENT)
Dept: INFUSION THERAPY | Facility: HOSPITAL | Age: 84
End: 2019-11-12
Attending: INTERNAL MEDICINE
Payer: MEDICARE

## 2019-11-12 VITALS
SYSTOLIC BLOOD PRESSURE: 167 MMHG | OXYGEN SATURATION: 99 % | RESPIRATION RATE: 16 BRPM | TEMPERATURE: 98 F | DIASTOLIC BLOOD PRESSURE: 81 MMHG | HEART RATE: 80 BPM

## 2019-11-12 DIAGNOSIS — D64.9 SYMPTOMATIC ANEMIA: ICD-10-CM

## 2019-11-12 DIAGNOSIS — D63.8 ANEMIA OF CHRONIC DISEASE: ICD-10-CM

## 2019-11-12 PROCEDURE — 25000003 PHARM REV CODE 250: Performed by: INTERNAL MEDICINE

## 2019-11-12 PROCEDURE — 96374 THER/PROPH/DIAG INJ IV PUSH: CPT

## 2019-11-12 PROCEDURE — 63600175 PHARM REV CODE 636 W HCPCS: Performed by: INTERNAL MEDICINE

## 2019-11-12 PROCEDURE — P9016 RBC LEUKOCYTES REDUCED: HCPCS

## 2019-11-12 PROCEDURE — 96375 TX/PRO/DX INJ NEW DRUG ADDON: CPT

## 2019-11-12 PROCEDURE — 36430 TRANSFUSION BLD/BLD COMPNT: CPT

## 2019-11-12 RX ORDER — DIPHENHYDRAMINE HYDROCHLORIDE 50 MG/ML
25 INJECTION INTRAMUSCULAR; INTRAVENOUS
Status: COMPLETED | OUTPATIENT
Start: 2019-11-12 | End: 2019-11-12

## 2019-11-12 RX ORDER — HYDROCODONE BITARTRATE AND ACETAMINOPHEN 500; 5 MG/1; MG/1
TABLET ORAL ONCE
Status: COMPLETED | OUTPATIENT
Start: 2019-11-12 | End: 2019-11-12

## 2019-11-12 RX ORDER — FUROSEMIDE 10 MG/ML
20 INJECTION INTRAMUSCULAR; INTRAVENOUS
Status: DISCONTINUED | OUTPATIENT
Start: 2019-11-12 | End: 2020-01-24

## 2019-11-12 RX ORDER — ACETAMINOPHEN 325 MG/1
650 TABLET ORAL
Status: COMPLETED | OUTPATIENT
Start: 2019-11-12 | End: 2019-11-12

## 2019-11-12 RX ADMIN — FUROSEMIDE 20 MG: 10 INJECTION, SOLUTION INTRAMUSCULAR; INTRAVENOUS at 11:11

## 2019-11-12 RX ADMIN — DIPHENHYDRAMINE HYDROCHLORIDE 25 MG: 50 INJECTION INTRAMUSCULAR; INTRAVENOUS at 07:11

## 2019-11-12 RX ADMIN — SODIUM CHLORIDE: 0.9 INJECTION, SOLUTION INTRAVENOUS at 07:11

## 2019-11-12 RX ADMIN — ACETAMINOPHEN 650 MG: 325 TABLET ORAL at 07:11

## 2019-11-12 NOTE — PATIENT INSTRUCTIONS
Instructed to follow up with Dr. Pal as directed. Go to ER for any chest pain, shortness of breath or other serious symptoms.

## 2019-11-15 ENCOUNTER — INFUSION (OUTPATIENT)
Dept: INFUSION THERAPY | Facility: HOSPITAL | Age: 84
End: 2019-11-15
Attending: INTERNAL MEDICINE
Payer: MEDICARE

## 2019-11-15 VITALS
TEMPERATURE: 98 F | BODY MASS INDEX: 18.09 KG/M2 | WEIGHT: 89.75 LBS | RESPIRATION RATE: 18 BRPM | HEIGHT: 59 IN | DIASTOLIC BLOOD PRESSURE: 68 MMHG | SYSTOLIC BLOOD PRESSURE: 130 MMHG | HEART RATE: 80 BPM

## 2019-11-15 DIAGNOSIS — D50.9 IRON DEFICIENCY ANEMIA, UNSPECIFIED IRON DEFICIENCY ANEMIA TYPE: Primary | ICD-10-CM

## 2019-11-15 PROCEDURE — 96365 THER/PROPH/DIAG IV INF INIT: CPT

## 2019-11-15 PROCEDURE — 63600175 PHARM REV CODE 636 W HCPCS: Performed by: INTERNAL MEDICINE

## 2019-11-15 RX ADMIN — FERRIC CARBOXYMALTOSE INJECTION 750 MG: 50 INJECTION, SOLUTION INTRAVENOUS at 08:11

## 2019-11-16 LAB
AORTIC ROOT ANNULUS: 2.64 CM
AORTIC VALVE CUSP SEPERATION: 1 CM
AV INDEX (PROSTH): 0.41
AV MEAN GRADIENT: 15 MMHG
AV PEAK GRADIENT: 28 MMHG
AV VALVE AREA: 1.2 CM2
AV VELOCITY RATIO: 36.58
CV ECHO LV RWT: 0.55 CM
DOP CALC AO PEAK VEL: 2.64 M/S
DOP CALC AO VTI: 41.97 CM
DOP CALC LVOT AREA: 2.9 CM2
DOP CALC LVOT DIAMETER: 1.93 CM
DOP CALC LVOT PEAK VEL: 96.56 M/S
DOP CALC LVOT STROKE VOLUME: 50.5 CM3
DOP CALCLVOT PEAK VEL VTI: 17.27 CM
E WAVE DECELERATION TIME: 136.75 MSEC
E/A RATIO: 1.41
E/E' RATIO: 23.11 M/S
ECHO LV POSTERIOR WALL: 1.33 CM (ref 0.6–1.1)
FRACTIONAL SHORTENING: 19 % (ref 28–44)
INTERVENTRICULAR SEPTUM: 1.32 CM (ref 0.6–1.1)
LEFT ATRIUM SIZE: 3.24 CM
LEFT INTERNAL DIMENSION IN SYSTOLE: 3.9 CM (ref 2.1–4)
LEFT VENTRICLE DIASTOLIC VOLUME INDEX: 77.52 ML/M2
LEFT VENTRICLE DIASTOLIC VOLUME: 101.62 ML
LEFT VENTRICLE MASS INDEX: 193 G/M2
LEFT VENTRICLE SYSTOLIC VOLUME INDEX: 47.3 ML/M2
LEFT VENTRICLE SYSTOLIC VOLUME: 62.03 ML
LEFT VENTRICULAR INTERNAL DIMENSION IN DIASTOLE: 4.81 CM (ref 3.5–6)
LEFT VENTRICULAR MASS: 253.43 G
LV LATERAL E/E' RATIO: 20.8 M/S
LV SEPTAL E/E' RATIO: 26 M/S
MV PEAK A VEL: 0.74 M/S
MV PEAK E VEL: 1.04 M/S
PISA TR MAX VEL: 3.06 M/S
PV PEAK VELOCITY: 123.2 CM/S
RA PRESSURE: 3 MMHG
TDI LATERAL: 0.05 M/S
TDI SEPTAL: 0.04 M/S
TDI: 0.05 M/S
TR MAX PG: 37 MMHG
TV REST PULMONARY ARTERY PRESSURE: 40 MMHG

## 2019-11-18 RX ORDER — GABAPENTIN 100 MG/1
100 CAPSULE ORAL NIGHTLY
Qty: 30 CAPSULE | Refills: 3 | Status: SHIPPED | OUTPATIENT
Start: 2019-11-18 | End: 2020-03-09

## 2019-11-22 DIAGNOSIS — I51.5: ICD-10-CM

## 2019-11-22 DIAGNOSIS — I72.9 ANEURYSM: Primary | ICD-10-CM

## 2019-11-30 ENCOUNTER — EXTERNAL CHRONIC CARE MANAGEMENT (OUTPATIENT)
Dept: PRIMARY CARE CLINIC | Facility: CLINIC | Age: 84
End: 2019-11-30
Payer: MEDICARE

## 2019-11-30 PROCEDURE — 99490 CHRNC CARE MGMT STAFF 1ST 20: CPT | Mod: S$PBB,,, | Performed by: FAMILY MEDICINE

## 2019-11-30 PROCEDURE — 99490 PR CHRONIC CARE MGMT, 1ST 20 MIN: ICD-10-PCS | Mod: S$PBB,,, | Performed by: FAMILY MEDICINE

## 2019-11-30 PROCEDURE — 99490 CHRNC CARE MGMT STAFF 1ST 20: CPT | Mod: PBBFAC,PO | Performed by: FAMILY MEDICINE

## 2019-12-12 ENCOUNTER — HOSPITAL ENCOUNTER (OUTPATIENT)
Dept: RADIOLOGY | Facility: HOSPITAL | Age: 84
Discharge: HOME | End: 2019-12-12
Attending: INTERNAL MEDICINE
Payer: MEDICARE

## 2019-12-12 DIAGNOSIS — I51.5: ICD-10-CM

## 2019-12-12 DIAGNOSIS — I72.9 ANEURYSM: ICD-10-CM

## 2019-12-12 PROCEDURE — A9577 INJ MULTIHANCE: HCPCS | Performed by: INTERNAL MEDICINE

## 2019-12-12 PROCEDURE — 75561 MRI CARDIAC MORPHOLOGY FUNCTION W WO: ICD-10-PCS | Mod: 26,,, | Performed by: INTERNAL MEDICINE

## 2019-12-12 PROCEDURE — 75561 CARDIAC MRI FOR MORPH W/DYE: CPT | Mod: TC

## 2019-12-12 PROCEDURE — 25500020 PHARM REV CODE 255: Performed by: INTERNAL MEDICINE

## 2019-12-12 PROCEDURE — 75561 CARDIAC MRI FOR MORPH W/DYE: CPT | Mod: 26,,, | Performed by: INTERNAL MEDICINE

## 2019-12-12 RX ADMIN — GADOBENATE DIMEGLUMINE 20 ML: 529 INJECTION, SOLUTION INTRAVENOUS at 08:12

## 2019-12-16 ENCOUNTER — TELEPHONE (OUTPATIENT)
Dept: CARDIOTHORACIC SURGERY | Facility: CLINIC | Age: 84
End: 2019-12-16

## 2019-12-16 NOTE — TELEPHONE ENCOUNTER
Called pt on R/S appt scheduled for time 2:30pm, on 01/09/2020. That Dr Worthington was going to be out of town. Pt verbalized understanding , Appt was mailed 12/19/19.

## 2019-12-17 ENCOUNTER — OFFICE VISIT (OUTPATIENT)
Dept: HEMATOLOGY/ONCOLOGY | Facility: CLINIC | Age: 84
End: 2019-12-17
Payer: MEDICARE

## 2019-12-17 VITALS
WEIGHT: 96.13 LBS | TEMPERATURE: 98 F | RESPIRATION RATE: 18 BRPM | BODY MASS INDEX: 19.41 KG/M2 | SYSTOLIC BLOOD PRESSURE: 128 MMHG | HEART RATE: 82 BPM | DIASTOLIC BLOOD PRESSURE: 64 MMHG

## 2019-12-17 DIAGNOSIS — D63.8 ANEMIA OF CHRONIC DISEASE: Chronic | ICD-10-CM

## 2019-12-17 PROCEDURE — 1126F PR PAIN SEVERITY QUANTIFIED, NO PAIN PRESENT: ICD-10-PCS | Mod: S$GLB,,, | Performed by: INTERNAL MEDICINE

## 2019-12-17 PROCEDURE — 1159F MED LIST DOCD IN RCRD: CPT | Mod: S$GLB,,, | Performed by: INTERNAL MEDICINE

## 2019-12-17 PROCEDURE — 99213 OFFICE O/P EST LOW 20 MIN: CPT | Mod: S$GLB,,, | Performed by: INTERNAL MEDICINE

## 2019-12-17 PROCEDURE — 1159F PR MEDICATION LIST DOCUMENTED IN MEDICAL RECORD: ICD-10-PCS | Mod: S$GLB,,, | Performed by: INTERNAL MEDICINE

## 2019-12-17 PROCEDURE — 1126F AMNT PAIN NOTED NONE PRSNT: CPT | Mod: S$GLB,,, | Performed by: INTERNAL MEDICINE

## 2019-12-17 PROCEDURE — 99213 PR OFFICE/OUTPT VISIT, EST, LEVL III, 20-29 MIN: ICD-10-PCS | Mod: S$GLB,,, | Performed by: INTERNAL MEDICINE

## 2019-12-17 NOTE — ASSESSMENT & PLAN NOTE
Patient is doing better and her blood count is no 11.9g/dl.  Will continue to observe this for now and will have her back with me in three months with labs.  She did well after 1 unit transfusion last visit.

## 2019-12-17 NOTE — PROGRESS NOTES
PROGRESS NOTE    Subjective:       Patient ID: Cheyanne Gomes is a 84 y.o. female.    Chief Complaint:  No chief complaint on file.  anemia and chf follow up.     History of Present Illness:   Cheyanne Gomes is a 84 y.o. female who presents with a history of anemia of chronic disease.    Patient is feeling well at this time.  No new complaints. Had blood after last visit, nov 2019 and this did help her feel better.     Injectafer given May 2019    12/9/2019:  Hb 11.9g/dl    EF: 45%          Family and Social history reviewed and is unchanged from 8/9/2016.       ROS:  Review of Systems   Constitutional: Negative for fever.   Respiratory: Negative for shortness of breath.    Cardiovascular: Negative for chest pain and leg swelling.   Gastrointestinal: Negative for abdominal pain and blood in stool.   Genitourinary: Negative for hematuria.   Skin: Negative for rash.          Current Outpatient Medications:     amLODIPine (NORVASC) 5 MG tablet, Take 5 mg by mouth once daily. , Disp: , Rfl: 5    aspirin 81 mg Tab, Take 1 tablet by mouth every evening. Every day, Disp: , Rfl:     biotin 5 mg Cap, Take 1 tablet by mouth 2 (two) times daily., Disp: , Rfl:     CALCIUM CARB/VIT D3/MINERALS (CALCIUM-VITAMIN D ORAL), Take 600 mg by mouth 2 (two) times daily. Calcium 1200 with D 3 1000, Disp: , Rfl:     coenzyme Q10 100 mg capsule, Take 100 mg by mouth once daily., Disp: , Rfl:     CRANBERRY CONC/ASCORBIC ACID (CRANBERRY PLUS VITAMIN C ORAL), Take 1 capsule by mouth once daily., Disp: , Rfl:     DEXILANT 60 mg capsule, Take 60 mg by mouth once daily. , Disp: , Rfl: 11    DOCUSATE CALCIUM (STOOL SOFTENER ORAL), Take 200 mg by mouth every evening. , Disp: , Rfl:     FERROUS FUMARATE/VIT BCOMP,C (SUPER B COMPLEX ORAL), Take 1 tablet by mouth once daily., Disp: , Rfl:     ferrous gluconate (FERGON) 324 MG tablet, Take 1 tablet (324 mg total) by mouth daily  with breakfast. In the am and at noon, Disp: , Rfl:     fluticasone (FLONASE) 50 mcg/actuation nasal spray, 2 sprays by Each Nare route once daily. (Patient taking differently: 2 sprays by Each Nare route daily as needed. ), Disp: 16 g, Rfl: 0    gabapentin (NEURONTIN) 100 MG capsule, Take 1 capsule (100 mg total) by mouth every evening., Disp: 30 capsule, Rfl: 3    hydroxychloroquine (PLAQUENIL) 200 mg tablet, Take 1 tablet (200 mg total) by mouth once daily., Disp: 30 tablet, Rfl: 3    isosorbide dinitrate (ISORDIL) 10 MG tablet, Take 10 mg by mouth 3 (three) times daily., Disp: , Rfl: 5    krill-omega-3-dha-epa-lipids (KRILL OIL) 315-06-58-50 mg Cap, Take 1,000 mg by mouth once daily. Braxton krill 300mg qd , Disp: , Rfl:     lactobacillus acidophilus (PROBIOTIC) 10 billion cell Cap, Take 1 each by mouth once daily. 1.5 billion, Disp: , Rfl:     magnesium oxide (MAG-OX) 400 mg tablet, Take 400 mg by mouth 2 (two) times daily., Disp: , Rfl:     megestrol (MEGACE) 400 mg/10 mL (40 mg/mL) Susp, take 1 TABLESPOONFUL EVERY DAY, Disp: , Rfl: 3    metOLazone (ZAROXOLYN) 2.5 MG tablet, Take 1 tablet (2.5 mg total) by mouth 3 (three) times a week. (Patient taking differently: Take 2.5 mg by mouth 3 (three) times a week. Monday, Wednesday and Friday), Disp: 12 tablet, Rfl: 11    MULTIVITAMIN WITH MINERALS (ONE-A-DAY 50 PLUS) Tab, Take 1 tablet by mouth once daily. Every day, Disp: , Rfl:     nitroGLYCERIN (NITROLINGUAL) 0.4 mg/dose spray, Place 1 spray under the tongue every 5 (five) minutes as needed. PRN, Disp: , Rfl:     ondansetron (ZOFRAN) 4 MG tablet, Take 4 mg by mouth every 8 (eight) hours as needed for Nausea. , Disp: , Rfl: 2    pantoprazole (PROTONIX) 40 MG tablet, Take 40 mg by mouth once daily., Disp: , Rfl:     potassium chloride SA (K-DUR,KLOR-CON) 10 MEQ tablet, Take 20 mEq by mouth once daily. , Disp: , Rfl: 3    potassium chloride SA (K-DUR,KLOR-CON) 10 MEQ tablet, Take 10 mEq by mouth every  evening., Disp: , Rfl:     promethazine (PHENERGAN) 25 MG tablet, Take 1 tablet (25 mg total) by mouth 2 (two) times daily as needed for Nausea., Disp: 60 tablet, Rfl: 1    sertraline (ZOLOFT) 50 MG tablet, Take 50 mg by mouth once daily., Disp: , Rfl:     temazepam (RESTORIL) 7.5 MG Cap, Take 15 mg by mouth nightly as needed., Disp: , Rfl:     traMADol (ULTRAM) 50 mg tablet, Take 50 mg by mouth 2 (two) times daily as needed for Pain. 1-2 tabs, Disp: , Rfl:     vitamin E 400 unit Tab, Take 400 mg by mouth once daily. Every day, Disp: , Rfl:     furosemide (LASIX) 40 MG tablet, Take 1 tablet (40 mg total) by mouth once daily., Disp: 30 tablet, Rfl: 0    Current Facility-Administered Medications:     furosemide injection 20 mg, 20 mg, Intravenous, PRN, David Pal MD, 20 mg at 11/12/19 1157        Objective:       Physical Examination:     /64   Pulse 82   Temp 98.2 °F (36.8 °C) (Oral)   Resp 18   Wt 43.6 kg (96 lb 1.6 oz)   BMI 19.41 kg/m²     Physical Exam   Constitutional: She appears well-developed and well-nourished.   HENT:   Head: Normocephalic and atraumatic.   Right Ear: External ear normal.   Left Ear: External ear normal.   Mouth/Throat: Oropharynx is clear and moist.   Eyes: Pupils are equal, round, and reactive to light. Conjunctivae are normal.   Neck: No tracheal deviation present. No thyromegaly present.   Cardiovascular: Normal rate, regular rhythm and normal heart sounds.   Pulmonary/Chest: Effort normal and breath sounds normal.   Abdominal: Soft. Bowel sounds are normal. She exhibits no distension and no mass. There is no tenderness.   Musculoskeletal: She exhibits no edema.   Neurological:   Neuro intact througout   Skin: No rash noted.   Psychiatric: She has a normal mood and affect. Her behavior is normal. Judgment and thought content normal.       Labs:   No results found for this or any previous visit (from the past 336 hour(s)).  CMP  Sodium   Date Value Ref Range  Status   11/11/2019 141 136 - 145 mmol/L Final   12/11/2018 139 134 - 144 mmol/L      Potassium   Date Value Ref Range Status   11/11/2019 4.0 3.5 - 5.1 mmol/L Final     Chloride   Date Value Ref Range Status   11/11/2019 104 95 - 110 mmol/L Final   12/11/2018 96 (L) 98 - 110 mmol/L      CO2   Date Value Ref Range Status   11/11/2019 26 23 - 29 mmol/L Final     Glucose   Date Value Ref Range Status   11/11/2019 93 70 - 110 mg/dL Final   12/11/2018 97 70 - 99 mg/dL      BUN, Bld   Date Value Ref Range Status   11/11/2019 22 8 - 23 mg/dL Final     Creatinine   Date Value Ref Range Status   11/11/2019 1.1 0.5 - 1.4 mg/dL Final   12/11/2018 0.95 0.60 - 1.40 mg/dL      Calcium   Date Value Ref Range Status   11/11/2019 9.0 8.7 - 10.5 mg/dL Final     Total Protein   Date Value Ref Range Status   11/11/2019 6.1 6.0 - 8.4 g/dL Final     Albumin   Date Value Ref Range Status   11/11/2019 3.8 3.5 - 5.2 g/dL Final   12/11/2018 3.9 3.1 - 4.7 g/dL      Total Bilirubin   Date Value Ref Range Status   11/11/2019 0.7 0.1 - 1.0 mg/dL Final     Comment:     For infants and newborns, interpretation of results should be based  on gestational age, weight and in agreement with clinical  observations.  Premature Infant recommended reference ranges:  Up to 24 hours.............<8.0 mg/dL  Up to 48 hours............<12.0 mg/dL  3-5 days..................<15.0 mg/dL  6-29 days.................<15.0 mg/dL       Alkaline Phosphatase   Date Value Ref Range Status   11/11/2019 44 (L) 55 - 135 U/L Final     AST   Date Value Ref Range Status   11/11/2019 36 10 - 40 U/L Final     ALT   Date Value Ref Range Status   11/11/2019 22 10 - 44 U/L Final     Anion Gap   Date Value Ref Range Status   11/11/2019 11 8 - 16 mmol/L Final     eGFR if    Date Value Ref Range Status   11/11/2019 53.7 (A) >60 mL/min/1.73 m^2 Final     eGFR if non    Date Value Ref Range Status   11/11/2019 46.5 (A) >60 mL/min/1.73 m^2 Final      Comment:     Calculation used to obtain the estimated glomerular filtration  rate (eGFR) is the CKD-EPI equation.        No results found for: CEA  No results found for: PSA        Assessment/Plan:   Anemia of chronic disease  Patient is doing better and her blood count is no 11.9g/dl.  Will continue to observe this for now and will have her back with me in three months with labs.  She did well after 1 unit transfusion last visit.     Discussion:     Follow up in about 3 months (around 3/17/2020).      Electronically signed by David Robb

## 2019-12-18 ENCOUNTER — OFFICE VISIT (OUTPATIENT)
Dept: RHEUMATOLOGY | Facility: CLINIC | Age: 84
End: 2019-12-18
Payer: MEDICARE

## 2019-12-18 VITALS — WEIGHT: 93.63 LBS | DIASTOLIC BLOOD PRESSURE: 80 MMHG | BODY MASS INDEX: 18.9 KG/M2 | SYSTOLIC BLOOD PRESSURE: 130 MMHG

## 2019-12-18 DIAGNOSIS — M35.00 SJOGREN'S SYNDROME, WITH UNSPECIFIED ORGAN INVOLVEMENT: Primary | ICD-10-CM

## 2019-12-18 PROCEDURE — 1125F PR PAIN SEVERITY QUANTIFIED, PAIN PRESENT: ICD-10-PCS | Mod: S$GLB,,, | Performed by: INTERNAL MEDICINE

## 2019-12-18 PROCEDURE — 1125F AMNT PAIN NOTED PAIN PRSNT: CPT | Mod: S$GLB,,, | Performed by: INTERNAL MEDICINE

## 2019-12-18 PROCEDURE — 99213 OFFICE O/P EST LOW 20 MIN: CPT | Mod: S$GLB,,, | Performed by: INTERNAL MEDICINE

## 2019-12-18 PROCEDURE — 1159F MED LIST DOCD IN RCRD: CPT | Mod: S$GLB,,, | Performed by: INTERNAL MEDICINE

## 2019-12-18 PROCEDURE — 1159F PR MEDICATION LIST DOCUMENTED IN MEDICAL RECORD: ICD-10-PCS | Mod: S$GLB,,, | Performed by: INTERNAL MEDICINE

## 2019-12-18 PROCEDURE — 99213 PR OFFICE/OUTPT VISIT, EST, LEVL III, 20-29 MIN: ICD-10-PCS | Mod: S$GLB,,, | Performed by: INTERNAL MEDICINE

## 2019-12-18 RX ORDER — TRAMADOL HYDROCHLORIDE 50 MG/1
50 TABLET ORAL EVERY 12 HOURS PRN
Qty: 60 TABLET | Refills: 2 | Status: SHIPPED | OUTPATIENT
Start: 2019-12-18 | End: 2020-02-26

## 2019-12-18 RX ORDER — SACUBITRIL AND VALSARTAN 49; 51 MG/1; MG/1
1 TABLET, FILM COATED ORAL 2 TIMES DAILY
Refills: 5 | COMMUNITY
Start: 2019-12-04 | End: 2020-03-18 | Stop reason: DRUGHIGH

## 2019-12-18 NOTE — PROGRESS NOTES
[facility] RHEUMATOLOGY            PROGRESS NOTE      Subjective:       Patient ID:   NAME: Cheyanne Gomes : 1935     84 y.o. female    Referring Doc: No ref. provider found  Other Physicians:    Chief Complaint:  Sjogren's syndrome      History of Present Illness:     Patient returns today for a regularly scheduled follow-up visit for Sjogren's syndrome      The patient denies any skin rashes swollen joints paresthesias.  She has been in the hospital for CHF since her last visit.  She has fatigue.  No chest pains.  She has shortness of breath particularly with exertion            ROS:   GEN:  No  fever, night sweats . weight is stable   + fatigue  SKIN: no rashes, no bruising, no ulcerations, no Raynaud's  HEENT: no HA's, No visual changes, no mucosal ulcers, no sicca symptoms,  CV:   no CP, SOB, PND, +SALINAS, no orthopnea, no palpitations  PULM: normal with no SOB, cough, hemoptysis, sputum or pleuritic pain  GI:  no abdominal pain, nausea, vomiting, constipation, diarrhea, melanotic stools, BRBPR, hematemesis, no dysphagia  :   no dysuria  NEURO: no paresthesias, headaches, visual disturbances, muscle weakness  MUSCULOSKELETAL:no joint swelling, prolonged AM stiffness, no back pain, no muscle pain  Allergies:  Review of patient's allergies indicates:   Allergen Reactions    Nitrofurantoin macrocrystalline Nausea And Vomiting     Other reaction(s): very sickly    Penicillins Swelling     Other reaction(s): swelling  Other reaction(s): red skin discolorati    Sulfa (sulfonamide antibiotics) Nausea And Vomiting     Other reaction(s): very sickly       Medications:    Current Outpatient Medications:     amLODIPine (NORVASC) 5 MG tablet, Take 5 mg by mouth once daily. , Disp: , Rfl: 5    aspirin 81 mg Tab, Take 1 tablet by mouth every evening. Every day, Disp: , Rfl:     biotin 5 mg Cap, Take 1 tablet by mouth 2 (two) times daily., Disp: , Rfl:     CALCIUM CARB/VIT D3/MINERALS (CALCIUM-VITAMIN D  ORAL), Take 600 mg by mouth 2 (two) times daily. Calcium 1200 with D 3 1000, Disp: , Rfl:     coenzyme Q10 100 mg capsule, Take 100 mg by mouth once daily., Disp: , Rfl:     CRANBERRY CONC/ASCORBIC ACID (CRANBERRY PLUS VITAMIN C ORAL), Take 1 capsule by mouth once daily., Disp: , Rfl:     DEXILANT 60 mg capsule, Take 60 mg by mouth once daily. , Disp: , Rfl: 11    DOCUSATE CALCIUM (STOOL SOFTENER ORAL), Take 200 mg by mouth every evening. , Disp: , Rfl:     ENTRESTO 49-51 mg per tablet, Take 1 tablet by mouth 2 (two) times daily., Disp: , Rfl: 5    FERROUS FUMARATE/VIT BCOMP,C (SUPER B COMPLEX ORAL), Take 1 tablet by mouth once daily., Disp: , Rfl:     ferrous gluconate (FERGON) 324 MG tablet, Take 1 tablet (324 mg total) by mouth daily with breakfast. In the am and at noon, Disp: , Rfl:     fluticasone (FLONASE) 50 mcg/actuation nasal spray, 2 sprays by Each Nare route once daily. (Patient taking differently: 2 sprays by Each Nare route daily as needed. ), Disp: 16 g, Rfl: 0    furosemide (LASIX) 40 MG tablet, Take 1 tablet (40 mg total) by mouth once daily., Disp: 30 tablet, Rfl: 0    gabapentin (NEURONTIN) 100 MG capsule, Take 1 capsule (100 mg total) by mouth every evening., Disp: 30 capsule, Rfl: 3    hydroxychloroquine (PLAQUENIL) 200 mg tablet, Take 1 tablet (200 mg total) by mouth once daily., Disp: 30 tablet, Rfl: 3    isosorbide dinitrate (ISORDIL) 10 MG tablet, Take 10 mg by mouth 3 (three) times daily., Disp: , Rfl: 5    krill-omega-3-dha-epa-lipids (KRILL OIL) 084-68-79-50 mg Cap, Take 1,000 mg by mouth once daily. Braxton krill 300mg qd , Disp: , Rfl:     lactobacillus acidophilus (PROBIOTIC) 10 billion cell Cap, Take 1 each by mouth once daily. 1.5 billion, Disp: , Rfl:     magnesium oxide (MAG-OX) 400 mg tablet, Take 400 mg by mouth 2 (two) times daily., Disp: , Rfl:     megestrol (MEGACE) 400 mg/10 mL (40 mg/mL) Susp, take 1 TABLESPOONFUL EVERY DAY, Disp: , Rfl: 3    metOLazone  (ZAROXOLYN) 2.5 MG tablet, Take 1 tablet (2.5 mg total) by mouth 3 (three) times a week. (Patient taking differently: Take 2.5 mg by mouth 3 (three) times a week. Monday, Wednesday and Friday), Disp: 12 tablet, Rfl: 11    MULTIVITAMIN WITH MINERALS (ONE-A-DAY 50 PLUS) Tab, Take 1 tablet by mouth once daily. Every day, Disp: , Rfl:     nitroGLYCERIN (NITROLINGUAL) 0.4 mg/dose spray, Place 1 spray under the tongue every 5 (five) minutes as needed. PRN, Disp: , Rfl:     ondansetron (ZOFRAN) 4 MG tablet, Take 4 mg by mouth every 8 (eight) hours as needed for Nausea. , Disp: , Rfl: 2    pantoprazole (PROTONIX) 40 MG tablet, Take 40 mg by mouth once daily., Disp: , Rfl:     potassium chloride SA (K-DUR,KLOR-CON) 10 MEQ tablet, Take 20 mEq by mouth once daily. , Disp: , Rfl: 3    potassium chloride SA (K-DUR,KLOR-CON) 10 MEQ tablet, Take 10 mEq by mouth every evening., Disp: , Rfl:     promethazine (PHENERGAN) 25 MG tablet, Take 1 tablet (25 mg total) by mouth 2 (two) times daily as needed for Nausea., Disp: 60 tablet, Rfl: 1    sertraline (ZOLOFT) 50 MG tablet, Take 50 mg by mouth once daily., Disp: , Rfl:     temazepam (RESTORIL) 7.5 MG Cap, Take 15 mg by mouth nightly as needed., Disp: , Rfl:     traMADol (ULTRAM) 50 mg tablet, Take 1 tablet (50 mg total) by mouth every 12 (twelve) hours as needed for Pain. 1-2 tabs, Disp: 60 tablet, Rfl: 2    vitamin E 400 unit Tab, Take 400 mg by mouth once daily. Every day, Disp: , Rfl:     Current Facility-Administered Medications:     furosemide injection 20 mg, 20 mg, Intravenous, PRN, David Pal MD, 20 mg at 11/12/19 1157    PMHx/PSHx Updates:      Objective:     Vitals:  Blood pressure 130/80, weight 42.5 kg (93 lb 9.6 oz).    Physical Examination:   GEN: no apparent distress, comfortable; AAOx3  SKIN: no rashes,no ulceration, no Raynaud's, no petechiae, no SQ nodules,  HEAD: normal  EYES: no pallor, no icterus  NECK: no masses, thyroid normal, trachea  midline, no LAD/LN's, supple  CV: RRR with no murmur; l S1 and S2 reg. ,no gallop no rubs,   CHEST: Normal respiratory effort; CTAB; normal breath sounds; no wheeze or crackles  MUSC/Skeletal: ROM normal; no crepitus; joints without synovitis,  no deformities  No joint swelling or tenderness of PIP, MCP, wrist, elbow, shoulder, or knee joints  EXTREM: no clubbing, cyanosis, no edema,normal  pulses   NEURO: grossly intact; motor WNL; AAOx3; ,  PSYCH: normal mood, affect and behavior  LYMPH: normal cervical, supraclavicular          Labs:   Lab Results   Component Value Date    WBC 7.07 11/11/2019    HGB 8.9 (L) 11/11/2019    HCT 29.0 (L) 11/11/2019    MCV 94 11/11/2019     11/11/2019    CMP  @LASTLAB(NA,K,CL,CO2,GLU,BUN,Creatinine,Calcium,PROT,Albumin,Bilitot,Alkphos,AST,ALT,CRP,ESR,RF,CCP,VANCE,SSA,CPK,uric acid) )@  I have reviewed all available lab results and radiology reports.    Radiology/Diagnostic Studies:        Assessment/Plan:   (1) 84 y.o. female with diagnosis of Sjogren's syndrome.  She is stable and has not had any activity in recent years.  Will stop Plaquenil  2) Hx CHF.  Has been in the hospital and will see cardiologist in [place]              Discussion:     I have explained all of the above in detail and the patient understands all of the current recommendation(s). I have answered all questions to the best of my ability and to their complete satisfaction.       The patient is to continue with the current management plan         RTC in   3 months      Electronically signed by Deepak Erazo MD

## 2019-12-24 RX ORDER — FUROSEMIDE 40 MG/1
40 TABLET ORAL DAILY
Qty: 30 TABLET | Refills: 0 | Status: SHIPPED | OUTPATIENT
Start: 2019-12-24 | End: 2020-03-18

## 2019-12-31 ENCOUNTER — EXTERNAL CHRONIC CARE MANAGEMENT (OUTPATIENT)
Dept: PRIMARY CARE CLINIC | Facility: CLINIC | Age: 84
End: 2019-12-31
Payer: MEDICARE

## 2019-12-31 PROCEDURE — 99490 CHRNC CARE MGMT STAFF 1ST 20: CPT | Mod: PBBFAC,PO | Performed by: FAMILY MEDICINE

## 2019-12-31 PROCEDURE — 99490 PR CHRONIC CARE MGMT, 1ST 20 MIN: ICD-10-PCS | Mod: S$PBB,,, | Performed by: FAMILY MEDICINE

## 2019-12-31 PROCEDURE — 99490 CHRNC CARE MGMT STAFF 1ST 20: CPT | Mod: S$PBB,,, | Performed by: FAMILY MEDICINE

## 2020-01-07 ENCOUNTER — TELEPHONE (OUTPATIENT)
Dept: RHEUMATOLOGY | Facility: CLINIC | Age: 85
End: 2020-01-07

## 2020-01-07 NOTE — TELEPHONE ENCOUNTER
Patient called stating she was instructed to stop Plaquenil at her last visit on 12/18/19. Patient is now having issues sleeping due to pressure on her hip, shoulder, and neck. Patient would like to know if she should restart the Plaquenil.

## 2020-01-09 ENCOUNTER — OFFICE VISIT (OUTPATIENT)
Dept: CARDIOTHORACIC SURGERY | Facility: CLINIC | Age: 85
End: 2020-01-09
Payer: MEDICARE

## 2020-01-09 VITALS
OXYGEN SATURATION: 98 % | DIASTOLIC BLOOD PRESSURE: 78 MMHG | BODY MASS INDEX: 21.55 KG/M2 | HEIGHT: 57 IN | SYSTOLIC BLOOD PRESSURE: 143 MMHG | HEART RATE: 75 BPM | TEMPERATURE: 98 F | WEIGHT: 99.88 LBS

## 2020-01-09 DIAGNOSIS — I35.0 AORTIC VALVE STENOSIS, MODERATE: Primary | ICD-10-CM

## 2020-01-09 DIAGNOSIS — I50.22 CHRONIC SYSTOLIC (CONGESTIVE) HEART FAILURE: Primary | ICD-10-CM

## 2020-01-09 DIAGNOSIS — Z79.899 ENCOUNTER FOR LONG-TERM (CURRENT) USE OF HIGH-RISK MEDICATION: ICD-10-CM

## 2020-01-09 DIAGNOSIS — I50.23 ACUTE ON CHRONIC SYSTOLIC HEART FAILURE: ICD-10-CM

## 2020-01-09 DIAGNOSIS — M81.0 OSTEOPOROSIS, UNSPECIFIED OSTEOPOROSIS TYPE, UNSPECIFIED PATHOLOGICAL FRACTURE PRESENCE: Primary | ICD-10-CM

## 2020-01-09 PROCEDURE — 99999 PR PBB SHADOW E&M-EST. PATIENT-LVL III: ICD-10-PCS | Mod: PBBFAC,,, | Performed by: THORACIC SURGERY (CARDIOTHORACIC VASCULAR SURGERY)

## 2020-01-09 PROCEDURE — 99213 OFFICE O/P EST LOW 20 MIN: CPT | Mod: PBBFAC | Performed by: THORACIC SURGERY (CARDIOTHORACIC VASCULAR SURGERY)

## 2020-01-09 PROCEDURE — 99205 PR OFFICE/OUTPT VISIT, NEW, LEVL V, 60-74 MIN: ICD-10-PCS | Mod: S$PBB,,, | Performed by: THORACIC SURGERY (CARDIOTHORACIC VASCULAR SURGERY)

## 2020-01-09 PROCEDURE — 1126F PR PAIN SEVERITY QUANTIFIED, NO PAIN PRESENT: ICD-10-PCS | Mod: ,,, | Performed by: THORACIC SURGERY (CARDIOTHORACIC VASCULAR SURGERY)

## 2020-01-09 PROCEDURE — 99999 PR PBB SHADOW E&M-EST. PATIENT-LVL III: CPT | Mod: PBBFAC,,, | Performed by: THORACIC SURGERY (CARDIOTHORACIC VASCULAR SURGERY)

## 2020-01-09 PROCEDURE — 1126F AMNT PAIN NOTED NONE PRSNT: CPT | Mod: ,,, | Performed by: THORACIC SURGERY (CARDIOTHORACIC VASCULAR SURGERY)

## 2020-01-09 PROCEDURE — 1159F PR MEDICATION LIST DOCUMENTED IN MEDICAL RECORD: ICD-10-PCS | Mod: ,,, | Performed by: THORACIC SURGERY (CARDIOTHORACIC VASCULAR SURGERY)

## 2020-01-09 PROCEDURE — 99205 OFFICE O/P NEW HI 60 MIN: CPT | Mod: S$PBB,,, | Performed by: THORACIC SURGERY (CARDIOTHORACIC VASCULAR SURGERY)

## 2020-01-09 PROCEDURE — 1159F MED LIST DOCD IN RCRD: CPT | Mod: ,,, | Performed by: THORACIC SURGERY (CARDIOTHORACIC VASCULAR SURGERY)

## 2020-01-09 NOTE — LETTER
[facility]  [address]  [facility] [address]  Phone: [phone] January 9, 2020      Tera Blair MD  [address], [address]  Cardiology [facility] [address]     Patient: Cheyanne Gomes   MR Number: [id number]   YOB: 1935   Date of Visit: 1/9/2020     Dear Dr. Blair,     I had the pleasure seeing your patient Ms. Cheyanne Gomes in clinic today.  As you know, she is a very pleasant woman who underwent coronary artery bypass grafting in 2000.  In the immediate postoperative period, she had a myocardial infarction.  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      I saw her in clinic today, and discussed her situation with her and her family member in detail.  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  I do not believe that surgical correction would justify her operative risk given her comorbid conditions.  I recommended ongoing medical management.  I did take the liberty of making arrangements for her to see our heart failure cardiologist to see if they had any experimental therapies that may be of benefit for her.      Thank you for sending this pleasant woman to me.  It was a pleasure to meet her.    Sincerely,      Jason Worthington MD  Chief, Division of Thoracic & Cardiovascular Surgery  [facility]    PEP/etb

## 2020-01-09 NOTE — PROGRESS NOTES
Subjective:      Patient ID: Cheyanne Gomes is a 84 y.o. female.    Chief Complaint: No chief complaint on file.      HPI:  Cheyanne Gomes is a 84 y.o. 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 Medical conditions include coronary artery disease s/p CABGx3 2000 and PCI, 2 abdominal stents, Sjogrens, apical aneurysm, recurrent admissions for congestive heart failure, hypertension, chronic anemia. She was recently admitted to [facility] and was discharged home in October on medical therapy. At that time she felt weak, dizzy, nauseated, and subsequently developed chest pain. She took Phenergan for her nausea and has made her very sleepy and incoherent. The family noted that her pulse was low as well as her blood pressure she was brought to the emergency room.  She was noted to be in a junctional rhythm rate in the 20s as well as markedly hypotensive. Underwent emergent placement of temporary pacemaker and was initiated on dopamine drip for hypotension.  Was also noted to be in acute renal failure at that time; likely prerenal secondary to hypotension. Bradycardia is thought to be likely secondary to metoprolol in the setting of acute renal failure. With improvement in renal function her bradycardia resolved likely due to clearance of metoprolol. Patient had improvement in symptoms and no further episodes of bradycardia. Reports she was referred by Dr. Blair for repair of aneuersym? But cardiac MRI showed HOCM and no thrombus or aneurysm. Reports 4 admits for CHF in the past year. Has used a walker for many years after b/l hip replacement for falls and uses a cane for short distances. Denies any recent falls. Lives with daughter as she is unable to perform ADLs as she is weak, has no stamina, and easily becomes SOB. Reports walking from chair in clinic room to the door would cause SOB. Feels like her body is trembling when over exerting herself but it is not. Has BLE  swelling, L>R and also abdominal swelling for which she takes a fluid pill. Reports orthopnea for which she just bought a sleep number bed so she can elevate her head and legs. Has occasional episodes of chest tightness several times a month for which she takes nitro. These episodes occur with rest and exertion and last a couple minutes. Denies dizziness, palpitations, prior strokes/seziures. Quit smoking in .       Family and social history reviewed    Review of patient's allergies indicates:   Allergen Reactions    Nitrofurantoin macrocrystalline Nausea And Vomiting     Other reaction(s): very sickly    Penicillins Swelling     Other reaction(s): swelling  Other reaction(s): red skin discolorati    Sulfa (sulfonamide antibiotics) Nausea And Vomiting     Other reaction(s): very sickly     Past Medical History:   Diagnosis Date    Abdominal hernia     Anemia     Dr Berkowitz     Aortic valve stenosis, moderate     Cannon's esophagus     Cataract     ou done    CHF (congestive heart failure)     EFx 35%, Dr. Spencer 13    Colitis     Compression fracture     Diverticulosis     Encounter for blood transfusion     GERD (gastroesophageal reflux disease)     Hyperlipidemia     Hypertension     Irritable bowel syndrome     Occlusive coronary artery disease     Osteoarthritis     lumbar DDD    Pneumonia 2017    Rheumatoid arteritis     Rheumatoid arthritis     Shingles 2017    Sjogren syndrome     Ulcerative colitis      Past Surgical History:   Procedure Laterality Date    APPENDECTOMY      BACK SURGERY      CARDIAC SURGERY      CABGx3 in     CATARACT EXTRACTION      ou od d 11-15-12/     SECTION, CLASSIC      x 2    CHOLECYSTECTOMY      COLONOSCOPY  09/15/2011    Dr Anaya     COLONOSCOPY  01/10/2017    [facility] report sent to scanning    CORONARY ANGIOPLASTY      PCI x 1 in     CORONARY ARTERY BYPASS GRAFT      3 vessel    CORONARY ARTERY BYPASS  GRAFT      eyes      rk ou    FRACTURE SURGERY  2016    Dr Guido left hip     FRACTURE SURGERY  2018    Right Hip    HYSTERECTOMY      tubal ligation, oopherectomy    INTRAMEDULLARY RODDING OF TROCHANTER OF FEMUR Right 10/8/2018    Procedure: INSERTION, INTRAMEDULLARY KELSI, FEMUR, TROCHANTER;  Surgeon: Valerio Luther MD;  [facility];  Service: Orthopedics;  Laterality: Right;    OOPHORECTOMY      SPINE SURGERY  2013    Silvino Mckeon    UPPER GASTROINTESTINAL ENDOSCOPY      Yag Capsulotomy Right 14     Family History     Problem Relation (Age of Onset)    Arthritis Brother    Crohn's disease Brother, Brother, Brother    Early death Son    Fibromyalgia Sister    Heart disease Father, Mother, Brother    Hypertension Father, Sister, Daughter    Irritable bowel syndrome Sister    Lupus Cousin, Cousin    Pulmonary embolism Sister    Scleroderma Sister    Skin cancer Mother        Social History     Socioeconomic History    Marital status:      Spouse name: Not on file    Number of children: Not on file    Years of education: Not on file    Highest education level: Not on file   Occupational History    Not on file   Social Needs    Financial resource strain: Not on file    Food insecurity:     Worry: Not on file     Inability: Not on file    Transportation needs:     Medical: Not on file     Non-medical: Not on file   Tobacco Use    Smoking status: Former Smoker     Packs/day: 1.00     Years: 5.00     Pack years: 5.00     Last attempt to quit: 1971     Years since quittin.0    Smokeless tobacco: Never Used   Substance and Sexual Activity    Alcohol use: Yes     Alcohol/week: 1.0 standard drinks     Types: 1 Glasses of wine per week    Drug use: Yes     Frequency: 1.0 times per week    Sexual activity: Not Currently     Partners: Male   Lifestyle    Physical activity:     Days per week: Not on file     Minutes per session: Not on file    Stress: Not on file    Relationships    Social connections:     Talks on phone: Not on file     Gets together: Not on file     Attends Mandaeism service: Not on file     Active member of club or organization: Not on file     Attends meetings of clubs or organizations: Not on file     Relationship status: Not on file   Other Topics Concern    Not on file   Social History Narrative    Not on file       Current medications Reviewed    Review of Systems   Constitutional: Positive for fatigue.   HENT: Negative for nosebleeds.    Eyes: Negative for visual disturbance.   Respiratory: Positive for shortness of breath. Negative for cough.    Cardiovascular: Positive for chest pain and leg swelling. Negative for palpitations.   Gastrointestinal: Negative for nausea.   Musculoskeletal: Positive for gait problem.   Skin:        Ecchymosis BLE and BUE   Neurological: Positive for weakness. Negative for dizziness, seizures, syncope and headaches.   Hematological: Bruises/bleeds easily.   Psychiatric/Behavioral: Negative for sleep disturbance.     Objective:   Physical Exam   Constitutional: She is oriented to person, place, and time. She appears well-developed and well-nourished. No distress.   HENT:   Head: Normocephalic and atraumatic.   Eyes: Pupils are equal, round, and reactive to light. EOM are normal.   Neck: Normal range of motion.   Cardiovascular: Normal rate, regular rhythm and normal pulses.   Murmur heard.  Pulmonary/Chest: Effort normal. No respiratory distress.   Abdominal: Soft.   Musculoskeletal: Normal range of motion. She exhibits edema (BLE).   Neurological: She is alert and oriented to person, place, and time.   Skin: Skin is warm, dry and intact. Capillary refill takes less than 2 seconds. She is not diaphoretic.   Ecchymosis BUE and BLE    Psychiatric: She has a normal mood and affect. Her speech is normal and behavior is normal. Judgment and thought content normal.   Vitals reviewed.      Diagnostic Results: reviewed   TTE  11/7/19  · Moderate concentric left ventricular hypertrophy with a speckled pattern suggestive of infiltrative disease  · Low normal left ventricular systolic function. The estimated ejection fraction is 39%  · Grade II (moderate) left ventricular diastolic dysfunction consistent with pseudonormalization.  · Local segmental wall motion abnormalities with apical aneurysmal motion noted with definity contrast  · Normal right ventricular systolic function.  · Mild left atrial enlargement.  · Mild aortic regurgitation.  · Moderate aortic valve stenosis.  · Aortic valve area is 1.20 cm2; peak velocity is 2.64 m/s; mean gradient is 15 mmHg.  · Mild mitral regurgitation.  · Mild to moderate tricuspid regurgitation.  · Mild pulmonic regurgitation.  · Normal central venous pressure (3 mm Hg).  · The estimated PA systolic pressure is 40 mm Hg     Cardiac MRI 12/12/19  1. Increased LV volume with LVEF of 30%.  Akinesis of the apical (septum, inferior anterior and lateral wall) and the mid septum. There is no thrombus noted.  2. The maximum wall thickness in the basal and mid myocardium of the LV in the SAX measures 15-16mm in the septum and anterior wall. Suggestive of HCM  3. There is patchy LGE noted in the basal and mid myocardium (seen in patients with HCM) with transmural LGE noted on the entire apex, and mid inferoseptum (seen in patients with CAD). Total LGE mass is 29% of the myocardium  4. Increased RV systolic volume with RVEF of 35%.  5. LAE  6. AV sclerosis with restricted motion    Assessment:   1. HOCM   2. Aortic Stenosis   Plan:       CTS Attending Note:    I have personally taken the history and examined this patient and agree with the BRAD's note as stated above. Very pleasant 84-year-old woman with history of previous bypass surgery and postoperative MI.  She had an apical infarct.  On MRI, that segment is akinetic and somewhat aneurysmal.  I discussed her situation with her and her family member in detail.   Given her comorbid conditions, and the relatively small size of her ventricular aneurysm, I do not believe that the benefit of surgical correction would justify the operative risk.  I recommended medical management.  We will make arrangements for her to see our heart failure cardiologists here to see if there are any opportunities to optimize her medications.

## 2020-01-10 ENCOUNTER — HOSPITAL ENCOUNTER (OUTPATIENT)
Dept: RADIOLOGY | Facility: HOSPITAL | Age: 85
Discharge: HOME | End: 2020-01-10
Attending: INTERNAL MEDICINE
Payer: MEDICARE

## 2020-01-10 DIAGNOSIS — M81.0 OSTEOPOROSIS, UNSPECIFIED OSTEOPOROSIS TYPE, UNSPECIFIED PATHOLOGICAL FRACTURE PRESENCE: ICD-10-CM

## 2020-01-10 PROCEDURE — 77081 DXA BONE DENSITY APPENDICULR: CPT | Mod: TC,PO

## 2020-01-13 ENCOUNTER — TELEPHONE (OUTPATIENT)
Dept: TRANSPLANT | Facility: CLINIC | Age: 85
End: 2020-01-13

## 2020-01-13 ENCOUNTER — INFUSION (OUTPATIENT)
Dept: INFUSION THERAPY | Facility: HOSPITAL | Age: 85
End: 2020-01-13
Attending: INTERNAL MEDICINE
Payer: MEDICARE

## 2020-01-13 VITALS
DIASTOLIC BLOOD PRESSURE: 74 MMHG | SYSTOLIC BLOOD PRESSURE: 131 MMHG | TEMPERATURE: 98 F | RESPIRATION RATE: 18 BRPM | HEART RATE: 76 BPM | OXYGEN SATURATION: 94 % | WEIGHT: 97.38 LBS | BODY MASS INDEX: 21.08 KG/M2

## 2020-01-13 DIAGNOSIS — M81.0 SENILE OSTEOPOROSIS: Primary | ICD-10-CM

## 2020-01-13 DIAGNOSIS — I50.22 CHRONIC SYSTOLIC CONGESTIVE HEART FAILURE: Primary | ICD-10-CM

## 2020-01-13 PROCEDURE — 96372 THER/PROPH/DIAG INJ SC/IM: CPT

## 2020-01-13 PROCEDURE — 63600175 PHARM REV CODE 636 W HCPCS: Mod: JG | Performed by: INTERNAL MEDICINE

## 2020-01-13 RX ADMIN — DENOSUMAB 60 MG: 60 INJECTION SUBCUTANEOUS at 11:01

## 2020-01-13 NOTE — PLAN OF CARE
Problem: Adult Inpatient Plan of Care  Goal: Plan of Care Review  Outcome: Ongoing, Progressing  Goal: Optimal Comfort and Wellbeing  Outcome: Ongoing, Progressing  Intervention: Provide Person-Centered Care  Flowsheets (Taken 1/13/2020 1103)  Trust Relationship/Rapport: care explained

## 2020-01-24 ENCOUNTER — INITIAL CONSULT (OUTPATIENT)
Dept: TRANSPLANT | Facility: CLINIC | Age: 85
End: 2020-01-24
Attending: INTERNAL MEDICINE
Payer: MEDICARE

## 2020-01-24 ENCOUNTER — LAB VISIT (OUTPATIENT)
Dept: LAB | Facility: HOSPITAL | Age: 85
End: 2020-01-24
Attending: INTERNAL MEDICINE
Payer: MEDICARE

## 2020-01-24 VITALS
BODY MASS INDEX: 20.59 KG/M2 | DIASTOLIC BLOOD PRESSURE: 64 MMHG | HEART RATE: 74 BPM | HEIGHT: 57 IN | SYSTOLIC BLOOD PRESSURE: 140 MMHG | WEIGHT: 95.44 LBS

## 2020-01-24 DIAGNOSIS — I50.22 CHRONIC SYSTOLIC CONGESTIVE HEART FAILURE: ICD-10-CM

## 2020-01-24 DIAGNOSIS — I35.0 AORTIC VALVE STENOSIS, MODERATE: ICD-10-CM

## 2020-01-24 DIAGNOSIS — I50.42 CHRONIC COMBINED SYSTOLIC AND DIASTOLIC HEART FAILURE: ICD-10-CM

## 2020-01-24 DIAGNOSIS — Z95.1 S/P CABG (CORONARY ARTERY BYPASS GRAFT): ICD-10-CM

## 2020-01-24 DIAGNOSIS — I42.9 CARDIOMYOPATHY, UNSPECIFIED TYPE: Primary | ICD-10-CM

## 2020-01-24 DIAGNOSIS — I25.118 CORONARY ARTERY DISEASE OF NATIVE ARTERY OF NATIVE HEART WITH STABLE ANGINA PECTORIS: ICD-10-CM

## 2020-01-24 LAB
ANION GAP SERPL CALC-SCNC: 8 MMOL/L (ref 8–16)
BNP SERPL-MCNC: 2007 PG/ML (ref 0–99)
BUN SERPL-MCNC: 21 MG/DL (ref 8–23)
CALCIUM SERPL-MCNC: 9.2 MG/DL (ref 8.7–10.5)
CHLORIDE SERPL-SCNC: 104 MMOL/L (ref 95–110)
CO2 SERPL-SCNC: 28 MMOL/L (ref 23–29)
CREAT SERPL-MCNC: 1.2 MG/DL (ref 0.5–1.4)
EST. GFR  (AFRICAN AMERICAN): 48 ML/MIN/1.73 M^2
EST. GFR  (NON AFRICAN AMERICAN): 41.6 ML/MIN/1.73 M^2
GLUCOSE SERPL-MCNC: 84 MG/DL (ref 70–110)
POTASSIUM SERPL-SCNC: 4.2 MMOL/L (ref 3.5–5.1)
SODIUM SERPL-SCNC: 140 MMOL/L (ref 136–145)

## 2020-01-24 PROCEDURE — 1159F PR MEDICATION LIST DOCUMENTED IN MEDICAL RECORD: ICD-10-PCS | Mod: ,,, | Performed by: INTERNAL MEDICINE

## 2020-01-24 PROCEDURE — 99204 OFFICE O/P NEW MOD 45 MIN: CPT | Mod: S$PBB,,, | Performed by: INTERNAL MEDICINE

## 2020-01-24 PROCEDURE — 80048 BASIC METABOLIC PNL TOTAL CA: CPT

## 2020-01-24 PROCEDURE — 36415 COLL VENOUS BLD VENIPUNCTURE: CPT

## 2020-01-24 PROCEDURE — 1126F AMNT PAIN NOTED NONE PRSNT: CPT | Mod: ,,, | Performed by: INTERNAL MEDICINE

## 2020-01-24 PROCEDURE — 99204 PR OFFICE/OUTPT VISIT, NEW, LEVL IV, 45-59 MIN: ICD-10-PCS | Mod: S$PBB,,, | Performed by: INTERNAL MEDICINE

## 2020-01-24 PROCEDURE — 1159F MED LIST DOCD IN RCRD: CPT | Mod: ,,, | Performed by: INTERNAL MEDICINE

## 2020-01-24 PROCEDURE — 1126F PR PAIN SEVERITY QUANTIFIED, NO PAIN PRESENT: ICD-10-PCS | Mod: ,,, | Performed by: INTERNAL MEDICINE

## 2020-01-24 PROCEDURE — 99999 PR PBB SHADOW E&M-EST. PATIENT-LVL III: ICD-10-PCS | Mod: PBBFAC,,, | Performed by: INTERNAL MEDICINE

## 2020-01-24 PROCEDURE — 99999 PR PBB SHADOW E&M-EST. PATIENT-LVL III: CPT | Mod: PBBFAC,,, | Performed by: INTERNAL MEDICINE

## 2020-01-24 PROCEDURE — 99213 OFFICE O/P EST LOW 20 MIN: CPT | Mod: PBBFAC | Performed by: INTERNAL MEDICINE

## 2020-01-24 PROCEDURE — 83880 ASSAY OF NATRIURETIC PEPTIDE: CPT

## 2020-01-25 NOTE — PROGRESS NOTES
Subjective:     Patient ID:  Cheyanne Gomes is a 84 y.o. female who presents for evaluation of Congestive Heart Failure; Cardiomyopathy; Coronary Artery Disease; and Aortic Stenosis    HPI: 85 yo WF referred by Dr. Blair to Dr. Worthington for consideration of surgery.  She was not felt to be a candidate for surgery and he referred here for advance heart failure opinion re: condition, treatment.    She has known CAD with prior CABG as well as recent dx of AS.  Most recent ECHO by Dr. Blair 11/7/2019:  Conclusion   · Moderate concentric left ventricular hypertrophy with a speckled pattern suggestive of infiltrative disease  · Low normal left ventricular systolic function. The estimated ejection fraction is 39%  · Grade II (moderate) left ventricular diastolic dysfunction consistent with pseudonormalization.  · Local segmental wall motion abnormalities with apical aneurysmal motion noted with definity contrast  · Normal right ventricular systolic function.  · Mild left atrial enlargement.  · Mild aortic regurgitation.  · Moderate aortic valve stenosis.  · Aortic valve area is 1.20 cm2; peak velocity is 2.64 m/s; mean gradient is 15 mmHg.  · Mild mitral regurgitation.  · Mild to moderate tricuspid regurgitation.  · Mild pulmonic regurgitation.  · Normal central venous pressure (3 mm Hg).  · The estimated PA systolic pressure is 40 mm Hg     12/12/19 Cardiac MRI interpreted by Dr. Cruz  Conclusion:    1. Increased LV volume with LVEF of 30%.  Akinesis of the apical (septum, inferior anterior and lateral wall) and the mid septum. There is no thrombus noted.  2. The maximum wall thickness in the basal and mid myocardium of the LV in the SAX measures 15-16mm in the septum and anterior wall. Suggestive of HCM  3. There is patchy LGE noted in the basal and mid myocardium (seen in patients with HCM) with transmural LGE noted on the entire apex, and mid inferoseptum (seen in patients with CAD). Total LGE mass is 29% of  "the myocardium  4. Increased RV systolic volume with RVEF of 35%.  5. LAE  6. AV sclerosis with restricted motion    Her last hosp admission was with ARF, marked bo but at that time on metoprolol, Entresto  mg BID, metolazone 3x/week and loop diuretic daily.  A review of discharge summary attributes this to metoprolol and delayed elimination by ARF.  Metoprolol was stopped but so was metolazone, she is not sure if lasix dose reduced but now on 40 mg qd.  It should be noted that metoprolol is eliminated hepatic system not renal.  Therefore, I suspect the culprit for ARF was heavy diuretic dose plus Entresto and bo likely secondary to metabolic derangements with hyperkalemia at time of presentation and contributed to by metoprolol.    Patient reports SOB moving around her home.  She sleeps on 1 pillow plus HOB elevated for additional equivalent of 1-2 pillows.  She does occ awaken with SOB and uses NTG for relief.  Her angina consists of pressure sensation and she still has angina but says its rare, pressure sensation and different than PND spells that she relieves with NTG.    No presyncope or syncope.    While concern for infiltrative CM she has no hx of carpal tunnel.  She has neuropathy but attributes hand numbness to Raynaud's and leg/feet numbness to back surgery.  She attributes constipation to iron supplement and reports "IBS" since cholecystectomy.    Past Medical History:   Diagnosis Date    Abdominal hernia     Anemia     Dr Berkowitz     Aortic valve stenosis, moderate     Cannon's esophagus     CAD (coronary artery disease)     Cataract     ou done    CHF (congestive heart failure)     EFx 35%, Dr. Spencer 12/19/13    Chronic combined systolic and diastolic heart failure 9/28/2019    Colitis     Compression fracture     Diverticulosis     Encounter for blood transfusion     GERD (gastroesophageal reflux disease)     Hyperlipidemia     Hypertension     Irritable bowel syndrome  "    Osteoarthritis     lumbar DDD    Pneumonia 2017    Raynaud disease     Rheumatoid arteritis     Rheumatoid arthritis     Shingles 2017    Sjogren syndrome     Ulcerative colitis        Past Surgical History:   Procedure Laterality Date    APPENDECTOMY      BACK SURGERY      CARDIAC SURGERY      CABGx3 in     CATARACT EXTRACTION      ou od d 11-15-12//     SECTION, CLASSIC      x 2    CHOLECYSTECTOMY      COLONOSCOPY  09/15/2011    Dr Anaya     COLONOSCOPY  01/10/2017    [facility] report sent to scanning    CORONARY ANGIOPLASTY      PCI x 1 in     CORONARY ARTERY BYPASS GRAFT      3 vessel    CORONARY ARTERY BYPASS GRAFT      eyes      rk ou    FRACTURE SURGERY  2016    Dr Guido left hip     FRACTURE SURGERY  2018    Right Hip    HYSTERECTOMY      tubal ligation, oopherectomy    INTRAMEDULLARY RODDING OF TROCHANTER OF FEMUR Right 10/8/2018    Procedure: INSERTION, INTRAMEDULLARY KELIS, FEMUR, TROCHANTER;  Surgeon: Valerio Luther MD;  Location: [facility] OR;  Service: Orthopedics;  Laterality: Right;    OOPHORECTOMY      SPINE SURGERY  2013    Silvino Mckeon    UPPER GASTROINTESTINAL ENDOSCOPY      Yag Capsulotomy Right 14       Family History   Problem Relation Age of Onset    Hypertension Father     Heart disease Father         MI    Heart disease Mother         aortic stenosis    Skin cancer Mother     Heart disease Brother         2 brothers CABG    Arthritis Brother     Crohn's disease Brother     Hypertension Sister     Fibromyalgia Sister     Scleroderma Sister     Pulmonary embolism Sister     Hypertension Daughter     Early death Son         accident    Lupus Cousin     Lupus Cousin     Irritable bowel syndrome Sister     Crohn's disease Brother     Crohn's disease Brother     Amblyopia Neg Hx     Blindness Neg Hx     Cancer Neg Hx     Cataracts Neg Hx     Diabetes Neg Hx     Glaucoma Neg Hx     Macular degeneration Neg Hx      Retinal detachment Neg Hx     Strabismus Neg Hx     Stroke Neg Hx     Thyroid disease Neg Hx     Celiac disease Neg Hx     Cirrhosis Neg Hx     Psoriasis Neg Hx     Melanoma Neg Hx     Multiple sclerosis Neg Hx     Rheum arthritis Neg Hx     Tuberculosis Neg Hx     Lymphoma Neg Hx     Ulcerative colitis Neg Hx     Moses's disease Neg Hx     Stomach cancer Neg Hx     Rectal cancer Neg Hx     Liver disease Neg Hx     Liver cancer Neg Hx     Inflammatory bowel disease Neg Hx     Hemochromatosis Neg Hx     Esophageal cancer Neg Hx     Cystic fibrosis Neg Hx     Colon cancer Neg Hx        Social History     Socioeconomic History    Marital status:    Tobacco Use    Smoking status: Former Smoker     Packs/day: 1.00     Years: 5.00     Pack years: 5.00     Last attempt to quit: 1971     Years since quittin.0    Smokeless tobacco: Never Used   Substance and Sexual Activity    Alcohol use: Yes     Alcohol/week: 1.0 standard drinks     Types: 1 Glasses of wine per week    Drug use: Never     Medication Sig    amLODIPine (NORVASC) 5 MG tablet Take 5 mg by mouth once daily.     aspirin 81 mg Tab Take 1 tablet by mouth every evening. Every day    biotin 5 mg Cap Take 1 tablet by mouth 2 (two) times daily.    CALCIUM CARB/VIT D3/MINERALS (CALCIUM-VITAMIN D ORAL) Take 600 mg by mouth 2 (two) times daily. Calcium 1200 with D 3 1000    coenzyme Q10 100 mg capsule 200 mg once daily.     CRANBERRY CONC/ASCORBIC ACID (CRANBERRY PLUS VITAMIN C ORAL) Take 1 capsule by mouth once daily.    DEXILANT 60 mg capsule Take 60 mg by mouth once daily.     DOCUSATE CALCIUM (STOOL SOFTENER ORAL) Take 200 mg by mouth every evening.     ENTRESTO 49-51 mg per tablet Take 1 tablet by mouth 2 (two) times daily.    FERROUS FUMARATE/VIT BCOMP,C (SUPER B COMPLEX ORAL) Take 1 tablet by mouth once daily.    ferrous gluconate (FERGON) 324 MG tablet Take 1 tablet (324 mg total) by mouth daily with  breakfast. In the am and at noon    fluticasone (FLONASE) 50 mcg/actuation nasal spray 2 sprays by Each Nare route once daily. (Patient taking differently: 2 sprays by Each Nare route daily as needed. )    gabapentin (NEURONTIN) 100 MG capsule Take 1 capsule (100 mg total) by mouth every evening.    hydroxychloroquine (PLAQUENIL) 200 mg tablet Take 1 tablet (200 mg total) by mouth once daily.    isosorbide dinitrate (ISORDIL) 10 MG tablet Take 10 mg by mouth 3 (three) times daily.    krill-omega-3-dha-epa-lipids (KRILL OIL) 015-00-73-50 mg Cap Take 1,000 mg by mouth 3 (three) times daily. Braxton krill 300mg qd     lactobacillus acidophilus (PROBIOTIC) 10 billion cell Cap Take 1 each by mouth once daily. 1.5 billion    magnesium oxide (MAG-OX) 400 mg tablet Take 400 mg by mouth 2 (two) times daily.    megestrol (MEGACE) 400 mg/10 mL (40 mg/mL) Susp take 1 TABLESPOONFUL EVERY DAY    MULTIVITAMIN WITH MINERALS (ONE-A-DAY 50 PLUS) Tab Take 1 tablet by mouth once daily. Every day    nitroGLYCERIN (NITROLINGUAL) 0.4 mg/dose spray Place 1 spray under the tongue every 5 (five) minutes as needed. PRN    ondansetron (ZOFRAN) 4 MG tablet Take 4 mg by mouth every 8 (eight) hours as needed for Nausea.     pantoprazole (PROTONIX) 40 MG tablet Take 40 mg by mouth once daily.    potassium chloride SA (K-DUR,KLOR-CON) 10 MEQ tablet Take 20 mEq by mouth once daily.     promethazine (PHENERGAN) 25 MG tablet Take 1 tablet (25 mg total) by mouth 2 (two) times daily as needed for Nausea.    sertraline (ZOLOFT) 50 MG tablet Take 50 mg by mouth once daily.    temazepam (RESTORIL) 7.5 MG Cap Take 15 mg by mouth nightly as needed.    traMADol (ULTRAM) 50 mg tablet Take 1 tablet (50 mg total) by mouth every 12 (twelve) hours as needed for Pain. 1-2 tabs    vitamin E 400 unit Tab Take 400 mg by mouth once daily. Every day    furosemide (LASIX) 40 MG tablet Take 1 tablet (40 mg total) by mouth once daily.     Review of  "patient's allergies indicates:   Allergen Reactions    Nitrofurantoin macrocrystalline Nausea And Vomiting     Other reaction(s): very sickly    Penicillins Swelling     Other reaction(s): swelling  Other reaction(s): red skin discolorati    Sulfa (sulfonamide antibiotics) Nausea And Vomiting     Other reaction(s): very sickly       Review of Systems   Constitution: Positive for malaise/fatigue and weight loss. Negative for chills, fever, night sweats and weight gain.   HENT:        Dry mouth   Eyes:        Dryness eyes   Cardiovascular: Positive for chest pain, dyspnea on exertion, leg swelling (reports both legs though worse on left since CABG; wears support stockings), orthopnea and paroxysmal nocturnal dyspnea. Negative for irregular heartbeat, near-syncope, palpitations and syncope.   Respiratory: Positive for cough (dry; may be related to food/liquid intake but problem throughout the day). Negative for sputum production and wheezing.    Hematologic/Lymphatic: Bruises/bleeds easily.   Skin:        Raynaud's   Musculoskeletal: Positive for arthritis, back pain, joint pain, muscle weakness and stiffness.   Gastrointestinal: Positive for constipation, diarrhea and dysphagia. Negative for hematochezia and melena.   Genitourinary: Negative for hematuria.   Neurological: Positive for dizziness, light-headedness, numbness, sensory change and weakness. Negative for brief paralysis, focal weakness and seizures.     Objective:   Physical Exam   Constitutional: No distress.   BP (!) 140/64 (BP Location: Right arm, Patient Position: Sitting, BP Method: Medium (Automatic))   Pulse 74   Ht 4' 9" (1.448 m)   Wt 43.3 kg (95 lb 7.4 oz)   BMI 20.66 kg/m²   Elderly, frail appearing WF in NAD   HENT:   Head: Normocephalic and atraumatic.   Eyes: Conjunctivae are normal. Right eye exhibits no discharge. Left eye exhibits no discharge. No scleral icterus.   Neck: JVD (V wave to 18 cm) present. No thyromegaly present. "   Cardiovascular: Normal rate and regular rhythm. Exam reveals no gallop.   Murmur (Grade 2/6 MARIANA base and more grade 1-2 systolic blowing LLSB to apex; murmur radiates carotids vs bruit R>L) heard.  Pulmonary/Chest: Effort normal. No respiratory distress. She has rales (bibasilar with reduced BS and dullnes both bases).   Abdominal: Soft. Bowel sounds are normal. She exhibits no distension (liver span 12 cm) and no mass. There is no tenderness. There is no rebound and no guarding.   Musculoskeletal: She exhibits edema (venous support stockings bilaterally; no pitting edema with stockings). She exhibits no tenderness.   Neurological: She is alert.   Skin: Skin is warm and dry. She is not diaphoretic.   Psychiatric: She has a normal mood and affect. Her behavior is normal. Judgment and thought content normal.   Seems very sharp, good historian      EKG's with LVH and repol changes    MRI and echo in HPI    Lab Results   Component Value Date    BNP 2,007 (H) 01/24/2020     01/24/2020    K 4.2 01/24/2020    MG 1.9 10/04/2019     01/24/2020    CO2 28 01/24/2020    BUN 21 01/24/2020    CREATININE 1.2 01/24/2020    GLU 84 01/24/2020    AST 36 11/11/2019    ALT 22 11/11/2019    ALBUMIN 3.8 11/11/2019    PROT 6.1 11/11/2019    BILITOT 0.7 11/11/2019    WBC 7.07 11/11/2019    HGB 8.9 (L) 11/11/2019    HCT 29.0 (L) 11/11/2019     11/11/2019    INR 1.0 10/01/2019    INR 1.0 07/22/2019    TSH 1.707 06/18/2019     Assessment:     1. Cardiomyopathy, unspecified type with infiltration suspected on echo and MRI    2. Aortic valve stenosis, moderate    3. Chronic combined systolic and diastolic heart failure    4. Coronary artery disease of native artery of native heart with stable angina pectoris    5. S/P CABG (coronary artery bypass graft)      Plan:   With CAD would recommend checking lipids and using statin  I suspect that she would do best on entresto  mg BID with lower dose of diuretic as now off  metolazone as long as no evidence for amyloidosis.  Would also recommend getting her back on beta blocker since ARF as opposed to BB seems more likely cause of her bo  For amyloidosis evaluation get urine and serum immuno + FLC serum today and assuming negative proceed with PYP scan.  If PYP scan + then dx of cardiac TTR amyloidosis.  At that time would prescribe tafamidis and obtain genetic testing for mutation though most likely wild type this is important for family to know.  It would be very improbable that she had hypertrophic cardiomyopathy thus would eliminate this from differential dx.  She will f/u with Dr. Blair for CHF management and will forward this note to him for his perusal and communicate results of tests that I ordered today when available.  I have encouraged patient and daughter to avoid looking up amyloidosis on the computer pending evaluation as probably at most 20% chance she has this dx.  If she does, I will get her back into clinic for detailed education as only superficial overview today.  If she does not have cardiac amyloidosis would proceed with medication adjustments as outlined above.  If she has amyloidosis would not as these adjustments would likely be poorly tolerated and not offer survival or functional improvement.    Thank you for allowing me to see this nice woman in consultation and do not hesitate to contact me if any questions arise.    Humberto Arango MD, [facility], [place]  Medical Director, Cardiomyopathy and Heart Failure Program  [phone], extension [phone]  Cell [phone]

## 2020-01-31 ENCOUNTER — EXTERNAL CHRONIC CARE MANAGEMENT (OUTPATIENT)
Dept: PRIMARY CARE CLINIC | Facility: CLINIC | Age: 85
End: 2020-01-31
Payer: MEDICARE

## 2020-01-31 PROCEDURE — 99490 CHRNC CARE MGMT STAFF 1ST 20: CPT | Mod: S$PBB,,, | Performed by: FAMILY MEDICINE

## 2020-01-31 PROCEDURE — 99490 CHRNC CARE MGMT STAFF 1ST 20: CPT | Mod: PBBFAC,PO | Performed by: FAMILY MEDICINE

## 2020-01-31 PROCEDURE — 99490 PR CHRONIC CARE MGMT, 1ST 20 MIN: ICD-10-PCS | Mod: S$PBB,,, | Performed by: FAMILY MEDICINE

## 2020-02-04 ENCOUNTER — TELEPHONE (OUTPATIENT)
Dept: HEMATOLOGY/ONCOLOGY | Facility: CLINIC | Age: 85
End: 2020-02-04

## 2020-02-04 DIAGNOSIS — D63.8 ANEMIA OF CHRONIC DISEASE: ICD-10-CM

## 2020-02-04 DIAGNOSIS — D50.0 IRON DEFICIENCY ANEMIA DUE TO CHRONIC BLOOD LOSS: Primary | ICD-10-CM

## 2020-02-06 ENCOUNTER — TELEPHONE (OUTPATIENT)
Dept: CARDIOLOGY | Facility: CLINIC | Age: 85
End: 2020-02-06

## 2020-02-07 ENCOUNTER — CLINICAL SUPPORT (OUTPATIENT)
Dept: CARDIOLOGY | Facility: CLINIC | Age: 85
End: 2020-02-07
Attending: INTERNAL MEDICINE
Payer: MEDICARE

## 2020-02-07 DIAGNOSIS — I42.9 CARDIOMYOPATHY, UNSPECIFIED TYPE: ICD-10-CM

## 2020-02-07 DIAGNOSIS — N18.9 CHRONIC KIDNEY DISEASE, UNSPECIFIED CKD STAGE: ICD-10-CM

## 2020-02-07 LAB — OHS CV PLANAR SCORE: 2

## 2020-02-07 PROCEDURE — 78803 RP LOCLZJ TUM SPECT 1 AREA: CPT | Mod: PBBFAC | Performed by: INTERNAL MEDICINE

## 2020-02-07 PROCEDURE — 78803 CV PYP ATTR W SPECT (CUPID ONLY): ICD-10-PCS | Mod: 26,S$PBB,, | Performed by: INTERNAL MEDICINE

## 2020-02-13 ENCOUNTER — LAB VISIT (OUTPATIENT)
Dept: LAB | Facility: HOSPITAL | Age: 85
End: 2020-02-13
Attending: INTERNAL MEDICINE
Payer: MEDICARE

## 2020-02-13 DIAGNOSIS — I35.0 NODULAR CALCIFIC AORTIC VALVE STENOSIS: ICD-10-CM

## 2020-02-13 DIAGNOSIS — D64.9 ANEMIA, UNSPECIFIED: ICD-10-CM

## 2020-02-13 DIAGNOSIS — I25.10 CORONARY ATHEROSCLEROSIS OF NATIVE CORONARY ARTERY: Primary | ICD-10-CM

## 2020-02-13 DIAGNOSIS — I50.9 HEART FAILURE, UNSPECIFIED: ICD-10-CM

## 2020-02-13 LAB
ANION GAP SERPL CALC-SCNC: 9 MMOL/L (ref 8–16)
BNP SERPL-MCNC: 1226 PG/ML (ref 0–99)
BUN SERPL-MCNC: 16 MG/DL (ref 8–23)
CALCIUM SERPL-MCNC: 8.6 MG/DL (ref 8.7–10.5)
CHLORIDE SERPL-SCNC: 109 MMOL/L (ref 95–110)
CO2 SERPL-SCNC: 26 MMOL/L (ref 23–29)
CREAT SERPL-MCNC: 0.9 MG/DL (ref 0.5–1.4)
EST. GFR  (AFRICAN AMERICAN): >60 ML/MIN/1.73 M^2
EST. GFR  (NON AFRICAN AMERICAN): 58.9 ML/MIN/1.73 M^2
GLUCOSE SERPL-MCNC: 89 MG/DL (ref 70–110)
POTASSIUM SERPL-SCNC: 4.5 MMOL/L (ref 3.5–5.1)
SODIUM SERPL-SCNC: 144 MMOL/L (ref 136–145)

## 2020-02-13 PROCEDURE — 83880 ASSAY OF NATRIURETIC PEPTIDE: CPT

## 2020-02-13 PROCEDURE — 80048 BASIC METABOLIC PNL TOTAL CA: CPT

## 2020-02-13 PROCEDURE — 36415 COLL VENOUS BLD VENIPUNCTURE: CPT

## 2020-02-18 ENCOUNTER — TELEPHONE (OUTPATIENT)
Dept: TRANSPLANT | Facility: CLINIC | Age: 85
End: 2020-02-18

## 2020-02-18 NOTE — TELEPHONE ENCOUNTER
Patient has completed her evaluation for cardiac amyloidosis.  She has no evidence for AL amyloidosis.  Here no evidence for cardiac TTR amyloidosis.  Her evaluation has been completed.  She should continue under care of her cardiologist, Dr. Blair and her hematologist, Dr. Pal.  The minor elevation in [place] noted is not felt to be significant but since she sees Dr. Pal I asked that she bring to his attention at upcoming visit should he disagree or feel f/u is necessary.    I was unable to speak with her directly so left voicemail message and am sending this note to her physicians outlined above and her primary Dr. MILAN Alas.  My original clinic note was previously forwarded with complete recommendations.    Thank you for allowing me to see this nice woman in consultation and do not hesitate to contact me if any questions arise.    Humberto Arango MD, [facility], [facility]  Medical Director, Cardiomyopathy and Heart Failure Program  [phone], extension [phone]  Cell [phone]

## 2020-02-26 RX ORDER — TRAMADOL HYDROCHLORIDE 50 MG/1
50 TABLET ORAL EVERY 12 HOURS PRN
Qty: 60 TABLET | Refills: 2 | Status: SHIPPED | OUTPATIENT
Start: 2020-02-26 | End: 2020-07-02

## 2020-02-29 ENCOUNTER — EXTERNAL CHRONIC CARE MANAGEMENT (OUTPATIENT)
Dept: PRIMARY CARE CLINIC | Facility: CLINIC | Age: 85
End: 2020-02-29
Payer: MEDICARE

## 2020-02-29 PROCEDURE — 99490 CHRNC CARE MGMT STAFF 1ST 20: CPT | Mod: S$PBB,,, | Performed by: FAMILY MEDICINE

## 2020-02-29 PROCEDURE — 99490 PR CHRONIC CARE MGMT, 1ST 20 MIN: ICD-10-PCS | Mod: S$PBB,,, | Performed by: FAMILY MEDICINE

## 2020-03-09 RX ORDER — GABAPENTIN 100 MG/1
100 CAPSULE ORAL NIGHTLY
Qty: 30 CAPSULE | Refills: 3 | Status: SHIPPED | OUTPATIENT
Start: 2020-03-09 | End: 2020-07-08

## 2020-03-17 ENCOUNTER — TELEPHONE (OUTPATIENT)
Dept: RHEUMATOLOGY | Facility: CLINIC | Age: 85
End: 2020-03-17

## 2020-03-17 NOTE — TELEPHONE ENCOUNTER
Patient rescheduled appt for 3/17/20.  She c/o left shoulder and hip pain, and across her lower back.  Her plaquenil has been stopped.  She asks if maybe she needs X-rays or medication for the pain/inflammation.  She has tramadol but only gets 60 tabs  For one BID.  Please advise

## 2020-03-18 ENCOUNTER — OFFICE VISIT (OUTPATIENT)
Dept: RHEUMATOLOGY | Facility: CLINIC | Age: 85
End: 2020-03-18
Payer: MEDICARE

## 2020-03-18 VITALS
TEMPERATURE: 97 F | DIASTOLIC BLOOD PRESSURE: 76 MMHG | BODY MASS INDEX: 19.76 KG/M2 | WEIGHT: 91.31 LBS | SYSTOLIC BLOOD PRESSURE: 138 MMHG

## 2020-03-18 DIAGNOSIS — M35.00 SJOGREN'S SYNDROME WITHOUT EXTRAGLANDULAR INVOLVEMENT: Primary | ICD-10-CM

## 2020-03-18 DIAGNOSIS — M81.0 OSTEOPOROSIS, UNSPECIFIED OSTEOPOROSIS TYPE, UNSPECIFIED PATHOLOGICAL FRACTURE PRESENCE: ICD-10-CM

## 2020-03-18 PROCEDURE — 99213 PR OFFICE/OUTPT VISIT, EST, LEVL III, 20-29 MIN: ICD-10-PCS | Mod: S$GLB,,, | Performed by: INTERNAL MEDICINE

## 2020-03-18 PROCEDURE — 99213 OFFICE O/P EST LOW 20 MIN: CPT | Mod: S$GLB,,, | Performed by: INTERNAL MEDICINE

## 2020-03-18 RX ORDER — SACUBITRIL AND VALSARTAN 97; 103 MG/1; MG/1
1 TABLET, FILM COATED ORAL 2 TIMES DAILY
COMMUNITY
Start: 2020-03-09 | End: 2021-01-13

## 2020-03-18 NOTE — PROGRESS NOTES
[facility] RHEUMATOLOGY            PROGRESS NOTE      Subjective:       Patient ID:   NAME: Cheyanne Gomes : 1935     84 y.o. female    Referring Doc: No ref. provider found  Other Physicians:    Chief Complaint:  Sjogren's syndrome      History of Present Illness:     Patient returns today for a regularly scheduled follow-up visit for    Sjogren's , OA The patient is doing fairly ok  Less SALINAS.No CP  No joint swelling  No fevers,cough.  No rashes            ROS:   GEN:  No  fever, night sweats . weight is stable   No fatigue  SKIN: no rashes, no bruising, no ulcerations, no Raynaud's  HEENT: no HA's, No visual changes, no mucosal ulcers, no sicca symptoms,  CV:   no CP, SOB, PND, SALINAS, no orthopnea, no palpitations  PULM: normal with no SOB, cough, hemoptysis, sputum or pleuritic pain  GI:  no abdominal pain, nausea, vomiting, constipation, diarrhea, melanotic stools, BRBPR, hematemesis, no dysphagia  :   no dysuria  NEURO: no paresthesias, headaches, visual disturbances, muscle weakness  MUSCULOSKELETAL:no joint swelling, prolonged AM stiffness, no back pain, no muscle pain  Allergies:  Review of patient's allergies indicates:   Allergen Reactions    Nitrofurantoin macrocrystalline Nausea And Vomiting     Other reaction(s): very sickly    Penicillins Swelling     Other reaction(s): swelling  Other reaction(s): red skin discolorati    Sulfa (sulfonamide antibiotics) Nausea And Vomiting     Other reaction(s): very sickly       Medications:    Current Outpatient Medications:     amLODIPine (NORVASC) 5 MG tablet, Take 5 mg by mouth once daily. , Disp: , Rfl: 5    aspirin 81 mg Tab, Take 1 tablet by mouth every evening. Every day, Disp: , Rfl:     biotin 5 mg Cap, Take 1 tablet by mouth 2 (two) times daily., Disp: , Rfl:     CALCIUM CARB/VIT D3/MINERALS (CALCIUM-VITAMIN D ORAL), Take 600 mg by mouth 2 (two) times daily. Calcium 1200 with D 3 1000, Disp: , Rfl:     coenzyme Q10 100 mg capsule, 200  mg once daily. , Disp: , Rfl:     CRANBERRY CONC/ASCORBIC ACID (CRANBERRY PLUS VITAMIN C ORAL), Take 1 capsule by mouth once daily., Disp: , Rfl:     DEXILANT 60 mg capsule, Take 60 mg by mouth once daily. , Disp: , Rfl: 11    DOCUSATE CALCIUM (STOOL SOFTENER ORAL), Take 200 mg by mouth every evening. , Disp: , Rfl:     ENTRESTO  mg per tablet, , Disp: , Rfl:     FERROUS FUMARATE/VIT BCOMP,C (SUPER B COMPLEX ORAL), Take 1 tablet by mouth once daily., Disp: , Rfl:     ferrous gluconate (FERGON) 324 MG tablet, Take 1 tablet (324 mg total) by mouth daily with breakfast. In the am and at noon, Disp: , Rfl:     fluticasone (FLONASE) 50 mcg/actuation nasal spray, 2 sprays by Each Nare route once daily. (Patient taking differently: 2 sprays by Each Nare route daily as needed. ), Disp: 16 g, Rfl: 0    furosemide (LASIX) 40 MG tablet, Take 1 tablet (40 mg total) by mouth once daily. (Patient taking differently: Take 80 mg by mouth once daily. ), Disp: 30 tablet, Rfl: 0    gabapentin (NEURONTIN) 100 MG capsule, Take 1 capsule (100 mg total) by mouth every evening., Disp: 30 capsule, Rfl: 3    hydroxychloroquine (PLAQUENIL) 200 mg tablet, Take 1 tablet (200 mg total) by mouth once daily., Disp: 30 tablet, Rfl: 3    isosorbide dinitrate (ISORDIL) 10 MG tablet, Take 10 mg by mouth 3 (three) times daily., Disp: , Rfl: 5    krill-omega-3-dha-epa-lipids (KRILL OIL) 111-54-12-50 mg Cap, Take 1,000 mg by mouth 3 (three) times daily. Braxton krill 300mg qd , Disp: , Rfl:     lactobacillus acidophilus (PROBIOTIC) 10 billion cell Cap, Take 1 each by mouth once daily. 1.5 billion, Disp: , Rfl:     magnesium oxide (MAG-OX) 400 mg tablet, Take 400 mg by mouth 2 (two) times daily., Disp: , Rfl:     megestrol (MEGACE) 400 mg/10 mL (40 mg/mL) Susp, take 1 TABLESPOONFUL EVERY DAY, Disp: , Rfl: 3    MULTIVITAMIN WITH MINERALS (ONE-A-DAY 50 PLUS) Tab, Take 1 tablet by mouth once daily. Every day, Disp: , Rfl:      nitroGLYCERIN (NITROLINGUAL) 0.4 mg/dose spray, Place 1 spray under the tongue every 5 (five) minutes as needed. PRN, Disp: , Rfl:     ondansetron (ZOFRAN) 4 MG tablet, Take 4 mg by mouth every 8 (eight) hours as needed for Nausea. , Disp: , Rfl: 2    pantoprazole (PROTONIX) 40 MG tablet, Take 40 mg by mouth once daily., Disp: , Rfl:     potassium chloride SA (K-DUR,KLOR-CON) 10 MEQ tablet, Take 20 mEq by mouth once daily. , Disp: , Rfl: 3    promethazine (PHENERGAN) 25 MG tablet, Take 1 tablet (25 mg total) by mouth 2 (two) times daily as needed for Nausea., Disp: 60 tablet, Rfl: 1    sertraline (ZOLOFT) 50 MG tablet, Take 50 mg by mouth once daily., Disp: , Rfl:     temazepam (RESTORIL) 7.5 MG Cap, Take 15 mg by mouth nightly as needed., Disp: , Rfl:     traMADol (ULTRAM) 50 mg tablet, Take 1 tablet (50 MG total) by MOUTH every 12 (twelve) hours as needed FOR Pain. 1-2 tabs, Disp: 60 tablet, Rfl: 2    vitamin E 400 unit Tab, Take 400 mg by mouth once daily. Every day, Disp: , Rfl:     PMHx/PSHx Updates:          Objective:     Vitals:  Blood pressure 138/76, temperature 96.8 °F (36 °C), weight 41.4 kg (91 lb 4.8 oz).    Physical Examination:   GEN: no apparent distress, comfortable; AAOx3  SKIN: no rashes,no ulceration, no Raynaud's, no petechiae, no SQ nodules,  HEAD: normal  EYES: no pallor, no icterus  NECK: no masses, thyroid normal, trachea midline, no LAD/LN's, supple  CV: RRR with no murmur; l S1 and S2 reg. ,no gallop no rubs,   CHEST: Normal respiratory effort; CTAB; normal breath sounds; no wheeze or crackles  ABDOM: nontender and nondistended; soft; no masses; no rebound/guarding  MUSC/Skeletal: ROM normal; no crepitus; joints without synovitis  No joint swelling or tenderness of PIP, MCP, wrist, elbow, shoulder, or knee joints  EXTREM: no clubbing, cyanosis, no edema,normal  pulses   NEURO: grossly intact; motor WNL; AAOx3;  PSYCH: normal mood, affect and behavior  LYMPH: normal cervical,  supraclavicular          Labs:   Lab Results   Component Value Date    WBC 7.07 11/11/2019    HGB 8.9 (L) 11/11/2019    HCT 29.0 (L) 11/11/2019    MCV 94 11/11/2019     11/11/2019    CMP  @LASTLAB(NA,K,CL,CO2,GLU,BUN,Creatinine,Calcium,PROT,Albumin,Bilitot,Alkphos,AST,ALT,CRP,ESR,RF,CCP,VANCE,SSA,CPK,uric acid) )@  I have reviewed all available lab results and radiology reports.    Radiology/Diagnostic Studies:    Reviewed     Assessment/Plan:   (1) 84 y.o. female with diagnosis of OA,Sjogren's stable( off Plaquenil sec to eye complications)  2)CKD                  Discussion:     I have explained all of the above in detail and the patient understands all of the current recommendation(s). I have answered all questions to the best of my ability and to their complete satisfaction.       The patient is to continue with the current management plan         RTC in   4 months      Electronically signed by Deepak Erazo MD

## 2020-03-31 ENCOUNTER — EXTERNAL CHRONIC CARE MANAGEMENT (OUTPATIENT)
Dept: PRIMARY CARE CLINIC | Facility: CLINIC | Age: 85
End: 2020-03-31
Payer: MEDICARE

## 2020-03-31 PROCEDURE — 99490 CHRNC CARE MGMT STAFF 1ST 20: CPT | Mod: S$PBB,,, | Performed by: FAMILY MEDICINE

## 2020-03-31 PROCEDURE — 99490 PR CHRONIC CARE MGMT, 1ST 20 MIN: ICD-10-PCS | Mod: S$PBB,,, | Performed by: FAMILY MEDICINE

## 2020-04-21 ENCOUNTER — LAB VISIT (OUTPATIENT)
Dept: INFUSION THERAPY | Facility: HOSPITAL | Age: 85
End: 2020-04-21
Attending: NURSE PRACTITIONER
Payer: MEDICARE

## 2020-04-21 ENCOUNTER — TELEPHONE (OUTPATIENT)
Dept: HEMATOLOGY/ONCOLOGY | Facility: CLINIC | Age: 85
End: 2020-04-21

## 2020-04-21 DIAGNOSIS — Z03.818 ENCNTR FOR OBS FOR SUSP EXPSR TO OTH BIOLG AGENTS RULED OUT: Primary | ICD-10-CM

## 2020-04-21 DIAGNOSIS — D50.0 IRON DEFICIENCY ANEMIA DUE TO CHRONIC BLOOD LOSS: Primary | ICD-10-CM

## 2020-04-21 DIAGNOSIS — I50.9 CONGESTIVE HEART FAILURE, UNSPECIFIED HF CHRONICITY, UNSPECIFIED HEART FAILURE TYPE: ICD-10-CM

## 2020-04-21 PROCEDURE — U0002 COVID-19 LAB TEST NON-CDC: HCPCS

## 2020-04-22 LAB — SARS-COV-2 RNA RESP QL NAA+PROBE: NOT DETECTED

## 2020-04-23 ENCOUNTER — INFUSION (OUTPATIENT)
Dept: INFUSION THERAPY | Facility: HOSPITAL | Age: 85
End: 2020-04-23
Attending: INTERNAL MEDICINE
Payer: MEDICARE

## 2020-04-23 VITALS
HEIGHT: 58 IN | WEIGHT: 91.38 LBS | TEMPERATURE: 98 F | BODY MASS INDEX: 19.18 KG/M2 | HEART RATE: 63 BPM | SYSTOLIC BLOOD PRESSURE: 117 MMHG | RESPIRATION RATE: 18 BRPM | DIASTOLIC BLOOD PRESSURE: 54 MMHG

## 2020-04-23 DIAGNOSIS — D50.9 IRON DEFICIENCY ANEMIA, UNSPECIFIED IRON DEFICIENCY ANEMIA TYPE: Primary | ICD-10-CM

## 2020-04-23 PROCEDURE — 63600175 PHARM REV CODE 636 W HCPCS: Mod: JG | Performed by: INTERNAL MEDICINE

## 2020-04-23 PROCEDURE — 25000003 PHARM REV CODE 250: Performed by: INTERNAL MEDICINE

## 2020-04-23 PROCEDURE — 96365 THER/PROPH/DIAG IV INF INIT: CPT

## 2020-04-23 RX ADMIN — FERRIC CARBOXYMALTOSE INJECTION 750 MG: 50 INJECTION, SOLUTION INTRAVENOUS at 08:04

## 2020-04-27 ENCOUNTER — OFFICE VISIT (OUTPATIENT)
Dept: HEMATOLOGY/ONCOLOGY | Facility: CLINIC | Age: 85
End: 2020-04-27
Payer: MEDICARE

## 2020-04-27 DIAGNOSIS — D50.0 IRON DEFICIENCY ANEMIA DUE TO CHRONIC BLOOD LOSS: ICD-10-CM

## 2020-04-27 DIAGNOSIS — D63.8 ANEMIA OF CHRONIC DISEASE: Chronic | ICD-10-CM

## 2020-04-27 DIAGNOSIS — R79.89 ELEVATED SERUM CREATININE: ICD-10-CM

## 2020-04-27 PROCEDURE — 99441 PR PHYSICIAN TELEPHONE EVALUATION 5-10 MIN: ICD-10-PCS | Mod: 95,,, | Performed by: INTERNAL MEDICINE

## 2020-04-27 PROCEDURE — 99441 PR PHYSICIAN TELEPHONE EVALUATION 5-10 MIN: CPT | Mod: 95,,, | Performed by: INTERNAL MEDICINE

## 2020-04-27 NOTE — PROGRESS NOTES
Subjective:       Patient ID: Cheyanne Gomes is a 84 y.o. female.    Chief Complaint: anemia and chf follow op.     HPI:    Injectafer May 2019    4/23/2020:  Creat 1.5-new  Hb 9.5g/dl    All medications and past medical and surgical history have been reviewed.     The patient location is: Home  Visit type: Virtual visit with synchronous audio and video due to covid 19 pandemic crisis. Video malfunctioned.     Review of patient's allergies indicates:   Allergen Reactions    Nitrofurantoin macrocrystalline Nausea And Vomiting     Other reaction(s): very sickly    Penicillins Swelling     Other reaction(s): swelling  Other reaction(s): red skin discolorati    Sulfa (sulfonamide antibiotics) Nausea And Vomiting     Other reaction(s): very sickly       ROS  GEN:   normal without any fever, night sweats or weight loss  HEENT:  normal with no HA's,  changes in vision  CV:   normal with no CP, SOB  PULM:  normal with no SOB, cough  GI:   normal with no abdominal pain, nausea, vomiting  :   normal with no hematuria, dysuria  SKIN:   normal with no rash      Previous FAMHX and SOCHX information reviewed and remains unchanged         Objective:        Physical Exam  There were no vitals taken for this visit.  GEN:   no apparent distress, comfortable; AAOx3  HEAD:  atraumatic and normocephalic  EYES:   no pallor, no icterus,  PSYCH:  normal mood, affect and behavior  PULM:   no apparent distress, breathing unlabored.  NEURO:  Grossly intact.   MUSC:   Visibly normal ROM throughout.           All lab results and imaging results have been reviewed and discussed with the patient  Recent Results (from the past 336 hour(s))   CBC auto differential    Collection Time: 04/23/20  8:18 AM   Result Value Ref Range    WBC 4.90 3.90 - 12.70 K/uL    Hemoglobin 9.5 (L) 12.0 - 16.0 g/dL    Hematocrit 31.7 (L) 37.0 - 48.5 %    Platelets 204 150 - 350 K/uL     CMP  Sodium   Date Value Ref Range Status   04/23/2020 140 136 - 145 mmol/L  Final   12/11/2018 139 134 - 144 mmol/L      Potassium   Date Value Ref Range Status   04/23/2020 4.5 3.5 - 5.1 mmol/L Final     Chloride   Date Value Ref Range Status   04/23/2020 102 95 - 110 mmol/L Final   12/11/2018 96 (L) 98 - 110 mmol/L      CO2   Date Value Ref Range Status   04/23/2020 30 (H) 23 - 29 mmol/L Final     Glucose   Date Value Ref Range Status   04/23/2020 122 (H) 70 - 110 mg/dL Final   12/11/2018 97 70 - 99 mg/dL      BUN, Bld   Date Value Ref Range Status   04/23/2020 30 (H) 8 - 23 mg/dL Final     Creatinine   Date Value Ref Range Status   04/23/2020 1.5 (H) 0.5 - 1.4 mg/dL Final   12/11/2018 0.95 0.60 - 1.40 mg/dL      Calcium   Date Value Ref Range Status   04/23/2020 9.3 8.7 - 10.5 mg/dL Final     Total Protein   Date Value Ref Range Status   04/23/2020 6.4 6.0 - 8.4 g/dL Final     Albumin   Date Value Ref Range Status   04/23/2020 3.8 3.5 - 5.2 g/dL Final   12/11/2018 3.9 3.1 - 4.7 g/dL      Total Bilirubin   Date Value Ref Range Status   04/23/2020 0.6 0.1 - 1.0 mg/dL Final     Comment:     For infants and newborns, interpretation of results should be based  on gestational age, weight and in agreement with clinical  observations.  Premature Infant recommended reference ranges:  Up to 24 hours.............<8.0 mg/dL  Up to 48 hours............<12.0 mg/dL  3-5 days..................<15.0 mg/dL  6-29 days.................<15.0 mg/dL       Alkaline Phosphatase   Date Value Ref Range Status   04/23/2020 43 (L) 55 - 135 U/L Final     AST   Date Value Ref Range Status   04/23/2020 24 10 - 40 U/L Final     ALT   Date Value Ref Range Status   04/23/2020 11 10 - 44 U/L Final     Anion Gap   Date Value Ref Range Status   04/23/2020 8 8 - 16 mmol/L Final     eGFR if    Date Value Ref Range Status   04/23/2020 36.6 (A) >60 mL/min/1.73 m^2 Final     eGFR if non    Date Value Ref Range Status   04/23/2020 31.8 (A) >60 mL/min/1.73 m^2 Final     Comment:     Calculation used  to obtain the estimated glomerular filtration  rate (eGFR) is the CKD-EPI equation.          Total time spent with patient: 9 minutes.   Assessment:      1. Anemia of chronic disease    2. Elevated serum creatinine    3. Iron deficiency anemia due to chronic blood loss      Problem List Items Addressed This Visit     Anemia of chronic disease (Chronic)     Patient's hb fell to 9.5 and she had another dose of injectafer last week.  She states this is making her feel better at this time and she has no specific complaints.  I discussed that we will check this again in four months and see her back at that time.          Elevated serum creatinine     Patient's creatinine is currently 1.5 which is a rise from her baseline.  I discussed this with her today and the plan is to recheck this next week to confirm this level.  Would work up further if this remains high and may need referral to nephrology.          Iron deficiency anemia due to chronic blood loss     As above.  Patient is on iron infusions currently.  Improving.          Relevant Orders    CBC auto differential    Comprehensive metabolic panel    Ferritin           Plan:   As Above in Assessment      The plan was discussed with the patient and all questions/concerns have been answered to the patient's satisfaction.          Thank you for allowing me to participate in this pleasant patient's care. Please call with any questions or concerns.

## 2020-04-27 NOTE — ASSESSMENT & PLAN NOTE
Patient's creatinine is currently 1.5 which is a rise from her baseline.  I discussed this with her today and the plan is to recheck this next week to confirm this level.  Would work up further if this remains high and may need referral to nephrology.

## 2020-04-27 NOTE — ASSESSMENT & PLAN NOTE
Patient's hb fell to 9.5 and she had another dose of injectafer last week.  She states this is making her feel better at this time and she has no specific complaints.  I discussed that we will check this again in four months and see her back at that time.

## 2020-04-30 ENCOUNTER — EXTERNAL CHRONIC CARE MANAGEMENT (OUTPATIENT)
Dept: PRIMARY CARE CLINIC | Facility: CLINIC | Age: 85
End: 2020-04-30
Payer: MEDICARE

## 2020-04-30 PROCEDURE — 99490 PR CHRONIC CARE MGMT, 1ST 20 MIN: ICD-10-PCS | Mod: S$PBB,,, | Performed by: FAMILY MEDICINE

## 2020-04-30 PROCEDURE — 99490 CHRNC CARE MGMT STAFF 1ST 20: CPT | Mod: S$PBB,,, | Performed by: FAMILY MEDICINE

## 2020-05-01 ENCOUNTER — INFUSION (OUTPATIENT)
Dept: INFUSION THERAPY | Facility: HOSPITAL | Age: 85
End: 2020-05-01
Attending: INTERNAL MEDICINE
Payer: MEDICARE

## 2020-05-01 VITALS
OXYGEN SATURATION: 96 % | HEART RATE: 60 BPM | WEIGHT: 96.31 LBS | BODY MASS INDEX: 20.13 KG/M2 | DIASTOLIC BLOOD PRESSURE: 55 MMHG | SYSTOLIC BLOOD PRESSURE: 120 MMHG | TEMPERATURE: 98 F | RESPIRATION RATE: 17 BRPM

## 2020-05-01 DIAGNOSIS — D50.9 IRON DEFICIENCY ANEMIA, UNSPECIFIED IRON DEFICIENCY ANEMIA TYPE: Primary | ICD-10-CM

## 2020-05-01 PROCEDURE — 63600175 PHARM REV CODE 636 W HCPCS: Mod: JG | Performed by: INTERNAL MEDICINE

## 2020-05-01 PROCEDURE — 96365 THER/PROPH/DIAG IV INF INIT: CPT

## 2020-05-01 PROCEDURE — 25000003 PHARM REV CODE 250: Performed by: INTERNAL MEDICINE

## 2020-05-01 RX ADMIN — FERRIC CARBOXYMALTOSE INJECTION 750 MG: 50 INJECTION, SOLUTION INTRAVENOUS at 08:05

## 2020-05-05 ENCOUNTER — PATIENT MESSAGE (OUTPATIENT)
Dept: ADMINISTRATIVE | Facility: HOSPITAL | Age: 85
End: 2020-05-05

## 2020-05-18 NOTE — PROGRESS NOTES
"[facility] Note  Staff: TAY Dempsey-C    PCP: Dr. Romeo Alas    Chief Complaint: gross hematuria    Subjective:        HPI: Cheyanne Gomes is a 84 y.o. female NEW PT presents today for further evaluation of "gross hematuria" symptoms.  Pt states today she initially noticed the blood on her depends urinary pads back in April of 2020.  Pt states today it happens intermittently.  She has never seen blood in toilet while urinating.  She is also in process of workup with GI to rule out any GI bleeding related issues, which she already attributes to having internal hemorrhoids at this time.    No recent fever, malaise  No dysuria at this time.    Questions asked pt during office visit today:  DTF: Yes but attributes to Lasix 80 mg daily, NTF: 2-3 x night  Urgency:Yes, incontinence with urgency? No; bothersome No; .  Incontinence with laughing or straining: Yes - "occasionally";   If yes, how many pads/day? Changes out 1-2 pads daily  Gross HematuriaYes - started in April 2020  History of UTI: Yes - Last infection has been several years ago    REVIEW OF SYSTEMS:  A comprehensive 10 system review was performed and is negative except as noted above in HPI    PMHx:  Past Medical History:   Diagnosis Date    Abdominal hernia     Anemia     Dr Berkowitz     Aortic valve stenosis, moderate     Cannon's esophagus     CAD (coronary artery disease)     Cataract     ou done    CHF (congestive heart failure)     EFx 35%, Dr. Spencer 12/19/13    Chronic combined systolic and diastolic heart failure 9/28/2019    Colitis     Compression fracture     Diverticulosis     Encounter for blood transfusion     GERD (gastroesophageal reflux disease)     Hyperlipidemia     Hypertension     Irritable bowel syndrome     Osteoarthritis     lumbar DDD    Pneumonia 01/03/2017    Raynaud disease     Rheumatoid arteritis     Rheumatoid arthritis     Shingles 01/03/2017    Sjogren syndrome  "    Ulcerative colitis      Kidney stones: No    PSHx:  Past Surgical History:   Procedure Laterality Date    APPENDECTOMY      BACK SURGERY      CARDIAC SURGERY      CABGx3 in     CATARACT EXTRACTION      ou od d 11-15-12//     SECTION, CLASSIC      x 2    CHOLECYSTECTOMY      COLONOSCOPY  09/15/2011    Dr Anaya     COLONOSCOPY  01/10/2017    [facility] report sent to scanning    CORONARY ANGIOPLASTY      PCI x 1 in     CORONARY ARTERY BYPASS GRAFT      3 vessel    CORONARY ARTERY BYPASS GRAFT      eyes      rk ou    FRACTURE SURGERY  2016    Dr Guido left hip     FRACTURE SURGERY  2018    Right Hip    HYSTERECTOMY      tubal ligation, oopherectomy    INTRAMEDULLARY RODDING OF TROCHANTER OF FEMUR Right 10/8/2018    Procedure: INSERTION, INTRAMEDULLARY KELSI, FEMUR, TROCHANTER;  Surgeon: Valerio Luther MD;  Location: [facility] OR;  Service: Orthopedics;  Laterality: Right;    OOPHORECTOMY      SPINE SURGERY  2013    Silvino Mike    UPPER GASTROINTESTINAL ENDOSCOPY      Yag Capsulotomy Right 14     Fam Hx:   malignancies: No     Allergies:  Nitrofurantoin macrocrystalline; Penicillins; and Sulfa (sulfonamide antibiotics)    Medications: reviewed   Objective:     Vitals:    20 0906   BP: 130/60   Pulse: 72   Resp: 18     Physical Exam   Vitals reviewed.  Constitutional: She is oriented to person, place, and time. She appears well-developed and well-nourished.   HENT:   Head: Normocephalic and atraumatic.   Eyes: Conjunctivae and EOM are normal. Pupils are equal, round, and reactive to light.   Neck: Normal range of motion. Neck supple.   Cardiovascular: Normal rate, regular rhythm, normal heart sounds and intact distal pulses.    Pulmonary/Chest: Effort normal and breath sounds normal.   Abdominal: Soft. Bowel sounds are normal.   Musculoskeletal: Normal range of motion.   Neurological: She is alert and oriented to person, place, and time. She has normal reflexes.   Skin:  Skin is warm and dry.     Psychiatric: She has a normal mood and affect. Her behavior is normal. Judgment and thought content normal.     LABS REVIEW:  UA today:   Color:Clear, Yellow  Spec. Grav.  1.020  PH  7.0  Protein 30  Assessment:       1. Urinary problem in female    2. Hematuria, gross    3. Gross hematuria    4. Stress incontinence    5. Urinary frequency          Plan:   Gross hematuria:    1.   Urine culture, Urine cytology to be performed.  2.   CT Urogram with serum creat to be performed.    F/u with Urologist for further workup after labs/imaging has been completed.  Pt vu.    [facility]: Active    Janiya Ga, FNP-C

## 2020-05-19 ENCOUNTER — OFFICE VISIT (OUTPATIENT)
Dept: UROLOGY | Facility: CLINIC | Age: 85
End: 2020-05-19
Payer: MEDICARE

## 2020-05-19 ENCOUNTER — HOSPITAL ENCOUNTER (OUTPATIENT)
Dept: RADIOLOGY | Facility: HOSPITAL | Age: 85
Discharge: HOME | End: 2020-05-19
Attending: NURSE PRACTITIONER
Payer: MEDICARE

## 2020-05-19 VITALS
HEART RATE: 72 BPM | HEIGHT: 57 IN | WEIGHT: 98.31 LBS | SYSTOLIC BLOOD PRESSURE: 130 MMHG | BODY MASS INDEX: 21.21 KG/M2 | DIASTOLIC BLOOD PRESSURE: 60 MMHG | RESPIRATION RATE: 18 BRPM

## 2020-05-19 DIAGNOSIS — R31.0 HEMATURIA, GROSS: ICD-10-CM

## 2020-05-19 DIAGNOSIS — R31.0 GROSS HEMATURIA: ICD-10-CM

## 2020-05-19 DIAGNOSIS — N39.3 STRESS INCONTINENCE: ICD-10-CM

## 2020-05-19 DIAGNOSIS — N39.9 URINARY PROBLEM IN FEMALE: Primary | ICD-10-CM

## 2020-05-19 DIAGNOSIS — R35.0 URINARY FREQUENCY: ICD-10-CM

## 2020-05-19 LAB
BILIRUB SERPL-MCNC: ABNORMAL MG/DL
BLOOD URINE, POC: ABNORMAL
COLOR, POC UA: YELLOW
CREAT SERPL-MCNC: 1 MG/DL (ref 0.5–1.4)
GLUCOSE UR QL STRIP: ABNORMAL
KETONES UR QL STRIP: ABNORMAL
LEUKOCYTE ESTERASE URINE, POC: ABNORMAL
NITRITE, POC UA: ABNORMAL
PH, POC UA: 7
PROTEIN, POC: 30
SAMPLE: NORMAL
SPECIFIC GRAVITY, POC UA: 1.02
UROBILINOGEN, POC UA: 0.2

## 2020-05-19 PROCEDURE — 99214 OFFICE O/P EST MOD 30 MIN: CPT | Mod: S$PBB,,, | Performed by: NURSE PRACTITIONER

## 2020-05-19 PROCEDURE — 74178 CT UROGRAM ABD PELVIS W WO: ICD-10-PCS | Mod: 26,,, | Performed by: RADIOLOGY

## 2020-05-19 PROCEDURE — 99999 PR PBB SHADOW E&M-EST. PATIENT-LVL V: ICD-10-PCS | Mod: PBBFAC,,, | Performed by: NURSE PRACTITIONER

## 2020-05-19 PROCEDURE — 74178 CT ABD&PLV WO CNTR FLWD CNTR: CPT | Mod: 26,,, | Performed by: RADIOLOGY

## 2020-05-19 PROCEDURE — 99999 PR PBB SHADOW E&M-EST. PATIENT-LVL V: CPT | Mod: PBBFAC,,, | Performed by: NURSE PRACTITIONER

## 2020-05-19 PROCEDURE — 88112 CYTOPATH CELL ENHANCE TECH: CPT | Performed by: PATHOLOGY

## 2020-05-19 PROCEDURE — 25500020 PHARM REV CODE 255

## 2020-05-19 PROCEDURE — 87086 URINE CULTURE/COLONY COUNT: CPT

## 2020-05-19 PROCEDURE — 88112 PR  CYTOPATH, CELL ENHANCE TECH: ICD-10-PCS | Mod: 26,,, | Performed by: PATHOLOGY

## 2020-05-19 PROCEDURE — 74178 CT ABD&PLV WO CNTR FLWD CNTR: CPT | Mod: TC

## 2020-05-19 PROCEDURE — 99214 PR OFFICE/OUTPT VISIT, EST, LEVL IV, 30-39 MIN: ICD-10-PCS | Mod: S$PBB,,, | Performed by: NURSE PRACTITIONER

## 2020-05-19 PROCEDURE — 81002 URINALYSIS NONAUTO W/O SCOPE: CPT | Mod: PBBFAC,PN | Performed by: NURSE PRACTITIONER

## 2020-05-19 PROCEDURE — 99215 OFFICE O/P EST HI 40 MIN: CPT | Mod: PBBFAC,PN | Performed by: NURSE PRACTITIONER

## 2020-05-19 PROCEDURE — 88112 CYTOPATH CELL ENHANCE TECH: CPT | Mod: 26,,, | Performed by: PATHOLOGY

## 2020-05-19 RX ORDER — MECLIZINE HCL 12.5 MG 12.5 MG/1
12.5 TABLET ORAL EVERY 8 HOURS PRN
Status: ON HOLD | COMMUNITY
Start: 2020-04-15 | End: 2020-06-03

## 2020-05-19 RX ADMIN — IOHEXOL 100 ML: 350 INJECTION, SOLUTION INTRAVENOUS at 02:05

## 2020-05-19 NOTE — PATIENT INSTRUCTIONS
Blood in the Urine    Blood in the urine (hematuria) has many possible causes. If it occurs after an injury (such as a car accident or fall), it is most often a sign of bruising to the kidney or bladder. Common causes of blood in the urine include urinary tract infections, kidney stones, inflammation, tumors, or certain other diseases of the kidney or bladder. Menstruation can cause blood to appear in the urine sample, although it is not coming from the urinary tract.  If only a trace amount of blood is present, it will show up on the urine test, even though the urine may be yellow and not pink or red. This may occur with any of the above conditions, as well as heavy exercise or high fever. In this case, your doctor may want to repeat the urine test on another day. This will show if the blood is still present. If it is, then other tests can be done to find out the cause.  Home care  Follow these home care guidelines:  · If your urine does not appear bloody (pink, brown or red) then you do not need to restrict your activity in any way.  · If you can see blood in your urine, rest and avoid heavy exertion until your next exam. Do not use aspirin, blood thinners, or anti-platelet or anti-inflammatory medicines. These include ibuprofen and naproxen. These thin the blood and may increase bleeding.  Follow-up care  Follow up with your healthcare provider, or as advised. If you were injured and had blood in your urine, you should have a repeat urine test in 1 to 2 days. Contact your doctor for this test.  A radiologist will review any X-rays that were taken. You will be told of any new findings that may affect your care.  When to seek medical advice  Call your healthcare provider right away if any of these occur:  · Bright red blood or blood clots in the urine (if you did not have this before)  · Weakness, dizziness or fainting  · New groin, abdominal, or back pain  · Fever of 100.4ºF (38ºC) or higher, or as directed by  your healthcare provider  · Repeated vomiting  · Bleeding from the nose or gums or easy bruising  Date Last Reviewed: 9/1/2016 © 2000-2017 [employer]. [address] [place] [address], [place] [address]. All rights reserved. This information is not intended as a substitute for professional medical care. Always follow your healthcare professional's instructions.        What is Hematuria?  Blood in your urine is a condition known as hematuria. Most of the time, the cause of hematuria is not serious. But, never ignore blood in the urine. Your doctor can evaluate you to find the cause of the bleeding and treat it, if needed.  Types of hematuria  · Gross hematuria means that the blood can be seen by the naked eye. The urine may look pinkish, brownish, or bright red.  · Microscopic hematuria means that the urine is clear, but blood cells can be seen when urine is looked at under a microscope or tested in a lab.  Both types of hematuria can have the same causes. Neither one is necessarily more serious than the other. With either type, you may have other symptoms, such as pain, pressure, or burning when you urinate, abdominal pain, or back pain. Or, you may not have any other symptoms. No matter how much blood is found, the cause of the bleeding needs to be identified.  Finding the cause of hematuria  To evaluate your condition, your doctor will first confirm that blood is indeed present. Then other tests are done to pinpoint where the blood is coming from and why. Your doctor will decide which tests will best determine the cause of your hematuria. Some common tests are listed below.  · History and physical exam  · Lab tests may include urinalysis, a urine culture, a urine cytology, and various blood tests  · Cystoscopy  · Computed tomography (CT) or CT urography  · Magnetic resonance imaging (MRI) or MR urography  · Ultrasound of the kidney  · Kidney biopsy  Causes of hematuria include the very benign (exercised  induced hematuria) to the very severe (cancer of the urinary system). A variety of treatments are available depending on the cause.  Date Last Reviewed: 12/2/2016 © 2000-2017 [employer]. [address] [place] [address], [place] [address]. All rights reserved. This information is not intended as a substitute for professional medical care. Always follow your healthcare professional's instructions.        Hematuria: Possible Causes     Many things can lead to blood in the urine (hematuria). The blood may be seen with the eye (macroscopic or gross hematuria). Or it may only be seen when the urine is looked at under a microscope (microscopic hematuria). Some of the most common causes of blood in the urine are listed below. Often, no cause for the blood can be found. This is called idiopathic hematuria.  · Kidney or bladder stones are collections of crystals. They form in the urine. Stones may be found anywhere in the urinary tract. But they form most often in the kidneys or bladder. In addition to blood in the urine, they can cause severe pain.  · BPH stands for benign prostatic hyperplasia. It is enlargement of the prostate gland. It happens as men age. BPH often causes problems with urination. It sometimes causes blood in the urine.  · A urinary tract infection (UTI) is due to bacteria growing in the urinary tract. It can cause blood in the urine. Other symptoms include burning or pain with urination. You may need to urinate often or urgently. You may also have a fever.  · Damage to the urinary tract may cause blood in the urine. This damage may be due to a blow or accident. It may also result from the use of a urinary catheter. Very hard exercise may sometimes irritate the urinary tract and cause bleeding.  · Cancer may occur anywhere in the urinary tract. A tumor may sometimes cause no symptoms other than bleeding.     Other possible causes of bleeding include:  · Prostatitis (infection of the prostate  gland)  · Taking anticoagulants  · Blockage in the urinary tract  · Disease or inflammation of the kidney  · Cystic diseases of the kidneys  · Sickle cell anemia  · Vigorous exercise  · Endometriosis  Date Last Reviewed: 12/1/2016  © 1900-0654 [employer]. [address] [place] [address], [place] [address]. All rights reserved. This information is not intended as a substitute for professional medical care. Always follow your healthcare professional's instructions.

## 2020-05-20 LAB — BACTERIA UR CULT: NO GROWTH

## 2020-05-22 LAB — FINAL PATHOLOGIC DIAGNOSIS: NORMAL

## 2020-05-28 ENCOUNTER — OFFICE VISIT (OUTPATIENT)
Dept: UROLOGY | Facility: CLINIC | Age: 85
End: 2020-05-28
Payer: MEDICARE

## 2020-05-28 VITALS
HEART RATE: 67 BPM | HEIGHT: 57 IN | RESPIRATION RATE: 18 BRPM | WEIGHT: 95.25 LBS | BODY MASS INDEX: 20.55 KG/M2 | SYSTOLIC BLOOD PRESSURE: 123 MMHG | TEMPERATURE: 96 F | DIASTOLIC BLOOD PRESSURE: 70 MMHG

## 2020-05-28 DIAGNOSIS — Q61.3 POLYCYSTIC KIDNEY: ICD-10-CM

## 2020-05-28 DIAGNOSIS — R31.0 GROSS HEMATURIA: Primary | ICD-10-CM

## 2020-05-28 PROCEDURE — 99215 OFFICE O/P EST HI 40 MIN: CPT | Mod: S$PBB,,, | Performed by: UROLOGY

## 2020-05-28 PROCEDURE — 99999 PR PBB SHADOW E&M-EST. PATIENT-LVL IV: CPT | Mod: PBBFAC,,, | Performed by: UROLOGY

## 2020-05-28 PROCEDURE — 99214 OFFICE O/P EST MOD 30 MIN: CPT | Mod: PBBFAC,PN | Performed by: UROLOGY

## 2020-05-28 PROCEDURE — 99215 PR OFFICE/OUTPT VISIT, EST, LEVL V, 40-54 MIN: ICD-10-PCS | Mod: S$PBB,,, | Performed by: UROLOGY

## 2020-05-28 PROCEDURE — 99999 PR PBB SHADOW E&M-EST. PATIENT-LVL IV: ICD-10-PCS | Mod: PBBFAC,,, | Performed by: UROLOGY

## 2020-05-28 RX ORDER — FUROSEMIDE 80 MG/1
TABLET ORAL
COMMUNITY
Start: 2020-04-06 | End: 2021-02-11

## 2020-05-28 NOTE — H&P (VIEW-ONLY)
[facility] New Patient/H&P:    Cheyanne Gomes is a 84 y.o. female who presents as a NEW patient to me for gross hematuria.    Patient with a history of multiple medical problems who presents with intermittent gross hematuria starting on 20 associated with no pain.     She underwent CT urogram which showed polycystic kidneys and asymmetric renal perfusion - left greater than right. She denies any flank pain.     Patient denies any fever, chills, dysuria, urinary tract infection, bone pain or recent  trauma.       Urine culture  No growth  20    Urine cytology  Negative  20      Past Medical History:   Diagnosis Date    Abdominal hernia     Anemia     Dr Berkowitz     Aortic valve stenosis, moderate     Cannon's esophagus     CAD (coronary artery disease)     Cataract     ou done    CHF (congestive heart failure)     EFx 35%, Dr. Spencer 13    Chronic combined systolic and diastolic heart failure 2019    Colitis     Compression fracture     Diverticulosis     Encounter for blood transfusion     GERD (gastroesophageal reflux disease)     Hyperlipidemia     Hypertension     Irritable bowel syndrome     Osteoarthritis     lumbar DDD    Pneumonia 2017    Raynaud disease     Rheumatoid arteritis     Rheumatoid arthritis     Shingles 2017    Sjogren syndrome     Ulcerative colitis        Past Surgical History:   Procedure Laterality Date    APPENDECTOMY      BACK SURGERY      CARDIAC SURGERY      CABGx3 in     CATARACT EXTRACTION      ou od d 11-15-12/     SECTION, CLASSIC      x 2    CHOLECYSTECTOMY      COLONOSCOPY  09/15/2011    Dr Anaya     COLONOSCOPY  01/10/2017    [facility] report sent to scanning    CORONARY ANGIOPLASTY      PCI x 1 in     CORONARY ARTERY BYPASS GRAFT      3 vessel    CORONARY ARTERY BYPASS GRAFT      eyes      rk ou    FRACTURE SURGERY  2016    Dr Guido left hip     FRACTURE  SURGERY  2018    Right Hip    HYSTERECTOMY      tubal ligation, oopherectomy    INTRAMEDULLARY RODDING OF TROCHANTER OF FEMUR Right 10/8/2018    Procedure: INSERTION, INTRAMEDULLARY KELSI, FEMUR, TROCHANTER;  Surgeon: Valerio Luther MD;  Location: [facility];  Service: Orthopedics;  Laterality: Right;    OOPHORECTOMY      SPINE SURGERY  8-    Silvino Mckeon    UPPER GASTROINTESTINAL ENDOSCOPY      Yag Capsulotomy Right 9/25/14       Family History   Problem Relation Age of Onset    Hypertension Father     Heart disease Father         MI    Heart disease Mother         aortic stenosis    Skin cancer Mother     Heart disease Brother         2 brothers CABG    Arthritis Brother     Crohn's disease Brother     Hypertension Sister     Fibromyalgia Sister     Scleroderma Sister     Pulmonary embolism Sister     Hypertension Daughter     Early death Son         accident    Lupus Cousin     Lupus Cousin     Irritable bowel syndrome Sister     Crohn's disease Brother     Crohn's disease Brother     Amblyopia Neg Hx     Blindness Neg Hx     Cancer Neg Hx     Cataracts Neg Hx     Diabetes Neg Hx     Glaucoma Neg Hx     Macular degeneration Neg Hx     Retinal detachment Neg Hx     Strabismus Neg Hx     Stroke Neg Hx     Thyroid disease Neg Hx     Celiac disease Neg Hx     Cirrhosis Neg Hx     Psoriasis Neg Hx     Melanoma Neg Hx     Multiple sclerosis Neg Hx     Rheum arthritis Neg Hx     Tuberculosis Neg Hx     Lymphoma Neg Hx     Ulcerative colitis Neg Hx     Moses's disease Neg Hx     Stomach cancer Neg Hx     Rectal cancer Neg Hx     Liver disease Neg Hx     Liver cancer Neg Hx     Inflammatory bowel disease Neg Hx     Hemochromatosis Neg Hx     Esophageal cancer Neg Hx     Cystic fibrosis Neg Hx     Colon cancer Neg Hx        Social History     Socioeconomic History    Marital status:      Spouse name: Not on file    Number of children: Not on file    Years of  education: Not on file    Highest education level: Not on file   Occupational History    Not on file   Social Needs    Financial resource strain: Not on file    Food insecurity:     Worry: Not on file     Inability: Not on file    Transportation needs:     Medical: Not on file     Non-medical: Not on file   Tobacco Use    Smoking status: Former Smoker     Packs/day: 1.00     Years: 5.00     Pack years: 5.00     Last attempt to quit: 1971     Years since quittin.4    Smokeless tobacco: Never Used   Substance and Sexual Activity    Alcohol use: Yes     Alcohol/week: 1.0 standard drinks     Types: 1 Glasses of wine per week    Drug use: Never    Sexual activity: Not Currently     Partners: Male   Lifestyle    Physical activity:     Days per week: Not on file     Minutes per session: Not on file    Stress: Not on file   Relationships    Social connections:     Talks on phone: Not on file     Gets together: Not on file     Attends Orthodoxy service: Not on file     Active member of club or organization: Not on file     Attends meetings of clubs or organizations: Not on file     Relationship status: Not on file   Other Topics Concern    Not on file   Social History Narrative    Not on file       Review of patient's allergies indicates:   Allergen Reactions    Nitrofurantoin macrocrystalline Nausea And Vomiting     Other reaction(s): very sickly    Penicillins Swelling     Other reaction(s): swelling  Other reaction(s): red skin discolorati    Sulfa (sulfonamide antibiotics) Nausea And Vomiting     Other reaction(s): very sickly       Medications Reviewed: see MAR    ROS:    Constitutional: denies fevers, chills, night sweats, fatigue, malaise  Respiratory: negative for cough, shortness of breath, wheezing, dyspnea.  Cardiovascular: negative for chest pain, varicose veins, ankle swelling, palpitations, syncope.  GI: negative for abdominal pain, heartburn, indigestion, nausea, vomiting,  "constipation, diarrhea, blood in stool.   Urology: as noted above in HPI  Endocrinology: negative for cold intolerance, excessive thirst, not feeling tired/sluggish, no heat intolerance.   Hematology/Lymph: negative for easy bleeding, easy bruising, swollen glands.  Musculoskeletal: negative for back pain, joint pain, joint swelling, neck pain.  Allergy-Immunology: negative for seasonal allergies, negative for unusual infections.   Skin: negative for boils, breast lumps, hives, itching, rash.   Neurology: negative for, dizziness, headache, tingling/numbness, tremors.   Psych: satisfied with life; negative for, anxiety, depression, suicidal thoughts.     PHYSICAL EXAM:    Vitals:    05/28/20 1531   BP: 123/70   Pulse: 67   Resp: 18   Temp: 96.2 °F (35.7 °C)     Body mass index is 20.61 kg/m². Weight: 43.2 kg (95 lb 3.8 oz) Height: 4' 9" (144.8 cm)       General: Alert, cooperative, no distress, appears stated age  Head: Normocephalic, without obvious abnormality, atraumatic  Neck: no masses, no thyromegaly, no lymphadenopathy  Eyes: PERRL, conjunctiva/corneas clear  Lungs: Respirations unlabored, normal effort, no accessory muscle use  CV: Warm and well perfused extremities  Abdomen: Soft, non-tender, no CVA tenderness, no hepatosplenomegaly, no hernia  Extremities: Extremities normal, atraumatic, no cyanosis or edema  Skin: Normal color, texture, and turgor, no rashes or lesions  Psych: Appropriate, well oriented, normal affect, normal mood  Neuro: Non-focal      LABS:    Recent Results (from the past 336 hour(s))   POCT URINE DIPSTICK WITHOUT MICROSCOPE    Collection Time: 05/19/20  9:15 AM   Result Value Ref Range    Color, UA Yellow     Spec Grav UA 1.020     pH, UA 7.0     WBC, UA neg     Nitrite, UA neg     Protein 30     Glucose, UA neg     Ketones, UA neg     Urobilinogen, UA 0.2     Bilirubin neg     Blood, UA neg    Urine culture    Collection Time: 05/19/20 10:56 AM   Result Value Ref Range    Urine " Culture, Routine No growth    Cytology, Urine    Collection Time: 05/19/20 10:56 AM   Result Value Ref Range    Final Pathologic Diagnosis       URINE CYTOLOGY:  NO HIGH GRADE UROTHELIAL NEOPLASIA IDENTIFIED (LYDUMILA SYSTEM)     ISTAT CREATININE    Collection Time: 05/19/20  2:30 PM   Result Value Ref Range    POC Creatinine 1.0 0.5 - 1.4 mg/dL    Sample unknown        IMAGING:    CT urogram 5/19/20 Images independently reviewed by me    1. Nonobstructing right nephrolithiasis.  2. Asymmetric renal perfusion, left greater than right.  There is also asymmetric contrast excretion and right collecting system/ureter are not well opacified.  3. Polycystic kidney disease.  There are several new or enlarged hyperdense lesions (four in the right renal midpole and one at the left upper pole), which presumably reflect cysts with hemorrhagic or proteinaceous content.  Recommend additional imaging follow-up, whether by CT or ultrasound.  4. Cardiomegaly and coronary artery disease.      Assessment/Diagnosis:    1. Gross hematuria  Case Request Operating Room: CYSTOSCOPY    Place in Outpatient    Reason for no VTE Prophylaxis    COVID-19 Routine Screening   2. Polycystic kidney  US Retroperitoneal Complete (Kidney and       Plans:    - I spent 40 minutes with the patient; more than 50% was in counseling about the disease process and methods of treatment. We discussed the etiology and management of the patient's gross hematuria. Explained that hematuria is multi-factorial and can be secondary to  cancer, obstructive uropathy, nephritis,  trauma,  infections, thrombosis, nephrolithiasis, bleeding disorders and medications. We discussed hematuria work-up and the benefit of further evaluation with cystourethroscopy to rule out any malignancy, stones or other pathology. All risks, benefits and alternatives discussed at length and all questions answered.    - Scheduled for cystoscopy on 6/3/20 at the [facility] under local. Requires  COVID testing prior.   - RTC in 6 months with US renal prior to monitor renal cysts.

## 2020-05-28 NOTE — PROGRESS NOTES
[facility] New Patient/H&P:    Cheyanne Gomes is a 84 y.o. female who presents as a NEW patient to me for gross hematuria.    Patient with a history of multiple medical problems who presents with intermittent gross hematuria starting on 20 associated with no pain.     She underwent CT urogram which showed polycystic kidneys and asymmetric renal perfusion - left greater than right. She denies any flank pain.     Patient denies any fever, chills, dysuria, urinary tract infection, bone pain or recent  trauma.       Urine culture  No growth  20    Urine cytology  Negative  20      Past Medical History:   Diagnosis Date    Abdominal hernia     Anemia     Dr Berkowitz     Aortic valve stenosis, moderate     Cannon's esophagus     CAD (coronary artery disease)     Cataract     ou done    CHF (congestive heart failure)     EFx 35%, Dr. Spencer 13    Chronic combined systolic and diastolic heart failure 2019    Colitis     Compression fracture     Diverticulosis     Encounter for blood transfusion     GERD (gastroesophageal reflux disease)     Hyperlipidemia     Hypertension     Irritable bowel syndrome     Osteoarthritis     lumbar DDD    Pneumonia 2017    Raynaud disease     Rheumatoid arteritis     Rheumatoid arthritis     Shingles 2017    Sjogren syndrome     Ulcerative colitis        Past Surgical History:   Procedure Laterality Date    APPENDECTOMY      BACK SURGERY      CARDIAC SURGERY      CABGx3 in     CATARACT EXTRACTION      ou od d 11-15-12/     SECTION, CLASSIC      x 2    CHOLECYSTECTOMY      COLONOSCOPY  09/15/2011    Dr Anaya     COLONOSCOPY  01/10/2017    [facility] report sent to scanning    CORONARY ANGIOPLASTY      PCI x 1 in     CORONARY ARTERY BYPASS GRAFT      3 vessel    CORONARY ARTERY BYPASS GRAFT      eyes      rk ou    FRACTURE SURGERY  2016    Dr Guido left hip     FRACTURE  SURGERY  2018    Right Hip    HYSTERECTOMY      tubal ligation, oopherectomy    INTRAMEDULLARY RODDING OF TROCHANTER OF FEMUR Right 10/8/2018    Procedure: INSERTION, INTRAMEDULLARY KELSI, FEMUR, TROCHANTER;  Surgeon: Valerio Luther MD;  Location: [facility];  Service: Orthopedics;  Laterality: Right;    OOPHORECTOMY      SPINE SURGERY  8-    Silvino Mckeon    UPPER GASTROINTESTINAL ENDOSCOPY      Yag Capsulotomy Right 9/25/14       Family History   Problem Relation Age of Onset    Hypertension Father     Heart disease Father         MI    Heart disease Mother         aortic stenosis    Skin cancer Mother     Heart disease Brother         2 brothers CABG    Arthritis Brother     Crohn's disease Brother     Hypertension Sister     Fibromyalgia Sister     Scleroderma Sister     Pulmonary embolism Sister     Hypertension Daughter     Early death Son         accident    Lupus Cousin     Lupus Cousin     Irritable bowel syndrome Sister     Crohn's disease Brother     Crohn's disease Brother     Amblyopia Neg Hx     Blindness Neg Hx     Cancer Neg Hx     Cataracts Neg Hx     Diabetes Neg Hx     Glaucoma Neg Hx     Macular degeneration Neg Hx     Retinal detachment Neg Hx     Strabismus Neg Hx     Stroke Neg Hx     Thyroid disease Neg Hx     Celiac disease Neg Hx     Cirrhosis Neg Hx     Psoriasis Neg Hx     Melanoma Neg Hx     Multiple sclerosis Neg Hx     Rheum arthritis Neg Hx     Tuberculosis Neg Hx     Lymphoma Neg Hx     Ulcerative colitis Neg Hx     Moses's disease Neg Hx     Stomach cancer Neg Hx     Rectal cancer Neg Hx     Liver disease Neg Hx     Liver cancer Neg Hx     Inflammatory bowel disease Neg Hx     Hemochromatosis Neg Hx     Esophageal cancer Neg Hx     Cystic fibrosis Neg Hx     Colon cancer Neg Hx        Social History     Socioeconomic History    Marital status:      Spouse name: Not on file    Number of children: Not on file    Years of  education: Not on file    Highest education level: Not on file   Occupational History    Not on file   Social Needs    Financial resource strain: Not on file    Food insecurity:     Worry: Not on file     Inability: Not on file    Transportation needs:     Medical: Not on file     Non-medical: Not on file   Tobacco Use    Smoking status: Former Smoker     Packs/day: 1.00     Years: 5.00     Pack years: 5.00     Last attempt to quit: 1971     Years since quittin.4    Smokeless tobacco: Never Used   Substance and Sexual Activity    Alcohol use: Yes     Alcohol/week: 1.0 standard drinks     Types: 1 Glasses of wine per week    Drug use: Never    Sexual activity: Not Currently     Partners: Male   Lifestyle    Physical activity:     Days per week: Not on file     Minutes per session: Not on file    Stress: Not on file   Relationships    Social connections:     Talks on phone: Not on file     Gets together: Not on file     Attends Jew service: Not on file     Active member of club or organization: Not on file     Attends meetings of clubs or organizations: Not on file     Relationship status: Not on file   Other Topics Concern    Not on file   Social History Narrative    Not on file       Review of patient's allergies indicates:   Allergen Reactions    Nitrofurantoin macrocrystalline Nausea And Vomiting     Other reaction(s): very sickly    Penicillins Swelling     Other reaction(s): swelling  Other reaction(s): red skin discolorati    Sulfa (sulfonamide antibiotics) Nausea And Vomiting     Other reaction(s): very sickly       Medications Reviewed: see MAR    ROS:    Constitutional: denies fevers, chills, night sweats, fatigue, malaise  Respiratory: negative for cough, shortness of breath, wheezing, dyspnea.  Cardiovascular: negative for chest pain, varicose veins, ankle swelling, palpitations, syncope.  GI: negative for abdominal pain, heartburn, indigestion, nausea, vomiting,  "constipation, diarrhea, blood in stool.   Urology: as noted above in HPI  Endocrinology: negative for cold intolerance, excessive thirst, not feeling tired/sluggish, no heat intolerance.   Hematology/Lymph: negative for easy bleeding, easy bruising, swollen glands.  Musculoskeletal: negative for back pain, joint pain, joint swelling, neck pain.  Allergy-Immunology: negative for seasonal allergies, negative for unusual infections.   Skin: negative for boils, breast lumps, hives, itching, rash.   Neurology: negative for, dizziness, headache, tingling/numbness, tremors.   Psych: satisfied with life; negative for, anxiety, depression, suicidal thoughts.     PHYSICAL EXAM:    Vitals:    05/28/20 1531   BP: 123/70   Pulse: 67   Resp: 18   Temp: 96.2 °F (35.7 °C)     Body mass index is 20.61 kg/m². Weight: 43.2 kg (95 lb 3.8 oz) Height: 4' 9" (144.8 cm)       General: Alert, cooperative, no distress, appears stated age  Head: Normocephalic, without obvious abnormality, atraumatic  Neck: no masses, no thyromegaly, no lymphadenopathy  Eyes: PERRL, conjunctiva/corneas clear  Lungs: Respirations unlabored, normal effort, no accessory muscle use  CV: Warm and well perfused extremities  Abdomen: Soft, non-tender, no CVA tenderness, no hepatosplenomegaly, no hernia  Extremities: Extremities normal, atraumatic, no cyanosis or edema  Skin: Normal color, texture, and turgor, no rashes or lesions  Psych: Appropriate, well oriented, normal affect, normal mood  Neuro: Non-focal      LABS:    Recent Results (from the past 336 hour(s))   POCT URINE DIPSTICK WITHOUT MICROSCOPE    Collection Time: 05/19/20  9:15 AM   Result Value Ref Range    Color, UA Yellow     Spec Grav UA 1.020     pH, UA 7.0     WBC, UA neg     Nitrite, UA neg     Protein 30     Glucose, UA neg     Ketones, UA neg     Urobilinogen, UA 0.2     Bilirubin neg     Blood, UA neg    Urine culture    Collection Time: 05/19/20 10:56 AM   Result Value Ref Range    Urine " Culture, Routine No growth    Cytology, Urine    Collection Time: 05/19/20 10:56 AM   Result Value Ref Range    Final Pathologic Diagnosis       URINE CYTOLOGY:  NO HIGH GRADE UROTHELIAL NEOPLASIA IDENTIFIED (LYUDMILA SYSTEM)     ISTAT CREATININE    Collection Time: 05/19/20  2:30 PM   Result Value Ref Range    POC Creatinine 1.0 0.5 - 1.4 mg/dL    Sample unknown        IMAGING:    CT urogram 5/19/20 Images independently reviewed by me    1. Nonobstructing right nephrolithiasis.  2. Asymmetric renal perfusion, left greater than right.  There is also asymmetric contrast excretion and right collecting system/ureter are not well opacified.  3. Polycystic kidney disease.  There are several new or enlarged hyperdense lesions (four in the right renal midpole and one at the left upper pole), which presumably reflect cysts with hemorrhagic or proteinaceous content.  Recommend additional imaging follow-up, whether by CT or ultrasound.  4. Cardiomegaly and coronary artery disease.      Assessment/Diagnosis:    1. Gross hematuria  Case Request Operating Room: CYSTOSCOPY    Place in Outpatient    Reason for no VTE Prophylaxis    COVID-19 Routine Screening   2. Polycystic kidney  US Retroperitoneal Complete (Kidney and       Plans:    - I spent 40 minutes with the patient; more than 50% was in counseling about the disease process and methods of treatment. We discussed the etiology and management of the patient's gross hematuria. Explained that hematuria is multi-factorial and can be secondary to  cancer, obstructive uropathy, nephritis,  trauma,  infections, thrombosis, nephrolithiasis, bleeding disorders and medications. We discussed hematuria work-up and the benefit of further evaluation with cystourethroscopy to rule out any malignancy, stones or other pathology. All risks, benefits and alternatives discussed at length and all questions answered.    - Scheduled for cystoscopy on 6/3/20 at the [facility] under local. Requires  COVID testing prior.   - RTC in 6 months with US renal prior to monitor renal cysts.

## 2020-05-28 NOTE — PATIENT INSTRUCTIONS
Cystoscopy    Cystoscopy is a procedure that lets your doctor look directly inside your urethra and bladder. It can be used to:  · Help diagnose a problem with your urethra, bladder, or kidneys.  · Take a sample (biopsy) of bladder or urethral tissue.  · Treat certain problems (such as removing kidney stones).  · Place a stent to bypass an obstruction.  · Take special X-rays of the kidneys.  Based on the findings, your doctor may recommend other tests or treatments.  What is a cystoscope?  A cystoscope is a telescope-like instrument that contains lenses and fiberoptics (small glass wires that make bright light). The cystoscope may be straight and rigid, or flexible to bend around curves in the urethra. The doctor may look directly into the cystoscope, or project the image onto a monitor.  Getting ready  · Ask your doctor if you should stop taking any medicines before the procedure.  · Ask whether you should avoid eating or drinking anything after midnight before the procedure.  · Follow any other instructions your doctor gives you.  Tell your doctor before the exam if you:  · Take any medicines, such as aspirin or blood thinners  · Have allergies to any medicines  · Are pregnant   The procedure  Cystoscopy is done in the doctors office, surgery center, or hospital. The doctor and a nurse are present during the procedure. It takes only a few minutes, longer if a biopsy, X-ray, or treatment needs to be done.  During the procedure:  · You lie on an exam table on your back, knees bent and legs apart. You are covered with a drape.  · Your urethra and the area around it are washed. Anesthetic jelly may be applied to numb the urethra. Other pain medicine is usually not needed. In some cases, you may be offered a mild sedative to help you relax. If a more extensive procedure is to be done, such as a biopsy or kidney stone removal, general anesthesia may be needed.  · The cystoscope is inserted. A sterile fluid is put  into the bladder to expand it. You may feel pressure from this fluid.  · When the procedure is done, the cystoscope is removed.  After the procedure  If you had a sedative, general anesthesia, or spinal anesthesia, you must have someone drive you home. Once youre home:  · Drink plenty of fluids.  · You may have burning or light bleeding when you urinate--this is normal.  · Medicines may be prescribed to ease any discomfort or prevent infection. Take these as directed.  · Call your doctor if you have heavy bleeding or blood clots, burning that lasts more than a day, a fever over 100°F  (38° C), or trouble urinating.  Date Last Reviewed: 1/1/2017  © 9429-8339 [employer], [employer]. [address] [place] [address], [place] [address]. All rights reserved. This information is not intended as a substitute for professional medical care. Always follow your healthcare professional's instructions.

## 2020-05-31 ENCOUNTER — EXTERNAL CHRONIC CARE MANAGEMENT (OUTPATIENT)
Dept: PRIMARY CARE CLINIC | Facility: CLINIC | Age: 85
End: 2020-05-31
Payer: MEDICARE

## 2020-05-31 PROCEDURE — 99490 PR CHRONIC CARE MGMT, 1ST 20 MIN: ICD-10-PCS | Mod: S$GLB,,, | Performed by: FAMILY MEDICINE

## 2020-05-31 PROCEDURE — 99490 CHRNC CARE MGMT STAFF 1ST 20: CPT | Mod: S$GLB,,, | Performed by: FAMILY MEDICINE

## 2020-05-31 PROCEDURE — 99490 CHRNC CARE MGMT STAFF 1ST 20: CPT | Mod: PBBFAC,PO | Performed by: FAMILY MEDICINE

## 2020-06-01 ENCOUNTER — LAB VISIT (OUTPATIENT)
Dept: PRIMARY CARE CLINIC | Facility: CLINIC | Age: 85
End: 2020-06-01
Payer: MEDICARE

## 2020-06-01 DIAGNOSIS — R31.0 GROSS HEMATURIA: ICD-10-CM

## 2020-06-01 PROCEDURE — U0003 INFECTIOUS AGENT DETECTION BY NUCLEIC ACID (DNA OR RNA); SEVERE ACUTE RESPIRATORY SYNDROME CORONAVIRUS 2 (SARS-COV-2) (CORONAVIRUS DISEASE [COVID-19]), AMPLIFIED PROBE TECHNIQUE, MAKING USE OF HIGH THROUGHPUT TECHNOLOGIES AS DESCRIBED BY CMS-2020-01-R: HCPCS

## 2020-06-03 ENCOUNTER — HOSPITAL ENCOUNTER (OUTPATIENT)
Facility: AMBULARY SURGERY CENTER | Age: 85
Discharge: HOME | End: 2020-06-03
Attending: UROLOGY | Admitting: UROLOGY
Payer: MEDICARE

## 2020-06-03 DIAGNOSIS — R31.0 GROSS HEMATURIA: Primary | ICD-10-CM

## 2020-06-03 LAB — SARS-COV-2 RNA RESP QL NAA+PROBE: NOT DETECTED

## 2020-06-03 PROCEDURE — 52000 CYSTOURETHROSCOPY: CPT | Mod: ,,, | Performed by: UROLOGY

## 2020-06-03 PROCEDURE — 52000 PR CYSTOURETHROSCOPY: ICD-10-PCS | Mod: ,,, | Performed by: UROLOGY

## 2020-06-03 PROCEDURE — 52000 CYSTOURETHROSCOPY: CPT | Performed by: UROLOGY

## 2020-06-03 RX ORDER — LIDOCAINE HYDROCHLORIDE 20 MG/ML
JELLY TOPICAL
Status: DISCONTINUED | OUTPATIENT
Start: 2020-06-03 | End: 2020-06-03 | Stop reason: HOSPADM

## 2020-06-03 RX ORDER — CIPROFLOXACIN 500 MG/1
500 TABLET ORAL 2 TIMES DAILY
Qty: 6 TABLET | Refills: 0 | Status: SHIPPED | OUTPATIENT
Start: 2020-06-03 | End: 2020-06-06

## 2020-06-03 NOTE — PLAN OF CARE
Patient is being driven home by Fidelina. She walked to the car using her walker. She declined anything to drink.

## 2020-06-03 NOTE — OP NOTE
[facility]  Operative/Discharge Note    Date: 06/03/2020    Pre-Op Diagnosis: Gross hematuria    Post-Op Diagnosis: Same    Procedure(s) Performed:   1.  Cystoscopy     Specimen(s): None    Staff Surgeon: Miryam Bender MD    Assistant Surgeon: Miryam Bender Jr, MD    Anesthesia: Local anesthesia topical 2% lidocaine gel    Indications: Cheyanne Gomes is a 84 y.o. female with gross hematuria.     Patient with a history of multiple medical problems who presents with intermittent gross hematuria starting on 4/23/20 associated with no pain.      She underwent CT urogram which showed polycystic kidneys and asymmetric renal perfusion - left greater than right. She denies any flank pain.      Patient denies any fever, chills, dysuria, urinary tract infection, bone pain or recent  trauma.         Urine culture  No growth                    5/19/20     Urine cytology  Negative                      5/19/20       Findings: Mild to moderate bladder trabeculations, otherwise unremarkable cystoscopy.     Estimated Blood Loss: min    Drains: None    Procedure in Detail:  After risks, benefits and possible complications of cystoscopy were explained, the patient elected to undergo the procedure and informed consent was obtained. All questions were answered in the joe-operative area. The patient was transferred to the cystoscopy suite and placed in the lithotomy position.  The patient was prepped and draped in the usual sterile fashion. Lidocaine jelly was administered for local anesthesia. Time out was performed.    A flexible cystoscope was introduced into the bladder per urethra. This passed easily.  The entire urethra was visualized which showed no masses or strictures.  The right and left ureteral orifices were identified in the normal anatomic position and were seen effluxing clear urine.  Formal cystoscopy was performed which revealed no masses or lesions suspicious for malignancy, mild to moderate trabeculations,  no bladder stones and no bladder diverticuli.      The patient tolerated the procedure well and was transferred to recovery in stable condition.    Disposition: Home    Discharge home today status post uncomplicated procedure as above  Diet - resume home diet  Follow up: Return to clinic in 6 months with renal ultrasound prior to monitor renal cyst. Gross hematuria work-up negative.   Instructions: Return for bleeding, infection or severe pain. Patient given Cipro x 3 days for UTI prophylaxis.   Meds:     Medication List      START taking these medications    ciprofloxacin HCl 500 MG tablet  Commonly known as:  CIPRO  Take 1 tablet (500 mg total) by mouth 2 (two) times daily. for 3 days        CHANGE how you take these medications    fluticasone propionate 50 mcg/actuation nasal spray  Commonly known as:  FLONASE  2 sprays by Each Nare route once daily.  What changed:    · when to take this  · reasons to take this        CONTINUE taking these medications    amLODIPine 5 MG tablet  Commonly known as:  NORVASC     aspirin 81 mg Tab     biotin 5 mg Cap     CALCIUM-VITAMIN D ORAL     CRANBERRY PLUS VITAMIN C ORAL     DEXILANT 60 mg capsule  Generic drug:  dexlansoprazole     ENTRESTO  mg per tablet  Generic drug:  sacubitriL-valsartan     ferrous gluconate 324 MG tablet  Commonly known as:  FERGON  Take 1 tablet (324 mg total) by mouth daily with breakfast. In the am and at noon     furosemide 80 MG tablet  Commonly known as:  LASIX     gabapentin 100 MG capsule  Commonly known as:  NEURONTIN  Take 1 capsule (100 mg total) by mouth every evening.     IRON ORAL     isosorbide dinitrate 10 MG tablet  Commonly known as:  ISORDIL     magnesium oxide 400 mg (241.3 mg magnesium) tablet  Commonly known as:  MAG-OX     NITROLINGUAL 400 mcg/spray spray  Generic drug:  nitroGLYCERIN 0.4 MG/DOSE TL SPRY     ondansetron 4 MG tablet  Commonly known as:  ZOFRAN     ONE-A-DAY 50 PLUS tablet  Generic drug:  geriatric  multivitamin-min     potassium chloride SA 10 MEQ tablet  Commonly known as:  K-DUR,KLOR-CON     PROBIOTIC 10 billion cell Cap  Generic drug:  Lactobacillus acidophilus     promethazine 25 MG tablet  Commonly known as:  PHENERGAN  Take 1 tablet (25 mg total) by mouth 2 (two) times daily as needed for Nausea.     sertraline 50 MG tablet  Commonly known as:  ZOLOFT     STOOL SOFTENER ORAL     SUPER B COMPLEX ORAL     temazepam 7.5 MG Cap  Commonly known as:  RESTORIL     traMADoL 50 mg tablet  Commonly known as:  ULTRAM  Take 1 tablet (50 MG total) by MOUTH every 12 (twelve) hours as needed FOR Pain. 1-2 tabs     vitamin E 400 unit Tab           Where to Get Your Medications      These medications were sent to [address] [address]    Phone:  [phone]   · ciprofloxacin HCl 500 MG tablet         Miryam Bender Jr, MD

## 2020-06-04 VITALS
DIASTOLIC BLOOD PRESSURE: 79 MMHG | RESPIRATION RATE: 18 BRPM | HEART RATE: 68 BPM | BODY MASS INDEX: 20.49 KG/M2 | SYSTOLIC BLOOD PRESSURE: 163 MMHG | OXYGEN SATURATION: 99 % | WEIGHT: 95 LBS | HEIGHT: 57 IN | TEMPERATURE: 98 F

## 2020-06-04 NOTE — INTERVAL H&P NOTE
The patient has been examined and the H&P has been reviewed:    I concur with the findings and changes have been noted since the H&P was written: Ok to proceed at the ASC    Anesthesia/Surgery risks, benefits and alternative options discussed and understood by patient/family.          Active Hospital Problems    Diagnosis  POA    Gross hematuria [R31.0]  Yes      Resolved Hospital Problems   No resolved problems to display.

## 2020-06-30 ENCOUNTER — EXTERNAL CHRONIC CARE MANAGEMENT (OUTPATIENT)
Dept: PRIMARY CARE CLINIC | Facility: CLINIC | Age: 85
End: 2020-06-30
Payer: MEDICARE

## 2020-06-30 PROCEDURE — 99490 PR CHRONIC CARE MGMT, 1ST 20 MIN: ICD-10-PCS | Mod: S$GLB,,, | Performed by: FAMILY MEDICINE

## 2020-06-30 PROCEDURE — 99490 CHRNC CARE MGMT STAFF 1ST 20: CPT | Mod: S$GLB,,, | Performed by: FAMILY MEDICINE

## 2020-06-30 PROCEDURE — 99490 CHRNC CARE MGMT STAFF 1ST 20: CPT | Mod: PBBFAC,PO | Performed by: FAMILY MEDICINE

## 2020-07-02 ENCOUNTER — OFFICE VISIT (OUTPATIENT)
Dept: RHEUMATOLOGY | Facility: CLINIC | Age: 85
End: 2020-07-02
Payer: MEDICARE

## 2020-07-02 VITALS
BODY MASS INDEX: 20.51 KG/M2 | SYSTOLIC BLOOD PRESSURE: 134 MMHG | TEMPERATURE: 98 F | DIASTOLIC BLOOD PRESSURE: 66 MMHG | WEIGHT: 94.81 LBS

## 2020-07-02 DIAGNOSIS — M35.00 SJOGREN'S SYNDROME WITHOUT EXTRAGLANDULAR INVOLVEMENT: Primary | ICD-10-CM

## 2020-07-02 PROCEDURE — 99213 PR OFFICE/OUTPT VISIT, EST, LEVL III, 20-29 MIN: ICD-10-PCS | Mod: S$GLB,,, | Performed by: INTERNAL MEDICINE

## 2020-07-02 PROCEDURE — 99213 OFFICE O/P EST LOW 20 MIN: CPT | Mod: S$GLB,,, | Performed by: INTERNAL MEDICINE

## 2020-07-02 RX ORDER — MECOBALAMIN 1000 MCG
TABLET,CHEWABLE ORAL
COMMUNITY
End: 2021-04-05

## 2020-07-02 RX ORDER — TRAMADOL HYDROCHLORIDE 50 MG/1
TABLET ORAL
Qty: 120 TABLET | Refills: 2 | Status: SHIPPED | OUTPATIENT
Start: 2020-07-02 | End: 2020-09-24

## 2020-07-02 RX ORDER — FOLIC ACID 1 MG/1
2 TABLET ORAL DAILY
COMMUNITY
Start: 2020-06-12

## 2020-07-02 NOTE — PROGRESS NOTES
[facility] RHEUMATOLOGY            PROGRESS NOTE      Subjective:       Patient ID:   NAME: Cheyanne Gomes : 1935     84 y.o. female    Referring Doc: No ref. provider found  Other Physicians:    Chief Complaint:  Sjogren's syndrome      History of Present Illness:     Patient returns today for a regularly scheduled follow-up visit for Sjogren's syndrome and osteoarthritis      The patient is experiencing increased neck pain low back pain hip pain and finger joint pains.  She did not have enough tramadol to take 2 twice a day.  No fevers cough shortness of breath or chest pains.  She tolerates tramadol well without dizziness or drowsiness.  She is off Plaquenil due to ophthalmologic side effects.  No rashes        ROS:   GEN:  No  fever, night sweats . weight is stable   + fatigue  SKIN: no rashes, no bruising, no ulcerations, no Raynaud's  HEENT: no HA's, No visual changes, no mucosal ulcers, no sicca symptoms,  CV:   no CP, SOB, PND, SALINAS, no orthopnea, no palpitations  PULM: normal with no SOB, cough, hemoptysis, sputum or pleuritic pain  GI:  no abdominal pain, nausea, vomiting, constipation, diarrhea, melanotic stools, BRBPR, hematemesis, no dysphagia  :   no dysuria  NEURO: no paresthesias, headaches, visual disturbances, muscle weakness  MUSCULOSKELETAL:no joint swelling, prolonged AM stiffness, + back pain, + muscle pain  Allergies:  Review of patient's allergies indicates:   Allergen Reactions    Nitrofurantoin macrocrystalline Nausea And Vomiting     Other reaction(s): very sickly    Penicillins Swelling     Other reaction(s): swelling  Other reaction(s): red skin discolorati    Sulfa (sulfonamide antibiotics) Nausea And Vomiting     Other reaction(s): very sickly       Medications:    Current Outpatient Medications:     amLODIPine (NORVASC) 5 MG tablet, Take 5 mg by mouth once daily. , Disp: , Rfl: 5    aspirin 81 mg Tab, Take 1 tablet by mouth every evening. Every day, Disp: , Rfl:      biotin 5 mg Cap, Take 1 tablet by mouth 2 (two) times daily., Disp: , Rfl:     CALCIUM CARB/VIT D3/MINERALS (CALCIUM-VITAMIN D ORAL), Take 600 mg by mouth 2 (two) times daily. Calcium 1200 with D 3 1000, Disp: , Rfl:     CRANBERRY CONC/ASCORBIC ACID (CRANBERRY PLUS VITAMIN C ORAL), Take 1 capsule by mouth once daily., Disp: , Rfl:     cyclosporine (RESTASIS OPHT), Apply to eye., Disp: , Rfl:     DEXILANT 60 mg capsule, Take 60 mg by mouth once daily. , Disp: , Rfl: 11    DOCUSATE CALCIUM (STOOL SOFTENER ORAL), Take 200 mg by mouth every evening. , Disp: , Rfl:     ENTRESTO  mg per tablet, 1 tablet 2 (two) times daily. , Disp: , Rfl:     FERROUS FUMARATE/VIT BCOMP,C (SUPER B COMPLEX ORAL), Take 1 tablet by mouth once daily., Disp: , Rfl:     ferrous gluconate (FERGON) 324 MG tablet, Take 1 tablet (324 mg total) by mouth daily with breakfast. In the am and at noon, Disp: , Rfl:     ferrous sulfate (IRON ORAL), qd, Disp: , Rfl:     fluticasone (FLONASE) 50 mcg/actuation nasal spray, 2 sprays by Each Nare route once daily. (Patient taking differently: 2 sprays by Each Nare route daily as needed. ), Disp: 16 g, Rfl: 0    folic acid (FOLVITE) 1 MG tablet, , Disp: , Rfl:     furosemide (LASIX) 80 MG tablet, , Disp: , Rfl:     gabapentin (NEURONTIN) 100 MG capsule, Take 1 capsule (100 mg total) by mouth every evening., Disp: 30 capsule, Rfl: 3    isosorbide dinitrate (ISORDIL) 10 MG tablet, Take 10 mg by mouth 3 (three) times daily., Disp: , Rfl: 5    lactobacillus acidophilus (PROBIOTIC) 10 billion cell Cap, Take 1 each by mouth once daily. 1.5 billion, Disp: , Rfl:     magnesium oxide (MAG-OX) 400 mg tablet, Take 400 mg by mouth 2 (two) times daily., Disp: , Rfl:     mecobalamin, vitamin B12, 1,000 mcg Chew, B12 Active 1,000 mcg chewable tablet  Take 1 tablet every day by oral route in the morning for 30 days., Disp: , Rfl:     MULTIVITAMIN WITH MINERALS (ONE-A-DAY 50 PLUS) Tab, Take 1 tablet  by mouth once daily. Every day, Disp: , Rfl:     nitroGLYCERIN (NITROLINGUAL) 0.4 mg/dose spray, Place 1 spray under the tongue every 5 (five) minutes as needed. PRN, Disp: , Rfl:     ondansetron (ZOFRAN) 4 MG tablet, Take 4 mg by mouth every 8 (eight) hours as needed for Nausea. , Disp: , Rfl: 2    potassium chloride SA (K-DUR,KLOR-CON) 10 MEQ tablet, Take 20 mEq by mouth once daily. , Disp: , Rfl: 3    promethazine (PHENERGAN) 25 MG tablet, Take 1 tablet (25 mg total) by mouth 2 (two) times daily as needed for Nausea., Disp: 60 tablet, Rfl: 1    sertraline (ZOLOFT) 50 MG tablet, Take 50 mg by mouth once daily., Disp: , Rfl:     temazepam (RESTORIL) 7.5 MG Cap, Take 15 mg by mouth nightly as needed., Disp: , Rfl:     traMADoL (ULTRAM) 50 mg tablet, 2 po bid prn severe pain, Disp: 120 tablet, Rfl: 2    vitamin E 400 unit Tab, Take 400 mg by mouth once daily. Every day, Disp: , Rfl:     PMHx/PSHx Updates:          Objective:     Vitals:  Blood pressure 134/66, temperature 98.2 °F (36.8 °C), weight 43 kg (94 lb 12.8 oz).    Physical Examination:   GEN: no apparent distress, comfortable; AAOx3  SKIN: no rashes,no ulceration, no Raynaud's, no petechiae, no SQ nodules,  HEAD: normal  EYES: no pallor, no icterus,   NECK: no masses, thyroid normal, trachea midline, no LAD/LN's, supple  CV: RRR with no murmur; l S1 and S2 reg. ,no gallop no rubs,   CHEST: Normal respiratory effort; CTAB; normal breath sounds; no wheeze or crackles  MUSC/Skeletal: ROM normal; no crepitus; joints without synovitis,  no deformities  No joint swelling or tenderness of PIP, MCP, wrist, elbow, shoulder, or knee joints  EXTREM: no clubbing, cyanosis, no edema,normal  pulses   NEURO: grossly intact; motor WNL; AAOx3;   PSYCH: normal mood, affect and behavior  LYMPH: normal cervical, supraclavicular          Labs:   Lab Results   Component Value Date    WBC 4.90 04/23/2020    HGB 9.5 (L) 04/23/2020    HCT 31.7 (L) 04/23/2020    MCV 94  04/23/2020     04/23/2020    CMP  @[facility](NA,K,CL,CO2,GLU,BUN,Creatinine,Calcium,PROT,Albumin,Bilitot,Alkphos,AST,ALT,CRP,ESR,RF,CCP,VANCE,SSA,CPK,uric acid) )@  I have reviewed all available lab results and radiology reports.    Radiology/Diagnostic Studies:        Assessment/Plan:   (1) 84 y.o. female with diagnosis of osteoarthritis and Sjogren's.  Is experiencing more arthralgias and back pain.  She was taking tramadol 2 twice a day but ran out and with only 1 twice a day she does not get enough pain relief.  She states she tolerates tramadol well without dizziness drowsiness    Plan increase tramadol to 2 twice a day as needed.  She takes 2 Tylenol a day.  Review recent blood work.  She will follow-up with her hematologist for anemia and leukopenia.      Discussion:     I have explained all of the above in detail and the patient understands all of the current recommendation(s). I have answered all questions to the best of my ability and to their complete satisfaction.       The patient is to continue with the current management plan         RTC in   3 months      Electronically signed by Deepak Erazo MD

## 2020-07-13 ENCOUNTER — TELEPHONE (OUTPATIENT)
Dept: INFUSION THERAPY | Facility: HOSPITAL | Age: 85
End: 2020-07-13

## 2020-07-31 ENCOUNTER — EXTERNAL CHRONIC CARE MANAGEMENT (OUTPATIENT)
Dept: PRIMARY CARE CLINIC | Facility: CLINIC | Age: 85
End: 2020-07-31
Payer: MEDICARE

## 2020-07-31 PROCEDURE — 99490 CHRNC CARE MGMT STAFF 1ST 20: CPT | Mod: S$GLB,,, | Performed by: FAMILY MEDICINE

## 2020-07-31 PROCEDURE — 99490 PR CHRONIC CARE MGMT, 1ST 20 MIN: ICD-10-PCS | Mod: S$GLB,,, | Performed by: FAMILY MEDICINE

## 2020-08-24 ENCOUNTER — OFFICE VISIT (OUTPATIENT)
Dept: FAMILY MEDICINE | Facility: CLINIC | Age: 85
End: 2020-08-24
Payer: MEDICARE

## 2020-08-24 ENCOUNTER — LAB VISIT (OUTPATIENT)
Dept: LAB | Facility: HOSPITAL | Age: 85
End: 2020-08-24
Attending: PHYSICIAN ASSISTANT
Payer: MEDICARE

## 2020-08-24 VITALS
OXYGEN SATURATION: 98 % | HEIGHT: 57 IN | SYSTOLIC BLOOD PRESSURE: 160 MMHG | HEART RATE: 68 BPM | WEIGHT: 97.88 LBS | DIASTOLIC BLOOD PRESSURE: 74 MMHG | BODY MASS INDEX: 21.12 KG/M2 | TEMPERATURE: 98 F

## 2020-08-24 DIAGNOSIS — R05.9 COUGH: ICD-10-CM

## 2020-08-24 DIAGNOSIS — J06.9 VIRAL UPPER RESPIRATORY TRACT INFECTION: ICD-10-CM

## 2020-08-24 DIAGNOSIS — R01.1 SYSTOLIC MURMUR: ICD-10-CM

## 2020-08-24 DIAGNOSIS — Z00.00 PERIODIC HEALTH ASSESSMENT, GENERAL SCREENING, ADULT: ICD-10-CM

## 2020-08-24 DIAGNOSIS — E78.2 MIXED HYPERLIPIDEMIA: ICD-10-CM

## 2020-08-24 DIAGNOSIS — R35.0 URINARY FREQUENCY: ICD-10-CM

## 2020-08-24 DIAGNOSIS — J06.9 VIRAL UPPER RESPIRATORY TRACT INFECTION: Primary | ICD-10-CM

## 2020-08-24 DIAGNOSIS — R11.0 NAUSEA: ICD-10-CM

## 2020-08-24 LAB
ALBUMIN SERPL BCP-MCNC: 4.1 G/DL (ref 3.5–5.2)
ALP SERPL-CCNC: 78 U/L (ref 55–135)
ALT SERPL W/O P-5'-P-CCNC: 13 U/L (ref 10–44)
ANION GAP SERPL CALC-SCNC: 8 MMOL/L (ref 8–16)
AST SERPL-CCNC: 27 U/L (ref 10–40)
BASOPHILS # BLD AUTO: 0.02 K/UL (ref 0–0.2)
BASOPHILS NFR BLD: 0.4 % (ref 0–1.9)
BILIRUB SERPL-MCNC: 0.2 MG/DL (ref 0.1–1)
BUN SERPL-MCNC: 28 MG/DL (ref 8–23)
CALCIUM SERPL-MCNC: 10 MG/DL (ref 8.7–10.5)
CHLORIDE SERPL-SCNC: 98 MMOL/L (ref 95–110)
CHOLEST SERPL-MCNC: 221 MG/DL (ref 120–199)
CHOLEST/HDLC SERPL: 3.8 {RATIO} (ref 2–5)
CO2 SERPL-SCNC: 35 MMOL/L (ref 23–29)
CREAT SERPL-MCNC: 1.2 MG/DL (ref 0.5–1.4)
DIFFERENTIAL METHOD: ABNORMAL
EOSINOPHIL # BLD AUTO: 0.1 K/UL (ref 0–0.5)
EOSINOPHIL NFR BLD: 1.8 % (ref 0–8)
ERYTHROCYTE [DISTWIDTH] IN BLOOD BY AUTOMATED COUNT: 13.8 % (ref 11.5–14.5)
EST. GFR  (AFRICAN AMERICAN): 48 ML/MIN/1.73 M^2
EST. GFR  (NON AFRICAN AMERICAN): 41.6 ML/MIN/1.73 M^2
GLUCOSE SERPL-MCNC: 110 MG/DL (ref 70–110)
HCT VFR BLD AUTO: 37.8 % (ref 37–48.5)
HDLC SERPL-MCNC: 58 MG/DL (ref 40–75)
HDLC SERPL: 26.2 % (ref 20–50)
HGB BLD-MCNC: 11.2 G/DL (ref 12–16)
IMM GRANULOCYTES # BLD AUTO: 0.01 K/UL (ref 0–0.04)
IMM GRANULOCYTES NFR BLD AUTO: 0.2 % (ref 0–0.5)
LDLC SERPL CALC-MCNC: 110.6 MG/DL (ref 63–159)
LYMPHOCYTES # BLD AUTO: 1.1 K/UL (ref 1–4.8)
LYMPHOCYTES NFR BLD: 18.7 % (ref 18–48)
MCH RBC QN AUTO: 28 PG (ref 27–31)
MCHC RBC AUTO-ENTMCNC: 29.6 G/DL (ref 32–36)
MCV RBC AUTO: 95 FL (ref 82–98)
MONOCYTES # BLD AUTO: 0.5 K/UL (ref 0.3–1)
MONOCYTES NFR BLD: 8.4 % (ref 4–15)
NEUTROPHILS # BLD AUTO: 4 K/UL (ref 1.8–7.7)
NEUTROPHILS NFR BLD: 70.5 % (ref 38–73)
NONHDLC SERPL-MCNC: 163 MG/DL
NRBC BLD-RTO: 0 /100 WBC
PLATELET # BLD AUTO: 196 K/UL (ref 150–350)
PMV BLD AUTO: 10.7 FL (ref 9.2–12.9)
POTASSIUM SERPL-SCNC: 5.3 MMOL/L (ref 3.5–5.1)
PROT SERPL-MCNC: 7.1 G/DL (ref 6–8.4)
RBC # BLD AUTO: 4 M/UL (ref 4–5.4)
SODIUM SERPL-SCNC: 141 MMOL/L (ref 136–145)
TRIGL SERPL-MCNC: 262 MG/DL (ref 30–150)
WBC # BLD AUTO: 5.71 K/UL (ref 3.9–12.7)

## 2020-08-24 PROCEDURE — 36415 COLL VENOUS BLD VENIPUNCTURE: CPT | Mod: PO

## 2020-08-24 PROCEDURE — 99214 PR OFFICE/OUTPT VISIT, EST, LEVL IV, 30-39 MIN: ICD-10-PCS | Mod: S$PBB,,, | Performed by: PHYSICIAN ASSISTANT

## 2020-08-24 PROCEDURE — 99999 PR PBB SHADOW E&M-EST. PATIENT-LVL V: CPT | Mod: PBBFAC,,, | Performed by: PHYSICIAN ASSISTANT

## 2020-08-24 PROCEDURE — 99214 OFFICE O/P EST MOD 30 MIN: CPT | Mod: S$PBB,,, | Performed by: PHYSICIAN ASSISTANT

## 2020-08-24 PROCEDURE — 80053 COMPREHEN METABOLIC PANEL: CPT

## 2020-08-24 PROCEDURE — 85025 COMPLETE CBC W/AUTO DIFF WBC: CPT

## 2020-08-24 PROCEDURE — 80061 LIPID PANEL: CPT

## 2020-08-24 PROCEDURE — 99215 OFFICE O/P EST HI 40 MIN: CPT | Mod: PBBFAC,PO | Performed by: PHYSICIAN ASSISTANT

## 2020-08-24 PROCEDURE — 99999 PR PBB SHADOW E&M-EST. PATIENT-LVL V: ICD-10-PCS | Mod: PBBFAC,,, | Performed by: PHYSICIAN ASSISTANT

## 2020-08-24 PROCEDURE — U0003 INFECTIOUS AGENT DETECTION BY NUCLEIC ACID (DNA OR RNA); SEVERE ACUTE RESPIRATORY SYNDROME CORONAVIRUS 2 (SARS-COV-2) (CORONAVIRUS DISEASE [COVID-19]), AMPLIFIED PROBE TECHNIQUE, MAKING USE OF HIGH THROUGHPUT TECHNOLOGIES AS DESCRIBED BY CMS-2020-01-R: HCPCS

## 2020-08-24 RX ORDER — CYANOCOBALAMIN 500 UG/1
SPRAY NASAL
COMMUNITY
End: 2021-11-03

## 2020-08-24 NOTE — PROGRESS NOTES
Subjective:       Patient ID: Cheyanne Gomes is a 84 y.o. female.    Chief Complaint: Fatigue    HPI   SOB  Mild cough  Congestion  No fever  Sx x 1 wk  Pt. Did start supplement 3 wks ago for hair growth  Nausea   Mild abd discomfort  Review of Systems   Constitutional: Positive for fatigue. Negative for activity change, appetite change, chills, diaphoresis, fever and unexpected weight change.   HENT: Positive for nasal congestion and sinus pressure/congestion.    Eyes: Negative.    Respiratory: Positive for cough. Negative for shortness of breath and wheezing.    Cardiovascular: Negative.  Negative for chest pain and leg swelling.   Gastrointestinal: Negative.    Endocrine: Negative.    Genitourinary: Negative.    Musculoskeletal: Negative.    Integumentary:  Negative.   Neurological: Negative.          Objective:      Physical Exam  Vitals signs reviewed.   Constitutional:       General: She is not in acute distress.     Appearance: She is normal weight. She is not ill-appearing, toxic-appearing or diaphoretic.   HENT:      Head: Normocephalic and atraumatic.      Right Ear: Tympanic membrane, ear canal and external ear normal. There is no impacted cerumen.      Left Ear: Tympanic membrane, ear canal and external ear normal. There is no impacted cerumen.      Nose: Congestion present.      Mouth/Throat:      Mouth: Mucous membranes are moist.      Pharynx: Oropharynx is clear. No oropharyngeal exudate or posterior oropharyngeal erythema.   Eyes:      General: No scleral icterus.     Conjunctiva/sclera: Conjunctivae normal.   Neck:      Musculoskeletal: Normal range of motion and neck supple. No neck rigidity or muscular tenderness.   Cardiovascular:      Rate and Rhythm: Normal rate and regular rhythm.      Pulses: Normal pulses.      Heart sounds: Normal heart sounds. No friction rub. No gallop.    Pulmonary:      Effort: Pulmonary effort is normal. No respiratory distress.      Breath sounds: Normal breath  sounds. No stridor. No wheezing, rhonchi or rales.   Abdominal:      General: Abdomen is flat. Bowel sounds are normal. There is no distension.      Palpations: Abdomen is soft. There is no mass.      Tenderness: There is abdominal tenderness. There is no guarding or rebound.      Hernia: No hernia is present.      Comments: Mild generalized tenderness   Musculoskeletal:         General: No swelling.      Right lower leg: No edema.      Left lower leg: No edema.   Lymphadenopathy:      Cervical: No cervical adenopathy.   Skin:     General: Skin is warm and dry.      Findings: No rash.   Neurological:      General: No focal deficit present.      Mental Status: She is alert and oriented to person, place, and time.         Assessment:       1. Viral upper respiratory tract infection    2. Cough    3. Nausea    4. Urinary frequency    5. Periodic health assessment, general screening, adult    6. Mixed hyperlipidemia    7. Systolic murmur        Plan:       Cheyanne was seen today for fatigue.    Diagnoses and all orders for this visit:    Viral upper respiratory tract infection  -     Comprehensive metabolic panel; Future  -     CBC auto differential; Future    Cough  -     Comprehensive metabolic panel; Future  -     CBC auto differential; Future  -     COVID-19 Routine Screening    Nausea  -     Comprehensive metabolic panel; Future  -     CBC auto differential; Future    Urinary frequency  -     POCT urinalysis, dipstick or tablet reag  -     Urine culture  -     Comprehensive metabolic panel; Future  -     CBC auto differential; Future  -     Urinalysis; Future  -     Urine culture; Future    Periodic health assessment, general screening, adult  -     Comprehensive metabolic panel; Future  -     CBC auto differential; Future  -     Lipid Panel; Future    Mixed hyperlipidemia  -     Lipid Panel; Future    discussed otc's  Hydrate  isolate  DC new supplement

## 2020-08-25 ENCOUNTER — TELEPHONE (OUTPATIENT)
Dept: FAMILY MEDICINE | Facility: CLINIC | Age: 85
End: 2020-08-25

## 2020-08-25 LAB — SARS-COV-2 RNA RESP QL NAA+PROBE: NOT DETECTED

## 2020-08-25 NOTE — TELEPHONE ENCOUNTER
Left message for patient to call the office back regarding her urine results (normal) and we are waiting for culture results.

## 2020-08-25 NOTE — TELEPHONE ENCOUNTER
----- Message from Rosana Perez sent at 8/25/2020  9:29 AM CDT -----  Type:  Patient Returning Call    Who Called:  Patient  Does the patient know what this is regarding?:  yes  Best Call Back Number:     Additional Information:  Call to pod, no answer.

## 2020-08-26 ENCOUNTER — PATIENT OUTREACH (OUTPATIENT)
Dept: ADMINISTRATIVE | Facility: HOSPITAL | Age: 85
End: 2020-08-26

## 2020-08-26 ENCOUNTER — TELEPHONE (OUTPATIENT)
Dept: FAMILY MEDICINE | Facility: CLINIC | Age: 85
End: 2020-08-26

## 2020-08-26 NOTE — TELEPHONE ENCOUNTER
----- Message from Sd Mckeon sent at 8/26/2020  3:37 PM CDT -----  Type:  Patient Returning Call    Who Called:  Patient  Who Left Message for Patient:  Taj  Does the patient know what this is regarding?:  NA  Best Call Back Number:  [phone]  Additional Information:

## 2020-08-27 ENCOUNTER — TELEPHONE (OUTPATIENT)
Dept: FAMILY MEDICINE | Facility: CLINIC | Age: 85
End: 2020-08-27

## 2020-08-27 NOTE — TELEPHONE ENCOUNTER
----- Message from Viv Tristan sent at 8/27/2020 10:44 AM CDT -----  Type: Needs Medical Advice  Who Called:  pt  Best Call Back Number: [phone]  Additional Information: please give call back to schedule nurse visit for BP f/u. thanks

## 2020-08-31 ENCOUNTER — EXTERNAL CHRONIC CARE MANAGEMENT (OUTPATIENT)
Dept: PRIMARY CARE CLINIC | Facility: CLINIC | Age: 85
End: 2020-08-31
Payer: MEDICARE

## 2020-08-31 PROCEDURE — 99490 PR CHRONIC CARE MGMT, 1ST 20 MIN: ICD-10-PCS | Mod: S$GLB,,, | Performed by: FAMILY MEDICINE

## 2020-08-31 PROCEDURE — 99490 CHRNC CARE MGMT STAFF 1ST 20: CPT | Mod: S$GLB,,, | Performed by: FAMILY MEDICINE

## 2020-09-10 DIAGNOSIS — M54.6 PAIN IN THORACIC SPINE: ICD-10-CM

## 2020-09-10 DIAGNOSIS — M81.0 AGE RELATED OSTEOPOROSIS: ICD-10-CM

## 2020-09-10 DIAGNOSIS — M43.26 FUSION OF SPINE OF LUMBAR REGION: ICD-10-CM

## 2020-09-10 DIAGNOSIS — S22.009A: ICD-10-CM

## 2020-09-10 DIAGNOSIS — M54.50 LUMBAGO: Primary | ICD-10-CM

## 2020-09-10 DIAGNOSIS — M54.14 THORACIC NEURITIS: ICD-10-CM

## 2020-09-21 ENCOUNTER — HOSPITAL ENCOUNTER (OUTPATIENT)
Dept: RADIOLOGY | Facility: HOSPITAL | Age: 85
Discharge: HOME | End: 2020-09-21
Attending: PHYSICIAN ASSISTANT
Payer: MEDICARE

## 2020-09-21 DIAGNOSIS — M81.0 AGE RELATED OSTEOPOROSIS: ICD-10-CM

## 2020-09-21 DIAGNOSIS — M54.14 THORACIC NEURITIS: ICD-10-CM

## 2020-09-21 DIAGNOSIS — S22.009A: ICD-10-CM

## 2020-09-21 DIAGNOSIS — M54.50 LUMBAGO: ICD-10-CM

## 2020-09-21 DIAGNOSIS — M54.6 PAIN IN THORACIC SPINE: ICD-10-CM

## 2020-09-21 DIAGNOSIS — M43.26 FUSION OF SPINE OF LUMBAR REGION: ICD-10-CM

## 2020-09-21 PROCEDURE — 72146 MRI CHEST SPINE W/O DYE: CPT | Mod: TC,PO

## 2020-09-21 PROCEDURE — 72131 CT LUMBAR SPINE W/O DYE: CPT | Mod: TC,PO

## 2020-09-21 PROCEDURE — 72148 MRI LUMBAR SPINE W/O DYE: CPT | Mod: TC,PO

## 2020-09-24 ENCOUNTER — LAB VISIT (OUTPATIENT)
Dept: FAMILY MEDICINE | Facility: CLINIC | Age: 85
End: 2020-09-24
Payer: MEDICARE

## 2020-09-24 DIAGNOSIS — D50.0 IRON DEFICIENCY ANEMIA SECONDARY TO BLOOD LOSS (CHRONIC): Primary | ICD-10-CM

## 2020-09-24 LAB
BASOPHILS # BLD AUTO: 0.04 K/UL (ref 0–0.2)
BASOPHILS NFR BLD: 0.7 % (ref 0–1.9)
DIFFERENTIAL METHOD: ABNORMAL
EOSINOPHIL # BLD AUTO: 0.2 K/UL (ref 0–0.5)
EOSINOPHIL NFR BLD: 2.5 % (ref 0–8)
ERYTHROCYTE [DISTWIDTH] IN BLOOD BY AUTOMATED COUNT: 13.7 % (ref 11.5–14.5)
HCT VFR BLD AUTO: 35.1 % (ref 37–48.5)
HGB BLD-MCNC: 10.6 G/DL (ref 12–16)
IMM GRANULOCYTES # BLD AUTO: 0.02 K/UL (ref 0–0.04)
IMM GRANULOCYTES NFR BLD AUTO: 0.3 % (ref 0–0.5)
LYMPHOCYTES # BLD AUTO: 1 K/UL (ref 1–4.8)
LYMPHOCYTES NFR BLD: 16.6 % (ref 18–48)
MCH RBC QN AUTO: 27.5 PG (ref 27–31)
MCHC RBC AUTO-ENTMCNC: 30.2 G/DL (ref 32–36)
MCV RBC AUTO: 91 FL (ref 82–98)
MONOCYTES # BLD AUTO: 0.5 K/UL (ref 0.3–1)
MONOCYTES NFR BLD: 8.9 % (ref 4–15)
NEUTROPHILS # BLD AUTO: 4.2 K/UL (ref 1.8–7.7)
NEUTROPHILS NFR BLD: 71 % (ref 38–73)
NRBC BLD-RTO: 0 /100 WBC
PLATELET # BLD AUTO: 182 K/UL (ref 150–350)
PMV BLD AUTO: 11.2 FL (ref 9.2–12.9)
RBC # BLD AUTO: 3.85 M/UL (ref 4–5.4)
WBC # BLD AUTO: 5.96 K/UL (ref 3.9–12.7)

## 2020-09-24 PROCEDURE — 85025 COMPLETE CBC W/AUTO DIFF WBC: CPT

## 2020-09-24 NOTE — PROGRESS NOTES
Venipuncture performed with 21 gauge butterfly, x's 2 attempt,  to R Antecubital vein.  Specimens collected per orders.      Pressure dressing applied to site, instructed patient to remove dressing in 10-15 minutes, OK to re-adjust dressing if pressure causing any discomfort, to observe closely for numbness and/or discoloration to hand or fingers, and to notify provider if bleeding persists after applying constant pressure lasting 30 minutes.

## 2020-09-29 ENCOUNTER — PATIENT MESSAGE (OUTPATIENT)
Dept: OTHER | Facility: OTHER | Age: 85
End: 2020-09-29

## 2020-09-30 ENCOUNTER — EXTERNAL CHRONIC CARE MANAGEMENT (OUTPATIENT)
Dept: PRIMARY CARE CLINIC | Facility: CLINIC | Age: 85
End: 2020-09-30
Payer: MEDICARE

## 2020-09-30 PROCEDURE — 99490 CHRNC CARE MGMT STAFF 1ST 20: CPT | Mod: S$GLB,,, | Performed by: FAMILY MEDICINE

## 2020-09-30 PROCEDURE — 99490 PR CHRONIC CARE MGMT, 1ST 20 MIN: ICD-10-PCS | Mod: S$GLB,,, | Performed by: FAMILY MEDICINE

## 2020-10-06 ENCOUNTER — LAB VISIT (OUTPATIENT)
Dept: FAMILY MEDICINE | Facility: CLINIC | Age: 85
End: 2020-10-06
Payer: MEDICARE

## 2020-10-06 DIAGNOSIS — D64.9 ANEMIA, UNSPECIFIED TYPE: ICD-10-CM

## 2020-10-06 DIAGNOSIS — I50.9 HEART FAILURE, UNSPECIFIED HF CHRONICITY, UNSPECIFIED HEART FAILURE TYPE: Primary | ICD-10-CM

## 2020-10-06 LAB
ANION GAP SERPL CALC-SCNC: 9 MMOL/L (ref 8–16)
BNP SERPL-MCNC: 497 PG/ML (ref 0–99)
BUN SERPL-MCNC: 29 MG/DL (ref 8–23)
CALCIUM SERPL-MCNC: 9.6 MG/DL (ref 8.7–10.5)
CHLORIDE SERPL-SCNC: 96 MMOL/L (ref 95–110)
CO2 SERPL-SCNC: 33 MMOL/L (ref 23–29)
CREAT SERPL-MCNC: 1.5 MG/DL (ref 0.5–1.4)
EST. GFR  (AFRICAN AMERICAN): 36.6 ML/MIN/1.73 M^2
EST. GFR  (NON AFRICAN AMERICAN): 31.8 ML/MIN/1.73 M^2
GLUCOSE SERPL-MCNC: 164 MG/DL (ref 70–110)
POTASSIUM SERPL-SCNC: 4.7 MMOL/L (ref 3.5–5.1)
SODIUM SERPL-SCNC: 138 MMOL/L (ref 136–145)

## 2020-10-06 PROCEDURE — 80048 BASIC METABOLIC PNL TOTAL CA: CPT

## 2020-10-06 PROCEDURE — 83880 ASSAY OF NATRIURETIC PEPTIDE: CPT

## 2020-10-06 NOTE — PROGRESS NOTES
Venipuncture performed with 21 gauge butterfly, x's 1 attempt,  to R Antecubital vein.  Specimens collected per orders.      Pressure dressing applied to site, instructed patient to remove dressing in 10-15 minutes, OK to re-adjust dressing if pressure causing any discomfort, to observe closely for numbness and/or discoloration to hand or fingers, and to notify provider if bleeding persists after applying constant pressure lasting 30 minutes.

## 2020-10-09 ENCOUNTER — OFFICE VISIT (OUTPATIENT)
Dept: CARDIOLOGY | Facility: CLINIC | Age: 85
End: 2020-10-09
Payer: MEDICARE

## 2020-10-09 VITALS
OXYGEN SATURATION: 97 % | TEMPERATURE: 98 F | HEART RATE: 65 BPM | SYSTOLIC BLOOD PRESSURE: 110 MMHG | WEIGHT: 98 LBS | DIASTOLIC BLOOD PRESSURE: 60 MMHG | HEIGHT: 57 IN | BODY MASS INDEX: 21.14 KG/M2

## 2020-10-09 DIAGNOSIS — I10 ESSENTIAL HYPERTENSION: ICD-10-CM

## 2020-10-09 DIAGNOSIS — I35.0 AORTIC VALVE STENOSIS, MODERATE: ICD-10-CM

## 2020-10-09 DIAGNOSIS — E78.00 PURE HYPERCHOLESTEROLEMIA: ICD-10-CM

## 2020-10-09 DIAGNOSIS — I50.42 CHRONIC COMBINED SYSTOLIC AND DIASTOLIC HEART FAILURE: ICD-10-CM

## 2020-10-09 DIAGNOSIS — I25.10 CORONARY ARTERY DISEASE, ANGINA PRESENCE UNSPECIFIED, UNSPECIFIED VESSEL OR LESION TYPE, UNSPECIFIED WHETHER NATIVE OR TRANSPLANTED HEART: Primary | ICD-10-CM

## 2020-10-09 PROCEDURE — 99213 OFFICE O/P EST LOW 20 MIN: CPT | Mod: S$GLB,,, | Performed by: INTERNAL MEDICINE

## 2020-10-09 PROCEDURE — 99213 PR OFFICE/OUTPT VISIT, EST, LEVL III, 20-29 MIN: ICD-10-PCS | Mod: S$GLB,,, | Performed by: INTERNAL MEDICINE

## 2020-10-09 NOTE — PROGRESS NOTES
Patient ID:  Cheyanne Gomes is a 84 y.o. female who presents for follow-up of Congestive Heart Failure, Coronary Artery Disease, and Results (labs)      She has been seen by Dr. guillermo points.  She had an MRI of her back.  He has compression fraction of her back.  He does not want to operate at present or do anything other than as with for her to pain management.  She used to be on Lipitor somehow the medication was discontinued.  She denies any swelling her breathing is doing fairly well.  She has been having symptoms of blurred vision and the Plaquenil was discontinued.      Past Medical History:   Diagnosis Date    Abdominal hernia     Anemia     Dr Berkowitz     Aortic valve stenosis, moderate     Cannon's esophagus     CAD (coronary artery disease)     Cataract     ou done    CHF (congestive heart failure)     EFx 35%, Dr. Spencer 13    Chronic combined systolic and diastolic heart failure 2019    Colitis     Compression fracture     Diverticulosis     Encounter for blood transfusion     GERD (gastroesophageal reflux disease)     Hyperlipidemia     Hypertension     Irritable bowel syndrome     Osteoarthritis     lumbar DDD    Pneumonia 2017    Raynaud disease     Rheumatoid arteritis     Rheumatoid arthritis     Shingles 2017    Sjogren syndrome     Ulcerative colitis         Past Surgical History:   Procedure Laterality Date    APPENDECTOMY      BACK SURGERY      CARDIAC SURGERY      CABGx3 in     CATARACT EXTRACTION      ou od d 11-15-12/     SECTION, CLASSIC      x 2    CHOLECYSTECTOMY      COLONOSCOPY  09/15/2011    Dr Anaya     COLONOSCOPY  01/10/2017    [facility] report [facility] to scanning    CORONARY ANGIOPLASTY      PCI x 1 in     CORONARY ARTERY BYPASS GRAFT      3 vessel    CORONARY ARTERY BYPASS GRAFT      CYSTOSCOPY N/A 6/3/2020    Procedure: CYSTOSCOPY;  Surgeon: Miryam Bender Jr., MD;  Location: [facility] OR;  Service: Urology;   Laterality: N/A;    eyes      rk ou    FRACTURE SURGERY  06/21/2016    Dr Guido left hip     FRACTURE SURGERY  2018    Right Hip    HYSTERECTOMY      tubal ligation, oopherectomy    INTRAMEDULLARY RODDING OF TROCHANTER OF FEMUR Right 10/8/2018    Procedure: INSERTION, INTRAMEDULLARY KELSI, FEMUR, TROCHANTER;  Surgeon: Valerio Luther MD;  Location: [facility];  Service: Orthopedics;  Laterality: Right;    OOPHORECTOMY      SPINE SURGERY  8-    Silvino Mckeon    UPPER GASTROINTESTINAL ENDOSCOPY      Yag Capsulotomy Right 9/25/14          Current Outpatient Medications   Medication Instructions    amLODIPine (NORVASC) 5 mg, Oral, Daily    aspirin 81 mg Tab 1 tablet, Oral, Nightly, Every day    biotin 5 mg Cap 1 tablet, Oral, 2 times daily    CALCIUM CARB/VIT D3/MINERALS (CALCIUM-VITAMIN D ORAL) 600 mg, Oral, 2 times daily, Calcium 1200 with D 3 1000    CRANBERRY CONC/ASCORBIC ACID (CRANBERRY PLUS VITAMIN C ORAL) 1 capsule, Oral, Daily    cyanocobalamin, vitamin B-12, (NASCOBAL) 500 mcg/spray [place] Nascobal 500 mcg/spray nasal spray   Spray 1 spray every week by intranasal route as directed for 28 days.    cyclosporine (RESTASIS OPHT) Ophthalmic    DEXILANT 60 mg, Oral, Daily    DOCUSATE CALCIUM (STOOL SOFTENER ORAL) 200 mg, Oral, Nightly    ENTRESTO  mg per tablet 1 tablet, 2 times daily    FERROUS FUMARATE/VIT BCOMP,C (SUPER B COMPLEX ORAL) 1 tablet, Oral, Daily    ferrous gluconate (FERGON) 324 mg, Oral, With breakfast, In the am and at noon    ferrous sulfate (IRON ORAL) qd    fluticasone (FLONASE) 50 mcg/actuation nasal spray 2 sprays, Each Nostril, Daily    folic acid (FOLVITE) 1 MG tablet No dose, route, or frequency recorded.    furosemide (LASIX) 80 MG tablet No dose, route, or frequency recorded.    gabapentin (NEURONTIN) 100 mg, Oral, Nightly    isosorbide dinitrate (ISORDIL) 10 mg, Oral, 3 times daily    lactobacillus acidophilus (PROBIOTIC) 10 billion cell Cap 1 each, Oral,  Daily, 1.5 billion    magnesium oxide (MAG-OX) 400 mg (241.3 mg magnesium) tablet TAKE 1 TABLET BY MOUTH 2 TIMES A DAY    mecobalamin, vitamin B12, 1,000 mcg Chew B12 Active 1,000 mcg chewable tablet   Take 1 tablet every day by oral route in the morning for 30 days.    megestroL (MEGACE) 400 mg/10 mL (40 mg/mL) Susp take 1 TABLESPOONFUL EVERY DAY    MULTIVITAMIN WITH MINERALS (ONE-A-DAY 50 PLUS) Tab 1 tablet, Oral, Daily, Every day    nitroGLYCERIN (NITROLINGUAL) 0.4 mg/dose spray 1 spray, Sublingual, Every 5 min PRN, PRN    ondansetron (ZOFRAN) 4 mg, Oral, Every 8 hours PRN    potassium chloride SA (K-DUR,KLOR-CON) 10 MEQ tablet 20 mEq, Oral, Daily    promethazine (PHENERGAN) 25 mg, Oral, 2 times daily PRN    sertraline (ZOLOFT) 50 mg, Oral, Daily    temazepam (RESTORIL) 15 mg, Oral, Nightly PRN    traMADoL (ULTRAM) 50 mg tablet TAKE TWO TABLETS BY MOUTH TWICE DAILY AS NEEDED FOR SEVERE pain    vitamin E 400 mg, Oral, Daily, Every day        Review of patient's allergies indicates:   Allergen Reactions    Adhesive     Nitrofurantoin macrocrystalline Nausea And Vomiting     Other reaction(s): very sickly    Penicillins Swelling     Other reaction(s): swelling  Other reaction(s): red skin discolorati    Sulfa (sulfonamide antibiotics) Nausea And Vomiting     Other reaction(s): very sickly        Review of Systems   Constitution: Negative for chills and fever.   HENT: Negative for congestion and sore throat.    Eyes: Positive for blurred vision. Negative for double vision.   Cardiovascular: Negative.  Negative for chest pain and leg swelling.   Respiratory: Negative.  Negative for cough and shortness of breath.    Hematologic/Lymphatic: Does not bruise/bleed easily.   Skin: Negative for itching and rash.   Musculoskeletal: Positive for back pain and joint pain.   Gastrointestinal: Negative for abdominal pain and heartburn.   Genitourinary: Positive for frequency. Negative for dysuria.   Neurological:  "Positive for dizziness and weakness.   Psychiatric/Behavioral: Negative.  Negative for altered mental status. The patient is not nervous/anxious.         Objective:     Vitals:    10/09/20 1121   BP: 110/60   BP Location: Right arm   Patient Position: Sitting   BP Method: Medium (Manual)   Pulse: 65   Temp: 98.4 °F (36.9 °C)   SpO2: 97%   Weight: 44.5 kg (98 lb)   Height: 4' 9" (1.448 m)       Physical Exam   Constitutional: She is oriented to person, place, and time. She appears well-developed and well-nourished. Distressed: Grade 3/2 systolic murmur at the base.   HENT:   Head: Normocephalic and atraumatic.   Eyes: Conjunctivae and EOM are normal.   Cardiovascular: Normal rate and regular rhythm.   Murmur heard.  Pulmonary/Chest: Effort normal and breath sounds normal.   Abdominal: Soft. Bowel sounds are normal.   Musculoskeletal: Normal range of motion.   Neurological: She is alert and oriented to person, place, and time.   Skin: Skin is warm and dry.   Psychiatric: She has a normal mood and affect. Her behavior is normal. Judgment and thought content normal.   Nursing note and vitals reviewed.    CMP  Sodium   Date Value Ref Range Status   10/06/2020 138 136 - 145 mmol/L Final   12/11/2018 139 134 - 144 mmol/L      Potassium   Date Value Ref Range Status   10/06/2020 4.7 3.5 - 5.1 mmol/L Final     Chloride   Date Value Ref Range Status   10/06/2020 96 95 - 110 mmol/L Final   12/11/2018 96 (L) 98 - 110 mmol/L      CO2   Date Value Ref Range Status   10/06/2020 33 (H) 23 - 29 mmol/L Final     Glucose   Date Value Ref Range Status   10/06/2020 164 (H) 70 - 110 mg/dL Final   12/11/2018 97 70 - 99 mg/dL      BUN, Bld   Date Value Ref Range Status   10/06/2020 29 (H) 8 - 23 mg/dL Final     Creatinine   Date Value Ref Range Status   10/06/2020 1.5 (H) 0.5 - 1.4 mg/dL Final   12/11/2018 0.95 0.60 - 1.40 mg/dL      Calcium   Date Value Ref Range Status   10/06/2020 9.6 8.7 - 10.5 mg/dL Final     Total Protein   Date " Value Ref Range Status   08/24/2020 7.1 6.0 - 8.4 g/dL Final     Albumin   Date Value Ref Range Status   08/24/2020 4.1 3.5 - 5.2 g/dL Final   12/11/2018 3.9 3.1 - 4.7 g/dL      Total Bilirubin   Date Value Ref Range Status   08/24/2020 0.2 0.1 - 1.0 mg/dL Final     Comment:     For infants and newborns, interpretation of results should be based  on gestational age, weight and in agreement with clinical  observations.  Premature Infant recommended reference ranges:  Up to 24 hours.............<8.0 mg/dL  Up to 48 hours............<12.0 mg/dL  3-5 days..................<15.0 mg/dL  6-29 days.................<15.0 mg/dL       Alkaline Phosphatase   Date Value Ref Range Status   08/24/2020 78 55 - 135 U/L Final     AST   Date Value Ref Range Status   08/24/2020 27 10 - 40 U/L Final     ALT   Date Value Ref Range Status   08/24/2020 13 10 - 44 U/L Final     Anion Gap   Date Value Ref Range Status   10/06/2020 9 8 - 16 mmol/L Final     eGFR if    Date Value Ref Range Status   10/06/2020 36.6 (A) >60 mL/min/1.73 m^2 Final     eGFR if non    Date Value Ref Range Status   10/06/2020 31.8 (A) >60 mL/min/1.73 m^2 Final     Comment:     Calculation used to obtain the estimated glomerular filtration  rate (eGFR) is the CKD-EPI equation.         BMP  Lab Results   Component Value Date     10/06/2020    K 4.7 10/06/2020    CL 96 10/06/2020    CO2 33 (H) 10/06/2020    BUN 29 (H) 10/06/2020    CREATININE 1.5 (H) 10/06/2020    CALCIUM 9.6 10/06/2020    ANIONGAP 9 10/06/2020    ESTGFRAFRICA 36.6 (A) 10/06/2020    EGFRNONAA 31.8 (A) 10/06/2020      BNP  @LABRCNTIP(BNP,BNPTRIAGEBLO)@   Lab Results   Component Value Date    CHOL 221 (H) 08/24/2020    CHOL 131 10/16/2013    CHOL 140 08/11/2009     Lab Results   Component Value Date    HDL 58 08/24/2020    HDL 59 10/16/2013    HDL 57 08/11/2009     Lab Results   Component Value Date    LDLCALC 110.6 08/24/2020    LDLCALC 55.4 (L) 10/16/2013    LDLCALC  55.6 (L) 08/11/2009     Lab Results   Component Value Date    TRIG 262 (H) 08/24/2020    TRIG 83 10/16/2013    TRIG 137 08/11/2009     Lab Results   Component Value Date    CHOLHDL 26.2 08/24/2020    CHOLHDL 45.0 10/16/2013    CHOLHDL 40.7 08/11/2009      Lab Results   Component Value Date    TSH 1.707 06/18/2019    FREET4 0.89 09/26/2012     Lab Results   Component Value Date    HGBA1C 5.3 02/26/2015     Lab Results   Component Value Date    WBC 5.96 09/24/2020    HGB 10.6 (L) 09/24/2020    HCT 35.1 (L) 09/24/2020    MCV 91 09/24/2020     09/24/2020           Results for orders placed in visit on 11/07/19   Echo Color Flow Doppler? Yes    Narrative · Moderate concentric left ventricular hypertrophy with a speckled pattern   suggestive of infiltrative disease  · Low normal left ventricular systolic function. The estimated ejection   fraction is 39%  · Grade II (moderate) left ventricular diastolic dysfunction consistent   with pseudonormalization.  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  · Mild left atrial enlargement.  · Mild aortic regurgitation.  · Moderate aortic valve stenosis.  · Aortic valve area is 1.20 cm2; peak velocity is 2.64 m/s; mean gradient   is 15 mmHg.  · Mild mitral regurgitation.  · Mild to moderate tricuspid regurgitation.  · Mild pulmonic regurgitation.  · Normal central venous pressure (3 mm Hg).  · The estimated PA systolic pressure is 40 mm Hg         No results found for this or any previous visit.       Assessment:       Hypertension  Her blood pressure is fairly well controlled and has been control at home.  Continue the current will pressure medications    CAD (coronary artery disease)  With previous coronary artery bypass.  She denies any symptoms of angina continue the current medical therapy    Hyperlipidemia  Her cholesterol is markedly increased.  She was on  Lipitor and for some unknown reason it has been discontinued.  Will resume Lipitor and check her lipid profile in 2 months    Aortic valve stenosis, moderate  Asymptomatic at present.  Continue to monitor    Chronic combined systolic and diastolic heart failure  Clinically she is compensated.  Her BNP has improved.  Continue the current medical therapy.       Plan:       Resume Lipitor.  She is to call back and give was the dose of Lipitor.  Return to the office in 2 months with a cMP BNP and a lipid profile

## 2020-10-10 NOTE — ASSESSMENT & PLAN NOTE
Her blood pressure is fairly well controlled and has been control at home.  Continue the current will pressure medications

## 2020-10-10 NOTE — ASSESSMENT & PLAN NOTE
Her cholesterol is markedly increased.  She was on Lipitor and for some unknown reason it has been discontinued.  Will resume Lipitor and check her lipid profile in 2 months

## 2020-10-13 ENCOUNTER — TELEPHONE (OUTPATIENT)
Dept: CARDIOLOGY | Facility: CLINIC | Age: 85
End: 2020-10-13

## 2020-10-13 NOTE — TELEPHONE ENCOUNTER
----- Message from Drake Garzon sent at 10/13/2020  1:24 PM CDT -----  Regarding: med  Was told to call and let us know what she was on before   Lipitor 40mg     [facility]

## 2020-10-14 ENCOUNTER — OFFICE VISIT (OUTPATIENT)
Dept: RHEUMATOLOGY | Facility: CLINIC | Age: 85
End: 2020-10-14
Payer: MEDICARE

## 2020-10-14 VITALS
BODY MASS INDEX: 21.68 KG/M2 | SYSTOLIC BLOOD PRESSURE: 155 MMHG | DIASTOLIC BLOOD PRESSURE: 75 MMHG | TEMPERATURE: 98 F | WEIGHT: 100.19 LBS

## 2020-10-14 DIAGNOSIS — M81.0 OSTEOPOROSIS, UNSPECIFIED OSTEOPOROSIS TYPE, UNSPECIFIED PATHOLOGICAL FRACTURE PRESENCE: ICD-10-CM

## 2020-10-14 DIAGNOSIS — M35.00 SJOGREN'S SYNDROME WITHOUT EXTRAGLANDULAR INVOLVEMENT: Primary | ICD-10-CM

## 2020-10-14 PROCEDURE — 99213 OFFICE O/P EST LOW 20 MIN: CPT | Mod: S$GLB,,, | Performed by: INTERNAL MEDICINE

## 2020-10-14 PROCEDURE — 99213 PR OFFICE/OUTPT VISIT, EST, LEVL III, 20-29 MIN: ICD-10-PCS | Mod: S$GLB,,, | Performed by: INTERNAL MEDICINE

## 2020-10-14 RX ORDER — TRAMADOL HYDROCHLORIDE 50 MG/1
TABLET ORAL
Qty: 120 TABLET | Refills: 2 | Status: ON HOLD | OUTPATIENT
Start: 2020-10-14 | End: 2023-02-06 | Stop reason: HOSPADM

## 2020-10-14 NOTE — PROGRESS NOTES
[facility] RHEUMATOLOGY            PROGRESS NOTE      Subjective:       Patient ID:   NAME: Cheyanne Gomes : 1935     84 y.o. female    Referring Doc: No ref. provider found  Other Physicians:    Chief Complaint:  Sjogren's syndrome      History of Present Illness:     Patient returns today for a regularly scheduled follow-up visit for Sjogren's syndrome osteoporosis and osteoarthritis      The patient offers no new complaints.  She has chronic back pains.  No fevers cough or shortness of breath.  No chest pains.  No known exposure to COVID-19.  No GI complaints.  No rashes.  She has sicca symptoms.            ROS:   GEN:  No  fever, night sweats . weight is stable   + fatigue  SKIN: no rashes, no bruising, no ulcerations, no Raynaud's  HEENT: no HA's, No visual changes, no mucosal ulcers, + sicca symptoms,  CV:   no CP, SOB, PND, SALINAS, no orthopnea, no palpitations  PULM: normal with no SOB, cough, hemoptysis, sputum or pleuritic pain  GI:  no abdominal pain, nausea, vomiting, constipation, diarrhea, melanotic stools, BRBPR, hematemesis, no dysphagia  :   no dysuria  NEURO: no paresthesias, headaches, visual disturbances, muscle weakness  MUSCULOSKELETAL:no joint swelling, prolonged AM stiffness,   + back pain, no muscle pain  Allergies:  Review of patient's allergies indicates:   Allergen Reactions    Adhesive     Nitrofurantoin macrocrystalline Nausea And Vomiting     Other reaction(s): very sickly    Penicillins Swelling     Other reaction(s): swelling  Other reaction(s): red skin discolorati    Sulfa (sulfonamide antibiotics) Nausea And Vomiting     Other reaction(s): very sickly       Medications:    Current Outpatient Medications:     amLODIPine (NORVASC) 5 MG tablet, Take 5 mg by mouth once daily. , Disp: , Rfl: 5    aspirin 81 mg Tab, Take 1 tablet by mouth every evening. Every day, Disp: , Rfl:     biotin 5 mg Cap, Take 1 tablet by mouth 2 (two) times daily., Disp: , Rfl:      buprenorphine HCl (BELBUCA BUCL), Place inside cheek., Disp: , Rfl:     CALCIUM CARB/VIT D3/MINERALS (CALCIUM-VITAMIN D ORAL), Take 600 mg by mouth 2 (two) times daily. Calcium 1200 with D 3 1000, Disp: , Rfl:     CRANBERRY CONC/ASCORBIC ACID (CRANBERRY PLUS VITAMIN C ORAL), Take 1 capsule by mouth once daily., Disp: , Rfl:     cyanocobalamin, vitamin B-12, (NASCOBAL) 500 mcg/spray [place] Nascobal 500 mcg/spray nasal spray  Spray 1 spray every week by intranasal route as directed for 28 days., Disp: , Rfl:     cyclosporine (RESTASIS OPHT), Apply to eye., Disp: , Rfl:     DEXILANT 60 mg capsule, Take 60 mg by mouth once daily. , Disp: , Rfl: 11    DOCUSATE CALCIUM (STOOL SOFTENER ORAL), Take 200 mg by mouth every evening. , Disp: , Rfl:     ENTRESTO  mg per tablet, 1 tablet 2 (two) times daily. , Disp: , Rfl:     FERROUS FUMARATE/VIT BCOMP,C (SUPER B COMPLEX ORAL), Take 1 tablet by mouth once daily., Disp: , Rfl:     ferrous gluconate (FERGON) 324 MG tablet, Take 1 tablet (324 mg total) by mouth daily with breakfast. In the am and at noon, Disp: , Rfl:     ferrous sulfate (IRON ORAL), qd, Disp: , Rfl:     fluticasone (FLONASE) 50 mcg/actuation nasal spray, 2 sprays by Each Nare route once daily. (Patient taking differently: 2 sprays by Each Nare route daily as needed. ), Disp: 16 g, Rfl: 0    folic acid (FOLVITE) 1 MG tablet, , Disp: , Rfl:     furosemide (LASIX) 80 MG tablet, , Disp: , Rfl:     gabapentin (NEURONTIN) 100 MG capsule, Take 1 capsule (100 mg total) by mouth every evening., Disp: 30 capsule, Rfl: 3    isosorbide dinitrate (ISORDIL) 10 MG tablet, Take 10 mg by mouth 3 (three) times daily., Disp: , Rfl: 5    lactobacillus acidophilus (PROBIOTIC) 10 billion cell Cap, Take 1 each by mouth once daily. 1.5 billion, Disp: , Rfl:     magnesium oxide (MAG-OX) 400 mg (241.3 mg magnesium) tablet, TAKE 1 TABLET BY MOUTH 2 TIMES A DAY, Disp: 120 tablet, Rfl: 2    mecobalamin, vitamin B12,  1,000 mcg Chew, B12 Active 1,000 mcg chewable tablet  Take 1 tablet every day by oral route in the morning for 30 days., Disp: , Rfl:     megestroL (MEGACE) 400 mg/10 mL (40 mg/mL) Susp, take 1 TABLESPOONFUL EVERY DAY, Disp: 240 mL, Rfl: 3    MULTIVITAMIN WITH MINERALS (ONE-A-DAY 50 PLUS) Tab, Take 1 tablet by mouth once daily. Every day, Disp: , Rfl:     nitroGLYCERIN (NITROLINGUAL) 0.4 mg/dose spray, Place 1 spray under the tongue every 5 (five) minutes as needed. PRN, Disp: , Rfl:     ondansetron (ZOFRAN) 4 MG tablet, Take 4 mg by mouth every 8 (eight) hours as needed for Nausea. , Disp: , Rfl: 2    potassium chloride SA (K-DUR,KLOR-CON) 10 MEQ tablet, Take 20 mEq by mouth once daily. , Disp: , Rfl: 3    promethazine (PHENERGAN) 25 MG tablet, Take 1 tablet (25 mg total) by mouth 2 (two) times daily as needed for Nausea., Disp: 60 tablet, Rfl: 1    sertraline (ZOLOFT) 50 MG tablet, Take 50 mg by mouth once daily., Disp: , Rfl:     temazepam (RESTORIL) 7.5 MG Cap, Take 15 mg by mouth nightly as needed., Disp: , Rfl:     traMADoL (ULTRAM) 50 mg tablet, TAKE TWO TABLETS BY MOUTH TWICE DAILY AS NEEDED FOR SEVERE pain, Disp: 120 tablet, Rfl: 2    vitamin E 400 unit Tab, Take 400 mg by mouth once daily. Every day, Disp: , Rfl:     PMHx/PSHx Updates:        Objective:     Vitals:  Blood pressure (!) 155/75, temperature 98.3 °F (36.8 °C), weight 45.5 kg (100 lb 3.2 oz).    Physical Examination:   GEN: no apparent distress, comfortable; AAOx3  SKIN: no rashes,no ulceration, no Raynaud's, no petechiae, no SQ nodules,  HEAD: normal  EYES: no pallor, no icterus,  NECK: no masses, thyroid normal, trachea midline, no LAD/LN's, supple  CV: RRR with no murmur; l S1 and S2 reg. ,no gallop no rubs,   CHEST: Normal respiratory effort; CTAB; normal breath sounds; no wheeze or crackles  MUSC/Skeletal: ROM normal; no crepitus; joints without synovitis,    No joint swelling or tenderness of PIP, MCP, wrist, elbow, shoulder, or  knee joints  EXTREM: no clubbing, cyanosis, no edema,normal  pulses   NEURO: grossly intact; motor WNL; AAOx3;   PSYCH: normal mood, affect and behavior  LYMPH: normal cervical, supraclavicular          Labs:   Lab Results   Component Value Date    WBC 5.96 09/24/2020    HGB 10.6 (L) 09/24/2020    HCT 35.1 (L) 09/24/2020    MCV 91 09/24/2020     09/24/2020    CMP  @LASTLAB(NA,K,CL,CO2,GLU,BUN,Creatinine,Calcium,PROT,Albumin,Bilitot,Alkphos,AST,ALT,CRP,ESR,RF,CCP,VANCE,SSA,CPK,uric acid) )@  I have reviewed all available lab results and radiology reports.    Radiology/Diagnostic Studies:        Assessment/Plan:   (1) 84 y.o. female with diagnosis of Sjogren's syndrome.  This is stable  Patient is off Plaquenil due to abnormal eye exam  2)Osteoporosis: Has been on  Prolia.Skipped July's  dose due to dental work            Discussion:     I have explained all of the above in detail and the patient understands all of the current recommendation(s). I have answered all questions to the best of my ability and to their complete satisfaction.       The patient is to continue with the current management plan         RTC in   4 months      Electronically signed by Deepak Earzo MD

## 2020-10-16 RX ORDER — ATORVASTATIN CALCIUM 40 MG/1
40 TABLET, FILM COATED ORAL DAILY
Qty: 90 TABLET | Refills: 3 | Status: SHIPPED | OUTPATIENT
Start: 2020-10-16 | End: 2021-08-09 | Stop reason: SDUPTHER

## 2020-10-31 ENCOUNTER — EXTERNAL CHRONIC CARE MANAGEMENT (OUTPATIENT)
Dept: PRIMARY CARE CLINIC | Facility: CLINIC | Age: 85
End: 2020-10-31
Payer: MEDICARE

## 2020-10-31 PROCEDURE — 99490 PR CHRONIC CARE MGMT, 1ST 20 MIN: ICD-10-PCS | Mod: S$GLB,,, | Performed by: FAMILY MEDICINE

## 2020-10-31 PROCEDURE — 99490 CHRNC CARE MGMT STAFF 1ST 20: CPT | Mod: S$GLB,,, | Performed by: FAMILY MEDICINE

## 2020-11-02 ENCOUNTER — OFFICE VISIT (OUTPATIENT)
Dept: FAMILY MEDICINE | Facility: CLINIC | Age: 85
End: 2020-11-02
Attending: FAMILY MEDICINE
Payer: MEDICARE

## 2020-11-02 VITALS
HEART RATE: 64 BPM | BODY MASS INDEX: 21.83 KG/M2 | WEIGHT: 101.19 LBS | TEMPERATURE: 98 F | HEIGHT: 57 IN | SYSTOLIC BLOOD PRESSURE: 134 MMHG | DIASTOLIC BLOOD PRESSURE: 82 MMHG | OXYGEN SATURATION: 98 % | RESPIRATION RATE: 18 BRPM

## 2020-11-02 DIAGNOSIS — I10 ESSENTIAL HYPERTENSION: ICD-10-CM

## 2020-11-02 DIAGNOSIS — I25.10 CORONARY ARTERY DISEASE, ANGINA PRESENCE UNSPECIFIED, UNSPECIFIED VESSEL OR LESION TYPE, UNSPECIFIED WHETHER NATIVE OR TRANSPLANTED HEART: ICD-10-CM

## 2020-11-02 DIAGNOSIS — M35.01 SJOGREN'S SYNDROME WITH KERATOCONJUNCTIVITIS SICCA: ICD-10-CM

## 2020-11-02 DIAGNOSIS — D63.8 ANEMIA OF CHRONIC DISEASE: Chronic | ICD-10-CM

## 2020-11-02 DIAGNOSIS — I50.42 CHRONIC COMBINED SYSTOLIC AND DIASTOLIC HEART FAILURE: Primary | ICD-10-CM

## 2020-11-02 DIAGNOSIS — K22.70 BARRETT'S ESOPHAGUS WITHOUT DYSPLASIA: ICD-10-CM

## 2020-11-02 DIAGNOSIS — D50.9 IRON DEFICIENCY ANEMIA, UNSPECIFIED IRON DEFICIENCY ANEMIA TYPE: ICD-10-CM

## 2020-11-02 DIAGNOSIS — E78.2 MIXED HYPERLIPIDEMIA: ICD-10-CM

## 2020-11-02 DIAGNOSIS — M05.759: ICD-10-CM

## 2020-11-02 DIAGNOSIS — I35.0 AORTIC VALVE STENOSIS, MODERATE: ICD-10-CM

## 2020-11-02 DIAGNOSIS — N18.32 STAGE 3B CHRONIC KIDNEY DISEASE: ICD-10-CM

## 2020-11-02 PROBLEM — N18.9 CKD (CHRONIC KIDNEY DISEASE): Status: ACTIVE | Noted: 2020-11-02

## 2020-11-02 PROCEDURE — 99214 PR OFFICE/OUTPT VISIT, EST, LEVL IV, 30-39 MIN: ICD-10-PCS | Mod: S$PBB,,, | Performed by: FAMILY MEDICINE

## 2020-11-02 PROCEDURE — 99999 PR PBB SHADOW E&M-EST. PATIENT-LVL V: ICD-10-PCS | Mod: PBBFAC,,, | Performed by: FAMILY MEDICINE

## 2020-11-02 PROCEDURE — 99215 OFFICE O/P EST HI 40 MIN: CPT | Mod: PBBFAC,PO | Performed by: FAMILY MEDICINE

## 2020-11-02 PROCEDURE — 99999 PR PBB SHADOW E&M-EST. PATIENT-LVL V: CPT | Mod: PBBFAC,,, | Performed by: FAMILY MEDICINE

## 2020-11-02 PROCEDURE — 99214 OFFICE O/P EST MOD 30 MIN: CPT | Mod: S$PBB,,, | Performed by: FAMILY MEDICINE

## 2020-11-02 NOTE — PROGRESS NOTES
Subjective:       Patient ID: Cheyanne Gomes is a 84 y.o. female.    Chief Complaint: Annual Exam    84-year-old female coming in for follow-up on multiple medical problems.  She recently saw her cardiologist, Dr. Blair who perform some labs than the results are available.  Her cholesterol was going up and he put her back on Lipitor.  She has a history of Sjogren's syndrome and rheumatoid arthritis and was taken off of Plaquenil due to some eye problems about four months ago.  She thinks her vision may have improved slightly since that time and she is no longer on any disease modifying agents.  Her blood sugar was over 160 but it was taken shortly after lunch and she was not fasting.  History includes chronic combined systolic and diastolic heart failure, coronary artery disease, aortic stenosis, anemia of chronic disease and iron deficiency, hyperlipidemia, hypertension, rheumatoid arthritis and Sjogren syndrome.  She has chronic back pain and her pain management physician is talking about an implantable pump system but has not told her which drug agent would be used.  They are still in early discussions regarding it.    Past Medical History:  No date: Abdominal hernia  No date: Anemia      Comment:  Dr Berkowitz   No date: Aortic valve stenosis, moderate  No date: Cannon's esophagus  No date: CAD (coronary artery disease)  No date: Cataract      Comment:  ou done  No date: CHF (congestive heart failure)      Comment:  EFx 35%, Dr. 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  No date: Rheumatoid  arthritis  2017: Shingles  No date: Sjogren syndrome  No date: Ulcerative colitis    Past Surgical History:  No date: APPENDECTOMY  No date: BACK SURGERY  No date: CARDIAC SURGERY      Comment:  CABGx3 in   No date: CATARACT EXTRACTION      Comment:  ou od d 11-15-12//  No date:  SECTION, CLASSIC      Comment:  x 2  No date: CHOLECYSTECTOMY  09/15/2011: COLONOSCOPY      Comment:  Dr Anaya   01/10/2017: COLONOSCOPY      Comment:  [facility] report sent to scanning  No date: CORONARY ANGIOPLASTY      Comment:  PCI x 1 in   No date: CORONARY ARTERY BYPASS GRAFT      Comment:  3 vessel  No date: CORONARY ARTERY BYPASS GRAFT  6/3/2020: CYSTOSCOPY; N/A      Comment:  Procedure: CYSTOSCOPY;  Surgeon: Miryam Bender Jr., MD;               Location: [facility] OR;  Service: Urology;  Laterality: N/A;  No date: eyes      Comment:  paul rodríguez  2016: FRACTURE SURGERY      Comment:  Dr Guido left hip   2018: FRACTURE SURGERY      Comment:  Right Hip  No date: HYSTERECTOMY      Comment:  tubal ligation, oopherectomy  10/8/2018: INTRAMEDULLARY RODDING OF TROCHANTER OF FEMUR; Right      Comment:  Procedure: INSERTION, INTRAMEDULLARY KELSI, FEMUR,                TROCHANTER;  Surgeon: Valerio Luther MD;  Location: [facility] OR;               Service: Orthopedics;  Laterality: Right;  No date: OOPHORECTOMY  2013: SPINE SURGERY      Comment:  Silvino Mckeon  No date: UPPER GASTROINTESTINAL ENDOSCOPY  14: Yag Capsulotomy; Right    Review of patient's family history indicates:  Problem: Hypertension      Relation: Father          Age of Onset: (Not Specified)  Problem: Heart disease      Relation: Father          Age of Onset: (Not Specified)          Comment: MI  Problem: Heart disease      Relation: Mother          Age of Onset: (Not Specified)          Comment: aortic stenosis  Problem: Skin cancer      Relation: Mother          Age of Onset: (Not Specified)  Problem: Heart disease      Relation: Brother          Age of  Onset: (Not Specified)          Comment: 2 brothers CABG  Problem: Arthritis      Relation: Brother          Age of Onset: (Not Specified)  Problem: Crohn's disease      Relation: Brother          Age of Onset: (Not Specified)  Problem: Hypertension      Relation: Sister          Age of Onset: (Not Specified)  Problem: Fibromyalgia      Relation: Sister          Age of Onset: (Not Specified)  Problem: Scleroderma      Relation: Sister          Age of Onset: (Not Specified)  Problem: Pulmonary embolism      Relation: Sister          Age of Onset: (Not Specified)  Problem: Hypertension      Relation: Daughter          Age of Onset: (Not Specified)  Problem: Early death      Relation: Son          Age of Onset: (Not Specified)          Comment: accident  Problem: Lupus      Relation: Cousin          Age of Onset: (Not Specified)  Problem: Lupus      Relation: Cousin          Age of Onset: (Not Specified)  Problem: Irritable bowel syndrome      Relation: Sister          Age of Onset: (Not Specified)  Problem: Crohn's disease      Relation: Brother          Age of Onset: (Not Specified)  Problem: Crohn's disease      Relation: Brother          Age of Onset: (Not Specified)  Problem: Amblyopia      Relation: Neg Hx          Age of Onset: (Not Specified)  Problem: Blindness      Relation: Neg Hx          Age of Onset: (Not Specified)  Problem: Cancer      Relation: Neg Hx          Age of Onset: (Not Specified)  Problem: Cataracts      Relation: Neg Hx          Age of Onset: (Not Specified)  Problem: Diabetes      Relation: Neg Hx          Age of Onset: (Not Specified)  Problem: Glaucoma      Relation: Neg Hx          Age of Onset: (Not Specified)  Problem: Macular degeneration      Relation: Neg Hx          Age of Onset: (Not Specified)  Problem: Retinal detachment      Relation: Neg Hx          Age of Onset: (Not Specified)  Problem: Strabismus      Relation: Neg Hx          Age of Onset: (Not Specified)  Problem:  Stroke      Relation: Neg Hx          Age of Onset: (Not Specified)  Problem: Thyroid disease      Relation: Neg Hx          Age of Onset: (Not Specified)  Problem: Celiac disease      Relation: Neg Hx          Age of Onset: (Not Specified)  Problem: Cirrhosis      Relation: Neg Hx          Age of Onset: (Not Specified)  Problem: Psoriasis      Relation: Neg Hx          Age of Onset: (Not Specified)  Problem: Melanoma      Relation: Neg Hx          Age of Onset: (Not Specified)  Problem: Multiple sclerosis      Relation: Neg Hx          Age of Onset: (Not Specified)  Problem: Rheum arthritis      Relation: Neg Hx          Age of Onset: (Not Specified)  Problem: Tuberculosis      Relation: Neg Hx          Age of Onset: (Not Specified)  Problem: Lymphoma      Relation: Neg Hx          Age of Onset: (Not Specified)  Problem: Ulcerative colitis      Relation: Neg Hx          Age of Onset: (Not Specified)  Problem: Moses's disease      Relation: Neg Hx          Age of Onset: (Not Specified)  Problem: Stomach cancer      Relation: Neg Hx          Age of Onset: (Not Specified)  Problem: Rectal cancer      Relation: Neg Hx          Age of Onset: (Not Specified)  Problem: Liver disease      Relation: Neg Hx          Age of Onset: (Not Specified)  Problem: Liver cancer      Relation: Neg Hx          Age of Onset: (Not Specified)  Problem: Inflammatory bowel disease      Relation: Neg Hx          Age of Onset: (Not Specified)  Problem: Hemochromatosis      Relation: Neg Hx          Age of Onset: (Not Specified)  Problem: Esophageal cancer      Relation: Neg Hx          Age of Onset: (Not Specified)  Problem: Cystic fibrosis      Relation: Neg Hx          Age of Onset: (Not Specified)  Problem: Colon cancer      Relation: Neg Hx          Age of Onset: (Not Specified)    Social History    Tobacco Use      Smoking status: Former Smoker        Packs/day: 1.00        Years: 5.00        Pack years: 5        Quit date: 1/1/1971         Years since quittin.8      Smokeless tobacco: Never Used    Alcohol use: Yes      Alcohol/week: 1.0 standard drinks      Types: 1 Glasses of wine per week    Drug use: Never    Current Outpatient Medications on File Prior to Visit:  amLODIPine (NORVASC) 5 MG tablet, Take 5 mg by mouth once daily. , Disp: , Rfl: 5  aspirin 81 mg Tab, Take 1 tablet by mouth every evening. Every day, Disp: , Rfl:   atorvastatin (LIPITOR) 40 MG tablet, Take 1 tablet (40 mg total) by mouth once daily., Disp: 90 tablet, Rfl: 3  biotin 5 mg Cap, Take 1 tablet by mouth 2 (two) times daily., Disp: , Rfl:   buprenorphine HCl (BELBUCA BUCL), Place inside cheek., Disp: , Rfl:   CALCIUM CARB/VIT D3/MINERALS (CALCIUM-VITAMIN D ORAL), Take 600 mg by mouth 2 (two) times daily. Calcium 1200 with D 3 1000, Disp: , Rfl:   CRANBERRY CONC/ASCORBIC ACID (CRANBERRY PLUS VITAMIN C ORAL), Take 1 capsule by mouth once daily., Disp: , Rfl:   cyanocobalamin, vitamin B-12, (NASCOBAL) 500 mcg/spray Spry, Nascobal 500 mcg/spray nasal spray   Spray 1 spray every week by intranasal route as directed for 28 days., Disp: , Rfl:   cyclosporine (RESTASIS OPHT), Apply to eye., Disp: , Rfl:   DEXILANT 60 mg capsule, Take 60 mg by mouth once daily. , Disp: , Rfl: 11  DOCUSATE CALCIUM (STOOL SOFTENER ORAL), Take 200 mg by mouth every evening. , Disp: , Rfl:   ENTRESTO  mg per tablet, 1 tablet 2 (two) times daily. , Disp: , Rfl:   FERROUS FUMARATE/VIT BCOMP,C (SUPER B COMPLEX ORAL), Take 1 tablet by mouth once daily., Disp: , Rfl:   ferrous gluconate (FERGON) 324 MG tablet, Take 1 tablet (324 mg total) by mouth daily with breakfast. In the am and at noon, Disp: , Rfl:   ferrous sulfate (IRON ORAL), qd, Disp: , Rfl:   fluticasone (FLONASE) 50 mcg/actuation nasal spray, 2 sprays by Each Nare route once daily. (Patient taking differently: 2 sprays by Each Nare route daily as needed. ), Disp: 16 g, Rfl: 0  folic acid (FOLVITE) 1 MG tablet, , Disp: , Rfl:    furosemide (LASIX) 80 MG tablet, , Disp: , Rfl:   gabapentin (NEURONTIN) 100 MG capsule, Take 1 capsule (100 mg total) by mouth every evening., Disp: 30 capsule, Rfl: 3  isosorbide dinitrate (ISORDIL) 10 MG tablet, Take 10 mg by mouth 3 (three) times daily., Disp: , Rfl: 5  lactobacillus acidophilus (PROBIOTIC) 10 billion cell Cap, Take 1 each by mouth once daily. 1.5 billion, Disp: , Rfl:   magnesium oxide (MAG-OX) 400 mg (241.3 mg magnesium) tablet, TAKE 1 TABLET BY MOUTH 2 TIMES A DAY, Disp: 120 tablet, Rfl: 2  mecobalamin, vitamin B12, 1,000 mcg Chew, B12 Active 1,000 mcg chewable tablet   Take 1 tablet every day by oral route in the morning for 30 days., Disp: , Rfl:   megestroL (MEGACE) 400 mg/10 mL (40 mg/mL) Susp, take 1 TABLESPOONFUL EVERY DAY, Disp: 240 mL, Rfl: 3  MULTIVITAMIN WITH MINERALS (ONE-A-DAY 50 PLUS) Tab, Take 1 tablet by mouth once daily. Every day, Disp: , Rfl:   nitroGLYCERIN (NITROLINGUAL) 0.4 mg/dose spray, Place 1 spray under the tongue every 5 (five) minutes as needed. PRN, Disp: , Rfl:   ondansetron (ZOFRAN) 4 MG tablet, Take 4 mg by mouth every 8 (eight) hours as needed for Nausea. , Disp: , Rfl: 2  potassium chloride SA (K-DUR,KLOR-CON) 10 MEQ tablet, Take 20 mEq by mouth once daily. , Disp: , Rfl: 3  promethazine (PHENERGAN) 25 MG tablet, Take 1 tablet (25 mg total) by mouth 2 (two) times daily as needed for Nausea., Disp: 60 tablet, Rfl: 1  sertraline (ZOLOFT) 50 MG tablet, Take 50 mg by mouth once daily., Disp: , Rfl:   temazepam (RESTORIL) 7.5 MG Cap, Take 15 mg by mouth nightly as needed., Disp: , Rfl:   traMADoL (ULTRAM) 50 mg tablet, TAKE TWO TABLETS BY MOUTH TWICE DAILY AS NEEDED FOR SEVERE pain, Disp: 120 tablet, Rfl: 2  vitamin E 400 unit Tab, Take 400 mg by mouth once daily. Every day, Disp: , Rfl:   (DISCONTINUED) hydrochlorothiazide (HYDRODIURIL) 25 MG tablet, Take 25 mg by mouth once daily., Disp: , Rfl:     No current facility-administered medications on file prior to  visit.     Shingles Vaccine(2 of 3) due on 10/23/2009      Review of Systems   Constitutional: Negative for chills, diaphoresis, fatigue, fever and unexpected weight change.   HENT: Negative for congestion, ear pain, hearing loss, postnasal drip and sinus pressure.    Eyes: Positive for visual disturbance. Negative for itching.   Respiratory: Negative for cough, chest tightness, shortness of breath and wheezing.    Cardiovascular: Negative for chest pain, palpitations and leg swelling.   Gastrointestinal: Negative for abdominal pain, blood in stool, constipation, diarrhea, nausea and vomiting.   Genitourinary: Negative for dysuria, frequency and hematuria.   Musculoskeletal: Positive for arthralgias and back pain. Negative for joint swelling and myalgias.   Skin: Positive for color change and rash.   Neurological: Negative for dizziness and headaches.   Hematological: Negative for adenopathy.   Psychiatric/Behavioral: Negative for sleep disturbance. The patient is not nervous/anxious.        Objective:      Physical Exam  Vitals signs and nursing note reviewed.   Constitutional:       General: She is not in acute distress.     Appearance: Normal appearance. She is well-developed and normal weight. She is not ill-appearing, toxic-appearing or diaphoretic.      Comments: Good blood pressure control  Normal weight with a BMI of 21.9.  She is typically underweight and has gained 5.7 lb from her October 10, 2019 visit   HENT:      Head: Normocephalic and atraumatic.      Right Ear: Tympanic membrane, ear canal and external ear normal. There is no impacted cerumen.      Left Ear: Tympanic membrane, ear canal and external ear normal. There is no impacted cerumen.      Nose: Nose normal. No congestion or rhinorrhea.      Mouth/Throat:      Mouth: Mucous membranes are moist.      Pharynx: Oropharynx is clear. No oropharyngeal exudate or posterior oropharyngeal erythema.   Eyes:      General: No scleral icterus.        Right  eye: No discharge.         Left eye: No discharge.      Extraocular Movements: Extraocular movements intact.      Conjunctiva/sclera: Conjunctivae normal.      Pupils: Pupils are equal, round, and reactive to light.   Neck:      Musculoskeletal: Normal range of motion and neck supple. No neck rigidity or muscular tenderness.      Thyroid: No thyromegaly.      Vascular: No carotid bruit or JVD.   Cardiovascular:      Rate and Rhythm: Normal rate and regular rhythm.      Heart sounds: Normal heart sounds. No murmur. No friction rub. No gallop.    Pulmonary:      Effort: Pulmonary effort is normal. No respiratory distress.      Breath sounds: Normal breath sounds. No stridor. No wheezing, rhonchi or rales.   Chest:      Chest wall: No tenderness.   Abdominal:      General: Bowel sounds are normal. There is no distension.      Palpations: Abdomen is soft. There is no mass.      Tenderness: There is no abdominal tenderness. There is no guarding or rebound.      Hernia: No hernia is present.   Musculoskeletal: Normal range of motion.         General: No swelling, tenderness, deformity or signs of injury.      Right lower leg: No edema.      Left lower leg: No edema.   Lymphadenopathy:      Cervical: No cervical adenopathy.   Skin:     General: Skin is warm and dry.      Coloration: Skin is not jaundiced or pale.      Findings: No bruising, erythema, lesion or rash.   Neurological:      Mental Status: She is alert and oriented to person, place, and time.      Cranial Nerves: No cranial nerve deficit.      Sensory: No sensory deficit.      Motor: No weakness.      Coordination: Coordination normal.      Gait: Gait normal.      Deep Tendon Reflexes: Reflexes are normal and symmetric. Reflexes normal.   Psychiatric:         Mood and Affect: Mood normal.         Behavior: Behavior normal.         Thought Content: Thought content normal.         Judgment: Judgment normal.         Assessment:       1. Chronic combined systolic  and diastolic heart failure    2. Aortic valve stenosis, moderate    3. Mixed hyperlipidemia    4. Coronary artery disease, angina presence unspecified, unspecified vessel or lesion type, unspecified whether native or transplanted heart    5. Essential hypertension    6. Stage 3b chronic kidney disease    7. Sjogren's syndrome with keratoconjunctivitis sicca    8. Anemia of chronic disease    9. Iron deficiency anemia, unspecified iron deficiency anemia type    10. 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    11. Rheumatoid arthritis involving hip with positive rheumatoid factor, unspecified laterality    12. BMI 21.0-21.9, adult        Plan:       1. Chronic combined systolic and diastolic heart failure  Stable, followed by Dr. Blair    2. Aortic valve stenosis, moderate  Stable, followed by Dr. Blair.  Her last echocardiogram appears to have been November 2019    3. Mixed hyperlipidemia  Just started back on Lipitor    4. Coronary artery disease, angina presence unspecified, unspecified vessel or lesion type, unspecified whether native or transplanted heart  Stable and asymptomatic    5. Essential hypertension  Good control    6. Stage 3b chronic kidney disease  Stable    7. Sjogren's syndrome with keratoconjunctivitis sicca  Followed by rheumatology    8. Anemia of chronic disease  Stable    9. Iron deficiency anemia, unspecified iron deficiency anemia type  Stable    10. 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  Followed by GI    11. Rheumatoid arthritis involving hip with positive rheumatoid factor, unspecified laterality  Followed by rheumatology    12. BMI 21.0-21.9, adult

## 2020-11-24 LAB
CHOLEST SERPL-MSCNC: 135 MG/DL (ref 0–200)
HDLC SERPL-MCNC: 61 MG/DL
LDLC SERPL CALC-MCNC: 55 MG/DL
TRIGL SERPL-MCNC: 108 MG/DL

## 2020-11-30 ENCOUNTER — EXTERNAL CHRONIC CARE MANAGEMENT (OUTPATIENT)
Dept: PRIMARY CARE CLINIC | Facility: CLINIC | Age: 85
End: 2020-11-30
Payer: MEDICARE

## 2020-11-30 PROCEDURE — 99490 PR CHRONIC CARE MGMT, 1ST 20 MIN: ICD-10-PCS | Mod: S$GLB,,, | Performed by: FAMILY MEDICINE

## 2020-11-30 PROCEDURE — 99490 CHRNC CARE MGMT STAFF 1ST 20: CPT | Mod: S$GLB,,, | Performed by: FAMILY MEDICINE

## 2020-12-01 ENCOUNTER — HOSPITAL ENCOUNTER (OUTPATIENT)
Dept: RADIOLOGY | Facility: HOSPITAL | Age: 85
Discharge: HOME | End: 2020-12-01
Attending: UROLOGY
Payer: MEDICARE

## 2020-12-01 DIAGNOSIS — Q61.3 POLYCYSTIC KIDNEY: ICD-10-CM

## 2020-12-01 PROCEDURE — 76770 US RETROPERITONEAL COMPLETE: ICD-10-PCS | Mod: 26,,, | Performed by: RADIOLOGY

## 2020-12-01 PROCEDURE — 76770 US EXAM ABDO BACK WALL COMP: CPT | Mod: TC

## 2020-12-01 PROCEDURE — 76770 US EXAM ABDO BACK WALL COMP: CPT | Mod: 26,,, | Performed by: RADIOLOGY

## 2020-12-04 ENCOUNTER — OFFICE VISIT (OUTPATIENT)
Dept: UROLOGY | Facility: CLINIC | Age: 85
End: 2020-12-04
Payer: MEDICARE

## 2020-12-04 VITALS
HEIGHT: 57 IN | DIASTOLIC BLOOD PRESSURE: 67 MMHG | HEART RATE: 69 BPM | SYSTOLIC BLOOD PRESSURE: 141 MMHG | BODY MASS INDEX: 21.9 KG/M2 | TEMPERATURE: 98 F

## 2020-12-04 DIAGNOSIS — Q61.3 POLYCYSTIC KIDNEY: Primary | ICD-10-CM

## 2020-12-04 DIAGNOSIS — R31.0 GROSS HEMATURIA: ICD-10-CM

## 2020-12-04 PROCEDURE — 99215 OFFICE O/P EST HI 40 MIN: CPT | Mod: PBBFAC,PN | Performed by: UROLOGY

## 2020-12-04 PROCEDURE — 99214 OFFICE O/P EST MOD 30 MIN: CPT | Mod: S$PBB,,, | Performed by: UROLOGY

## 2020-12-04 PROCEDURE — 99214 PR OFFICE/OUTPT VISIT, EST, LEVL IV, 30-39 MIN: ICD-10-PCS | Mod: S$PBB,,, | Performed by: UROLOGY

## 2020-12-04 PROCEDURE — 99999 PR PBB SHADOW E&M-EST. PATIENT-LVL V: ICD-10-PCS | Mod: PBBFAC,,, | Performed by: UROLOGY

## 2020-12-04 PROCEDURE — 99999 PR PBB SHADOW E&M-EST. PATIENT-LVL V: CPT | Mod: PBBFAC,,, | Performed by: UROLOGY

## 2020-12-04 RX ORDER — CYCLOSPORINE 0.5 MG/ML
1 EMULSION OPHTHALMIC 2 TIMES DAILY
COMMUNITY
Start: 2020-11-30 | End: 2022-08-22

## 2020-12-04 RX ORDER — BUPRENORPHINE HYDROCHLORIDE 300 UG/1
FILM, SOLUBLE BUCCAL
COMMUNITY
Start: 2020-12-01 | End: 2022-05-12 | Stop reason: DRUGHIGH

## 2020-12-04 NOTE — PROGRESS NOTES
[facility] Established Patient/H&P:    Cheyanne Gomes is a 85 y.o. female who presents for follow up for gross hematuria.      Patient with a history of multiple medical problems who presented with intermittent gross hematuria starting on 4/23/20 associated with no pain.      She underwent CT urogram which showed polycystic kidneys and asymmetric renal perfusion - left greater than right. She denies any flank pain.      Interval History    12/4/20: She is s/p cystoscopy on 6/3/20 which revealed mild to moderate bladder trabeculations. Otherwise unremarkable.      Renal ultrasound on 12/1/20 to monitor cysts revealed polycystic kidneys with the majority simple. One Bosniak 2F right renal mid-pole cyst and one questionably complex lesion at the upper pole of the left kidney without a CT correlate. Recommend continuing follow up with ultrasound or CT. No hematuria since her previous episode in 4/2020.      Patient denies any fever, chills, dysuria, urinary tract infection, bone pain or recent  trauma.         Urine culture  No growth  8/24/20  No growth                    5/19/20     Urine cytology  Negative                      5/19/20      Past Medical History:   Diagnosis Date    Abdominal hernia     Anemia     Dr Berkowitz     Aortic valve stenosis, moderate     Cannon's esophagus     CAD (coronary artery disease)     Cataract     ou done    CHF (congestive heart failure)     EFx 35%, Dr. Spencer 12/19/13    Chronic combined systolic and diastolic heart failure 9/28/2019    Colitis     Compression fracture     Diverticulosis     Encounter for blood transfusion     GERD (gastroesophageal reflux disease)     Hyperlipidemia     Hypertension     Irritable bowel syndrome     Osteoarthritis     lumbar DDD    Pneumonia 01/03/2017    Raynaud disease     Rheumatoid arteritis     Rheumatoid arthritis     Shingles 01/03/2017    Sjogren syndrome     Ulcerative colitis         Past Surgical History:   Procedure Laterality Date    APPENDECTOMY      BACK SURGERY      CARDIAC SURGERY      CABGx3 in     CATARACT EXTRACTION      ou od d 11-15-12//     SECTION, CLASSIC      x 2    CHOLECYSTECTOMY      COLONOSCOPY  09/15/2011    Dr Anaya     COLONOSCOPY  01/10/2017    [id number] report sent to scanning    CORONARY ANGIOPLASTY      PCI x 1 in     CORONARY ARTERY BYPASS GRAFT      3 vessel    CORONARY ARTERY BYPASS GRAFT      CYSTOSCOPY N/A 6/3/2020    Procedure: CYSTOSCOPY;  Surgeon: Miryam Bender Jr., MD;  Location: [facility] OR;  Service: Urology;  Laterality: N/A;    eyes      rk ou    FRACTURE SURGERY  2016    Dr Guido left hip     FRACTURE SURGERY  2018    Right Hip    HYSTERECTOMY      tubal ligation, oopherectomy    INTRAMEDULLARY RODDING OF TROCHANTER OF FEMUR Right 10/8/2018    Procedure: INSERTION, INTRAMEDULLARY KELSI, FEMUR, TROCHANTER;  Surgeon: Valerio Luther MD;  Location: [facility] OR;  Service: Orthopedics;  Laterality: Right;    OOPHORECTOMY      SPINE SURGERY  2013    Silvino Mckeon    UPPER GASTROINTESTINAL ENDOSCOPY      Yag Capsulotomy Right 14       Family History   Problem Relation Age of Onset    Hypertension Father     Heart disease Father         MI    Heart disease Mother         aortic stenosis    Skin cancer Mother     Heart disease Brother         2 brothers CABG    Arthritis Brother     Crohn's disease Brother     Hypertension Sister     Fibromyalgia Sister     Scleroderma Sister     Pulmonary embolism Sister     Hypertension Daughter     Early death Son         accident    Lupus Cousin     Lupus Cousin     Irritable bowel syndrome Sister     Crohn's disease Brother     Crohn's disease Brother     Amblyopia Neg Hx     Blindness Neg Hx     Cancer Neg Hx     Cataracts Neg Hx     Diabetes Neg Hx     Glaucoma Neg Hx     Macular degeneration Neg Hx     Retinal detachment Neg Hx     Strabismus Neg Hx      Stroke Neg Hx     Thyroid disease Neg Hx     Celiac disease Neg Hx     Cirrhosis Neg Hx     Psoriasis Neg Hx     Melanoma Neg Hx     Multiple sclerosis Neg Hx     Rheum arthritis Neg Hx     Tuberculosis Neg Hx     Lymphoma Neg Hx     Ulcerative colitis Neg Hx     Moses's disease Neg Hx     Stomach cancer Neg Hx     Rectal cancer Neg Hx     Liver disease Neg Hx     Liver cancer Neg Hx     Inflammatory bowel disease Neg Hx     Hemochromatosis Neg Hx     Esophageal cancer Neg Hx     Cystic fibrosis Neg Hx     Colon cancer Neg Hx        Social History     Socioeconomic History    Marital status:      Spouse name: Not on file    Number of children: Not on file    Years of education: Not on file    Highest education level: Not on file   Occupational History    Not on file   Social Needs    Financial resource strain: Not on file    Food insecurity     Worry: Not on file     Inability: Not on file    Transportation needs     Medical: Not on file     Non-medical: Not on file   Tobacco Use    Smoking status: Former Smoker     Packs/day: 1.00     Years: 5.00     Pack years: 5.00     Quit date: 1971     Years since quittin.9    Smokeless tobacco: Never Used   Substance and Sexual Activity    Alcohol use: Yes     Alcohol/week: 1.0 standard drinks     Types: 1 Glasses of wine per week    Drug use: Never    Sexual activity: Not Currently     Partners: Male   Lifestyle    Physical activity     Days per week: Not on file     Minutes per session: Not on file    Stress: Not on file   Relationships    Social connections     Talks on phone: Not on file     Gets together: Not on file     Attends [employer] service: Not on file     Active member of club or organization: Not on file     Attends meetings of clubs or organizations: Not on file     Relationship status: Not on file   Other Topics Concern    Not on file   Social History Narrative    Not on file       Review of patient's  "allergies indicates:   Allergen Reactions    Adhesive     Nitrofurantoin macrocrystalline Nausea And Vomiting     Other reaction(s): very sickly    Penicillins Swelling     Other reaction(s): swelling  Other reaction(s): red skin discolorati    Sulfa (sulfonamide antibiotics) Nausea And Vomiting     Other reaction(s): very sickly       Medications Reviewed: see MAR    ROS:    Constitutional: denies fevers, chills, night sweats, fatigue, malaise  Respiratory: negative for cough, shortness of breath, wheezing, dyspnea.  Cardiovascular: + for high blood pressure, negative for chest pain, varicose veins, ankle swelling, palpitations, syncope.  GI: negative for abdominal pain, heartburn, indigestion, nausea, vomiting, constipation, diarrhea, blood in stool.   Urology: as noted above in HPI  Endocrinology: negative for cold intolerance, excessive thirst, not feeling tired/sluggish, no heat intolerance.   Hematology/Lymph: negative for easy bleeding, easy bruising, swollen glands.  Musculoskeletal: negative for back pain, joint pain, joint swelling, neck pain.  Allergy-Immunology: negative for seasonal allergies, negative for unusual infections.   Skin: negative for boils, breast lumps, hives, itching, rash.   Neurology: negative for, dizziness, headache, tingling/numbness, tremors.   Psych: satisfied with life; negative for, anxiety, depression, suicidal thoughts.     PHYSICAL EXAM:    Vitals:    12/04/20 1015   BP: (!) 141/67   Pulse: 69   Temp: 97.9 °F (36.6 °C)     Body mass index is 21.9 kg/m².   Height: 4' 9" (144.8 cm)       General: Alert, cooperative, no distress, appears stated age  Head: Normocephalic, without obvious abnormality, atraumatic  Neck: no masses, no thyromegaly, no lymphadenopathy  Eyes: PERRL, conjunctiva/corneas clear  Lungs: Respirations unlabored, normal effort, no accessory muscle use  CV: Warm and well perfused extremities  Abdomen: Soft, non-tender, no CVA tenderness, no " hepatosplenomegaly, no hernia  Extremities: Extremities normal, atraumatic, no cyanosis or edema  Skin: Normal color, texture, and turgor, no rashes or lesions  Psych: Appropriate, well oriented, normal affect, normal mood  Neuro: Non-focal      LABS:    No results found for this or any previous visit (from the past 336 hour(s)).    IMAGING:    Reviewed in HPI      Assessment/Diagnosis:    1. Polycystic kidney     2. Gross hematuria         Plans:    - Reviewed renal ultrasound results with patient. One Bosniak 2F right renal mid-pole cyst and one questionably complex lesion at the upper pole of the left kidney without a CT correlate. We discussed continuing surveillance of these lesions with renal ultrasound at least yearly.   - RTC in 12 months with a renal ultrasound prior. Order placed.

## 2020-12-11 ENCOUNTER — PATIENT MESSAGE (OUTPATIENT)
Dept: OTHER | Facility: OTHER | Age: 85
End: 2020-12-11

## 2020-12-14 ENCOUNTER — OFFICE VISIT (OUTPATIENT)
Dept: CARDIOLOGY | Facility: CLINIC | Age: 85
End: 2020-12-14
Payer: MEDICARE

## 2020-12-14 VITALS
HEART RATE: 62 BPM | SYSTOLIC BLOOD PRESSURE: 120 MMHG | HEIGHT: 57 IN | BODY MASS INDEX: 21.57 KG/M2 | OXYGEN SATURATION: 97 % | DIASTOLIC BLOOD PRESSURE: 70 MMHG | WEIGHT: 100 LBS

## 2020-12-14 DIAGNOSIS — N18.32 STAGE 3B CHRONIC KIDNEY DISEASE: ICD-10-CM

## 2020-12-14 DIAGNOSIS — I25.10 CORONARY ARTERY DISEASE, ANGINA PRESENCE UNSPECIFIED, UNSPECIFIED VESSEL OR LESION TYPE, UNSPECIFIED WHETHER NATIVE OR TRANSPLANTED HEART: Primary | ICD-10-CM

## 2020-12-14 DIAGNOSIS — I35.0 AORTIC VALVE STENOSIS, MODERATE: ICD-10-CM

## 2020-12-14 DIAGNOSIS — E78.00 PURE HYPERCHOLESTEROLEMIA: ICD-10-CM

## 2020-12-14 DIAGNOSIS — I50.42 CHRONIC COMBINED SYSTOLIC AND DIASTOLIC HEART FAILURE: ICD-10-CM

## 2020-12-14 PROCEDURE — 99214 PR OFFICE/OUTPT VISIT, EST, LEVL IV, 30-39 MIN: ICD-10-PCS | Mod: S$GLB,,, | Performed by: INTERNAL MEDICINE

## 2020-12-14 PROCEDURE — 99214 OFFICE O/P EST MOD 30 MIN: CPT | Mod: S$GLB,,, | Performed by: INTERNAL MEDICINE

## 2020-12-14 NOTE — PROGRESS NOTES
Patient ID:  Cheyanne Gomes is a 85 y.o. female who presents for follow-up of Congestive Heart Failure, Coronary Artery Disease, Results (labs), and Shortness of Breath      She has been doing okay.  She does have some dyspnea on exertion.  She is going to have a trial of the back a simulation on Friday.  She will have a follow-up on the .  She continues to have a lot of back pain.  She went to see the nephrologists and she continues to have cysts in her kidneys they are observing them for now.      Past Medical History:   Diagnosis Date    Abdominal hernia     Anemia     Dr Berkowitz     Aortic valve stenosis, moderate     Cannon's esophagus     CAD (coronary artery disease)     Cataract     ou done    CHF (congestive heart failure)     EFx 35%, Dr. Spencer 13    Chronic combined systolic and diastolic heart failure 2019    Colitis     Compression fracture     Diverticulosis     Encounter for blood transfusion     GERD (gastroesophageal reflux disease)     Hyperlipidemia     Hypertension     Irritable bowel syndrome     Osteoarthritis     lumbar DDD    Pneumonia 2017    Raynaud disease     Rheumatoid arteritis     Rheumatoid arthritis     Shingles 2017    Sjogren syndrome     Ulcerative colitis         Past Surgical History:   Procedure Laterality Date    APPENDECTOMY      BACK SURGERY      CARDIAC SURGERY      CABGx3 in     CATARACT EXTRACTION      ou od d 11-15-12//     SECTION, CLASSIC      x 2    CHOLECYSTECTOMY      COLONOSCOPY  09/15/2011    Dr Anaya     COLONOSCOPY  01/10/2017    [facility] report sent to scanning    CORONARY ANGIOPLASTY      PCI x 1 in     CORONARY ARTERY BYPASS GRAFT      3 vessel    CORONARY ARTERY BYPASS GRAFT      CYSTOSCOPY N/A 6/3/2020    Procedure: CYSTOSCOPY;  Surgeon: Miryam Bender Jr., MD;  Location: [facility] OR;  Service: Urology;  Laterality: N/A;    eyes      rk ou    FRACTURE SURGERY  2016     Dr Guido left hip     FRACTURE SURGERY  2018    Right Hip    HYSTERECTOMY      tubal ligation, oopherectomy    INTRAMEDULLARY RODDING OF TROCHANTER OF FEMUR Right 10/8/2018    Procedure: INSERTION, INTRAMEDULLARY KELSI, FEMUR, TROCHANTER;  Surgeon: Valerio Luther MD;  Location: [facility];  Service: Orthopedics;  Laterality: Right;    OOPHORECTOMY      SPINE SURGERY  8-    Silvino Mckeon    UPPER GASTROINTESTINAL ENDOSCOPY      Yag Capsulotomy Right 9/25/14          Current Outpatient Medications   Medication Instructions    amLODIPine (NORVASC) 5 mg, Oral, Daily    aspirin 81 mg Tab 1 tablet, Oral, Nightly, Every day    atorvastatin (LIPITOR) 40 mg, Oral, Daily    BELBUCA 300 mcg Film Place 1 film between cheek AND gum TWICE DAILY.    biotin 5 mg Cap 1 tablet, Oral, 2 times daily    buprenorphine HCl (BELBUCA BUCL) Buccal    CALCIUM CARB/VIT D3/MINERALS (CALCIUM-VITAMIN D ORAL) 600 mg, Oral, 2 times daily, Calcium 1200 with D 3 1000    CRANBERRY CONC/ASCORBIC ACID (CRANBERRY PLUS VITAMIN C ORAL) 1 capsule, Oral, Daily    cyanocobalamin, vitamin B-12, (NASCOBAL) 500 mcg/spray [place] Nascobal 500 mcg/spray nasal spray   Spray 1 spray every week by intranasal route as directed for 28 days.    DEXILANT 60 mg, Oral, Daily    DOCUSATE CALCIUM (STOOL SOFTENER ORAL) 200 mg, Oral, Nightly    ENTRESTO  mg per tablet 1 tablet, 2 times daily    FERROUS FUMARATE/VIT BCOMP,C (SUPER B COMPLEX ORAL) 1 tablet, Oral, Daily    ferrous gluconate (FERGON) 324 mg, Oral, With breakfast, In the am and at noon    ferrous sulfate (IRON ORAL) qd    fluticasone (FLONASE) 50 mcg/actuation nasal spray 2 sprays, Each Nostril, Daily    folic acid (FOLVITE) 1 MG tablet No dose, route, or frequency recorded.    furosemide (LASIX) 80 MG tablet No dose, route, or frequency recorded.    gabapentin (NEURONTIN) 100 mg, Oral, Nightly    isosorbide dinitrate (ISORDIL) 10 MG tablet TAKE ONE TABLET BY MOUTH THREE TIMES DAILY     lactobacillus acidophilus (PROBIOTIC) 10 billion cell Cap 1 each, Oral, Daily, 1.5 billion    magnesium oxide (MAG-OX) 400 mg (241.3 mg magnesium) tablet TAKE 1 TABLET BY MOUTH 2 TIMES A DAY    mecobalamin, vitamin B12, 1,000 mcg Chew B12 Active 1,000 mcg chewable tablet   Take 1 tablet every day by oral route in the morning for 30 days.    megestroL (MEGACE) 400 mg/10 mL (40 mg/mL) Susp take 1 TABLESPOONFUL EVERY DAY    MULTIVITAMIN WITH MINERALS (ONE-A-DAY 50 PLUS) Tab 1 tablet, Oral, Daily, Every day    nitroGLYCERIN (NITROLINGUAL) 0.4 mg/dose spray 1 spray, Sublingual, Every 5 min PRN, PRN    ondansetron (ZOFRAN) 4 mg, Oral, Every 8 hours PRN    potassium chloride SA (K-DUR,KLOR-CON) 10 MEQ tablet 20 mEq, Oral, Daily    promethazine (PHENERGAN) 25 mg, Oral, 2 times daily PRN    RESTASIS 0.05 % ophthalmic emulsion 1 drop, Both Eyes, 2 times daily    sertraline (ZOLOFT) 50 mg, Oral, Daily    temazepam (RESTORIL) 15 mg, Oral, Nightly PRN    traMADoL (ULTRAM) 50 mg tablet TAKE TWO TABLETS BY MOUTH TWICE DAILY AS NEEDED FOR SEVERE pain    vitamin E 400 mg, Oral, Daily, Every day        Review of patient's allergies indicates:   Allergen Reactions    Adhesive     Nitrofurantoin macrocrystalline Nausea And Vomiting     Other reaction(s): very sickly    Penicillins Swelling     Other reaction(s): swelling  Other reaction(s): red skin discolorati    Sulfa (sulfonamide antibiotics) Nausea And Vomiting     Other reaction(s): very sickly        Review of Systems   Constitution: Negative for chills and fever.   HENT: Positive for congestion. Negative for sore throat.    Cardiovascular: Negative.  Negative for chest pain and leg swelling.   Respiratory: Positive for shortness of breath. Negative for cough.    Skin: Negative for itching and rash.   Musculoskeletal: Positive for back pain. Negative for muscle cramps.   Gastrointestinal: Negative for abdominal pain and heartburn.   Genitourinary: Negative  "for dysuria and frequency.   Neurological: Positive for weakness. Negative for dizziness.   Psychiatric/Behavioral: Negative.  Negative for altered mental status and memory loss. The patient does not have insomnia.    Allergic/Immunologic: Positive for environmental allergies.        Objective:     Vitals:    12/14/20 1226   BP: 120/70   BP Location: Right arm   Patient Position: Sitting   BP Method: Medium (Manual)   Pulse: 62   SpO2: 97%   Weight: 45.4 kg (100 lb)   Height: 4' 9" (1.448 m)       Physical Exam   Constitutional: She is oriented to person, place, and time.   Eyes: Pupils are equal, round, and reactive to light.   Cardiovascular:   Murmur (Grade 2/6 systolic murmur at the base) heard.  Pulmonary/Chest: Effort normal and breath sounds normal. She has no wheezes.   Abdominal: Soft. Bowel sounds are normal.   Musculoskeletal:         General: No edema.   Neurological: She is alert and oriented to person, place, and time.   Skin: Skin is warm and dry.   Psychiatric: She has a normal mood and affect. Her behavior is normal. Judgment and thought content normal.     CMP  Sodium   Date Value Ref Range Status   10/06/2020 138 136 - 145 mmol/L Final   12/11/2018 139 134 - 144 mmol/L      Potassium   Date Value Ref Range Status   10/06/2020 4.7 3.5 - 5.1 mmol/L Final     Chloride   Date Value Ref Range Status   10/06/2020 96 95 - 110 mmol/L Final   12/11/2018 96 (L) 98 - 110 mmol/L      CO2   Date Value Ref Range Status   10/06/2020 33 (H) 23 - 29 mmol/L Final     Glucose   Date Value Ref Range Status   10/06/2020 164 (H) 70 - 110 mg/dL Final   12/11/2018 97 70 - 99 mg/dL      BUN   Date Value Ref Range Status   10/06/2020 29 (H) 8 - 23 mg/dL Final     Creatinine   Date Value Ref Range Status   10/06/2020 1.5 (H) 0.5 - 1.4 mg/dL Final   12/11/2018 0.95 0.60 - 1.40 mg/dL      Calcium   Date Value Ref Range Status   10/06/2020 9.6 8.7 - 10.5 mg/dL Final     Total Protein   Date Value Ref Range Status   08/24/2020 " 7.1 6.0 - 8.4 g/dL Final     Albumin   Date Value Ref Range Status   08/24/2020 4.1 3.5 - 5.2 g/dL Final   12/11/2018 3.9 3.1 - 4.7 g/dL      Total Bilirubin   Date Value Ref Range Status   08/24/2020 0.2 0.1 - 1.0 mg/dL Final     Comment:     For infants and newborns, interpretation of results should be based  on gestational age, weight and in agreement with clinical  observations.  Premature Infant recommended reference ranges:  Up to 24 hours.............<8.0 mg/dL  Up to 48 hours............<12.0 mg/dL  3-5 days..................<15.0 mg/dL  6-29 days.................<15.0 mg/dL       Alkaline Phosphatase   Date Value Ref Range Status   08/24/2020 78 55 - 135 U/L Final     AST   Date Value Ref Range Status   08/24/2020 27 10 - 40 U/L Final     ALT   Date Value Ref Range Status   08/24/2020 13 10 - 44 U/L Final     Anion Gap   Date Value Ref Range Status   10/06/2020 9 8 - 16 mmol/L Final     eGFR if    Date Value Ref Range Status   10/06/2020 36.6 (A) >60 mL/min/1.73 m^2 Final     eGFR if non    Date Value Ref Range Status   10/06/2020 31.8 (A) >60 mL/min/1.73 m^2 Final     Comment:     Calculation used to obtain the estimated glomerular filtration  rate (eGFR) is the CKD-EPI equation.         BMP  Lab Results   Component Value Date     10/06/2020    K 4.7 10/06/2020    CL 96 10/06/2020    CO2 33 (H) 10/06/2020    BUN 29 (H) 10/06/2020    CREATININE 1.5 (H) 10/06/2020    CALCIUM 9.6 10/06/2020    ANIONGAP 9 10/06/2020    ESTGFRAFRICA 36.6 (A) 10/06/2020    EGFRNONAA 31.8 (A) 10/06/2020      BNP  @LABRCNTIP(BNP,BNPTRIAGEBLO)@   Lab Results   Component Value Date    CHOL 221 (H) 08/24/2020    CHOL 131 10/16/2013    CHOL 140 08/11/2009     Lab Results   Component Value Date    HDL 58 08/24/2020    HDL 59 10/16/2013    HDL 57 08/11/2009     Lab Results   Component Value Date    LDLCALC 110.6 08/24/2020    LDLCALC 55.4 (L) 10/16/2013    LDLCALC 55.6 (L) 08/11/2009     Lab Results    Component Value Date    TRIG 262 (H) 08/24/2020    TRIG 83 10/16/2013    TRIG 137 08/11/2009     Lab Results   Component Value Date    CHOLHDL 26.2 08/24/2020    CHOLHDL 45.0 10/16/2013    CHOLHDL 40.7 08/11/2009      Lab Results   Component Value Date    TSH 1.707 06/18/2019    FREET4 0.89 09/26/2012     Lab Results   Component Value Date    HGBA1C 5.3 02/26/2015     Lab Results   Component Value Date    WBC 5.96 09/24/2020    HGB 10.6 (L) 09/24/2020    HCT 35.1 (L) 09/24/2020    MCV 91 09/24/2020     09/24/2020           Results for orders placed in visit on 11/07/19   Echo Color Flow Doppler? Yes    Narrative · Moderate concentric left ventricular hypertrophy with a speckled pattern   suggestive of infiltrative disease  · Low normal left ventricular systolic function. The estimated ejection   fraction is 39%  · Grade II (moderate) left ventricular diastolic dysfunction consistent   with pseudonormalization.  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  · Mild left atrial enlargement.  · Mild aortic regurgitation.  · Moderate aortic valve stenosis.  · Aortic valve area is 1.20 cm2; peak velocity is 2.64 m/s; mean gradient   is 15 mmHg.  · Mild mitral regurgitation.  · Mild to moderate tricuspid regurgitation.  · Mild pulmonic regurgitation.  · Normal central venous pressure (3 mm Hg).  · The estimated PA systolic pressure is 40 mm Hg         No results found for this or any previous visit.       Assessment:       Chronic combined systolic and diastolic heart failure  Fairly well compensated.    Aortic valve stenosis, moderate  Continue to monitor    CKD (chronic kidney disease)  Stable       Plan:       Continue the current medical therapy.  Return to the office in 2 months with a BMP BNP as well as a CBC.

## 2020-12-16 ENCOUNTER — TELEPHONE (OUTPATIENT)
Dept: CARDIOLOGY | Facility: CLINIC | Age: 85
End: 2020-12-16

## 2020-12-16 NOTE — TELEPHONE ENCOUNTER
----- Message from Bonnie Calderon sent at 12/16/2020 10:36 AM CST -----  Regarding: clearance  MD office checking on clearance for patient please give them a call [phone] Ammy this is needed by Friday she said it was faxed over

## 2020-12-17 NOTE — TELEPHONE ENCOUNTER
----- Message from Dexter Husain sent at 12/17/2020 10:06 AM CST -----  Contact: Ammy from Dr Malone's office at [phone]  Type: Needs Medical Advice  Who Called:  Ammy  Curly Call Back Number: [phone]  Additional Information: Ammy from Dr Malone's office is calling about a Cardiac Clearance for pt. Please call back and advise  Ammy states she's been calling all week and no one is returning her calls and she states the pt is scheduled in the morning

## 2020-12-31 ENCOUNTER — EXTERNAL CHRONIC CARE MANAGEMENT (OUTPATIENT)
Dept: PRIMARY CARE CLINIC | Facility: CLINIC | Age: 85
End: 2020-12-31
Payer: MEDICARE

## 2020-12-31 PROCEDURE — 99490 CHRNC CARE MGMT STAFF 1ST 20: CPT | Mod: S$GLB,,, | Performed by: FAMILY MEDICINE

## 2020-12-31 PROCEDURE — 99490 PR CHRONIC CARE MGMT, 1ST 20 MIN: ICD-10-PCS | Mod: S$GLB,,, | Performed by: FAMILY MEDICINE

## 2021-01-06 ENCOUNTER — OFFICE VISIT (OUTPATIENT)
Dept: RHEUMATOLOGY | Facility: CLINIC | Age: 86
End: 2021-01-06
Payer: MEDICARE

## 2021-01-06 VITALS
TEMPERATURE: 98 F | BODY MASS INDEX: 22.59 KG/M2 | DIASTOLIC BLOOD PRESSURE: 70 MMHG | WEIGHT: 104.38 LBS | SYSTOLIC BLOOD PRESSURE: 144 MMHG

## 2021-01-06 DIAGNOSIS — M35.00 SJOGREN'S SYNDROME WITHOUT EXTRAGLANDULAR INVOLVEMENT: Primary | ICD-10-CM

## 2021-01-06 PROCEDURE — 99213 OFFICE O/P EST LOW 20 MIN: CPT | Mod: S$GLB,,, | Performed by: INTERNAL MEDICINE

## 2021-01-06 PROCEDURE — 99213 PR OFFICE/OUTPT VISIT, EST, LEVL III, 20-29 MIN: ICD-10-PCS | Mod: S$GLB,,, | Performed by: INTERNAL MEDICINE

## 2021-01-31 ENCOUNTER — EXTERNAL CHRONIC CARE MANAGEMENT (OUTPATIENT)
Dept: PRIMARY CARE CLINIC | Facility: CLINIC | Age: 86
End: 2021-01-31
Payer: MEDICARE

## 2021-01-31 PROCEDURE — 99490 PR CHRONIC CARE MGMT, 1ST 20 MIN: ICD-10-PCS | Mod: S$GLB,,, | Performed by: FAMILY MEDICINE

## 2021-01-31 PROCEDURE — 99490 CHRNC CARE MGMT STAFF 1ST 20: CPT | Mod: S$GLB,,, | Performed by: FAMILY MEDICINE

## 2021-02-15 ENCOUNTER — PATIENT MESSAGE (OUTPATIENT)
Dept: FAMILY MEDICINE | Facility: CLINIC | Age: 86
End: 2021-02-15

## 2021-02-24 ENCOUNTER — NURSE TRIAGE (OUTPATIENT)
Dept: ADMINISTRATIVE | Facility: CLINIC | Age: 86
End: 2021-02-24

## 2021-02-26 LAB
BNP SERPL-MCNC: 343.7 PG/ML (ref 0–100)
BUN SERPL-MCNC: 29 MG/DL (ref 8–27)
BUN/CREAT SERPL: 21 (ref 12–28)
CALCIUM SERPL-MCNC: 9.4 MG/DL (ref 8.7–10.3)
CHLORIDE SERPL-SCNC: 101 MMOL/L (ref 96–106)
CO2 SERPL-SCNC: 30 MMOL/L (ref 20–29)
CREAT SERPL-MCNC: 1.41 MG/DL (ref 0.57–1)
ERYTHROCYTE [DISTWIDTH] IN BLOOD BY AUTOMATED COUNT: 14.1 % (ref 11.7–15.4)
GLUCOSE SERPL-MCNC: 98 MG/DL (ref 65–99)
HCT VFR BLD AUTO: 26.8 % (ref 34–46.6)
HGB BLD-MCNC: 8.3 G/DL (ref 11.1–15.9)
MCH RBC QN AUTO: 27.9 PG (ref 26.6–33)
MCHC RBC AUTO-ENTMCNC: 31 G/DL (ref 31.5–35.7)
MCV RBC AUTO: 90 FL (ref 79–97)
PLATELET # BLD AUTO: 173 X10E3/UL (ref 150–450)
POTASSIUM SERPL-SCNC: 4.6 MMOL/L (ref 3.5–5.2)
RBC # BLD AUTO: 2.98 X10E6/UL (ref 3.77–5.28)
SODIUM SERPL-SCNC: 143 MMOL/L (ref 134–144)
WBC # BLD AUTO: 4.7 X10E3/UL (ref 3.4–10.8)

## 2021-02-28 ENCOUNTER — EXTERNAL CHRONIC CARE MANAGEMENT (OUTPATIENT)
Dept: PRIMARY CARE CLINIC | Facility: CLINIC | Age: 86
End: 2021-02-28
Payer: MEDICARE

## 2021-02-28 PROCEDURE — 99490 PR CHRONIC CARE MGMT, 1ST 20 MIN: ICD-10-PCS | Mod: S$GLB,,, | Performed by: FAMILY MEDICINE

## 2021-02-28 PROCEDURE — 99490 CHRNC CARE MGMT STAFF 1ST 20: CPT | Mod: S$GLB,,, | Performed by: FAMILY MEDICINE

## 2021-03-01 ENCOUNTER — OFFICE VISIT (OUTPATIENT)
Dept: CARDIOLOGY | Facility: CLINIC | Age: 86
End: 2021-03-01
Payer: MEDICARE

## 2021-03-01 VITALS
HEIGHT: 57 IN | DIASTOLIC BLOOD PRESSURE: 70 MMHG | SYSTOLIC BLOOD PRESSURE: 122 MMHG | OXYGEN SATURATION: 94 % | BODY MASS INDEX: 22.44 KG/M2 | HEART RATE: 69 BPM | WEIGHT: 104 LBS

## 2021-03-01 DIAGNOSIS — I50.42 CHRONIC COMBINED SYSTOLIC AND DIASTOLIC HEART FAILURE: ICD-10-CM

## 2021-03-01 DIAGNOSIS — I10 ESSENTIAL HYPERTENSION: ICD-10-CM

## 2021-03-01 DIAGNOSIS — I25.10 CORONARY ARTERY DISEASE INVOLVING NATIVE CORONARY ARTERY OF NATIVE HEART WITHOUT ANGINA PECTORIS: ICD-10-CM

## 2021-03-01 PROCEDURE — 99214 OFFICE O/P EST MOD 30 MIN: CPT | Mod: S$GLB,,, | Performed by: INTERNAL MEDICINE

## 2021-03-01 PROCEDURE — 99214 PR OFFICE/OUTPT VISIT, EST, LEVL IV, 30-39 MIN: ICD-10-PCS | Mod: S$GLB,,, | Performed by: INTERNAL MEDICINE

## 2021-03-04 ENCOUNTER — TELEPHONE (OUTPATIENT)
Dept: CARDIOLOGY | Facility: CLINIC | Age: 86
End: 2021-03-04

## 2021-03-09 ENCOUNTER — INFUSION (OUTPATIENT)
Dept: INFUSION THERAPY | Facility: HOSPITAL | Age: 86
End: 2021-03-09
Attending: INTERNAL MEDICINE
Payer: MEDICARE

## 2021-03-09 VITALS
BODY MASS INDEX: 22.27 KG/M2 | TEMPERATURE: 98 F | HEART RATE: 62 BPM | DIASTOLIC BLOOD PRESSURE: 66 MMHG | WEIGHT: 102.88 LBS | RESPIRATION RATE: 17 BRPM | SYSTOLIC BLOOD PRESSURE: 132 MMHG

## 2021-03-09 DIAGNOSIS — D50.9 IRON DEFICIENCY ANEMIA, UNSPECIFIED IRON DEFICIENCY ANEMIA TYPE: Primary | ICD-10-CM

## 2021-03-09 PROCEDURE — 63600175 PHARM REV CODE 636 W HCPCS: Mod: JG | Performed by: INTERNAL MEDICINE

## 2021-03-09 PROCEDURE — 96365 THER/PROPH/DIAG IV INF INIT: CPT

## 2021-03-09 PROCEDURE — 25000003 PHARM REV CODE 250: Performed by: INTERNAL MEDICINE

## 2021-03-09 RX ADMIN — FERRIC CARBOXYMALTOSE INJECTION 750 MG: 50 INJECTION, SOLUTION INTRAVENOUS at 02:03

## 2021-03-10 ENCOUNTER — INFUSION (OUTPATIENT)
Dept: INFUSION THERAPY | Facility: HOSPITAL | Age: 86
End: 2021-03-10
Attending: INTERNAL MEDICINE
Payer: MEDICARE

## 2021-03-10 VITALS
HEART RATE: 71 BPM | RESPIRATION RATE: 19 BRPM | TEMPERATURE: 98 F | BODY MASS INDEX: 20.94 KG/M2 | WEIGHT: 103.88 LBS | HEIGHT: 59 IN | DIASTOLIC BLOOD PRESSURE: 56 MMHG | SYSTOLIC BLOOD PRESSURE: 105 MMHG | OXYGEN SATURATION: 94 %

## 2021-03-10 DIAGNOSIS — M81.0 SENILE OSTEOPOROSIS: Primary | ICD-10-CM

## 2021-03-10 PROCEDURE — 96372 THER/PROPH/DIAG INJ SC/IM: CPT

## 2021-03-10 PROCEDURE — 63600175 PHARM REV CODE 636 W HCPCS: Mod: JG | Performed by: INTERNAL MEDICINE

## 2021-03-10 RX ADMIN — DENOSUMAB 60 MG: 60 INJECTION SUBCUTANEOUS at 03:03

## 2021-03-11 ENCOUNTER — TELEPHONE (OUTPATIENT)
Dept: INFUSION THERAPY | Facility: HOSPITAL | Age: 86
End: 2021-03-11

## 2021-03-13 DIAGNOSIS — D63.8 CHRONIC DISEASE ANEMIA: Primary | ICD-10-CM

## 2021-03-14 RX ORDER — GABAPENTIN 100 MG/1
100 CAPSULE ORAL NIGHTLY
Qty: 30 CAPSULE | Refills: 3 | Status: SHIPPED | OUTPATIENT
Start: 2021-03-14 | End: 2021-04-05

## 2021-03-16 ENCOUNTER — INFUSION (OUTPATIENT)
Dept: INFUSION THERAPY | Facility: HOSPITAL | Age: 86
End: 2021-03-16
Attending: INTERNAL MEDICINE
Payer: MEDICARE

## 2021-03-16 VITALS
HEIGHT: 59 IN | OXYGEN SATURATION: 98 % | HEART RATE: 58 BPM | WEIGHT: 104.19 LBS | SYSTOLIC BLOOD PRESSURE: 108 MMHG | RESPIRATION RATE: 18 BRPM | TEMPERATURE: 98 F | DIASTOLIC BLOOD PRESSURE: 52 MMHG | BODY MASS INDEX: 21 KG/M2

## 2021-03-16 DIAGNOSIS — D50.9 IRON DEFICIENCY ANEMIA, UNSPECIFIED IRON DEFICIENCY ANEMIA TYPE: Primary | ICD-10-CM

## 2021-03-16 PROCEDURE — 63600175 PHARM REV CODE 636 W HCPCS: Mod: JG | Performed by: INTERNAL MEDICINE

## 2021-03-16 PROCEDURE — 96365 THER/PROPH/DIAG IV INF INIT: CPT

## 2021-03-16 PROCEDURE — 25000003 PHARM REV CODE 250: Performed by: INTERNAL MEDICINE

## 2021-03-16 RX ADMIN — FERRIC CARBOXYMALTOSE INJECTION 750 MG: 50 INJECTION, SOLUTION INTRAVENOUS at 02:03

## 2021-03-24 ENCOUNTER — TELEPHONE (OUTPATIENT)
Dept: FAMILY MEDICINE | Facility: CLINIC | Age: 86
End: 2021-03-24

## 2021-03-24 ENCOUNTER — TELEPHONE (OUTPATIENT)
Dept: HEMATOLOGY/ONCOLOGY | Facility: CLINIC | Age: 86
End: 2021-03-24

## 2021-03-24 LAB
BASOPHILS # BLD AUTO: 0 X10E3/UL (ref 0–0.2)
BASOPHILS NFR BLD AUTO: 1 %
EOSINOPHIL # BLD AUTO: 0.1 X10E3/UL (ref 0–0.4)
EOSINOPHIL NFR BLD AUTO: 2 %
ERYTHROCYTE [DISTWIDTH] IN BLOOD BY AUTOMATED COUNT: 14.5 % (ref 11.7–15.4)
HCT VFR BLD AUTO: 26.2 % (ref 34–46.6)
HGB BLD-MCNC: 8.1 G/DL (ref 11.1–15.9)
IMM GRANULOCYTES # BLD AUTO: 0 X10E3/UL (ref 0–0.1)
IMM GRANULOCYTES NFR BLD AUTO: 0 %
LYMPHOCYTES # BLD AUTO: 1.3 X10E3/UL (ref 0.7–3.1)
LYMPHOCYTES NFR BLD AUTO: 16 %
MCH RBC QN AUTO: 27.9 PG (ref 26.6–33)
MCHC RBC AUTO-ENTMCNC: 30.9 G/DL (ref 31.5–35.7)
MCV RBC AUTO: 90 FL (ref 79–97)
MONOCYTES # BLD AUTO: 0.8 X10E3/UL (ref 0.1–0.9)
MONOCYTES NFR BLD AUTO: 9 %
NEUTROPHILS # BLD AUTO: 6.2 X10E3/UL (ref 1.4–7)
NEUTROPHILS NFR BLD AUTO: 72 %
PLATELET # BLD AUTO: 158 X10E3/UL (ref 150–450)
RBC # BLD AUTO: 2.9 X10E6/UL (ref 3.77–5.28)
WBC # BLD AUTO: 8.4 X10E3/UL (ref 3.4–10.8)

## 2021-03-30 DIAGNOSIS — M54.12 RADICULOPATHY OF CERVICAL SPINE: Primary | ICD-10-CM

## 2021-03-31 ENCOUNTER — HOSPITAL ENCOUNTER (OUTPATIENT)
Dept: RADIOLOGY | Facility: HOSPITAL | Age: 86
Discharge: HOME | End: 2021-03-31
Attending: PHYSICIAN ASSISTANT
Payer: MEDICARE

## 2021-03-31 ENCOUNTER — EXTERNAL CHRONIC CARE MANAGEMENT (OUTPATIENT)
Dept: PRIMARY CARE CLINIC | Facility: CLINIC | Age: 86
End: 2021-03-31
Payer: MEDICARE

## 2021-03-31 DIAGNOSIS — M54.12 RADICULOPATHY OF CERVICAL SPINE: ICD-10-CM

## 2021-03-31 DIAGNOSIS — G56.00 CARPAL TUNNEL SYNDROME, UNSPECIFIED LATERALITY: Primary | ICD-10-CM

## 2021-03-31 PROCEDURE — 72141 MRI NECK SPINE W/O DYE: CPT | Mod: TC,PO

## 2021-03-31 PROCEDURE — 99490 PR CHRONIC CARE MGMT, 1ST 20 MIN: ICD-10-PCS | Mod: S$PBB,,, | Performed by: FAMILY MEDICINE

## 2021-03-31 PROCEDURE — 99490 CHRNC CARE MGMT STAFF 1ST 20: CPT | Mod: S$PBB,,, | Performed by: FAMILY MEDICINE

## 2021-03-31 PROCEDURE — 99490 CHRNC CARE MGMT STAFF 1ST 20: CPT | Mod: PBBFAC,PO | Performed by: FAMILY MEDICINE

## 2021-04-01 ENCOUNTER — OFFICE VISIT (OUTPATIENT)
Dept: PHYSICAL MEDICINE AND REHAB | Facility: CLINIC | Age: 86
End: 2021-04-01
Payer: MEDICARE

## 2021-04-01 DIAGNOSIS — G56.00 CARPAL TUNNEL SYNDROME, UNSPECIFIED LATERALITY: ICD-10-CM

## 2021-04-01 PROCEDURE — 99499 UNLISTED E&M SERVICE: CPT | Mod: S$PBB,,, | Performed by: PHYSICAL MEDICINE & REHABILITATION

## 2021-04-01 PROCEDURE — 95886 MUSC TEST DONE W/N TEST COMP: CPT | Mod: PBBFAC,PN | Performed by: PHYSICAL MEDICINE & REHABILITATION

## 2021-04-01 PROCEDURE — 95886 MUSC TEST DONE W/N TEST COMP: CPT | Mod: 26,S$PBB,, | Performed by: PHYSICAL MEDICINE & REHABILITATION

## 2021-04-01 PROCEDURE — 95910 NRV CNDJ TEST 7-8 STUDIES: CPT | Mod: 26,S$PBB,, | Performed by: PHYSICAL MEDICINE & REHABILITATION

## 2021-04-01 PROCEDURE — 95910 NRV CNDJ TEST 7-8 STUDIES: CPT | Mod: PBBFAC,PN | Performed by: PHYSICAL MEDICINE & REHABILITATION

## 2021-04-01 PROCEDURE — 99499 NO LOS: ICD-10-PCS | Mod: S$PBB,,, | Performed by: PHYSICAL MEDICINE & REHABILITATION

## 2021-04-01 PROCEDURE — 95910 PR NERVE CONDUCTION STUDY; 7-8 STUDIES: ICD-10-PCS | Mod: 26,S$PBB,, | Performed by: PHYSICAL MEDICINE & REHABILITATION

## 2021-04-01 PROCEDURE — 95886 PR EMG COMPLETE, W/ NERVE CONDUCTION STUDIES, 5+ MUSCLES: ICD-10-PCS | Mod: 26,S$PBB,, | Performed by: PHYSICAL MEDICINE & REHABILITATION

## 2021-04-05 ENCOUNTER — OFFICE VISIT (OUTPATIENT)
Dept: FAMILY MEDICINE | Facility: CLINIC | Age: 86
End: 2021-04-05
Payer: MEDICARE

## 2021-04-05 VITALS
HEIGHT: 59 IN | OXYGEN SATURATION: 98 % | BODY MASS INDEX: 20.45 KG/M2 | WEIGHT: 101.44 LBS | DIASTOLIC BLOOD PRESSURE: 72 MMHG | SYSTOLIC BLOOD PRESSURE: 140 MMHG | HEART RATE: 58 BPM

## 2021-04-05 DIAGNOSIS — D50.9 IRON DEFICIENCY ANEMIA, UNSPECIFIED IRON DEFICIENCY ANEMIA TYPE: ICD-10-CM

## 2021-04-05 DIAGNOSIS — Z09 HOSPITAL DISCHARGE FOLLOW-UP: Primary | ICD-10-CM

## 2021-04-05 PROCEDURE — 99215 OFFICE O/P EST HI 40 MIN: CPT | Mod: PBBFAC,PO | Performed by: PHYSICIAN ASSISTANT

## 2021-04-05 PROCEDURE — 99999 PR PBB SHADOW E&M-EST. PATIENT-LVL V: ICD-10-PCS | Mod: PBBFAC,,, | Performed by: PHYSICIAN ASSISTANT

## 2021-04-05 PROCEDURE — 99999 PR PBB SHADOW E&M-EST. PATIENT-LVL V: CPT | Mod: PBBFAC,,, | Performed by: PHYSICIAN ASSISTANT

## 2021-04-05 PROCEDURE — 99213 OFFICE O/P EST LOW 20 MIN: CPT | Mod: S$PBB,,, | Performed by: PHYSICIAN ASSISTANT

## 2021-04-05 PROCEDURE — 99213 PR OFFICE/OUTPT VISIT, EST, LEVL III, 20-29 MIN: ICD-10-PCS | Mod: S$PBB,,, | Performed by: PHYSICIAN ASSISTANT

## 2021-04-13 ENCOUNTER — TELEPHONE (OUTPATIENT)
Dept: FAMILY MEDICINE | Facility: CLINIC | Age: 86
End: 2021-04-13

## 2021-04-14 ENCOUNTER — PATIENT OUTREACH (OUTPATIENT)
Dept: ADMINISTRATIVE | Facility: HOSPITAL | Age: 86
End: 2021-04-14

## 2021-04-16 RX ORDER — NITROGLYCERIN 400 UG/1
1 SPRAY ORAL EVERY 5 MIN PRN
Qty: 4.9 G | Refills: 3 | Status: SHIPPED | OUTPATIENT
Start: 2021-04-16 | End: 2024-01-11 | Stop reason: SDUPTHER

## 2021-04-19 ENCOUNTER — OFFICE VISIT (OUTPATIENT)
Dept: FAMILY MEDICINE | Facility: CLINIC | Age: 86
End: 2021-04-19
Payer: MEDICARE

## 2021-04-19 VITALS
HEIGHT: 59 IN | WEIGHT: 103.63 LBS | OXYGEN SATURATION: 99 % | HEART RATE: 71 BPM | TEMPERATURE: 99 F | SYSTOLIC BLOOD PRESSURE: 118 MMHG | BODY MASS INDEX: 20.89 KG/M2 | DIASTOLIC BLOOD PRESSURE: 70 MMHG

## 2021-04-19 DIAGNOSIS — L98.9 SKIN LESION OF RIGHT LEG: Primary | ICD-10-CM

## 2021-04-19 PROCEDURE — 99212 OFFICE O/P EST SF 10 MIN: CPT | Mod: S$PBB,,, | Performed by: PHYSICIAN ASSISTANT

## 2021-04-19 PROCEDURE — 99999 PR PBB SHADOW E&M-EST. PATIENT-LVL V: ICD-10-PCS | Mod: PBBFAC,,, | Performed by: PHYSICIAN ASSISTANT

## 2021-04-19 PROCEDURE — 99999 PR PBB SHADOW E&M-EST. PATIENT-LVL V: CPT | Mod: PBBFAC,,, | Performed by: PHYSICIAN ASSISTANT

## 2021-04-19 PROCEDURE — 99215 OFFICE O/P EST HI 40 MIN: CPT | Mod: PBBFAC,PO | Performed by: PHYSICIAN ASSISTANT

## 2021-04-19 PROCEDURE — 99212 PR OFFICE/OUTPT VISIT, EST, LEVL II, 10-19 MIN: ICD-10-PCS | Mod: S$PBB,,, | Performed by: PHYSICIAN ASSISTANT

## 2021-04-19 RX ORDER — MUPIROCIN 20 MG/G
OINTMENT TOPICAL 3 TIMES DAILY
Qty: 1 TUBE | Refills: 0 | Status: SHIPPED | OUTPATIENT
Start: 2021-04-19 | End: 2021-04-26

## 2021-04-30 ENCOUNTER — EXTERNAL CHRONIC CARE MANAGEMENT (OUTPATIENT)
Dept: PRIMARY CARE CLINIC | Facility: CLINIC | Age: 86
End: 2021-04-30
Payer: MEDICARE

## 2021-04-30 PROCEDURE — 99490 CHRNC CARE MGMT STAFF 1ST 20: CPT | Mod: PBBFAC,PO | Performed by: FAMILY MEDICINE

## 2021-04-30 PROCEDURE — 99490 PR CHRONIC CARE MGMT, 1ST 20 MIN: ICD-10-PCS | Mod: S$PBB,,, | Performed by: FAMILY MEDICINE

## 2021-04-30 PROCEDURE — 99490 CHRNC CARE MGMT STAFF 1ST 20: CPT | Mod: S$PBB,,, | Performed by: FAMILY MEDICINE

## 2021-05-03 ENCOUNTER — EXTERNAL HOME HEALTH (OUTPATIENT)
Dept: HOME HEALTH SERVICES | Facility: HOSPITAL | Age: 86
End: 2021-05-03

## 2021-05-03 ENCOUNTER — DOCUMENT SCAN (OUTPATIENT)
Dept: HOME HEALTH SERVICES | Facility: HOSPITAL | Age: 86
End: 2021-05-03

## 2021-05-07 ENCOUNTER — TELEPHONE (OUTPATIENT)
Dept: CARDIOLOGY | Facility: CLINIC | Age: 86
End: 2021-05-07

## 2021-05-07 LAB
ALBUMIN SERPL-MCNC: 3.4 G/DL (ref 3.6–4.6)
ALBUMIN/GLOB SERPL: 1.2 {RATIO} (ref 1.2–2.2)
ALP SERPL-CCNC: 107 IU/L (ref 39–117)
ALT SERPL-CCNC: 14 IU/L (ref 0–32)
AST SERPL-CCNC: 23 IU/L (ref 0–40)
BILIRUB SERPL-MCNC: 0.3 MG/DL (ref 0–1.2)
BNP SERPL-MCNC: 889.9 PG/ML (ref 0–100)
BUN SERPL-MCNC: 11 MG/DL (ref 8–27)
BUN/CREAT SERPL: 15 (ref 12–28)
CALCIUM SERPL-MCNC: 6.5 MG/DL (ref 8.7–10.3)
CHLORIDE SERPL-SCNC: 105 MMOL/L (ref 96–106)
CHOLEST SERPL-MCNC: 113 MG/DL (ref 100–199)
CO2 SERPL-SCNC: 20 MMOL/L (ref 20–29)
CREAT SERPL-MCNC: 0.72 MG/DL (ref 0.57–1)
GLOBULIN SER CALC-MCNC: 2.9 G/DL (ref 1.5–4.5)
GLUCOSE SERPL-MCNC: 81 MG/DL (ref 65–99)
HDLC SERPL-MCNC: 47 MG/DL
LDLC SERPL CALC-MCNC: 51 MG/DL (ref 0–99)
POTASSIUM SERPL-SCNC: 4.2 MMOL/L (ref 3.5–5.2)
PROT SERPL-MCNC: 6.3 G/DL (ref 6–8.5)
SODIUM SERPL-SCNC: 140 MMOL/L (ref 134–144)
TRIGL SERPL-MCNC: 70 MG/DL (ref 0–149)
VLDLC SERPL CALC-MCNC: 15 MG/DL (ref 5–40)

## 2021-05-12 ENCOUNTER — OFFICE VISIT (OUTPATIENT)
Dept: RHEUMATOLOGY | Facility: CLINIC | Age: 86
End: 2021-05-12
Payer: MEDICARE

## 2021-05-12 ENCOUNTER — HOSPITAL ENCOUNTER (OUTPATIENT)
Dept: RADIOLOGY | Facility: HOSPITAL | Age: 86
Discharge: HOME | End: 2021-05-12
Attending: INTERNAL MEDICINE
Payer: MEDICARE

## 2021-05-12 ENCOUNTER — HOSPITAL ENCOUNTER (OUTPATIENT)
Dept: RADIOLOGY | Facility: HOSPITAL | Age: 86
Discharge: HOME | End: 2021-05-12
Attending: UROLOGY
Payer: MEDICARE

## 2021-05-12 ENCOUNTER — TELEPHONE (OUTPATIENT)
Dept: CARDIOLOGY | Facility: CLINIC | Age: 86
End: 2021-05-12

## 2021-05-12 ENCOUNTER — OFFICE VISIT (OUTPATIENT)
Dept: CARDIOLOGY | Facility: CLINIC | Age: 86
End: 2021-05-12
Payer: MEDICARE

## 2021-05-12 VITALS
HEIGHT: 59 IN | RESPIRATION RATE: 16 BRPM | HEART RATE: 76 BPM | WEIGHT: 105 LBS | OXYGEN SATURATION: 99 % | BODY MASS INDEX: 21.17 KG/M2 | SYSTOLIC BLOOD PRESSURE: 120 MMHG | DIASTOLIC BLOOD PRESSURE: 68 MMHG

## 2021-05-12 VITALS — BODY MASS INDEX: 21.41 KG/M2 | DIASTOLIC BLOOD PRESSURE: 69 MMHG | WEIGHT: 106 LBS | SYSTOLIC BLOOD PRESSURE: 164 MMHG

## 2021-05-12 DIAGNOSIS — I10 ESSENTIAL HYPERTENSION: ICD-10-CM

## 2021-05-12 DIAGNOSIS — D63.8 ANEMIA OF CHRONIC DISEASE: Chronic | ICD-10-CM

## 2021-05-12 DIAGNOSIS — N17.9 AKI (ACUTE KIDNEY INJURY): ICD-10-CM

## 2021-05-12 DIAGNOSIS — I25.10 CORONARY ARTERY DISEASE INVOLVING NATIVE CORONARY ARTERY OF NATIVE HEART WITHOUT ANGINA PECTORIS: Primary | ICD-10-CM

## 2021-05-12 DIAGNOSIS — I50.43 ACUTE ON CHRONIC COMBINED SYSTOLIC AND DIASTOLIC CONGESTIVE HEART FAILURE: ICD-10-CM

## 2021-05-12 DIAGNOSIS — M81.0 OSTEOPOROSIS, UNSPECIFIED OSTEOPOROSIS TYPE, UNSPECIFIED PATHOLOGICAL FRACTURE PRESENCE: ICD-10-CM

## 2021-05-12 DIAGNOSIS — M35.00 SJOGREN'S SYNDROME WITHOUT EXTRAGLANDULAR INVOLVEMENT: Primary | ICD-10-CM

## 2021-05-12 DIAGNOSIS — Q61.3 POLYCYSTIC KIDNEY: ICD-10-CM

## 2021-05-12 DIAGNOSIS — E83.51 HYPOCALCEMIA: ICD-10-CM

## 2021-05-12 DIAGNOSIS — I50.42 CHRONIC COMBINED SYSTOLIC AND DIASTOLIC HEART FAILURE: ICD-10-CM

## 2021-05-12 DIAGNOSIS — I35.0 AORTIC VALVE STENOSIS, MODERATE: ICD-10-CM

## 2021-05-12 DIAGNOSIS — M35.00 SJOGREN'S SYNDROME WITHOUT EXTRAGLANDULAR INVOLVEMENT: ICD-10-CM

## 2021-05-12 PROCEDURE — 99214 OFFICE O/P EST MOD 30 MIN: CPT | Mod: S$GLB,,, | Performed by: INTERNAL MEDICINE

## 2021-05-12 PROCEDURE — 99213 OFFICE O/P EST LOW 20 MIN: CPT | Mod: S$GLB,,, | Performed by: INTERNAL MEDICINE

## 2021-05-12 PROCEDURE — 99214 PR OFFICE/OUTPT VISIT, EST, LEVL IV, 30-39 MIN: ICD-10-PCS | Mod: S$GLB,,, | Performed by: INTERNAL MEDICINE

## 2021-05-12 PROCEDURE — 73590 X-RAY EXAM OF LOWER LEG: CPT | Mod: TC,RT

## 2021-05-12 PROCEDURE — 76770 US RETROPERITONEAL COMPLETE: ICD-10-PCS | Mod: 26,,, | Performed by: RADIOLOGY

## 2021-05-12 PROCEDURE — 76770 US EXAM ABDO BACK WALL COMP: CPT | Mod: TC

## 2021-05-12 PROCEDURE — 99213 PR OFFICE/OUTPT VISIT, EST, LEVL III, 20-29 MIN: ICD-10-PCS | Mod: S$GLB,,, | Performed by: INTERNAL MEDICINE

## 2021-05-12 PROCEDURE — 76770 US EXAM ABDO BACK WALL COMP: CPT | Mod: 26,,, | Performed by: RADIOLOGY

## 2021-05-13 ENCOUNTER — OFFICE VISIT (OUTPATIENT)
Dept: HEMATOLOGY/ONCOLOGY | Facility: CLINIC | Age: 86
End: 2021-05-13
Payer: MEDICARE

## 2021-05-13 VITALS
WEIGHT: 105.69 LBS | HEART RATE: 85 BPM | BODY MASS INDEX: 21.35 KG/M2 | SYSTOLIC BLOOD PRESSURE: 107 MMHG | RESPIRATION RATE: 18 BRPM | DIASTOLIC BLOOD PRESSURE: 60 MMHG | TEMPERATURE: 98 F

## 2021-05-13 DIAGNOSIS — D50.0 IRON DEFICIENCY ANEMIA DUE TO CHRONIC BLOOD LOSS: ICD-10-CM

## 2021-05-13 PROCEDURE — 99213 OFFICE O/P EST LOW 20 MIN: CPT | Mod: S$GLB,,, | Performed by: INTERNAL MEDICINE

## 2021-05-13 PROCEDURE — 99213 PR OFFICE/OUTPT VISIT, EST, LEVL III, 20-29 MIN: ICD-10-PCS | Mod: S$GLB,,, | Performed by: INTERNAL MEDICINE

## 2021-05-13 RX ORDER — HYDROMORPHONE HYDROCHLORIDE 2 MG/1
2 TABLET ORAL EVERY 4 HOURS PRN
COMMUNITY

## 2021-05-14 ENCOUNTER — INFUSION (OUTPATIENT)
Dept: INFUSION THERAPY | Facility: HOSPITAL | Age: 86
End: 2021-05-14
Attending: INTERNAL MEDICINE
Payer: MEDICARE

## 2021-05-14 VITALS
WEIGHT: 106 LBS | RESPIRATION RATE: 18 BRPM | SYSTOLIC BLOOD PRESSURE: 122 MMHG | HEART RATE: 67 BPM | DIASTOLIC BLOOD PRESSURE: 61 MMHG | TEMPERATURE: 98 F | HEIGHT: 59 IN | BODY MASS INDEX: 21.37 KG/M2

## 2021-05-14 DIAGNOSIS — D50.9 IRON DEFICIENCY ANEMIA, UNSPECIFIED IRON DEFICIENCY ANEMIA TYPE: Primary | ICD-10-CM

## 2021-05-14 PROCEDURE — 63600175 PHARM REV CODE 636 W HCPCS: Mod: JG | Performed by: INTERNAL MEDICINE

## 2021-05-14 PROCEDURE — 96365 THER/PROPH/DIAG IV INF INIT: CPT

## 2021-05-14 PROCEDURE — 25000003 PHARM REV CODE 250: Performed by: INTERNAL MEDICINE

## 2021-05-14 RX ADMIN — FERRIC CARBOXYMALTOSE INJECTION 750 MG: 50 INJECTION, SOLUTION INTRAVENOUS at 02:05

## 2021-05-17 ENCOUNTER — TELEPHONE (OUTPATIENT)
Dept: FAMILY MEDICINE | Facility: CLINIC | Age: 86
End: 2021-05-17

## 2021-05-17 DIAGNOSIS — R35.0 URINARY FREQUENCY: Primary | ICD-10-CM

## 2021-05-17 DIAGNOSIS — N30.01 ACUTE CYSTITIS WITH HEMATURIA: ICD-10-CM

## 2021-05-19 RX ORDER — CIPROFLOXACIN 250 MG/1
250 TABLET, FILM COATED ORAL 2 TIMES DAILY
Qty: 14 TABLET | Refills: 0 | Status: SHIPPED | OUTPATIENT
Start: 2021-05-19 | End: 2021-05-26

## 2021-05-20 ENCOUNTER — DOCUMENT SCAN (OUTPATIENT)
Dept: HOME HEALTH SERVICES | Facility: HOSPITAL | Age: 86
End: 2021-05-20
Payer: MEDICARE

## 2021-05-21 ENCOUNTER — TELEPHONE (OUTPATIENT)
Dept: FAMILY MEDICINE | Facility: CLINIC | Age: 86
End: 2021-05-21

## 2021-05-24 ENCOUNTER — INFUSION (OUTPATIENT)
Dept: INFUSION THERAPY | Facility: HOSPITAL | Age: 86
End: 2021-05-24
Attending: INTERNAL MEDICINE
Payer: MEDICARE

## 2021-05-24 VITALS
HEART RATE: 65 BPM | RESPIRATION RATE: 19 BRPM | SYSTOLIC BLOOD PRESSURE: 145 MMHG | WEIGHT: 105.31 LBS | TEMPERATURE: 98 F | DIASTOLIC BLOOD PRESSURE: 60 MMHG | BODY MASS INDEX: 21.27 KG/M2

## 2021-05-24 DIAGNOSIS — D50.9 IRON DEFICIENCY ANEMIA, UNSPECIFIED IRON DEFICIENCY ANEMIA TYPE: Primary | ICD-10-CM

## 2021-05-24 PROCEDURE — 96365 THER/PROPH/DIAG IV INF INIT: CPT

## 2021-05-24 PROCEDURE — 25000003 PHARM REV CODE 250: Performed by: INTERNAL MEDICINE

## 2021-05-24 PROCEDURE — 63600175 PHARM REV CODE 636 W HCPCS: Mod: JG | Performed by: INTERNAL MEDICINE

## 2021-05-24 RX ADMIN — FERRIC CARBOXYMALTOSE INJECTION 750 MG: 50 INJECTION, SOLUTION INTRAVENOUS at 01:05

## 2021-05-28 ENCOUNTER — TELEPHONE (OUTPATIENT)
Dept: FAMILY MEDICINE | Facility: CLINIC | Age: 86
End: 2021-05-28

## 2021-05-28 DIAGNOSIS — R30.0 BURNING WITH URINATION: Primary | ICD-10-CM

## 2021-05-31 ENCOUNTER — EXTERNAL CHRONIC CARE MANAGEMENT (OUTPATIENT)
Dept: PRIMARY CARE CLINIC | Facility: CLINIC | Age: 86
End: 2021-05-31
Payer: MEDICARE

## 2021-05-31 PROCEDURE — 99490 CHRNC CARE MGMT STAFF 1ST 20: CPT | Mod: S$PBB,,, | Performed by: FAMILY MEDICINE

## 2021-05-31 PROCEDURE — 99490 CHRNC CARE MGMT STAFF 1ST 20: CPT | Mod: PBBFAC,PO | Performed by: FAMILY MEDICINE

## 2021-05-31 PROCEDURE — 99490 PR CHRONIC CARE MGMT, 1ST 20 MIN: ICD-10-PCS | Mod: S$PBB,,, | Performed by: FAMILY MEDICINE

## 2021-06-02 ENCOUNTER — TELEPHONE (OUTPATIENT)
Dept: FAMILY MEDICINE | Facility: CLINIC | Age: 86
End: 2021-06-02

## 2021-06-09 LAB
BASOPHILS # BLD AUTO: 0.1 X10E3/UL (ref 0–0.2)
BASOPHILS NFR BLD AUTO: 1 %
BNP SERPL-MCNC: 503.9 PG/ML (ref 0–100)
BUN SERPL-MCNC: 16 MG/DL (ref 8–27)
BUN/CREAT SERPL: 19 (ref 12–28)
CALCIUM SERPL-MCNC: 7.6 MG/DL (ref 8.7–10.3)
CHLORIDE SERPL-SCNC: 101 MMOL/L (ref 96–106)
CO2 SERPL-SCNC: 23 MMOL/L (ref 20–29)
CREAT SERPL-MCNC: 0.84 MG/DL (ref 0.57–1)
EOSINOPHIL # BLD AUTO: 0.4 X10E3/UL (ref 0–0.4)
EOSINOPHIL NFR BLD AUTO: 6 %
ERYTHROCYTE [DISTWIDTH] IN BLOOD BY AUTOMATED COUNT: 17.1 % (ref 11.7–15.4)
ERYTHROCYTE [DISTWIDTH] IN BLOOD BY AUTOMATED COUNT: 17.2 % (ref 11.7–15.4)
FERRITIN SERPL-MCNC: 1957 NG/ML (ref 15–150)
GLUCOSE SERPL-MCNC: 89 MG/DL (ref 65–99)
HCT VFR BLD AUTO: 35.7 % (ref 34–46.6)
HCT VFR BLD AUTO: 37 % (ref 34–46.6)
HGB BLD-MCNC: 11.2 G/DL (ref 11.1–15.9)
HGB BLD-MCNC: 11.4 G/DL (ref 11.1–15.9)
IMM GRANULOCYTES # BLD AUTO: 0 X10E3/UL (ref 0–0.1)
IMM GRANULOCYTES NFR BLD AUTO: 1 %
LYMPHOCYTES # BLD AUTO: 0.9 X10E3/UL (ref 0.7–3.1)
LYMPHOCYTES NFR BLD AUTO: 14 %
MCH RBC QN AUTO: 27.5 PG (ref 26.6–33)
MCH RBC QN AUTO: 27.8 PG (ref 26.6–33)
MCHC RBC AUTO-ENTMCNC: 30.8 G/DL (ref 31.5–35.7)
MCHC RBC AUTO-ENTMCNC: 31.4 G/DL (ref 31.5–35.7)
MCV RBC AUTO: 89 FL (ref 79–97)
MCV RBC AUTO: 89 FL (ref 79–97)
MONOCYTES # BLD AUTO: 0.5 X10E3/UL (ref 0.1–0.9)
MONOCYTES NFR BLD AUTO: 8 %
NEUTROPHILS # BLD AUTO: 4.4 X10E3/UL (ref 1.4–7)
NEUTROPHILS NFR BLD AUTO: 70 %
PLATELET # BLD AUTO: 153 X10E3/UL (ref 150–450)
PLATELET # BLD AUTO: 198 X10E3/UL (ref 150–450)
POTASSIUM SERPL-SCNC: 4.2 MMOL/L (ref 3.5–5.2)
RBC # BLD AUTO: 4.03 X10E6/UL (ref 3.77–5.28)
RBC # BLD AUTO: 4.15 X10E6/UL (ref 3.77–5.28)
SODIUM SERPL-SCNC: 141 MMOL/L (ref 134–144)
WBC # BLD AUTO: 6.1 X10E3/UL (ref 3.4–10.8)
WBC # BLD AUTO: 6.3 X10E3/UL (ref 3.4–10.8)

## 2021-06-11 ENCOUNTER — OFFICE VISIT (OUTPATIENT)
Dept: CARDIOLOGY | Facility: CLINIC | Age: 86
End: 2021-06-11
Payer: MEDICARE

## 2021-06-11 VITALS
DIASTOLIC BLOOD PRESSURE: 72 MMHG | SYSTOLIC BLOOD PRESSURE: 142 MMHG | OXYGEN SATURATION: 98 % | HEIGHT: 58 IN | BODY MASS INDEX: 21.2 KG/M2 | WEIGHT: 101 LBS | HEART RATE: 67 BPM

## 2021-06-11 DIAGNOSIS — I50.42 CHRONIC COMBINED SYSTOLIC AND DIASTOLIC HEART FAILURE: ICD-10-CM

## 2021-06-11 DIAGNOSIS — I25.10 CORONARY ARTERY DISEASE INVOLVING NATIVE CORONARY ARTERY OF NATIVE HEART WITHOUT ANGINA PECTORIS: Primary | ICD-10-CM

## 2021-06-11 DIAGNOSIS — I50.9 CONGESTIVE HEART FAILURE, UNSPECIFIED HF CHRONICITY, UNSPECIFIED HEART FAILURE TYPE: ICD-10-CM

## 2021-06-11 DIAGNOSIS — E78.2 MIXED HYPERLIPIDEMIA: ICD-10-CM

## 2021-06-11 PROCEDURE — 99214 PR OFFICE/OUTPT VISIT, EST, LEVL IV, 30-39 MIN: ICD-10-PCS | Mod: S$GLB,,, | Performed by: NURSE PRACTITIONER

## 2021-06-11 PROCEDURE — 93000 EKG 12-LEAD: ICD-10-PCS | Mod: S$GLB,,, | Performed by: SPECIALIST

## 2021-06-11 PROCEDURE — 99214 OFFICE O/P EST MOD 30 MIN: CPT | Mod: S$GLB,,, | Performed by: NURSE PRACTITIONER

## 2021-06-11 PROCEDURE — 93000 ELECTROCARDIOGRAM COMPLETE: CPT | Mod: S$GLB,,, | Performed by: SPECIALIST

## 2021-06-17 ENCOUNTER — TELEPHONE (OUTPATIENT)
Dept: FAMILY MEDICINE | Facility: CLINIC | Age: 86
End: 2021-06-17

## 2021-06-18 ENCOUNTER — TELEPHONE (OUTPATIENT)
Dept: FAMILY MEDICINE | Facility: CLINIC | Age: 86
End: 2021-06-18

## 2021-06-21 ENCOUNTER — LAB VISIT (OUTPATIENT)
Dept: LAB | Facility: HOSPITAL | Age: 86
End: 2021-06-21
Attending: NURSE PRACTITIONER
Payer: MEDICARE

## 2021-06-21 ENCOUNTER — OFFICE VISIT (OUTPATIENT)
Dept: FAMILY MEDICINE | Facility: CLINIC | Age: 86
End: 2021-06-21
Payer: MEDICARE

## 2021-06-21 VITALS
WEIGHT: 105.19 LBS | TEMPERATURE: 98 F | HEART RATE: 67 BPM | DIASTOLIC BLOOD PRESSURE: 50 MMHG | OXYGEN SATURATION: 96 % | SYSTOLIC BLOOD PRESSURE: 100 MMHG | HEIGHT: 58 IN | BODY MASS INDEX: 22.08 KG/M2

## 2021-06-21 DIAGNOSIS — R55 PRE-SYNCOPE: ICD-10-CM

## 2021-06-21 DIAGNOSIS — R63.0 DECREASED APPETITE: ICD-10-CM

## 2021-06-21 DIAGNOSIS — R11.0 NAUSEA: ICD-10-CM

## 2021-06-21 DIAGNOSIS — N30.00 ACUTE CYSTITIS WITHOUT HEMATURIA: ICD-10-CM

## 2021-06-21 DIAGNOSIS — R53.83 FATIGUE, UNSPECIFIED TYPE: ICD-10-CM

## 2021-06-21 DIAGNOSIS — R53.83 FATIGUE, UNSPECIFIED TYPE: Primary | ICD-10-CM

## 2021-06-21 DIAGNOSIS — D50.9 IRON DEFICIENCY ANEMIA, UNSPECIFIED IRON DEFICIENCY ANEMIA TYPE: ICD-10-CM

## 2021-06-21 DIAGNOSIS — I10 ESSENTIAL HYPERTENSION: ICD-10-CM

## 2021-06-21 PROCEDURE — 99215 OFFICE O/P EST HI 40 MIN: CPT | Mod: PBBFAC,PO | Performed by: NURSE PRACTITIONER

## 2021-06-21 PROCEDURE — 99999 PR PBB SHADOW E&M-EST. PATIENT-LVL V: ICD-10-PCS | Mod: PBBFAC,,, | Performed by: NURSE PRACTITIONER

## 2021-06-21 PROCEDURE — 99214 OFFICE O/P EST MOD 30 MIN: CPT | Mod: S$PBB,,, | Performed by: NURSE PRACTITIONER

## 2021-06-21 PROCEDURE — 36415 COLL VENOUS BLD VENIPUNCTURE: CPT | Mod: PO | Performed by: NURSE PRACTITIONER

## 2021-06-21 PROCEDURE — 87086 URINE CULTURE/COLONY COUNT: CPT | Performed by: NURSE PRACTITIONER

## 2021-06-21 PROCEDURE — 99214 PR OFFICE/OUTPT VISIT, EST, LEVL IV, 30-39 MIN: ICD-10-PCS | Mod: S$PBB,,, | Performed by: NURSE PRACTITIONER

## 2021-06-21 PROCEDURE — 80053 COMPREHEN METABOLIC PANEL: CPT | Performed by: NURSE PRACTITIONER

## 2021-06-21 PROCEDURE — 99999 PR PBB SHADOW E&M-EST. PATIENT-LVL V: CPT | Mod: PBBFAC,,, | Performed by: NURSE PRACTITIONER

## 2021-06-21 PROCEDURE — 81001 URINALYSIS AUTO W/SCOPE: CPT | Performed by: NURSE PRACTITIONER

## 2021-06-22 LAB
ALBUMIN SERPL BCP-MCNC: 3.6 G/DL (ref 3.5–5.2)
ALP SERPL-CCNC: 151 U/L (ref 55–135)
ALT SERPL W/O P-5'-P-CCNC: 22 U/L (ref 10–44)
ANION GAP SERPL CALC-SCNC: 10 MMOL/L (ref 8–16)
AST SERPL-CCNC: 30 U/L (ref 10–40)
BACTERIA #/AREA URNS AUTO: ABNORMAL /HPF
BILIRUB SERPL-MCNC: 0.3 MG/DL (ref 0.1–1)
BILIRUB UR QL STRIP: NEGATIVE
BUN SERPL-MCNC: 28 MG/DL (ref 8–23)
CALCIUM SERPL-MCNC: 7.6 MG/DL (ref 8.7–10.5)
CHLORIDE SERPL-SCNC: 105 MMOL/L (ref 95–110)
CLARITY UR REFRACT.AUTO: CLEAR
CO2 SERPL-SCNC: 24 MMOL/L (ref 23–29)
COLOR UR AUTO: YELLOW
CREAT SERPL-MCNC: 0.9 MG/DL (ref 0.5–1.4)
EST. GFR  (AFRICAN AMERICAN): >60 ML/MIN/1.73 M^2
EST. GFR  (NON AFRICAN AMERICAN): 58.5 ML/MIN/1.73 M^2
GLUCOSE SERPL-MCNC: 101 MG/DL (ref 70–110)
GLUCOSE UR QL STRIP: NEGATIVE
HGB UR QL STRIP: NEGATIVE
HYALINE CASTS UR QL AUTO: 4 /LPF
KETONES UR QL STRIP: NEGATIVE
LEUKOCYTE ESTERASE UR QL STRIP: NEGATIVE
MICROSCOPIC COMMENT: ABNORMAL
NITRITE UR QL STRIP: NEGATIVE
PH UR STRIP: 5 [PH] (ref 5–8)
POTASSIUM SERPL-SCNC: 4.6 MMOL/L (ref 3.5–5.1)
PROT SERPL-MCNC: 7.4 G/DL (ref 6–8.4)
PROT UR QL STRIP: NEGATIVE
RBC #/AREA URNS AUTO: 0 /HPF (ref 0–4)
SODIUM SERPL-SCNC: 139 MMOL/L (ref 136–145)
SP GR UR STRIP: 1.01 (ref 1–1.03)
SQUAMOUS #/AREA URNS AUTO: 0 /HPF
URN SPEC COLLECT METH UR: NORMAL
WBC #/AREA URNS AUTO: 0 /HPF (ref 0–5)

## 2021-06-23 LAB — BACTERIA UR CULT: NO GROWTH

## 2021-06-30 ENCOUNTER — EXTERNAL CHRONIC CARE MANAGEMENT (OUTPATIENT)
Dept: PRIMARY CARE CLINIC | Facility: CLINIC | Age: 86
End: 2021-06-30
Payer: MEDICARE

## 2021-06-30 PROCEDURE — 99490 CHRNC CARE MGMT STAFF 1ST 20: CPT | Mod: S$PBB,,, | Performed by: FAMILY MEDICINE

## 2021-06-30 PROCEDURE — 99490 CHRNC CARE MGMT STAFF 1ST 20: CPT | Mod: PBBFAC,PO | Performed by: FAMILY MEDICINE

## 2021-06-30 PROCEDURE — 99490 PR CHRONIC CARE MGMT, 1ST 20 MIN: ICD-10-PCS | Mod: S$PBB,,, | Performed by: FAMILY MEDICINE

## 2021-07-10 LAB
BASOPHILS # BLD AUTO: 0 X10E3/UL (ref 0–0.2)
BASOPHILS NFR BLD AUTO: 1 %
EOSINOPHIL # BLD AUTO: 0.1 X10E3/UL (ref 0–0.4)
EOSINOPHIL NFR BLD AUTO: 2 %
ERYTHROCYTE [DISTWIDTH] IN BLOOD BY AUTOMATED COUNT: 16.4 % (ref 11.7–15.4)
FERRITIN SERPL-MCNC: 1600 NG/ML (ref 15–150)
HCT VFR BLD AUTO: 36.4 % (ref 34–46.6)
HGB BLD-MCNC: 11.4 G/DL (ref 11.1–15.9)
IMM GRANULOCYTES # BLD AUTO: 0 X10E3/UL (ref 0–0.1)
IMM GRANULOCYTES NFR BLD AUTO: 0 %
LYMPHOCYTES # BLD AUTO: 0.8 X10E3/UL (ref 0.7–3.1)
LYMPHOCYTES NFR BLD AUTO: 20 %
MCH RBC QN AUTO: 28.3 PG (ref 26.6–33)
MCHC RBC AUTO-ENTMCNC: 31.3 G/DL (ref 31.5–35.7)
MCV RBC AUTO: 90 FL (ref 79–97)
MONOCYTES # BLD AUTO: 0.4 X10E3/UL (ref 0.1–0.9)
MONOCYTES NFR BLD AUTO: 9 %
NEUTROPHILS # BLD AUTO: 2.8 X10E3/UL (ref 1.4–7)
NEUTROPHILS NFR BLD AUTO: 68 %
PLATELET # BLD AUTO: 148 X10E3/UL (ref 150–450)
RBC # BLD AUTO: 4.03 X10E6/UL (ref 3.77–5.28)
WBC # BLD AUTO: 4.1 X10E3/UL (ref 3.4–10.8)

## 2021-07-14 ENCOUNTER — PES CALL (OUTPATIENT)
Dept: ADMINISTRATIVE | Facility: CLINIC | Age: 86
End: 2021-07-14

## 2021-07-31 ENCOUNTER — EXTERNAL CHRONIC CARE MANAGEMENT (OUTPATIENT)
Dept: PRIMARY CARE CLINIC | Facility: CLINIC | Age: 86
End: 2021-07-31
Payer: MEDICARE

## 2021-07-31 PROCEDURE — 99490 CHRNC CARE MGMT STAFF 1ST 20: CPT | Mod: S$PBB,,, | Performed by: FAMILY MEDICINE

## 2021-07-31 PROCEDURE — 99490 PR CHRONIC CARE MGMT, 1ST 20 MIN: ICD-10-PCS | Mod: S$PBB,,, | Performed by: FAMILY MEDICINE

## 2021-07-31 PROCEDURE — 99490 CHRNC CARE MGMT STAFF 1ST 20: CPT | Mod: PBBFAC,PO | Performed by: FAMILY MEDICINE

## 2021-08-10 LAB
ALBUMIN SERPL-MCNC: 4.4 G/DL (ref 3.6–4.6)
ALBUMIN/GLOB SERPL: 1.7 {RATIO} (ref 1.2–2.2)
ALP SERPL-CCNC: 145 IU/L (ref 48–121)
ALT SERPL-CCNC: 18 IU/L (ref 0–32)
AST SERPL-CCNC: 25 IU/L (ref 0–40)
BILIRUB SERPL-MCNC: 0.2 MG/DL (ref 0–1.2)
BNP SERPL-MCNC: 240.4 PG/ML (ref 0–100)
BUN SERPL-MCNC: 23 MG/DL (ref 8–27)
BUN/CREAT SERPL: 23 (ref 12–28)
CALCIUM SERPL-MCNC: 9.8 MG/DL (ref 8.7–10.3)
CHLORIDE SERPL-SCNC: 97 MMOL/L (ref 96–106)
CO2 SERPL-SCNC: 27 MMOL/L (ref 20–29)
CREAT SERPL-MCNC: 1.01 MG/DL (ref 0.57–1)
GLOBULIN SER CALC-MCNC: 2.6 G/DL (ref 1.5–4.5)
GLUCOSE SERPL-MCNC: 105 MG/DL (ref 65–99)
POTASSIUM SERPL-SCNC: 4.1 MMOL/L (ref 3.5–5.2)
PROT SERPL-MCNC: 7 G/DL (ref 6–8.5)
SODIUM SERPL-SCNC: 137 MMOL/L (ref 134–144)

## 2021-08-11 ENCOUNTER — OFFICE VISIT (OUTPATIENT)
Dept: CARDIOLOGY | Facility: CLINIC | Age: 86
End: 2021-08-11
Payer: MEDICARE

## 2021-08-11 VITALS
DIASTOLIC BLOOD PRESSURE: 70 MMHG | SYSTOLIC BLOOD PRESSURE: 140 MMHG | HEIGHT: 58 IN | WEIGHT: 102 LBS | HEART RATE: 57 BPM | BODY MASS INDEX: 21.41 KG/M2

## 2021-08-11 DIAGNOSIS — E78.2 MIXED HYPERLIPIDEMIA: ICD-10-CM

## 2021-08-11 DIAGNOSIS — I35.0 AORTIC VALVE STENOSIS, MODERATE: ICD-10-CM

## 2021-08-11 DIAGNOSIS — R00.1 AV JUNCTIONAL BRADYCARDIA: ICD-10-CM

## 2021-08-11 DIAGNOSIS — I50.42 CHRONIC COMBINED SYSTOLIC AND DIASTOLIC HEART FAILURE: ICD-10-CM

## 2021-08-11 LAB
BASOPHILS # BLD AUTO: 0 X10E3/UL (ref 0–0.2)
BASOPHILS NFR BLD AUTO: 1 %
EOSINOPHIL # BLD AUTO: 0.3 X10E3/UL (ref 0–0.4)
EOSINOPHIL NFR BLD AUTO: 5 %
ERYTHROCYTE [DISTWIDTH] IN BLOOD BY AUTOMATED COUNT: 14 % (ref 11.7–15.4)
FERRITIN SERPL-MCNC: 1212 NG/ML (ref 15–150)
HCT VFR BLD AUTO: 36 % (ref 34–46.6)
HGB BLD-MCNC: 11.6 G/DL (ref 11.1–15.9)
IMM GRANULOCYTES # BLD AUTO: 0 X10E3/UL (ref 0–0.1)
IMM GRANULOCYTES NFR BLD AUTO: 1 %
LYMPHOCYTES # BLD AUTO: 1.1 X10E3/UL (ref 0.7–3.1)
LYMPHOCYTES NFR BLD AUTO: 17 %
MCH RBC QN AUTO: 30.1 PG (ref 26.6–33)
MCHC RBC AUTO-ENTMCNC: 32.2 G/DL (ref 31.5–35.7)
MCV RBC AUTO: 93 FL (ref 79–97)
MONOCYTES # BLD AUTO: 0.6 X10E3/UL (ref 0.1–0.9)
MONOCYTES NFR BLD AUTO: 10 %
NEUTROPHILS # BLD AUTO: 4.2 X10E3/UL (ref 1.4–7)
NEUTROPHILS NFR BLD AUTO: 66 %
PLATELET # BLD AUTO: 182 X10E3/UL (ref 150–450)
RBC # BLD AUTO: 3.86 X10E6/UL (ref 3.77–5.28)
WBC # BLD AUTO: 6.3 X10E3/UL (ref 3.4–10.8)

## 2021-08-11 PROCEDURE — 99213 OFFICE O/P EST LOW 20 MIN: CPT | Mod: S$GLB,,, | Performed by: INTERNAL MEDICINE

## 2021-08-11 PROCEDURE — 99213 PR OFFICE/OUTPT VISIT, EST, LEVL III, 20-29 MIN: ICD-10-PCS | Mod: S$GLB,,, | Performed by: INTERNAL MEDICINE

## 2021-08-11 NOTE — Clinical Note
The site was marked. Prepped: groin. Prepped with: ChloraPrep. The site was clipped. The patient was draped.  Fluconazole Pregnancy And Lactation Text: This medication is Pregnancy Category C and it isn't know if it is safe during pregnancy. It is also excreted in breast milk.

## 2021-08-12 ENCOUNTER — OFFICE VISIT (OUTPATIENT)
Dept: HEMATOLOGY/ONCOLOGY | Facility: CLINIC | Age: 86
End: 2021-08-12
Payer: MEDICARE

## 2021-08-12 VITALS
RESPIRATION RATE: 17 BRPM | SYSTOLIC BLOOD PRESSURE: 154 MMHG | BODY MASS INDEX: 21.28 KG/M2 | TEMPERATURE: 99 F | DIASTOLIC BLOOD PRESSURE: 69 MMHG | HEART RATE: 60 BPM | WEIGHT: 101.81 LBS

## 2021-08-12 DIAGNOSIS — D50.0 IRON DEFICIENCY ANEMIA DUE TO CHRONIC BLOOD LOSS: ICD-10-CM

## 2021-08-12 PROCEDURE — 99213 OFFICE O/P EST LOW 20 MIN: CPT | Mod: S$GLB,,, | Performed by: INTERNAL MEDICINE

## 2021-08-12 PROCEDURE — 99213 PR OFFICE/OUTPT VISIT, EST, LEVL III, 20-29 MIN: ICD-10-PCS | Mod: S$GLB,,, | Performed by: INTERNAL MEDICINE

## 2021-08-31 ENCOUNTER — EXTERNAL CHRONIC CARE MANAGEMENT (OUTPATIENT)
Dept: PRIMARY CARE CLINIC | Facility: CLINIC | Age: 86
End: 2021-08-31
Payer: MEDICARE

## 2021-08-31 PROCEDURE — 99490 CHRNC CARE MGMT STAFF 1ST 20: CPT | Mod: S$PBB,,, | Performed by: FAMILY MEDICINE

## 2021-08-31 PROCEDURE — 99490 CHRNC CARE MGMT STAFF 1ST 20: CPT | Mod: PBBFAC,PO | Performed by: FAMILY MEDICINE

## 2021-08-31 PROCEDURE — 99490 PR CHRONIC CARE MGMT, 1ST 20 MIN: ICD-10-PCS | Mod: S$PBB,,, | Performed by: FAMILY MEDICINE

## 2021-09-18 ENCOUNTER — PES CALL (OUTPATIENT)
Dept: ADMINISTRATIVE | Facility: CLINIC | Age: 86
End: 2021-09-18

## 2021-09-21 ENCOUNTER — OFFICE VISIT (OUTPATIENT)
Dept: RHEUMATOLOGY | Facility: CLINIC | Age: 86
End: 2021-09-21
Payer: MEDICARE

## 2021-09-21 ENCOUNTER — OFFICE VISIT (OUTPATIENT)
Dept: PRIMARY CARE CLINIC | Facility: CLINIC | Age: 86
End: 2021-09-21
Payer: MEDICARE

## 2021-09-21 ENCOUNTER — HOSPITAL ENCOUNTER (OUTPATIENT)
Dept: CARDIOLOGY | Facility: CLINIC | Age: 86
Discharge: HOME | End: 2021-09-21
Attending: INTERNAL MEDICINE
Payer: MEDICARE

## 2021-09-21 VITALS
WEIGHT: 107.81 LBS | SYSTOLIC BLOOD PRESSURE: 145 MMHG | DIASTOLIC BLOOD PRESSURE: 80 MMHG | HEIGHT: 58 IN | OXYGEN SATURATION: 97 % | BODY MASS INDEX: 22.63 KG/M2 | HEART RATE: 60 BPM | TEMPERATURE: 98 F

## 2021-09-21 VITALS — SYSTOLIC BLOOD PRESSURE: 130 MMHG | WEIGHT: 108.5 LBS | BODY MASS INDEX: 22.68 KG/M2 | DIASTOLIC BLOOD PRESSURE: 66 MMHG

## 2021-09-21 DIAGNOSIS — N18.32 STAGE 3B CHRONIC KIDNEY DISEASE: ICD-10-CM

## 2021-09-21 DIAGNOSIS — I35.0 AORTIC VALVE STENOSIS, MODERATE: ICD-10-CM

## 2021-09-21 DIAGNOSIS — M35.00 SJOGREN'S SYNDROME WITHOUT EXTRAGLANDULAR INVOLVEMENT: Primary | ICD-10-CM

## 2021-09-21 DIAGNOSIS — E78.2 MIXED HYPERLIPIDEMIA: ICD-10-CM

## 2021-09-21 DIAGNOSIS — R26.9 ABNORMALITY OF GAIT AND MOBILITY: ICD-10-CM

## 2021-09-21 DIAGNOSIS — D63.8 ANEMIA OF CHRONIC DISEASE: Chronic | ICD-10-CM

## 2021-09-21 DIAGNOSIS — I50.42 CHRONIC COMBINED SYSTOLIC AND DIASTOLIC HEART FAILURE: ICD-10-CM

## 2021-09-21 DIAGNOSIS — I25.118 CORONARY ARTERY DISEASE OF NATIVE ARTERY OF NATIVE HEART WITH STABLE ANGINA PECTORIS: ICD-10-CM

## 2021-09-21 DIAGNOSIS — R00.1 AV JUNCTIONAL BRADYCARDIA: ICD-10-CM

## 2021-09-21 DIAGNOSIS — M05.759: ICD-10-CM

## 2021-09-21 DIAGNOSIS — M81.0 OSTEOPOROSIS, UNSPECIFIED OSTEOPOROSIS TYPE, UNSPECIFIED PATHOLOGICAL FRACTURE PRESENCE: ICD-10-CM

## 2021-09-21 DIAGNOSIS — I10 ESSENTIAL HYPERTENSION: Primary | ICD-10-CM

## 2021-09-21 DIAGNOSIS — Z99.89 DEPENDENCE ON OTHER ENABLING MACHINES AND DEVICES: ICD-10-CM

## 2021-09-21 DIAGNOSIS — K21.9 GASTROESOPHAGEAL REFLUX DISEASE, UNSPECIFIED WHETHER ESOPHAGITIS PRESENT: ICD-10-CM

## 2021-09-21 DIAGNOSIS — M35.01 SJOGREN'S SYNDROME WITH KERATOCONJUNCTIVITIS SICCA: ICD-10-CM

## 2021-09-21 DIAGNOSIS — Z00.00 ENCOUNTER FOR PREVENTIVE HEALTH EXAMINATION: ICD-10-CM

## 2021-09-21 PROCEDURE — 93224 HOLTER MONITOR - 48 HOUR (CUPID ONLY): ICD-10-PCS | Mod: S$GLB,,, | Performed by: INTERNAL MEDICINE

## 2021-09-21 PROCEDURE — 99213 OFFICE O/P EST LOW 20 MIN: CPT | Mod: S$GLB,,, | Performed by: INTERNAL MEDICINE

## 2021-09-21 PROCEDURE — G0439 PPPS, SUBSEQ VISIT: HCPCS | Mod: S$GLB,,, | Performed by: NURSE PRACTITIONER

## 2021-09-21 PROCEDURE — 93224 XTRNL ECG REC UP TO 48 HRS: CPT | Mod: S$GLB,,, | Performed by: INTERNAL MEDICINE

## 2021-09-21 PROCEDURE — 99213 PR OFFICE/OUTPT VISIT, EST, LEVL III, 20-29 MIN: ICD-10-PCS | Mod: S$GLB,,, | Performed by: INTERNAL MEDICINE

## 2021-09-21 PROCEDURE — G0439 PR MEDICARE ANNUAL WELLNESS SUBSEQUENT VISIT: ICD-10-PCS | Mod: S$GLB,,, | Performed by: NURSE PRACTITIONER

## 2021-09-27 DIAGNOSIS — I47.10 SVT (SUPRAVENTRICULAR TACHYCARDIA): Primary | ICD-10-CM

## 2021-09-30 ENCOUNTER — EXTERNAL CHRONIC CARE MANAGEMENT (OUTPATIENT)
Dept: PRIMARY CARE CLINIC | Facility: CLINIC | Age: 86
End: 2021-09-30
Payer: MEDICARE

## 2021-09-30 ENCOUNTER — PATIENT MESSAGE (OUTPATIENT)
Dept: FAMILY MEDICINE | Facility: CLINIC | Age: 86
End: 2021-09-30

## 2021-09-30 PROCEDURE — 99490 CHRNC CARE MGMT STAFF 1ST 20: CPT | Mod: PBBFAC,PO | Performed by: FAMILY MEDICINE

## 2021-09-30 PROCEDURE — 99490 PR CHRONIC CARE MGMT, 1ST 20 MIN: ICD-10-PCS | Mod: S$PBB,,, | Performed by: FAMILY MEDICINE

## 2021-09-30 PROCEDURE — 99490 CHRNC CARE MGMT STAFF 1ST 20: CPT | Mod: S$PBB,,, | Performed by: FAMILY MEDICINE

## 2021-10-04 ENCOUNTER — PATIENT MESSAGE (OUTPATIENT)
Dept: FAMILY MEDICINE | Facility: CLINIC | Age: 86
End: 2021-10-04

## 2021-10-13 ENCOUNTER — OFFICE VISIT (OUTPATIENT)
Dept: CARDIOLOGY | Facility: CLINIC | Age: 86
End: 2021-10-13
Payer: MEDICARE

## 2021-10-13 VITALS
WEIGHT: 107 LBS | DIASTOLIC BLOOD PRESSURE: 70 MMHG | BODY MASS INDEX: 22.46 KG/M2 | SYSTOLIC BLOOD PRESSURE: 122 MMHG | HEART RATE: 78 BPM | OXYGEN SATURATION: 97 % | HEIGHT: 58 IN

## 2021-10-13 DIAGNOSIS — I47.10 PSVT (PAROXYSMAL SUPRAVENTRICULAR TACHYCARDIA): ICD-10-CM

## 2021-10-13 DIAGNOSIS — D63.8 ANEMIA OF CHRONIC DISEASE: ICD-10-CM

## 2021-10-13 DIAGNOSIS — I35.0 AORTIC VALVE STENOSIS, MODERATE: ICD-10-CM

## 2021-10-13 DIAGNOSIS — E78.2 MIXED HYPERLIPIDEMIA: ICD-10-CM

## 2021-10-13 DIAGNOSIS — I10 ESSENTIAL HYPERTENSION: Primary | ICD-10-CM

## 2021-10-13 PROCEDURE — 99214 OFFICE O/P EST MOD 30 MIN: CPT | Mod: S$GLB,,, | Performed by: INTERNAL MEDICINE

## 2021-10-13 PROCEDURE — 99214 PR OFFICE/OUTPT VISIT, EST, LEVL IV, 30-39 MIN: ICD-10-PCS | Mod: S$GLB,,, | Performed by: INTERNAL MEDICINE

## 2021-10-14 DIAGNOSIS — R10.11 ABDOMINAL PAIN, RIGHT UPPER QUADRANT: Primary | ICD-10-CM

## 2021-10-19 ENCOUNTER — PATIENT OUTREACH (OUTPATIENT)
Dept: ADMINISTRATIVE | Facility: OTHER | Age: 86
End: 2021-10-19

## 2021-10-20 ENCOUNTER — OFFICE VISIT (OUTPATIENT)
Dept: CARDIOLOGY | Facility: CLINIC | Age: 86
End: 2021-10-20
Payer: MEDICARE

## 2021-10-20 VITALS
HEART RATE: 64 BPM | SYSTOLIC BLOOD PRESSURE: 160 MMHG | WEIGHT: 109 LBS | DIASTOLIC BLOOD PRESSURE: 62 MMHG | OXYGEN SATURATION: 100 % | HEIGHT: 58 IN | BODY MASS INDEX: 22.88 KG/M2

## 2021-10-20 DIAGNOSIS — E78.2 MIXED HYPERLIPIDEMIA: ICD-10-CM

## 2021-10-20 DIAGNOSIS — I10 PRIMARY HYPERTENSION: ICD-10-CM

## 2021-10-20 DIAGNOSIS — I50.22 CHRONIC SYSTOLIC CONGESTIVE HEART FAILURE: Primary | ICD-10-CM

## 2021-10-20 DIAGNOSIS — I47.10 PSVT (PAROXYSMAL SUPRAVENTRICULAR TACHYCARDIA): ICD-10-CM

## 2021-10-20 DIAGNOSIS — N17.9 AKI (ACUTE KIDNEY INJURY): ICD-10-CM

## 2021-10-20 PROCEDURE — 99205 PR OFFICE/OUTPT VISIT, NEW, LEVL V, 60-74 MIN: ICD-10-PCS | Mod: S$GLB,,, | Performed by: INTERNAL MEDICINE

## 2021-10-20 PROCEDURE — 99205 OFFICE O/P NEW HI 60 MIN: CPT | Mod: S$GLB,,, | Performed by: INTERNAL MEDICINE

## 2021-10-20 RX ORDER — CYANOCOBALAMIN 1000 UG/ML
1000 INJECTION, SOLUTION INTRAMUSCULAR; SUBCUTANEOUS
COMMUNITY
End: 2023-04-03

## 2021-10-21 ENCOUNTER — HOSPITAL ENCOUNTER (OUTPATIENT)
Dept: RADIOLOGY | Facility: HOSPITAL | Age: 86
Discharge: HOME | End: 2021-10-21
Attending: INTERNAL MEDICINE
Payer: MEDICARE

## 2021-10-21 DIAGNOSIS — R10.11 ABDOMINAL PAIN, RIGHT UPPER QUADRANT: ICD-10-CM

## 2021-10-21 PROCEDURE — 76700 US EXAM ABDOM COMPLETE: CPT | Mod: TC,PO

## 2021-10-26 ENCOUNTER — TELEPHONE (OUTPATIENT)
Dept: CARDIOLOGY | Facility: CLINIC | Age: 86
End: 2021-10-26
Payer: MEDICARE

## 2021-10-26 NOTE — TELEPHONE ENCOUNTER
----- Message from Iglesia Alarcon sent at 10/26/2021 11:06 AM CDT -----  Contact: pt  Type: Needs Medical Advice    Who Called:pt  Best Call Back Number:[phone] or [phone] if before 2 pm call back     Additional Information: Requesting callback regarding needing appt for pace maker   Please Advise-Thank you

## 2021-10-27 DIAGNOSIS — I49.5 SICK SINUS SYNDROME: Primary | ICD-10-CM

## 2021-10-27 RX ORDER — MUPIROCIN 20 MG/G
1 OINTMENT TOPICAL
Status: CANCELLED | OUTPATIENT
Start: 2021-10-27

## 2021-10-27 RX ORDER — SODIUM CHLORIDE 9 MG/ML
INJECTION, SOLUTION INTRAVENOUS CONTINUOUS
Status: DISCONTINUED | OUTPATIENT
Start: 2021-10-27 | End: 2022-02-25

## 2021-10-28 ENCOUNTER — PATIENT MESSAGE (OUTPATIENT)
Dept: CARDIOLOGY | Facility: HOSPITAL | Age: 86
End: 2021-10-28
Payer: MEDICARE

## 2021-10-31 ENCOUNTER — EXTERNAL CHRONIC CARE MANAGEMENT (OUTPATIENT)
Dept: PRIMARY CARE CLINIC | Facility: CLINIC | Age: 86
End: 2021-10-31
Payer: MEDICARE

## 2021-10-31 PROCEDURE — 99490 CHRNC CARE MGMT STAFF 1ST 20: CPT | Mod: PBBFAC,PO | Performed by: FAMILY MEDICINE

## 2021-10-31 PROCEDURE — 99490 PR CHRONIC CARE MGMT, 1ST 20 MIN: ICD-10-PCS | Mod: S$PBB,,, | Performed by: FAMILY MEDICINE

## 2021-10-31 PROCEDURE — 99490 CHRNC CARE MGMT STAFF 1ST 20: CPT | Mod: S$PBB,,, | Performed by: FAMILY MEDICINE

## 2021-11-03 ENCOUNTER — OFFICE VISIT (OUTPATIENT)
Dept: FAMILY MEDICINE | Facility: CLINIC | Age: 86
End: 2021-11-03
Attending: FAMILY MEDICINE
Payer: MEDICARE

## 2021-11-03 VITALS
DIASTOLIC BLOOD PRESSURE: 71 MMHG | TEMPERATURE: 98 F | HEIGHT: 58 IN | SYSTOLIC BLOOD PRESSURE: 138 MMHG | HEART RATE: 60 BPM | RESPIRATION RATE: 18 BRPM | BODY MASS INDEX: 23.21 KG/M2 | WEIGHT: 110.56 LBS | OXYGEN SATURATION: 96 %

## 2021-11-03 DIAGNOSIS — I50.42 CHRONIC COMBINED SYSTOLIC AND DIASTOLIC HEART FAILURE: ICD-10-CM

## 2021-11-03 DIAGNOSIS — R14.0 ABDOMINAL BLOATING: ICD-10-CM

## 2021-11-03 DIAGNOSIS — I35.0 AORTIC VALVE STENOSIS, MODERATE: ICD-10-CM

## 2021-11-03 DIAGNOSIS — I49.5 SICK SINUS SYNDROME: ICD-10-CM

## 2021-11-03 DIAGNOSIS — G56.00 CARPAL TUNNEL SYNDROME, UNSPECIFIED LATERALITY: ICD-10-CM

## 2021-11-03 DIAGNOSIS — D63.8 ANEMIA OF CHRONIC DISEASE: Chronic | ICD-10-CM

## 2021-11-03 DIAGNOSIS — M05.759: ICD-10-CM

## 2021-11-03 DIAGNOSIS — N18.31 STAGE 3A CHRONIC KIDNEY DISEASE: ICD-10-CM

## 2021-11-03 DIAGNOSIS — G62.9 PERIPHERAL POLYNEUROPATHY: Primary | ICD-10-CM

## 2021-11-03 DIAGNOSIS — I25.118 CORONARY ARTERY DISEASE OF NATIVE ARTERY OF NATIVE HEART WITH STABLE ANGINA PECTORIS: ICD-10-CM

## 2021-11-03 PROCEDURE — 99215 OFFICE O/P EST HI 40 MIN: CPT | Mod: PBBFAC,PN | Performed by: FAMILY MEDICINE

## 2021-11-03 PROCEDURE — 99999 PR PBB SHADOW E&M-EST. PATIENT-LVL V: ICD-10-PCS | Mod: PBBFAC,,, | Performed by: FAMILY MEDICINE

## 2021-11-03 PROCEDURE — 99215 PR OFFICE/OUTPT VISIT, EST, LEVL V, 40-54 MIN: ICD-10-PCS | Mod: S$PBB,,, | Performed by: FAMILY MEDICINE

## 2021-11-03 PROCEDURE — 99999 PR PBB SHADOW E&M-EST. PATIENT-LVL V: CPT | Mod: PBBFAC,,, | Performed by: FAMILY MEDICINE

## 2021-11-03 PROCEDURE — 99215 OFFICE O/P EST HI 40 MIN: CPT | Mod: S$PBB,,, | Performed by: FAMILY MEDICINE

## 2021-11-03 RX ORDER — PRUCALOPRIDE 2 MG/1
TABLET, FILM COATED ORAL
COMMUNITY
End: 2022-08-22 | Stop reason: ALTCHOICE

## 2021-11-03 RX ORDER — GABAPENTIN 100 MG/1
200 CAPSULE ORAL NIGHTLY
Qty: 60 CAPSULE | Refills: 5 | Status: SHIPPED | OUTPATIENT
Start: 2021-11-03 | End: 2022-04-18

## 2021-11-05 ENCOUNTER — HOSPITAL ENCOUNTER (OUTPATIENT)
Dept: PREADMISSION TESTING | Facility: HOSPITAL | Age: 86
Discharge: HOME | End: 2021-11-05
Attending: INTERNAL MEDICINE
Payer: MEDICARE

## 2021-11-05 DIAGNOSIS — E78.2 MIXED HYPERLIPIDEMIA: ICD-10-CM

## 2021-11-05 DIAGNOSIS — R00.1 AV JUNCTIONAL BRADYCARDIA: ICD-10-CM

## 2021-11-05 DIAGNOSIS — Z01.810 PRE-PROCEDURAL CARDIOVASCULAR EXAMINATION: Primary | ICD-10-CM

## 2021-11-05 DIAGNOSIS — I50.42 CHRONIC COMBINED SYSTOLIC AND DIASTOLIC HEART FAILURE: ICD-10-CM

## 2021-11-05 DIAGNOSIS — I35.0 AORTIC VALVE STENOSIS, MODERATE: ICD-10-CM

## 2021-11-05 LAB
ANION GAP SERPL CALC-SCNC: 9 MMOL/L (ref 8–16)
BNP SERPL-MCNC: 199 PG/ML (ref 0–99)
BUN SERPL-MCNC: 22 MG/DL (ref 8–23)
CALCIUM SERPL-MCNC: 9.6 MG/DL (ref 8.7–10.5)
CHLORIDE SERPL-SCNC: 99 MMOL/L (ref 95–110)
CO2 SERPL-SCNC: 32 MMOL/L (ref 23–29)
CREAT SERPL-MCNC: 0.9 MG/DL (ref 0.5–1.4)
ERYTHROCYTE [DISTWIDTH] IN BLOOD BY AUTOMATED COUNT: 13.3 % (ref 11.5–14.5)
EST. GFR  (AFRICAN AMERICAN): >60 ML/MIN/1.73 M^2
EST. GFR  (NON AFRICAN AMERICAN): 58.5 ML/MIN/1.73 M^2
GLUCOSE SERPL-MCNC: 93 MG/DL (ref 70–110)
HCT VFR BLD AUTO: 32.4 % (ref 37–48.5)
HGB BLD-MCNC: 10.4 G/DL (ref 12–16)
MCH RBC QN AUTO: 30.2 PG (ref 27–31)
MCHC RBC AUTO-ENTMCNC: 32.1 G/DL (ref 32–36)
MCV RBC AUTO: 94 FL (ref 82–98)
MRSA SCREEN BY PCR: NEGATIVE
PLATELET # BLD AUTO: 142 K/UL (ref 150–450)
PMV BLD AUTO: 9.7 FL (ref 9.2–12.9)
POTASSIUM SERPL-SCNC: 4.1 MMOL/L (ref 3.5–5.1)
RBC # BLD AUTO: 3.44 M/UL (ref 4–5.4)
SARS-COV-2 RNA RESP QL NAA+PROBE: NOT DETECTED
SARS-COV-2- CYCLE NUMBER: NORMAL
SODIUM SERPL-SCNC: 140 MMOL/L (ref 136–145)
WBC # BLD AUTO: 5.27 K/UL (ref 3.9–12.7)

## 2021-11-05 PROCEDURE — U0005 INFEC AGEN DETEC AMPLI PROBE: HCPCS | Performed by: INTERNAL MEDICINE

## 2021-11-05 PROCEDURE — 83880 ASSAY OF NATRIURETIC PEPTIDE: CPT | Performed by: INTERNAL MEDICINE

## 2021-11-05 PROCEDURE — 85027 COMPLETE CBC AUTOMATED: CPT | Performed by: INTERNAL MEDICINE

## 2021-11-05 PROCEDURE — 80048 BASIC METABOLIC PNL TOTAL CA: CPT | Performed by: INTERNAL MEDICINE

## 2021-11-05 PROCEDURE — 87641 MR-STAPH DNA AMP PROBE: CPT | Performed by: INTERNAL MEDICINE

## 2021-11-05 PROCEDURE — 36415 COLL VENOUS BLD VENIPUNCTURE: CPT | Performed by: INTERNAL MEDICINE

## 2021-11-05 PROCEDURE — U0003 INFECTIOUS AGENT DETECTION BY NUCLEIC ACID (DNA OR RNA); SEVERE ACUTE RESPIRATORY SYNDROME CORONAVIRUS 2 (SARS-COV-2) (CORONAVIRUS DISEASE [COVID-19]), AMPLIFIED PROBE TECHNIQUE, MAKING USE OF HIGH THROUGHPUT TECHNOLOGIES AS DESCRIBED BY CMS-2020-01-R: HCPCS | Performed by: INTERNAL MEDICINE

## 2021-11-08 ENCOUNTER — HOSPITAL ENCOUNTER (OUTPATIENT)
Facility: HOSPITAL | Age: 86
Discharge: HOME | End: 2021-11-09
Attending: INTERNAL MEDICINE | Admitting: INTERNAL MEDICINE
Payer: MEDICARE

## 2021-11-08 DIAGNOSIS — D63.8 ANEMIA OF CHRONIC DISEASE: Chronic | ICD-10-CM

## 2021-11-08 DIAGNOSIS — I49.5 SICK SINUS SYNDROME: ICD-10-CM

## 2021-11-08 DIAGNOSIS — I50.42 CHRONIC COMBINED SYSTOLIC AND DIASTOLIC HEART FAILURE: ICD-10-CM

## 2021-11-08 DIAGNOSIS — I25.118 CORONARY ARTERY DISEASE OF NATIVE ARTERY OF NATIVE HEART WITH STABLE ANGINA PECTORIS: ICD-10-CM

## 2021-11-08 DIAGNOSIS — I47.10 PSVT (PAROXYSMAL SUPRAVENTRICULAR TACHYCARDIA): Primary | ICD-10-CM

## 2021-11-08 DIAGNOSIS — I49.9 ARRHYTHMIA: ICD-10-CM

## 2021-11-08 DIAGNOSIS — N18.31 STAGE 3A CHRONIC KIDNEY DISEASE: ICD-10-CM

## 2021-11-08 DIAGNOSIS — I35.0 AORTIC VALVE STENOSIS, MODERATE: ICD-10-CM

## 2021-11-08 PROCEDURE — 33208 INSRT HEART PM ATRIAL & VENT: CPT | Mod: KX | Performed by: INTERNAL MEDICINE

## 2021-11-08 PROCEDURE — 25000003 PHARM REV CODE 250: Performed by: INTERNAL MEDICINE

## 2021-11-08 PROCEDURE — C1898 LEAD, PMKR, OTHER THAN TRANS: HCPCS | Performed by: INTERNAL MEDICINE

## 2021-11-08 PROCEDURE — 93005 ELECTROCARDIOGRAM TRACING: CPT | Performed by: INTERNAL MEDICINE

## 2021-11-08 PROCEDURE — 33208 PR INSER HART PACER XVENOUS ATR/VENTR: ICD-10-PCS | Mod: KX,,, | Performed by: INTERNAL MEDICINE

## 2021-11-08 PROCEDURE — 96365 THER/PROPH/DIAG IV INF INIT: CPT | Mod: 59 | Performed by: INTERNAL MEDICINE

## 2021-11-08 PROCEDURE — 63600175 PHARM REV CODE 636 W HCPCS: Performed by: INTERNAL MEDICINE

## 2021-11-08 PROCEDURE — 93010 EKG 12-LEAD: ICD-10-PCS | Mod: ,,, | Performed by: INTERNAL MEDICINE

## 2021-11-08 PROCEDURE — 99152 PR MOD CONSCIOUS SEDATION, SAME PHYS, 5+ YRS, FIRST 15 MIN: ICD-10-PCS | Mod: ,,, | Performed by: INTERNAL MEDICINE

## 2021-11-08 PROCEDURE — 63600175 PHARM REV CODE 636 W HCPCS: Performed by: NURSE PRACTITIONER

## 2021-11-08 PROCEDURE — C1785 PMKR, DUAL, RATE-RESP: HCPCS | Performed by: INTERNAL MEDICINE

## 2021-11-08 PROCEDURE — 33208 INSRT HEART PM ATRIAL & VENT: CPT | Mod: KX,,, | Performed by: INTERNAL MEDICINE

## 2021-11-08 PROCEDURE — 99152 MOD SED SAME PHYS/QHP 5/>YRS: CPT | Performed by: INTERNAL MEDICINE

## 2021-11-08 PROCEDURE — 25000003 PHARM REV CODE 250: Performed by: NURSE PRACTITIONER

## 2021-11-08 PROCEDURE — 93010 ELECTROCARDIOGRAM REPORT: CPT | Mod: ,,, | Performed by: INTERNAL MEDICINE

## 2021-11-08 PROCEDURE — 96366 THER/PROPH/DIAG IV INF ADDON: CPT | Mod: 59 | Performed by: INTERNAL MEDICINE

## 2021-11-08 PROCEDURE — 99152 MOD SED SAME PHYS/QHP 5/>YRS: CPT | Mod: ,,, | Performed by: INTERNAL MEDICINE

## 2021-11-08 PROCEDURE — 99153 MOD SED SAME PHYS/QHP EA: CPT | Performed by: INTERNAL MEDICINE

## 2021-11-08 PROCEDURE — 27201423 OPTIME MED/SURG SUP & DEVICES STERILE SUPPLY: Performed by: INTERNAL MEDICINE

## 2021-11-08 DEVICE — PACEMAKER DUAL DHMBR AZURE XTDR MRI: Type: IMPLANTABLE DEVICE | Site: CHEST  WALL | Status: FUNCTIONAL

## 2021-11-08 DEVICE — LEAD 5076-52 MRI US RCMCRD
Type: IMPLANTABLE DEVICE | Site: HEART | Status: FUNCTIONAL
Brand: CAPSUREFIX NOVUS MRI™ SURESCAN®

## 2021-11-08 DEVICE — LEAD 407645 CAPSUREFIX NOVUS US MRI
Type: IMPLANTABLE DEVICE | Site: HEART | Status: FUNCTIONAL
Brand: CAPSUREFIX NOVUS MRI™ SURESCAN™

## 2021-11-08 RX ORDER — SERTRALINE HYDROCHLORIDE 50 MG/1
50 TABLET, FILM COATED ORAL DAILY
Status: DISCONTINUED | OUTPATIENT
Start: 2021-11-09 | End: 2021-11-09 | Stop reason: HOSPADM

## 2021-11-08 RX ORDER — ONDANSETRON 4 MG/1
4 TABLET, ORALLY DISINTEGRATING ORAL EVERY 8 HOURS PRN
Status: DISCONTINUED | OUTPATIENT
Start: 2021-11-08 | End: 2021-11-09 | Stop reason: HOSPADM

## 2021-11-08 RX ORDER — PANTOPRAZOLE SODIUM 40 MG/1
40 TABLET, DELAYED RELEASE ORAL DAILY
Status: DISCONTINUED | OUTPATIENT
Start: 2021-11-09 | End: 2021-11-09 | Stop reason: HOSPADM

## 2021-11-08 RX ORDER — FUROSEMIDE 40 MG/1
80 TABLET ORAL DAILY
Status: DISCONTINUED | OUTPATIENT
Start: 2021-11-09 | End: 2021-11-09 | Stop reason: HOSPADM

## 2021-11-08 RX ORDER — MUPIROCIN 20 MG/G
1 OINTMENT TOPICAL
Status: DISCONTINUED | OUTPATIENT
Start: 2021-11-08 | End: 2021-11-08 | Stop reason: HOSPADM

## 2021-11-08 RX ORDER — POTASSIUM CHLORIDE 750 MG/1
10 CAPSULE, EXTENDED RELEASE ORAL DAILY
Status: DISCONTINUED | OUTPATIENT
Start: 2021-11-09 | End: 2021-11-09 | Stop reason: HOSPADM

## 2021-11-08 RX ORDER — AMLODIPINE BESYLATE 5 MG/1
5 TABLET ORAL NIGHTLY
Status: DISCONTINUED | OUTPATIENT
Start: 2021-11-08 | End: 2021-11-08

## 2021-11-08 RX ORDER — ISOSORBIDE DINITRATE 10 MG/1
10 TABLET ORAL 3 TIMES DAILY
Status: DISCONTINUED | OUTPATIENT
Start: 2021-11-08 | End: 2021-11-09 | Stop reason: HOSPADM

## 2021-11-08 RX ORDER — LIDOCAINE HYDROCHLORIDE 10 MG/ML
INJECTION, SOLUTION EPIDURAL; INFILTRATION; INTRACAUDAL; PERINEURAL
Status: DISCONTINUED | OUTPATIENT
Start: 2021-11-08 | End: 2021-11-08 | Stop reason: HOSPADM

## 2021-11-08 RX ORDER — GABAPENTIN 100 MG/1
200 CAPSULE ORAL NIGHTLY
Status: DISCONTINUED | OUTPATIENT
Start: 2021-11-08 | End: 2021-11-09 | Stop reason: HOSPADM

## 2021-11-08 RX ORDER — MIDAZOLAM HYDROCHLORIDE 1 MG/ML
INJECTION INTRAMUSCULAR; INTRAVENOUS
Status: DISCONTINUED | OUTPATIENT
Start: 2021-11-08 | End: 2021-11-08 | Stop reason: HOSPADM

## 2021-11-08 RX ORDER — HYDROMORPHONE HYDROCHLORIDE 2 MG/1
2 TABLET ORAL EVERY 4 HOURS PRN
Status: DISCONTINUED | OUTPATIENT
Start: 2021-11-08 | End: 2021-11-09 | Stop reason: HOSPADM

## 2021-11-08 RX ORDER — METOPROLOL SUCCINATE 25 MG/1
25 TABLET, EXTENDED RELEASE ORAL DAILY
Status: DISCONTINUED | OUTPATIENT
Start: 2021-11-08 | End: 2021-11-09 | Stop reason: HOSPADM

## 2021-11-08 RX ORDER — ATORVASTATIN CALCIUM 40 MG/1
40 TABLET, FILM COATED ORAL NIGHTLY
Status: DISCONTINUED | OUTPATIENT
Start: 2021-11-08 | End: 2021-11-09 | Stop reason: HOSPADM

## 2021-11-08 RX ORDER — AMLODIPINE BESYLATE 5 MG/1
5 TABLET ORAL NIGHTLY
Status: DISCONTINUED | OUTPATIENT
Start: 2021-11-08 | End: 2021-11-09 | Stop reason: HOSPADM

## 2021-11-08 RX ORDER — FENTANYL CITRATE 50 UG/ML
INJECTION, SOLUTION INTRAMUSCULAR; INTRAVENOUS
Status: DISCONTINUED | OUTPATIENT
Start: 2021-11-08 | End: 2021-11-08 | Stop reason: HOSPADM

## 2021-11-08 RX ORDER — ISOSORBIDE DINITRATE 10 MG/1
10 TABLET ORAL 3 TIMES DAILY
Status: DISCONTINUED | OUTPATIENT
Start: 2021-11-08 | End: 2021-11-08

## 2021-11-08 RX ORDER — FLUTICASONE PROPIONATE 50 MCG
2 SPRAY, SUSPENSION (ML) NASAL DAILY
Status: DISCONTINUED | OUTPATIENT
Start: 2021-11-09 | End: 2021-11-09 | Stop reason: HOSPADM

## 2021-11-08 RX ADMIN — ISOSORBIDE DINITRATE 10 MG: 10 TABLET ORAL at 06:11

## 2021-11-08 RX ADMIN — MUPIROCIN 1 G: 20 OINTMENT TOPICAL at 08:11

## 2021-11-08 RX ADMIN — METOPROLOL SUCCINATE 25 MG: 25 TABLET, FILM COATED, EXTENDED RELEASE ORAL at 08:11

## 2021-11-08 RX ADMIN — VANCOMYCIN HYDROCHLORIDE 750 MG: 750 INJECTION, POWDER, LYOPHILIZED, FOR SOLUTION INTRAVENOUS at 08:11

## 2021-11-08 RX ADMIN — AMLODIPINE BESYLATE 5 MG: 5 TABLET ORAL at 06:11

## 2021-11-08 RX ADMIN — ATORVASTATIN CALCIUM 40 MG: 40 TABLET, FILM COATED ORAL at 08:11

## 2021-11-08 RX ADMIN — GABAPENTIN 200 MG: 100 CAPSULE ORAL at 08:11

## 2021-11-09 VITALS
BODY MASS INDEX: 22.46 KG/M2 | DIASTOLIC BLOOD PRESSURE: 73 MMHG | RESPIRATION RATE: 18 BRPM | SYSTOLIC BLOOD PRESSURE: 158 MMHG | HEIGHT: 58 IN | WEIGHT: 107 LBS | TEMPERATURE: 97 F | HEART RATE: 60 BPM | OXYGEN SATURATION: 94 %

## 2021-11-09 PROCEDURE — 63600175 PHARM REV CODE 636 W HCPCS: Performed by: INTERNAL MEDICINE

## 2021-11-09 PROCEDURE — 25000003 PHARM REV CODE 250: Performed by: INTERNAL MEDICINE

## 2021-11-09 RX ADMIN — PANTOPRAZOLE SODIUM 40 MG: 40 TABLET, DELAYED RELEASE ORAL at 08:11

## 2021-11-09 RX ADMIN — VANCOMYCIN HYDROCHLORIDE 750 MG: 750 INJECTION, POWDER, LYOPHILIZED, FOR SOLUTION INTRAVENOUS at 08:11

## 2021-11-09 RX ADMIN — FUROSEMIDE 80 MG: 40 TABLET ORAL at 08:11

## 2021-11-09 RX ADMIN — METOPROLOL SUCCINATE 25 MG: 25 TABLET, FILM COATED, EXTENDED RELEASE ORAL at 08:11

## 2021-11-09 RX ADMIN — SERTRALINE HYDROCHLORIDE 50 MG: 50 TABLET ORAL at 08:11

## 2021-11-09 RX ADMIN — POTASSIUM CHLORIDE 10 MEQ: 750 CAPSULE, EXTENDED RELEASE ORAL at 08:11

## 2021-11-15 ENCOUNTER — TELEPHONE (OUTPATIENT)
Dept: CARDIOLOGY | Facility: CLINIC | Age: 86
End: 2021-11-15
Payer: MEDICARE

## 2021-11-15 DIAGNOSIS — I49.5 SICK SINUS SYNDROME: Primary | ICD-10-CM

## 2021-11-17 ENCOUNTER — TELEPHONE (OUTPATIENT)
Dept: UROLOGY | Facility: CLINIC | Age: 86
End: 2021-11-17
Payer: MEDICARE

## 2021-11-17 LAB
OHS CV EVENT MONITOR DAY: 0
OHS CV HOLTER LENGTH DECIMAL HOURS: 48
OHS CV HOLTER LENGTH HOURS: 48
OHS CV HOLTER LENGTH MINUTES: 0
OHS CV HOLTER SINUS AVERAGE HR: 65
OHS CV HOLTER SINUS MAX HR: 143
OHS CV HOLTER SINUS MIN HR: 35

## 2021-11-23 ENCOUNTER — TELEPHONE (OUTPATIENT)
Dept: CARDIOLOGY | Facility: CLINIC | Age: 86
End: 2021-11-23
Payer: MEDICARE

## 2021-11-29 ENCOUNTER — TELEPHONE (OUTPATIENT)
Dept: FAMILY MEDICINE | Facility: CLINIC | Age: 86
End: 2021-11-29
Payer: MEDICARE

## 2021-11-29 DIAGNOSIS — N39.0 URINARY TRACT INFECTION WITHOUT HEMATURIA, SITE UNSPECIFIED: Primary | ICD-10-CM

## 2021-11-30 ENCOUNTER — TELEPHONE (OUTPATIENT)
Dept: FAMILY MEDICINE | Facility: CLINIC | Age: 86
End: 2021-11-30
Payer: MEDICARE

## 2021-11-30 ENCOUNTER — EXTERNAL CHRONIC CARE MANAGEMENT (OUTPATIENT)
Dept: PRIMARY CARE CLINIC | Facility: CLINIC | Age: 86
End: 2021-11-30
Payer: MEDICARE

## 2021-11-30 PROCEDURE — 99490 CHRNC CARE MGMT STAFF 1ST 20: CPT | Mod: S$PBB,,, | Performed by: FAMILY MEDICINE

## 2021-11-30 PROCEDURE — 99490 CHRNC CARE MGMT STAFF 1ST 20: CPT | Mod: PBBFAC,PO | Performed by: FAMILY MEDICINE

## 2021-11-30 PROCEDURE — 99490 PR CHRONIC CARE MGMT, 1ST 20 MIN: ICD-10-PCS | Mod: S$PBB,,, | Performed by: FAMILY MEDICINE

## 2021-12-01 LAB
APPEARANCE UR: CLEAR
BACTERIA #/AREA URNS HPF: NORMAL /[HPF]
BILIRUB UR QL STRIP: NEGATIVE
COLOR UR: YELLOW
EPI CELLS #/AREA URNS HPF: NORMAL /HPF (ref 0–10)
GLUCOSE UR QL: NEGATIVE
HGB UR QL STRIP: NEGATIVE
KETONES UR QL STRIP: NEGATIVE
LEUKOCYTE ESTERASE UR QL STRIP: ABNORMAL
MICRO URNS: ABNORMAL
NITRITE UR QL STRIP: NEGATIVE
PH UR STRIP: 7 [PH] (ref 5–7.5)
PROT UR QL STRIP: NEGATIVE
RBC #/AREA URNS HPF: NORMAL /HPF (ref 0–2)
SP GR UR: 1.01 (ref 1–1.03)
SPECIMEN STATUS REPORT: NORMAL
UROBILINOGEN UR STRIP-MCNC: 0.2 MG/DL (ref 0.2–1)
WBC #/AREA URNS HPF: NORMAL /HPF (ref 0–5)

## 2021-12-02 DIAGNOSIS — N39.0 URINARY TRACT INFECTION WITHOUT HEMATURIA, SITE UNSPECIFIED: Primary | ICD-10-CM

## 2021-12-02 LAB
BACTERIA UR CULT: ABNORMAL
BACTERIA UR CULT: ABNORMAL
OTHER ANTIBIOTIC SUSC ISLT: ABNORMAL

## 2021-12-02 RX ORDER — CIPROFLOXACIN 250 MG/1
250 TABLET, FILM COATED ORAL 2 TIMES DAILY
Qty: 14 TABLET | Refills: 0 | Status: SHIPPED | OUTPATIENT
Start: 2021-12-02 | End: 2021-12-03 | Stop reason: SDUPTHER

## 2021-12-03 ENCOUNTER — TELEPHONE (OUTPATIENT)
Dept: FAMILY MEDICINE | Facility: CLINIC | Age: 86
End: 2021-12-03
Payer: MEDICARE

## 2021-12-03 DIAGNOSIS — N39.0 URINARY TRACT INFECTION WITHOUT HEMATURIA, SITE UNSPECIFIED: ICD-10-CM

## 2021-12-03 RX ORDER — CIPROFLOXACIN 250 MG/1
250 TABLET, FILM COATED ORAL 2 TIMES DAILY
Qty: 14 TABLET | Refills: 0 | Status: SHIPPED | OUTPATIENT
Start: 2021-12-03 | End: 2021-12-10

## 2021-12-07 ENCOUNTER — PATIENT OUTREACH (OUTPATIENT)
Dept: ADMINISTRATIVE | Facility: OTHER | Age: 86
End: 2021-12-07
Payer: MEDICARE

## 2021-12-07 ENCOUNTER — HOSPITAL ENCOUNTER (OUTPATIENT)
Dept: CARDIOLOGY | Facility: CLINIC | Age: 86
Discharge: HOME | End: 2021-12-07
Attending: INTERNAL MEDICINE
Payer: MEDICARE

## 2021-12-07 DIAGNOSIS — I49.5 SICK SINUS SYNDROME: ICD-10-CM

## 2021-12-07 PROCEDURE — 93280 CARDIAC DEVICE CHECK - IN CLINIC & HOSPITAL: ICD-10-PCS | Mod: S$GLB,,, | Performed by: INTERNAL MEDICINE

## 2021-12-07 PROCEDURE — 93280 PM DEVICE PROGR EVAL DUAL: CPT | Mod: S$GLB,,, | Performed by: INTERNAL MEDICINE

## 2021-12-08 ENCOUNTER — OFFICE VISIT (OUTPATIENT)
Dept: UROLOGY | Facility: CLINIC | Age: 86
End: 2021-12-08
Payer: MEDICARE

## 2021-12-08 ENCOUNTER — OFFICE VISIT (OUTPATIENT)
Dept: RHEUMATOLOGY | Facility: CLINIC | Age: 86
End: 2021-12-08
Payer: MEDICARE

## 2021-12-08 ENCOUNTER — PATIENT MESSAGE (OUTPATIENT)
Dept: FAMILY MEDICINE | Facility: CLINIC | Age: 86
End: 2021-12-08
Payer: MEDICARE

## 2021-12-08 ENCOUNTER — OFFICE VISIT (OUTPATIENT)
Dept: CARDIOLOGY | Facility: CLINIC | Age: 86
End: 2021-12-08
Payer: MEDICARE

## 2021-12-08 VITALS
HEIGHT: 58 IN | SYSTOLIC BLOOD PRESSURE: 120 MMHG | HEART RATE: 82 BPM | BODY MASS INDEX: 23.69 KG/M2 | WEIGHT: 112.88 LBS | DIASTOLIC BLOOD PRESSURE: 61 MMHG

## 2021-12-08 VITALS
HEIGHT: 58 IN | HEART RATE: 78 BPM | WEIGHT: 112.81 LBS | BODY MASS INDEX: 23.68 KG/M2 | SYSTOLIC BLOOD PRESSURE: 144 MMHG | OXYGEN SATURATION: 99 % | DIASTOLIC BLOOD PRESSURE: 72 MMHG

## 2021-12-08 VITALS
BODY MASS INDEX: 23.49 KG/M2 | DIASTOLIC BLOOD PRESSURE: 54 MMHG | WEIGHT: 112.38 LBS | SYSTOLIC BLOOD PRESSURE: 154 MMHG

## 2021-12-08 DIAGNOSIS — I50.42 CHRONIC COMBINED SYSTOLIC AND DIASTOLIC HEART FAILURE: ICD-10-CM

## 2021-12-08 DIAGNOSIS — M81.0 OSTEOPOROSIS, UNSPECIFIED OSTEOPOROSIS TYPE, UNSPECIFIED PATHOLOGICAL FRACTURE PRESENCE: ICD-10-CM

## 2021-12-08 DIAGNOSIS — I35.0 AORTIC VALVE STENOSIS, MODERATE: Primary | ICD-10-CM

## 2021-12-08 DIAGNOSIS — I49.5 SICK SINUS SYNDROME: ICD-10-CM

## 2021-12-08 DIAGNOSIS — I10 PRIMARY HYPERTENSION: ICD-10-CM

## 2021-12-08 DIAGNOSIS — M35.00 SJOGREN'S SYNDROME WITHOUT EXTRAGLANDULAR INVOLVEMENT: Primary | ICD-10-CM

## 2021-12-08 DIAGNOSIS — Q61.3 POLYCYSTIC KIDNEY: Primary | ICD-10-CM

## 2021-12-08 DIAGNOSIS — Z95.0 HISTORY OF CARDIAC PACEMAKER IN SITU: ICD-10-CM

## 2021-12-08 DIAGNOSIS — I25.118 CORONARY ARTERY DISEASE OF NATIVE ARTERY OF NATIVE HEART WITH STABLE ANGINA PECTORIS: ICD-10-CM

## 2021-12-08 DIAGNOSIS — I47.10 PSVT (PAROXYSMAL SUPRAVENTRICULAR TACHYCARDIA): ICD-10-CM

## 2021-12-08 PROCEDURE — 99999 PR PBB SHADOW E&M-EST. PATIENT-LVL V: CPT | Mod: PBBFAC,,, | Performed by: NURSE PRACTITIONER

## 2021-12-08 PROCEDURE — 99214 OFFICE O/P EST MOD 30 MIN: CPT | Mod: S$GLB,,, | Performed by: NURSE PRACTITIONER

## 2021-12-08 PROCEDURE — 99214 PR OFFICE/OUTPT VISIT, EST, LEVL IV, 30-39 MIN: ICD-10-PCS | Mod: S$GLB,,, | Performed by: NURSE PRACTITIONER

## 2021-12-08 PROCEDURE — 99213 PR OFFICE/OUTPT VISIT, EST, LEVL III, 20-29 MIN: ICD-10-PCS | Mod: S$PBB,,, | Performed by: NURSE PRACTITIONER

## 2021-12-08 PROCEDURE — 99999 PR PBB SHADOW E&M-EST. PATIENT-LVL V: ICD-10-PCS | Mod: PBBFAC,,, | Performed by: NURSE PRACTITIONER

## 2021-12-08 PROCEDURE — 99213 PR OFFICE/OUTPT VISIT, EST, LEVL III, 20-29 MIN: ICD-10-PCS | Mod: S$GLB,,, | Performed by: INTERNAL MEDICINE

## 2021-12-08 PROCEDURE — 99215 OFFICE O/P EST HI 40 MIN: CPT | Mod: PBBFAC,PN | Performed by: NURSE PRACTITIONER

## 2021-12-08 PROCEDURE — 99213 OFFICE O/P EST LOW 20 MIN: CPT | Mod: S$PBB,,, | Performed by: NURSE PRACTITIONER

## 2021-12-08 PROCEDURE — 99213 OFFICE O/P EST LOW 20 MIN: CPT | Mod: S$GLB,,, | Performed by: INTERNAL MEDICINE

## 2021-12-08 RX ORDER — AMLODIPINE BESYLATE 5 MG/1
5 TABLET ORAL NIGHTLY
Start: 2021-12-08 | End: 2022-03-03

## 2021-12-08 RX ORDER — METOPROLOL SUCCINATE 25 MG/1
25 TABLET, EXTENDED RELEASE ORAL DAILY
COMMUNITY
Start: 2021-11-09 | End: 2022-02-02 | Stop reason: SDUPTHER

## 2021-12-09 ENCOUNTER — PATIENT OUTREACH (OUTPATIENT)
Dept: ADMINISTRATIVE | Facility: HOSPITAL | Age: 86
End: 2021-12-09
Payer: MEDICARE

## 2021-12-12 LAB
BATTERY VOLTAGE (V): 3.2 V
IMPEDANCE RA LEAD (NATIVE): 684 OHMS
IMPEDANCE RA LEAD: 399 OHMS
P/R-WAVE RA LEAD (NATIVE): 2.8 MV
P/R-WAVE RA LEAD: 0.6 MV
THRESHOLD MS RA LEAD (NATIVE): 0.4 MS
THRESHOLD MS RA LEAD: 0.4 MS
THRESHOLD V RA LEAD (NATIVE): 0.75 V
THRESHOLD V RA LEAD: 0.75 V

## 2021-12-31 ENCOUNTER — EXTERNAL CHRONIC CARE MANAGEMENT (OUTPATIENT)
Dept: PRIMARY CARE CLINIC | Facility: CLINIC | Age: 86
End: 2021-12-31
Payer: MEDICARE

## 2021-12-31 PROCEDURE — 99490 CHRNC CARE MGMT STAFF 1ST 20: CPT | Mod: S$PBB,,, | Performed by: FAMILY MEDICINE

## 2021-12-31 PROCEDURE — 99490 CHRNC CARE MGMT STAFF 1ST 20: CPT | Mod: PBBFAC,PO | Performed by: FAMILY MEDICINE

## 2021-12-31 PROCEDURE — 99490 PR CHRONIC CARE MGMT, 1ST 20 MIN: ICD-10-PCS | Mod: S$PBB,,, | Performed by: FAMILY MEDICINE

## 2022-01-08 LAB
ALBUMIN SERPL-MCNC: 4.2 G/DL (ref 3.6–4.6)
ALBUMIN/GLOB SERPL: 1.8 {RATIO} (ref 1.2–2.2)
ALP SERPL-CCNC: 88 IU/L (ref 44–121)
ALT SERPL-CCNC: 14 IU/L (ref 0–32)
AST SERPL-CCNC: 26 IU/L (ref 0–40)
BILIRUB SERPL-MCNC: 0.2 MG/DL (ref 0–1.2)
BUN SERPL-MCNC: 24 MG/DL (ref 8–27)
BUN/CREAT SERPL: 21 (ref 12–28)
CALCIUM SERPL-MCNC: 9.3 MG/DL (ref 8.7–10.3)
CHLORIDE SERPL-SCNC: 98 MMOL/L (ref 96–106)
CO2 SERPL-SCNC: 29 MMOL/L (ref 20–29)
CREAT SERPL-MCNC: 1.17 MG/DL (ref 0.57–1)
GLOBULIN SER CALC-MCNC: 2.4 G/DL (ref 1.5–4.5)
GLUCOSE SERPL-MCNC: 96 MG/DL (ref 65–99)
POTASSIUM SERPL-SCNC: 4.4 MMOL/L (ref 3.5–5.2)
PROT SERPL-MCNC: 6.6 G/DL (ref 6–8.5)
SODIUM SERPL-SCNC: 141 MMOL/L (ref 134–144)

## 2022-01-11 ENCOUNTER — HOSPITAL ENCOUNTER (OUTPATIENT)
Dept: RADIOLOGY | Facility: HOSPITAL | Age: 87
Discharge: HOME | End: 2022-01-11
Attending: INTERNAL MEDICINE
Payer: MEDICARE

## 2022-01-11 DIAGNOSIS — M81.0 OSTEOPOROSIS, UNSPECIFIED OSTEOPOROSIS TYPE, UNSPECIFIED PATHOLOGICAL FRACTURE PRESENCE: ICD-10-CM

## 2022-01-11 PROCEDURE — 77080 DXA BONE DENSITY AXIAL: CPT | Mod: TC,PO

## 2022-01-13 ENCOUNTER — PATIENT MESSAGE (OUTPATIENT)
Dept: CARDIOLOGY | Facility: CLINIC | Age: 87
End: 2022-01-13
Payer: MEDICARE

## 2022-01-13 RX ORDER — MEGESTROL ACETATE 20 MG/1
40 TABLET ORAL DAILY
COMMUNITY
End: 2022-01-13 | Stop reason: SDUPTHER

## 2022-01-13 RX ORDER — MEGESTROL ACETATE 20 MG/1
20 TABLET ORAL DAILY
Qty: 30 TABLET | Refills: 2 | Status: SHIPPED | OUTPATIENT
Start: 2022-01-13 | End: 2022-04-11

## 2022-01-14 DIAGNOSIS — D63.8 CHRONIC DISEASE ANEMIA: Primary | ICD-10-CM

## 2022-01-17 ENCOUNTER — INFUSION (OUTPATIENT)
Dept: INFUSION THERAPY | Facility: HOSPITAL | Age: 87
End: 2022-01-17
Attending: INTERNAL MEDICINE
Payer: MEDICARE

## 2022-01-17 VITALS
DIASTOLIC BLOOD PRESSURE: 81 MMHG | BODY MASS INDEX: 23.62 KG/M2 | OXYGEN SATURATION: 97 % | HEIGHT: 58 IN | WEIGHT: 112.5 LBS | HEART RATE: 64 BPM | TEMPERATURE: 98 F | SYSTOLIC BLOOD PRESSURE: 159 MMHG | RESPIRATION RATE: 16 BRPM

## 2022-01-17 DIAGNOSIS — D50.9 IRON DEFICIENCY ANEMIA, UNSPECIFIED IRON DEFICIENCY ANEMIA TYPE: ICD-10-CM

## 2022-01-17 DIAGNOSIS — D63.8 CHRONIC DISEASE ANEMIA: Primary | ICD-10-CM

## 2022-01-17 PROCEDURE — 96365 THER/PROPH/DIAG IV INF INIT: CPT

## 2022-01-17 PROCEDURE — 25000003 PHARM REV CODE 250: Performed by: INTERNAL MEDICINE

## 2022-01-17 PROCEDURE — 63600175 PHARM REV CODE 636 W HCPCS: Mod: JG | Performed by: INTERNAL MEDICINE

## 2022-01-17 RX ADMIN — FERRIC CARBOXYMALTOSE INJECTION 750 MG: 50 INJECTION, SOLUTION INTRAVENOUS at 02:01

## 2022-01-17 NOTE — PLAN OF CARE
Problem: Fatigue  Goal: Improved Activity Tolerance  Outcome: Ongoing, Progressing  Intervention: Promote Improved Energy  Flowsheets (Taken 1/17/2022 1402)  Fatigue Management: frequent rest breaks encouraged  Sleep/Rest Enhancement:   regular sleep/rest pattern promoted   relaxation techniques promoted  Activity Management: Ambulated -L4

## 2022-01-25 ENCOUNTER — INFUSION (OUTPATIENT)
Dept: INFUSION THERAPY | Facility: HOSPITAL | Age: 87
End: 2022-01-25
Attending: INTERNAL MEDICINE
Payer: MEDICARE

## 2022-01-25 VITALS
OXYGEN SATURATION: 98 % | SYSTOLIC BLOOD PRESSURE: 149 MMHG | DIASTOLIC BLOOD PRESSURE: 86 MMHG | BODY MASS INDEX: 23.26 KG/M2 | WEIGHT: 110.81 LBS | HEIGHT: 58 IN | TEMPERATURE: 98 F | RESPIRATION RATE: 17 BRPM | HEART RATE: 67 BPM

## 2022-01-25 DIAGNOSIS — D63.8 CHRONIC DISEASE ANEMIA: Primary | ICD-10-CM

## 2022-01-25 DIAGNOSIS — D50.9 IRON DEFICIENCY ANEMIA, UNSPECIFIED IRON DEFICIENCY ANEMIA TYPE: ICD-10-CM

## 2022-01-25 PROCEDURE — 25000003 PHARM REV CODE 250: Performed by: INTERNAL MEDICINE

## 2022-01-25 PROCEDURE — 96365 THER/PROPH/DIAG IV INF INIT: CPT

## 2022-01-25 PROCEDURE — 63600175 PHARM REV CODE 636 W HCPCS: Mod: JG | Performed by: INTERNAL MEDICINE

## 2022-01-25 RX ADMIN — FERRIC CARBOXYMALTOSE INJECTION 750 MG: 50 INJECTION, SOLUTION INTRAVENOUS at 02:01

## 2022-01-25 NOTE — PLAN OF CARE
Problem: Fall Injury Risk  Goal: Absence of Fall and Fall-Related Injury  Outcome: Met     Problem: Fatigue  Goal: Improved Activity Tolerance  Outcome: Met     Problem: Breathing Pattern Ineffective  Goal: Effective Breathing Pattern  Outcome: Met

## 2022-02-02 RX ORDER — METOPROLOL SUCCINATE 25 MG/1
25 TABLET, EXTENDED RELEASE ORAL DAILY
Qty: 90 TABLET | Refills: 2 | Status: SHIPPED | OUTPATIENT
Start: 2022-02-02 | End: 2022-09-29 | Stop reason: SDUPTHER

## 2022-02-02 NOTE — TELEPHONE ENCOUNTER
----- Message from Jordan Garcia sent at 2/2/2022 11:58 AM CST -----  Contact: pt  Pt need her metoprolol succinate (TOPROL-XL) 25 MG 24 hr tablet refill sent in to pharm and call back at [phone]      [address] [address]  Phone: [phone] Fax: [phone]

## 2022-02-07 ENCOUNTER — TELEPHONE (OUTPATIENT)
Dept: CARDIOLOGY | Facility: CLINIC | Age: 87
End: 2022-02-07
Payer: MEDICARE

## 2022-02-07 ENCOUNTER — TELEPHONE (OUTPATIENT)
Dept: FAMILY MEDICINE | Facility: CLINIC | Age: 87
End: 2022-02-07
Payer: MEDICARE

## 2022-02-07 NOTE — TELEPHONE ENCOUNTER
----- Message from Jordan Garcia sent at 2/7/2022 11:43 AM CST -----  Contact: pt  Pt needing to rech appt on the 11th please call back to assist thanks [phone]  Thanks

## 2022-02-07 NOTE — TELEPHONE ENCOUNTER
Patient states she tested positive for Covid at the [facility] today. Reports experiencing hoarse voice, upset stomach, and one episode of watery diarrhea stool (symptoms started 2 days ago). States also experiencing weakness and fatigue; but she normally has these symptoms due to Rheumatology issues.   Patient is taking Zofran related to upset stomach/nausea. States she was advised by the [facility] to notify her PCP of Covid status due to the underlying health issues she has. Please advise. Thank you.

## 2022-02-07 NOTE — TELEPHONE ENCOUNTER
----- Message from Brennen Teresa sent at 2/7/2022 11:36 AM CST -----  Contact: Self  Type: Needs Medical Advice  Who Called:  Patient  Symptoms (please be specific):  COVID Positive, fatigue  Best Call Back Number: [phone]   Additional Information:  Called to speak with office regarding positive result.

## 2022-02-08 NOTE — TELEPHONE ENCOUNTER
Spoke to- advised on otc cough medication- Tylenol or Advil for fever. Maintain healthy diet and fluids. Go to ED if worsening or short of breath.

## 2022-02-08 NOTE — TELEPHONE ENCOUNTER
Maintain good hydration, she may use some Imodium to slow the diarrhea if needed.  Otherwise symptomatic relief for any cold symptoms that should arise.  Monoclonal antibody infusion is no longer available having lost its emergency use authorization.

## 2022-02-11 ENCOUNTER — TELEPHONE (OUTPATIENT)
Dept: CARDIOLOGY | Facility: CLINIC | Age: 87
End: 2022-02-11
Payer: MEDICARE

## 2022-02-11 NOTE — LETTER
2022    Cheyanne Gomes  [address] [facility] [address]             [facility] - Cardiology  [address], [address]  [facility] [address]  Phone: [phone]  Fax: [phone] Patient: Cheyanne Gomes  : 1935  Referring Doctor: Dr Conti  Type of Procedure: Endoscopic carpal tunnel release of the right wrist under general anesthesia    Current Outpatient Medications   Medication Sig    amLODIPine (NORVASC) 5 MG tablet Take 1 tablet (5 mg total) by mouth every evening.    aspirin 81 mg Tab Take 1 tablet by mouth every evening. Every day    atorvastatin (LIPITOR) 40 MG tablet Take 1 tablet (40 mg total) by mouth once daily.    BELBUCA 300 mcg Film Place 1 film between cheek AND gum TWICE DAILY.    biotin 5 mg Cap Take 1 tablet by mouth 2 (two) times daily.    CALCIUM CARB/VIT D3/MINERALS (CALCIUM-VITAMIN D ORAL) Take 600 mg by mouth 2 (two) times daily. Calcium 1200 with D 3 1000    CRANBERRY CONC/ASCORBIC ACID (CRANBERRY PLUS VITAMIN C ORAL) Take 1 capsule by mouth once daily.    cyanocobalamin 1,000 mcg/mL injection 1,000 mcg every 30 days.    DEXILANT 60 mg capsule Take 60 mg by mouth once daily.     ENTRESTO  mg per tablet TAKE ONE TABLET BY MOUTH TWICE DAILY    fluticasone (FLONASE) 50 mcg/actuation nasal spray 2 sprays by Each Nare route once daily. (Patient taking differently: 2 sprays by Each Nostril route daily as needed.)    folic acid (FOLVITE) 1 MG tablet Take 2 mg by mouth once daily.     furosemide (LASIX) 80 MG tablet TAKE ONE TABLET BY MOUTH EVERY DAY    gabapentin (NEURONTIN) 100 MG capsule Take 2 capsules (200 mg total) by mouth every evening.    HYDROmorphone (DILAUDID) 2 MG tablet Take 2 mg by mouth every 4 (four) hours as needed for Pain.    isosorbide dinitrate (ISORDIL) 10 MG tablet TAKE ONE TABLET BY MOUTH THREE TIMES DAILY    Lactobacillus acidophilus 10 billion cell Cap Take 1 each by mouth once daily. 1.5 billion    magnesium oxide  (MAG-OX) 400 mg (241.3 mg magnesium) tablet TAKE 1 TABLET BY MOUTH 2 TIMES A DAY    megestroL (MEGACE) 20 MG Tab Take 1 tablet (20 mg total) by mouth once daily.    megestroL (MEGACE) 400 mg/10 mL (40 mg/mL) Susp take 1 TABLESPOONFUL EVERY DAY    metoprolol succinate (TOPROL-XL) 25 MG 24 hr tablet Take 1 tablet (25 mg total) by mouth once daily.    MULTIVITAMIN WITH MINERALS (ONE-A-DAY 50 PLUS) Tab Take 1 tablet by mouth once daily. Every day    nitroGLYCERIN 0.4 MG/DOSE TL SPRY (NITROLINGUAL) 400 mcg/spray spray Place 1 spray under the tongue every 5 (five) minutes as needed for Chest pain. PRN    ondansetron (ZOFRAN) 4 MG tablet Take 4 mg by mouth every 8 (eight) hours as needed for Nausea.     potassium chloride SA (K-DUR,KLOR-CON) 10 MEQ tablet TAKE TWO TABLETS BY MOUTH EVERY MORNING AND 1 TABLET EVERY EVENING    promethazine (PHENERGAN) 25 MG tablet Take 1 tablet (25 mg total) by mouth 2 (two) times daily as needed for Nausea.    prucalopride (MOTEGRITY) 2 mg Tab Motegrity 2 mg tablet   Take 1 tablet every day by oral route as needed for 30 days.    RESTASIS 0.05 % ophthalmic emulsion Place 1 drop into both eyes 2 (two) times daily.    sertraline (ZOLOFT) 50 MG tablet TAKE 1 TABLET (50 MG TOTAL) BY MOUTH ONCE DAILY.    temazepam (RESTORIL) 7.5 MG Cap Take 15 mg by mouth nightly as needed.    traMADoL (ULTRAM) 50 mg tablet TAKE TWO TABLETS BY MOUTH TWICE DAILY AS NEEDED FOR SEVERE pain    vitamin E 400 unit Tab Take 400 mg by mouth once daily. Every day     Current Facility-Administered Medications   Medication    0.9%  NaCl infusion       This patient has been assessed for risk factors for clearance of surgery with the following stipulations:    ___ No contraindications  ___ Recommendations for antiplatelet/anticoagulant medications:  _x__ Cleared for surgery   ___ Not cleared for surgery due to the following reasons:      If you have any questions regarding the above, please contact my office at  [phone]    Sincerely,

## 2022-02-18 ENCOUNTER — INFUSION (OUTPATIENT)
Dept: INFUSION THERAPY | Facility: HOSPITAL | Age: 87
End: 2022-02-18
Attending: INTERNAL MEDICINE
Payer: MEDICARE

## 2022-02-18 VITALS
RESPIRATION RATE: 17 BRPM | DIASTOLIC BLOOD PRESSURE: 78 MMHG | HEART RATE: 91 BPM | OXYGEN SATURATION: 97 % | TEMPERATURE: 98 F | SYSTOLIC BLOOD PRESSURE: 164 MMHG

## 2022-02-18 DIAGNOSIS — M81.0 SENILE OSTEOPOROSIS: Primary | ICD-10-CM

## 2022-02-18 PROCEDURE — 63600175 PHARM REV CODE 636 W HCPCS: Mod: JG | Performed by: INTERNAL MEDICINE

## 2022-02-18 PROCEDURE — 96372 THER/PROPH/DIAG INJ SC/IM: CPT

## 2022-02-18 RX ADMIN — DENOSUMAB 60 MG: 60 INJECTION SUBCUTANEOUS at 03:02

## 2022-02-22 LAB
BNP SERPL-MCNC: 331.2 PG/ML (ref 0–100)
BUN SERPL-MCNC: 16 MG/DL (ref 8–27)
BUN/CREAT SERPL: 17 (ref 12–28)
CALCIUM SERPL-MCNC: 8.3 MG/DL (ref 8.7–10.3)
CHLORIDE SERPL-SCNC: 101 MMOL/L (ref 96–106)
CO2 SERPL-SCNC: 22 MMOL/L (ref 20–29)
CREAT SERPL-MCNC: 0.93 MG/DL (ref 0.57–1)
ERYTHROCYTE [DISTWIDTH] IN BLOOD BY AUTOMATED COUNT: 14.3 % (ref 11.7–15.4)
GLUCOSE SERPL-MCNC: 87 MG/DL (ref 65–99)
HCT VFR BLD AUTO: 41 % (ref 34–46.6)
HGB BLD-MCNC: 12.5 G/DL (ref 11.1–15.9)
MCH RBC QN AUTO: 27.9 PG (ref 26.6–33)
MCHC RBC AUTO-ENTMCNC: 30.5 G/DL (ref 31.5–35.7)
MCV RBC AUTO: 92 FL (ref 79–97)
PLATELET # BLD AUTO: 167 X10E3/UL (ref 150–450)
POTASSIUM SERPL-SCNC: 4 MMOL/L (ref 3.5–5.2)
RBC # BLD AUTO: 4.48 X10E6/UL (ref 3.77–5.28)
SODIUM SERPL-SCNC: 140 MMOL/L (ref 134–144)
TSH SERPL DL<=0.005 MIU/L-ACNC: 2.42 UIU/ML (ref 0.45–4.5)
WBC # BLD AUTO: 7.3 X10E3/UL (ref 3.4–10.8)

## 2022-02-23 DIAGNOSIS — D84.9 IMMUNOSUPPRESSED STATUS: ICD-10-CM

## 2022-02-25 ENCOUNTER — OFFICE VISIT (OUTPATIENT)
Dept: FAMILY MEDICINE | Facility: CLINIC | Age: 87
End: 2022-02-25
Payer: MEDICARE

## 2022-02-25 ENCOUNTER — HOSPITAL ENCOUNTER (OUTPATIENT)
Dept: RADIOLOGY | Facility: CLINIC | Age: 87
Discharge: HOME | End: 2022-02-25
Attending: STUDENT IN AN ORGANIZED HEALTH CARE EDUCATION/TRAINING PROGRAM
Payer: MEDICARE

## 2022-02-25 VITALS
SYSTOLIC BLOOD PRESSURE: 120 MMHG | WEIGHT: 108.94 LBS | DIASTOLIC BLOOD PRESSURE: 60 MMHG | BODY MASS INDEX: 22.87 KG/M2 | RESPIRATION RATE: 16 BRPM | HEART RATE: 64 BPM | TEMPERATURE: 99 F | OXYGEN SATURATION: 97 % | HEIGHT: 58 IN

## 2022-02-25 DIAGNOSIS — I10 PRIMARY HYPERTENSION: Primary | ICD-10-CM

## 2022-02-25 DIAGNOSIS — M54.2 POSTERIOR NECK PAIN: ICD-10-CM

## 2022-02-25 DIAGNOSIS — R42 DIZZINESS: ICD-10-CM

## 2022-02-25 DIAGNOSIS — M05.759: ICD-10-CM

## 2022-02-25 PROBLEM — R31.0 GROSS HEMATURIA: Status: RESOLVED | Noted: 2020-06-03 | Resolved: 2022-02-25

## 2022-02-25 PROCEDURE — 99215 OFFICE O/P EST HI 40 MIN: CPT | Mod: PBBFAC,PO | Performed by: STUDENT IN AN ORGANIZED HEALTH CARE EDUCATION/TRAINING PROGRAM

## 2022-02-25 PROCEDURE — 72040 XR CERVICAL SPINE AP LATERAL: ICD-10-PCS | Mod: 26,,, | Performed by: RADIOLOGY

## 2022-02-25 PROCEDURE — 72040 X-RAY EXAM NECK SPINE 2-3 VW: CPT | Mod: 26,,, | Performed by: RADIOLOGY

## 2022-02-25 PROCEDURE — 99215 PR OFFICE/OUTPT VISIT, EST, LEVL V, 40-54 MIN: ICD-10-PCS | Mod: S$PBB,,, | Performed by: STUDENT IN AN ORGANIZED HEALTH CARE EDUCATION/TRAINING PROGRAM

## 2022-02-25 PROCEDURE — 72040 X-RAY EXAM NECK SPINE 2-3 VW: CPT | Mod: TC,FY,PO

## 2022-02-25 PROCEDURE — 99215 OFFICE O/P EST HI 40 MIN: CPT | Mod: S$PBB,,, | Performed by: STUDENT IN AN ORGANIZED HEALTH CARE EDUCATION/TRAINING PROGRAM

## 2022-02-25 PROCEDURE — 99999 PR PBB SHADOW E&M-EST. PATIENT-LVL V: CPT | Mod: PBBFAC,,, | Performed by: STUDENT IN AN ORGANIZED HEALTH CARE EDUCATION/TRAINING PROGRAM

## 2022-02-25 PROCEDURE — 99999 PR PBB SHADOW E&M-EST. PATIENT-LVL V: ICD-10-PCS | Mod: PBBFAC,,, | Performed by: STUDENT IN AN ORGANIZED HEALTH CARE EDUCATION/TRAINING PROGRAM

## 2022-02-25 RX ORDER — TIZANIDINE 2 MG/1
TABLET ORAL
Qty: 30 TABLET | Refills: 0 | Status: ON HOLD | OUTPATIENT
Start: 2022-02-25 | End: 2023-02-06 | Stop reason: HOSPADM

## 2022-02-25 RX ORDER — LEVOCETIRIZINE DIHYDROCHLORIDE 5 MG/1
5 TABLET, FILM COATED ORAL DAILY
COMMUNITY
End: 2022-02-25

## 2022-02-25 NOTE — PATIENT INSTRUCTIONS
If you are due for any health screening(s) below please notify me so we can arrange them to be ordered and scheduled to maintain your health.     Health Maintenance   Topic Date Due    Lipid Panel  05/06/2022    TETANUS VACCINE  09/26/2022    Aspirin/Antiplatelet Therapy  02/25/2023

## 2022-02-25 NOTE — PROGRESS NOTES
Subjective:       Patient ID: Cheyanne Gomes is a 86 y.o. female.    Chief Complaint: Neck Pain (stiffness), Dizziness, and Fatigue    HPI:  86-YO F presents with neck pain (persistent and dull) and stiffness that started 1 week ago. She was originally having problems rotating her head to the left, but this has improved. She reports tightness when rotating her head and flexion/extension. Her pain is at the base of her skull and radiates down her arms bilaterally. She tried tylenol but it has not helped much. Denies fever, recent infection, injury, or numbness. She had COVID 3 weeks ago and had mild symptoms.     She reports a headache described as dull and throbbing at the base of her skull and moves to the top of her head. She reports using a heating pad which is comforting but does not relieve her headache. Denies vision changes, but she has a history of macular degeneration. She also reports dizziness that started with the neck pain. Denies any falls or balance problems.    She has chronic nausea (no vomiting) and chronic fatigue and weakness.   History of osteoporosis, compression fractures, osteoarthritis, and rheumatoid arthritis. She has Raynaud's but reports that she does not have problems with it.       Past Medical History:   Diagnosis Date    Abdominal hernia     Anemia     Dr Berkowitz     Angina pectoris     Aortic valve stenosis, moderate     Back pain     Cannon's esophagus     CAD (coronary artery disease)     Cataract     ou done    CHF (congestive heart failure)     EFx 35%, Dr. Spencer 12/19/13    Chronic combined systolic and diastolic heart failure 9/28/2019    Colitis     Compression fracture     Diverticulosis     Encounter for blood transfusion     GERD (gastroesophageal reflux disease)     Hyperlipidemia     Hypertension     Irritable bowel syndrome     Myocardial infarction     Osteoarthritis     lumbar DDD    Pneumonia 01/03/2017    Raynaud disease      Rheumatoid arteritis     Rheumatoid arthritis     Shingles 2017    Sjogren syndrome     Ulcerative colitis      Past Surgical History:   Procedure Laterality Date    A-V CARDIAC PACEMAKER INSERTION Left 2021    Procedure: INSERTION, CARDIAC PACEMAKER, DUAL CHAMBER  ([employer]);  Surgeon: Tera Blair MD;  Location: [facility] CATH/EP LAB;  Service: Cardiology;  Laterality: Left;    APPENDECTOMY      BACK SURGERY      CARDIAC SURGERY      CABGx3 in     CATARACT EXTRACTION      ou od d 11-15-12//     SECTION, CLASSIC      x 2    CHOLECYSTECTOMY      COLONOSCOPY  09/15/2011    Dr Anaya     COLONOSCOPY  01/10/2017    [facility] report sent to scanning    CORONARY ANGIOPLASTY      PCI x 1 in     CORONARY ARTERY BYPASS GRAFT      3 vessel    CORONARY ARTERY BYPASS GRAFT      CYSTOSCOPY N/A 6/3/2020    Procedure: CYSTOSCOPY;  Surgeon: Miryam Bender Jr., MD;  Location: [facility] OR;  Service: Urology;  Laterality: N/A;    eyes      rk ou    FRACTURE SURGERY  2016    Dr Guido left hip     FRACTURE SURGERY  2018    Right Hip    HYSTERECTOMY      tubal ligation, oopherectomy    INTRAMEDULLARY RODDING OF TROCHANTER OF FEMUR Right 10/8/2018    Procedure: INSERTION, INTRAMEDULLARY KELSI, FEMUR, TROCHANTER;  Surgeon: Valerio Luther MD;  Location: [facility] OR;  Service: Orthopedics;  Laterality: Right;    OOPHORECTOMY      SPINE SURGERY  2013    Silvino Mckeon    UPPER GASTROINTESTINAL ENDOSCOPY      Yag Capsulotomy Right 14     Review of patient's allergies indicates:   Allergen Reactions    Adhesive     Nitrofurantoin macrocrystalline Nausea And Vomiting     Other reaction(s): very sickly    Penicillins Swelling     Other reaction(s): swelling  Other reaction(s): red skin discolorati    Sulfa (sulfonamide antibiotics) Nausea And Vomiting     Other reaction(s): very sickly       She reports that she quit smoking about 51 years ago. She has a 5.00 pack-year smoking history. She  "has never used smokeless tobacco. She reports current alcohol use of about 1.0 standard drink of alcohol per week. She reports that she does not use drugs.    Her family history includes Arthritis in her brother; Crohn's disease in her brother, brother, and brother; Early death in her son; Fibromyalgia in her sister; Heart disease in her brother, father, and mother; Hypertension in her daughter, father, and sister; Irritable bowel syndrome in her sister; Lupus in her cousin and cousin; Pulmonary embolism in her sister; Scleroderma in her sister; Skin cancer in her mother.  Medications listed in plan.    Review of Systems   Constitutional: Negative for activity change, appetite change and fever.   HENT: Negative for ear discharge, ear pain, rhinorrhea, sinus pressure and sinus pain.    Respiratory: Negative for cough and shortness of breath.    Cardiovascular: Negative for chest pain and palpitations.   Gastrointestinal: Negative for abdominal pain, diarrhea, nausea and vomiting.   Musculoskeletal: Negative for myalgias.        Neck pain and stiffness  Hx of osteoarthritis and rheumatoid arthritis.    Skin: Negative for rash.   Neurological: Negative for dizziness, light-headedness and headaches.         Objective:      /60 (BP Location: Right arm, Patient Position: Sitting, BP Method: Small (Manual))   Pulse 64   Temp 99.1 °F (37.3 °C) (Oral)   Resp 16   Ht 4' 10" (1.473 m)   Wt 49.4 kg (108 lb 14.5 oz)   SpO2 97%   BMI 22.76 kg/m²     Wt Readings from Last 3 Encounters:   02/25/22 49.4 kg (108 lb 14.5 oz)   01/25/22 50.3 kg (110 lb 12.8 oz)   01/17/22 51 kg (112 lb 8 oz)     BP Readings from Last 3 Encounters:   02/25/22 120/60   02/18/22 (!) 164/78   01/25/22 (!) 149/86     Physical Exam  Vitals and nursing note reviewed.   Constitutional:       General: She is not in acute distress.  HENT:      Head: Normocephalic.      Right Ear: External ear normal.      Left Ear: External ear normal.   Eyes:      " Extraocular Movements: Extraocular movements intact.      Conjunctiva/sclera: Conjunctivae normal.   Cardiovascular:      Rate and Rhythm: Normal rate and regular rhythm.      Heart sounds: Normal heart sounds, S1 normal and S2 normal. No murmur heard.    No friction rub. No gallop.   Pulmonary:      Effort: Pulmonary effort is normal.      Breath sounds: Normal breath sounds.   Abdominal:      General: Abdomen is flat.      Palpations: Abdomen is soft.      Tenderness: There is no abdominal tenderness. There is no guarding.   Musculoskeletal:      Right lower leg: No edema.      Left lower leg: No edema.      Comments: Ambulates with a walker.    Neurological:      General: No focal deficit present.      Mental Status: She is alert and oriented to person, place, and time.   Psychiatric:         Attention and Perception: Attention normal.         Mood and Affect: Mood normal.         Behavior: Behavior normal.           Assessment:     1. Primary hypertension    2. Rheumatoid arthritis involving hip with positive rheumatoid factor, unspecified laterality    3. Posterior neck pain    4. Dizziness          Plan:     1. Primary hypertension  - /60 She is stable. Will continue to monitor condition and current medications.    2. Rheumatoid arthritis involving hip with positive rheumatoid factor, unspecified laterality  - She is stable. Will continue to monitor condition and current medications.    3. Posterior neck pain  - X-Ray Cervical Spine AP And Lateral; Future. Results showed degenerative changes   - Zanaflex TID PRN for pain. Be care operating machinery or driving. Be careful of falls.   - Contact clinic if pain worsens.     4. Dizziness    F/u with Dr. Alas 10-3-22.     Medication List with Changes/Refills   Current Medications    AMLODIPINE (NORVASC) 5 MG TABLET    Take 1 tablet (5 mg total) by mouth every evening.    ASPIRIN 81 MG TAB    Take 1 tablet by mouth every evening. Every day    ATORVASTATIN  (LIPITOR) 40 MG TABLET    Take 1 tablet (40 mg total) by mouth once daily.    BELBUCA 300 MCG FILM    Place 1 film between cheek AND gum TWICE DAILY.    BIOTIN 5 MG CAP    Take 1 tablet by mouth 2 (two) times daily.    CALCIUM CARB/VIT D3/MINERALS (CALCIUM-VITAMIN D ORAL)    Take 600 mg by mouth 2 (two) times daily. Calcium 1200 with D 3 1000    CRANBERRY CONC/ASCORBIC ACID (CRANBERRY PLUS VITAMIN C ORAL)    Take 1 capsule by mouth once daily.    CYANOCOBALAMIN 1,000 MCG/ML INJECTION    1,000 mcg every 30 days.    DEXILANT 60 MG CAPSULE    Take 60 mg by mouth once daily.     ENTRESTO  MG PER TABLET    TAKE ONE TABLET BY MOUTH TWICE DAILY    FLUTICASONE (FLONASE) 50 MCG/ACTUATION NASAL SPRAY    2 sprays by Each Nare route once daily.    FOLIC ACID (FOLVITE) 1 MG TABLET    Take 2 mg by mouth once daily.     FUROSEMIDE (LASIX) 80 MG TABLET    TAKE ONE TABLET BY MOUTH EVERY DAY    GABAPENTIN (NEURONTIN) 100 MG CAPSULE    Take 2 capsules (200 mg total) by mouth every evening.    HYDROMORPHONE (DILAUDID) 2 MG TABLET    Take 2 mg by mouth every 4 (four) hours as needed for Pain.    ISOSORBIDE DINITRATE (ISORDIL) 10 MG TABLET    TAKE ONE TABLET BY MOUTH THREE TIMES DAILY    LACTOBACILLUS ACIDOPHILUS 10 BILLION CELL CAP    Take 1 each by mouth once daily. 1.5 billion    MAGNESIUM OXIDE (MAG-OX) 400 MG (241.3 MG MAGNESIUM) TABLET    TAKE 1 TABLET BY MOUTH 2 TIMES A DAY    MEGESTROL (MEGACE) 20 MG TAB    Take 1 tablet (20 mg total) by mouth once daily.    METOPROLOL SUCCINATE (TOPROL-XL) 25 MG 24 HR TABLET    Take 1 tablet (25 mg total) by mouth once daily.    MULTIVITAMIN WITH MINERALS (ONE-A-DAY 50 PLUS) TAB    Take 1 tablet by mouth once daily. Every day    NITROGLYCERIN 0.4 MG/DOSE TL SPRY (NITROLINGUAL) 400 MCG/SPRAY SPRAY    Place 1 spray under the tongue every 5 (five) minutes as needed for Chest pain. PRN    ONDANSETRON (ZOFRAN) 4 MG TABLET    Take 4 mg by mouth every 8 (eight) hours as needed for Nausea.      POTASSIUM CHLORIDE SA (K-DUR,KLOR-CON) 10 MEQ TABLET    TAKE TWO TABLETS BY MOUTH EVERY MORNING AND 1 TABLET EVERY EVENING    PROMETHAZINE (PHENERGAN) 25 MG TABLET    Take 1 tablet (25 mg total) by mouth 2 (two) times daily as needed for Nausea.    PRUCALOPRIDE (MOTEGRITY) 2 MG TAB    Motegrity 2 mg tablet   Take 1 tablet every day by oral route as needed for 30 days.    RESTASIS 0.05 % OPHTHALMIC EMULSION    Place 1 drop into both eyes 2 (two) times daily.    SERTRALINE (ZOLOFT) 50 MG TABLET    TAKE 1 TABLET (50 MG TOTAL) BY MOUTH ONCE DAILY.    TRAMADOL (ULTRAM) 50 MG TABLET    TAKE TWO TABLETS BY MOUTH TWICE DAILY AS NEEDED FOR SEVERE pain    VITAMIN E 400 UNIT TAB    Take 400 mg by mouth once daily. Every day   Discontinued Medications    LEVOCETIRIZINE (XYZAL) 5 MG TABLET    Take 5 mg by mouth once daily.    MEGESTROL (MEGACE) 400 MG/10 ML (40 MG/ML) SUSP    take 1 TABLESPOONFUL EVERY DAY    TEMAZEPAM (RESTORIL) 7.5 MG CAP    Take 15 mg by mouth nightly as needed.     Modified Medications    No medications on file     Medication Monitoring    I discussed imaging findings, diagnosis, possibilities, treatment options, medications, risks, and benefits. She had many questions regarding the options and long-term effects. All questions were answered. She expressed understanding after counseling regarding the diagnosis and recommendations. She was capable and demonstrated competence with understanding of these options. Shared decision making was performed resulting in her choosing the current treatment plan. Patient handout was given with instructions and recommendations. Advised the patient that if they become pregnant to alert us immediately to assess for medication changes.     I also discussed the importance of close follow up to discuss labs, change or modify her medications if needed, monitor side effects, and further evaluation of medical problems.       Pilar Benz PA-C  Family Medicine Physician Assistant        02/25/2022     If you are due for any health screening(s) below please notify me so we can arrange them to be ordered and scheduled to maintain your health.     Health Maintenance Due   Topic Date Due    Shingles Vaccine (2 of 3) 10/23/2009       I spent a total of 46 minutes on the day of the visit.This includes face to face time and non-face to face time preparing to see the patient (eg, review of tests), obtaining and/or reviewing separately obtained history, documenting clinical information in the electronic or other health record, independently interpreting results and communicating results to the patient/family/caregiver, or care coordinator.    We have addressed [4] Moderate: 1 or more chronic illnesses with exacerbation, progression, or side effects of treatment / 2 or more stable chronic illnesses / 1 undiagnosed new problem with uncertain prognosis / 1 acute illness with systemic symptoms / 1 acute complicated injury  The complexity of the data reviewed and analyzed for this visit was [4] Moderate (Reviewed prior external note, ordered unique testing and reviewed the results of each unique test / Independently interpreted a test / Discussed management or interpretation of a test with another provider)  The risk of complications and/or morbidity or mortality are [4] Moderate risk (I.e. prescription drug management / decision regarding minor surgery with identified pt or procedure risk factors / decision regarding elective major surgery without identified pt or procedure risk factors / diagnosis or treatment significantly limited by social determinants of health)   The level of Medical Decision Making for this visit is [4] Moderate

## 2022-02-28 ENCOUNTER — TELEPHONE (OUTPATIENT)
Dept: FAMILY MEDICINE | Facility: CLINIC | Age: 87
End: 2022-02-28
Payer: MEDICARE

## 2022-02-28 ENCOUNTER — OFFICE VISIT (OUTPATIENT)
Dept: CARDIOLOGY | Facility: CLINIC | Age: 87
End: 2022-02-28
Payer: MEDICARE

## 2022-02-28 ENCOUNTER — EXTERNAL CHRONIC CARE MANAGEMENT (OUTPATIENT)
Dept: PRIMARY CARE CLINIC | Facility: CLINIC | Age: 87
End: 2022-02-28
Payer: MEDICARE

## 2022-02-28 VITALS
DIASTOLIC BLOOD PRESSURE: 76 MMHG | SYSTOLIC BLOOD PRESSURE: 128 MMHG | BODY MASS INDEX: 22.46 KG/M2 | WEIGHT: 107 LBS | HEIGHT: 58 IN | OXYGEN SATURATION: 95 % | HEART RATE: 68 BPM

## 2022-02-28 DIAGNOSIS — I25.118 CORONARY ARTERY DISEASE OF NATIVE ARTERY OF NATIVE HEART WITH STABLE ANGINA PECTORIS: ICD-10-CM

## 2022-02-28 DIAGNOSIS — R00.2 PALPITATIONS: Primary | ICD-10-CM

## 2022-02-28 DIAGNOSIS — I50.42 CHRONIC COMBINED SYSTOLIC AND DIASTOLIC HEART FAILURE: ICD-10-CM

## 2022-02-28 DIAGNOSIS — I35.0 AORTIC VALVE STENOSIS, MODERATE: ICD-10-CM

## 2022-02-28 DIAGNOSIS — Z95.0 HISTORY OF CARDIAC PACEMAKER IN SITU: ICD-10-CM

## 2022-02-28 DIAGNOSIS — I47.10 PSVT (PAROXYSMAL SUPRAVENTRICULAR TACHYCARDIA): ICD-10-CM

## 2022-02-28 PROCEDURE — 93000 EKG 12-LEAD: ICD-10-PCS | Mod: S$GLB,,, | Performed by: INTERNAL MEDICINE

## 2022-02-28 PROCEDURE — 99214 PR OFFICE/OUTPT VISIT, EST, LEVL IV, 30-39 MIN: ICD-10-PCS | Mod: S$GLB,,,

## 2022-02-28 PROCEDURE — 93000 ELECTROCARDIOGRAM COMPLETE: CPT | Mod: S$GLB,,, | Performed by: INTERNAL MEDICINE

## 2022-02-28 PROCEDURE — 99214 OFFICE O/P EST MOD 30 MIN: CPT | Mod: S$GLB,,,

## 2022-02-28 PROCEDURE — 99490 PR CHRONIC CARE MGMT, 1ST 20 MIN: ICD-10-PCS | Mod: S$PBB,,, | Performed by: FAMILY MEDICINE

## 2022-02-28 PROCEDURE — 99490 CHRNC CARE MGMT STAFF 1ST 20: CPT | Mod: PBBFAC,PO | Performed by: FAMILY MEDICINE

## 2022-02-28 PROCEDURE — 99490 CHRNC CARE MGMT STAFF 1ST 20: CPT | Mod: S$PBB,,, | Performed by: FAMILY MEDICINE

## 2022-02-28 NOTE — TELEPHONE ENCOUNTER
I sent in zanaflex which is a muscle relaxer to help your pain. Your xray did not show any fractures.     Hope this helps!     Pilar Benz PA-C

## 2022-02-28 NOTE — ASSESSMENT & PLAN NOTE
Will obtain updated echo before carpal tunnel release   Clinically stable at this time   Recent BMP and BNP reviewed   Cont lasix, isordil, toprol, entresto   Low Na diet

## 2022-02-28 NOTE — ASSESSMENT & PLAN NOTE
Now having intermittent palpitations   Last device interrogation on 12/7:  Conclusion       · 1. Normal device function.  · 2. Five AT/AF episodes detected. Far field over sensing noted.  · 3. Three NST episodes. Longest lasting two seconds. Fastest episode was 167 bpm.  · 4. Decreased A&V outputs. 1.5v/.0.4ms and 2.0v/0.4ms.  · 5. Order Carelink monitor.  · 6. RTC: Six months.    Cont Mag and Toprol   Home check next week, will follow up  May need DOAC

## 2022-02-28 NOTE — PROGRESS NOTES
Subjective:    Patient ID:  Cheyanne Gomes is a 86 y.o. female patient here for evaluation Establish Care (Follow up 2 months ) and Palpitations (Needs cardiac clearance , corporal  tunnel l surgery RIGHT hand . Dr Shirley )      History of Present Illness:   Patient is here today for a follow up. She has been having some intermittent palpitations mostly at night. They resolve on their own after a few seconds. No chest pain, SOB, dizziness, lightheadedness, syncope, neck/arm/jaw pain, or blood in her urine or stool. She is seeking surgical clearance for carpal tunnel release soon, this does not require general anesthesia, just light sedation.     Review of patient's allergies indicates:   Allergen Reactions    Adhesive     Nitrofurantoin macrocrystalline Nausea And Vomiting     Other reaction(s): very sickly    Penicillins Swelling     Other reaction(s): swelling  Other reaction(s): red skin discolorati    Sulfa (sulfonamide antibiotics) Nausea And Vomiting     Other reaction(s): very sickly       Past Medical History:   Diagnosis Date    Abdominal hernia     Anemia     Dr Berkowitz     Angina pectoris     Aortic valve stenosis, moderate     Back pain     Cannon's esophagus     CAD (coronary artery disease)     Cataract     ou done    CHF (congestive heart failure)     EFx 35%, Dr. Spencer 12/19/13    Chronic combined systolic and diastolic heart failure 9/28/2019    Colitis     Compression fracture     Diverticulosis     Encounter for blood transfusion     GERD (gastroesophageal reflux disease)     Hyperlipidemia     Hypertension     Irritable bowel syndrome     Myocardial infarction     Osteoarthritis     lumbar DDD    Pneumonia 01/03/2017    Raynaud disease     Rheumatoid arteritis     Rheumatoid arthritis     Shingles 01/03/2017    Sjogren syndrome     Ulcerative colitis      Past Surgical History:   Procedure Laterality Date    A-V CARDIAC PACEMAKER INSERTION Left  2021    Procedure: INSERTION, CARDIAC PACEMAKER, DUAL CHAMBER  ([employer]);  Surgeon: Tera Blair MD;  Location: [facility] CATH/EP LAB;  Service: Cardiology;  Laterality: Left;    APPENDECTOMY      BACK SURGERY      CARDIAC SURGERY      CABGx3 in     CATARACT EXTRACTION      ou od d 11-15-12//     SECTION, CLASSIC      x 2    CHOLECYSTECTOMY      COLONOSCOPY  09/15/2011    Dr Anaya     COLONOSCOPY  01/10/2017    [facility] report sent to scanning    CORONARY ANGIOPLASTY      PCI x 1 in     CORONARY ARTERY BYPASS GRAFT      3 vessel    CORONARY ARTERY BYPASS GRAFT      CYSTOSCOPY N/A 6/3/2020    Procedure: CYSTOSCOPY;  Surgeon: Miryam Bender Jr., MD;  Location: [facility];  Service: Urology;  Laterality: N/A;    eyes      rk ou    FRACTURE SURGERY  2016    Dr Guido left hip     FRACTURE SURGERY      Right Hip    HYSTERECTOMY      tubal ligation, oopherectomy    INTRAMEDULLARY RODDING OF TROCHANTER OF FEMUR Right 10/8/2018    Procedure: INSERTION, INTRAMEDULLARY KELSI, FEMUR, TROCHANTER;  Surgeon: Valerio Luther MD;  Location: [facility] OR;  Service: Orthopedics;  Laterality: Right;    OOPHORECTOMY      SPINE SURGERY  2013    Silvino Sandsen    UPPER GASTROINTESTINAL ENDOSCOPY      Yag Capsulotomy Right 14     Social History     Tobacco Use    Smoking status: Former Smoker     Packs/day: 1.00     Years: 5.00     Pack years: 5.00     Quit date: 1971     Years since quittin.1    Smokeless tobacco: Never Used   Substance Use Topics    Alcohol use: Yes     Alcohol/week: 1.0 standard drink     Types: 1 Glasses of wine per week    Drug use: Never        Review of Systems:    As noted in HPI in addition      REVIEW OF SYSTEMS  CARDIOVASCULAR: No recent chest pain, palpitations, arm, neck, or jaw pain  RESPIRATORY: No recent fever, cough chills, SOB or congestion  : No blood in the urine  GI: No Nausea, vomiting, constipation, diarrhea, blood, or  reflux.  MUSCULOSKELETAL: No myalgias  NEURO: No lightheadedness or dizziness  EYES: No Double vision, blurry, vision or headache              Objective        Vitals:    02/28/22 1331   BP: 128/76   Pulse: 68       LIPIDS - LAST 2   Lab Results   Component Value Date    CHOL 113 05/06/2021    CHOL 135 11/24/2020    HDL 47 05/06/2021    HDL 61 11/24/2020    LDLCALC 51 05/06/2021    LDLCALC 55 11/24/2020    TRIG 70 05/06/2021    TRIG 108 11/24/2020    CHOLHDL 26.2 08/24/2020    CHOLHDL 45.0 10/16/2013       CBC - LAST 2  Lab Results   Component Value Date    WBC 7.3 02/21/2022    WBC 5.27 11/05/2021    RBC 4.48 02/21/2022    RBC 3.44 (L) 11/05/2021    HGB 12.5 02/21/2022    HGB 10.4 (L) 11/05/2021    HCT 41.0 02/21/2022    HCT 32.4 (L) 11/05/2021    MCV 92 02/21/2022    MCV 94 11/05/2021    MCH 27.9 02/21/2022    MCH 30.2 11/05/2021    MCHC 30.5 (L) 02/21/2022    MCHC 32.1 11/05/2021    RDW 14.3 02/21/2022    RDW 13.3 11/05/2021     02/21/2022     (L) 11/05/2021    MPV 9.7 11/05/2021    MPV 9.4 05/12/2021    GRAN 4.2 09/24/2020    GRAN 71.0 09/24/2020    LYMPH 1.1 08/10/2021    LYMPH 0.8 07/09/2021    MONO 10 08/10/2021    MONO 0.6 08/10/2021    BASO 0.0 08/10/2021    BASO 0.0 07/09/2021    NRBC 0 09/24/2020    NRBC 0 08/24/2020       CHEMISTRY & LIVER FUNCTION - LAST 2  Lab Results   Component Value Date     02/21/2022     01/07/2022    K 4.0 02/21/2022    K 4.4 01/07/2022     02/21/2022    CL 98 01/07/2022    CO2 22 02/21/2022    CO2 29 01/07/2022    ANIONGAP 9 11/05/2021    ANIONGAP 10 06/21/2021    BUN 16 02/21/2022    BUN 24 01/07/2022    CREATININE 0.93 02/21/2022    CREATININE 1.17 (H) 01/07/2022    GLU 87 02/21/2022    GLU 96 01/07/2022    CALCIUM 8.3 (L) 02/21/2022    CALCIUM 9.3 01/07/2022    MG 1.9 10/04/2019    MG 2.0 10/03/2019    ALBUMIN 4.2 01/07/2022    ALBUMIN 4.4 08/09/2021    PROT 7.4 06/21/2021    PROT 7.8 05/12/2021    ALKPHOS 151 (H) 06/21/2021    ALKPHOS 96  05/12/2021    ALT 14 01/07/2022    ALT 18 08/09/2021    AST 26 01/07/2022    AST 25 08/09/2021    BILITOT 0.2 01/07/2022    BILITOT 0.2 08/09/2021        CARDIAC PROFILE - LAST 2  Lab Results   Component Value Date    .2 (H) 02/21/2022     (H) 11/05/2021     10/02/2019     (H) 10/01/2019    CPKMB 8.9 (H) 10/02/2019    CPKMB 7.3 (H) 10/01/2019    TROPONINI 0.417 (HH) 10/02/2019    TROPONINI 0.434 (HH) 10/01/2019        COAGULATION - LAST 2  Lab Results   Component Value Date    LABPT 13.1 10/01/2019    LABPT 13.3 09/27/2019    INR 1.0 10/01/2019    INR 1.1 09/27/2019    APTT 31.8 10/01/2019       ENDOCRINE & PSA - LAST 2  Lab Results   Component Value Date    HGBA1C 5.3 02/26/2015    HGBA1C 5.5 10/16/2013    TSH 2.420 02/21/2022    TSH 1.707 06/18/2019        ECHOCARDIOGRAM RESULTS  Results for orders placed during the hospital encounter of 12/12/19    Cardiac MRI Morphology    Narrative  Cardiovascular Magnetic Resonance Study Report    Patient Name:  BESSY BROOKE Institution:  [facility]  Patient Sex:  F Study Date:  2019-12-12  YOB: 1935 Study Description:  MRI CARDIAC MORPHOLOGY FUNCTION W WO  Age:  84 Y. 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 T1 weighted dark blood without contrast  2. Cine SSFP  3. Phase Contrast CMR flow measurements  4. Late gadolinium enhancement utilizing 20 cc of Gadolinium (Multihance)  5. Perfusion  6. Mapping      Study Quality: adequate    Rhythm and HR: normal, 70s    Breathhold quality: adequate    Interpretation:  Left Ventricle:  The left ventricular volumes are increased.  The calculated LVEF is 30 %. Akinesis of the apical (septum, inferior anterior and lateral wall) and the mid  septum. There is no thrombus noted.  The maximum wall thickness in the basal and mid myocardium of the LV in the SAX measures 15-16mm in the septum and anterior wall.  LVEDV:   154 cc  LVEDV:  118 cc/m2 for BSA (nl 51-95)  LVESV:    109 cc  LVESV:    83 cc/m2 for BSA (nl 11-35)  LVEF:  30 %  LV mass: 168 g/m2 (nl 34-70)  Stroke volume: 46 cc by ventricular trace  Stroke volume: 30 cc by Q-flow analysis  Regurgitant volume: 3 cc by Q-flow analysis  Total volume through the aortic valve: 33 cc by Q-flow analysis    Right Ventricle:  The right ventricular systolic volume is increased  The calculated RVEF is 35 %.    RVEDV:   120 cc  RVEDV:  92 cc/m2 for BSA (nl )  RVESV:   78 cc  RVESV:   60  cc/m2 for BSA (nl 6-54)  RVEF:  35 %  Stroke volume:  42 cc by ventricular trace  Stroke volume: 28 cc by Q-flow analysis  Regurgitant volume: 4 cc by Q-flow analysis  Total volume through the pulmonary valve: 32 cc by Q-flow analysis    Atria:  a.  Right:  The right atrium is normal in size with an area of 14 cm2.  b.  Left:  The left atrium is enlarged in size. Volume measures 46 cc/m2 by area length method.    Cardiac Valves:  a.  Mitral:  The mitral valve is structurally normal.  MR noted  b.  Aortic:  The aortic valve is sclerotic with restricted motion.  c.  Pulmonic:  The pulmonic valve is not well seen  d.  Tricuspid:  The tricuspid valve is structurally normal.    Pulmonic vasculature  Axial   RPA:  19 mm  LPA:  21 mm  MPA: 28 mm    Aorta  Axial   Ascending aorta:  32 mm  Descending aorta: 25 mm  Aortic arch:  Left sided    Cine 3 chamber and paracoronal  LVOT: 20 mm  Sinus of Valsalva: 28 mm  Sinotubular junction: 23 mm    Mapping  T1 map : 994 ± 30  T2 map: 48 ± 6  T2 star Map: 40.60 ± 2.49 ms R²=0.978 [Fe] = 0.49 ± 0.04 mg/g dw  Please note that clinically important iron loading is defined by T2*<20ms and severe cardiac iron loading is considered present if the T2* <10ms    Late hyperenhancement: There  is patchy LGE noted in the basal and mid myocardium (seen in patients with HCM) with transmural LGE noted on the entire apex, and mid inferoseptum (seen in patients with CAD)  Total LGE mass is 29% of the myocardium    Pericardium  Effusion: no effusion    Pleura: bilateral pleural effusion left > right    Conclusion:  1. Increased LV volume with LVEF of 30%.  Akinesis of the apical (septum, inferior anterior and lateral wall) and the mid septum. There is no thrombus noted.  2. The maximum wall thickness in the basal and mid myocardium of the LV in the SAX measures 15-16mm in the septum and anterior wall. Suggestive of HCM  3. There is patchy LGE noted in the basal and mid myocardium (seen in patients with HCM) with transmural LGE noted on the entire apex, and mid inferoseptum (seen in patients with CAD). Total LGE mass is 29% of the myocardium  4. Increased RV systolic volume with RVEF of 35%.  5. LAE  6. AV sclerosis with restricted motion        Review imaging tab for radiology portion of the read.      CURRENT/PREVIOUS VISIT EKG  Results for orders placed or performed during the hospital encounter of 11/08/21   EKG 12-lead    Collection Time: 11/08/21  1:13 PM    Narrative    Test Reason : I49.9,    Vent. Rate : 069 BPM     Atrial Rate : 068 BPM     P-R Int : 236 ms          QRS Dur : 100 ms      QT Int : 410 ms       P-R-T Axes : -24 000 237 degrees     QTc Int : 439 ms    Atrial-paced rhythm with prolonged AV conduction  LVH with repolarization abnormality  Abnormal ECG  When compared with ECG of 11-JUN-2021 10:15,  Electronic atrial pacemaker has replaced Sinus rhythm  QRS duration has increased  Criteria for Lateral infarct are no longer Present  Criteria for Inferior infarct are no longer Present  Inverted T waves have replaced nonspecific T wave abnormality in Inferior  leads  T wave inversion more evident in Anterior leads  Confirmed by Naman De Santiago MD ([id number]) on 11/11/2021 11:19:41 AM    Referred By:              Confirmed By:Naman De Santiago MD     No valid procedures specified.   No results found for this or any previous visit.    No valid procedures specified.    PHYSICAL EXAM  CONSTITUTIONAL: Well built, well nourished in no apparent distress  NECK: no carotid bruit, no JVD  LUNGS: CTA  CHEST WALL: no tenderness  HEART: regular rate and rhythm, S1, S2 normal, grade 3/6 systolic murmur   ABDOMEN: soft, non-tender; bowel sounds normal; no masses,  no organomegaly  EXTREMITIES: Extremities normal, no edema, no calf tenderness noted  NEURO: AAO X 3    I HAVE REVIEWED :    The vital signs, nurses notes, and all the pertinent radiology and labs.        Current Outpatient Medications   Medication Instructions    amLODIPine (NORVASC) 5 mg, Oral, Nightly    aspirin 81 mg Tab 1 tablet, Oral, Nightly, Every day    atorvastatin (LIPITOR) 40 mg, Oral, Daily    BELBUCA 300 mcg Film Place 1 film between cheek AND gum TWICE DAILY.    biotin 5 mg Cap 1 tablet, Oral, 2 times daily    CALCIUM CARB/VIT D3/MINERALS (CALCIUM-VITAMIN D ORAL) 600 mg, Oral, 2 times daily, Calcium 1200 with D 3 1000    CRANBERRY CONC/ASCORBIC ACID (CRANBERRY PLUS VITAMIN C ORAL) 1 capsule, Oral, Daily    cyanocobalamin 1,000 mcg, Every 30 days    DEXILANT 60 mg, Oral, Daily    ENTRESTO  mg per tablet TAKE ONE TABLET BY MOUTH TWICE DAILY    fluticasone (FLONASE) 50 mcg/actuation nasal spray 2 sprays, Each Nostril, Daily    folic acid (FOLVITE) 2 mg, Oral, Daily    furosemide (LASIX) 80 MG tablet TAKE ONE TABLET BY MOUTH EVERY DAY    gabapentin (NEURONTIN) 200 mg, Oral, Nightly    HYDROmorphone (DILAUDID) 2 mg, Oral, Every 4 hours PRN    isosorbide dinitrate (ISORDIL) 10 MG tablet TAKE ONE TABLET BY MOUTH THREE TIMES DAILY    Lactobacillus acidophilus 10 billion cell Cap 1 each, Oral, Daily, 1.5 billion    magnesium oxide (MAG-OX) 400 mg (241.3 mg magnesium) tablet TAKE 1 TABLET BY MOUTH 2 TIMES A DAY    megestroL (MEGACE) 20 mg,  Oral, Daily    metoprolol succinate (TOPROL-XL) 25 mg, Oral, Daily    MULTIVITAMIN WITH MINERALS (ONE-A-DAY 50 PLUS) Tab 1 tablet, Oral, Daily, Every day    nitroGLYCERIN 0.4 MG/DOSE TL SPRY (NITROLINGUAL) 400 mcg/spray spray 1 spray, Sublingual, Every 5 min PRN, PRN    ondansetron (ZOFRAN) 4 mg, Oral, Every 8 hours PRN    potassium chloride SA (K-DUR,KLOR-CON) 10 MEQ tablet TAKE TWO TABLETS BY MOUTH EVERY MORNING AND 1 TABLET EVERY EVENING    promethazine (PHENERGAN) 25 mg, Oral, 2 times daily PRN    prucalopride (MOTEGRITY) 2 mg Tab Motegrity 2 mg tablet   Take 1 tablet every day by oral route as needed for 30 days.    RESTASIS 0.05 % ophthalmic emulsion 1 drop, Both Eyes, 2 times daily    sertraline (ZOLOFT) 50 MG tablet TAKE 1 TABLET (50 MG TOTAL) BY MOUTH ONCE DAILY.    tiZANidine (ZANAFLEX) 2 MG tablet TID PRN    traMADoL (ULTRAM) 50 mg tablet TAKE TWO TABLETS BY MOUTH TWICE DAILY AS NEEDED FOR SEVERE pain    vitamin E 400 mg, Oral, Daily, Every day      In office ekg shows a paced rhythm, unchanged from previous     Assessment & Plan     History of cardiac pacemaker in situ  Last device interrogation showed AF/AT episodes   Has home interrogation in early March- will follow up, if further AF/AT episodes needs to be placed on DOAC   Currently only on ASA   Device function WNL     Chronic combined systolic and diastolic heart failure  Will obtain updated echo before carpal tunnel release   Clinically stable at this time   Recent BMP and BNP reviewed   Cont lasix, isordil, toprol, entresto   Low Na diet     Coronary artery disease of native artery of native heart with stable angina pectoris  NO angial equivalents noted   Cont ASA and statin therapy     Aortic valve stenosis, moderate  Repeat echo     PSVT (paroxysmal supraventricular tachycardia)  Now having intermittent palpitations   Last device interrogation on 12/7:  Conclusion       · 1. Normal device function.  · 2. Five AT/AF episodes  detected. Far field over sensing noted.  · 3. Three NST episodes. Longest lasting two seconds. Fastest episode was 167 bpm.  · 4. Decreased A&V outputs. 1.5v/.0.4ms and 2.0v/0.4ms.  · 5. Order Carelink monitor.  · 6. RTC: Six months.    Cont Mag and Toprol   Home check next week, will follow up  May need DOAC           Follow up in about 3 months (around 5/28/2022).

## 2022-02-28 NOTE — ASSESSMENT & PLAN NOTE
Last device interrogation showed AF/AT episodes   Has home interrogation in early March- will follow up, if further AF/AT episodes needs to be placed on DOAC   Currently only on ASA   Device function WNL

## 2022-03-14 ENCOUNTER — TELEPHONE (OUTPATIENT)
Dept: CARDIOLOGY | Facility: CLINIC | Age: 87
End: 2022-03-14
Payer: MEDICARE

## 2022-03-14 DIAGNOSIS — Z95.0 HISTORY OF CARDIAC PACEMAKER IN SITU: Primary | ICD-10-CM

## 2022-03-14 DIAGNOSIS — I49.5 SICK SINUS SYNDROME: ICD-10-CM

## 2022-03-14 NOTE — TELEPHONE ENCOUNTER
----- Message from Iglesia Alarcon sent at 3/14/2022 10:22 AM CDT -----  Contact: pt  Asking to see if she can come in tomorrow to get a pacemaker check, pt knows a [employer] rep will be in and would like and appt to get it checked out please call back pt.    [phone]

## 2022-03-15 DIAGNOSIS — I35.0 AORTIC VALVE STENOSIS, MODERATE: Primary | ICD-10-CM

## 2022-03-16 ENCOUNTER — PATIENT MESSAGE (OUTPATIENT)
Dept: CARDIOLOGY | Facility: CLINIC | Age: 87
End: 2022-03-16
Payer: MEDICARE

## 2022-03-17 ENCOUNTER — PATIENT MESSAGE (OUTPATIENT)
Dept: CARDIOLOGY | Facility: CLINIC | Age: 87
End: 2022-03-17
Payer: MEDICARE

## 2022-03-31 ENCOUNTER — EXTERNAL CHRONIC CARE MANAGEMENT (OUTPATIENT)
Dept: PRIMARY CARE CLINIC | Facility: CLINIC | Age: 87
End: 2022-03-31
Payer: MEDICARE

## 2022-03-31 PROCEDURE — 99490 CHRNC CARE MGMT STAFF 1ST 20: CPT | Mod: S$PBB,,, | Performed by: FAMILY MEDICINE

## 2022-03-31 PROCEDURE — 99490 PR CHRONIC CARE MGMT, 1ST 20 MIN: ICD-10-PCS | Mod: S$PBB,,, | Performed by: FAMILY MEDICINE

## 2022-03-31 PROCEDURE — 99490 CHRNC CARE MGMT STAFF 1ST 20: CPT | Mod: PBBFAC,PO | Performed by: FAMILY MEDICINE

## 2022-04-04 ENCOUNTER — OFFICE VISIT (OUTPATIENT)
Dept: HEMATOLOGY/ONCOLOGY | Facility: CLINIC | Age: 87
End: 2022-04-04
Payer: MEDICARE

## 2022-04-04 VITALS
RESPIRATION RATE: 18 BRPM | BODY MASS INDEX: 22.36 KG/M2 | HEIGHT: 58 IN | DIASTOLIC BLOOD PRESSURE: 85 MMHG | SYSTOLIC BLOOD PRESSURE: 158 MMHG | TEMPERATURE: 98 F | HEART RATE: 89 BPM

## 2022-04-04 DIAGNOSIS — R79.89 ELEVATED FERRITIN: ICD-10-CM

## 2022-04-04 PROCEDURE — 99213 PR OFFICE/OUTPT VISIT, EST, LEVL III, 20-29 MIN: ICD-10-PCS | Mod: S$GLB,,, | Performed by: INTERNAL MEDICINE

## 2022-04-04 PROCEDURE — 99213 OFFICE O/P EST LOW 20 MIN: CPT | Mod: S$GLB,,, | Performed by: INTERNAL MEDICINE

## 2022-04-04 NOTE — ASSESSMENT & PLAN NOTE
Patient is doing well but her count is higher now.  She is not on any iron and notes she has RA.  There may be an inflammatory aspect to this.  Will continue to monitor for now and if this stays high then may need phlebotomy.  Will also check her for hemochromatosis gene now and see her back with me with CBC and ferritin in three months.

## 2022-04-04 NOTE — PROGRESS NOTES
PROGRESS NOTE    Subjective:       Patient ID: Cheyanne Gomes is a 86 y.o. female.    Chief Complaint:  No chief complaint on file.  anemia and chf follow up.     History of Present Illness:   Cheyanne Gomes is a 86 y.o. female who presents with a history of anemia of chronic disease.    Patient has no new complaints at this time.      Date:  Ferritin  4/23/20 79  6/8/2021 1957  7/9/2021 1600  8/10/2021 1212  3/31/2022 1601    Patient had 2 units of blood at [facility] in March 2021.  She was given 3 iv iron infusions after that.  She is feeling well at this time.  No new complaints.  No bleeding, no melena.      Injectafer given May 2019        EF: 45%          Family and Social history reviewed and is unchanged from 8/9/2016.       ROS:  Review of Systems   Constitutional: Positive for appetite change. Negative for fever and unexpected weight change.   HENT: Negative for mouth sores.    Eyes: Negative for visual disturbance.   Respiratory: Negative for cough and shortness of breath.    Cardiovascular: Negative for chest pain and leg swelling.   Gastrointestinal: Positive for diarrhea. Negative for abdominal pain and blood in stool.   Genitourinary: Positive for frequency. Negative for hematuria.   Musculoskeletal: Negative for back pain.   Skin: Negative for rash.   Neurological: Negative for headaches.   Hematological: Negative for adenopathy.   Psychiatric/Behavioral: The patient is not nervous/anxious.           Current Outpatient Medications:     amLODIPine (NORVASC) 5 MG tablet, TAKE 1 TABLET BY MOUTH 2 TIMES A DAY, Disp: 60 tablet, Rfl: 5    aspirin 81 mg Tab, Take 1 tablet by mouth every evening. Every day, Disp: , Rfl:     atorvastatin (LIPITOR) 40 MG tablet, Take 1 tablet (40 mg total) by mouth once daily., Disp: 90 tablet, Rfl: 3    BELBUCA 300 mcg Film, Place 1 film between cheek AND gum TWICE DAILY., Disp: , Rfl:     biotin 5  mg Cap, Take 1 tablet by mouth 2 (two) times daily., Disp: , Rfl:     CALCIUM CARB/VIT D3/MINERALS (CALCIUM-VITAMIN D ORAL), Take 600 mg by mouth 2 (two) times daily. Calcium 1200 with D 3 1000, Disp: , Rfl:     CRANBERRY CONC/ASCORBIC ACID (CRANBERRY PLUS VITAMIN C ORAL), Take 1 capsule by mouth once daily., Disp: , Rfl:     cyanocobalamin 1,000 mcg/mL injection, 1,000 mcg every 30 days., Disp: , Rfl:     DEXILANT 60 mg capsule, Take 60 mg by mouth once daily. , Disp: , Rfl: 11    ENTRESTO  mg per tablet, TAKE ONE TABLET BY MOUTH TWICE DAILY, Disp: 60 tablet, Rfl: 4    fluticasone (FLONASE) 50 mcg/actuation nasal spray, 2 sprays by Each Nare route once daily. (Patient taking differently: 2 sprays by Each Nostril route daily as needed.), Disp: 16 g, Rfl: 0    folic acid (FOLVITE) 1 MG tablet, Take 2 mg by mouth once daily. , Disp: , Rfl:     furosemide (LASIX) 80 MG tablet, TAKE ONE TABLET BY MOUTH EVERY DAY, Disp: 30 tablet, Rfl: 4    gabapentin (NEURONTIN) 100 MG capsule, Take 2 capsules (200 mg total) by mouth every evening., Disp: 60 capsule, Rfl: 5    HYDROmorphone (DILAUDID) 2 MG tablet, Take 2 mg by mouth every 4 (four) hours as needed for Pain., Disp: , Rfl:     isosorbide dinitrate (ISORDIL) 10 MG tablet, TAKE ONE TABLET BY MOUTH THREE TIMES DAILY, Disp: 100 tablet, Rfl: 2    Lactobacillus acidophilus 10 billion cell Cap, Take 1 each by mouth once daily. 1.5 billion, Disp: , Rfl:     magnesium oxide (MAG-OX) 400 mg (241.3 mg magnesium) tablet, TAKE 1 TABLET BY MOUTH 2 TIMES A DAY, Disp: 120 tablet, Rfl: 2    megestroL (MEGACE) 20 MG Tab, Take 1 tablet (20 mg total) by mouth once daily., Disp: 30 tablet, Rfl: 2    metoprolol succinate (TOPROL-XL) 25 MG 24 hr tablet, Take 1 tablet (25 mg total) by mouth once daily., Disp: 90 tablet, Rfl: 2    MULTIVITAMIN WITH MINERALS (ONE-A-DAY 50 PLUS) Tab, Take 1 tablet by mouth once daily. Every day, Disp: , Rfl:     nitroGLYCERIN 0.4 MG/DOSE TL  "SPRY (NITROLINGUAL) 400 mcg/spray spray, Place 1 spray under the tongue every 5 (five) minutes as needed for Chest pain. PRN, Disp: 4.9 g, Rfl: 3    ondansetron (ZOFRAN) 4 MG tablet, Take 4 mg by mouth every 8 (eight) hours as needed for Nausea. , Disp: , Rfl: 2    potassium chloride SA (K-DUR,KLOR-CON) 10 MEQ tablet, TAKE TWO TABLETS BY MOUTH EVERY MORNING AND 1 TABLET EVERY EVENING, Disp: 270 tablet, Rfl: 2    promethazine (PHENERGAN) 25 MG tablet, Take 1 tablet (25 mg total) by mouth 2 (two) times daily as needed for Nausea., Disp: 60 tablet, Rfl: 1    prucalopride (MOTEGRITY) 2 mg Tab, Motegrity 2 mg tablet  Take 1 tablet every day by oral route as needed for 30 days., Disp: , Rfl:     RESTASIS 0.05 % ophthalmic emulsion, Place 1 drop into both eyes 2 (two) times daily., Disp: , Rfl:     sertraline (ZOLOFT) 50 MG tablet, TAKE 1 TABLET (50 MG TOTAL) BY MOUTH ONCE DAILY., Disp: 30 tablet, Rfl: 5    tiZANidine (ZANAFLEX) 2 MG tablet, TID PRN, Disp: 30 tablet, Rfl: 0    traMADoL (ULTRAM) 50 mg tablet, TAKE TWO TABLETS BY MOUTH TWICE DAILY AS NEEDED FOR SEVERE pain, Disp: 120 tablet, Rfl: 2    vitamin E 400 unit Tab, Take 400 mg by mouth once daily. Every day, Disp: , Rfl:         Objective:       Physical Examination:     BP (!) 158/85   Pulse 89   Temp 98.3 °F (36.8 °C)   Resp 18   Ht 4' 10" (1.473 m)   BMI 22.36 kg/m²     Physical Exam  Constitutional:       Appearance: She is well-developed.   HENT:      Head: Normocephalic and atraumatic.      Right Ear: External ear normal.      Left Ear: External ear normal.   Eyes:      Conjunctiva/sclera: Conjunctivae normal.      Pupils: Pupils are equal, round, and reactive to light.   Neck:      Thyroid: No thyromegaly.      Trachea: No tracheal deviation.   Cardiovascular:      Rate and Rhythm: Normal rate and regular rhythm.      Heart sounds: Normal heart sounds.   Pulmonary:      Effort: Pulmonary effort is normal.      Breath sounds: Normal breath " sounds.   Abdominal:      General: Bowel sounds are normal. There is no distension.      Palpations: Abdomen is soft. There is no mass.      Tenderness: There is no abdominal tenderness.   Skin:     Findings: No rash.   Neurological:      Comments: Neuro intact througout   Psychiatric:         Behavior: Behavior normal.         Thought Content: Thought content normal.         Judgment: Judgment normal.         Labs:   Recent Results (from the past 336 hour(s))   CBC Auto Differential    Collection Time: 03/31/22  1:27 PM   Result Value Ref Range    WBC 6.8 3.4 - 10.8 x10E3/uL    Hemoglobin 11.7 11.1 - 15.9 g/dL    Hematocrit 37.0 34.0 - 46.6 %    Platelets 162 150 - 450 x10E3/uL     CMP  Sodium   Date Value Ref Range Status   02/21/2022 140 134 - 144 mmol/L Final   12/11/2018 139 134 - 144 mmol/L      Potassium   Date Value Ref Range Status   02/21/2022 4.0 3.5 - 5.2 mmol/L Final     Chloride   Date Value Ref Range Status   02/21/2022 101 96 - 106 mmol/L Final   12/11/2018 96 (L) 98 - 110 mmol/L      CO2   Date Value Ref Range Status   02/21/2022 22 20 - 29 mmol/L Final     Glucose   Date Value Ref Range Status   02/21/2022 87 65 - 99 mg/dL Final   12/11/2018 97 70 - 99 mg/dL      BUN   Date Value Ref Range Status   02/21/2022 16 8 - 27 mg/dL Final     Creatinine   Date Value Ref Range Status   02/21/2022 0.93 0.57 - 1.00 mg/dL Final     Comment:                    **Effective February 28, 2022 [facility] will begin**                   reporting the 2021 CKD-EPI creatinine equation that                   estimates kidney function without a race variable.     12/11/2018 0.95 0.60 - 1.40 mg/dL      Calcium   Date Value Ref Range Status   02/21/2022 8.3 (L) 8.7 - 10.3 mg/dL Final     Total Protein   Date Value Ref Range Status   06/21/2021 7.4 6.0 - 8.4 g/dL Final     Albumin   Date Value Ref Range Status   01/07/2022 4.2 3.6 - 4.6 g/dL Final   06/21/2021 3.6 3.5 - 5.2 g/dL Final   12/11/2018 3.9 3.1 - 4.7 g/dL      Total  Bilirubin   Date Value Ref Range Status   01/07/2022 0.2 0.0 - 1.2 mg/dL Final     Alkaline Phosphatase   Date Value Ref Range Status   06/21/2021 151 (H) 55 - 135 U/L Final     AST   Date Value Ref Range Status   01/07/2022 26 0 - 40 IU/L Final     ALT   Date Value Ref Range Status   01/07/2022 14 0 - 32 IU/L Final     Anion Gap   Date Value Ref Range Status   11/05/2021 9 8 - 16 mmol/L Final     eGFR if    Date Value Ref Range Status   11/05/2021 >60.0 >60 mL/min/1.73 m^2 Final     eGFR if non    Date Value Ref Range Status   02/21/2022 56 (L) >59 mL/min/1.73 Final     No results found for: CEA  No results found for: PSA        Assessment/Plan:   Elevated ferritin  Patient is doing well but her count is higher now.  She is not on any iron and notes she has RA.  There may be an inflammatory aspect to this.  Will continue to monitor for now and if this stays high then may need phlebotomy.  Will also check her for hemochromatosis gene now and see her back with me with CBC and ferritin in three months.     Discussion:     Follow up in about 3 months (around 7/4/2022).      Electronically signed by David Robb

## 2022-04-12 ENCOUNTER — OFFICE VISIT (OUTPATIENT)
Dept: DERMATOLOGY | Facility: CLINIC | Age: 87
End: 2022-04-12
Payer: MEDICARE

## 2022-04-12 ENCOUNTER — OFFICE VISIT (OUTPATIENT)
Dept: RHEUMATOLOGY | Facility: CLINIC | Age: 87
End: 2022-04-12
Payer: MEDICARE

## 2022-04-12 VITALS
BODY MASS INDEX: 22.55 KG/M2 | DIASTOLIC BLOOD PRESSURE: 71 MMHG | SYSTOLIC BLOOD PRESSURE: 114 MMHG | WEIGHT: 107.88 LBS

## 2022-04-12 VITALS — BODY MASS INDEX: 22.46 KG/M2 | HEIGHT: 58 IN | WEIGHT: 107 LBS

## 2022-04-12 DIAGNOSIS — M35.00 SJOGREN'S SYNDROME WITHOUT EXTRAGLANDULAR INVOLVEMENT: Primary | ICD-10-CM

## 2022-04-12 DIAGNOSIS — L57.0 ACTINIC KERATOSES: ICD-10-CM

## 2022-04-12 DIAGNOSIS — L81.4 SOLAR LENTIGO: ICD-10-CM

## 2022-04-12 DIAGNOSIS — L82.1 SEBORRHEIC KERATOSES: ICD-10-CM

## 2022-04-12 DIAGNOSIS — M81.0 OSTEOPOROSIS, UNSPECIFIED OSTEOPOROSIS TYPE, UNSPECIFIED PATHOLOGICAL FRACTURE PRESENCE: ICD-10-CM

## 2022-04-12 DIAGNOSIS — D48.5 NEOPLASM OF UNCERTAIN BEHAVIOR OF SKIN: Primary | ICD-10-CM

## 2022-04-12 DIAGNOSIS — D22.9 MULTIPLE BENIGN NEVI: ICD-10-CM

## 2022-04-12 PROCEDURE — 11103 TANGNTL BX SKIN EA SEP/ADDL: CPT | Mod: PBBFAC,PO | Performed by: DERMATOLOGY

## 2022-04-12 PROCEDURE — 11102 TANGNTL BX SKIN SINGLE LES: CPT | Mod: PBBFAC,PO | Performed by: DERMATOLOGY

## 2022-04-12 PROCEDURE — 99203 PR OFFICE/OUTPT VISIT, NEW, LEVL III, 30-44 MIN: ICD-10-PCS | Mod: 25,S$PBB,, | Performed by: DERMATOLOGY

## 2022-04-12 PROCEDURE — 88305 TISSUE EXAM BY PATHOLOGIST: ICD-10-PCS | Mod: 26,,, | Performed by: PATHOLOGY

## 2022-04-12 PROCEDURE — 88305 TISSUE EXAM BY PATHOLOGIST: CPT | Mod: 26,,, | Performed by: PATHOLOGY

## 2022-04-12 PROCEDURE — 11103 TANGNTL BX SKIN EA SEP/ADDL: CPT | Mod: S$PBB,,, | Performed by: DERMATOLOGY

## 2022-04-12 PROCEDURE — 11103 PR TANGENTIAL BIOPSY, SKIN, EA ADDTL LESION: ICD-10-PCS | Mod: S$PBB,,, | Performed by: DERMATOLOGY

## 2022-04-12 PROCEDURE — 17000 PR DESTRUCTION(LASER SURGERY,CRYOSURGERY,CHEMOSURGERY),PREMALIGNANT LESIONS,FIRST LESION: ICD-10-PCS | Mod: 59,S$PBB,, | Performed by: DERMATOLOGY

## 2022-04-12 PROCEDURE — 99215 OFFICE O/P EST HI 40 MIN: CPT | Mod: PBBFAC,PO | Performed by: DERMATOLOGY

## 2022-04-12 PROCEDURE — 99213 PR OFFICE/OUTPT VISIT, EST, LEVL III, 20-29 MIN: ICD-10-PCS | Mod: S$GLB,,, | Performed by: INTERNAL MEDICINE

## 2022-04-12 PROCEDURE — 17000 DESTRUCT PREMALG LESION: CPT | Mod: 59,S$PBB,, | Performed by: DERMATOLOGY

## 2022-04-12 PROCEDURE — 11102 TANGNTL BX SKIN SINGLE LES: CPT | Mod: S$PBB,,, | Performed by: DERMATOLOGY

## 2022-04-12 PROCEDURE — 11102 PR TANGENTIAL BIOPSY, SKIN, SINGLE LESION: ICD-10-PCS | Mod: S$PBB,,, | Performed by: DERMATOLOGY

## 2022-04-12 PROCEDURE — 99213 OFFICE O/P EST LOW 20 MIN: CPT | Mod: S$GLB,,, | Performed by: INTERNAL MEDICINE

## 2022-04-12 PROCEDURE — 17000 DESTRUCT PREMALG LESION: CPT | Mod: 59,PBBFAC,PO | Performed by: DERMATOLOGY

## 2022-04-12 PROCEDURE — 99203 OFFICE O/P NEW LOW 30 MIN: CPT | Mod: 25,S$PBB,, | Performed by: DERMATOLOGY

## 2022-04-12 PROCEDURE — 99999 PR PBB SHADOW E&M-EST. PATIENT-LVL V: ICD-10-PCS | Mod: PBBFAC,,, | Performed by: DERMATOLOGY

## 2022-04-12 PROCEDURE — 88305 TISSUE EXAM BY PATHOLOGIST: CPT | Performed by: PATHOLOGY

## 2022-04-12 PROCEDURE — 99999 PR PBB SHADOW E&M-EST. PATIENT-LVL V: CPT | Mod: PBBFAC,,, | Performed by: DERMATOLOGY

## 2022-04-12 NOTE — PROGRESS NOTES
[facility] RHEUMATOLOGY            PROGRESS NOTE      Subjective:       Patient ID:   NAME: Cheyanne Gomes : 1935     86 y.o. female    Referring Doc: No ref. provider found  Other Physicians:    Chief Complaint:  Follow-up      History of Present Illness:     Patient returns today for a regularly scheduled follow-up visit for Sjogren's syndrome osteoarthritis and osteoporosis      The patient is doing fairly well.  Chronic pains relieved with medications prescribed by her pain management physician.  No chest pains or cough.  Has sicca symptoms.  No rashes.  No paresthesias.            ROS:   GEN:  No  fever, night sweats . weight is stable   + fatigue  SKIN: no rashes, no bruising, no ulcerations, no Raynaud's  HEENT: no HA's, No visual changes, no mucosal ulcers, + sicca symptoms,  CV:   no CP, SOB, PND, SALINAS, no orthopnea, no palpitations  PULM: normal with no SOB, cough, hemoptysis, sputum or pleuritic pain  GI:  no abdominal pain, nausea, vomiting, constipation, diarrhea, melanotic stools, BRBPR, hematemesis, no dysphagia  :   no dysuria  NEURO: no paresthesias, headaches, visual disturbances, muscle weakness  MUSCULOSKELETAL:no joint swelling, prolonged AM stiffness, + back pain, + muscle pain  Allergies:  Review of patient's allergies indicates:   Allergen Reactions    Adhesive     Nitrofurantoin macrocrystalline Nausea And Vomiting     Other reaction(s): very sickly    Penicillins Swelling     Other reaction(s): swelling  Other reaction(s): red skin discolorati    Sulfa (sulfonamide antibiotics) Nausea And Vomiting     Other reaction(s): very sickly       Medications:    Current Outpatient Medications:     amLODIPine (NORVASC) 5 MG tablet, TAKE 1 TABLET BY MOUTH 2 TIMES A DAY, Disp: 60 tablet, Rfl: 5    aspirin 81 mg Tab, Take 1 tablet by mouth every evening. Every day, Disp: , Rfl:     atorvastatin (LIPITOR) 40 MG tablet, Take 1 tablet (40 mg total) by mouth once daily., Disp: 90  tablet, Rfl: 3    BELBUCA 300 mcg Film, Place 1 film between cheek AND gum TWICE DAILY., Disp: , Rfl:     biotin 5 mg Cap, Take 1 tablet by mouth 2 (two) times daily., Disp: , Rfl:     CALCIUM CARB/VIT D3/MINERALS (CALCIUM-VITAMIN D ORAL), Take 600 mg by mouth 2 (two) times daily. Calcium 1200 with D 3 1000, Disp: , Rfl:     CRANBERRY CONC/ASCORBIC ACID (CRANBERRY PLUS VITAMIN C ORAL), Take 1 capsule by mouth once daily., Disp: , Rfl:     cyanocobalamin 1,000 mcg/mL injection, 1,000 mcg every 30 days., Disp: , Rfl:     DEXILANT 60 mg capsule, Take 60 mg by mouth once daily. , Disp: , Rfl: 11    ENTRESTO  mg per tablet, TAKE ONE TABLET BY MOUTH TWICE DAILY, Disp: 60 tablet, Rfl: 4    fluticasone (FLONASE) 50 mcg/actuation nasal spray, 2 sprays by Each Nare route once daily. (Patient taking differently: 2 sprays by Each Nostril route daily as needed.), Disp: 16 g, Rfl: 0    folic acid (FOLVITE) 1 MG tablet, Take 2 mg by mouth once daily. , Disp: , Rfl:     furosemide (LASIX) 80 MG tablet, TAKE ONE TABLET BY MOUTH EVERY DAY, Disp: 30 tablet, Rfl: 4    gabapentin (NEURONTIN) 100 MG capsule, Take 2 capsules (200 mg total) by mouth every evening., Disp: 60 capsule, Rfl: 5    HYDROmorphone (DILAUDID) 2 MG tablet, Take 2 mg by mouth every 4 (four) hours as needed for Pain., Disp: , Rfl:     isosorbide dinitrate (ISORDIL) 10 MG tablet, TAKE ONE TABLET BY MOUTH THREE TIMES DAILY, Disp: 100 tablet, Rfl: 2    Lactobacillus acidophilus 10 billion cell Cap, Take 1 each by mouth once daily. 1.5 billion, Disp: , Rfl:     magnesium oxide (MAG-OX) 400 mg (241.3 mg magnesium) tablet, TAKE 1 TABLET BY MOUTH 2 TIMES A DAY, Disp: 120 tablet, Rfl: 2    megestroL (MEGACE) 20 MG Tab, TAKE 1 TABLET BY MOUTH EVERY DAY, Disp: 90 tablet, Rfl: 0    metoprolol succinate (TOPROL-XL) 25 MG 24 hr tablet, Take 1 tablet (25 mg total) by mouth once daily., Disp: 90 tablet, Rfl: 2    MULTIVITAMIN WITH MINERALS (ONE-A-DAY 50 PLUS)  Tab, Take 1 tablet by mouth once daily. Every day, Disp: , Rfl:     nitroGLYCERIN 0.4 MG/DOSE TL SPRY (NITROLINGUAL) 400 mcg/spray spray, Place 1 spray under the tongue every 5 (five) minutes as needed for Chest pain. PRN, Disp: 4.9 g, Rfl: 3    ondansetron (ZOFRAN) 4 MG tablet, Take 4 mg by mouth every 8 (eight) hours as needed for Nausea. , Disp: , Rfl: 2    potassium chloride SA (K-DUR,KLOR-CON) 10 MEQ tablet, TAKE TWO TABLETS BY MOUTH EVERY MORNING AND 1 TABLET EVERY EVENING, Disp: 270 tablet, Rfl: 2    promethazine (PHENERGAN) 25 MG tablet, Take 1 tablet (25 mg total) by mouth 2 (two) times daily as needed for Nausea., Disp: 60 tablet, Rfl: 1    prucalopride (MOTEGRITY) 2 mg Tab, Motegrity 2 mg tablet  Take 1 tablet every day by oral route as needed for 30 days., Disp: , Rfl:     RESTASIS 0.05 % ophthalmic emulsion, Place 1 drop into both eyes 2 (two) times daily., Disp: , Rfl:     sertraline (ZOLOFT) 50 MG tablet, TAKE 1 TABLET (50 MG TOTAL) BY MOUTH ONCE DAILY., Disp: 30 tablet, Rfl: 5    tiZANidine (ZANAFLEX) 2 MG tablet, TID PRN, Disp: 30 tablet, Rfl: 0    traMADoL (ULTRAM) 50 mg tablet, TAKE TWO TABLETS BY MOUTH TWICE DAILY AS NEEDED FOR SEVERE pain, Disp: 120 tablet, Rfl: 2    vitamin E 400 unit Tab, Take 400 mg by mouth once daily. Every day, Disp: , Rfl:     PMHx/PSHx Updates:          Objective:     Vitals:  Blood pressure 114/71, weight 48.9 kg (107 lb 14.4 oz).    Physical Examination:   GEN: no apparent distress, comfortable; AAOx3  SKIN: no rashes,no ulceration, no Raynaud's, no petechiae, no SQ nodules,  HEAD: normal  EYES: no pallor, no icterus  NECK: no masses, thyroid normal, trachea midline, no LAD/LN's, supple  CV: RRR with no murmur; l S1 and S2 reg. ,no gallop no rubs,   CHEST: Normal respiratory effort; CTAB; normal breath sounds; no wheeze or crackles  MUSC/Skeletal: ROM normal; no crepitus; joints without synovitis,  no deformities  No joint swelling or tenderness of PIP, MCP,  wrist, elbow, shoulder, or knee joints  EXTREM: no clubbing, cyanosis, no edema  NEURO: grossly intact; motor WNL; AAOx  PSYCH: normal mood, affect and behavior  LYMPH: normal cervical, supraclavicular          Labs:   Lab Results   Component Value Date    WBC 6.8 03/31/2022    HGB 11.7 03/31/2022    HCT 37.0 03/31/2022    MCV 91 03/31/2022     03/31/2022    CMP  @[facility](NA,K,CL,CO2,GLU,BUN,Creatinine,Calcium,PROT,Albumin,Bilitot,Alkphos,AST,ALT,CRP,ESR,RF,CCP,VANCE,SSA,CPK,uric acid) )@  I have reviewed all available lab results and radiology reports.    Radiology/Diagnostic Studies:        Assessment/Plan:   (1) 86 y.o. female with diagnosis of Sjogren's osteoarthritis and osteoporosis.  She is stable  FU in 4 months          Discussion:     I have explained all of the above in detail and the patient understands all of the current recommendation(s). I have answered all questions to the best of my ability and to their complete satisfaction.       The patient is to continue with the current management plan         RTC in         Electronically signed by Deepak Erazo MD

## 2022-04-12 NOTE — PATIENT INSTRUCTIONS
Shave Biopsy Wound Care    Your doctor has performed a shave biopsy today.  A band aid and vaseline ointment has been placed over the site.  This should remain in place for 24 hours.  It is recommended that you keep the area dry for the first 24 hours.  After 24 hours, you may remove the band aid and wash the area with warm soap and water and apply Vaseline jelly.  Many patients prefer to use Neosporin or Bacitracin ointment.  This is acceptable; however, know that you can develop an allergy to this medication even if you have used it safely for years.  It is important to keep the area moist.  Letting it dry out and get air slows healing time, and will worsen the scar.  Band aid is optional after first 24 hours.      If you notice increasing redness, tenderness, pain, or yellow drainage at the biopsy site, please notify your doctor.  These are signs of an infection.    If your biopsy site is bleeding, apply firm pressure for 15 minutes straight.  Repeat for another 15 minutes, if it is still bleeding.   If the surgical site continues to bleed, then please contact your doctor.       [facility] DERMATOLOGY  [address] [place] [address], [address] [id number]  Dept: [phone]      CRYOSURGERY      Your doctor has used a method called cryosurgery to treat your skin condition. Cryosurgery refers to the use of very cold substances to treat a variety of skin conditions such as warts, pre-skin cancers, molluscum contagiosum, sun spots, and several benign growths. The substance we use in cryosurgery is liquid nitrogen and is so cold (-195 degrees Celsius) that is burns when administered.     Following treatment in the office, the skin may immediately burn and become red. You may find the area around the lesion is affected as well. It is sometimes necessary to treat not only the lesion, but a small area of the surrounding normal skin to achieve a good response.     A blister, and even a blood  filled blister, may form after treatment.   This is a normal response. If the blister is painful, it is acceptable to sterilize a needle and with rubbing alcohol and gently pop the blister. It is important that you gently wash the area with soap and warm water as the blister fluid may contain wart virus if a wart was treated. Do no remove the roof of the blister.     The area treated can take anywhere from 1-3 weeks to heal. Healing time depends on the kind skin lesion treated, the location, and how aggressively the lesion was treated. It is recommended that the areas treated are covered with Vaseline or bacitracin ointment and a band-aid. If a band-aid is not practical, just ointment applied several times per day will do. Keeping these areas moist will speed the healing time.    Treatment with liquid nitrogen can leave a scar. In dark skin, it may be a light or dark scar, in light skin it may be a white or pink scar. These will generally fade with time, but may never go away completely.     If you have any concerns after your treatment, please feel free to call the office.         [facility] - DERMATOLOGY  [address] [place] [id number]  Dept: [phone]

## 2022-04-12 NOTE — PROGRESS NOTES
Subjective:       Patient ID:  Cheyanne Gomes is a 86 y.o. female who presents for   Chief Complaint   Patient presents with    Skin Check     TBSC    Spot     On nose and right ear     New patient  Here for TBSC  Has areas on her nose and right ear x many months, painful at times  Has a corn on her foot     Has no hx of NMSC  Has no fhx of MM    H/o RA and Sjogren, under care of Dr Kacey CARMONA  No current treatment      Current Outpatient Medications:     amLODIPine (NORVASC) 5 MG tablet, TAKE 1 TABLET BY MOUTH 2 TIMES A DAY, Disp: 60 tablet, Rfl: 5    aspirin 81 mg Tab, Take 1 tablet by mouth every evening. Every day, Disp: , Rfl:     atorvastatin (LIPITOR) 40 MG tablet, Take 1 tablet (40 mg total) by mouth once daily., Disp: 90 tablet, Rfl: 3    BELBUCA 300 mcg Film, Place 1 film between cheek AND gum TWICE DAILY., Disp: , Rfl:     biotin 5 mg Cap, Take 1 tablet by mouth 2 (two) times daily., Disp: , Rfl:     CALCIUM CARB/VIT D3/MINERALS (CALCIUM-VITAMIN D ORAL), Take 600 mg by mouth 2 (two) times daily. Calcium 1200 with D 3 1000, Disp: , Rfl:     CRANBERRY CONC/ASCORBIC ACID (CRANBERRY PLUS VITAMIN C ORAL), Take 1 capsule by mouth once daily., Disp: , Rfl:     cyanocobalamin 1,000 mcg/mL injection, 1,000 mcg every 30 days., Disp: , Rfl:     DEXILANT 60 mg capsule, Take 60 mg by mouth once daily. , Disp: , Rfl: 11    ENTRESTO  mg per tablet, TAKE ONE TABLET BY MOUTH TWICE DAILY, Disp: 60 tablet, Rfl: 4    fluticasone (FLONASE) 50 mcg/actuation nasal spray, 2 sprays by Each Nare route once daily. (Patient taking differently: 2 sprays by Each Nostril route daily as needed.), Disp: 16 g, Rfl: 0    folic acid (FOLVITE) 1 MG tablet, Take 2 mg by mouth once daily. , Disp: , Rfl:     furosemide (LASIX) 80 MG tablet, TAKE ONE TABLET BY MOUTH EVERY DAY, Disp: 30 tablet, Rfl: 4    gabapentin (NEURONTIN) 100 MG capsule, Take 2 capsules (200 mg total) by mouth every evening., Disp: 60 capsule,  Rfl: 5    HYDROmorphone (DILAUDID) 2 MG tablet, Take 2 mg by mouth every 4 (four) hours as needed for Pain., Disp: , Rfl:     isosorbide dinitrate (ISORDIL) 10 MG tablet, TAKE ONE TABLET BY MOUTH THREE TIMES DAILY, Disp: 100 tablet, Rfl: 2    Lactobacillus acidophilus 10 billion cell Cap, Take 1 each by mouth once daily. 1.5 billion, Disp: , Rfl:     magnesium oxide (MAG-OX) 400 mg (241.3 mg magnesium) tablet, TAKE 1 TABLET BY MOUTH 2 TIMES A DAY, Disp: 120 tablet, Rfl: 2    metoprolol succinate (TOPROL-XL) 25 MG 24 hr tablet, Take 1 tablet (25 mg total) by mouth once daily., Disp: 90 tablet, Rfl: 2    MULTIVITAMIN WITH MINERALS (ONE-A-DAY 50 PLUS) Tab, Take 1 tablet by mouth once daily. Every day, Disp: , Rfl:     nitroGLYCERIN 0.4 MG/DOSE TL SPRY (NITROLINGUAL) 400 mcg/spray spray, Place 1 spray under the tongue every 5 (five) minutes as needed for Chest pain. PRN, Disp: 4.9 g, Rfl: 3    ondansetron (ZOFRAN) 4 MG tablet, Take 4 mg by mouth every 8 (eight) hours as needed for Nausea. , Disp: , Rfl: 2    potassium chloride SA (K-DUR,KLOR-CON) 10 MEQ tablet, TAKE TWO TABLETS BY MOUTH EVERY MORNING AND 1 TABLET EVERY EVENING, Disp: 270 tablet, Rfl: 2    promethazine (PHENERGAN) 25 MG tablet, Take 1 tablet (25 mg total) by mouth 2 (two) times daily as needed for Nausea., Disp: 60 tablet, Rfl: 1    RESTASIS 0.05 % ophthalmic emulsion, Place 1 drop into both eyes 2 (two) times daily., Disp: , Rfl:     sertraline (ZOLOFT) 50 MG tablet, TAKE 1 TABLET (50 MG TOTAL) BY MOUTH ONCE DAILY., Disp: 30 tablet, Rfl: 5    traMADoL (ULTRAM) 50 mg tablet, TAKE TWO TABLETS BY MOUTH TWICE DAILY AS NEEDED FOR SEVERE pain, Disp: 120 tablet, Rfl: 2    vitamin E 400 unit Tab, Take 400 mg by mouth once daily. Every day, Disp: , Rfl:     megestroL (MEGACE) 20 MG Tab, TAKE 1 TABLET BY MOUTH EVERY DAY (Patient not taking: Reported on 4/12/2022.), Disp: 90 tablet, Rfl: 0    prucalopride (MOTEGRITY) 2 mg Tab, Motegrity 2 mg tablet   Take 1 tablet every day by oral route as needed for 30 days., Disp: , Rfl:     tiZANidine (ZANAFLEX) 2 MG tablet, TID PRN (Patient not taking: Reported on 4/12/2022), Disp: 30 tablet, Rfl: 0        Review of Systems   Constitutional: Negative for fever and chills.   Respiratory: Negative for cough and shortness of breath.    Skin: Positive for dry skin and daily sunscreen use. Negative for itching, rash, activity-related sunscreen use and wears hat.   Hematologic/Lymphatic: Bruises/bleeds easily.        Objective:    Physical Exam   Constitutional: She appears well-developed and well-nourished. No distress.   Neurological: She is alert and oriented to person, place, and time. She is not disoriented.   Psychiatric: She has a normal mood and affect.   Skin:   Areas Examined (abnormalities noted in diagram):   Scalp / Hair Palpated and Inspected  Head / Face Inspection Performed  Neck Inspection Performed  Chest / Axilla Inspection Performed  Abdomen Inspection Performed  Genitals / Buttocks / Groin Inspection Performed  Back Inspection Performed  RUE Inspected  LUE Inspection Performed  RLE Inspected  LLE Inspection Performed  Nails and Digits Inspection Performed                       Diagram Legend     Erythematous scaling macule/papule c/w actinic keratosis       Vascular papule c/w angioma      Pigmented verrucoid papule/plaque c/w seborrheic keratosis      Yellow umbilicated papule c/w sebaceous hyperplasia      Irregularly shaped tan macule c/w lentigo     1-2 mm smooth white papules consistent with Milia      Movable subcutaneous cyst with punctum c/w epidermal inclusion cyst      Subcutaneous movable cyst c/w pilar cyst      Firm pink to brown papule c/w dermatofibroma      Pedunculated fleshy papule(s) c/w skin tag(s)      Evenly pigmented macule c/w junctional nevus     Mildly variegated pigmented, slightly irregular-bordered macule c/w mildly atypical nevus      Flesh colored to evenly pigmented papule  c/w intradermal nevus       Pink pearly papule/plaque c/w basal cell carcinoma      Erythematous hyperkeratotic cursted plaque c/w SCC      Surgical scar with no sign of skin cancer recurrence      Open and closed comedones      Inflammatory papules and pustules      Verrucoid papule consistent consistent with wart     Erythematous eczematous patches and plaques     Dystrophic onycholytic nail with subungual debris c/w onychomycosis     Umbilicated papule    Erythematous-base heme-crusted tan verrucoid plaque consistent with inflamed seborrheic keratosis     Erythematous Silvery Scaling Plaque c/w Psoriasis     See annotation          Assessment / Plan:      Pathology Orders:     Normal Orders This Visit    Specimen to Pathology, Dermatology     Questions:    Procedure Type: Dermatology and skin neoplasms    Number of Specimens: 3    ------------------------: -------------------------    Spec 1 Procedure: Biopsy    Spec 1 Clinical Impression: r/o bcc    Spec 1 Source: left chin    ------------------------: -------------------------    Spec 2 Procedure: Biopsy    Spec 2 Clinical Impression: r/o bcc    Spec 2 Source: right chin    ------------------------: -------------------------    Spec 3 Procedure: Biopsy    Spec 3 Clinical Impression: r/o scc    Spec 3 Source: KATRIN kim    Release to patient:         Neoplasm of uncertain behavior of skin  -     Specimen to Pathology, Dermatology  Shave biopsy procedure note:x3    Shave biopsy performed after verbal consent including risk of infection, scar, recurrence, need for additional treatment of site. Area prepped with alcohol, anesthetized with approximately 1.0cc of 1% lidocaine with epinephrine. Lesional tissue shaved with razor blade. Hemostasis achieved with application of aluminum chloride followed by hyfrecation. No complications. Dressing applied. Wound care explained.    Actinic keratoses  Cryosurgery Procedure Note    Verbal consent from the patient is obtained  and the patient is aware of the precancerous quality and need for treatment of these lesions. Liquid nitrogen cryosurgery is applied to the 1 actinic keratoses, as detailed in the physical exam, to produce a freeze injury. The patient is aware that blisters may form and is instructed on wound care with gentle cleansing and use of vaseline ointment to keep moist until healed. The patient is supplied a handout on cryosurgery and is instructed to call if lesions do not completely resolve.    Seborrheic keratoses  These are benign inherited growths without a malignant potential. Reassurance given to patient. No treatment is necessary.     Solar lentigo  This is a benign hyperpigmented sun induced lesion. Daily sun protection will reduce the number of new lesions. Treatment of these benign lesions are considered cosmetic.    Multiple benign nevi  Careful dermoscopy evaluation of nevi performed with none identified as needing biopsy today  Monitor for new mole or moles that are becoming bigger, darker, irritated, or developing irregular borders.     Patient instructed in importance in daily broad spectrum sun protection of at least spf 30. Mineral sunscreen ingredients preferred (Zinc +/- Titanium) and can be found OTC.   Recommend Elta MD for daily use on face and neck.  Patient encouraged to wear hat for all outdoor exposure.   Also discussed sun avoidance and use of protective clothing.               Follow up in about 6 months (around 10/12/2022), or if symptoms worsen or fail to improve.

## 2022-04-13 LAB
FERRITIN SERPL-MCNC: 1496 NG/ML (ref 15–150)
HFE GENE MUT ANL BLD/T: NORMAL

## 2022-04-18 DIAGNOSIS — G62.9 PERIPHERAL POLYNEUROPATHY: Primary | ICD-10-CM

## 2022-04-18 RX ORDER — GABAPENTIN 100 MG/1
200 CAPSULE ORAL NIGHTLY
Qty: 60 CAPSULE | Refills: 5 | Status: SHIPPED | OUTPATIENT
Start: 2022-04-18 | End: 2022-10-25

## 2022-04-18 NOTE — TELEPHONE ENCOUNTER
No new care gaps identified.  Powered by [employer]. Reference number: [id number].   4/18/2022 10:09:11 AM CDT

## 2022-04-19 ENCOUNTER — TELEPHONE (OUTPATIENT)
Dept: DERMATOLOGY | Facility: CLINIC | Age: 87
End: 2022-04-19
Payer: MEDICARE

## 2022-04-19 LAB
FINAL PATHOLOGIC DIAGNOSIS: NORMAL
GROSS: NORMAL
Lab: NORMAL
MICROSCOPIC EXAM: NORMAL

## 2022-04-19 NOTE — PROGRESS NOTES
All 3 lesions are skin cancers. I recommend treatment by a Mohs surgeon who has the ability to ensure negative surgical margins before repairing the defect. We work with Dr Weir who sees [facility] patients in [place] or with Dr Mckeon who sees patients at [facility].   Please notify patient and place referral request once patient has been given diagnosis and opportunity to discuss treatment plan    1. 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 You will be contacted regarding treatment.   2. Skin, right chin, shave biopsy:   -BASAL CELL CARCINOMA, NODULAR TYPE, EXTENDING TO THE DEEP BIOPSY EDGE   This lesion is skin cancer. You will be contacted regarding treatment.   3. Skin, right alar rim, shave biopsy:   -BASAL CELL CARCINOMA, NODULAR AND INFILTRATIVE, EXTENDING TO THE PERIPHERAL   AND DEEP BIOPSY EDGES

## 2022-04-19 NOTE — TELEPHONE ENCOUNTER
Attempted to contact patient, no answer. [facility] for a return call.     ----- Message from Genevieve Bartlett MD sent at 4/19/2022  3:16 PM CDT -----  All 3 lesions are skin cancers. I recommend treatment by a Mohs surgeon who has the ability to ensure negative surgical margins before repairing the defect. We work with Dr Weir who sees [facility] patients in [place] or with Dr Mckeon who sees patients at [facility].   Please notify patient and place referral request once patient has been given diagnosis and opportunity to discuss treatment plan    1. 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 You will be contacted regarding treatment.   2. Skin, right chin, shave biopsy:   -BASAL CELL CARCINOMA, NODULAR TYPE, EXTENDING TO THE DEEP BIOPSY EDGE   This lesion is skin cancer. You will be contacted regarding treatment.   3. Skin, right alar rim, shave biopsy:   -BASAL CELL CARCINOMA, NODULAR AND INFILTRATIVE, EXTENDING TO THE PERIPHERAL   AND DEEP BIOPSY EDGES

## 2022-04-21 ENCOUNTER — TELEPHONE (OUTPATIENT)
Dept: DERMATOLOGY | Facility: CLINIC | Age: 87
End: 2022-04-21
Payer: MEDICARE

## 2022-04-30 ENCOUNTER — EXTERNAL CHRONIC CARE MANAGEMENT (OUTPATIENT)
Dept: PRIMARY CARE CLINIC | Facility: CLINIC | Age: 87
End: 2022-04-30
Payer: MEDICARE

## 2022-04-30 PROCEDURE — 99490 CHRNC CARE MGMT STAFF 1ST 20: CPT | Mod: S$PBB,,, | Performed by: FAMILY MEDICINE

## 2022-04-30 PROCEDURE — 99490 CHRNC CARE MGMT STAFF 1ST 20: CPT | Mod: PBBFAC,PO | Performed by: FAMILY MEDICINE

## 2022-04-30 PROCEDURE — 99490 PR CHRONIC CARE MGMT, 1ST 20 MIN: ICD-10-PCS | Mod: S$PBB,,, | Performed by: FAMILY MEDICINE

## 2022-05-04 ENCOUNTER — TELEPHONE (OUTPATIENT)
Dept: CARDIOLOGY | Facility: CLINIC | Age: 87
End: 2022-05-04
Payer: MEDICARE

## 2022-05-04 NOTE — TELEPHONE ENCOUNTER
----- Message from Soo Kaur sent at 5/4/2022  4:05 PM CDT -----  Regarding: advice  Contact: pt  Type: Needs Medical Advice    Who Called:  pt  Symptoms (please be specific):  n/a  How long has patient had these symptoms:  n/a  Pharmacy name and phone #:  n/a  Best Call Back Number: [phone]    Additional Information: need cardiac clearance for dr ritchie to do carpal tunnel procedure. Please call to advise

## 2022-05-04 NOTE — TELEPHONE ENCOUNTER
----- Message from Soo Kaur sent at 5/4/2022  4:05 PM CDT -----  Regarding: advice  Contact: pt  Type: Needs Medical Advice    Who Called:  pt  Symptoms (please be specific):  n/a  How long has patient had these symptoms:  n/a  Pharmacy name and phone #:  n/a  Best Call Back Number: [phone]    Additional Information: need cardiac clearance for dr ritchie to do carpal tunnel procedure. Please call to advise         Refilled per Rheum RN refill protocol    Today's advice/conversation is in the additional context of the current extreme COVID-19 pandemic circumstances.

## 2022-05-04 NOTE — LETTER
May 6, 2022    Cheyanne Gomes  [address] [facility] [address]             [facility]  [address], [address]  [facility] [address]  Phone: [phone]  Fax: [phone] Patient: Cheyanne Gomes  : 1935  Referring Doctor: Dr Cazares  Type of Surgery : carpal tunnel    Current Outpatient Medications   Medication Sig    amLODIPine (NORVASC) 5 MG tablet TAKE 1 TABLET BY MOUTH 2 TIMES A DAY    aspirin 81 mg Tab Take 1 tablet by mouth every evening. Every day    atorvastatin (LIPITOR) 40 MG tablet Take 1 tablet (40 mg total) by mouth once daily.    BELBUCA 300 mcg Film Place 1 film between cheek AND gum TWICE DAILY.    biotin 5 mg Cap Take 1 tablet by mouth 2 (two) times daily.    CALCIUM CARB/VIT D3/MINERALS (CALCIUM-VITAMIN D ORAL) Take 600 mg by mouth 2 (two) times daily. Calcium 1200 with D 3 1000    CRANBERRY CONC/ASCORBIC ACID (CRANBERRY PLUS VITAMIN C ORAL) Take 1 capsule by mouth once daily.    cyanocobalamin 1,000 mcg/mL injection 1,000 mcg every 30 days.    DEXILANT 60 mg capsule Take 60 mg by mouth once daily.     ENTRESTO  mg per tablet TAKE ONE TABLET BY MOUTH TWICE DAILY    fluticasone (FLONASE) 50 mcg/actuation nasal spray 2 sprays by Each Nare route once daily. (Patient taking differently: 2 sprays by Each Nostril route daily as needed.)    folic acid (FOLVITE) 1 MG tablet Take 2 mg by mouth once daily.     furosemide (LASIX) 80 MG tablet TAKE ONE TABLET BY MOUTH EVERY DAY    gabapentin (NEURONTIN) 100 MG capsule Take 2 capsules (200 mg total) by mouth every evening.    HYDROmorphone (DILAUDID) 2 MG tablet Take 2 mg by mouth every 4 (four) hours as needed for Pain.    isosorbide dinitrate (ISORDIL) 10 MG tablet TAKE ONE TABLET BY MOUTH THREE TIMES DAILY    Lactobacillus acidophilus 10 billion cell Cap Take 1 each by mouth once daily. 1.5 billion    magnesium oxide (MAG-OX) 400 mg (241.3 mg magnesium) tablet TAKE 1 TABLET BY MOUTH 2 TIMES A DAY     megestroL (MEGACE) 20 MG Tab TAKE 1 TABLET BY MOUTH EVERY DAY    metoprolol succinate (TOPROL-XL) 25 MG 24 hr tablet Take 1 tablet (25 mg total) by mouth once daily.    MULTIVITAMIN WITH MINERALS (ONE-A-DAY 50 PLUS) Tab Take 1 tablet by mouth once daily. Every day    nitroGLYCERIN 0.4 MG/DOSE TL SPRY (NITROLINGUAL) 400 mcg/spray spray Place 1 spray under the tongue every 5 (five) minutes as needed for Chest pain. PRN    ondansetron (ZOFRAN) 4 MG tablet Take 4 mg by mouth every 8 (eight) hours as needed for Nausea.     potassium chloride SA (K-DUR,KLOR-CON) 10 MEQ tablet TAKE TWO TABLETS BY MOUTH EVERY MORNING AND 1 TABLET EVERY EVENING    promethazine (PHENERGAN) 25 MG tablet Take 1 tablet (25 mg total) by mouth 2 (two) times daily as needed for Nausea.    prucalopride (MOTEGRITY) 2 mg Tab Motegrity 2 mg tablet   Take 1 tablet every day by oral route as needed for 30 days.    RESTASIS 0.05 % ophthalmic emulsion Place 1 drop into both eyes 2 (two) times daily.    sertraline (ZOLOFT) 50 MG tablet TAKE 1 TABLET (50 MG TOTAL) BY MOUTH ONCE DAILY.    tiZANidine (ZANAFLEX) 2 MG tablet TID PRN    traMADoL (ULTRAM) 50 mg tablet TAKE TWO TABLETS BY MOUTH TWICE DAILY AS NEEDED FOR SEVERE pain    vitamin E 400 unit Tab Take 400 mg by mouth once daily. Every day     No current facility-administered medications for this visit.       This patient has been assessed for risk factors for clearance of surgery with the following stipulations:    ___ No contraindications  ___ Recommendations for antiplatelet/anticoagulant medications:  _x__ Cleared for surgery   ___ Not cleared for surgery due to the following reasons:      If you have any questions regarding the above, please contact my office at [phone]    Sincerely,  .

## 2022-05-06 ENCOUNTER — PATIENT MESSAGE (OUTPATIENT)
Dept: CARDIOLOGY | Facility: CLINIC | Age: 87
End: 2022-05-06
Payer: MEDICARE

## 2022-05-10 ENCOUNTER — PATIENT MESSAGE (OUTPATIENT)
Dept: CARDIOLOGY | Facility: CLINIC | Age: 87
End: 2022-05-10
Payer: MEDICARE

## 2022-05-12 ENCOUNTER — TELEPHONE (OUTPATIENT)
Dept: FAMILY MEDICINE | Facility: CLINIC | Age: 87
End: 2022-05-12
Payer: MEDICARE

## 2022-05-12 RX ORDER — BUPRENORPHINE HYDROCHLORIDE 450 UG/1
FILM, SOLUBLE BUCCAL 2 TIMES DAILY PRN
COMMUNITY
Start: 2022-05-09 | End: 2023-01-05

## 2022-05-12 NOTE — TELEPHONE ENCOUNTER
Valorie CLEMENTE Staff  Hello,   Pt reported pain management provider increased BELBUCA 300 mcg to 450mcg.     Please let me know if I can be of further assistance. Thank you for allowing me to be a part of pt's care.     Respectfully,   PADILLA Jim   Care Coordinator   [facility]

## 2022-05-19 ENCOUNTER — OFFICE VISIT (OUTPATIENT)
Dept: DERMATOLOGY | Facility: CLINIC | Age: 87
End: 2022-05-19
Payer: MEDICARE

## 2022-05-19 VITALS
HEIGHT: 58 IN | DIASTOLIC BLOOD PRESSURE: 83 MMHG | WEIGHT: 107 LBS | SYSTOLIC BLOOD PRESSURE: 147 MMHG | HEART RATE: 85 BPM | BODY MASS INDEX: 22.46 KG/M2

## 2022-05-19 DIAGNOSIS — C44.01 BASAL CELL CARCINOMA (BCC) OF LOWER LIP: ICD-10-CM

## 2022-05-19 DIAGNOSIS — C44.311 BASAL CELL CARCINOMA OF NOSE: ICD-10-CM

## 2022-05-19 DIAGNOSIS — C44.319 BASAL CELL CARCINOMA (BCC) OF CHIN: Primary | ICD-10-CM

## 2022-05-19 PROCEDURE — 99215 OFFICE O/P EST HI 40 MIN: CPT | Mod: PBBFAC,PO | Performed by: DERMATOLOGY

## 2022-05-19 PROCEDURE — 99999 PR PBB SHADOW E&M-EST. PATIENT-LVL V: ICD-10-PCS | Mod: PBBFAC,,, | Performed by: DERMATOLOGY

## 2022-05-19 PROCEDURE — 99213 PR OFFICE/OUTPT VISIT, EST, LEVL III, 20-29 MIN: ICD-10-PCS | Mod: S$PBB,,, | Performed by: DERMATOLOGY

## 2022-05-19 PROCEDURE — 99999 PR PBB SHADOW E&M-EST. PATIENT-LVL V: CPT | Mod: PBBFAC,,, | Performed by: DERMATOLOGY

## 2022-05-19 PROCEDURE — 99213 OFFICE O/P EST LOW 20 MIN: CPT | Mod: S$PBB,,, | Performed by: DERMATOLOGY

## 2022-05-19 RX ORDER — AMOXICILLIN 500 MG
1 CAPSULE ORAL DAILY
COMMUNITY

## 2022-05-19 NOTE — PROGRESS NOTES
On ASPIRIN 81 mg  ALLERGIES:  Adhesive, Nitrofurantoin macrocrystalline, Penicillins, and Sulfa (sulfonamide antibiotics)    CHIEF COMPLAINT:  This 86 y.o. female comes for evaluation for Mohs' Micrographic Surgery, Fresh Tissue Technique, for treatment of basal cell carcinomas on the right chin, left chin, and right alar rim. Consultation requested by Genevieve Bartlett M.D..    HISTORY OF PRESENT ILLNESS:   Location: left chin, right chin and right alar rim  Duration: 24 months rightt and left chin and 4 months for or more alar rim  Context: status post biopsy by Genevieve Bartlett M.D; path = as below; pathology accession # , [facility]    Prior Treatment: none    Defibrillator: No  Pacemaker: Yes  Artificial heart valves: No  Artificial joints: No       Final Pathologic Diagnosis   Date Value Ref Range Status   04/12/2022   Final    1. Skin, left chin, shave biopsy:  -BASAL CELL CARCINOMA, NODULAR TYPE, EXTENDING TO THE DEEP BIOPSY EDGE  This lesion is skin cancer. You will be contacted regarding treatment.  2. Skin, right chin, shave biopsy:  -BASAL CELL CARCINOMA, NODULAR TYPE, EXTENDING TO THE DEEP BIOPSY EDGE  This lesion is skin cancer. You will be contacted regarding treatment.  3. Skin, right alar rim, shave biopsy:  -BASAL CELL CARCINOMA, NODULAR AND INFILTRATIVE, EXTENDING TO THE PERIPHERAL  AND DEEP BIOPSY EDGES  This lesion is skin cancer. You will be contacted regarding treatment.       Comment:     Interp By Barney Robert M.D., Signed on 04/19/2022 at 14:34           REVIEW OF SYSTEMS:   General: general health good  Skin: previous skin cancer(s) No   If yes, details:   Relevant other:  No   Cardiovascular:   High Blood Pressure: Yes   Chest Pain:  Yes  Defibrillator: as above  Pacemaker: as above  Artificial heart valves: as above  Prior Endocarditis: No   Prior Heart Attack/MI: MI 2000     If yes, when:   Prior Cardiac Bypass or Stents:  1 stent. Triple CABG   If yes, when:   Mitral Valve  Prolapse: No   Relevant other:  CHF/ CAD  Respiratory:   Shortness of breath: Yes  Relevant other:  No   Endocrine:   Diabetes:  No   Relevant other:  No   Hem/Lymph:   Taking Prescribed Blood Thinners:  81 mg aspirin   Easy Bleeding:  Yes   Relevant other:  No   Allergy/Immuno: as noted above  Relevant other:  No   GI:   Prior Hepatitis:  No     If yes, details:   Relevant other:  Gerd/ UC/ IBS   Musculoskeletal:   Artificial joints: as above  Relevant other:  No   Neurologic:   Prior Stroke:  No     If yes, details:   Relevant other:  No   Relevant other info:  No     PAST MEDICAL HISTORY:  Past Medical History:   Diagnosis Date    Abdominal hernia     Anemia     Dr Berkowitz     Angina pectoris     Aortic valve stenosis, moderate     Back pain     Cannon's esophagus     CAD (coronary artery disease)     Cataract     ou done    CHF (congestive heart failure)     EFx 35%, Dr. Spencer 13    Chronic combined systolic and diastolic heart failure 2019    Colitis     Compression fracture     Diverticulosis     Encounter for blood transfusion     GERD (gastroesophageal reflux disease)     Hyperlipidemia     Hypertension     Irritable bowel syndrome     Myocardial infarction     Osteoarthritis     lumbar DDD    Pacemaker     Pneumonia 2017    Raynaud disease     Rheumatoid arteritis     Rheumatoid arthritis     Shingles 2017    Sjogren syndrome     Ulcerative colitis        PAST SURGICAL HISTORY:  Past Surgical History:   Procedure Laterality Date    A-V CARDIAC PACEMAKER INSERTION Left 2021    Procedure: INSERTION, CARDIAC PACEMAKER, DUAL CHAMBER  ([employer]);  Surgeon: Tera Blair MD;  Location: [facility] CATH/EP LAB;  Service: Cardiology;  Laterality: Left;    APPENDECTOMY      BACK SURGERY      CARDIAC SURGERY      CABGx3 in     CATARACT EXTRACTION      ou od d 11-15-12//     SECTION, CLASSIC      x 2    CHOLECYSTECTOMY      COLONOSCOPY   09/15/2011    Dr Anaya     COLONOSCOPY  01/10/2017    [facility] report sent to scanning    CORONARY ANGIOPLASTY      PCI x 1 in     CORONARY ARTERY BYPASS GRAFT      3 vessel    CORONARY ARTERY BYPASS GRAFT      CYSTOSCOPY N/A 6/3/2020    Procedure: CYSTOSCOPY;  Surgeon: Miryam Bender Jr., MD;  [facility]: [facility];  Service: Urology;  Laterality: N/A;    eyes      rk ou    FRACTURE SURGERY  2016    Dr Guido left hip     FRACTURE SURGERY  2018    Right Hip    HYSTERECTOMY      tubal ligation, oopherectomy    INTRAMEDULLARY RODDING OF TROCHANTER OF FEMUR Right 10/8/2018    Procedure: INSERTION, INTRAMEDULLARY KELSI, FEMUR, TROCHANTER;  Surgeon: Valerio Luther MD;  Location: [facility] OR;  Service: Orthopedics;  Laterality: Right;    OOPHORECTOMY      SPINE SURGERY  2013    Silvino Mckeon    UPPER GASTROINTESTINAL ENDOSCOPY      Yag Capsulotomy Right 14        SOCIAL HISTORY:  Dependencies: smoking status as noted below  Social History     Tobacco Use    Smoking status: Former Smoker     Packs/day: 1.00     Years: 5.00     Pack years: 5.00     Quit date: 1971     Years since quittin.4    Smokeless tobacco: Never Used   Substance Use Topics    Alcohol use: Yes     Alcohol/week: 1.0 standard drink     Types: 1 Glasses of wine per week    Drug use: Never       PERTINENT MEDICATIONS:  See medications list.    Current Outpatient Medications:     amLODIPine (NORVASC) 5 MG tablet, TAKE 1 TABLET BY MOUTH 2 TIMES A DAY, Disp: 60 tablet, Rfl: 5    aspirin 81 mg Tab, Take 1 tablet by mouth every evening. Every day, Disp: , Rfl:     atorvastatin (LIPITOR) 40 MG tablet, Take 1 tablet (40 mg total) by mouth once daily., Disp: 90 tablet, Rfl: 3    biotin 5 mg Cap, Take 1 tablet by mouth 2 (two) times daily., Disp: , Rfl:     CALCIUM CARB/VIT D3/MINERALS (CALCIUM-VITAMIN D ORAL), Take 600 mg by mouth 2 (two) times daily. Calcium 1200 with D 3 1000, Disp: , Rfl:     CRANBERRY CONC/ASCORBIC ACID  (CRANBERRY PLUS VITAMIN C ORAL), Take 1 capsule by mouth once daily., Disp: , Rfl:     cyanocobalamin 1,000 mcg/mL injection, 1,000 mcg every 30 days., Disp: , Rfl:     DEXILANT 60 mg capsule, Take 60 mg by mouth once daily. , Disp: , Rfl: 11    ENTRESTO  mg per tablet, TAKE ONE TABLET BY MOUTH TWICE DAILY, Disp: 60 tablet, Rfl: 4    fluticasone (FLONASE) 50 mcg/actuation nasal spray, 2 sprays by Each Nare route once daily. (Patient taking differently: 2 sprays by Each Nostril route daily as needed.), Disp: 16 g, Rfl: 0    folic acid (FOLVITE) 1 MG tablet, Take 2 mg by mouth once daily. , Disp: , Rfl:     furosemide (LASIX) 80 MG tablet, TAKE ONE TABLET BY MOUTH EVERY DAY, Disp: 30 tablet, Rfl: 4    gabapentin (NEURONTIN) 100 MG capsule, Take 2 capsules (200 mg total) by mouth every evening., Disp: 60 capsule, Rfl: 5    HYDROmorphone (DILAUDID) 2 MG tablet, Take 2 mg by mouth every 4 (four) hours as needed for Pain., Disp: , Rfl:     isosorbide dinitrate (ISORDIL) 10 MG tablet, TAKE ONE TABLET BY MOUTH THREE TIMES DAILY, Disp: 100 tablet, Rfl: 2    Lactobacillus acidophilus 10 billion cell Cap, Take 1 each by mouth once daily. 1.5 billion, Disp: , Rfl:     magnesium oxide (MAG-OX) 400 mg (241.3 mg magnesium) tablet, TAKE 1 TABLET BY MOUTH 2 TIMES A DAY, Disp: 120 tablet, Rfl: 2    megestroL (MEGACE) 20 MG Tab, TAKE 1 TABLET BY MOUTH EVERY DAY, Disp: 90 tablet, Rfl: 0    metoprolol succinate (TOPROL-XL) 25 MG 24 hr tablet, Take 1 tablet (25 mg total) by mouth once daily., Disp: 90 tablet, Rfl: 2    MULTIVITAMIN WITH MINERALS (ONE-A-DAY 50 PLUS) Tab, Take 1 tablet by mouth once daily. Every day, Disp: , Rfl:     nitroGLYCERIN 0.4 MG/DOSE TL SPRY (NITROLINGUAL) 400 mcg/spray spray, Place 1 spray under the tongue every 5 (five) minutes as needed for Chest pain. PRN, Disp: 4.9 g, Rfl: 3    omega-3 fatty acids/fish oil (FISH OIL-OMEGA-3 FATTY ACIDS) 300-1,000 mg capsule, Take by mouth once daily.,  Disp: , Rfl:     ondansetron (ZOFRAN) 4 MG tablet, Take 4 mg by mouth every 8 (eight) hours as needed for Nausea. , Disp: , Rfl: 2    potassium chloride SA (K-DUR,KLOR-CON) 10 MEQ tablet, TAKE TWO TABLETS BY MOUTH EVERY MORNING AND 1 TABLET EVERY EVENING, Disp: 270 tablet, Rfl: 2    sertraline (ZOLOFT) 50 MG tablet, TAKE 1 TABLET (50 MG TOTAL) BY MOUTH ONCE DAILY., Disp: 30 tablet, Rfl: 5    vitamin E 400 unit Tab, Take 400 mg by mouth once daily. Every day, Disp: , Rfl:     BELBUCA 450 mcg Film, 2 (two) times a day. Place 1 film between cheek and gum twice a day, Disp: , Rfl:     promethazine (PHENERGAN) 25 MG tablet, Take 1 tablet (25 mg total) by mouth 2 (two) times daily as needed for Nausea. (Patient not taking: Reported on 5/19/2022), Disp: 60 tablet, Rfl: 1    prucalopride (MOTEGRITY) 2 mg Tab, Motegrity 2 mg tablet  Take 1 tablet every day by oral route as needed for 30 days., Disp: , Rfl:     RESTASIS 0.05 % ophthalmic emulsion, Place 1 drop into both eyes 2 (two) times daily., Disp: , Rfl:     tiZANidine (ZANAFLEX) 2 MG tablet, TID PRN (Patient not taking: Reported on 5/19/2022), Disp: 30 tablet, Rfl: 0    traMADoL (ULTRAM) 50 mg tablet, TAKE TWO TABLETS BY MOUTH TWICE DAILY AS NEEDED FOR SEVERE pain (Patient not taking: Reported on 5/19/2022), Disp: 120 tablet, Rfl: 2    ALLERGIES:  Adhesive, Nitrofurantoin macrocrystalline, Penicillins, and Sulfa (sulfonamide antibiotics)    EXAM:  Constitutional  General appearance: well-developed, well-nourished, well-kempt older white female    Neurologic/Psychiatric  Alert,  normal orientation to time, place, person  Normal mood and affect with no evidence of depression, anxiety, agitation  Skin: see photo(s)  Head: background moderate solar damage to exposed areas of skin  inspection/palpation reveals an approximately 8 mm scar on her left lateral lower lip, an approximately 8 mm scar on her right chin, and an approximately 8 mm pink plaque on her right  anterior nasal alar rim   site(s) confirmed by reference to the photograph(s) attached below taken at the time of the biopsy/biopsies by the referring physician  Photo(s) this visit:           Photo(s) from biopsy visit:      ASSESSMENT: biopsy-proven basal cell carcinomas of the left lower lip, right chin, and right nasal alar rim  chronic solar damage to areas as noted above  Long term current use of aspirin    PLAN:  The diagnoses and management options, and risks and benefits of the alternatives, including observation/non-treatment, radiation treatment, excision with vertical frozen section or paraffin-embedded section margin evaluation, and Mohs' Micrographic Surgery, Fresh Tissue Technique, were discussed at length with the patient. In particular, the discussion included, but was not limited to, the following:    One alternative at this point would be to defer further treatment and observe the lesions. With small skin cancers of this kind, it is possible that a biopsy can be sufficient to definitively treat a small skin cancer of this kind. Alternatively, some skin cancers are slow growing and do not require immediate treatment. The potential advantage of this choice would be to avoid the need for possibly unnecessary additional surgery. Among the potential disadvantages of this would be the possibility of enlargement of the lesions, more extensive spread of the lesions or recurrence at a later date, which might necessitate larger and more complex surgeries.    Radiation treatment can be an effective treatment for these types of skin cancer. The usual course of treatment is every weekday for several weeks. Local irritation will result from treatment, although no systemic side effects are expected. The potential advantage of radiation treatment is that it avoids the need for surgery. Among the disadvantages of radiation treatment are the length of treatment, the local inflammatory response, the absence of  pathologic confirmation of the removal of the skin cancer, a possible increased risk of additional skin cancer in the treated area in later years, and a somewhat increased risk of recurrence at a later date.     Excisional surgery can be an effective treatment for these types of skin cancer. This would involve excision of the lesions with margin evaluation by submitting the specimens to a pathologist for either immediate marginal assessment via frozen section processing, or delayed marginal assessment by fixed-tissue processing. The potential advantage of this technique is that it offers a way of treating the lesions with some degree of histologic confirmation of tumor removal. Among the disadvantages of this treatment are the possible need for re-excision if marginal involvement is identified, a somewhat greater likelihood of recurrence as compared to Mohs' surgery because of the less comprehensive margin evaluation inherent in the technique, and the general potential risks of surgery, including allergic reactions to the anesthetic and other materials used, infection, injury to nerves in the area with consequent loss of sensation or muscle function, and scarring or distortion of surrounding structures.    Mohs' surgery is a very effective treatment for these types of skin cancer. The potential advantage of Mohs' surgery is that this technique offers the greatest possible certainty of knowing that the skin cancer has been completely removed, with the removal of the least amount of normal tissue. The potential disadvantages of Mohs' surgery include the duration of the surgery, the possible need for a separate surgery for reconstruction following tumor removal, and scarring as a result. In addition, general potential risks of surgery as noted above also apply to treatment via Mohs' surgery.    In light of the nature of these tumors and the locations in areas of increased risk of recurrence, Mohs' micrographic surgery  was thought to be the most appropriate management choice, and these diagnoses are appropriate for treatment by Mohs' micrographic surgery.     We also discussed options for repair of the site to follow tumor removal via Mohs' surgery, including the participation of a reconstructive surgeon to reconstruct the resultant wound. Given the location, the anticipated size and potential complexity of the defect to follow tumor removal via Mohs' surgery, I anticipate coordinating her reconstruction with 1 of the head and neck surgeons here at [facility].  I will discuss her circumstances with Dr. Jackson and/or Dr. Cordova, and we will coordinate her care thereafter.    Sufficient time was available for questions, and all questions were answered to her satisfaction. She fully understands the aims, risks, alternatives, and possible complications, and has elected to proceed with the surgery, and verbally consented to do so. The procedure will be scheduled in the near future.    Routine pre-op instructions were given to her.    --------------------------------------  Note: Some or all of this note may have been generated using voice recognition software. There may be voice recognition errors including grammatical and/or spelling errors found in the text. Attempts were made to correct these errors prior to signature.

## 2022-05-23 ENCOUNTER — PATIENT MESSAGE (OUTPATIENT)
Dept: DERMATOLOGY | Facility: CLINIC | Age: 87
End: 2022-05-23
Payer: MEDICARE

## 2022-05-25 ENCOUNTER — PATIENT MESSAGE (OUTPATIENT)
Dept: CARDIOLOGY | Facility: CLINIC | Age: 87
End: 2022-05-25
Payer: MEDICARE

## 2022-05-27 ENCOUNTER — TELEPHONE (OUTPATIENT)
Dept: CARDIOLOGY | Facility: CLINIC | Age: 87
End: 2022-05-27
Payer: MEDICARE

## 2022-05-27 NOTE — TELEPHONE ENCOUNTER
----- Message from Shy Kaur sent at 5/27/2022 10:15 AM CDT -----  Contact: pt  Type: Needs Medical Advice         Who Called: [facility]AL Seals's Office- Addie Rawls Call Back Number:[phone]  Additional Information: Requesting a call back regarding They are needing the clearance for the PT that they faxed over on 05/05. Please contact office.   Please Advise- Thank you

## 2022-05-27 NOTE — LETTER
May 31, 2022    Cheyanne Gomes  [address] [facility] [address]         [facility] - Cardiology  [address]  [facility] [address]  Phone: [phone]  Fax: [phone] Patient: Cheyanne Gomes  : 1935  Referring Doctor: dr ortiz  Type of Procedure: colonoscopy    Current Outpatient Medications   Medication Sig    amLODIPine (NORVASC) 5 MG tablet TAKE 1 TABLET BY MOUTH 2 TIMES A DAY    aspirin 81 mg Tab Take 1 tablet by mouth every evening. Every day    atorvastatin (LIPITOR) 40 MG tablet Take 1 tablet (40 mg total) by mouth once daily.    BELBUCA 450 mcg Film 2 (two) times a day. Place 1 film between cheek and gum twice a day    biotin 5 mg Cap Take 1 tablet by mouth 2 (two) times daily.    CALCIUM CARB/VIT D3/MINERALS (CALCIUM-VITAMIN D ORAL) Take 600 mg by mouth 2 (two) times daily. Calcium 1200 with D 3 1000    CRANBERRY CONC/ASCORBIC ACID (CRANBERRY PLUS VITAMIN C ORAL) Take 1 capsule by mouth once daily.    cyanocobalamin 1,000 mcg/mL injection 1,000 mcg every 30 days.    DEXILANT 60 mg capsule Take 60 mg by mouth once daily.     ENTRESTO  mg per tablet TAKE ONE TABLET BY MOUTH TWICE DAILY    fluticasone (FLONASE) 50 mcg/actuation nasal spray 2 sprays by Each Nare route once daily. (Patient taking differently: 2 sprays by Each Nostril route daily as needed.)    folic acid (FOLVITE) 1 MG tablet Take 2 mg by mouth once daily.     furosemide (LASIX) 80 MG tablet TAKE ONE TABLET BY MOUTH EVERY DAY    gabapentin (NEURONTIN) 100 MG capsule Take 2 capsules (200 mg total) by mouth every evening.    HYDROmorphone (DILAUDID) 2 MG tablet Take 2 mg by mouth every 4 (four) hours as needed for Pain.    isosorbide dinitrate (ISORDIL) 10 MG tablet TAKE ONE TABLET BY MOUTH THREE TIMES DAILY    Lactobacillus acidophilus 10 billion cell Cap Take 1 each by mouth once daily. 1.5 billion    magnesium oxide (MAG-OX) 400 mg (241.3 mg magnesium) tablet TAKE 1 TABLET BY MOUTH 2  TIMES A DAY    megestroL (MEGACE) 20 MG Tab TAKE 1 TABLET BY MOUTH EVERY DAY    metoprolol succinate (TOPROL-XL) 25 MG 24 hr tablet Take 1 tablet (25 mg total) by mouth once daily.    MULTIVITAMIN WITH MINERALS (ONE-A-DAY 50 PLUS) Tab Take 1 tablet by mouth once daily. Every day    nitroGLYCERIN 0.4 MG/DOSE TL SPRY (NITROLINGUAL) 400 mcg/spray spray Place 1 spray under the tongue every 5 (five) minutes as needed for Chest pain. PRN    omega-3 fatty acids/fish oil (FISH OIL-OMEGA-3 FATTY ACIDS) 300-1,000 mg capsule Take by mouth once daily.    ondansetron (ZOFRAN) 4 MG tablet Take 4 mg by mouth every 8 (eight) hours as needed for Nausea.     potassium chloride SA (K-DUR,KLOR-CON) 10 MEQ tablet TAKE TWO TABLETS BY MOUTH EVERY MORNING AND 1 TABLET EVERY EVENING    promethazine (PHENERGAN) 25 MG tablet Take 1 tablet (25 mg total) by mouth 2 (two) times daily as needed for Nausea. (Patient not taking: Reported on 5/19/2022)    prucalopride (MOTEGRITY) 2 mg Tab Motegrity 2 mg tablet   Take 1 tablet every day by oral route as needed for 30 days.    RESTASIS 0.05 % ophthalmic emulsion Place 1 drop into both eyes 2 (two) times daily.    sertraline (ZOLOFT) 50 MG tablet TAKE 1 TABLET (50 MG TOTAL) BY MOUTH ONCE DAILY.    tiZANidine (ZANAFLEX) 2 MG tablet TID PRN (Patient not taking: Reported on 5/19/2022)    traMADoL (ULTRAM) 50 mg tablet TAKE TWO TABLETS BY MOUTH TWICE DAILY AS NEEDED FOR SEVERE pain (Patient not taking: Reported on 5/19/2022)    vitamin E 400 unit Tab Take 400 mg by mouth once daily. Every day     No current facility-administered medications for this visit.       This patient has been assessed for risk factors for clearance of surgery with the following stipulations:    __x_ No contraindications  _x__ Recommendations for antiplatelet/anticoagulant medications: ASA 7 days.   _x__ Cleared for surgery  ___ Not cleared for surgery due to the following reasons:      If you have any questions  regarding the above, please contact my office at [phone].    Sincerely,    Miladis Green PA-C   [facility] - Department of Cardiology  Office: [phone]

## 2022-05-31 ENCOUNTER — EXTERNAL CHRONIC CARE MANAGEMENT (OUTPATIENT)
Dept: PRIMARY CARE CLINIC | Facility: CLINIC | Age: 87
End: 2022-05-31
Payer: MEDICARE

## 2022-05-31 PROCEDURE — 99490 CHRNC CARE MGMT STAFF 1ST 20: CPT | Mod: PBBFAC,PO | Performed by: FAMILY MEDICINE

## 2022-05-31 PROCEDURE — 99490 PR CHRONIC CARE MGMT, 1ST 20 MIN: ICD-10-PCS | Mod: S$PBB,,, | Performed by: FAMILY MEDICINE

## 2022-05-31 PROCEDURE — 99490 CHRNC CARE MGMT STAFF 1ST 20: CPT | Mod: S$PBB,,, | Performed by: FAMILY MEDICINE

## 2022-06-02 ENCOUNTER — OFFICE VISIT (OUTPATIENT)
Dept: CARDIOLOGY | Facility: CLINIC | Age: 87
End: 2022-06-02
Payer: MEDICARE

## 2022-06-02 VITALS
SYSTOLIC BLOOD PRESSURE: 122 MMHG | WEIGHT: 107.56 LBS | BODY MASS INDEX: 22.58 KG/M2 | DIASTOLIC BLOOD PRESSURE: 72 MMHG | HEIGHT: 58 IN | HEART RATE: 60 BPM

## 2022-06-02 DIAGNOSIS — I47.10 PSVT (PAROXYSMAL SUPRAVENTRICULAR TACHYCARDIA): ICD-10-CM

## 2022-06-02 DIAGNOSIS — I42.0 CONGESTIVE CARDIOMYOPATHY: Primary | ICD-10-CM

## 2022-06-02 DIAGNOSIS — Z95.0 HISTORY OF CARDIAC PACEMAKER IN SITU: ICD-10-CM

## 2022-06-02 DIAGNOSIS — I35.0 AORTIC VALVE STENOSIS, MODERATE: ICD-10-CM

## 2022-06-02 DIAGNOSIS — N18.31 STAGE 3A CHRONIC KIDNEY DISEASE: ICD-10-CM

## 2022-06-02 PROCEDURE — 99213 PR OFFICE/OUTPT VISIT, EST, LEVL III, 20-29 MIN: ICD-10-PCS | Mod: S$GLB,,, | Performed by: INTERNAL MEDICINE

## 2022-06-02 PROCEDURE — 99213 OFFICE O/P EST LOW 20 MIN: CPT | Mod: S$GLB,,, | Performed by: INTERNAL MEDICINE

## 2022-06-02 NOTE — PROGRESS NOTES
Patient ID:  Cheyanne Gomes is a 86 y.o. female who presents for follow-up of Coronary Artery Disease, Congestive Heart Failure, Shortness of Breath, and Fatigue      She is complaining of shortness of breath and dyspnea on exertion.  She is complaining of fatigue.  Her back in general is been doing better.  She has occasional palpitations.      Past Medical History:   Diagnosis Date    Abdominal hernia     Anemia     Dr Berkowitz     Angina pectoris     Aortic valve stenosis, moderate     Back pain     Cannon's esophagus     CAD (coronary artery disease)     Cataract     ou done    CHF (congestive heart failure)     EFx 35%, Dr. Spencer 13    Chronic combined systolic and diastolic heart failure 2019    Colitis     Compression fracture     Diverticulosis     Encounter for blood transfusion     GERD (gastroesophageal reflux disease)     Hyperlipidemia     Hypertension     Irritable bowel syndrome     Myocardial infarction     Osteoarthritis     lumbar DDD    Pacemaker     Pneumonia 2017    Raynaud disease     Rheumatoid arteritis     Rheumatoid arthritis     Shingles 2017    Sjogren syndrome     Ulcerative colitis         Past Surgical History:   Procedure Laterality Date    A-V CARDIAC PACEMAKER INSERTION Left 2021    Procedure: INSERTION, CARDIAC PACEMAKER, DUAL CHAMBER  ([employer]TR[employer]);  Surgeon: Tera Blair MD;  Location: [facility] CATH/EP LAB;  Service: Cardiology;  Laterality: Left;    APPENDECTOMY      BACK SURGERY      CARDIAC SURGERY      CABGx3 in     CATARACT EXTRACTION      ou od d 11-15-12/     SECTION, CLASSIC      x 2    CHOLECYSTECTOMY      COLONOSCOPY  09/15/2011    Dr Anaya     COLONOSCOPY  01/10/2017    [facility] report sent to scanning    CORONARY ANGIOPLASTY      PCI x 1 in     CORONARY ARTERY BYPASS GRAFT      3 vessel    CORONARY ARTERY BYPASS GRAFT      CYSTOSCOPY N/A 6/3/2020    Procedure: CYSTOSCOPY;   Surgeon: Miryam Bender Jr., MD;  Location: [facility] OR;  Service: Urology;  Laterality: N/A;    eyes      rk ou    FRACTURE SURGERY  06/21/2016    Dr Guido left hip     FRACTURE SURGERY  2018    Right Hip    HYSTERECTOMY      tubal ligation, oopherectomy    INTRAMEDULLARY RODDING OF TROCHANTER OF FEMUR Right 10/8/2018    Procedure: INSERTION, INTRAMEDULLARY KELSI, FEMUR, TROCHANTER;  Surgeon: Valerio Luther MD;  Location: [facility] OR;  Service: Orthopedics;  Laterality: Right;    OOPHORECTOMY      SPINE SURGERY  8-    Silvino Mike    UPPER GASTROINTESTINAL ENDOSCOPY      Yag Capsulotomy Right 9/25/14          Current Outpatient Medications   Medication Instructions    amLODIPine (NORVASC) 5 MG tablet TAKE 1 TABLET BY MOUTH 2 TIMES A DAY    aspirin 81 mg Tab 1 tablet, Oral, Nightly, Every day    atorvastatin (LIPITOR) 40 mg, Oral, Daily    BELBUCA 450 mcg Film 2 times daily PRN, Place 1 film between cheek and gum twice a day    biotin 5 mg Cap 1 tablet, Oral, 2 times daily    CALCIUM CARB/VIT D3/MINERALS (CALCIUM-VITAMIN D ORAL) 600 mg, Oral, 2 times daily, Calcium 1200 with D 3 1000    CRANBERRY CONC/ASCORBIC ACID (CRANBERRY PLUS VITAMIN C ORAL) 1 capsule, Oral, Daily    cyanocobalamin 1,000 mcg, Every 30 days    DEXILANT 60 mg, Oral, Daily    ENTRESTO  mg per tablet TAKE ONE TABLET BY MOUTH TWICE DAILY    fluticasone (FLONASE) 50 mcg/actuation nasal spray 2 sprays, Each Nostril, Daily    folic acid (FOLVITE) 2 mg, Oral, Daily    furosemide (LASIX) 80 MG tablet TAKE ONE TABLET BY MOUTH EVERY DAY    gabapentin (NEURONTIN) 200 mg, Oral, Nightly    HYDROcodone-acetaminophen (NORCO) 5-325 mg per tablet 1 tablet, Oral, Every 6 hours PRN    HYDROmorphone (DILAUDID) 2 mg, Oral, Every 4 hours PRN    isosorbide dinitrate (ISORDIL) 10 MG tablet TAKE ONE TABLET BY MOUTH THREE TIMES DAILY    Lactobacillus acidophilus 10 billion cell Cap 1 each, Oral, Daily, 1.5 billion    magnesium oxide (MAG-OX)  400 mg (241.3 mg magnesium) tablet TAKE 1 TABLET BY MOUTH 2 TIMES A DAY    megestroL (MEGACE) 20 MG Tab TAKE 1 TABLET BY MOUTH EVERY DAY    metoprolol succinate (TOPROL-XL) 25 mg, Oral, Daily    MULTIVITAMIN WITH MINERALS (ONE-A-DAY 50 PLUS) Tab 1 tablet, Oral, Daily, Every day    nitroGLYCERIN 0.4 MG/DOSE TL SPRY (NITROLINGUAL) 400 mcg/spray spray 1 spray, Sublingual, Every 5 min PRN, PRN    omega-3 fatty acids/fish oil (FISH OIL-OMEGA-3 FATTY ACIDS) 300-1,000 mg capsule Oral, Daily    ondansetron (ZOFRAN) 4 mg, Oral, Every 8 hours PRN    potassium chloride SA (K-DUR,KLOR-CON) 10 MEQ tablet TAKE TWO TABLETS BY MOUTH EVERY MORNING AND 1 TABLET EVERY EVENING    promethazine (PHENERGAN) 25 mg, Oral, 2 times daily PRN    prucalopride (MOTEGRITY) 2 mg Tab Motegrity 2 mg tablet   Take 1 tablet every day by oral route as needed for 30 days.    RESTASIS 0.05 % ophthalmic emulsion 1 drop, Both Eyes, 2 times daily    sertraline (ZOLOFT) 50 MG tablet TAKE 1 TABLET (50 MG TOTAL) BY MOUTH ONCE DAILY.    tiZANidine (ZANAFLEX) 2 MG tablet TID PRN    traMADoL (ULTRAM) 50 mg tablet TAKE TWO TABLETS BY MOUTH TWICE DAILY AS NEEDED FOR SEVERE pain    vitamin E 400 mg, Oral, Daily, Every day    zinc gluconate 50 mg, Oral, Daily        Review of patient's allergies indicates:   Allergen Reactions    Adhesive     Nitrofurantoin macrocrystalline Nausea And Vomiting     Other reaction(s): very sickly    Penicillins Swelling     Other reaction(s): swelling  Other reaction(s): red skin discolorati    Sulfa (sulfonamide antibiotics) Nausea And Vomiting     Other reaction(s): very sickly        Review of Systems   Cardiovascular: Positive for dyspnea on exertion and leg swelling. Negative for chest pain.   Respiratory: Negative for cough.    Gastrointestinal: Negative for abdominal pain and heartburn.        Objective:     Vitals:    06/02/22 1516   BP: 122/72   BP Location: Left arm   Patient Position: Sitting   BP Method:  "Medium (Manual)   Pulse: 60   Weight: 48.8 kg (107 lb 9.4 oz)   Height: 4' 10" (1.473 m)       Physical Exam  Vitals and nursing note reviewed.   Constitutional:       Appearance: She is well-developed.   HENT:      Head: Normocephalic and atraumatic.   Eyes:      Conjunctiva/sclera: Conjunctivae normal.   Cardiovascular:      Rate and Rhythm: Normal rate and regular rhythm.      Heart sounds: Normal heart sounds.   Pulmonary:      Effort: Pulmonary effort is normal.      Breath sounds: Normal breath sounds.   Abdominal:      General: Bowel sounds are normal.      Palpations: Abdomen is soft.   Musculoskeletal:         General: Normal range of motion.   Skin:     General: Skin is warm and dry.   Neurological:      Mental Status: She is alert and oriented to person, place, and time.   Psychiatric:         Behavior: Behavior normal.         Thought Content: Thought content normal.         Judgment: Judgment normal.       CMP  Sodium   Date Value Ref Range Status   06/03/2022 142 134 - 144 mmol/L Final   12/11/2018 139 134 - 144 mmol/L      Potassium   Date Value Ref Range Status   06/03/2022 5.7 (H) 3.5 - 5.2 mmol/L Final     Chloride   Date Value Ref Range Status   06/03/2022 102 96 - 106 mmol/L Final   12/11/2018 96 (L) 98 - 110 mmol/L      CO2   Date Value Ref Range Status   06/03/2022 28 20 - 29 mmol/L Final     Glucose   Date Value Ref Range Status   06/03/2022 100 (H) 65 - 99 mg/dL Final   12/11/2018 97 70 - 99 mg/dL      BUN   Date Value Ref Range Status   06/03/2022 26 8 - 27 mg/dL Final     Creatinine   Date Value Ref Range Status   06/03/2022 1.25 (H) 0.57 - 1.00 mg/dL Final   12/11/2018 0.95 0.60 - 1.40 mg/dL      Calcium   Date Value Ref Range Status   06/03/2022 9.4 8.7 - 10.3 mg/dL Final     Total Protein   Date Value Ref Range Status   06/21/2021 7.4 6.0 - 8.4 g/dL Final     Albumin   Date Value Ref Range Status   01/07/2022 4.2 3.6 - 4.6 g/dL Final   06/21/2021 3.6 3.5 - 5.2 g/dL Final   12/11/2018 3.9 " 3.1 - 4.7 g/dL      Total Bilirubin   Date Value Ref Range Status   01/07/2022 0.2 0.0 - 1.2 mg/dL Final     Alkaline Phosphatase   Date Value Ref Range Status   06/21/2021 151 (H) 55 - 135 U/L Final     AST   Date Value Ref Range Status   01/07/2022 26 0 - 40 IU/L Final     ALT   Date Value Ref Range Status   01/07/2022 14 0 - 32 IU/L Final     Anion Gap   Date Value Ref Range Status   11/05/2021 9 8 - 16 mmol/L Final     eGFR if    Date Value Ref Range Status   11/05/2021 >60.0 >60 mL/min/1.73 m^2 Final     eGFR if non    Date Value Ref Range Status   02/21/2022 56 (L) >59 mL/min/1.73 Final      BMP  Lab Results   Component Value Date     06/03/2022    K 5.7 (H) 06/03/2022     06/03/2022    CO2 28 06/03/2022    BUN 26 06/03/2022    CREATININE 1.25 (H) 06/03/2022    CALCIUM 9.4 06/03/2022    ANIONGAP 9 11/05/2021    ESTGFRAFRICA >60.0 11/05/2021    EGFRNONAA 56 (L) 02/21/2022      BNP  @LABRCNTIP(BNP,BNPTRIAGEBLO)@   Lab Results   Component Value Date    CHOL 113 05/06/2021    CHOL 135 11/24/2020    CHOL 221 (H) 08/24/2020     Lab Results   Component Value Date    HDL 47 05/06/2021    HDL 61 11/24/2020    HDL 58 08/24/2020     Lab Results   Component Value Date    LDLCALC 51 05/06/2021    LDLCALC 55 11/24/2020    LDLCALC 110.6 08/24/2020     Lab Results   Component Value Date    TRIG 70 05/06/2021    TRIG 108 11/24/2020    TRIG 262 (H) 08/24/2020     Lab Results   Component Value Date    CHOLHDL 26.2 08/24/2020    CHOLHDL 45.0 10/16/2013    CHOLHDL 40.7 08/11/2009      Lab Results   Component Value Date    TSH 2.420 02/21/2022    FREET4 0.89 09/26/2012     Lab Results   Component Value Date    HGBA1C 5.3 02/26/2015     Lab Results   Component Value Date    WBC 6.32 06/23/2022    HGB 11.5 (L) 06/23/2022    HCT 36.8 (L) 06/23/2022    MCV 94 06/23/2022     06/23/2022         Results for orders placed during the hospital encounter of 04/28/22    Echo    Interpretation  Summary  · The left ventricle is normal in size with concentric remodeling and mildly decreased systolic function.  · The estimated ejection fraction is 40%.  · Grade I left ventricular diastolic dysfunction.  · There are segmental left ventricular wall motion abnormalities.  · Apical akinesis  · Normal right ventricular size with normal right ventricular systolic function.  · Mild aortic regurgitation.  · There is moderate aortic valve stenosis.  · Aortic valve area is 1.15 cm2; peak velocity is 2.12 m/s; mean gradient is 18 mmHg.  · Mild tricuspid regurgitation.  · Normal central venous pressure (3 mmHg).  · The estimated PA systolic pressure is 24 mmHg.  · Overall the study quality was technically difficult.     No results found for this or any previous visit.         Assessment:       PSVT (paroxysmal supraventricular tachycardia)  Will increase metoprolol to 25 mg p.o. b.i.d.    History of cardiac pacemaker in situ  Pacemaker revealed short runs of SVT on 09/21/2021    Aortic valve stenosis, moderate  Stable       Plan:       Consider fax he got 5 mg p.o. daily.  Increase metoprolol to 25 mg p.o. b.i.d..  She will be seen in the office in 2 months with a BMP as well as a BNP.

## 2022-06-03 ENCOUNTER — TELEPHONE (OUTPATIENT)
Dept: CARDIOLOGY | Facility: CLINIC | Age: 87
End: 2022-06-03
Payer: MEDICARE

## 2022-06-03 NOTE — TELEPHONE ENCOUNTER
----- Message from Iglesia Alarcon sent at 6/3/2022  9:42 AM CDT -----  Contact: Tia  Type: Needs Medical Advice    Who Called:[facility]   Best Call Back Number: [phone] fax [phone]   Additional Information: Needing to check on a Clearance that was sent over asking for follow up please call back   Please Advise-Thank you

## 2022-06-04 LAB
BNP SERPL-MCNC: 313.4 PG/ML (ref 0–100)
BUN SERPL-MCNC: 26 MG/DL (ref 8–27)
BUN/CREAT SERPL: 21 (ref 12–28)
CALCIUM SERPL-MCNC: 9.4 MG/DL (ref 8.7–10.3)
CHLORIDE SERPL-SCNC: 102 MMOL/L (ref 96–106)
CO2 SERPL-SCNC: 28 MMOL/L (ref 20–29)
CREAT SERPL-MCNC: 1.25 MG/DL (ref 0.57–1)
EST. GFR  (NO RACE VARIABLE): 42 ML/MIN/1.73
GLUCOSE SERPL-MCNC: 100 MG/DL (ref 65–99)
POTASSIUM SERPL-SCNC: 5.7 MMOL/L (ref 3.5–5.2)
SODIUM SERPL-SCNC: 142 MMOL/L (ref 134–144)

## 2022-06-07 DIAGNOSIS — I42.0 CONGESTIVE CARDIOMYOPATHY: Primary | ICD-10-CM

## 2022-06-13 ENCOUNTER — HOSPITAL ENCOUNTER (OUTPATIENT)
Dept: RADIOLOGY | Facility: HOSPITAL | Age: 87
Discharge: HOME | End: 2022-06-13
Attending: NURSE PRACTITIONER
Payer: MEDICARE

## 2022-06-13 DIAGNOSIS — Q61.3 POLYCYSTIC KIDNEY: ICD-10-CM

## 2022-06-13 PROCEDURE — 76770 US EXAM ABDO BACK WALL COMP: CPT | Mod: TC

## 2022-06-13 PROCEDURE — 76770 US RETROPERITONEAL COMPLETE: ICD-10-PCS | Mod: 26,,, | Performed by: RADIOLOGY

## 2022-06-13 PROCEDURE — 76770 US EXAM ABDO BACK WALL COMP: CPT | Mod: 26,,, | Performed by: RADIOLOGY

## 2022-06-22 ENCOUNTER — PATIENT MESSAGE (OUTPATIENT)
Dept: UROLOGY | Facility: CLINIC | Age: 87
End: 2022-06-22
Payer: MEDICARE

## 2022-06-22 NOTE — TELEPHONE ENCOUNTER
Spoke with pt and gave results of Ultrasound   several bilateral cysts as before with no changes noted at this time.  F/UP in one year   pt vu

## 2022-06-23 ENCOUNTER — LAB VISIT (OUTPATIENT)
Dept: LAB | Facility: HOSPITAL | Age: 87
End: 2022-06-23
Attending: INTERNAL MEDICINE
Payer: MEDICARE

## 2022-06-23 DIAGNOSIS — R79.89 ELEVATED FERRITIN: ICD-10-CM

## 2022-06-23 LAB
BASOPHILS # BLD AUTO: 0.05 K/UL (ref 0–0.2)
BASOPHILS NFR BLD: 0.8 % (ref 0–1.9)
DIFFERENTIAL METHOD: ABNORMAL
EOSINOPHIL # BLD AUTO: 0.3 K/UL (ref 0–0.5)
EOSINOPHIL NFR BLD: 4 % (ref 0–8)
ERYTHROCYTE [DISTWIDTH] IN BLOOD BY AUTOMATED COUNT: 12.3 % (ref 11.5–14.5)
FERRITIN SERPL-MCNC: 1739 NG/ML (ref 20–300)
HCT VFR BLD AUTO: 36.8 % (ref 37–48.5)
HGB BLD-MCNC: 11.5 G/DL (ref 12–16)
IMM GRANULOCYTES # BLD AUTO: 0.01 K/UL (ref 0–0.04)
IMM GRANULOCYTES NFR BLD AUTO: 0.2 % (ref 0–0.5)
LYMPHOCYTES # BLD AUTO: 1.1 K/UL (ref 1–4.8)
LYMPHOCYTES NFR BLD: 18 % (ref 18–48)
MCH RBC QN AUTO: 29.3 PG (ref 27–31)
MCHC RBC AUTO-ENTMCNC: 31.3 G/DL (ref 32–36)
MCV RBC AUTO: 94 FL (ref 82–98)
MONOCYTES # BLD AUTO: 0.7 K/UL (ref 0.3–1)
MONOCYTES NFR BLD: 10.3 % (ref 4–15)
NEUTROPHILS # BLD AUTO: 4.2 K/UL (ref 1.8–7.7)
NEUTROPHILS NFR BLD: 66.7 % (ref 38–73)
NRBC BLD-RTO: 0 /100 WBC
PLATELET # BLD AUTO: 177 K/UL (ref 150–450)
PMV BLD AUTO: 11.1 FL (ref 9.2–12.9)
RBC # BLD AUTO: 3.93 M/UL (ref 4–5.4)
WBC # BLD AUTO: 6.32 K/UL (ref 3.9–12.7)

## 2022-06-23 PROCEDURE — 82728 ASSAY OF FERRITIN: CPT | Performed by: INTERNAL MEDICINE

## 2022-06-23 PROCEDURE — 36415 COLL VENOUS BLD VENIPUNCTURE: CPT | Mod: PO | Performed by: INTERNAL MEDICINE

## 2022-06-23 PROCEDURE — 85025 COMPLETE CBC W/AUTO DIFF WBC: CPT | Performed by: INTERNAL MEDICINE

## 2022-06-27 ENCOUNTER — OFFICE VISIT (OUTPATIENT)
Dept: OTOLARYNGOLOGY | Facility: CLINIC | Age: 87
End: 2022-06-27
Payer: MEDICARE

## 2022-06-27 VITALS — WEIGHT: 103 LBS | BODY MASS INDEX: 21.62 KG/M2 | HEIGHT: 58 IN

## 2022-06-27 DIAGNOSIS — C44.311 BASAL CELL CARCINOMA (BCC) OF RIGHT NASAL TIP: Primary | ICD-10-CM

## 2022-06-27 DIAGNOSIS — I50.22 CHRONIC SYSTOLIC CONGESTIVE HEART FAILURE: ICD-10-CM

## 2022-06-27 DIAGNOSIS — I49.5 SICK SINUS SYNDROME: ICD-10-CM

## 2022-06-27 PROCEDURE — 99213 OFFICE O/P EST LOW 20 MIN: CPT | Mod: PBBFAC,PO | Performed by: OTOLARYNGOLOGY

## 2022-06-27 PROCEDURE — 99999 PR PBB SHADOW E&M-EST. PATIENT-LVL III: CPT | Mod: PBBFAC,,, | Performed by: OTOLARYNGOLOGY

## 2022-06-27 PROCEDURE — 99203 PR OFFICE/OUTPT VISIT, NEW, LEVL III, 30-44 MIN: ICD-10-PCS | Mod: S$PBB,,, | Performed by: OTOLARYNGOLOGY

## 2022-06-27 PROCEDURE — 99999 PR PBB SHADOW E&M-EST. PATIENT-LVL III: ICD-10-PCS | Mod: PBBFAC,,, | Performed by: OTOLARYNGOLOGY

## 2022-06-27 PROCEDURE — 99203 OFFICE O/P NEW LOW 30 MIN: CPT | Mod: S$PBB,,, | Performed by: OTOLARYNGOLOGY

## 2022-06-27 RX ORDER — HYDROCODONE BITARTRATE AND ACETAMINOPHEN 5; 325 MG/1; MG/1
1 TABLET ORAL EVERY 6 HOURS PRN
COMMUNITY
Start: 2022-06-15 | End: 2022-08-22 | Stop reason: ALTCHOICE

## 2022-06-27 RX ORDER — ZINC GLUCONATE 50 MG
50 TABLET ORAL DAILY
COMMUNITY
End: 2023-03-02

## 2022-06-27 NOTE — PROGRESS NOTES
PROGRESS NOTE    Subjective:       Patient ID: Cheyanne Gomes is a 86 y.o. female.    Chief Complaint:  No chief complaint on file.  anemia, iron overload and chf follow up.     History of Present Illness:   Cheyanne Gomes is a 86 y.o. female who presents with a history of anemia of chronic disease.    Patient feeling increasing fatigue at this time.      Date:  Ferritin  4/23/20 79  6/8/2021 1957  7/9/2021 1600  8/10/2021 1212  3/31/2022 1601    Patient had 2 units of blood at [facility] in March 2021.  She was given 3 iv iron infusions after that.  She is feeling well at this time.  No new complaints.  No bleeding, no melena.      Injectafer given May 2019        EF: 45%          Family and Social history reviewed and is unchanged from 8/9/2016.       ROS:  Review of Systems   Constitutional: Positive for appetite change. Negative for fever and unexpected weight change.   HENT: Negative for mouth sores.    Eyes: Negative for visual disturbance.   Respiratory: Negative for cough and shortness of breath.    Cardiovascular: Negative for chest pain and leg swelling.   Gastrointestinal: Positive for diarrhea. Negative for abdominal pain and blood in stool.   Genitourinary: Positive for frequency. Negative for hematuria.   Musculoskeletal: Negative for back pain.   Skin: Negative for rash.   Neurological: Negative for headaches.   Hematological: Negative for adenopathy.   Psychiatric/Behavioral: The patient is not nervous/anxious.           Current Outpatient Medications:     amLODIPine (NORVASC) 5 MG tablet, TAKE 1 TABLET BY MOUTH 2 TIMES A DAY, Disp: 60 tablet, Rfl: 5    aspirin 81 mg Tab, Take 1 tablet by mouth every evening. Every day, Disp: , Rfl:     atorvastatin (LIPITOR) 40 MG tablet, Take 1 tablet (40 mg total) by mouth once daily., Disp: 90 tablet, Rfl: 3    BELBUCA 450 mcg Film, 2 (two) times daily as needed. Place 1 film between  cheek and gum twice a day, Disp: , Rfl:     biotin 5 mg Cap, Take 1 tablet by mouth 2 (two) times daily., Disp: , Rfl:     CALCIUM CARB/VIT D3/MINERALS (CALCIUM-VITAMIN D ORAL), Take 600 mg by mouth 2 (two) times daily. Calcium 1200 with D 3 1000, Disp: , Rfl:     CRANBERRY CONC/ASCORBIC ACID (CRANBERRY PLUS VITAMIN C ORAL), Take 1 capsule by mouth once daily., Disp: , Rfl:     cyanocobalamin 1,000 mcg/mL injection, 1,000 mcg every 30 days., Disp: , Rfl:     DEXILANT 60 mg capsule, Take 60 mg by mouth once daily. , Disp: , Rfl: 11    ENTRESTO  mg per tablet, TAKE ONE TABLET BY MOUTH TWICE DAILY, Disp: 60 tablet, Rfl: 4    fluticasone (FLONASE) 50 mcg/actuation nasal spray, 2 sprays by Each Nare route once daily. (Patient taking differently: 2 sprays by Each Nostril route daily as needed.), Disp: 16 g, Rfl: 0    folic acid (FOLVITE) 1 MG tablet, Take 2 mg by mouth once daily. , Disp: , Rfl:     furosemide (LASIX) 80 MG tablet, TAKE ONE TABLET BY MOUTH EVERY DAY, Disp: 30 tablet, Rfl: 4    gabapentin (NEURONTIN) 100 MG capsule, Take 2 capsules (200 mg total) by mouth every evening., Disp: 60 capsule, Rfl: 5    HYDROcodone-acetaminophen (NORCO) 5-325 mg per tablet, Take 1 tablet by mouth every 6 (six) hours as needed., Disp: , Rfl:     HYDROmorphone (DILAUDID) 2 MG tablet, Take 2 mg by mouth every 4 (four) hours as needed for Pain., Disp: , Rfl:     isosorbide dinitrate (ISORDIL) 10 MG tablet, TAKE ONE TABLET BY MOUTH THREE TIMES DAILY, Disp: 100 tablet, Rfl: 2    Lactobacillus acidophilus 10 billion cell Cap, Take 1 each by mouth once daily. 1.5 billion, Disp: , Rfl:     magnesium oxide (MAG-OX) 400 mg (241.3 mg magnesium) tablet, TAKE 1 TABLET BY MOUTH 2 TIMES A DAY, Disp: 120 tablet, Rfl: 2    megestroL (MEGACE) 20 MG Tab, TAKE 1 TABLET BY MOUTH EVERY DAY, Disp: 90 tablet, Rfl: 0    metoprolol succinate (TOPROL-XL) 25 MG 24 hr tablet, Take 1 tablet (25 mg total) by mouth once daily., Disp: 90  "tablet, Rfl: 2    MULTIVITAMIN WITH MINERALS (ONE-A-DAY 50 PLUS) Tab, Take 1 tablet by mouth once daily. Every day, Disp: , Rfl:     nitroGLYCERIN 0.4 MG/DOSE TL SPRY (NITROLINGUAL) 400 mcg/spray spray, Place 1 spray under the tongue every 5 (five) minutes as needed for Chest pain. PRN, Disp: 4.9 g, Rfl: 3    omega-3 fatty acids/fish oil (FISH OIL-OMEGA-3 FATTY ACIDS) 300-1,000 mg capsule, Take by mouth once daily., Disp: , Rfl:     ondansetron (ZOFRAN) 4 MG tablet, Take 4 mg by mouth every 8 (eight) hours as needed for Nausea. , Disp: , Rfl: 2    potassium chloride SA (K-DUR,KLOR-CON) 10 MEQ tablet, TAKE TWO TABLETS BY MOUTH EVERY MORNING AND 1 TABLET EVERY EVENING, Disp: 270 tablet, Rfl: 2    promethazine (PHENERGAN) 25 MG tablet, Take 1 tablet (25 mg total) by mouth 2 (two) times daily as needed for Nausea., Disp: 60 tablet, Rfl: 1    prucalopride (MOTEGRITY) 2 mg Tab, Motegrity 2 mg tablet  Take 1 tablet every day by oral route as needed for 30 days., Disp: , Rfl:     RESTASIS 0.05 % ophthalmic emulsion, Place 1 drop into both eyes 2 (two) times daily., Disp: , Rfl:     sertraline (ZOLOFT) 50 MG tablet, TAKE 1 TABLET (50 MG TOTAL) BY MOUTH ONCE DAILY., Disp: 30 tablet, Rfl: 5    tiZANidine (ZANAFLEX) 2 MG tablet, TID PRN, Disp: 30 tablet, Rfl: 0    traMADoL (ULTRAM) 50 mg tablet, TAKE TWO TABLETS BY MOUTH TWICE DAILY AS NEEDED FOR SEVERE pain, Disp: 120 tablet, Rfl: 2    vitamin E 400 unit Tab, Take 400 mg by mouth once daily. Every day, Disp: , Rfl:     zinc gluconate 50 mg tablet, Take 50 mg by mouth once daily., Disp: , Rfl:         Objective:       Physical Examination:     BP (!) 161/81   Pulse 86   Temp 98 °F (36.7 °C)   Resp 18   Ht 4' 10" (1.473 m)   Wt 48.1 kg (106 lb)   BMI 22.15 kg/m²     Physical Exam  Constitutional:       Appearance: She is well-developed.   HENT:      Head: Normocephalic and atraumatic.      Right Ear: External ear normal.      Left Ear: External ear normal. "   Eyes:      Conjunctiva/sclera: Conjunctivae normal.      Pupils: Pupils are equal, round, and reactive to light.   Neck:      Thyroid: No thyromegaly.      Trachea: No tracheal deviation.   Cardiovascular:      Rate and Rhythm: Normal rate and regular rhythm.      Heart sounds: Normal heart sounds.   Pulmonary:      Effort: Pulmonary effort is normal.      Breath sounds: Normal breath sounds.   Abdominal:      General: Bowel sounds are normal. There is no distension.      Palpations: Abdomen is soft. There is no mass.      Tenderness: There is no abdominal tenderness.   Skin:     Findings: No rash.   Neurological:      Comments: Neuro intact througout   Psychiatric:         Behavior: Behavior normal.         Thought Content: Thought content normal.         Judgment: Judgment normal.         Labs:   Recent Results (from the past 336 hour(s))   CBC Auto Differential    Collection Time: 06/23/22 11:22 AM   Result Value Ref Range    WBC 6.32 3.90 - 12.70 K/uL    Hemoglobin 11.5 (L) 12.0 - 16.0 g/dL    Hematocrit 36.8 (L) 37.0 - 48.5 %    Platelets 177 150 - 450 K/uL     CMP  Sodium   Date Value Ref Range Status   06/03/2022 142 134 - 144 mmol/L Final   12/11/2018 139 134 - 144 mmol/L      Potassium   Date Value Ref Range Status   06/03/2022 5.7 (H) 3.5 - 5.2 mmol/L Final     Chloride   Date Value Ref Range Status   06/03/2022 102 96 - 106 mmol/L Final   12/11/2018 96 (L) 98 - 110 mmol/L      CO2   Date Value Ref Range Status   06/03/2022 28 20 - 29 mmol/L Final     Glucose   Date Value Ref Range Status   06/03/2022 100 (H) 65 - 99 mg/dL Final   12/11/2018 97 70 - 99 mg/dL      BUN   Date Value Ref Range Status   06/03/2022 26 8 - 27 mg/dL Final     Creatinine   Date Value Ref Range Status   06/03/2022 1.25 (H) 0.57 - 1.00 mg/dL Final   12/11/2018 0.95 0.60 - 1.40 mg/dL      Calcium   Date Value Ref Range Status   06/03/2022 9.4 8.7 - 10.3 mg/dL Final     Total Protein   Date Value Ref Range Status   06/21/2021 7.4 6.0  - 8.4 g/dL Final     Albumin   Date Value Ref Range Status   01/07/2022 4.2 3.6 - 4.6 g/dL Final   06/21/2021 3.6 3.5 - 5.2 g/dL Final   12/11/2018 3.9 3.1 - 4.7 g/dL      Total Bilirubin   Date Value Ref Range Status   01/07/2022 0.2 0.0 - 1.2 mg/dL Final     Alkaline Phosphatase   Date Value Ref Range Status   06/21/2021 151 (H) 55 - 135 U/L Final     AST   Date Value Ref Range Status   01/07/2022 26 0 - 40 IU/L Final     ALT   Date Value Ref Range Status   01/07/2022 14 0 - 32 IU/L Final     Anion Gap   Date Value Ref Range Status   11/05/2021 9 8 - 16 mmol/L Final     eGFR if    Date Value Ref Range Status   11/05/2021 >60.0 >60 mL/min/1.73 m^2 Final     eGFR if non    Date Value Ref Range Status   02/21/2022 56 (L) >59 mL/min/1.73 Final     No results found for: CEA  No results found for: PSA        Assessment/Plan:   Elevated ferritin  This continues to be high and has gone up a bit.  I discussed this today and feel that some gentle phlebotomy is indicated.  I will have her go monthly for 200cc of blood and check CBC and ferritin monthly.  Will have her back with me in 3 months to see how she is doing. Need to watch Hb closely through this process as she is slightly anemic.     Anemia of chronic disease  Note is made of Hb of 11.5g/dl.  Still need to try to get ferritin down.  See below.     Chronic systolic congestive heart failure  EF is 40%.   Will try to get ferritin down to hopefully not exacerbate this issue.       Discussion:     Follow up in about 3 months (around 9/28/2022).      Electronically signed by David Robb

## 2022-06-27 NOTE — PROGRESS NOTES
Subjective:       Patient ID: Cheyanne Gomes is a 86 y.o. female.    Chief Complaint: Basal Cell Carcinoma    Cheyanne is here for evaluation of BCC of right chin, left chin, and right nasal tip / soft tissue triangle.   The lesion has been present for month(s). no pain, norapid growth in a short time.   There is negative history of previous skin cancer.  Notable previous surgery of the head and neck: none  History of cardiac issues: yes  Anti-coagulation: ASA 81  History of H&N radiation: no  History of immunosuppression: no    Social History     Tobacco Use   Smoking Status Former Smoker    Packs/day: 1.00    Years: 5.00    Pack years: 5.00    Quit date: 1971    Years since quittin.5   Smokeless Tobacco Never Used     Social History     Substance and Sexual Activity   Alcohol Use Yes    Alcohol/week: 1.0 standard drink    Types: 1 Glasses of wine per week          Objective:        Constitutional:   She is oriented to person, place, and time. She appears well-developed and well-nourished. She appears alert. She is active. Normal speech.      Head:  Normocephalic and atraumatic. Head is without TMJ tenderness. No scars. Salivary glands normal.  Facial strength is normal.      Ears:    Right Ear: No drainage or swelling. No middle ear effusion.   Left Ear: No drainage or swelling.  No middle ear effusion.     Nose:  No mucosal edema, rhinorrhea or sinus tenderness. No turbinate hypertrophy.    4 mm area involving lateral right nasal tip and soft tissue triangle    Mouth/Throat  Oropharynx clear and moist without lesions or asymmetry, normal uvula midline and mirror exam normal. Normal dentition. No uvula swelling, lacerations or trismus. No oropharyngeal exudate. Tonsillar erythema, tonsillar exudate.      Neck:  Full range of motion with neck supple and no adenopathy. Thyroid tenderness is present. No tracheal deviation, no edema, no erythema, normal range of motion, no stridor, no crepitus and  no neck rigidity present. No thyroid mass present.     Cardiovascular:   Intact distal pulses and normal pulses.      Pulmonary/Chest:   Effort normal and breath sounds normal. No stridor.     Psychiatric:   Her speech is normal and behavior is normal. Her mood appears not anxious. Her affect is not labile.     Neurological:   She is alert and oriented to person, place, and time. No sensory deficit.     Skin:   No abrasions, lacerations, lesions, or rashes. No abrasion and no bruising noted.         Tests / Results:  none    Assessment:       1. Basal cell carcinoma (BCC) of right nasal tip          Plan:       Cheyanne Gomes is scheduled to undergo Moh's surgery for excision of the lesion. Dr. Weir will perform the resection, and I will be available for reconstruction of the defect. I discussed my tentative plan with the patient including local tissue rearrangement, possible skin graft +/- cartilage grafting. I discussed possible scarring at the region of the soft tissue triangle. I discussed that this may change based on the ultimate defect. I discussed the reconstructive ladder including healing by secondary intention all the way to interpolated flaps, if necessary.     We will plan on reconstruction under General.  Additional perioperative considerations: will clear with anesthesia.

## 2022-06-27 NOTE — Clinical Note
Can you please take a look at the chart and let me know if you have any concerns about doing her case at [facility]? She recently had uncomplicated carpal tunnel and was cleared prior to that.

## 2022-06-28 ENCOUNTER — OFFICE VISIT (OUTPATIENT)
Dept: HEMATOLOGY/ONCOLOGY | Facility: CLINIC | Age: 87
End: 2022-06-28
Payer: MEDICARE

## 2022-06-28 VITALS
RESPIRATION RATE: 18 BRPM | WEIGHT: 106 LBS | HEART RATE: 86 BPM | DIASTOLIC BLOOD PRESSURE: 81 MMHG | TEMPERATURE: 98 F | SYSTOLIC BLOOD PRESSURE: 161 MMHG | BODY MASS INDEX: 22.25 KG/M2 | HEIGHT: 58 IN

## 2022-06-28 DIAGNOSIS — R79.89 ELEVATED FERRITIN: ICD-10-CM

## 2022-06-28 DIAGNOSIS — D63.8 ANEMIA OF CHRONIC DISEASE: Chronic | ICD-10-CM

## 2022-06-28 DIAGNOSIS — I50.22 CHRONIC SYSTOLIC CONGESTIVE HEART FAILURE: ICD-10-CM

## 2022-06-28 PROBLEM — D50.0 IRON DEFICIENCY ANEMIA DUE TO CHRONIC BLOOD LOSS: Status: RESOLVED | Noted: 2019-05-23 | Resolved: 2022-06-28

## 2022-06-28 PROBLEM — D50.9 IRON DEFICIENCY ANEMIA, UNSPECIFIED: Status: RESOLVED | Noted: 2019-11-04 | Resolved: 2022-06-28

## 2022-06-28 PROCEDURE — 99214 PR OFFICE/OUTPT VISIT, EST, LEVL IV, 30-39 MIN: ICD-10-PCS | Mod: S$GLB,,, | Performed by: INTERNAL MEDICINE

## 2022-06-28 PROCEDURE — 99214 OFFICE O/P EST MOD 30 MIN: CPT | Mod: S$GLB,,, | Performed by: INTERNAL MEDICINE

## 2022-06-28 NOTE — ASSESSMENT & PLAN NOTE
This continues to be high and has gone up a bit.  I discussed this today and feel that some gentle phlebotomy is indicated.  I will have her go monthly for 200cc of blood and check CBC and ferritin monthly.  Will have her back with me in 3 months to see how she is doing. Need to watch Hb closely through this process as she is slightly anemic.

## 2022-06-30 ENCOUNTER — EXTERNAL CHRONIC CARE MANAGEMENT (OUTPATIENT)
Dept: PRIMARY CARE CLINIC | Facility: CLINIC | Age: 87
End: 2022-06-30
Payer: MEDICARE

## 2022-06-30 PROCEDURE — 99490 CHRNC CARE MGMT STAFF 1ST 20: CPT | Mod: S$PBB,,, | Performed by: FAMILY MEDICINE

## 2022-06-30 PROCEDURE — 99490 CHRNC CARE MGMT STAFF 1ST 20: CPT | Mod: PBBFAC,PO | Performed by: FAMILY MEDICINE

## 2022-06-30 PROCEDURE — 99490 PR CHRONIC CARE MGMT, 1ST 20 MIN: ICD-10-PCS | Mod: S$PBB,,, | Performed by: FAMILY MEDICINE

## 2022-07-01 LAB
BASOPHILS # BLD AUTO: 0 X10E3/UL (ref 0–0.2)
BASOPHILS NFR BLD AUTO: 1 %
EOSINOPHIL # BLD AUTO: 0.3 X10E3/UL (ref 0–0.4)
EOSINOPHIL NFR BLD AUTO: 4 %
ERYTHROCYTE [DISTWIDTH] IN BLOOD BY AUTOMATED COUNT: 11.9 % (ref 11.7–15.4)
FERRITIN SERPL-MCNC: 1486 NG/ML (ref 15–150)
HCT VFR BLD AUTO: 32.4 % (ref 34–46.6)
HGB BLD-MCNC: 10.7 G/DL (ref 11.1–15.9)
IMM GRANULOCYTES # BLD AUTO: 0 X10E3/UL (ref 0–0.1)
IMM GRANULOCYTES NFR BLD AUTO: 0 %
LYMPHOCYTES # BLD AUTO: 1.6 X10E3/UL (ref 0.7–3.1)
LYMPHOCYTES NFR BLD AUTO: 23 %
MCH RBC QN AUTO: 29.9 PG (ref 26.6–33)
MCHC RBC AUTO-ENTMCNC: 33 G/DL (ref 31.5–35.7)
MCV RBC AUTO: 91 FL (ref 79–97)
MONOCYTES # BLD AUTO: 0.6 X10E3/UL (ref 0.1–0.9)
MONOCYTES NFR BLD AUTO: 9 %
NEUTROPHILS # BLD AUTO: 4.2 X10E3/UL (ref 1.4–7)
NEUTROPHILS NFR BLD AUTO: 63 %
PLATELET # BLD AUTO: 183 X10E3/UL (ref 150–450)
RBC # BLD AUTO: 3.58 X10E6/UL (ref 3.77–5.28)
WBC # BLD AUTO: 6.7 X10E3/UL (ref 3.4–10.8)

## 2022-07-07 ENCOUNTER — TELEPHONE (OUTPATIENT)
Dept: DERMATOLOGY | Facility: CLINIC | Age: 87
End: 2022-07-07
Payer: MEDICARE

## 2022-07-07 NOTE — TELEPHONE ENCOUNTER
Called patient with surgery date of 9-21-22 for Dr. Weir and 9-23-22 for Dr. Jackson.I told the patient to follow the instructions for medication from Dr. Jackson.

## 2022-07-15 ENCOUNTER — PATIENT MESSAGE (OUTPATIENT)
Dept: CARDIOLOGY | Facility: CLINIC | Age: 87
End: 2022-07-15
Payer: MEDICARE

## 2022-07-22 LAB
BASOPHILS # BLD AUTO: 0.1 X10E3/UL (ref 0–0.2)
BASOPHILS NFR BLD AUTO: 1 %
EOSINOPHIL # BLD AUTO: 0.3 X10E3/UL (ref 0–0.4)
EOSINOPHIL NFR BLD AUTO: 5 %
ERYTHROCYTE [DISTWIDTH] IN BLOOD BY AUTOMATED COUNT: 12.2 % (ref 11.7–15.4)
FERRITIN SERPL-MCNC: 1305 NG/ML (ref 15–150)
HCT VFR BLD AUTO: 33 % (ref 34–46.6)
HGB BLD-MCNC: 10.7 G/DL (ref 11.1–15.9)
IMM GRANULOCYTES # BLD AUTO: 0 X10E3/UL (ref 0–0.1)
IMM GRANULOCYTES NFR BLD AUTO: 1 %
LYMPHOCYTES # BLD AUTO: 1.3 X10E3/UL (ref 0.7–3.1)
LYMPHOCYTES NFR BLD AUTO: 21 %
MCH RBC QN AUTO: 29.4 PG (ref 26.6–33)
MCHC RBC AUTO-ENTMCNC: 32.4 G/DL (ref 31.5–35.7)
MCV RBC AUTO: 91 FL (ref 79–97)
MONOCYTES # BLD AUTO: 0.5 X10E3/UL (ref 0.1–0.9)
MONOCYTES NFR BLD AUTO: 8 %
NEUTROPHILS # BLD AUTO: 4.1 X10E3/UL (ref 1.4–7)
NEUTROPHILS NFR BLD AUTO: 64 %
PLATELET # BLD AUTO: 164 X10E3/UL (ref 150–450)
RBC # BLD AUTO: 3.64 X10E6/UL (ref 3.77–5.28)
WBC # BLD AUTO: 6.3 X10E3/UL (ref 3.4–10.8)

## 2022-07-25 ENCOUNTER — PATIENT MESSAGE (OUTPATIENT)
Dept: CARDIOLOGY | Facility: CLINIC | Age: 87
End: 2022-07-25
Payer: MEDICARE

## 2022-07-31 ENCOUNTER — EXTERNAL CHRONIC CARE MANAGEMENT (OUTPATIENT)
Dept: PRIMARY CARE CLINIC | Facility: CLINIC | Age: 87
End: 2022-07-31
Payer: MEDICARE

## 2022-07-31 PROCEDURE — 99490 PR CHRONIC CARE MGMT, 1ST 20 MIN: ICD-10-PCS | Mod: S$PBB,,, | Performed by: FAMILY MEDICINE

## 2022-07-31 PROCEDURE — 99490 CHRNC CARE MGMT STAFF 1ST 20: CPT | Mod: S$PBB,,, | Performed by: FAMILY MEDICINE

## 2022-07-31 PROCEDURE — 99490 CHRNC CARE MGMT STAFF 1ST 20: CPT | Mod: PBBFAC,PO | Performed by: FAMILY MEDICINE

## 2022-08-08 DIAGNOSIS — Z79.899 ENCOUNTER FOR LONG-TERM (CURRENT) USE OF HIGH-RISK MEDICATION: ICD-10-CM

## 2022-08-08 DIAGNOSIS — M81.0 OSTEOPOROSIS, UNSPECIFIED OSTEOPOROSIS TYPE, UNSPECIFIED PATHOLOGICAL FRACTURE PRESENCE: Primary | ICD-10-CM

## 2022-08-09 LAB
BASOPHILS # BLD AUTO: 0 X10E3/UL (ref 0–0.2)
BASOPHILS NFR BLD AUTO: 1 %
BUN SERPL-MCNC: 21 MG/DL (ref 8–27)
BUN/CREAT SERPL: 18 (ref 12–28)
CALCIUM SERPL-MCNC: 9.2 MG/DL (ref 8.7–10.3)
CHLORIDE SERPL-SCNC: 99 MMOL/L (ref 96–106)
CO2 SERPL-SCNC: 22 MMOL/L (ref 20–29)
CREAT SERPL-MCNC: 1.18 MG/DL (ref 0.57–1)
EOSINOPHIL # BLD AUTO: 0.2 X10E3/UL (ref 0–0.4)
EOSINOPHIL NFR BLD AUTO: 3 %
ERYTHROCYTE [DISTWIDTH] IN BLOOD BY AUTOMATED COUNT: 12.3 % (ref 11.7–15.4)
EST. GFR  (NO RACE VARIABLE): 45 ML/MIN/1.73
FERRITIN SERPL-MCNC: 1206 NG/ML (ref 15–150)
GLUCOSE SERPL-MCNC: 136 MG/DL (ref 65–99)
HCT VFR BLD AUTO: 35.1 % (ref 34–46.6)
HGB BLD-MCNC: 10.6 G/DL (ref 11.1–15.9)
IMM GRANULOCYTES # BLD AUTO: 0 X10E3/UL (ref 0–0.1)
IMM GRANULOCYTES NFR BLD AUTO: 1 %
LYMPHOCYTES # BLD AUTO: 1.2 X10E3/UL (ref 0.7–3.1)
LYMPHOCYTES NFR BLD AUTO: 15 %
MCH RBC QN AUTO: 28.6 PG (ref 26.6–33)
MCHC RBC AUTO-ENTMCNC: 30.2 G/DL (ref 31.5–35.7)
MCV RBC AUTO: 95 FL (ref 79–97)
MONOCYTES # BLD AUTO: 0.6 X10E3/UL (ref 0.1–0.9)
MONOCYTES NFR BLD AUTO: 8 %
NEUTROPHILS # BLD AUTO: 5.9 X10E3/UL (ref 1.4–7)
NEUTROPHILS NFR BLD AUTO: 72 %
PLATELET # BLD AUTO: 181 X10E3/UL (ref 150–450)
POTASSIUM SERPL-SCNC: 4.1 MMOL/L (ref 3.5–5.2)
RBC # BLD AUTO: 3.7 X10E6/UL (ref 3.77–5.28)
SODIUM SERPL-SCNC: 139 MMOL/L (ref 134–144)
WBC # BLD AUTO: 8 X10E3/UL (ref 3.4–10.8)

## 2022-08-16 ENCOUNTER — OFFICE VISIT (OUTPATIENT)
Dept: RHEUMATOLOGY | Facility: CLINIC | Age: 87
End: 2022-08-16
Payer: MEDICARE

## 2022-08-16 VITALS — BODY MASS INDEX: 22.4 KG/M2 | DIASTOLIC BLOOD PRESSURE: 59 MMHG | WEIGHT: 107.19 LBS | SYSTOLIC BLOOD PRESSURE: 89 MMHG

## 2022-08-16 DIAGNOSIS — M81.0 OSTEOPOROSIS, UNSPECIFIED OSTEOPOROSIS TYPE, UNSPECIFIED PATHOLOGICAL FRACTURE PRESENCE: ICD-10-CM

## 2022-08-16 DIAGNOSIS — M35.00 SJOGREN'S SYNDROME WITHOUT EXTRAGLANDULAR INVOLVEMENT: Primary | ICD-10-CM

## 2022-08-16 DIAGNOSIS — Z79.899 ENCOUNTER FOR LONG-TERM (CURRENT) USE OF HIGH-RISK MEDICATION: ICD-10-CM

## 2022-08-16 PROCEDURE — 99213 OFFICE O/P EST LOW 20 MIN: CPT | Mod: S$GLB,,, | Performed by: INTERNAL MEDICINE

## 2022-08-16 PROCEDURE — 99213 PR OFFICE/OUTPT VISIT, EST, LEVL III, 20-29 MIN: ICD-10-PCS | Mod: S$GLB,,, | Performed by: INTERNAL MEDICINE

## 2022-08-16 NOTE — PROGRESS NOTES
[facility] RHEUMATOLOGY            PROGRESS NOTE      Subjective:       Patient ID:   NAME: Cheyanne Gomes : 1935     86 y.o. female    Referring Doc: No ref. provider found  Other Physicians:    Chief Complaint:  Sjogren's syndrome      History of Present Illness:     Patient returns today for a regularly scheduled follow-up visit for Sjogren's syndrome  The patient has fatigue sicca symptoms and myalgias.  Has noticed skin lesion on the right upper thigh for the past week or so.  No history of trauma.  No fever or chills.  No chest pains    ROS:   GEN:  No  fever, night sweats . weight is stable   + fatigue  SKIN: no rashes, no bruising, no ulcerations, no Raynaud's.  HEENT: no HA's, No visual changes, no mucosal ulcers, + sicca symptoms,  CV:   no CP, SOB, PND,+ SALINAS, no orthopnea, no palpitations  PULM: normal with + occ  SOB, No cough, hemoptysis, sputum or pleuritic pain  GI:  no abdominal pain, nausea, vomiting, constipation, diarrhea, melanotic stools, BRBPR, hematemesis, no dysphagia  NEURO: no paresthesias, headaches, visual disturbances  MUSCULOSKELETAL:no joint swelling, prolonged AM stiffness, + occ  back pain, + muscle pain  Allergies:  Review of patient's allergies indicates:   Allergen Reactions    Adhesive     Nitrofurantoin macrocrystalline Nausea And Vomiting     Other reaction(s): very sickly    Penicillins Swelling     Other reaction(s): swelling  Other reaction(s): red skin discolorati    Sulfa (sulfonamide antibiotics) Nausea And Vomiting     Other reaction(s): very sickly       Medications:    Current Outpatient Medications:     amLODIPine (NORVASC) 5 MG tablet, TAKE 1 TABLET BY MOUTH 2 TIMES A DAY, Disp: 60 tablet, Rfl: 5    aspirin 81 mg Tab, Take 1 tablet by mouth every evening. Every day, Disp: , Rfl:     atorvastatin (LIPITOR) 40 MG tablet, Take 1 tablet (40 mg total) by mouth once daily., Disp: 90 tablet, Rfl: 3    BELBUCA 450 mcg Film, 2 (two) times daily as  needed. Place 1 film between cheek and gum twice a day, Disp: , Rfl:     biotin 5 mg Cap, Take 1 tablet by mouth 2 (two) times daily., Disp: , Rfl:     CALCIUM CARB/VIT D3/MINERALS (CALCIUM-VITAMIN D ORAL), Take 600 mg by mouth 2 (two) times daily. Calcium 1200 with D 3 1000, Disp: , Rfl:     CRANBERRY CONC/ASCORBIC ACID (CRANBERRY PLUS VITAMIN C ORAL), Take 1 capsule by mouth once daily., Disp: , Rfl:     cyanocobalamin 1,000 mcg/mL injection, 1,000 mcg every 30 days., Disp: , Rfl:     DEXILANT 60 mg capsule, Take 60 mg by mouth once daily. , Disp: , Rfl: 11    ENTRESTO  mg per tablet, TAKE ONE TABLET BY MOUTH TWICE DAILY, Disp: 60 tablet, Rfl: 4    fluticasone (FLONASE) 50 mcg/actuation nasal spray, 2 sprays by Each Nare route once daily. (Patient taking differently: 2 sprays by Each Nostril route daily as needed.), Disp: 16 g, Rfl: 0    folic acid (FOLVITE) 1 MG tablet, Take 2 mg by mouth once daily. , Disp: , Rfl:     furosemide (LASIX) 80 MG tablet, TAKE ONE TABLET BY MOUTH EVERY DAY, Disp: 30 tablet, Rfl: 4    gabapentin (NEURONTIN) 100 MG capsule, Take 2 capsules (200 mg total) by mouth every evening., Disp: 60 capsule, Rfl: 5    HYDROcodone-acetaminophen (NORCO) 5-325 mg per tablet, Take 1 tablet by mouth every 6 (six) hours as needed., Disp: , Rfl:     HYDROmorphone (DILAUDID) 2 MG tablet, Take 2 mg by mouth every 4 (four) hours as needed for Pain., Disp: , Rfl:     isosorbide dinitrate (ISORDIL) 10 MG tablet, TAKE ONE TABLET BY MOUTH THREE TIMES DAILY, Disp: 100 tablet, Rfl: 2    Lactobacillus acidophilus 10 billion cell Cap, Take 1 each by mouth once daily. 1.5 billion, Disp: , Rfl:     magnesium oxide (MAG-OX) 400 mg (241.3 mg magnesium) tablet, TAKE 1 TABLET BY MOUTH 2 TIMES A DAY, Disp: 120 tablet, Rfl: 2    megestroL (MEGACE) 20 MG Tab, TAKE 1 TABLET BY MOUTH EVERY DAY, Disp: 90 tablet, Rfl: 0    metoprolol succinate (TOPROL-XL) 25 MG 24 hr tablet, Take 1 tablet (25 mg total) by  mouth once daily., Disp: 90 tablet, Rfl: 2    MULTIVITAMIN WITH MINERALS (ONE-A-DAY 50 PLUS) Tab, Take 1 tablet by mouth once daily. Every day, Disp: , Rfl:     nitroGLYCERIN 0.4 MG/DOSE TL SPRY (NITROLINGUAL) 400 mcg/spray spray, Place 1 spray under the tongue every 5 (five) minutes as needed for Chest pain. PRN, Disp: 4.9 g, Rfl: 3    omega-3 fatty acids/fish oil (FISH OIL-OMEGA-3 FATTY ACIDS) 300-1,000 mg capsule, Take by mouth once daily., Disp: , Rfl:     ondansetron (ZOFRAN) 4 MG tablet, Take 4 mg by mouth every 8 (eight) hours as needed for Nausea. , Disp: , Rfl: 2    potassium chloride SA (K-DUR,KLOR-CON) 10 MEQ tablet, TAKE TWO TABLETS BY MOUTH EVERY MORNING AND 1 TABLET EVERY EVENING, Disp: 270 tablet, Rfl: 2    promethazine (PHENERGAN) 25 MG tablet, Take 1 tablet (25 mg total) by mouth 2 (two) times daily as needed for Nausea., Disp: 60 tablet, Rfl: 1    prucalopride (MOTEGRITY) 2 mg Tab, Motegrity 2 mg tablet  Take 1 tablet every day by oral route as needed for 30 days., Disp: , Rfl:     RESTASIS 0.05 % ophthalmic emulsion, Place 1 drop into both eyes 2 (two) times daily., Disp: , Rfl:     sertraline (ZOLOFT) 50 MG tablet, TAKE 1 TABLET (50 MG TOTAL) BY MOUTH ONCE DAILY., Disp: 30 tablet, Rfl: 5    tiZANidine (ZANAFLEX) 2 MG tablet, TID PRN, Disp: 30 tablet, Rfl: 0    traMADoL (ULTRAM) 50 mg tablet, TAKE TWO TABLETS BY MOUTH TWICE DAILY AS NEEDED FOR SEVERE pain, Disp: 120 tablet, Rfl: 2    vitamin E 400 unit Tab, Take 400 mg by mouth once daily. Every day, Disp: , Rfl:     zinc gluconate 50 mg tablet, Take 50 mg by mouth once daily., Disp: , Rfl:     PMHx/PSHx Updates:            Objective:     Vitals:  Blood pressure (!) 89/59, weight 48.6 kg (107 lb 3.2 oz).    Physical Examination:   GEN: comfortable; AAOx3  SKIN:  Round lesion about 1 cm on  R upper thigh on sl erythematous base+ crust . No erythematous streaksEYES: no pallor, no icterus  NECK: no masses, thyroid normal, trachea  midline, no LAD/LN's, supple  CV: RRR with no murmur; l S1 and S2 reg. ,no gallop no rubs,   CHEST: Normal respiratory effort; CTAB; normal breath sounds; no wheeze or crackles  MUSC/Skeletal: ROM normal; no crepitus; joints without synovitis,  no deformities  No joint swelling or tenderness of PIP, MCP, wrist, elbow, shoulder, or knee joints  EXTREM: no clubbing, cyanosis,  NEURO: grossly intact; motor WNL; AAOx3; ,  PSYCH: normal mood, affect and behavior  LYMPH: normal cervical, supraclavicular          Labs:   Lab Results   Component Value Date    WBC 8.0 08/08/2022    HGB 10.6 (L) 08/08/2022    HCT 35.1 08/08/2022    MCV 95 08/08/2022     08/08/2022    CMP  @LASTLAB(NA,K,CL,CO2,GLU,BUN,Creatinine,Calcium,PROT,Albumin,Bilitot,Alkphos,AST,ALT,CRP,ESR,RF,CCP,VANCE,SSA,CPK,uric acid) )@  I have reviewed all available lab results and radiology reports.    Radiology/Diagnostic Studies:        Assessment/Plan:   (1) 86 y.o. female with diagnosis of Sjogren's .Stable  Skin lesion appears stable.See PCP or Urgent care if worsening of lesion ,fevers,chills increased erythema              Discussion:     I have explained all of the above in detail and the patient understands all of the current recommendation(s). I have answered all questions to the best of my ability and to their complete satisfaction.       The patient is to continue with the current management plan         RTC in   A few months      Electronically signed by Deepak Erazo MD    Patient notified that this office will be closing December 22, 2022. They should begin looking for another rheumatologist as soon as possible.  A list with the names and contact details of rheumatologists in the surrounding area was provided.    Answers for HPI/ROS submitted by the patient on 8/12/2022  fever: No  eye redness: No  mouth sores: No  headaches: No  shortness of breath: Yes  chest pain: No  trouble swallowing: No  diarrhea: Yes  constipation:  No  unexpected weight change: No  genital sore: No  dysuria: No  During the last 3 days, have you had a skin rash?: No  Bruises or bleeds easily: Yes  cough: No

## 2022-08-17 ENCOUNTER — PATIENT MESSAGE (OUTPATIENT)
Dept: FAMILY MEDICINE | Facility: CLINIC | Age: 87
End: 2022-08-17
Payer: MEDICARE

## 2022-08-17 NOTE — TELEPHONE ENCOUNTER
appt made 8/22/22 with Ms. Jim for symptoms on going for a couple months, fatigue, night sweats, nausea and diarrhea

## 2022-08-19 ENCOUNTER — PATIENT OUTREACH (OUTPATIENT)
Dept: ADMINISTRATIVE | Facility: HOSPITAL | Age: 87
End: 2022-08-19
Payer: MEDICARE

## 2022-08-22 ENCOUNTER — OFFICE VISIT (OUTPATIENT)
Dept: FAMILY MEDICINE | Facility: CLINIC | Age: 87
End: 2022-08-22
Payer: MEDICARE

## 2022-08-22 ENCOUNTER — LAB VISIT (OUTPATIENT)
Dept: LAB | Facility: HOSPITAL | Age: 87
End: 2022-08-22
Attending: FAMILY MEDICINE
Payer: MEDICARE

## 2022-08-22 VITALS
SYSTOLIC BLOOD PRESSURE: 124 MMHG | TEMPERATURE: 99 F | DIASTOLIC BLOOD PRESSURE: 66 MMHG | HEIGHT: 58 IN | WEIGHT: 108.94 LBS | OXYGEN SATURATION: 97 % | BODY MASS INDEX: 22.87 KG/M2 | HEART RATE: 60 BPM

## 2022-08-22 DIAGNOSIS — R79.89 ELEVATED FERRITIN: ICD-10-CM

## 2022-08-22 DIAGNOSIS — R53.83 FATIGUE, UNSPECIFIED TYPE: Primary | ICD-10-CM

## 2022-08-22 DIAGNOSIS — R53.83 FATIGUE, UNSPECIFIED TYPE: ICD-10-CM

## 2022-08-22 DIAGNOSIS — R79.9 ABNORMAL FINDING OF BLOOD CHEMISTRY, UNSPECIFIED: ICD-10-CM

## 2022-08-22 DIAGNOSIS — D63.8 ANEMIA OF CHRONIC DISEASE: Chronic | ICD-10-CM

## 2022-08-22 DIAGNOSIS — L03.115 CELLULITIS OF RIGHT THIGH: ICD-10-CM

## 2022-08-22 DIAGNOSIS — I50.22 CHRONIC SYSTOLIC CONGESTIVE HEART FAILURE: ICD-10-CM

## 2022-08-22 LAB
ALBUMIN SERPL BCP-MCNC: 4 G/DL (ref 3.5–5.2)
ALP SERPL-CCNC: 70 U/L (ref 55–135)
ALT SERPL W/O P-5'-P-CCNC: 18 U/L (ref 10–44)
ANION GAP SERPL CALC-SCNC: 8 MMOL/L (ref 8–16)
AST SERPL-CCNC: 25 U/L (ref 10–40)
BASOPHILS # BLD AUTO: 0.04 K/UL (ref 0–0.2)
BASOPHILS NFR BLD: 0.6 % (ref 0–1.9)
BILIRUB SERPL-MCNC: 0.4 MG/DL (ref 0.1–1)
BUN SERPL-MCNC: 21 MG/DL (ref 8–23)
CALCIUM SERPL-MCNC: 9.5 MG/DL (ref 8.7–10.5)
CHLORIDE SERPL-SCNC: 97 MMOL/L (ref 95–110)
CO2 SERPL-SCNC: 29 MMOL/L (ref 23–29)
CREAT SERPL-MCNC: 1.1 MG/DL (ref 0.5–1.4)
DIFFERENTIAL METHOD: ABNORMAL
EOSINOPHIL # BLD AUTO: 0.2 K/UL (ref 0–0.5)
EOSINOPHIL NFR BLD: 3.6 % (ref 0–8)
ERYTHROCYTE [DISTWIDTH] IN BLOOD BY AUTOMATED COUNT: 13.5 % (ref 11.5–14.5)
EST. GFR  (NO RACE VARIABLE): 48.9 ML/MIN/1.73 M^2
FERRITIN SERPL-MCNC: 1023 NG/ML (ref 20–300)
GLUCOSE SERPL-MCNC: 90 MG/DL (ref 70–110)
HCT VFR BLD AUTO: 34.1 % (ref 37–48.5)
HGB BLD-MCNC: 10.8 G/DL (ref 12–16)
HGB BLD-MCNC: 10.8 G/DL (ref 12–16)
IMM GRANULOCYTES # BLD AUTO: 0.02 K/UL (ref 0–0.04)
IMM GRANULOCYTES NFR BLD AUTO: 0.3 % (ref 0–0.5)
LYMPHOCYTES # BLD AUTO: 1.5 K/UL (ref 1–4.8)
LYMPHOCYTES NFR BLD: 22.1 % (ref 18–48)
MCH RBC QN AUTO: 29.2 PG (ref 27–31)
MCHC RBC AUTO-ENTMCNC: 31.7 G/DL (ref 32–36)
MCV RBC AUTO: 92 FL (ref 82–98)
MONOCYTES # BLD AUTO: 0.6 K/UL (ref 0.3–1)
MONOCYTES NFR BLD: 8.3 % (ref 4–15)
NEUTROPHILS # BLD AUTO: 4.4 K/UL (ref 1.8–7.7)
NEUTROPHILS NFR BLD: 65.1 % (ref 38–73)
NRBC BLD-RTO: 0 /100 WBC
PLATELET # BLD AUTO: 162 K/UL (ref 150–450)
PLATELET # BLD AUTO: 162 K/UL (ref 150–450)
PMV BLD AUTO: 10.9 FL (ref 9.2–12.9)
PMV BLD AUTO: 10.9 FL (ref 9.2–12.9)
POTASSIUM SERPL-SCNC: 4.8 MMOL/L (ref 3.5–5.1)
PROT SERPL-MCNC: 7 G/DL (ref 6–8.4)
RBC # BLD AUTO: 3.7 M/UL (ref 4–5.4)
SODIUM SERPL-SCNC: 134 MMOL/L (ref 136–145)
TSH SERPL DL<=0.005 MIU/L-ACNC: 2.1 UIU/ML (ref 0.4–4)
WBC # BLD AUTO: 6.73 K/UL (ref 3.9–12.7)

## 2022-08-22 PROCEDURE — 82728 ASSAY OF FERRITIN: CPT | Performed by: FAMILY MEDICINE

## 2022-08-22 PROCEDURE — 84443 ASSAY THYROID STIM HORMONE: CPT | Performed by: FAMILY MEDICINE

## 2022-08-22 PROCEDURE — 36415 COLL VENOUS BLD VENIPUNCTURE: CPT | Mod: PO | Performed by: FAMILY MEDICINE

## 2022-08-22 PROCEDURE — 80053 COMPREHEN METABOLIC PANEL: CPT | Performed by: FAMILY MEDICINE

## 2022-08-22 PROCEDURE — 99213 OFFICE O/P EST LOW 20 MIN: CPT | Mod: PBBFAC,PO | Performed by: FAMILY MEDICINE

## 2022-08-22 PROCEDURE — 85025 COMPLETE CBC W/AUTO DIFF WBC: CPT | Performed by: INTERNAL MEDICINE

## 2022-08-22 PROCEDURE — 99214 PR OFFICE/OUTPT VISIT, EST, LEVL IV, 30-39 MIN: ICD-10-PCS | Mod: S$PBB,,, | Performed by: FAMILY MEDICINE

## 2022-08-22 PROCEDURE — 99999 PR PBB SHADOW E&M-EST. PATIENT-LVL III: CPT | Mod: PBBFAC,,, | Performed by: FAMILY MEDICINE

## 2022-08-22 PROCEDURE — 99999 PR PBB SHADOW E&M-EST. PATIENT-LVL III: ICD-10-PCS | Mod: PBBFAC,,, | Performed by: FAMILY MEDICINE

## 2022-08-22 PROCEDURE — 99214 OFFICE O/P EST MOD 30 MIN: CPT | Mod: S$PBB,,, | Performed by: FAMILY MEDICINE

## 2022-08-22 RX ORDER — AMLODIPINE BESYLATE 5 MG/1
5 TABLET ORAL DAILY
COMMUNITY
End: 2023-01-06

## 2022-08-22 RX ORDER — CLINDAMYCIN HYDROCHLORIDE 300 MG/1
300 CAPSULE ORAL 3 TIMES DAILY
Qty: 21 CAPSULE | Refills: 0 | Status: SHIPPED | OUTPATIENT
Start: 2022-08-22 | End: 2022-08-29

## 2022-08-22 NOTE — PATIENT INSTRUCTIONS
Montrell Edward,     If you are due for any health screening(s) below please notify me so we can arrange them to be ordered and scheduled to maintain your health. Most healthy patients complete it. Don't lose out on improving your health.

## 2022-08-24 ENCOUNTER — PATIENT MESSAGE (OUTPATIENT)
Dept: CARDIOLOGY | Facility: CLINIC | Age: 87
End: 2022-08-24
Payer: MEDICARE

## 2022-08-26 ENCOUNTER — PATIENT MESSAGE (OUTPATIENT)
Dept: HEMATOLOGY/ONCOLOGY | Facility: CLINIC | Age: 87
End: 2022-08-26

## 2022-08-26 ENCOUNTER — PATIENT MESSAGE (OUTPATIENT)
Dept: FAMILY MEDICINE | Facility: CLINIC | Age: 87
End: 2022-08-26
Payer: MEDICARE

## 2022-08-31 ENCOUNTER — EXTERNAL CHRONIC CARE MANAGEMENT (OUTPATIENT)
Dept: PRIMARY CARE CLINIC | Facility: CLINIC | Age: 87
End: 2022-08-31
Payer: MEDICARE

## 2022-08-31 PROCEDURE — 99439 PR CHRONIC CARE MGMT, EA ADDTL 20 MIN: ICD-10-PCS | Mod: S$PBB,,, | Performed by: FAMILY MEDICINE

## 2022-08-31 PROCEDURE — 99439 CHRNC CARE MGMT STAF EA ADDL: CPT | Mod: S$PBB,,, | Performed by: FAMILY MEDICINE

## 2022-08-31 PROCEDURE — 99439 CHRNC CARE MGMT STAF EA ADDL: CPT | Mod: PBBFAC,PO | Performed by: FAMILY MEDICINE

## 2022-08-31 PROCEDURE — 99490 CHRNC CARE MGMT STAFF 1ST 20: CPT | Mod: S$PBB,,, | Performed by: FAMILY MEDICINE

## 2022-08-31 PROCEDURE — 99490 CHRNC CARE MGMT STAFF 1ST 20: CPT | Mod: PBBFAC,PO | Performed by: FAMILY MEDICINE

## 2022-08-31 PROCEDURE — 99490 PR CHRONIC CARE MGMT, 1ST 20 MIN: ICD-10-PCS | Mod: S$PBB,,, | Performed by: FAMILY MEDICINE

## 2022-09-06 ENCOUNTER — PATIENT MESSAGE (OUTPATIENT)
Dept: HEMATOLOGY/ONCOLOGY | Facility: CLINIC | Age: 87
End: 2022-09-06

## 2022-09-06 ENCOUNTER — TELEPHONE (OUTPATIENT)
Dept: HEMATOLOGY/ONCOLOGY | Facility: CLINIC | Age: 87
End: 2022-09-06

## 2022-09-06 DIAGNOSIS — D63.8 ANEMIA OF CHRONIC DISEASE: Primary | ICD-10-CM

## 2022-09-20 NOTE — H&P (VIEW-ONLY)
-On ASPIRIN 81 mg  Prior photo(s) of site(s) to confirm location(s):      Defibrillator: No  Pacemaker: Yes  Artificial heart valves: No  Artificial joints: No    ALLERGIES:   Adhesive, Nitrofurantoin macrocrystalline, Penicillins, and Sulfa (sulfonamide antibiotics)      Current Outpatient Medications:     amLODIPine (NORVASC) 5 MG tablet, Take 5 mg by mouth once daily., Disp: , Rfl:     aspirin 81 mg Tab, Take 1 tablet by mouth every evening. Every day, Disp: , Rfl:     atorvastatin (LIPITOR) 40 MG tablet, Take 1 tablet (40 mg total) by mouth once daily., Disp: 90 tablet, Rfl: 3    BELBUCA 450 mcg Film, 2 (two) times daily as needed. Place 1 film between cheek and gum twice a day, Disp: , Rfl:     biotin 5 mg Cap, Take 1 tablet by mouth 2 (two) times daily., Disp: , Rfl:     CALCIUM CARB/VIT D3/MINERALS (CALCIUM-VITAMIN D ORAL), Take 600 mg by mouth 2 (two) times daily. Calcium 1200 with D 3 1000, Disp: , Rfl:     CRANBERRY CONC/ASCORBIC ACID (CRANBERRY PLUS VITAMIN C ORAL), Take 1 capsule by mouth once daily., Disp: , Rfl:     cyanocobalamin 1,000 mcg/mL injection, 1,000 mcg every 30 days., Disp: , Rfl:     DEXILANT 60 mg capsule, Take 60 mg by mouth once daily. , Disp: , Rfl: 11    ENTRESTO  mg per tablet, TAKE ONE TABLET BY MOUTH TWICE DAILY, Disp: 60 tablet, Rfl: 4    fluticasone (FLONASE) 50 mcg/actuation nasal spray, 2 sprays by Each Nare route once daily. (Patient taking differently: 2 sprays by Each Nostril route daily as needed.), Disp: 16 g, Rfl: 0    folic acid (FOLVITE) 1 MG tablet, Take 2 mg by mouth once daily. , Disp: , Rfl:     furosemide (LASIX) 80 MG tablet, TAKE ONE TABLET BY MOUTH EVERY DAY, Disp: 30 tablet, Rfl: 4    gabapentin (NEURONTIN) 100 MG capsule, Take 2 capsules (200 mg total) by mouth every evening., Disp: 60 capsule, Rfl: 5    HYDROmorphone (DILAUDID) 2 MG tablet, Take 2 mg by mouth every 4 (four) hours as needed for Pain., Disp: , Rfl:     isosorbide dinitrate (ISORDIL) 10 MG  tablet, TAKE ONE TABLET BY MOUTH THREE TIMES DAILY, Disp: 100 tablet, Rfl: 2    Lactobacillus acidophilus 10 billion cell Cap, Take 1 each by mouth once daily. 1.5 billion, Disp: , Rfl:     magnesium oxide (MAG-OX) 400 mg (241.3 mg magnesium) tablet, TAKE 1 TABLET BY MOUTH 2 TIMES A DAY, Disp: 120 tablet, Rfl: 2    megestroL (MEGACE) 20 MG Tab, TAKE 1 TABLET BY MOUTH EVERY DAY (Patient not taking: Reported on 8/22/2022.), Disp: 90 tablet, Rfl: 0    metoprolol succinate (TOPROL-XL) 25 MG 24 hr tablet, Take 1 tablet (25 mg total) by mouth once daily., Disp: 90 tablet, Rfl: 2    MULTIVITAMIN WITH MINERALS (ONE-A-DAY 50 PLUS) Tab, Take 1 tablet by mouth once daily. Every day, Disp: , Rfl:     nitroGLYCERIN 0.4 MG/DOSE TL SPRY (NITROLINGUAL) 400 mcg/spray spray, Place 1 spray under the tongue every 5 (five) minutes as needed for Chest pain. PRN, Disp: 4.9 g, Rfl: 3    omega-3 fatty acids/fish oil (FISH OIL-OMEGA-3 FATTY ACIDS) 300-1,000 mg capsule, Take by mouth once daily., Disp: , Rfl:     ondansetron (ZOFRAN) 4 MG tablet, Take 4 mg by mouth every 8 (eight) hours as needed for Nausea. , Disp: , Rfl: 2    potassium chloride SA (K-DUR,KLOR-CON) 10 MEQ tablet, TAKE TWO TABLETS BY MOUTH EVERY MORNING AND 1 TABLET EVERY EVENING, Disp: 270 tablet, Rfl: 2    promethazine (PHENERGAN) 25 MG tablet, Take 1 tablet (25 mg total) by mouth 2 (two) times daily as needed for Nausea., Disp: 60 tablet, Rfl: 1    sertraline (ZOLOFT) 50 MG tablet, TAKE 1 TABLET (50 MG TOTAL) BY MOUTH ONCE DAILY., Disp: 30 tablet, Rfl: 5    tiZANidine (ZANAFLEX) 2 MG tablet, TID PRN (Patient not taking: Reported on 8/22/2022), Disp: 30 tablet, Rfl: 0    traMADoL (ULTRAM) 50 mg tablet, TAKE TWO TABLETS BY MOUTH TWICE DAILY AS NEEDED FOR SEVERE pain, Disp: 120 tablet, Rfl: 2    vitamin E 400 unit Tab, Take 400 mg by mouth once daily. Every day, Disp: , Rfl:     zinc gluconate 50 mg tablet, Take 50 mg by mouth once daily., Disp: , Rfl:    -------------------------------------------------------------  PROCEDURE: Mohs' Micrographic Surgery to three sites    FIRST SITE: right alar rim    INDICATION: basal cell carcinoma in an area at increased risk of recurrence    CASE NUMBER: [id number]      ANESTHETIC: 12.5 mL 1% Lidocaine with Epinephrine 1:100,000   And 1.5 mL of 0.5% Marcaine    SURGICAL PREP: Ethanol and Hibiclens    SURGEON: Jeff Weir MD    ASSISTANTS: Radhames Flower CST     PREOPERATIVE DIAGNOSIS: basal cell carcinoma .    POSTOPERATIVE DIAGNOSIS: basal cell carcinoma     PATHOLOGIC DIAGNOSIS: nodular/infiltrative basal cell carcinoma     STAGES OF MOHS' SURGERY PERFORMED: 5    TUMOR-FREE PLANE ACHIEVED: yes    HEMOSTASIS: Hyfrecation     SPECIMENS: five (one in stage A, one in stage B, one in stage C, one in stage D, one in stage E)    INITIAL LESION SIZE: 0.9 x 1.2 cm    FINAL DEFECT SIZE: 2.4 x 2.6 cm    WOUND REPAIR/DISPOSITION: see below    ---------------------------------------    SECOND SITE: right chin    INDICATION: basal cell carcinoma in an area at increased risk of recurrence    CASE NUMBER: [id number]      ANESTHETIC: as above    SURGICAL PREP: as above    SURGEON: Jeff Weir MD    ASSISTANTS: as above    PREOPERATIVE DIAGNOSIS: basal cell carcinoma     POSTOPERATIVE DIAGNOSIS: basal cell carcinoma     PATHOLOGIC DIAGNOSIS: basal cell carcinoma     STAGES OF MOHS' SURGERY PERFORMED: one    TUMOR-FREE PLANE ACHIEVED: yes    HEMOSTASIS: Hyfrecation     SPECIMENS: one (one in stage A)    INITIAL LESION SIZE: 0.8 x 1.2 cm    FINAL DEFECT SIZE: 1.0 x 1.3 cm    WOUND REPAIR/DISPOSITION: see below    ---------------------------------------    THIRD SITE: left chin    INDICATION: basal cell carcinoma in an area at increased risk of recurrence    CASE NUMBER: [id number]      ANESTHETIC: as above    SURGICAL PREP: as above    SURGEON: Jeff Weir MD    ASSISTANTS: as above    PREOPERATIVE DIAGNOSIS: basal cell  carcinoma     POSTOPERATIVE DIAGNOSIS: basal cell carcinoma     PATHOLOGIC DIAGNOSIS: basal cell carcinoma     STAGES OF MOHS' SURGERY PERFORMED: one    TUMOR-FREE PLANE ACHIEVED: yes    HEMOSTASIS: Hyfrecation     SPECIMENS: one (one in stage A)    INITIAL LESION SIZE: 1.0 x 1.2 cm    FINAL DEFECT SIZE: 1.3 x 1.4 cm    WOUND REPAIR/DISPOSITION: see below    NARRATIVE:    The patient is a 86 y.o.female referred by Genevieve Bartlett MD with a history of cancers on the left chin and right chin and right alar rim which were biopsied - pathology accession # [id number]-, [facility]. Findings revealed basal cell carcinoma at the left chin site and basal cell carcinoma at the right chin site and nodular/infiltrative basal cell carcinoma at the right alar rim site. Examination revealed hypopigmented scars on the right nasal alar rim, the right chin and the left chin at the sites of prior biopsies, which were confirmed by reference to the photograph(s) taken at the previous patient visit, as attached above. In light of the nature of these tumors and the locations on the mid face, Mohs' micrographic surgery was thought to be the most appropriate management choice, and these diagnoses are appropriate for treatment by Mohs' micrographic surgery.  I discussed it with the patient and she fully understands the aims, risks, alternatives, and possible complications, and elects to proceed.  There are no medical or surgical contraindications to the procedure.     A signed informed consent was obtained.    PROCEDURE:  The patient was placed in the semi-recumbent position on the operating table in the Mohs' Surgery Suite. The areas in question was thoroughly prepped with ethanol and Hibiclens, with particular care to avoid application to the immediate periocular skin . A sterile surgical marker was used to outline the clinically apparent margins of the involved area, and a narrow margin of normal-appearing skin, at each site.  "Reference marks were made at the periphery of the outlined area at each site with the surgical marker. The proposed areas of excision were measured and photographed. Local anesthesia as noted above was administered  The total volume of anesthetic used throughout this portion of the procedure was as documented above. The areas were prepared and draped in the standard manner. All of the grossly identifiable area of clinically abnormal tissue and an underlying/peripheral layer was taken at each site and processed by the Mohs' technique.  Hemostasis was obtained with the hyfrecator. Tissue was taken from any areas of residual marginal involvement (if present) at each site and processed by the Mohs' technique in as many stages as needed until a tumor-free plane was achieved at each site.    Colors of inks used in the reference nicks at epidermal margins (if present) and/or inking of non-epithelial edges, if applicable, is represented on the Mohs map as follows: solid lines represent red ink, dots represent blue ink, jagged lines represent black ink, curlicues represent green ink, "xxx" represents yellow ink.    FIRST SITE: right alar rim  The first Mohs' layer consisted of one section(s) with 6 slide(s) evaluated. Some residual tumor was noted at the margins of the first Mohs' layer. Histology of the specimen(s) showed a focus of superficial basal cell carcinoma, manifested as nests of basaloid cells with hyperchromatic nuclei, peripheral palisading, and artifactual clefting around the nests/strands, taking origin/budding from the undersurface of the epidermis between the red and blue nick, as noted on the Mohs map. In addition,  on later cuts, a focus of basal cell carcinoma, manifested as nests of basaloid cells with hyperchromatic nuclei, peripheral palisading, and artifactual clefting around the nests was noted at the deep margin near the green nick, as noted on the Mohs' map; multiple earlier cuts were clear in this " area and actual surgical margins were assessed as clear.      The second Mohs' layer consisted of one section(s) with 2 slide(s) evaluated. Some residual tumor was noted at the margins of the second Mohs' layer. Histology of the specimen(s) showed superficial basal cell carcinoma, manifested as nests of basaloid cells with hyperchromatic nuclei, peripheral palisading, and artifactual clefting around the nests/strands, taking origin/budding from the undersurface of the epidermis, as noted on the Mohs map .    The third Mohs' layer consisted of one section(s) with 3 slide(s) evaluated. Some residual tumor was noted at the margins of the third Mohs' layer. Histology of the specimen(s) showed  superficial basal cell carcinoma, manifested as nests of basaloid cells with hyperchromatic nuclei, peripheral palisading, and artifactual clefting around the nests/strands, taking origin/budding from the undersurface of the epidermis, as noted on the Mohs map .    The fourth Mohs' layer consisted of 1 section(s) with 3 slide(s) evaluated. Some residual tumor was noted at the margins of the fourth Mohs' layer. Histology of the specimen(s) showed follicular basal cell carcinoma, manifested as nests of basaloid cells with hyperchromatic nuclei, peripheral palisading, and artifactual clefting around the nests/strands, taking origin/replacing a hair follicle in the superficial dermis, as note on the Mohs map.    The fifth Mohs' layer consisted of 1 section(s) with 2 slide(s) evaluated. No residual tumor was noted at the margins of the fifth Mohs' layer.    A total of five section(s) and 16 slide(s) were examined under the microscope via the Mohs technique.  A cancer free plane was reached after layer number five. Defect final size was as noted above.      SECOND SITE: right chin  The first Mohs' layer consisted of one section(s) with 4 slide(s) evaluated. Histology of the specimen(s) showed, on later cuts, a focus of basal cell  carcinoma, manifested as nests of basaloid cells with hyperchromatic nuclei, peripheral palisading, and artifactual clefting around the nests near the red nick, as noted on the Mohs' map; multiple earlier cuts were clear in this area and actual surgical margins were assessed as clear; also noted on later cuts only was scarring at the deep margin between the red and blue nicks, as noted on the Mohs map.      A total of one section(s) and 4 slide(s) were examined under the microscope via the Mohs technique.  A cancer free plane was reached after layer number one. Defect final size was as noted above.      THIRD SITE: left chin  The first Mohs' layer consisted of one section(s) with 4 slide(s) evaluated. Histology of the specimen(s) showed, on later cuts, a focus of basal cell carcinoma, manifested as nests of basaloid cells with hyperchromatic nuclei, peripheral palisading, and artifactual clefting around the nests/strands in the dermis near the blue nick, as noted on the Mohs' map; multiple earlier cuts were clear in this area and actual surgical margins were assessed as clear.      A total of one section(s) and 4 slide(s) were examined under the microscope via the Mohs technique.  A cancer free plane was reached after layer number one. Defect final size was as noted above.      The wounds were covered with nonadherent dressings between stages, and the patient allowed to wait in the waiting area during these periods. The final defects were photographed at the completion of the Mohs' procedure.    The patient was returned to the procedure room following completion of the Mohs' procedure and final slide review. She is scheduled to see Delvis Jackson MD  for closure of the defects in two days as previously arranged.    Final dressing(s) consisted of Telfa and tape on the right and left chin sites and Telfa, held place by several #4-0 silk sutures at the inferior/alar margin and tape on the right alar site.    Estimated  blood loss for the total procedure was less than 10 mL.    Total operative time including tissue processing in the Mohs' laboratory and microscopic Mohs' frozen section slide review was 6.5 hour(s). Verbal and written wound care instructions were given to the patient, and she expressed understanding of these instructions. The patient tolerated the procedure well and left the operating room in good condition; she is to return PRN to me.    Dr. Jackson's cell phone number was given to the patient with instructions to call prn with any problems.

## 2022-09-20 NOTE — PROGRESS NOTES
-On ASPIRIN 81 mg  Prior photo(s) of site(s) to confirm location(s):      Defibrillator: No  Pacemaker: Yes  Artificial heart valves: No  Artificial joints: No    ALLERGIES:   Adhesive, Nitrofurantoin macrocrystalline, Penicillins, and Sulfa (sulfonamide antibiotics)      Current Outpatient Medications:     amLODIPine (NORVASC) 5 MG tablet, Take 5 mg by mouth once daily., Disp: , Rfl:     aspirin 81 mg Tab, Take 1 tablet by mouth every evening. Every day, Disp: , Rfl:     atorvastatin (LIPITOR) 40 MG tablet, Take 1 tablet (40 mg total) by mouth once daily., Disp: 90 tablet, Rfl: 3    BELBUCA 450 mcg Film, 2 (two) times daily as needed. Place 1 film between cheek and gum twice a day, Disp: , Rfl:     biotin 5 mg Cap, Take 1 tablet by mouth 2 (two) times daily., Disp: , Rfl:     CALCIUM CARB/VIT D3/MINERALS (CALCIUM-VITAMIN D ORAL), Take 600 mg by mouth 2 (two) times daily. Calcium 1200 with D 3 1000, Disp: , Rfl:     CRANBERRY CONC/ASCORBIC ACID (CRANBERRY PLUS VITAMIN C ORAL), Take 1 capsule by mouth once daily., Disp: , Rfl:     cyanocobalamin 1,000 mcg/mL injection, 1,000 mcg every 30 days., Disp: , Rfl:     DEXILANT 60 mg capsule, Take 60 mg by mouth once daily. , Disp: , Rfl: 11    ENTRESTO  mg per tablet, TAKE ONE TABLET BY MOUTH TWICE DAILY, Disp: 60 tablet, Rfl: 4    fluticasone (FLONASE) 50 mcg/actuation nasal spray, 2 sprays by Each Nare route once daily. (Patient taking differently: 2 sprays by Each Nostril route daily as needed.), Disp: 16 g, Rfl: 0    folic acid (FOLVITE) 1 MG tablet, Take 2 mg by mouth once daily. , Disp: , Rfl:     furosemide (LASIX) 80 MG tablet, TAKE ONE TABLET BY MOUTH EVERY DAY, Disp: 30 tablet, Rfl: 4    gabapentin (NEURONTIN) 100 MG capsule, Take 2 capsules (200 mg total) by mouth every evening., Disp: 60 capsule, Rfl: 5    HYDROmorphone (DILAUDID) 2 MG tablet, Take 2 mg by mouth every 4 (four) hours as needed for Pain., Disp: , Rfl:     isosorbide dinitrate (ISORDIL) 10 MG  tablet, TAKE ONE TABLET BY MOUTH THREE TIMES DAILY, Disp: 100 tablet, Rfl: 2    Lactobacillus acidophilus 10 billion cell Cap, Take 1 each by mouth once daily. 1.5 billion, Disp: , Rfl:     magnesium oxide (MAG-OX) 400 mg (241.3 mg magnesium) tablet, TAKE 1 TABLET BY MOUTH 2 TIMES A DAY, Disp: 120 tablet, Rfl: 2    megestroL (MEGACE) 20 MG Tab, TAKE 1 TABLET BY MOUTH EVERY DAY (Patient not taking: Reported on 8/22/2022.), Disp: 90 tablet, Rfl: 0    metoprolol succinate (TOPROL-XL) 25 MG 24 hr tablet, Take 1 tablet (25 mg total) by mouth once daily., Disp: 90 tablet, Rfl: 2    MULTIVITAMIN WITH MINERALS (ONE-A-DAY 50 PLUS) Tab, Take 1 tablet by mouth once daily. Every day, Disp: , Rfl:     nitroGLYCERIN 0.4 MG/DOSE TL SPRY (NITROLINGUAL) 400 mcg/spray spray, Place 1 spray under the tongue every 5 (five) minutes as needed for Chest pain. PRN, Disp: 4.9 g, Rfl: 3    omega-3 fatty acids/fish oil (FISH OIL-OMEGA-3 FATTY ACIDS) 300-1,000 mg capsule, Take by mouth once daily., Disp: , Rfl:     ondansetron (ZOFRAN) 4 MG tablet, Take 4 mg by mouth every 8 (eight) hours as needed for Nausea. , Disp: , Rfl: 2    potassium chloride SA (K-DUR,KLOR-CON) 10 MEQ tablet, TAKE TWO TABLETS BY MOUTH EVERY MORNING AND 1 TABLET EVERY EVENING, Disp: 270 tablet, Rfl: 2    promethazine (PHENERGAN) 25 MG tablet, Take 1 tablet (25 mg total) by mouth 2 (two) times daily as needed for Nausea., Disp: 60 tablet, Rfl: 1    sertraline (ZOLOFT) 50 MG tablet, TAKE 1 TABLET (50 MG TOTAL) BY MOUTH ONCE DAILY., Disp: 30 tablet, Rfl: 5    tiZANidine (ZANAFLEX) 2 MG tablet, TID PRN (Patient not taking: Reported on 8/22/2022), Disp: 30 tablet, Rfl: 0    traMADoL (ULTRAM) 50 mg tablet, TAKE TWO TABLETS BY MOUTH TWICE DAILY AS NEEDED FOR SEVERE pain, Disp: 120 tablet, Rfl: 2    vitamin E 400 unit Tab, Take 400 mg by mouth once daily. Every day, Disp: , Rfl:     zinc gluconate 50 mg tablet, Take 50 mg by mouth once daily., Disp: , Rfl:    -------------------------------------------------------------  PROCEDURE: Mohs' Micrographic Surgery to three sites    FIRST SITE: right alar rim    INDICATION: basal cell carcinoma in an area at increased risk of recurrence    CASE NUMBER: [id number]      ANESTHETIC: 12.5 mL 1% Lidocaine with Epinephrine 1:100,000   And 1.5 mL of 0.5% Marcaine    SURGICAL PREP: Ethanol and Hibiclens    SURGEON: Jeff Weir MD    ASSISTANTS: Radhames Flower CST     PREOPERATIVE DIAGNOSIS: basal cell carcinoma .    POSTOPERATIVE DIAGNOSIS: basal cell carcinoma     PATHOLOGIC DIAGNOSIS: nodular/infiltrative basal cell carcinoma     STAGES OF MOHS' SURGERY PERFORMED: 5    TUMOR-FREE PLANE ACHIEVED: yes    HEMOSTASIS: Hyfrecation     SPECIMENS: five (one in stage A, one in stage B, one in stage C, one in stage D, one in stage E)    INITIAL LESION SIZE: 0.9 x 1.2 cm    FINAL DEFECT SIZE: 2.4 x 2.6 cm    WOUND REPAIR/DISPOSITION: see below    ---------------------------------------    SECOND SITE: right chin    INDICATION: basal cell carcinoma in an area at increased risk of recurrence    CASE NUMBER: [id number]      ANESTHETIC: as above    SURGICAL PREP: as above    SURGEON: eJff Weir MD    ASSISTANTS: as above    PREOPERATIVE DIAGNOSIS: basal cell carcinoma     POSTOPERATIVE DIAGNOSIS: basal cell carcinoma     PATHOLOGIC DIAGNOSIS: basal cell carcinoma     STAGES OF MOHS' SURGERY PERFORMED: one    TUMOR-FREE PLANE ACHIEVED: yes    HEMOSTASIS: Hyfrecation     SPECIMENS: one (one in stage A)    INITIAL LESION SIZE: 0.8 x 1.2 cm    FINAL DEFECT SIZE: 1.0 x 1.3 cm    WOUND REPAIR/DISPOSITION: see below    ---------------------------------------    THIRD SITE: left chin    INDICATION: basal cell carcinoma in an area at increased risk of recurrence    CASE NUMBER: [id number]      ANESTHETIC: as above    SURGICAL PREP: as above    SURGEON: Jeff Weir MD    ASSISTANTS: as above    PREOPERATIVE DIAGNOSIS: basal cell  carcinoma     POSTOPERATIVE DIAGNOSIS: basal cell carcinoma     PATHOLOGIC DIAGNOSIS: basal cell carcinoma     STAGES OF MOHS' SURGERY PERFORMED: one    TUMOR-FREE PLANE ACHIEVED: yes    HEMOSTASIS: Hyfrecation     SPECIMENS: one (one in stage A)    INITIAL LESION SIZE: 1.0 x 1.2 cm    FINAL DEFECT SIZE: 1.3 x 1.4 cm    WOUND REPAIR/DISPOSITION: see below    NARRATIVE:    The patient is a 86 y.o.female referred by Genevieve Bartlett MD with a history of cancers on the left chin and right chin and right alar rim which were biopsied - pathology accession # [id number]-, [facility]. Findings revealed basal cell carcinoma at the left chin site and basal cell carcinoma at the right chin site and nodular/infiltrative basal cell carcinoma at the right alar rim site. Examination revealed hypopigmented scars on the right nasal alar rim, the right chin and the left chin at the sites of prior biopsies, which were confirmed by reference to the photograph(s) taken at the previous patient visit, as attached above. In light of the nature of these tumors and the locations on the mid face, Mohs' micrographic surgery was thought to be the most appropriate management choice, and these diagnoses are appropriate for treatment by Mohs' micrographic surgery.  I discussed it with the patient and she fully understands the aims, risks, alternatives, and possible complications, and elects to proceed.  There are no medical or surgical contraindications to the procedure.     A signed informed consent was obtained.    PROCEDURE:  The patient was placed in the semi-recumbent position on the operating table in the Mohs' Surgery Suite. The areas in question was thoroughly prepped with ethanol and Hibiclens, with particular care to avoid application to the immediate periocular skin . A sterile surgical marker was used to outline the clinically apparent margins of the involved area, and a narrow margin of normal-appearing skin, at each site.  "Reference marks were made at the periphery of the outlined area at each site with the surgical marker. The proposed areas of excision were measured and photographed. Local anesthesia as noted above was administered  The total volume of anesthetic used throughout this portion of the procedure was as documented above. The areas were prepared and draped in the standard manner. All of the grossly identifiable area of clinically abnormal tissue and an underlying/peripheral layer was taken at each site and processed by the Mohs' technique.  Hemostasis was obtained with the hyfrecator. Tissue was taken from any areas of residual marginal involvement (if present) at each site and processed by the Mohs' technique in as many stages as needed until a tumor-free plane was achieved at each site.    Colors of inks used in the reference nicks at epidermal margins (if present) and/or inking of non-epithelial edges, if applicable, is represented on the Mohs map as follows: solid lines represent red ink, dots represent blue ink, jagged lines represent black ink, curlicues represent green ink, "xxx" represents yellow ink.    FIRST SITE: right alar rim  The first Mohs' layer consisted of one section(s) with 6 slide(s) evaluated. Some residual tumor was noted at the margins of the first Mohs' layer. Histology of the specimen(s) showed a focus of superficial basal cell carcinoma, manifested as nests of basaloid cells with hyperchromatic nuclei, peripheral palisading, and artifactual clefting around the nests/strands, taking origin/budding from the undersurface of the epidermis between the red and blue nick, as noted on the Mohs map. In addition,  on later cuts, a focus of basal cell carcinoma, manifested as nests of basaloid cells with hyperchromatic nuclei, peripheral palisading, and artifactual clefting around the nests was noted at the deep margin near the green nick, as noted on the Mohs' map; multiple earlier cuts were clear in this " area and actual surgical margins were assessed as clear.      The second Mohs' layer consisted of one section(s) with 2 slide(s) evaluated. Some residual tumor was noted at the margins of the second Mohs' layer. Histology of the specimen(s) showed superficial basal cell carcinoma, manifested as nests of basaloid cells with hyperchromatic nuclei, peripheral palisading, and artifactual clefting around the nests/strands, taking origin/budding from the undersurface of the epidermis, as noted on the Mohs map .    The third Mohs' layer consisted of one section(s) with 3 slide(s) evaluated. Some residual tumor was noted at the margins of the third Mohs' layer. Histology of the specimen(s) showed  superficial basal cell carcinoma, manifested as nests of basaloid cells with hyperchromatic nuclei, peripheral palisading, and artifactual clefting around the nests/strands, taking origin/budding from the undersurface of the epidermis, as noted on the Mohs map .    The fourth Mohs' layer consisted of 1 section(s) with 3 slide(s) evaluated. Some residual tumor was noted at the margins of the fourth Mohs' layer. Histology of the specimen(s) showed follicular basal cell carcinoma, manifested as nests of basaloid cells with hyperchromatic nuclei, peripheral palisading, and artifactual clefting around the nests/strands, taking origin/replacing a hair follicle in the superficial dermis, as note on the Mohs map.    The fifth Mohs' layer consisted of 1 section(s) with 2 slide(s) evaluated. No residual tumor was noted at the margins of the fifth Mohs' layer.    A total of five section(s) and 16 slide(s) were examined under the microscope via the Mohs technique.  A cancer free plane was reached after layer number five. Defect final size was as noted above.      SECOND SITE: right chin  The first Mohs' layer consisted of one section(s) with 4 slide(s) evaluated. Histology of the specimen(s) showed, on later cuts, a focus of basal cell  carcinoma, manifested as nests of basaloid cells with hyperchromatic nuclei, peripheral palisading, and artifactual clefting around the nests near the red nick, as noted on the Mohs' map; multiple earlier cuts were clear in this area and actual surgical margins were assessed as clear; also noted on later cuts only was scarring at the deep margin between the red and blue nicks, as noted on the Mohs map.      A total of one section(s) and 4 slide(s) were examined under the microscope via the Mohs technique.  A cancer free plane was reached after layer number one. Defect final size was as noted above.      THIRD SITE: left chin  The first Mohs' layer consisted of one section(s) with 4 slide(s) evaluated. Histology of the specimen(s) showed, on later cuts, a focus of basal cell carcinoma, manifested as nests of basaloid cells with hyperchromatic nuclei, peripheral palisading, and artifactual clefting around the nests/strands in the dermis near the blue nick, as noted on the Mohs' map; multiple earlier cuts were clear in this area and actual surgical margins were assessed as clear.      A total of one section(s) and 4 slide(s) were examined under the microscope via the Mohs technique.  A cancer free plane was reached after layer number one. Defect final size was as noted above.      The wounds were covered with nonadherent dressings between stages, and the patient allowed to wait in the waiting area during these periods. The final defects were photographed at the completion of the Mohs' procedure.    The patient was returned to the procedure room following completion of the Mohs' procedure and final slide review. She is scheduled to see Delvis Jackson MD  for closure of the defects in two days as previously arranged.    Final dressing(s) consisted of Telfa and tape on the right and left chin sites and Telfa, held place by several #4-0 silk sutures at the inferior/alar margin and tape on the right alar site.    Estimated  blood loss for the total procedure was less than 10 mL.    Total operative time including tissue processing in the Mohs' laboratory and microscopic Mohs' frozen section slide review was 6.5 hour(s). Verbal and written wound care instructions were given to the patient, and she expressed understanding of these instructions. The patient tolerated the procedure well and left the operating room in good condition; she is to return PRN to me.    Dr. Jackson's cell phone number was given to the patient with instructions to call prn with any problems.

## 2022-09-21 ENCOUNTER — PROCEDURE VISIT (OUTPATIENT)
Dept: DERMATOLOGY | Facility: CLINIC | Age: 87
End: 2022-09-21
Payer: MEDICARE

## 2022-09-21 VITALS
WEIGHT: 108 LBS | DIASTOLIC BLOOD PRESSURE: 75 MMHG | SYSTOLIC BLOOD PRESSURE: 131 MMHG | BODY MASS INDEX: 22.67 KG/M2 | HEIGHT: 58 IN | HEART RATE: 60 BPM

## 2022-09-21 DIAGNOSIS — C44.311 BASAL CELL CARCINOMA OF NOSE: Primary | ICD-10-CM

## 2022-09-21 DIAGNOSIS — C44.319 BASAL CELL CARCINOMA (BCC) OF CHIN: ICD-10-CM

## 2022-09-21 PROCEDURE — 17311 MOHS 1 STAGE H/N/HF/G: CPT | Mod: PBBFAC,PO | Performed by: DERMATOLOGY

## 2022-09-21 PROCEDURE — 99499 UNLISTED E&M SERVICE: CPT | Mod: S$PBB,,, | Performed by: DERMATOLOGY

## 2022-09-21 PROCEDURE — 17312 MOHS ADDL STAGE: CPT | Mod: PBBFAC,PO | Performed by: DERMATOLOGY

## 2022-09-21 PROCEDURE — 99499 NO LOS: ICD-10-PCS | Mod: S$PBB,,, | Performed by: DERMATOLOGY

## 2022-09-21 PROCEDURE — 17311 MOHS 1 STAGE H/N/HF/G: CPT | Mod: S$PBB,,, | Performed by: DERMATOLOGY

## 2022-09-21 PROCEDURE — 17311: ICD-10-PCS | Mod: 59,S$PBB,, | Performed by: DERMATOLOGY

## 2022-09-21 PROCEDURE — 17312 MOHS ADDL STAGE: CPT | Mod: S$PBB,,, | Performed by: DERMATOLOGY

## 2022-09-21 PROCEDURE — 17312: ICD-10-PCS | Mod: S$PBB,,, | Performed by: DERMATOLOGY

## 2022-09-22 ENCOUNTER — ANESTHESIA EVENT (OUTPATIENT)
Dept: SURGERY | Facility: HOSPITAL | Age: 87
End: 2022-09-22
Payer: MEDICARE

## 2022-09-23 ENCOUNTER — HOSPITAL ENCOUNTER (OUTPATIENT)
Facility: HOSPITAL | Age: 87
Discharge: HOME | End: 2022-09-23
Attending: OTOLARYNGOLOGY | Admitting: OTOLARYNGOLOGY
Payer: MEDICARE

## 2022-09-23 ENCOUNTER — ANESTHESIA (OUTPATIENT)
Dept: SURGERY | Facility: HOSPITAL | Age: 87
End: 2022-09-23
Payer: MEDICARE

## 2022-09-23 VITALS
DIASTOLIC BLOOD PRESSURE: 78 MMHG | BODY MASS INDEX: 22.67 KG/M2 | TEMPERATURE: 98 F | HEIGHT: 58 IN | SYSTOLIC BLOOD PRESSURE: 169 MMHG | OXYGEN SATURATION: 96 % | HEART RATE: 72 BPM | WEIGHT: 108 LBS | RESPIRATION RATE: 16 BRPM

## 2022-09-23 DIAGNOSIS — C44.311 BASAL CELL CARCINOMA (BCC) OF RIGHT ALA NASI: ICD-10-CM

## 2022-09-23 DIAGNOSIS — C44.311 BASAL CELL CARCINOMA (BCC) OF RIGHT NASAL TIP: Primary | ICD-10-CM

## 2022-09-23 DIAGNOSIS — C44.311 BASAL CELL CARCINOMA (BCC) OF DORSUM OF NOSE: ICD-10-CM

## 2022-09-23 PROCEDURE — 12053 INTMD RPR FACE/MM 5.1-7.5 CM: CPT | Mod: 59,,, | Performed by: OTOLARYNGOLOGY

## 2022-09-23 PROCEDURE — 27200651 HC AIRWAY, LMA: Mod: PO | Performed by: ANESTHESIOLOGY

## 2022-09-23 PROCEDURE — D9220A PRA ANESTHESIA: Mod: ANES,,, | Performed by: ANESTHESIOLOGY

## 2022-09-23 PROCEDURE — 15260 PR FULL THICK GRFT NOS,EAR,LID <20 SQCM: ICD-10-PCS | Mod: ,,, | Performed by: OTOLARYNGOLOGY

## 2022-09-23 PROCEDURE — 37000008 HC ANESTHESIA 1ST 15 MINUTES: Mod: PO | Performed by: OTOLARYNGOLOGY

## 2022-09-23 PROCEDURE — D9220A PRA ANESTHESIA: ICD-10-PCS | Mod: CRNA,,, | Performed by: NURSE ANESTHETIST, CERTIFIED REGISTERED

## 2022-09-23 PROCEDURE — 63600175 PHARM REV CODE 636 W HCPCS: Mod: PO | Performed by: OTOLARYNGOLOGY

## 2022-09-23 PROCEDURE — 12053 PR LAYR CLOS WND FACE,FACIAL 5.1-7.5 CM: ICD-10-PCS | Mod: 59,,, | Performed by: OTOLARYNGOLOGY

## 2022-09-23 PROCEDURE — 36000707: Mod: PO | Performed by: OTOLARYNGOLOGY

## 2022-09-23 PROCEDURE — 37000009 HC ANESTHESIA EA ADD 15 MINS: Mod: PO | Performed by: OTOLARYNGOLOGY

## 2022-09-23 PROCEDURE — 63600175 PHARM REV CODE 636 W HCPCS: Mod: PO | Performed by: NURSE ANESTHETIST, CERTIFIED REGISTERED

## 2022-09-23 PROCEDURE — D9220A PRA ANESTHESIA: ICD-10-PCS | Mod: ANES,,, | Performed by: ANESTHESIOLOGY

## 2022-09-23 PROCEDURE — 25000003 PHARM REV CODE 250: Mod: PO | Performed by: OTOLARYNGOLOGY

## 2022-09-23 PROCEDURE — 15260 FTH/GFT FR N/E/E/L 20 SQCM/<: CPT | Mod: ,,, | Performed by: OTOLARYNGOLOGY

## 2022-09-23 PROCEDURE — 25000003 PHARM REV CODE 250: Mod: PO | Performed by: NURSE ANESTHETIST, CERTIFIED REGISTERED

## 2022-09-23 PROCEDURE — 11441 PR EXC SKIN BENIG 0.6-1 CM FACE,FACIAL: ICD-10-PCS | Mod: 51,,, | Performed by: OTOLARYNGOLOGY

## 2022-09-23 PROCEDURE — 71000015 HC POSTOP RECOV 1ST HR: Mod: PO | Performed by: OTOLARYNGOLOGY

## 2022-09-23 PROCEDURE — 11441 EXC FACE-MM B9+MARG 0.6-1 CM: CPT | Mod: 51,,, | Performed by: OTOLARYNGOLOGY

## 2022-09-23 PROCEDURE — 71000033 HC RECOVERY, INTIAL HOUR: Mod: PO | Performed by: OTOLARYNGOLOGY

## 2022-09-23 PROCEDURE — D9220A PRA ANESTHESIA: Mod: CRNA,,, | Performed by: NURSE ANESTHETIST, CERTIFIED REGISTERED

## 2022-09-23 PROCEDURE — 36000706: Mod: PO | Performed by: OTOLARYNGOLOGY

## 2022-09-23 RX ORDER — ACETAMINOPHEN 10 MG/ML
INJECTION, SOLUTION INTRAVENOUS
Status: DISCONTINUED | OUTPATIENT
Start: 2022-09-23 | End: 2022-09-23

## 2022-09-23 RX ORDER — ONDANSETRON 2 MG/ML
4 INJECTION INTRAMUSCULAR; INTRAVENOUS ONCE AS NEEDED
Status: DISCONTINUED | OUTPATIENT
Start: 2022-09-23 | End: 2022-09-23 | Stop reason: HOSPADM

## 2022-09-23 RX ORDER — ONDANSETRON 2 MG/ML
INJECTION INTRAMUSCULAR; INTRAVENOUS
Status: DISCONTINUED | OUTPATIENT
Start: 2022-09-23 | End: 2022-09-23

## 2022-09-23 RX ORDER — SODIUM CHLORIDE, SODIUM LACTATE, POTASSIUM CHLORIDE, CALCIUM CHLORIDE 600; 310; 30; 20 MG/100ML; MG/100ML; MG/100ML; MG/100ML
125 INJECTION, SOLUTION INTRAVENOUS CONTINUOUS
Status: DISCONTINUED | OUTPATIENT
Start: 2022-09-23 | End: 2022-09-23 | Stop reason: HOSPADM

## 2022-09-23 RX ORDER — BUPIVACAINE HYDROCHLORIDE 2.5 MG/ML
INJECTION, SOLUTION EPIDURAL; INFILTRATION; INTRACAUDAL
Status: DISCONTINUED | OUTPATIENT
Start: 2022-09-23 | End: 2022-09-23 | Stop reason: HOSPADM

## 2022-09-23 RX ORDER — PROPOFOL 10 MG/ML
VIAL (ML) INTRAVENOUS
Status: DISCONTINUED | OUTPATIENT
Start: 2022-09-23 | End: 2022-09-23

## 2022-09-23 RX ORDER — FENTANYL CITRATE 50 UG/ML
INJECTION, SOLUTION INTRAMUSCULAR; INTRAVENOUS
Status: DISCONTINUED | OUTPATIENT
Start: 2022-09-23 | End: 2022-09-23

## 2022-09-23 RX ORDER — PHENYLEPHRINE HYDROCHLORIDE 10 MG/ML
INJECTION INTRAVENOUS
Status: DISCONTINUED | OUTPATIENT
Start: 2022-09-23 | End: 2022-09-23

## 2022-09-23 RX ORDER — LIDOCAINE HYDROCHLORIDE AND EPINEPHRINE 10; 10 MG/ML; UG/ML
INJECTION, SOLUTION INFILTRATION; PERINEURAL
Status: DISCONTINUED | OUTPATIENT
Start: 2022-09-23 | End: 2022-09-23 | Stop reason: HOSPADM

## 2022-09-23 RX ORDER — DEXAMETHASONE SODIUM PHOSPHATE 4 MG/ML
INJECTION, SOLUTION INTRA-ARTICULAR; INTRALESIONAL; INTRAMUSCULAR; INTRAVENOUS; SOFT TISSUE
Status: DISCONTINUED | OUTPATIENT
Start: 2022-09-23 | End: 2022-09-23

## 2022-09-23 RX ORDER — SODIUM CHLORIDE, SODIUM LACTATE, POTASSIUM CHLORIDE, CALCIUM CHLORIDE 600; 310; 30; 20 MG/100ML; MG/100ML; MG/100ML; MG/100ML
INJECTION, SOLUTION INTRAVENOUS CONTINUOUS
Status: DISCONTINUED | OUTPATIENT
Start: 2022-09-23 | End: 2022-09-23 | Stop reason: HOSPADM

## 2022-09-23 RX ORDER — LIDOCAINE HYDROCHLORIDE 10 MG/ML
1 INJECTION, SOLUTION EPIDURAL; INFILTRATION; INTRACAUDAL; PERINEURAL ONCE
Status: DISCONTINUED | OUTPATIENT
Start: 2022-09-23 | End: 2022-09-23 | Stop reason: HOSPADM

## 2022-09-23 RX ORDER — LIDOCAINE HYDROCHLORIDE 20 MG/ML
INJECTION INTRAVENOUS
Status: DISCONTINUED | OUTPATIENT
Start: 2022-09-23 | End: 2022-09-23

## 2022-09-23 RX ORDER — MUPIROCIN 20 MG/G
OINTMENT TOPICAL
Status: DISCONTINUED | OUTPATIENT
Start: 2022-09-23 | End: 2022-09-23 | Stop reason: HOSPADM

## 2022-09-23 RX ORDER — OXYCODONE HYDROCHLORIDE 5 MG/1
5 TABLET ORAL ONCE AS NEEDED
Status: DISCONTINUED | OUTPATIENT
Start: 2022-09-23 | End: 2022-09-23 | Stop reason: HOSPADM

## 2022-09-23 RX ORDER — FENTANYL CITRATE 50 UG/ML
25 INJECTION, SOLUTION INTRAMUSCULAR; INTRAVENOUS EVERY 5 MIN PRN
Status: DISCONTINUED | OUTPATIENT
Start: 2022-09-23 | End: 2022-09-23 | Stop reason: HOSPADM

## 2022-09-23 RX ADMIN — PHENYLEPHRINE HYDROCHLORIDE 100 MCG: 10 INJECTION INTRAVENOUS at 12:09

## 2022-09-23 RX ADMIN — FENTANYL CITRATE 25 MCG: 50 INJECTION, SOLUTION INTRAMUSCULAR; INTRAVENOUS at 11:09

## 2022-09-23 RX ADMIN — DEXAMETHASONE SODIUM PHOSPHATE 4 MG: 4 INJECTION, SOLUTION INTRAMUSCULAR; INTRAVENOUS at 12:09

## 2022-09-23 RX ADMIN — ONDANSETRON 4 MG: 2 INJECTION, SOLUTION INTRAMUSCULAR; INTRAVENOUS at 12:09

## 2022-09-23 RX ADMIN — LIDOCAINE HYDROCHLORIDE 60 MG: 20 INJECTION INTRAVENOUS at 11:09

## 2022-09-23 RX ADMIN — SODIUM CHLORIDE, SODIUM LACTATE, POTASSIUM CHLORIDE, AND CALCIUM CHLORIDE: .6; .31; .03; .02 INJECTION, SOLUTION INTRAVENOUS at 10:09

## 2022-09-23 RX ADMIN — PROPOFOL 70 MG: 10 INJECTION, EMULSION INTRAVENOUS at 11:09

## 2022-09-23 RX ADMIN — PHENYLEPHRINE HYDROCHLORIDE 100 MCG: 10 INJECTION INTRAVENOUS at 11:09

## 2022-09-23 RX ADMIN — PROPOFOL 20 MG: 10 INJECTION, EMULSION INTRAVENOUS at 12:09

## 2022-09-23 RX ADMIN — FENTANYL CITRATE 25 MCG: 50 INJECTION, SOLUTION INTRAMUSCULAR; INTRAVENOUS at 12:09

## 2022-09-23 RX ADMIN — ACETAMINOPHEN 450 MG: 10 INJECTION, SOLUTION INTRAVENOUS at 12:09

## 2022-09-23 RX ADMIN — SODIUM CHLORIDE, SODIUM LACTATE, POTASSIUM CHLORIDE, AND CALCIUM CHLORIDE: .6; .31; .03; .02 INJECTION, SOLUTION INTRAVENOUS at 01:09

## 2022-09-23 NOTE — BRIEF OP NOTE
[facility] - Surgery  Brief Operative Note     SUMMARY     Surgery Date: 9/23/2022     Surgeon(s) and Role:     * Delvis Jackson MD - Primary    Assisting Surgeon: None    Pre-op Diagnosis:  Basal cell carcinoma (BCC) of right nasal tip [C44.311]    Post-op Diagnosis:  Post-Op Diagnosis Codes:     * Basal cell carcinoma (BCC) of right nasal tip [C44.311]    Procedure(s) (LRB):  CLOSURE-WOUND (Right)  SURGICAL PROCUREMENT, GRAFT, SKIN    Anesthesia: General/MAC    Description of the findings of the procedure: Moh's reconstruction    Findings/Key Components: Moh's reconstruction    Estimated Blood Loss: * No values recorded between 9/23/2022 12:10 PM and 9/23/2022  1:27 PM *         Specimens:   Specimen (24h ago, onward)      None            Discharge Note    SUMMARY     Admit Date: 9/23/2022    Discharge Date and Time:  09/23/2022 1:27 PM    Hospital Course (synopsis of major diagnoses, care, treatment, and services provided during the course of the hospital stay): Did well following surgery and was discharged uneventfully     Final Diagnosis: Post-Op Diagnosis Codes:     * Basal cell carcinoma (BCC) of right nasal tip [C44.311]    Disposition: Home or Self Care    Follow Up/Patient Instructions: Regular diet, Follow-up 1 week. Activity light    Medications:  Reconciled Home Medications:   Current Discharge Medication List        CONTINUE these medications which have NOT CHANGED    Details   amLODIPine (NORVASC) 5 MG tablet Take 5 mg by mouth once daily.      aspirin 81 mg Tab Take 1 tablet by mouth every evening. Every day      atorvastatin (LIPITOR) 40 MG tablet Take 1 tablet (40 mg total) by mouth once daily.  Qty: 90 tablet, Refills: 3      BELBUCA 450 mcg Film 2 (two) times daily as needed. Place 1 film between cheek and gum twice a day      biotin 5 mg Cap Take 1 tablet by mouth 2 (two) times daily.      CALCIUM CARB/VIT D3/MINERALS (CALCIUM-VITAMIN D ORAL) Take 600 mg by mouth 2 (two) times daily. Calcium  1200 with D 3 1000      CRANBERRY CONC/ASCORBIC ACID (CRANBERRY PLUS VITAMIN C ORAL) Take 1 capsule by mouth once daily.      cyanocobalamin 1,000 mcg/mL injection 1,000 mcg every 30 days.      DEXILANT 60 mg capsule Take 60 mg by mouth once daily.   Refills: 11      ENTRESTO  mg per tablet TAKE ONE TABLET BY MOUTH TWICE DAILY  Qty: 60 tablet, Refills: 4    Comments: This prescription was filled on 5/14/2022. Any refills authorized will be placed on file.      fluticasone (FLONASE) 50 mcg/actuation nasal spray 2 sprays by Each Nare route once daily.  Qty: 16 g, Refills: 0    Associated Diagnoses: Acute bacterial rhinosinusitis      folic acid (FOLVITE) 1 MG tablet Take 2 mg by mouth once daily.       furosemide (LASIX) 80 MG tablet TAKE ONE TABLET BY MOUTH EVERY DAY  Qty: 30 tablet, Refills: 4    Comments: This prescription was filled on 4/8/2022. Any refills authorized will be placed on file.      gabapentin (NEURONTIN) 100 MG capsule Take 2 capsules (200 mg total) by mouth every evening.  Qty: 60 capsule, Refills: 5    Comments: This prescription was filled on 4/18/2022. Any refills authorized will be placed on file.  Associated Diagnoses: Peripheral polyneuropathy      HYDROmorphone (DILAUDID) 2 MG tablet Take 2 mg by mouth every 4 (four) hours as needed for Pain.      isosorbide dinitrate (ISORDIL) 10 MG tablet TAKE ONE TABLET BY MOUTH THREE TIMES DAILY  Qty: 100 tablet, Refills: 2    Comments: This prescription was filled on 7/26/2022. Any refills authorized will be placed on file.      Lactobacillus acidophilus 10 billion cell Cap Take 1 each by mouth once daily. 1.5 billion      magnesium oxide (MAG-OX) 400 mg (241.3 mg magnesium) tablet TAKE 1 TABLET BY MOUTH 2 TIMES A DAY  Qty: 120 tablet, Refills: 2    Comments: This prescription was filled on 5/14/2022. Any refills authorized will be placed on file.      megestroL (MEGACE) 20 MG Tab TAKE 1 TABLET BY MOUTH EVERY DAY  Qty: 90 tablet, Refills: 0       metoprolol succinate (TOPROL-XL) 25 MG 24 hr tablet Take 1 tablet (25 mg total) by mouth once daily.  Qty: 90 tablet, Refills: 2    Comments: .      MULTIVITAMIN WITH MINERALS (ONE-A-DAY 50 PLUS) Tab Take 1 tablet by mouth once daily. Every day      nitroGLYCERIN 0.4 MG/DOSE TL SPRY (NITROLINGUAL) 400 mcg/spray spray Place 1 spray under the tongue every 5 (five) minutes as needed for Chest pain. PRN  Qty: 4.9 g, Refills: 3      omega-3 fatty acids/fish oil (FISH OIL-OMEGA-3 FATTY ACIDS) 300-1,000 mg capsule Take by mouth once daily.      potassium chloride SA (K-DUR,KLOR-CON) 10 MEQ tablet TAKE TWO TABLETS BY MOUTH EVERY MORNING AND 1 TABLET EVERY EVENING  Qty: 270 tablet, Refills: 2    Comments: This prescription was filled on 3/12/2022. Any refills authorized will be placed on file.      promethazine (PHENERGAN) 25 MG tablet Take 1 tablet (25 mg total) by mouth 2 (two) times daily as needed for Nausea.  Qty: 60 tablet, Refills: 1    Associated Diagnoses: Nausea      sertraline (ZOLOFT) 50 MG tablet TAKE 1 TABLET (50 MG TOTAL) BY MOUTH ONCE DAILY.  Qty: 30 tablet, Refills: 5    Comments: This prescription was filled on 3/12/2022. Any refills authorized will be placed on file.  Associated Diagnoses: Major depressive disorder, single episode, unspecified      traMADoL (ULTRAM) 50 mg tablet TAKE TWO TABLETS BY MOUTH TWICE DAILY AS NEEDED FOR SEVERE pain  Qty: 120 tablet, Refills: 2    Comments: This prescription was filled on 9/24/2020. Any refills authorized will be placed on file.      vitamin E 400 unit Tab Take 400 mg by mouth once daily. Every day      zinc gluconate 50 mg tablet Take 50 mg by mouth once daily.      ondansetron (ZOFRAN) 4 MG tablet Take 4 mg by mouth every 8 (eight) hours as needed for Nausea.   Refills: 2      tiZANidine (ZANAFLEX) 2 MG tablet TID PRN  Qty: 30 tablet, Refills: 0    Associated Diagnoses: Posterior neck pain           No discharge procedures on file.

## 2022-09-23 NOTE — ANESTHESIA POSTPROCEDURE EVALUATION
Anesthesia Post Evaluation    Patient: Cheyanne HERNANDEZ Heflin    Procedure(s) Performed: Procedure(s) (LRB):  CLOSURE-WOUND (Right)  SURGICAL PROCUREMENT, GRAFT, SKIN    Final Anesthesia Type: general      Patient location during evaluation: PACU  Patient participation: Yes- Able to Participate  Level of consciousness: awake and alert  Post-procedure vital signs: reviewed and stable  Pain management: adequate  Airway patency: patent    PONV status at discharge: No PONV  Anesthetic complications: no      Cardiovascular status: blood pressure returned to baseline  Respiratory status: unassisted  Hydration status: euvolemic  Follow-up not needed.          Vitals Value Taken Time   /78 09/23/22 1349   Temp 36.7 °C (98 °F) 09/23/22 1335   Pulse 72 09/23/22 1349   Resp 16 09/23/22 1349   SpO2 96 % 09/23/22 1349         Event Time   Out of Recovery 13:34:17         Pain/Bill Score: Bill Score: 10 (9/23/2022  1:51 PM)

## 2022-09-23 NOTE — PATIENT INSTRUCTIONS
Reconstruction After Skin Cancer Removal  Delvis Jackson MD  Otolaryngology, [facility]  [address] [facility] [address], [place] [address]  Office: [phone]  Cell (after-hours concerns): [phone]    What to Expect:  Soreness / Tenderness around the area which will decrease over time. The initial swelling will improve over a week then the additional swelling will take a few weeks to improve.  Occasional bruising immediately over the incision line  Swelling (edema) will occur over the first few days of healing, but this should be soft, not discolored, and should not be much more tender  Sutures will need to be removed.     Care for your wound:  Keep the wound dry (out of the shower/bath) for the first 24-36 hours. You may put ointment on the wound during this time.  After 1-2 days, you may get the wound wet, but do not soak it. You may gently clean the incision with warm soapy water.   If crusting builds up over the sutures, You may use diluted hydrogen peroxide (1/2 distilled water and 1/2 peroxide) to gently rub the crusting away. This can be done 3-4 times per day as needed.  Keep the wound moisturized with antibiotic ointment for 2-3 days, then transition to Aquaphor or Vaseline. Moisturization is important to minimize scar.  Applying ice to the wound can help decrease the swelling.     Activity:  Light activity for 2-3 days. You may slowly increase your activity following this, but generally keep it light for the first 7-10 days. Avoid strenuous exercise for the first week.   Avoid pressure to the incision    Medications:  Resume your normal home medications. If you were on a blood thinner, check with Dr. Jackson to see when this can be resumed.  Avoid Motrin or Advil products for the first week. It is OK for an occasional dose of this in between Tylenol or narcotic, but do not take scheduled Advil unless this has been Ok'd by Dr. Jackson.   If you have been prescribed a pain medication (narcotic), use it  sparingly and wean yourself from it over the first few days.  Do not take the following herbal supplements: fish oil, garlic, ginko biloba for 2 weeks. Avoid all supplements for 2 weeks if able, as these can sometimes have medical side effects that can impair wound healing.    Diet:  Resume your normal Diet    Call the office if:  Redness or firm swelling develop over the wound. A small amount of soft swelling is normal, especially after 3-4 days, but if it is hard, painful, or very red, notify Dr. Jackson's office.  Temperature > 101  Drainage from wound  If the wound opens up.

## 2022-09-23 NOTE — TRANSFER OF CARE
"Anesthesia Transfer of Care Note    Patient: Cheyanne HERNANDEZ [place]    Procedure(s) Performed: Procedure(s) (LRB):  CLOSURE-WOUND (Right)  SURGICAL PROCUREMENT, GRAFT, SKIN    Patient location: PACU    Anesthesia Type: general    Transport from OR: Transported from OR on room air with adequate spontaneous ventilation    Post pain: adequate analgesia    Post assessment: no apparent anesthetic complications and tolerated procedure well    Post vital signs: stable    Level of consciousness: sedated and awake    Nausea/Vomiting: no nausea/vomiting    Complications: none    Transfer of care protocol was followed      Last vitals:   Visit Vitals  BP (!) 180/78   Pulse 72   Temp 36.7 °C (98 °F) (Skin)   Resp 13   Ht 4' 10" (1.473 m)   Wt 49 kg (108 lb)   SpO2 95%   Breastfeeding No   BMI 22.57 kg/m²     "

## 2022-09-23 NOTE — ANESTHESIA PROCEDURE NOTES
LMA insertion    Date/Time: 9/23/2022 11:49 AM  Performed by: Diane Mensah CRNA  Authorized by: Keyshawn Metcalf MD     Intubation:     Induction:  Intravenous    Intubated:  Postinduction    Mask Ventilation:  N/a    Attempts:  1    Attempted By:  CRNA    Difficult Airway Encountered?: No      Complications:  None    Airway Device:  Supraglottic airway/LMA    Airway Device Size:  3.0    Style/Cuff Inflation:  Cuffed (inflated to minimal occlusive pressure)    Secured at:  The lips    Placement Verified By:  Capnometry    Complicating Factors:  None    Findings Post-Intubation:  BS equal bilateral and atraumatic/condition of teeth unchanged

## 2022-09-23 NOTE — H&P
Subjective:       Patient ID: Cheyanne Gomes is a 86 y.o. female.    Chief Complaint: No chief complaint on file.      Cheyanne is here for closure of mohs defect. No changes since clinic visit     Review of Systems   Constitutional: Negative for activity change and appetite change.   Eyes: Negative for discharge.   Respiratory: Negative for difficulty breathing and wheezing   Cardiovascular: Negative for chest pain.   Gastrointestinal: Negative for abdominal distention and abdominal pain.   Endocrine: Negative for cold intolerance and heat intolerance.   Genitourinary: Negative for dysuria.   Musculoskeletal: Negative for gait problem and joint swelling.   Skin: Negative for color change and pallor.   Neurological: Negative for syncope and weakness.   Psychiatric/Behavioral: Negative for agitation and confusion.     Objective:        Constitutional:   She is oriented to person, place, and time. She appears well-developed and well-nourished. She appears alert. She is active. Normal speech.      Head:  Normocephalic and atraumatic. Head is without TMJ tenderness. No scars. Salivary glands normal.  Facial strength is normal.      Ears:    Right Ear: No drainage or swelling. No middle ear effusion.   Left Ear: No drainage or swelling.  No middle ear effusion.     Nose:  No mucosal edema, rhinorrhea or sinus tenderness. No turbinate hypertrophy.      Mouth/Throat  Oropharynx clear and moist without lesions or asymmetry, normal uvula midline and mirror exam normal. Normal dentition. No uvula swelling, lacerations or trismus. No oropharyngeal exudate. Tonsillar erythema, tonsillar exudate.      Neck:  Full range of motion with neck supple and no adenopathy. Thyroid tenderness is present. No tracheal deviation, no edema, no erythema, normal range of motion, no stridor, no crepitus and no neck rigidity present. No thyroid mass present.     Cardiovascular:    Intact distal pulses and normal pulses.               Pulmonary/Chest:   Effort normal and breath sounds normal. No stridor.     Psychiatric:   Her speech is normal and behavior is normal. Her mood appears not anxious. Her affect is not labile.     Neurological:   She is alert and oriented to person, place, and time. No sensory deficit.     Skin:   No abrasions, lacerations, lesions, or rashes. No abrasion and no bruising noted.       Tests / Results:  Reviewed     Assessment:       1. Basal cell carcinoma (BCC) of right nasal tip    2. Basal cell carcinoma (BCC) of dorsum of nose    3. Basal cell carcinoma (BCC) of right ala nasi            Plan:         Closure of mohs defect as planned

## 2022-09-23 NOTE — ANESTHESIA PREPROCEDURE EVALUATION
09/23/2022  Cheyanne Gomes is a 86 y.o., female.      Pre-op Assessment    I have reviewed the Patient Summary Reports.     I have reviewed the Nursing Notes. I have reviewed the NPO Status.   I have reviewed the Medications.     Review of Systems  Anesthesia Hx:  Denies Family Hx of Anesthesia complications.   Denies Personal Hx of Anesthesia complications.   Hematology/Oncology:         -- Anemia:   Cardiovascular:   Hypertension Valvular problems/Murmurs, AS, AI Past MI CAD  CABG/stent  CHF ECG has been reviewed. Ischemic cardiomyopathy, pacemaker, moderate AS, mild AI   Pulmonary:   Pneumonia    Renal/:   Chronic Renal Disease, CKD    Hepatic/GI:   PUD, GERD Current nausea   Musculoskeletal:  Spine Disorders: Chronic Pain    Neurological:   Chronic Pain Syndrome       Physical Exam  General: Well nourished, Cooperative, Alert and Oriented    Airway:  Mallampati: III   Mouth Opening: Small, but > 3cm  TM Distance: 4 - 6 cm  Tongue: Normal  Neck ROM: Extension Decreased    Chest/Lungs:  Normal Respiratory Rate    Heart:  Rate: Normal  Rhythm: Regular Rhythm        Anesthesia Plan  Type of Anesthesia, risks & benefits discussed:    Anesthesia Type: Gen Supraglottic Airway, Gen ETT  Intra-op Monitoring Plan: Standard ASA Monitors  Post Op Pain Control Plan: multimodal analgesia  Induction:  IV  Airway Plan: , Post-Induction  Informed Consent: Informed consent signed with the Patient and all parties understand the risks and agree with anesthesia plan.  All questions answered.   ASA Score: 3    Ready For Surgery From Anesthesia Perspective.     .

## 2022-09-27 LAB
ALBUMIN SERPL-MCNC: 4.3 G/DL (ref 3.6–4.6)
ALBUMIN/GLOB SERPL: 1.6 {RATIO} (ref 1.2–2.2)
ALP SERPL-CCNC: 97 IU/L (ref 44–121)
ALT SERPL-CCNC: 21 IU/L (ref 0–32)
AST SERPL-CCNC: 26 IU/L (ref 0–40)
BASOPHILS # BLD AUTO: 0 X10E3/UL (ref 0–0.2)
BASOPHILS NFR BLD AUTO: 0 %
BILIRUB SERPL-MCNC: <0.2 MG/DL (ref 0–1.2)
BUN SERPL-MCNC: 14 MG/DL (ref 8–27)
BUN SERPL-MCNC: 15 MG/DL (ref 8–27)
BUN/CREAT SERPL: 13 (ref 12–28)
BUN/CREAT SERPL: 15 (ref 12–28)
CALCIUM SERPL-MCNC: 9.5 MG/DL (ref 8.7–10.3)
CALCIUM SERPL-MCNC: 9.7 MG/DL (ref 8.7–10.3)
CHLORIDE SERPL-SCNC: 100 MMOL/L (ref 96–106)
CHLORIDE SERPL-SCNC: 100 MMOL/L (ref 96–106)
CO2 SERPL-SCNC: 25 MMOL/L (ref 20–29)
CO2 SERPL-SCNC: 28 MMOL/L (ref 20–29)
CREAT SERPL-MCNC: 1 MG/DL (ref 0.57–1)
CREAT SERPL-MCNC: 1.05 MG/DL (ref 0.57–1)
EOSINOPHIL # BLD AUTO: 0.2 X10E3/UL (ref 0–0.4)
EOSINOPHIL NFR BLD AUTO: 3 %
ERYTHROCYTE [DISTWIDTH] IN BLOOD BY AUTOMATED COUNT: 12.6 % (ref 11.7–15.4)
EST. GFR  (NO RACE VARIABLE): 52 ML/MIN/1.73
EST. GFR  (NO RACE VARIABLE): 55 ML/MIN/1.73
FERRITIN SERPL-MCNC: 1099 NG/ML (ref 15–150)
GLOBULIN SER CALC-MCNC: 2.7 G/DL (ref 1.5–4.5)
GLUCOSE SERPL-MCNC: 120 MG/DL (ref 70–99)
GLUCOSE SERPL-MCNC: 120 MG/DL (ref 70–99)
HCT VFR BLD AUTO: 37.2 % (ref 34–46.6)
HGB BLD-MCNC: 11.8 G/DL (ref 11.1–15.9)
IMM GRANULOCYTES # BLD AUTO: 0 X10E3/UL (ref 0–0.1)
IMM GRANULOCYTES NFR BLD AUTO: 0 %
LYMPHOCYTES # BLD AUTO: 1.2 X10E3/UL (ref 0.7–3.1)
LYMPHOCYTES NFR BLD AUTO: 16 %
MCH RBC QN AUTO: 29 PG (ref 26.6–33)
MCHC RBC AUTO-ENTMCNC: 31.7 G/DL (ref 31.5–35.7)
MCV RBC AUTO: 91 FL (ref 79–97)
MONOCYTES # BLD AUTO: 0.6 X10E3/UL (ref 0.1–0.9)
MONOCYTES NFR BLD AUTO: 8 %
NEUTROPHILS # BLD AUTO: 5.2 X10E3/UL (ref 1.4–7)
NEUTROPHILS NFR BLD AUTO: 73 %
PLATELET # BLD AUTO: 166 X10E3/UL (ref 150–450)
POTASSIUM SERPL-SCNC: 3.8 MMOL/L (ref 3.5–5.2)
POTASSIUM SERPL-SCNC: 3.9 MMOL/L (ref 3.5–5.2)
PROT SERPL-MCNC: 7 G/DL (ref 6–8.5)
RBC # BLD AUTO: 4.07 X10E6/UL (ref 3.77–5.28)
SODIUM SERPL-SCNC: 143 MMOL/L (ref 134–144)
SODIUM SERPL-SCNC: 144 MMOL/L (ref 134–144)
WBC # BLD AUTO: 7.2 X10E3/UL (ref 3.4–10.8)

## 2022-09-27 NOTE — OP NOTE
9/23/2022    Cheyanne Gomes  [id number]  1935    PRE-OPERATIVE DIAGNOSIS  1. Basal cell carcinoma (BCC) of right nasal tip    2. Basal cell carcinoma (BCC) of dorsum of nose    3. Basal cell carcinoma (BCC) of right ala nasi    4. Basal cell carcinoma of chin x 2    POST-OPERATIVE DIAGNOSIS  1. Basal cell carcinoma (BCC) of right nasal tip    2. Basal cell carcinoma (BCC) of dorsum of nose    3. Basal cell carcinoma (BCC) of right ala nasi    4. Basal cell carcinoma of chin x 2    PROCEDURES PERFORMED  Excision of benign skin nasal tip 1 x 0.5 cm  Full thickness skin graft closure of nasal tip and right nasal ala defect measuring 1.5 x 3 cm  Layered wound closure of nasal dorsum and cheek defect totalling 3.5 cm    SURGEON: Delvsi Jackson MD    ASSISTANT: None    ANESTHESIA: General    COMPLICATIONS: None    BLOOD LOSS: minimal cc    SPECIMENS  None    DISPOSITION  PACU    OPERATIVE FINDINGS  Defect of the nasal dorsum, 75% of tip, and medial 30% of right ala region. Method of closure: Layered closure of nasal dorsum to close the subunit. I excised the remaining nasal tip subunit for reconstruction of the entire aspect. FTSG for the tip and ala  Defect of the chin x 2  region. Method of closure: layered closure    INDICATIONS  Cheyanne Gomes is a 86 y.o. female who was seen in clinic for evaluation of the above diagnosis. she underwent Moh's microsurgical excision a few days prior. she presented to me for reconstructive efforts. Based on clinical assessment, the following procedures were discussed as an option for treatment. I discussed the reconstructive ladder as well as risks of the procedure including scar, cosmetic deformity, change in appearance, asymmetry, wound healing issues/skin breakdown, hematoma, infection. Facial weakness/paralysis, graft failure. After risks, benefits, and alternatives were discussed the patient decided to proceed.    PROCEDURE IN DETAIL  Consent was signed. A timeout was  performed. Anesthesia was induced.    I examined the defect. Findings are indicated above. The edges of the defect were freshened and measured. The planned incision was injected with 1% Lidocaine with 1:100,000 epinephrine and 0.25% Marcaine.    The nasal defect spanned 3 subunits. The nasal dorsum component was a tapering triangular defect. There were sufficient laxity laterally to elevated planes and close the dorsum in a layered fashion with 4-0 Monocryl and 5-0 Prolene. This left the nasal tip and right ala defect. I excised the remaining nasal tip skin on the left to complete the subunit excision. I then planned a full thickness skin graft.     I planned an ellipse along the relaxed skin tension lines in the right supraclavicular region and injected with 1% Lidocaine with 1:100:000 epinephrine. I made an incision immediately deep to the dermis and harvested a full thickness skin graft with appropriate size to cover the defect. The skin graft measured approximately 3.5 x 2 cm. I trimmed a small amount of subcutaneous fat on the deep surface of the flap and set it on our table to inset later. Hemostasis was good. I closed the donor site with 4-0 Vicryl deep and running 5-0 Prolene.     I inset the skin graft into the defect and sutured the periphery with 5-0 Prolene with the ends saved to tie the bolster in place. The remaining loose edges were approximated with 6-0 Prolene. I applied a Xeroform bolster with antibiotic ointment coated and tied it down with the free Prolene edges.    I then turned attention to the two chin cutaneous defects, one on each side. I elevated the skin from the underlying soft tissue and closed them in layered fashion along relaxed skin tension lines. The wounds were closed with 4-0 Monocryl and 5-0 plain fut.     Antibiotic ointment was applied.    This marked the end of the procedure. The patient was turned back over to the Anesthesia team.  They were awoken and transported back to the  PACU in stable condition. Counts were correct. I performed the entire procedure.

## 2022-09-27 NOTE — PROGRESS NOTES
PROGRESS NOTE    Subjective:       Patient ID: Cheyanne Gomes is a 86 y.o. female.    Chief Complaint:  No chief complaint on file.  anemia, iron overload and chf follow up.     History of Present Illness:   Cheyanne Gomes is a 86 y.o. female who presents with a history of anemia of chronic disease.    Patient feeling ok.  Just had skin cancer removed.     Date:  Ferritin  4/23/20 79  6/8/2021 1957  7/9/2021 1600  8/10/2021 1212  3/31/2022 1601    Patient had 2 units of blood at [facility] in March 2021.  She was given 3 iv iron infusions after that.  She is feeling well at this time.  No new complaints.  No bleeding, no melena.      Injectafer given May 2019        EF: 45%          Family and Social history reviewed and is unchanged from 8/9/2016.       ROS:  Review of Systems   Constitutional:  Positive for appetite change. Negative for fever and unexpected weight change.   HENT:  Negative for mouth sores.    Eyes:  Negative for visual disturbance.   Respiratory:  Negative for cough and shortness of breath.    Cardiovascular:  Negative for chest pain and leg swelling.   Gastrointestinal:  Positive for diarrhea. Negative for abdominal pain and blood in stool.   Genitourinary:  Positive for frequency. Negative for hematuria.   Musculoskeletal:  Negative for back pain.   Skin:  Negative for rash.   Neurological:  Negative for headaches.   Hematological:  Negative for adenopathy.   Psychiatric/Behavioral:  The patient is not nervous/anxious.         Current Outpatient Medications:     amLODIPine (NORVASC) 5 MG tablet, Take 5 mg by mouth once daily., Disp: , Rfl:     aspirin 81 mg Tab, Take 1 tablet by mouth every evening. Every day, Disp: , Rfl:     atorvastatin (LIPITOR) 40 MG tablet, Take 1 tablet (40 mg total) by mouth once daily., Disp: 90 tablet, Rfl: 3    BELBUCA 450 mcg Film, 2 (two) times daily as needed. Place 1 film between cheek and gum  twice a day, Disp: , Rfl:     biotin 5 mg Cap, Take 1 tablet by mouth 2 (two) times daily., Disp: , Rfl:     CALCIUM CARB/VIT D3/MINERALS (CALCIUM-VITAMIN D ORAL), Take 600 mg by mouth 2 (two) times daily. Calcium 1200 with D 3 1000, Disp: , Rfl:     CRANBERRY CONC/ASCORBIC ACID (CRANBERRY PLUS VITAMIN C ORAL), Take 1 capsule by mouth once daily., Disp: , Rfl:     cyanocobalamin 1,000 mcg/mL injection, 1,000 mcg every 30 days., Disp: , Rfl:     DEXILANT 60 mg capsule, Take 60 mg by mouth once daily. , Disp: , Rfl: 11    ENTRESTO  mg per tablet, TAKE ONE TABLET BY MOUTH TWICE DAILY, Disp: 60 tablet, Rfl: 4    fluticasone (FLONASE) 50 mcg/actuation nasal spray, 2 sprays by Each Nare route once daily. (Patient taking differently: 2 sprays by Each Nostril route daily as needed.), Disp: 16 g, Rfl: 0    folic acid (FOLVITE) 1 MG tablet, Take 2 mg by mouth once daily. , Disp: , Rfl:     furosemide (LASIX) 80 MG tablet, TAKE ONE TABLET BY MOUTH EVERY DAY, Disp: 30 tablet, Rfl: 4    gabapentin (NEURONTIN) 100 MG capsule, Take 2 capsules (200 mg total) by mouth every evening., Disp: 60 capsule, Rfl: 5    HYDROmorphone (DILAUDID) 2 MG tablet, Take 2 mg by mouth every 4 (four) hours as needed for Pain., Disp: , Rfl:     isosorbide dinitrate (ISORDIL) 10 MG tablet, TAKE ONE TABLET BY MOUTH THREE TIMES DAILY, Disp: 100 tablet, Rfl: 2    Lactobacillus acidophilus 10 billion cell Cap, Take 1 each by mouth once daily. 1.5 billion, Disp: , Rfl:     magnesium oxide (MAG-OX) 400 mg (241.3 mg magnesium) tablet, TAKE 1 TABLET BY MOUTH 2 TIMES A DAY, Disp: 120 tablet, Rfl: 2    megestroL (MEGACE) 20 MG Tab, TAKE 1 TABLET BY MOUTH EVERY DAY, Disp: 90 tablet, Rfl: 0    metoprolol succinate (TOPROL-XL) 25 MG 24 hr tablet, Take 1 tablet (25 mg total) by mouth once daily., Disp: 90 tablet, Rfl: 2    MULTIVITAMIN WITH MINERALS (ONE-A-DAY 50 PLUS) Tab, Take 1 tablet by mouth once daily. Every day, Disp: , Rfl:     nitroGLYCERIN 0.4 MG/DOSE  "TL SPRY (NITROLINGUAL) 400 mcg/spray spray, Place 1 spray under the tongue every 5 (five) minutes as needed for Chest pain. PRN, Disp: 4.9 g, Rfl: 3    omega-3 fatty acids/fish oil (FISH OIL-OMEGA-3 FATTY ACIDS) 300-1,000 mg capsule, Take by mouth once daily., Disp: , Rfl:     ondansetron (ZOFRAN) 4 MG tablet, Take 4 mg by mouth every 8 (eight) hours as needed for Nausea. , Disp: , Rfl: 2    potassium chloride SA (K-DUR,KLOR-CON) 10 MEQ tablet, TAKE TWO TABLETS BY MOUTH EVERY MORNING AND 1 TABLET EVERY EVENING, Disp: 270 tablet, Rfl: 2    promethazine (PHENERGAN) 25 MG tablet, Take 1 tablet (25 mg total) by mouth 2 (two) times daily as needed for Nausea., Disp: 60 tablet, Rfl: 1    sertraline (ZOLOFT) 50 MG tablet, TAKE 1 TABLET (50 MG TOTAL) BY MOUTH ONCE DAILY., Disp: 30 tablet, Rfl: 5    tiZANidine (ZANAFLEX) 2 MG tablet, TID PRN, Disp: 30 tablet, Rfl: 0    traMADoL (ULTRAM) 50 mg tablet, TAKE TWO TABLETS BY MOUTH TWICE DAILY AS NEEDED FOR SEVERE pain, Disp: 120 tablet, Rfl: 2    vitamin E 400 unit Tab, Take 400 mg by mouth once daily. Every day, Disp: , Rfl:     zinc gluconate 50 mg tablet, Take 50 mg by mouth once daily., Disp: , Rfl:         Objective:       Physical Examination:     BP (!) 149/67   Pulse 85   Temp 97.5 °F (36.4 °C)   Resp 20   Ht 4' 10" (1.473 m)   Wt 49.4 kg (109 lb)   BMI 22.78 kg/m²     Physical Exam  Constitutional:       Appearance: She is well-developed.   HENT:      Head: Normocephalic and atraumatic.      Right Ear: External ear normal.      Left Ear: External ear normal.   Eyes:      Conjunctiva/sclera: Conjunctivae normal.      Pupils: Pupils are equal, round, and reactive to light.   Neck:      Thyroid: No thyromegaly.      Trachea: No tracheal deviation.   Cardiovascular:      Rate and Rhythm: Normal rate and regular rhythm.      Heart sounds: Normal heart sounds.   Pulmonary:      Effort: Pulmonary effort is normal.      Breath sounds: Normal breath sounds.   Abdominal:     "  General: Bowel sounds are normal. There is no distension.      Palpations: Abdomen is soft. There is no mass.      Tenderness: There is no abdominal tenderness.   Skin:     Findings: No rash.   Neurological:      Comments: Neuro intact througout   Psychiatric:         Behavior: Behavior normal.         Thought Content: Thought content normal.         Judgment: Judgment normal.       Labs:   Recent Results (from the past 336 hour(s))   CBC Auto Differential    Collection Time: 09/26/22  1:57 PM   Result Value Ref Range    WBC 7.2 3.4 - 10.8 x10E3/uL    Hemoglobin 11.8 11.1 - 15.9 g/dL    Hematocrit 37.2 34.0 - 46.6 %    Platelets 166 150 - 450 x10E3/uL     CMP  Sodium   Date Value Ref Range Status   09/26/2022 143 134 - 144 mmol/L Final   12/11/2018 139 134 - 144 mmol/L      Potassium   Date Value Ref Range Status   09/26/2022 3.8 3.5 - 5.2 mmol/L Final     Chloride   Date Value Ref Range Status   09/26/2022 100 96 - 106 mmol/L Final   12/11/2018 96 (L) 98 - 110 mmol/L      CO2   Date Value Ref Range Status   09/26/2022 25 20 - 29 mmol/L Final     Glucose   Date Value Ref Range Status   09/26/2022 120 (H) 70 - 99 mg/dL Final     Comment:                   **Please note reference interval change**   12/11/2018 97 70 - 99 mg/dL      BUN   Date Value Ref Range Status   09/26/2022 14 8 - 27 mg/dL Final     Creatinine   Date Value Ref Range Status   09/26/2022 1.05 (H) 0.57 - 1.00 mg/dL Final   12/11/2018 0.95 0.60 - 1.40 mg/dL      Calcium   Date Value Ref Range Status   09/26/2022 9.5 8.7 - 10.3 mg/dL Final     Total Protein   Date Value Ref Range Status   08/22/2022 7.0 6.0 - 8.4 g/dL Final     Albumin   Date Value Ref Range Status   09/26/2022 4.3 3.6 - 4.6 g/dL Final   08/22/2022 4.0 3.5 - 5.2 g/dL Final   12/11/2018 3.9 3.1 - 4.7 g/dL      Total Bilirubin   Date Value Ref Range Status   09/26/2022 <0.2 0.0 - 1.2 mg/dL Final     Alkaline Phosphatase   Date Value Ref Range Status   08/22/2022 70 55 - 135 U/L Final      AST   Date Value Ref Range Status   09/26/2022 26 0 - 40 IU/L Final     ALT   Date Value Ref Range Status   09/26/2022 21 0 - 32 IU/L Final     Anion Gap   Date Value Ref Range Status   08/22/2022 8 8 - 16 mmol/L Final     eGFR if    Date Value Ref Range Status   11/05/2021 >60.0 >60 mL/min/1.73 m^2 Final     eGFR if non    Date Value Ref Range Status   02/21/2022 56 (L) >59 mL/min/1.73 Final     No results found for: CEA  No results found for: PSA        Assessment/Plan:   Elevated ferritin  Ferritin is above 1000 and patient's Hb is up to 11.8g/dl.  I discussed this today.  Will plan on phlebotomy once monthly and allow for HCT as low as 30% to try to get this ferritin under better control.  Discussed this today and will arrange.  Will keep this up till I see her again in six months.     Discussion:     Follow up in about 6 months (around 3/28/2023).      Electronically signed by David Robb

## 2022-09-28 ENCOUNTER — OFFICE VISIT (OUTPATIENT)
Dept: HEMATOLOGY/ONCOLOGY | Facility: CLINIC | Age: 87
End: 2022-09-28
Payer: MEDICARE

## 2022-09-28 ENCOUNTER — OFFICE VISIT (OUTPATIENT)
Dept: RHEUMATOLOGY | Facility: CLINIC | Age: 87
End: 2022-09-28
Payer: MEDICARE

## 2022-09-28 VITALS
SYSTOLIC BLOOD PRESSURE: 149 MMHG | HEIGHT: 58 IN | BODY MASS INDEX: 22.88 KG/M2 | WEIGHT: 109 LBS | HEART RATE: 85 BPM | DIASTOLIC BLOOD PRESSURE: 67 MMHG | RESPIRATION RATE: 20 BRPM | TEMPERATURE: 98 F

## 2022-09-28 VITALS — WEIGHT: 108.5 LBS | BODY MASS INDEX: 22.68 KG/M2 | SYSTOLIC BLOOD PRESSURE: 114 MMHG | DIASTOLIC BLOOD PRESSURE: 63 MMHG

## 2022-09-28 DIAGNOSIS — M75.52 BURSITIS OF SHOULDER, LEFT: Primary | ICD-10-CM

## 2022-09-28 DIAGNOSIS — R79.89 ELEVATED FERRITIN: ICD-10-CM

## 2022-09-28 PROCEDURE — 99213 PR OFFICE/OUTPT VISIT, EST, LEVL III, 20-29 MIN: ICD-10-PCS | Mod: 25,S$GLB,, | Performed by: INTERNAL MEDICINE

## 2022-09-28 PROCEDURE — 20610 LARGE JOINT ASPIRATION/INJECTION: L SUBACROMIAL BURSA: ICD-10-PCS | Mod: LT,S$GLB,, | Performed by: INTERNAL MEDICINE

## 2022-09-28 PROCEDURE — 99213 PR OFFICE/OUTPT VISIT, EST, LEVL III, 20-29 MIN: ICD-10-PCS | Mod: S$GLB,,, | Performed by: INTERNAL MEDICINE

## 2022-09-28 PROCEDURE — 99213 OFFICE O/P EST LOW 20 MIN: CPT | Mod: 25,S$GLB,, | Performed by: INTERNAL MEDICINE

## 2022-09-28 PROCEDURE — 20610 DRAIN/INJ JOINT/BURSA W/O US: CPT | Mod: LT,S$GLB,, | Performed by: INTERNAL MEDICINE

## 2022-09-28 PROCEDURE — 99213 OFFICE O/P EST LOW 20 MIN: CPT | Mod: S$GLB,,, | Performed by: INTERNAL MEDICINE

## 2022-09-28 NOTE — PROGRESS NOTES
[facility] RHEUMATOLOGY            PROGRESS NOTE      Subjective:       Patient ID:   NAME: Cheyanne Gomes : 1935     86 y.o. female    Referring Doc: No ref. provider found  Other Physicians:    Chief Complaint:  Sjogren's syndrome and Osteoporosis      History of Present Illness:     Patient returns today for a regularly scheduled follow-up visit for Sjogren's syndrome and osteoporosis  The patient has arthralgias but no joint swelling.  Left shoulder pain mainly with abduction.  No history of trauma  No chest pains.  Has dyspnea on exertion.  No palpitations.  Has fatigue.  No skin rashes.    ROS:   GEN:  No  fever, night sweats . weight is stable  +fatigue  SKIN: no rashes, no bruising, no ulcerations, + rarely Raynaud's  HEENT: no HA's, No visual changes, no mucosal ulcers, + sicca symptoms,  CV:   no CP, SOB, PND, +SALINAS, no orthopnea, no palpitations  PULM: normal with no  cough, hemoptysis, sputum or pleuritic pain  GI:  no abdominal pain, nausea, vomiting, constipation, diarrhea, melanotic stools, BRBPR, hematemesis, no dysphagia  NEURO:  chronic feet paresthesias, -headaches, visual disturbances, muscle weakness  MUSCULOSKELETAL:no joint swelling, prolonged AM stiffness, +  back pain, no muscle pain  Allergies:  Review of patient's allergies indicates:   Allergen Reactions    Adhesive     Nitrofurantoin macrocrystalline Nausea And Vomiting     Other reaction(s): very sickly    Penicillins Swelling     Other reaction(s): swelling  Other reaction(s): red skin discolorati    Sulfa (sulfonamide antibiotics) Nausea And Vomiting     Other reaction(s): very sickly       Medications:    Current Outpatient Medications:     amLODIPine (NORVASC) 5 MG tablet, Take 5 mg by mouth once daily., Disp: , Rfl:     aspirin 81 mg Tab, Take 1 tablet by mouth every evening. Every day, Disp: , Rfl:     atorvastatin (LIPITOR) 40 MG tablet, Take 1 tablet (40 mg total) by mouth once daily., Disp: 90 tablet, Rfl: 3     BELBUCA 450 mcg Film, 2 (two) times daily as needed. Place 1 film between cheek and gum twice a day, Disp: , Rfl:     biotin 5 mg Cap, Take 1 tablet by mouth 2 (two) times daily., Disp: , Rfl:     CALCIUM CARB/VIT D3/MINERALS (CALCIUM-VITAMIN D ORAL), Take 600 mg by mouth 2 (two) times daily. Calcium 1200 with D 3 1000, Disp: , Rfl:     CRANBERRY CONC/ASCORBIC ACID (CRANBERRY PLUS VITAMIN C ORAL), Take 1 capsule by mouth once daily., Disp: , Rfl:     cyanocobalamin 1,000 mcg/mL injection, 1,000 mcg every 30 days., Disp: , Rfl:     DEXILANT 60 mg capsule, Take 60 mg by mouth once daily. , Disp: , Rfl: 11    ENTRESTO  mg per tablet, TAKE ONE TABLET BY MOUTH TWICE DAILY, Disp: 60 tablet, Rfl: 4    fluticasone (FLONASE) 50 mcg/actuation nasal spray, 2 sprays by Each Nare route once daily. (Patient taking differently: 2 sprays by Each Nostril route daily as needed.), Disp: 16 g, Rfl: 0    folic acid (FOLVITE) 1 MG tablet, Take 2 mg by mouth once daily. , Disp: , Rfl:     furosemide (LASIX) 80 MG tablet, TAKE ONE TABLET BY MOUTH EVERY DAY, Disp: 30 tablet, Rfl: 4    gabapentin (NEURONTIN) 100 MG capsule, Take 2 capsules (200 mg total) by mouth every evening., Disp: 60 capsule, Rfl: 5    HYDROmorphone (DILAUDID) 2 MG tablet, Take 2 mg by mouth every 4 (four) hours as needed for Pain., Disp: , Rfl:     isosorbide dinitrate (ISORDIL) 10 MG tablet, TAKE ONE TABLET BY MOUTH THREE TIMES DAILY, Disp: 100 tablet, Rfl: 2    Lactobacillus acidophilus 10 billion cell Cap, Take 1 each by mouth once daily. 1.5 billion, Disp: , Rfl:     magnesium oxide (MAG-OX) 400 mg (241.3 mg magnesium) tablet, TAKE 1 TABLET BY MOUTH 2 TIMES A DAY, Disp: 120 tablet, Rfl: 2    megestroL (MEGACE) 20 MG Tab, TAKE 1 TABLET BY MOUTH EVERY DAY, Disp: 90 tablet, Rfl: 0    metoprolol succinate (TOPROL-XL) 25 MG 24 hr tablet, Take 1 tablet (25 mg total) by mouth once daily., Disp: 90 tablet, Rfl: 2    MULTIVITAMIN WITH MINERALS (ONE-A-DAY 50 PLUS) Tab,  Take 1 tablet by mouth once daily. Every day, Disp: , Rfl:     nitroGLYCERIN 0.4 MG/DOSE TL SPRY (NITROLINGUAL) 400 mcg/spray spray, Place 1 spray under the tongue every 5 (five) minutes as needed for Chest pain. PRN, Disp: 4.9 g, Rfl: 3    omega-3 fatty acids/fish oil (FISH OIL-OMEGA-3 FATTY ACIDS) 300-1,000 mg capsule, Take by mouth once daily., Disp: , Rfl:     ondansetron (ZOFRAN) 4 MG tablet, Take 4 mg by mouth every 8 (eight) hours as needed for Nausea. , Disp: , Rfl: 2    potassium chloride SA (K-DUR,KLOR-CON) 10 MEQ tablet, TAKE TWO TABLETS BY MOUTH EVERY MORNING AND 1 TABLET EVERY EVENING, Disp: 270 tablet, Rfl: 2    promethazine (PHENERGAN) 25 MG tablet, Take 1 tablet (25 mg total) by mouth 2 (two) times daily as needed for Nausea., Disp: 60 tablet, Rfl: 1    sertraline (ZOLOFT) 50 MG tablet, TAKE 1 TABLET (50 MG TOTAL) BY MOUTH ONCE DAILY., Disp: 30 tablet, Rfl: 5    tiZANidine (ZANAFLEX) 2 MG tablet, TID PRN, Disp: 30 tablet, Rfl: 0    traMADoL (ULTRAM) 50 mg tablet, TAKE TWO TABLETS BY MOUTH TWICE DAILY AS NEEDED FOR SEVERE pain, Disp: 120 tablet, Rfl: 2    vitamin E 400 unit Tab, Take 400 mg by mouth once daily. Every day, Disp: , Rfl:     zinc gluconate 50 mg tablet, Take 50 mg by mouth once daily., Disp: , Rfl:     PMHx/PSHx Updates:      Objective:     Vitals:  Blood pressure 114/63, weight 49.2 kg (108 lb 8 oz).    Physical Examination:   GEN: no apparent distress, comfortable; AAOx3  SKIN: no rashes,no ulceration, no Raynaud's, no petechiae, no SQ nodules,  HEAD: normal  EYES: no pallor, no icterus  NECK: no masses, thyroid normal, trachea midline, no LAD/LN's, supple  CV: RRR with no murmur; l S1 and S2 reg. ,no gallop no rubs,   CHEST: Normal respiratory effort; CTAB; normal breath sounds; no wheeze or crackles  MUSC/Skeletal: no crepitus; joints without synovitis,    No joint swelling or tenderness of PIP, MCP, wrist, elbow,  R shoulder, or knee joints.  Left shoulder without swelling or  erythema.  Abduction to 90°.  Internal and external rotation slightly decreased  EXTREM: no clubbing, cyanosis, no edema  NEURO: grossly intact; motor WNL; AAOx3;   PSYCH: normal mood, affect and behavior  LYMPH: normal cervical, supraclavicular          Labs:   Lab Results   Component Value Date    WBC 7.2 09/26/2022    HGB 11.8 09/26/2022    HCT 37.2 09/26/2022    MCV 91 09/26/2022     09/26/2022    CMP  @LASTLAB(NA,K,CL,CO2,GLU,BUN,Creatinine,Calcium,PROT,Albumin,Bilitot,Alkphos,AST,ALT,CRP,ESR,RF,CCP,VANCE,SSA,CPK,uric acid) )@  I have reviewed all available lab results and radiology reports.    Radiology/Diagnostic Studies:        Assessment/Plan:   (1) 86 y.o. female with diagnosis of history of Sjogren's syndrome.  No extra glandular manifestations at this time.  Osteoporosis.  She is on Prolia  Left subacromial bursitis.  Injected as per procedure note.  Follow-up in a few weeks if still symptomatic              Discussion:     I have explained all of the above in detail and the patient understands all of the current recommendation(s). I have answered all questions to the best of my ability and to their complete satisfaction.       The patient is to continue with the current management plan               Electronically signed by Deepak Erazo MD    Patient notified that this office will be closing December 22, 2022. They should begin looking for another rheumatologist as soon as possible.  A list with the names and contact details of rheumatologists in the surrounding area was provided.

## 2022-09-28 NOTE — PROCEDURES
Large Joint Aspiration/Injection: L subacromial bursa    Date/Time: 9/28/2022 10:00 AM  Performed by: Deepak Erazo MD  Authorized by: Deepak Erazo MD     Indications:  Pain  Local anesthetic:  Lidocaine 2% without epinephrine  Location:  Shoulder  Site:  L subacromial bursa  Injected 1 cc Kenalog,1 cc Dexamethasone LSABursa

## 2022-09-28 NOTE — ASSESSMENT & PLAN NOTE
Ferritin is above 1000 and patient's Hb is up to 11.8g/dl.  I discussed this today.  Will plan on phlebotomy once monthly and allow for HCT as low as 30% to try to get this ferritin under better control.  Discussed this today and will arrange.  Will keep this up till I see her again in six months.

## 2022-09-29 ENCOUNTER — PATIENT MESSAGE (OUTPATIENT)
Dept: CARDIOLOGY | Facility: CLINIC | Age: 87
End: 2022-09-29
Payer: MEDICARE

## 2022-09-29 DIAGNOSIS — F32.9 MAJOR DEPRESSIVE DISORDER, SINGLE EPISODE, UNSPECIFIED: ICD-10-CM

## 2022-09-29 RX ORDER — METOPROLOL SUCCINATE 25 MG/1
25 TABLET, EXTENDED RELEASE ORAL SEE ADMIN INSTRUCTIONS
Qty: 200 TABLET | Refills: 2 | Status: SHIPPED | OUTPATIENT
Start: 2022-09-29 | End: 2023-03-02

## 2022-09-29 RX ORDER — SERTRALINE HYDROCHLORIDE 50 MG/1
100 TABLET, FILM COATED ORAL DAILY
Qty: 180 TABLET | Refills: 2 | Status: SHIPPED | OUTPATIENT
Start: 2022-09-29 | End: 2023-09-09 | Stop reason: SDUPTHER

## 2022-09-30 ENCOUNTER — OFFICE VISIT (OUTPATIENT)
Dept: OTOLARYNGOLOGY | Facility: CLINIC | Age: 87
End: 2022-09-30
Payer: MEDICARE

## 2022-09-30 ENCOUNTER — EXTERNAL CHRONIC CARE MANAGEMENT (OUTPATIENT)
Dept: PRIMARY CARE CLINIC | Facility: CLINIC | Age: 87
End: 2022-09-30
Payer: MEDICARE

## 2022-09-30 VITALS — WEIGHT: 108.94 LBS | HEIGHT: 58 IN | BODY MASS INDEX: 22.87 KG/M2

## 2022-09-30 DIAGNOSIS — Z48.02 ENCOUNTER FOR REMOVAL OF SUTURES: Primary | ICD-10-CM

## 2022-09-30 PROCEDURE — 99024 POSTOP FOLLOW-UP VISIT: CPT | Mod: POP,,, | Performed by: OTOLARYNGOLOGY

## 2022-09-30 PROCEDURE — 99999 PR PBB SHADOW E&M-EST. PATIENT-LVL IV: CPT | Mod: PBBFAC,,, | Performed by: OTOLARYNGOLOGY

## 2022-09-30 PROCEDURE — 99024 PR POST-OP FOLLOW-UP VISIT: ICD-10-PCS | Mod: POP,,, | Performed by: OTOLARYNGOLOGY

## 2022-09-30 PROCEDURE — 99214 OFFICE O/P EST MOD 30 MIN: CPT | Mod: PBBFAC,25,27,PO | Performed by: OTOLARYNGOLOGY

## 2022-09-30 PROCEDURE — 99490 PR CHRONIC CARE MGMT, 1ST 20 MIN: ICD-10-PCS | Mod: S$PBB,,, | Performed by: FAMILY MEDICINE

## 2022-09-30 PROCEDURE — 99490 CHRNC CARE MGMT STAFF 1ST 20: CPT | Mod: S$PBB,,, | Performed by: FAMILY MEDICINE

## 2022-09-30 PROCEDURE — 99999 PR PBB SHADOW E&M-EST. PATIENT-LVL IV: ICD-10-PCS | Mod: PBBFAC,,, | Performed by: OTOLARYNGOLOGY

## 2022-09-30 PROCEDURE — 99490 CHRNC CARE MGMT STAFF 1ST 20: CPT | Mod: PBBFAC,PO | Performed by: FAMILY MEDICINE

## 2022-10-01 NOTE — PROGRESS NOTES
Cheyanne is here for a post-operative visit following     PROCEDURES PERFORMED  Excision of benign skin nasal tip 1 x 0.5 cm  Full thickness skin graft closure of nasal tip and right nasal ala defect measuring 1.5 x 3 cm  Layered wound closure of nasal dorsum and cheek defect totalling 3.5 cm    No issues, doing well  Pain controlled    Exam:  Alert, active, appropriate  Incision c/d/i, wound with appropriate mild edema, mild erythema  Sutures removed, bolster removed.  100% skin graft take  Chin healing well    Healing well  Follow-up 1-2 weeks

## 2022-10-03 ENCOUNTER — OFFICE VISIT (OUTPATIENT)
Dept: FAMILY MEDICINE | Facility: CLINIC | Age: 87
End: 2022-10-03
Attending: FAMILY MEDICINE
Payer: MEDICARE

## 2022-10-03 VITALS
TEMPERATURE: 99 F | BODY MASS INDEX: 22.63 KG/M2 | WEIGHT: 107.81 LBS | SYSTOLIC BLOOD PRESSURE: 126 MMHG | OXYGEN SATURATION: 95 % | HEIGHT: 58 IN | HEART RATE: 61 BPM | DIASTOLIC BLOOD PRESSURE: 64 MMHG | RESPIRATION RATE: 17 BRPM

## 2022-10-03 DIAGNOSIS — M05.759: ICD-10-CM

## 2022-10-03 DIAGNOSIS — I25.118 CORONARY ARTERY DISEASE OF NATIVE ARTERY OF NATIVE HEART WITH STABLE ANGINA PECTORIS: Primary | ICD-10-CM

## 2022-10-03 DIAGNOSIS — N18.31 STAGE 3A CHRONIC KIDNEY DISEASE: ICD-10-CM

## 2022-10-03 DIAGNOSIS — I47.10 PSVT (PAROXYSMAL SUPRAVENTRICULAR TACHYCARDIA): ICD-10-CM

## 2022-10-03 DIAGNOSIS — R19.7 DIARRHEA, UNSPECIFIED TYPE: ICD-10-CM

## 2022-10-03 DIAGNOSIS — E78.2 MIXED HYPERLIPIDEMIA: ICD-10-CM

## 2022-10-03 DIAGNOSIS — K21.9 GASTROESOPHAGEAL REFLUX DISEASE, UNSPECIFIED WHETHER ESOPHAGITIS PRESENT: ICD-10-CM

## 2022-10-03 DIAGNOSIS — I10 PRIMARY HYPERTENSION: ICD-10-CM

## 2022-10-03 DIAGNOSIS — M35.00 SJOGREN'S SYNDROME, WITH UNSPECIFIED ORGAN INVOLVEMENT: ICD-10-CM

## 2022-10-03 DIAGNOSIS — K22.70 BARRETT'S ESOPHAGUS WITHOUT DYSPLASIA: ICD-10-CM

## 2022-10-03 DIAGNOSIS — I50.22 CHRONIC SYSTOLIC CONGESTIVE HEART FAILURE: ICD-10-CM

## 2022-10-03 DIAGNOSIS — D63.8 ANEMIA OF CHRONIC DISEASE: Chronic | ICD-10-CM

## 2022-10-03 DIAGNOSIS — Z95.0 HISTORY OF CARDIAC PACEMAKER IN SITU: ICD-10-CM

## 2022-10-03 PROCEDURE — 99214 OFFICE O/P EST MOD 30 MIN: CPT | Mod: S$PBB,,, | Performed by: FAMILY MEDICINE

## 2022-10-03 PROCEDURE — 99215 OFFICE O/P EST HI 40 MIN: CPT | Mod: PBBFAC,PN | Performed by: FAMILY MEDICINE

## 2022-10-03 PROCEDURE — 99999 PR PBB SHADOW E&M-EST. PATIENT-LVL V: ICD-10-PCS | Mod: PBBFAC,,, | Performed by: FAMILY MEDICINE

## 2022-10-03 PROCEDURE — 99999 PR PBB SHADOW E&M-EST. PATIENT-LVL V: CPT | Mod: PBBFAC,,, | Performed by: FAMILY MEDICINE

## 2022-10-03 PROCEDURE — G0008 ADMIN INFLUENZA VIRUS VAC: HCPCS | Mod: PBBFAC,PN

## 2022-10-03 PROCEDURE — 99214 PR OFFICE/OUTPT VISIT, EST, LEVL IV, 30-39 MIN: ICD-10-PCS | Mod: S$PBB,,, | Performed by: FAMILY MEDICINE

## 2022-10-03 RX ORDER — CHOLESTYRAMINE 4 G/4.8G
POWDER, FOR SUSPENSION ORAL
Qty: 60 PACKET | Refills: 5 | Status: SHIPPED | OUTPATIENT
Start: 2022-10-03 | End: 2023-03-02

## 2022-10-03 NOTE — PROGRESS NOTES
Subjective:       Patient ID: Cheyanne Gomes is a 86 y.o. female.    Chief Complaint: Annual Exam (Pt states that she is here for her annual exam )    86-year-old female coming in to follow-up on multiple medical problems.  She recently had a basal cell skin cancer removed from the nose with extensive Mohs surgery and reconstruction with Dr. Weir.  She also is complaining of some chronic diarrhea that she attributes to irritable bowel syndrome but on questioning it did start when she had her gallbladder removed.  She does not have intermittent constipation and she has no blood in the bowel movements.  She has never tried Questran.  She does use Metamucil on the advice of her gastroenterologist.  It gives her slight relief.  History includes systolic and diastolic congestive heart failure, PSVT, aortic stenosis, hypertension, hyperlipidemia, coronary artery disease with three-vessel bypass surgery, pacemaker implantation, stage IIIA chronic kidney disease, rheumatoid arthritis with Sjogren syndrome followed by Dr. Perez who will be retiring at the end of this year.  She also has anemia of chronic disease, reflux and 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.  She has osteoporosis.    She is followed by Dr. Blair for her cardiac problems and will be seeing him on Thursday of this week.  Her lab is up-to-date but she is due for a cholesterol check.    Past Medical History:  No date: Abdominal hernia  No date: Anemia      Comment:  Dr Berkowitz   No date: Angina pectoris  No date: Aortic valve stenosis, moderate  No date: Back pain  No date: Cannon's esophagus  No date: CAD (coronary artery disease)  No date: Cataract      Comment:  ou done  No date: CHF (congestive heart failure)      Comment:  EFx 35%, Dr. 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  No date: Rheumatoid arthritis  2017: Shingles  No date: Sjogren syndrome  No date: Ulcerative colitis  Past Surgical History:  2021: A-V CARDIAC PACEMAKER INSERTION; Left      Comment:  Procedure: INSERTION, CARDIAC PACEMAKER, DUAL CHAMBER                 ([employer]);  Surgeon: Tera Blair MD;                 Location: [facility] CATH/EP LAB;  Service: Cardiology;                 Laterality: Left;  No date: APPENDECTOMY  No date: BACK SURGERY  No date: CARDIAC SURGERY      Comment:  CABGx3 in   No date: CATARACT EXTRACTION      Comment:  ou od d 11-15-12/  No date:  SECTION, CLASSIC      Comment:  x 2  No date: CHOLECYSTECTOMY  2022: CLOSURE OF WOUND; Right      Comment:  Procedure: CLOSURE-WOUND;  Surgeon: Delvis Jackson MD;  Location: [facility];  Service: ENT;  Laterality:                Right;  NOSE AND CHIN  09/15/2011: COLONOSCOPY      Comment:  Dr Anaya   01/10/2017: COLONOSCOPY      Comment:  [facility] report sent to scanning  No date: CORONARY ANGIOPLASTY      Comment:  PCI x 1 in   No date: CORONARY ARTERY BYPASS GRAFT      Comment:  3 vessel  No date: CORONARY ARTERY BYPASS GRAFT  6/3/2020: CYSTOSCOPY; N/A      Comment:  Procedure: CYSTOSCOPY;  Surgeon: Miryam Bender Jr., MD;               Location: [facility] OR;  Service: Urology;  Laterality: N/A;  No date: eyes      Comment:  paul rodríguez  2016: FRACTURE SURGERY      Comment:  Dr Guido left hip   2018: FRACTURE SURGERY      Comment:  Right Hip  2022: HARVESTING OF SKIN GRAFT      Comment:  Procedure: SURGICAL PROCUREMENT, GRAFT, SKIN;  Surgeon:                Delvis Jackson MD;   Location: [facility] OR;  Service: ENT;;               RIGHT CHEST  No date: HYSTERECTOMY      Comment:  tubal ligation, oopherectomy  10/8/2018: INTRAMEDULLARY RODDING OF TROCHANTER OF FEMUR; Right      Comment:  Procedure: INSERTION, INTRAMEDULLARY KELSI, FEMUR,                TROCHANTER;  Surgeon: Valerio Luther MD;  Location: [facility] OR;               Service: Orthopedics;  Laterality: Right;  No date: OOPHORECTOMY  2013: SPINE SURGERY      Comment:  Silvino Mckeon  No date: UPPER GASTROINTESTINAL ENDOSCOPY  14: Yag Capsulotomy; Right    Review of patient's family history indicates:  Adopted:  Yes    Social History    Tobacco Use      Smoking status: Former        Packs/day: 1.00        Years: 5.00        Pack years: 5        Types: Cigarettes        Quit date: 1971        Years since quittin.7      Smokeless tobacco: Never    Alcohol use: Yes      Alcohol/week: 1.0 standard drink      Types: 1 Glasses of wine per week    Drug use: Never    Current Outpatient Medications on File Prior to Visit:  amLODIPine (NORVASC) 5 MG tablet, Take 5 mg by mouth once daily., Disp: , Rfl:   aspirin 81 mg Tab, Take 1 tablet by mouth every evening. Every day, Disp: , Rfl:   atorvastatin (LIPITOR) 40 MG tablet, Take 1 tablet (40 mg total) by mouth once daily., Disp: 90 tablet, Rfl: 3  biotin 5 mg Cap, Take 1 tablet by mouth 2 (two) times daily., Disp: , Rfl:   CALCIUM CARB/VIT D3/MINERALS (CALCIUM-VITAMIN D ORAL), Take 600 mg by mouth 2 (two) times daily. Calcium 1200 with D 3 1000, Disp: , Rfl:   CRANBERRY CONC/ASCORBIC ACID (CRANBERRY PLUS VITAMIN C ORAL), Take 1 capsule by mouth once daily., Disp: , Rfl:   cyanocobalamin 1,000 mcg/mL injection, 1,000 mcg every 30 days., Disp: , Rfl:   DEXILANT 60 mg capsule, Take 60 mg by mouth once daily. , Disp: , Rfl: 11  ENTRESTO  mg per tablet, TAKE ONE TABLET BY MOUTH TWICE DAILY, Disp: 60 tablet, Rfl: 4  fluticasone (FLONASE) 50 mcg/actuation nasal spray, 2 sprays by Each Nare  route once daily. (Patient taking differently: 2 sprays by Each Nostril route daily as needed.), Disp: 16 g, Rfl: 0  folic acid (FOLVITE) 1 MG tablet, Take 2 mg by mouth once daily. , Disp: , Rfl:   furosemide (LASIX) 80 MG tablet, TAKE ONE TABLET BY MOUTH EVERY DAY, Disp: 30 tablet, Rfl: 4  gabapentin (NEURONTIN) 100 MG capsule, Take 2 capsules (200 mg total) by mouth every evening., Disp: 60 capsule, Rfl: 5  HYDROmorphone (DILAUDID) 2 MG tablet, Take 2 mg by mouth every 4 (four) hours as needed for Pain., Disp: , Rfl:   isosorbide dinitrate (ISORDIL) 10 MG tablet, TAKE ONE TABLET BY MOUTH THREE TIMES DAILY, Disp: 100 tablet, Rfl: 2  Lactobacillus acidophilus 10 billion cell Cap, Take 1 each by mouth once daily. 1.5 billion, Disp: , Rfl:   magnesium oxide (MAG-OX) 400 mg (241.3 mg magnesium) tablet, TAKE 1 TABLET BY MOUTH 2 TIMES A DAY, Disp: 120 tablet, Rfl: 2  metoprolol succinate (TOPROL-XL) 25 MG 24 hr tablet, Take 1 tablet (25 mg total) by mouth As instructed. Take 1 and 1/2 tabs qam and 1 tab qpm, Disp: 200 tablet, Rfl: 2  MULTIVITAMIN WITH MINERALS (ONE-A-DAY 50 PLUS) Tab, Take 1 tablet by mouth once daily. Every day, Disp: , Rfl:   nitroGLYCERIN 0.4 MG/DOSE TL SPRY (NITROLINGUAL) 400 mcg/spray spray, Place 1 spray under the tongue every 5 (five) minutes as needed for Chest pain. PRN, Disp: 4.9 g, Rfl: 3  ondansetron (ZOFRAN) 4 MG tablet, Take 4 mg by mouth every 8 (eight) hours as needed for Nausea. , Disp: , Rfl: 2  potassium chloride SA (K-DUR,KLOR-CON) 10 MEQ tablet, TAKE TWO TABLETS BY MOUTH EVERY MORNING AND 1 TABLET EVERY EVENING, Disp: 270 tablet, Rfl: 2  promethazine (PHENERGAN) 25 MG tablet, Take 1 tablet (25 mg total) by mouth 2 (two) times daily as needed for Nausea., Disp: 60 tablet, Rfl: 1  sertraline (ZOLOFT) 50 MG tablet, Take 2 tablets (100 mg total) by mouth once daily., Disp: 180 tablet, Rfl: 2  traMADoL (ULTRAM) 50 mg tablet, TAKE TWO TABLETS BY MOUTH TWICE DAILY AS NEEDED FOR SEVERE  pain, Disp: 120 tablet, Rfl: 2  vitamin E 400 unit Tab, Take 400 mg by mouth once daily. Every day, Disp: , Rfl:   zinc gluconate 50 mg tablet, Take 50 mg by mouth once daily., Disp: , Rfl:   BELBUCA 450 mcg Film, 2 (two) times daily as needed. Place 1 film between cheek and gum twice a day, Disp: , Rfl:   megestroL (MEGACE) 20 MG Tab, TAKE 1 TABLET BY MOUTH EVERY DAY (Patient not taking: Reported on 10/3/2022.), Disp: 90 tablet, Rfl: 0  omega-3 fatty acids/fish oil (FISH OIL-OMEGA-3 FATTY ACIDS) 300-1,000 mg capsule, Take by mouth once daily., Disp: , Rfl:   tiZANidine (ZANAFLEX) 2 MG tablet, TID PRN (Patient not taking: Reported on 10/3/2022), Disp: 30 tablet, Rfl: 0  [DISCONTINUED] hydrochlorothiazide (HYDRODIURIL) 25 MG tablet, Take 25 mg by mouth once daily., Disp: , Rfl:     No current facility-administered medications on file prior to visit.        Review of Systems   Constitutional:  Positive for fatigue. Negative for chills, diaphoresis, fever and unexpected weight change.   HENT:  Negative for congestion, ear pain, hearing loss, postnasal drip, sinus pressure, sneezing, sore throat, tinnitus and trouble swallowing.    Eyes:  Negative for itching and visual disturbance.   Respiratory:  Negative for cough, chest tightness, shortness of breath and wheezing.    Cardiovascular:  Negative for chest pain, palpitations and leg swelling.   Gastrointestinal:  Positive for diarrhea. Negative for abdominal pain, blood in stool, constipation, nausea and vomiting.   Genitourinary:  Negative for dysuria, frequency, hematuria, menstrual problem, pelvic pain, vaginal bleeding and vaginal discharge.   Musculoskeletal:  Negative for arthralgias, back pain, joint swelling and myalgias.   Neurological:  Negative for dizziness and headaches.   Hematological:  Negative for adenopathy.   Psychiatric/Behavioral:  Negative for sleep disturbance. The patient is not nervous/anxious.      Objective:      Physical Exam  Vitals and  nursing note reviewed.   Constitutional:       General: She is not in acute distress.     Appearance: Normal appearance. She is well-developed and normal weight. She is not ill-appearing, toxic-appearing or diaphoretic.      Comments: Good blood pressure control  Normal weight with a BMI of 22.5 she is down 2.8 lb from her last visit with me November 3, 2021   HENT:      Head: Normocephalic and atraumatic.      Right Ear: Tympanic membrane, ear canal and external ear normal. There is no impacted cerumen.      Left Ear: Tympanic membrane, ear canal and external ear normal. There is no impacted cerumen.      Ears:      Comments: Bilateral behind the ear in the canal hearing aids     Nose: No congestion or rhinorrhea.      Comments: Postsurgical changes from Mohs surgery and reconstruction of the nose     Mouth/Throat:      Mouth: Mucous membranes are moist.      Pharynx: Oropharynx is clear. No oropharyngeal exudate or posterior oropharyngeal erythema.   Eyes:      General: No scleral icterus.        Right eye: No discharge.         Left eye: No discharge.      Extraocular Movements: Extraocular movements intact.      Conjunctiva/sclera: Conjunctivae normal.      Pupils: Pupils are equal, round, and reactive to light.   Neck:      Thyroid: No thyromegaly.      Vascular: No carotid bruit or JVD.   Cardiovascular:      Rate and Rhythm: Normal rate and regular rhythm.      Pulses: Normal pulses.      Heart sounds: Normal heart sounds. No murmur heard.    No friction rub. No gallop.   Pulmonary:      Effort: Pulmonary effort is normal. No respiratory distress.      Breath sounds: Normal breath sounds. No stridor. No wheezing, rhonchi or rales.   Chest:      Chest wall: No tenderness.   Abdominal:      General: Bowel sounds are normal. There is no distension.      Palpations: Abdomen is soft. There is no mass.      Tenderness: There is no abdominal tenderness. There is no guarding or rebound.      Hernia: No hernia is  present.   Musculoskeletal:         General: No swelling, tenderness, deformity or signs of injury. Normal range of motion.      Cervical back: Normal range of motion and neck supple. No rigidity or tenderness.      Right lower leg: No edema.      Left lower leg: No edema.   Lymphadenopathy:      Cervical: No cervical adenopathy.   Skin:     General: Skin is warm and dry.      Coloration: Skin is not jaundiced or pale.      Findings: No bruising, erythema, lesion or rash.   Neurological:      General: No focal deficit present.      Mental Status: She is alert and oriented to person, place, and time. Mental status is at baseline.      Cranial Nerves: No cranial nerve deficit.      Sensory: No sensory deficit.      Motor: No weakness.      Coordination: Coordination normal.      Gait: Gait normal.      Deep Tendon Reflexes: Reflexes are normal and symmetric. Reflexes normal.   Psychiatric:         Mood and Affect: Mood normal.         Behavior: Behavior normal.         Thought Content: Thought content normal.         Judgment: Judgment normal.       Assessment:       1. Coronary artery disease of native artery of native heart with stable angina pectoris    2. Chronic systolic congestive heart failure    3. PSVT (paroxysmal supraventricular tachycardia)    4. Primary hypertension    5. Mixed hyperlipidemia    6. History of cardiac pacemaker in situ    7. Stage 3a chronic kidney disease    8. Sjogren's syndrome, with unspecified organ involvement    9. 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 Anemia of chronic disease    11. Gastroesophageal reflux disease, unspecified whether esophagitis present    12. 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    13. Diarrhea, unspecified type          Plan:       1. Coronary artery disease of native artery of native heart with stable angina pectoris  Stable, followed by Dr. Blair    2. Chronic systolic congestive heart  failure  Stable    3. PSVT (paroxysmal supraventricular tachycardia)  Stable controlled with pacemaker    4. Primary hypertension  Good control no changes needed    5. Mixed hyperlipidemia  Lipid ordered at [facility]  - Lipid Panel; Future  - Lipid Panel    6. History of cardiac pacemaker in situ  Stable    7. Stage 3a chronic kidney disease  BMP  Lab Results   Component Value Date     09/26/2022    K 3.8 09/26/2022     09/26/2022    CO2 25 09/26/2022    BUN 14 09/26/2022    CREATININE 1.05 (H) 09/26/2022    CALCIUM 9.5 09/26/2022    ANIONGAP 8 08/22/2022    ESTGFRAFRICA >60.0 11/05/2021    EGFRNONAA 56 (L) 02/21/2022         8. Sjogren's syndrome, with unspecified organ involvement  Stable followed by Dr. Perez    9. 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 Perez    10. Anemia of chronic disease  Lab Results   Component Value Date    WBC 7.2 09/26/2022    HGB 11.8 09/26/2022    HCT 37.2 09/26/2022    MCV 91 09/26/2022     09/26/2022           11. Gastroesophageal reflux disease, unspecified whether esophagitis present  Asymptomatic    12. 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  Asymptomatic    13. Diarrhea, unspecified type  Discussed Questran, start with one packet a day and increase up to at maximum of three daily every 3rd day.  If she develops constipation reduce the dose  - cholestyramine-aspartame (QUESTRAN LIGHT) 4 gram PwPk; Start with one packet daily for three days, then if needed increase to 2 packets daily for three days, then if needed increase to 3 packets  Dispense: 60 packet; Refill: 5

## 2022-10-05 LAB
CHOLEST SERPL-MCNC: 146 MG/DL (ref 100–199)
HDLC SERPL-MCNC: 52 MG/DL
LDLC SERPL CALC-MCNC: 75 MG/DL (ref 0–99)
TRIGL SERPL-MCNC: 104 MG/DL (ref 0–149)
VLDLC SERPL CALC-MCNC: 19 MG/DL (ref 5–40)

## 2022-10-06 ENCOUNTER — OFFICE VISIT (OUTPATIENT)
Dept: CARDIOLOGY | Facility: CLINIC | Age: 87
End: 2022-10-06
Payer: MEDICARE

## 2022-10-06 VITALS
BODY MASS INDEX: 23.6 KG/M2 | OXYGEN SATURATION: 92 % | WEIGHT: 112.44 LBS | HEIGHT: 58 IN | SYSTOLIC BLOOD PRESSURE: 122 MMHG | HEART RATE: 70 BPM | DIASTOLIC BLOOD PRESSURE: 62 MMHG

## 2022-10-06 DIAGNOSIS — I42.0 CONGESTIVE CARDIOMYOPATHY: Primary | ICD-10-CM

## 2022-10-06 DIAGNOSIS — I47.10 PSVT (PAROXYSMAL SUPRAVENTRICULAR TACHYCARDIA): ICD-10-CM

## 2022-10-06 DIAGNOSIS — I49.5 SICK SINUS SYNDROME: ICD-10-CM

## 2022-10-06 DIAGNOSIS — I25.118 CORONARY ARTERY DISEASE OF NATIVE ARTERY OF NATIVE HEART WITH STABLE ANGINA PECTORIS: ICD-10-CM

## 2022-10-06 DIAGNOSIS — N18.31 STAGE 3A CHRONIC KIDNEY DISEASE: ICD-10-CM

## 2022-10-06 DIAGNOSIS — I35.0 AORTIC VALVE STENOSIS, MODERATE: ICD-10-CM

## 2022-10-06 DIAGNOSIS — I50.42 CHRONIC COMBINED SYSTOLIC AND DIASTOLIC HEART FAILURE: ICD-10-CM

## 2022-10-06 PROCEDURE — 99212 PR OFFICE/OUTPT VISIT, EST, LEVL II, 10-19 MIN: ICD-10-PCS | Mod: S$GLB,,, | Performed by: INTERNAL MEDICINE

## 2022-10-06 PROCEDURE — 99212 OFFICE O/P EST SF 10 MIN: CPT | Mod: S$GLB,,, | Performed by: INTERNAL MEDICINE

## 2022-10-06 NOTE — PROGRESS NOTES
Patient ID:  Cheyanne Gomes is a 86 y.o. female who presents for follow-up of Coronary Artery Disease, Congestive Heart Failure, Cardiomyopathy, and Results (labs)      She has been doing fairly well right now she is complaining of for since and cough.  Denies any chest pains.  In general her shortness of breath is stable.      Past Medical History:   Diagnosis Date    Abdominal hernia     Anemia     Dr Berkowitz     Angina pectoris     Aortic valve stenosis, moderate     Back pain     Cannon's esophagus     CAD (coronary artery disease)     Cataract     ou done    CHF (congestive heart failure)     EFx 35%, Dr. Spencer 13    Chronic combined systolic and diastolic heart failure 2019    Colitis     Compression fracture     Diverticulosis     Encounter for blood transfusion     GERD (gastroesophageal reflux disease)     Hyperlipidemia     Hypertension     Irritable bowel syndrome     Myocardial infarction     Osteoarthritis     lumbar DDD    Pacemaker     Pneumonia 2017    Raynaud disease     Rheumatoid arteritis     Rheumatoid arthritis     Shingles 2017    Sjogren syndrome     Ulcerative colitis         Past Surgical History:   Procedure Laterality Date    A-V CARDIAC PACEMAKER INSERTION Left 2021    Procedure: INSERTION, CARDIAC PACEMAKER, DUAL CHAMBER  ([employer]TRONIC);  Surgeon: Tera Blair MD;  Location: [facility] CATH/EP LAB;  Service: Cardiology;  Laterality: Left;    APPENDECTOMY      BACK SURGERY      CARDIAC SURGERY      CABGx3 in     CATARACT EXTRACTION      ou od d 11-15-12//     SECTION, CLASSIC      x 2    CHOLECYSTECTOMY      CLOSURE OF WOUND Right 2022    Procedure: CLOSURE-WOUND;  Surgeon: Delvis Jackson MD;  Location: [facility] OR;  Service: ENT;  Laterality: Right;  NOSE AND CHIN    COLONOSCOPY  09/15/2011    Dr Anaya     COLONOSCOPY  01/10/2017    [facility] report sent to scanning    CORONARY ANGIOPLASTY      PCI x 1 in     CORONARY ARTERY  BYPASS GRAFT      3 vessel    CORONARY ARTERY BYPASS GRAFT      CYSTOSCOPY N/A 6/3/2020    Procedure: CYSTOSCOPY;  Surgeon: Miryam Bender Jr., MD;  Location: [facility] OR;  Service: Urology;  Laterality: N/A;    eyes      rk ou    FRACTURE SURGERY  06/21/2016    Dr Guido left hip     FRACTURE SURGERY  2018    Right Hip    HARVESTING OF SKIN GRAFT  9/23/2022    Procedure: SURGICAL PROCUREMENT, GRAFT, SKIN;  Surgeon: Delvis Jackson MD;  Location: [facility] OR;  Service: ENT;;  RIGHT CHEST    HYSTERECTOMY      tubal ligation, oopherectomy    INTRAMEDULLARY RODDING OF TROCHANTER OF FEMUR Right 10/8/2018    Procedure: INSERTION, INTRAMEDULLARY KELSI, FEMUR, TROCHANTER;  Surgeon: Valerio Luther MD;  Location: [facility] OR;  Service: Orthopedics;  Laterality: Right;    OOPHORECTOMY      SPINE SURGERY  8-    Silvino Mckeon    UPPER GASTROINTESTINAL ENDOSCOPY      Yag Capsulotomy Right 9/25/14          Current Outpatient Medications   Medication Instructions    amLODIPine (NORVASC) 5 mg, Oral, Daily    aspirin 81 mg Tab 1 tablet, Oral, Nightly, Every day    atorvastatin (LIPITOR) 40 mg, Oral, Daily    BELBUCA 450 mcg Film 2 times daily PRN, Place 1 film between cheek and gum twice a day    biotin 5 mg Cap 1 tablet, Oral, 2 times daily    CALCIUM CARB/VIT D3/MINERALS (CALCIUM-VITAMIN D ORAL) 600 mg, Oral, 2 times daily, Calcium 1200 with D 3 1000    cholestyramine-aspartame (QUESTRAN LIGHT) 4 gram PwPk Start with one packet daily for three days, then if needed increase to 2 packets daily for three days, then if needed increase to 3 packets    CRANBERRY CONC/ASCORBIC ACID (CRANBERRY PLUS VITAMIN C ORAL) 1 capsule, Oral, Daily    cyanocobalamin 1,000 mcg, Every 30 days    DEXILANT 60 mg, Oral, Daily    ENTRESTO  mg per tablet TAKE ONE TABLET BY MOUTH TWICE DAILY    fluticasone (FLONASE) 50 mcg/actuation nasal spray 2 sprays, Each Nostril, Daily    folic acid (FOLVITE) 2 mg, Oral, Daily    furosemide (LASIX) 80 MG tablet TAKE ONE  TABLET BY MOUTH EVERY DAY    gabapentin (NEURONTIN) 200 mg, Oral, Nightly    HYDROmorphone (DILAUDID) 2 mg, Oral, Every 4 hours PRN    isosorbide dinitrate (ISORDIL) 10 MG tablet TAKE ONE TABLET BY MOUTH THREE TIMES DAILY    Lactobacillus acidophilus 10 billion cell Cap 1 each, Oral, Daily, 1.5 billion    magnesium oxide (MAG-OX) 400 mg (241.3 mg magnesium) tablet TAKE 1 TABLET BY MOUTH 2 TIMES A DAY    megestroL (MEGACE) 20 MG Tab TAKE 1 TABLET BY MOUTH EVERY DAY    metoprolol succinate (TOPROL-XL) 25 mg, Oral, See admin instructions, Take 1 and 1/2 tabs qam and 1 tab qpm    MULTIVITAMIN WITH MINERALS (ONE-A-DAY 50 PLUS) Tab 1 tablet, Oral, Daily, Every day    nitroGLYCERIN 0.4 MG/DOSE TL SPRY (NITROLINGUAL) 400 mcg/spray spray 1 spray, Sublingual, Every 5 min PRN, PRN    omega-3 fatty acids/fish oil (FISH OIL-OMEGA-3 FATTY ACIDS) 300-1,000 mg capsule Oral, Daily    ondansetron (ZOFRAN) 4 mg, Oral, Every 8 hours PRN    potassium chloride SA (K-DUR,KLOR-CON) 10 MEQ tablet TAKE TWO TABLETS BY MOUTH EVERY MORNING AND 1 TABLET EVERY EVENING    promethazine (PHENERGAN) 25 mg, Oral, 2 times daily PRN    sertraline (ZOLOFT) 100 mg, Oral, Daily    tiZANidine (ZANAFLEX) 2 MG tablet TID PRN    traMADoL (ULTRAM) 50 mg tablet TAKE TWO TABLETS BY MOUTH TWICE DAILY AS NEEDED FOR SEVERE pain    vitamin E 400 mg, Oral, Daily, Every day    zinc gluconate 50 mg, Oral, Daily        Review of patient's allergies indicates:   Allergen Reactions    Adhesive     Nitrofurantoin macrocrystalline Nausea And Vomiting     Other reaction(s): very sickly    Penicillins Swelling     Other reaction(s): swelling  Other reaction(s): red skin discolorati    Sulfa (sulfonamide antibiotics) Nausea And Vomiting     Other reaction(s): very sickly        Review of Systems   HENT:  Positive for congestion and hoarse voice.    Cardiovascular:  Negative for chest pain, dyspnea on exertion, leg swelling and palpitations.   Respiratory:  Positive for cough.   "     Objective:     Vitals:    10/06/22 1507   BP: 122/62   BP Location: Left arm   Patient Position: Sitting   BP Method: Medium (Manual)   Pulse: 70   SpO2: (!) 92%   Weight: 51 kg (112 lb 7 oz)   Height: 4' 10" (1.473 m)       Physical Exam  Vitals and nursing note reviewed.   Constitutional:       Appearance: She is well-developed.   HENT:      Head: Normocephalic and atraumatic.   Eyes:      Conjunctiva/sclera: Conjunctivae normal.   Cardiovascular:      Rate and Rhythm: Normal rate and regular rhythm.      Heart sounds: Murmur (Grade 3/6 systolic murmur at the base) heard.   Pulmonary:      Effort: Pulmonary effort is normal.      Breath sounds: Normal breath sounds.   Abdominal:      General: Bowel sounds are normal.      Palpations: Abdomen is soft.   Musculoskeletal:         General: Normal range of motion.   Skin:     General: Skin is warm and dry.   Neurological:      Mental Status: She is alert and oriented to person, place, and time.   Psychiatric:         Behavior: Behavior normal.         Thought Content: Thought content normal.         Judgment: Judgment normal.     CMP  Sodium   Date Value Ref Range Status   10/14/2022 142 134 - 144 mmol/L Final   12/11/2018 139 134 - 144 mmol/L      Potassium   Date Value Ref Range Status   10/14/2022 4.5 3.5 - 5.2 mmol/L Final     Chloride   Date Value Ref Range Status   10/14/2022 99 96 - 106 mmol/L Final   12/11/2018 96 (L) 98 - 110 mmol/L      CO2   Date Value Ref Range Status   10/14/2022 29 20 - 29 mmol/L Final     Glucose   Date Value Ref Range Status   10/14/2022 120 (H) 70 - 99 mg/dL Final     Comment:                   **Please note reference interval change**   12/11/2018 97 70 - 99 mg/dL      BUN   Date Value Ref Range Status   10/14/2022 16 8 - 27 mg/dL Final     Creatinine   Date Value Ref Range Status   10/14/2022 0.99 0.57 - 1.00 mg/dL Final   12/11/2018 0.95 0.60 - 1.40 mg/dL      Calcium   Date Value Ref Range Status   10/14/2022 9.4 8.7 - 10.3 " mg/dL Final     Total Protein   Date Value Ref Range Status   08/22/2022 7.0 6.0 - 8.4 g/dL Final     Albumin   Date Value Ref Range Status   10/14/2022 4.0 3.6 - 4.6 g/dL Final   08/22/2022 4.0 3.5 - 5.2 g/dL Final   12/11/2018 3.9 3.1 - 4.7 g/dL      Total Bilirubin   Date Value Ref Range Status   10/14/2022 0.2 0.0 - 1.2 mg/dL Final     Alkaline Phosphatase   Date Value Ref Range Status   08/22/2022 70 55 - 135 U/L Final     AST   Date Value Ref Range Status   10/14/2022 23 0 - 40 IU/L Final     ALT   Date Value Ref Range Status   10/14/2022 16 0 - 32 IU/L Final     Anion Gap   Date Value Ref Range Status   08/22/2022 8 8 - 16 mmol/L Final     eGFR if    Date Value Ref Range Status   11/05/2021 >60.0 >60 mL/min/1.73 m^2 Final     eGFR if non    Date Value Ref Range Status   02/21/2022 56 (L) >59 mL/min/1.73 Final      BMP  Lab Results   Component Value Date     10/14/2022    K 4.5 10/14/2022    CL 99 10/14/2022    CO2 29 10/14/2022    BUN 16 10/14/2022    CREATININE 0.99 10/14/2022    CALCIUM 9.4 10/14/2022    ANIONGAP 8 08/22/2022    ESTGFRAFRICA >60.0 11/05/2021    EGFRNONAA 56 (L) 02/21/2022      BNP  @LABRCNTIP(BNP,BNPTRIAGEBLO)@   Lab Results   Component Value Date    CHOL 146 10/04/2022    CHOL 113 05/06/2021    CHOL 135 11/24/2020     Lab Results   Component Value Date    HDL 52 10/04/2022    HDL 47 05/06/2021    HDL 61 11/24/2020     Lab Results   Component Value Date    LDLCALC 75 10/04/2022    LDLCALC 51 05/06/2021    LDLCALC 55 11/24/2020     Lab Results   Component Value Date    TRIG 104 10/04/2022    TRIG 70 05/06/2021    TRIG 108 11/24/2020     Lab Results   Component Value Date    CHOLHDL 26.2 08/24/2020    CHOLHDL 45.0 10/16/2013    CHOLHDL 40.7 08/11/2009      Lab Results   Component Value Date    TSH 2.105 08/22/2022    FREET4 0.89 09/26/2012     Lab Results   Component Value Date    HGBA1C 5.3 02/26/2015     Lab Results   Component Value Date    WBC 7.2  09/26/2022    HGB 11.8 09/26/2022    HCT 37.2 09/26/2022    MCV 91 09/26/2022     09/26/2022         Results for orders placed during the hospital encounter of 04/28/22    Echo    Interpretation Summary  · The left ventricle is normal in size with concentric remodeling and mildly decreased systolic function.  · The estimated ejection fraction is 40%.  · Grade I left ventricular diastolic dysfunction.  · There are segmental left ventricular wall motion abnormalities.  · Apical akinesis  · Normal right ventricular size with normal right ventricular systolic function.  · Mild aortic regurgitation.  · There is moderate aortic valve stenosis.  · Aortic valve area is 1.15 cm2; peak velocity is 2.12 m/s; mean gradient is 18 mmHg.  · Mild tricuspid regurgitation.  · Normal central venous pressure (3 mmHg).  · The estimated PA systolic pressure is 24 mmHg.  · Overall the study quality was technically difficult.     No results found for this or any previous visit.         Assessment:       Hypertension  Controlled on amlodipine and Entresto    Aortic valve stenosis, moderate  Aware symptoms not worse    Coronary artery disease of native artery of native heart with stable angina pectoris  Stable no symptoms of angina    Hyperlipidemia  On 40 mg of atorvastatin    Chronic combined systolic and diastolic heart failure  Stable    Sick sinus syndrome  Some degree of sick sinus syndrome stable       Plan:       Continue current medical therapy return to the office in 3 months with a BMP as well as a BNP.

## 2022-10-15 LAB
ALBUMIN SERPL-MCNC: 4 G/DL (ref 3.6–4.6)
ALBUMIN/GLOB SERPL: 1.7 {RATIO} (ref 1.2–2.2)
ALP SERPL-CCNC: 85 IU/L (ref 44–121)
ALT SERPL-CCNC: 16 IU/L (ref 0–32)
AST SERPL-CCNC: 23 IU/L (ref 0–40)
BILIRUB SERPL-MCNC: 0.2 MG/DL (ref 0–1.2)
BUN SERPL-MCNC: 16 MG/DL (ref 8–27)
BUN/CREAT SERPL: 16 (ref 12–28)
CALCIUM SERPL-MCNC: 9.4 MG/DL (ref 8.7–10.3)
CHLORIDE SERPL-SCNC: 99 MMOL/L (ref 96–106)
CO2 SERPL-SCNC: 29 MMOL/L (ref 20–29)
CREAT SERPL-MCNC: 0.99 MG/DL (ref 0.57–1)
EST. GFR  (NO RACE VARIABLE): 56 ML/MIN/1.73
GLOBULIN SER CALC-MCNC: 2.4 G/DL (ref 1.5–4.5)
GLUCOSE SERPL-MCNC: 120 MG/DL (ref 70–99)
POTASSIUM SERPL-SCNC: 4.5 MMOL/L (ref 3.5–5.2)
PROT SERPL-MCNC: 6.4 G/DL (ref 6–8.5)
SODIUM SERPL-SCNC: 142 MMOL/L (ref 134–144)

## 2022-10-17 ENCOUNTER — OFFICE VISIT (OUTPATIENT)
Dept: OTOLARYNGOLOGY | Facility: CLINIC | Age: 87
End: 2022-10-17
Payer: MEDICARE

## 2022-10-17 DIAGNOSIS — Z98.890 POST-OPERATIVE STATE: Primary | ICD-10-CM

## 2022-10-17 PROCEDURE — 99212 OFFICE O/P EST SF 10 MIN: CPT | Mod: PBBFAC,PO | Performed by: OTOLARYNGOLOGY

## 2022-10-17 PROCEDURE — 99999 PR PBB SHADOW E&M-EST. PATIENT-LVL II: ICD-10-PCS | Mod: PBBFAC,,, | Performed by: OTOLARYNGOLOGY

## 2022-10-17 PROCEDURE — 99024 POSTOP FOLLOW-UP VISIT: CPT | Mod: POP,,, | Performed by: OTOLARYNGOLOGY

## 2022-10-17 PROCEDURE — 99999 PR PBB SHADOW E&M-EST. PATIENT-LVL II: CPT | Mod: PBBFAC,,, | Performed by: OTOLARYNGOLOGY

## 2022-10-17 PROCEDURE — 99024 PR POST-OP FOLLOW-UP VISIT: ICD-10-PCS | Mod: POP,,, | Performed by: OTOLARYNGOLOGY

## 2022-10-18 ENCOUNTER — INFUSION (OUTPATIENT)
Dept: INFUSION THERAPY | Facility: HOSPITAL | Age: 87
End: 2022-10-18
Attending: INTERNAL MEDICINE
Payer: MEDICARE

## 2022-10-18 VITALS
SYSTOLIC BLOOD PRESSURE: 149 MMHG | DIASTOLIC BLOOD PRESSURE: 71 MMHG | BODY MASS INDEX: 23.09 KG/M2 | TEMPERATURE: 97 F | RESPIRATION RATE: 18 BRPM | HEIGHT: 58 IN | HEART RATE: 75 BPM | OXYGEN SATURATION: 95 % | WEIGHT: 110 LBS

## 2022-10-18 DIAGNOSIS — M81.0 SENILE OSTEOPOROSIS: Primary | ICD-10-CM

## 2022-10-18 PROCEDURE — 96372 THER/PROPH/DIAG INJ SC/IM: CPT

## 2022-10-18 PROCEDURE — 63600175 PHARM REV CODE 636 W HCPCS: Mod: JG | Performed by: INTERNAL MEDICINE

## 2022-10-18 RX ADMIN — DENOSUMAB 60 MG: 60 INJECTION SUBCUTANEOUS at 01:10

## 2022-10-18 NOTE — PLAN OF CARE
Problem: Fatigue  Goal: Improved Activity Tolerance  Outcome: Ongoing, Progressing  Intervention: Promote Improved Energy  Flowsheets (Taken 10/18/2022 1254)  Fatigue Management:   activity schedule adjusted   frequent rest breaks encouraged   paced activity encouraged  Activity Management: Ambulated -L4

## 2022-10-18 NOTE — PROGRESS NOTES
Cheyanne is here for a post-operative visit following     PROCEDURES PERFORMED  Excision of benign skin nasal tip 1 x 0.5 cm  Full thickness skin graft closure of nasal tip and right nasal ala defect measuring 1.5 x 3 cm  Layered wound closure of nasal dorsum and cheek defect totalling 3.5 cm    No issues, doing well  Pain controlled    Exam:  Alert, active, appropriate  Incision c/d/i, wound with appropriate mild edema, mild erythema - 90% skin graft take  Dense crusting near right soft tissue triangle  Chin healing well    Peroxide and moisture  Continue wound care  Follow-up 3 weeks

## 2022-10-21 ENCOUNTER — PATIENT MESSAGE (OUTPATIENT)
Dept: HEMATOLOGY/ONCOLOGY | Facility: CLINIC | Age: 87
End: 2022-10-21

## 2022-10-25 DIAGNOSIS — G62.9 PERIPHERAL POLYNEUROPATHY: ICD-10-CM

## 2022-10-25 RX ORDER — GABAPENTIN 100 MG/1
200 CAPSULE ORAL NIGHTLY
Qty: 60 CAPSULE | Refills: 5 | Status: SHIPPED | OUTPATIENT
Start: 2022-10-25 | End: 2023-06-20

## 2022-10-25 NOTE — TELEPHONE ENCOUNTER
No new care gaps identified.  [facility] Embedded Care Gaps. Reference number: [id number]. 10/25/2022   1:32:27 PM CDT

## 2022-10-28 ENCOUNTER — TELEPHONE (OUTPATIENT)
Dept: CARDIOLOGY | Facility: CLINIC | Age: 87
End: 2022-10-28
Payer: MEDICARE

## 2022-10-28 NOTE — TELEPHONE ENCOUNTER
Good afternoon,     CC spoke to pt this afternoon. pt bp is 180's systolic in the AM but comes down with medication to the 140's systolic. Could you please have provider look into if pt should come for another appt and/or med change? Thank you and if I can assist any further, please let me know., VALERIE AVENDANO LPN   [facility]   Care Coordinator   [phone]

## 2022-10-31 ENCOUNTER — EXTERNAL CHRONIC CARE MANAGEMENT (OUTPATIENT)
Dept: PRIMARY CARE CLINIC | Facility: CLINIC | Age: 87
End: 2022-10-31
Payer: MEDICARE

## 2022-10-31 PROCEDURE — 99490 CHRNC CARE MGMT STAFF 1ST 20: CPT | Mod: PBBFAC,PN | Performed by: FAMILY MEDICINE

## 2022-10-31 PROCEDURE — 99439 CHRNC CARE MGMT STAF EA ADDL: CPT | Mod: PBBFAC,PN | Performed by: FAMILY MEDICINE

## 2022-10-31 PROCEDURE — 99439 CHRNC CARE MGMT STAF EA ADDL: CPT | Mod: S$PBB,,, | Performed by: FAMILY MEDICINE

## 2022-10-31 PROCEDURE — 99490 PR CHRONIC CARE MGMT, 1ST 20 MIN: ICD-10-PCS | Mod: S$PBB,,, | Performed by: FAMILY MEDICINE

## 2022-10-31 PROCEDURE — 99439 PR CHRONIC CARE MGMT, EA ADDTL 20 MIN: ICD-10-PCS | Mod: S$PBB,,, | Performed by: FAMILY MEDICINE

## 2022-10-31 PROCEDURE — 99490 CHRNC CARE MGMT STAFF 1ST 20: CPT | Mod: S$PBB,,, | Performed by: FAMILY MEDICINE

## 2022-11-01 ENCOUNTER — PATIENT MESSAGE (OUTPATIENT)
Dept: OTOLARYNGOLOGY | Facility: CLINIC | Age: 87
End: 2022-11-01
Payer: MEDICARE

## 2022-11-01 ENCOUNTER — PES CALL (OUTPATIENT)
Dept: ADMINISTRATIVE | Facility: CLINIC | Age: 87
End: 2022-11-01
Payer: MEDICARE

## 2022-11-07 ENCOUNTER — OFFICE VISIT (OUTPATIENT)
Dept: OTOLARYNGOLOGY | Facility: CLINIC | Age: 87
End: 2022-11-07
Payer: MEDICARE

## 2022-11-07 VITALS — HEIGHT: 58 IN | WEIGHT: 110 LBS | BODY MASS INDEX: 23.09 KG/M2

## 2022-11-07 DIAGNOSIS — Z98.890 POST-OPERATIVE STATE: Primary | ICD-10-CM

## 2022-11-07 PROCEDURE — 99999 PR PBB SHADOW E&M-EST. PATIENT-LVL IV: ICD-10-PCS | Mod: PBBFAC,,, | Performed by: OTOLARYNGOLOGY

## 2022-11-07 PROCEDURE — 99999 PR PBB SHADOW E&M-EST. PATIENT-LVL IV: CPT | Mod: PBBFAC,,, | Performed by: OTOLARYNGOLOGY

## 2022-11-07 PROCEDURE — 99214 OFFICE O/P EST MOD 30 MIN: CPT | Mod: PBBFAC,PO | Performed by: OTOLARYNGOLOGY

## 2022-11-07 PROCEDURE — 99024 PR POST-OP FOLLOW-UP VISIT: ICD-10-PCS | Mod: POP,,, | Performed by: OTOLARYNGOLOGY

## 2022-11-07 PROCEDURE — 99024 POSTOP FOLLOW-UP VISIT: CPT | Mod: POP,,, | Performed by: OTOLARYNGOLOGY

## 2022-11-07 NOTE — PROGRESS NOTES
Cheyanne is here for a post-operative visit following     PROCEDURES PERFORMED  Excision of benign skin nasal tip 1 x 0.5 cm  Full thickness skin graft closure of nasal tip and right nasal ala defect measuring 1.5 x 3 cm  Layered wound closure of nasal dorsum and cheek defect totalling 3.5 cm    No issues, doing well  Pain controlled    Exam:  Alert, active, appropriate  Incision c/d/i, wound with appropriate mild edema, mild erythema - 90% skin graft take  Slight crusting over right soft tissue triangle, debrided. Tup healing well.   Chin healing well    Continue moisture  RTC prn

## 2022-11-27 ENCOUNTER — PATIENT MESSAGE (OUTPATIENT)
Dept: FAMILY MEDICINE | Facility: CLINIC | Age: 87
End: 2022-11-27
Payer: MEDICARE

## 2022-11-28 NOTE — PROGRESS NOTES
I tried to update the treatment plan but apparently there is none and I can't do it in [facility].  Do they have a guide on how to do it at least until they update and mess it up again?

## 2022-11-30 ENCOUNTER — EXTERNAL CHRONIC CARE MANAGEMENT (OUTPATIENT)
Dept: PRIMARY CARE CLINIC | Facility: CLINIC | Age: 87
End: 2022-11-30
Payer: MEDICARE

## 2022-11-30 PROCEDURE — 99490 CHRNC CARE MGMT STAFF 1ST 20: CPT | Mod: S$PBB,,, | Performed by: FAMILY MEDICINE

## 2022-11-30 PROCEDURE — 99439 PR CHRONIC CARE MGMT, EA ADDTL 20 MIN: ICD-10-PCS | Mod: S$PBB,,, | Performed by: FAMILY MEDICINE

## 2022-11-30 PROCEDURE — 99490 CHRNC CARE MGMT STAFF 1ST 20: CPT | Mod: PBBFAC,PN | Performed by: FAMILY MEDICINE

## 2022-11-30 PROCEDURE — 99439 CHRNC CARE MGMT STAF EA ADDL: CPT | Mod: PBBFAC,PN | Performed by: FAMILY MEDICINE

## 2022-11-30 PROCEDURE — 99439 CHRNC CARE MGMT STAF EA ADDL: CPT | Mod: S$PBB,,, | Performed by: FAMILY MEDICINE

## 2022-11-30 PROCEDURE — 99490 PR CHRONIC CARE MGMT, 1ST 20 MIN: ICD-10-PCS | Mod: S$PBB,,, | Performed by: FAMILY MEDICINE

## 2022-12-05 ENCOUNTER — PATIENT MESSAGE (OUTPATIENT)
Dept: CARDIOLOGY | Facility: CLINIC | Age: 87
End: 2022-12-05
Payer: MEDICARE

## 2022-12-14 ENCOUNTER — PATIENT OUTREACH (OUTPATIENT)
Dept: ADMINISTRATIVE | Facility: HOSPITAL | Age: 87
End: 2022-12-14
Payer: MEDICARE

## 2022-12-21 ENCOUNTER — TELEPHONE (OUTPATIENT)
Dept: FAMILY MEDICINE | Facility: CLINIC | Age: 87
End: 2022-12-21
Payer: MEDICARE

## 2022-12-21 NOTE — TELEPHONE ENCOUNTER
----- Message from Cheryl Camarillo sent at 12/20/2022  5:18 PM CST -----  Type:  Patient Returning Call    Who Called: Lisseth cameron  Who Left Message for Patient: pt   Does the patient know what this is regarding?: pt need a call about a med change   Would the patient rather a call back or a response via [employer]?  Call   Best Call Back Number:[phone]  Additional Information:  call back

## 2022-12-21 NOTE — TELEPHONE ENCOUNTER
Patient has macular degeneration and was told by her ophthalmologist that Gabapentin can worsen vision. Patient advised on how to wean off by going to every other day then every 2 days then one a day etc. Asked her to call when she is off it completely.

## 2022-12-31 ENCOUNTER — EXTERNAL CHRONIC CARE MANAGEMENT (OUTPATIENT)
Dept: PRIMARY CARE CLINIC | Facility: CLINIC | Age: 87
End: 2022-12-31
Payer: MEDICARE

## 2022-12-31 PROCEDURE — 99489 CPLX CHRNC CARE EA ADDL 30: CPT | Mod: S$PBB,,, | Performed by: FAMILY MEDICINE

## 2022-12-31 PROCEDURE — 99487 CPLX CHRNC CARE 1ST 60 MIN: CPT | Mod: S$PBB,,, | Performed by: FAMILY MEDICINE

## 2022-12-31 PROCEDURE — 99487 CPLX CHRNC CARE 1ST 60 MIN: CPT | Mod: PBBFAC,PN,27 | Performed by: FAMILY MEDICINE

## 2022-12-31 PROCEDURE — 99489 PR COMPLX CHRON CARE MGMT, EA ADDTL 30 MIN, PER MONTH: ICD-10-PCS | Mod: S$PBB,,, | Performed by: FAMILY MEDICINE

## 2022-12-31 PROCEDURE — 99487 PR COMPLX CHRON CARE MGMT, 1ST HR, PER MONTH: ICD-10-PCS | Mod: S$PBB,,, | Performed by: FAMILY MEDICINE

## 2022-12-31 PROCEDURE — 99489 CPLX CHRNC CARE EA ADDL 30: CPT | Mod: PBBFAC,PN,27 | Performed by: FAMILY MEDICINE

## 2023-01-05 ENCOUNTER — OFFICE VISIT (OUTPATIENT)
Dept: CARDIOLOGY | Facility: CLINIC | Age: 88
End: 2023-01-05
Payer: MEDICARE

## 2023-01-05 ENCOUNTER — LAB VISIT (OUTPATIENT)
Dept: LAB | Facility: HOSPITAL | Age: 88
End: 2023-01-05
Attending: INTERNAL MEDICINE
Payer: MEDICARE

## 2023-01-05 VITALS
BODY MASS INDEX: 23.74 KG/M2 | HEART RATE: 75 BPM | SYSTOLIC BLOOD PRESSURE: 122 MMHG | DIASTOLIC BLOOD PRESSURE: 80 MMHG | HEIGHT: 58 IN | OXYGEN SATURATION: 95 % | WEIGHT: 113.13 LBS

## 2023-01-05 DIAGNOSIS — I25.118 CORONARY ARTERY DISEASE OF NATIVE ARTERY OF NATIVE HEART WITH STABLE ANGINA PECTORIS: ICD-10-CM

## 2023-01-05 DIAGNOSIS — I50.42 CHRONIC COMBINED SYSTOLIC AND DIASTOLIC HEART FAILURE: ICD-10-CM

## 2023-01-05 DIAGNOSIS — D63.8 ANEMIA OF CHRONIC DISEASE: ICD-10-CM

## 2023-01-05 DIAGNOSIS — I42.0 CONGESTIVE CARDIOMYOPATHY: ICD-10-CM

## 2023-01-05 DIAGNOSIS — I47.10 PSVT (PAROXYSMAL SUPRAVENTRICULAR TACHYCARDIA): ICD-10-CM

## 2023-01-05 DIAGNOSIS — N18.31 STAGE 3A CHRONIC KIDNEY DISEASE: ICD-10-CM

## 2023-01-05 DIAGNOSIS — I42.0 CONGESTIVE CARDIOMYOPATHY: Primary | ICD-10-CM

## 2023-01-05 LAB
ANION GAP SERPL CALC-SCNC: 5 MMOL/L (ref 8–16)
BNP SERPL-MCNC: 681 PG/ML (ref 0–99)
BUN SERPL-MCNC: 13 MG/DL (ref 8–23)
CALCIUM SERPL-MCNC: 9.6 MG/DL (ref 8.7–10.5)
CHLORIDE SERPL-SCNC: 101 MMOL/L (ref 95–110)
CO2 SERPL-SCNC: 32 MMOL/L (ref 23–29)
CREAT SERPL-MCNC: 0.9 MG/DL (ref 0.5–1.4)
ERYTHROCYTE [DISTWIDTH] IN BLOOD BY AUTOMATED COUNT: 13.8 % (ref 11.5–14.5)
EST. GFR  (NO RACE VARIABLE): >60 ML/MIN/1.73 M^2
GLUCOSE SERPL-MCNC: 90 MG/DL (ref 70–110)
HCT VFR BLD AUTO: 33.9 % (ref 37–48.5)
HGB BLD-MCNC: 10.7 G/DL (ref 12–16)
MCH RBC QN AUTO: 29.3 PG (ref 27–31)
MCHC RBC AUTO-ENTMCNC: 31.6 G/DL (ref 32–36)
MCV RBC AUTO: 93 FL (ref 82–98)
PLATELET # BLD AUTO: 152 K/UL (ref 150–450)
PMV BLD AUTO: 9.4 FL (ref 9.2–12.9)
POTASSIUM SERPL-SCNC: 4.1 MMOL/L (ref 3.5–5.1)
RBC # BLD AUTO: 3.65 M/UL (ref 4–5.4)
SODIUM SERPL-SCNC: 138 MMOL/L (ref 136–145)
WBC # BLD AUTO: 5.99 K/UL (ref 3.9–12.7)

## 2023-01-05 PROCEDURE — 80048 BASIC METABOLIC PNL TOTAL CA: CPT | Performed by: INTERNAL MEDICINE

## 2023-01-05 PROCEDURE — 85027 COMPLETE CBC AUTOMATED: CPT | Performed by: INTERNAL MEDICINE

## 2023-01-05 PROCEDURE — 83880 ASSAY OF NATRIURETIC PEPTIDE: CPT | Performed by: INTERNAL MEDICINE

## 2023-01-05 PROCEDURE — 36415 COLL VENOUS BLD VENIPUNCTURE: CPT | Performed by: INTERNAL MEDICINE

## 2023-01-05 PROCEDURE — 99999 PR PBB SHADOW E&M-EST. PATIENT-LVL V: ICD-10-PCS | Mod: PBBFAC,,, | Performed by: INTERNAL MEDICINE

## 2023-01-05 PROCEDURE — 99215 OFFICE O/P EST HI 40 MIN: CPT | Mod: PBBFAC,PN | Performed by: INTERNAL MEDICINE

## 2023-01-05 PROCEDURE — 99213 PR OFFICE/OUTPT VISIT, EST, LEVL III, 20-29 MIN: ICD-10-PCS | Mod: S$PBB,,, | Performed by: INTERNAL MEDICINE

## 2023-01-05 PROCEDURE — 99999 PR PBB SHADOW E&M-EST. PATIENT-LVL V: CPT | Mod: PBBFAC,,, | Performed by: INTERNAL MEDICINE

## 2023-01-05 PROCEDURE — 99213 OFFICE O/P EST LOW 20 MIN: CPT | Mod: S$PBB,,, | Performed by: INTERNAL MEDICINE

## 2023-01-05 NOTE — PROGRESS NOTES
Patient ID:  Cheyanne Gomes is a 87 y.o. female who presents for follow-up of Coronary Artery Disease, Congestive Heart Failure, and Cardiomyopathy      She is complaining of weakness and generalized fatigue.  She is going to physical therapy.  She is very short of breath.  The blood pressure taking by me was 142/62.  Will send her for a BMP BNP and CBC today.  Clinically she appears to be in heart failure therefore she is to take an extra 40 of Lasix every afternoon and take extra potassium as well.      Past Medical History:   Diagnosis Date    Abdominal hernia     Anemia     Dr Berkowitz     Angina pectoris     Aortic valve stenosis, moderate     Back pain     Cannon's esophagus     CAD (coronary artery disease)     Cataract     ou done    CHF (congestive heart failure)     EFx 35%, Dr. Spencer 13    Chronic combined systolic and diastolic heart failure 2019    Colitis     Compression fracture     Diverticulosis     Encounter for blood transfusion     GERD (gastroesophageal reflux disease)     Hyperlipidemia     Hypertension     Irritable bowel syndrome     Myocardial infarction     Osteoarthritis     lumbar DDD    Pacemaker     Pneumonia 2017    Raynaud disease     Rheumatoid arteritis     Rheumatoid arthritis     Shingles 2017    Sjogren syndrome     Ulcerative colitis         Past Surgical History:   Procedure Laterality Date    A-V CARDIAC PACEMAKER INSERTION Left 2021    Procedure: INSERTION, CARDIAC PACEMAKER, DUAL CHAMBER  ([employer]);  Surgeon: Tera Blair MD;  Location: [facility] CATH/EP LAB;  Service: Cardiology;  Laterality: Left;    APPENDECTOMY      BACK SURGERY      CARDIAC SURGERY      CABGx3 in     CATARACT EXTRACTION      ou od d 11-15-12//     SECTION, CLASSIC      x 2    CHOLECYSTECTOMY      CLOSURE OF WOUND Right 2022    Procedure: CLOSURE-WOUND;  Surgeon: Delvis Jackson MD;  Location: [facility] OR;  Service: ENT;  Laterality: Right;   NOSE AND CHIN    COLONOSCOPY  09/15/2011    Dr Anaya     COLONOSCOPY  01/10/2017    [facility] report sent to scanning    CORONARY ANGIOPLASTY      PCI x 1 in 2007    CORONARY ARTERY BYPASS GRAFT      3 vessel    CORONARY ARTERY BYPASS GRAFT      CYSTOSCOPY N/A 6/3/2020    Procedure: CYSTOSCOPY;  Surgeon: Miryam Bender Jr., MD;  Location: [facility] OR;  Service: Urology;  Laterality: N/A;    eyes      rk ou    FRACTURE SURGERY  06/21/2016    Dr Guido left hip     FRACTURE SURGERY  2018    Right Hip    HARVESTING OF SKIN GRAFT  9/23/2022    Procedure: SURGICAL PROCUREMENT, GRAFT, SKIN;  Surgeon: Delvis Jackson MD;  Location: [facility] OR;  Service: ENT;;  RIGHT CHEST    HYSTERECTOMY      tubal ligation, oopherectomy    INTRAMEDULLARY RODDING OF TROCHANTER OF FEMUR Right 10/8/2018    Procedure: INSERTION, INTRAMEDULLARY KELSI, FEMUR, TROCHANTER;  Surgeon: Valerio Luther MD;  Location: [facility] OR;  Service: Orthopedics;  Laterality: Right;    OOPHORECTOMY      SPINE SURGERY  8-    Silvino Mckeon    UPPER GASTROINTESTINAL ENDOSCOPY      Yag Capsulotomy Right 9/25/14          Current Outpatient Medications   Medication Instructions    amLODIPine (NORVASC) 5 MG tablet TAKE 1 TABLET BY MOUTH 2 TIMES A DAY    aspirin 81 mg Tab 1 tablet, Oral, Nightly, Every day    atorvastatin (LIPITOR) 40 mg, Oral, Daily    biotin 5 mg Cap 1 tablet, Oral, 2 times daily    CALCIUM CARB/VIT D3/MINERALS (CALCIUM-VITAMIN D ORAL) 600 mg, Oral, 2 times daily, Calcium 1200 with D 3 1000    cholestyramine-aspartame (QUESTRAN LIGHT) 4 gram PwPk Start with one packet daily for three days, then if needed increase to 2 packets daily for three days, then if needed increase to 3 packets    CRANBERRY CONC/ASCORBIC ACID (CRANBERRY PLUS VITAMIN C ORAL) 1 capsule, Oral, Daily    cyanocobalamin 1,000 mcg, Every 30 days    DEXILANT 60 mg, Oral, Daily    ENTRESTO  mg per tablet TAKE ONE TABLET BY MOUTH TWICE DAILY    fluticasone (FLONASE) 50 mcg/actuation nasal spray 2  sprays, Each Nostril, Daily    folic acid (FOLVITE) 2 mg, Oral, Daily    furosemide (LASIX) 80 MG tablet TAKE ONE TABLET BY MOUTH EVERY DAY    gabapentin (NEURONTIN) 200 mg, Oral, Nightly    HYDROmorphone (DILAUDID) 2 mg, Oral, Every 4 hours PRN    isosorbide dinitrate (ISORDIL) 10 MG tablet TAKE ONE TABLET BY MOUTH THREE TIMES DAILY    Lactobacillus acidophilus 10 billion cell Cap 1 each, Oral, Daily, 1.5 billion    magnesium oxide (MAG-OX) 400 mg (241.3 mg magnesium) tablet TAKE 1 TABLET BY MOUTH 2 TIMES A DAY    megestroL (MEGACE) 20 MG Tab TAKE 1 TABLET BY MOUTH EVERY DAY    metoprolol succinate (TOPROL-XL) 25 mg, Oral, See admin instructions, Take 1 and 1/2 tabs qam and 1 tab qpm    MULTIVITAMIN WITH MINERALS (ONE-A-DAY 50 PLUS) Tab 1 tablet, Oral, Daily, Every day    nitroGLYCERIN 0.4 MG/DOSE TL SPRY (NITROLINGUAL) 400 mcg/spray spray 1 spray, Sublingual, Every 5 min PRN, PRN    omega-3 fatty acids/fish oil (FISH OIL-OMEGA-3 FATTY ACIDS) 300-1,000 mg capsule Oral, Daily    ondansetron (ZOFRAN) 4 mg, Oral, Every 8 hours PRN    potassium chloride SA (K-DUR,KLOR-CON) 10 MEQ tablet TAKE TWO TABLETS BY MOUTH EVERY MORNING AND 1 TABLET EVERY EVENING    promethazine (PHENERGAN) 25 mg, Oral, 2 times daily PRN    sertraline (ZOLOFT) 100 mg, Oral, Daily    tiZANidine (ZANAFLEX) 2 MG tablet TID PRN    traMADoL (ULTRAM) 50 mg tablet TAKE TWO TABLETS BY MOUTH TWICE DAILY AS NEEDED FOR SEVERE pain    vitamin E 400 mg, Oral, Daily, Every day    zinc gluconate 50 mg, Oral, Daily        Review of patient's allergies indicates:   Allergen Reactions    Adhesive     Nitrofurantoin macrocrystalline Nausea And Vomiting     Other reaction(s): very sickly    Penicillins Swelling     Other reaction(s): swelling  Other reaction(s): red skin discolorati    Sulfa (sulfonamide antibiotics) Nausea And Vomiting     Other reaction(s): very sickly        Review of Systems   Cardiovascular:  Positive for dyspnea on exertion. Negative for chest  "pain and leg swelling.   Respiratory:  Positive for shortness of breath. Negative for cough.    Neurological:  Positive for weakness.      Objective:     Vitals:    01/05/23 1109   BP: 122/80   BP Location: Left arm   Patient Position: Sitting   BP Method: Medium (Manual)   Pulse: 75   SpO2: 95%   Weight: 51.3 kg (113 lb 1.5 oz)   Height: 4' 10" (1.473 m)       Physical Exam  Vitals and nursing note reviewed.   Constitutional:       Appearance: She is well-developed.   HENT:      Head: Normocephalic and atraumatic.   Eyes:      Conjunctiva/sclera: Conjunctivae normal.   Cardiovascular:      Rate and Rhythm: Normal rate and regular rhythm.      Heart sounds: Murmur (Grade 3/6 systolic murmur at the base) heard.   Pulmonary:      Effort: Pulmonary effort is normal.      Breath sounds: Normal breath sounds.   Abdominal:      General: Bowel sounds are normal.      Palpations: Abdomen is soft.   Musculoskeletal:         General: Normal range of motion.   Skin:     General: Skin is warm and dry.   Neurological:      Mental Status: She is alert and oriented to person, place, and time.   Psychiatric:         Behavior: Behavior normal.         Thought Content: Thought content normal.         Judgment: Judgment normal.     CMP  Sodium   Date Value Ref Range Status   01/05/2023 138 136 - 145 mmol/L Final   12/11/2018 139 134 - 144 mmol/L      Potassium   Date Value Ref Range Status   01/05/2023 4.1 3.5 - 5.1 mmol/L Final     Chloride   Date Value Ref Range Status   01/05/2023 101 95 - 110 mmol/L Final   12/11/2018 96 (L) 98 - 110 mmol/L      CO2   Date Value Ref Range Status   01/05/2023 32 (H) 23 - 29 mmol/L Final     Glucose   Date Value Ref Range Status   01/05/2023 90 70 - 110 mg/dL Final   12/11/2018 97 70 - 99 mg/dL      BUN   Date Value Ref Range Status   01/05/2023 13 8 - 23 mg/dL Final     Creatinine   Date Value Ref Range Status   01/05/2023 0.9 0.5 - 1.4 mg/dL Final   12/11/2018 0.95 0.60 - 1.40 mg/dL      Calcium "   Date Value Ref Range Status   01/05/2023 9.6 8.7 - 10.5 mg/dL Final     Total Protein   Date Value Ref Range Status   08/22/2022 7.0 6.0 - 8.4 g/dL Final     Albumin   Date Value Ref Range Status   10/14/2022 4.0 3.6 - 4.6 g/dL Final   08/22/2022 4.0 3.5 - 5.2 g/dL Final   12/11/2018 3.9 3.1 - 4.7 g/dL      Total Bilirubin   Date Value Ref Range Status   10/14/2022 0.2 0.0 - 1.2 mg/dL Final     Alkaline Phosphatase   Date Value Ref Range Status   08/22/2022 70 55 - 135 U/L Final     AST   Date Value Ref Range Status   10/14/2022 23 0 - 40 IU/L Final     ALT   Date Value Ref Range Status   10/14/2022 16 0 - 32 IU/L Final     Anion Gap   Date Value Ref Range Status   01/05/2023 5 (L) 8 - 16 mmol/L Final     eGFR if    Date Value Ref Range Status   11/05/2021 >60.0 >60 mL/min/1.73 m^2 Final     eGFR if non    Date Value Ref Range Status   02/21/2022 56 (L) >59 mL/min/1.73 Final      BMP  Lab Results   Component Value Date     01/05/2023    K 4.1 01/05/2023     01/05/2023    CO2 32 (H) 01/05/2023    BUN 13 01/05/2023    CREATININE 0.9 01/05/2023    CALCIUM 9.6 01/05/2023    ANIONGAP 5 (L) 01/05/2023    ESTGFRAFRICA >60.0 11/05/2021    EGFRNONAA 56 (L) 02/21/2022      BNP  @LABRCNTIP(BNP,BNPTRIAGEBLO)@   Lab Results   Component Value Date    CHOL 146 10/04/2022    CHOL 113 05/06/2021    CHOL 135 11/24/2020     Lab Results   Component Value Date    HDL 52 10/04/2022    HDL 47 05/06/2021    HDL 61 11/24/2020     Lab Results   Component Value Date    LDLCALC 75 10/04/2022    LDLCALC 51 05/06/2021    LDLCALC 55 11/24/2020     Lab Results   Component Value Date    TRIG 104 10/04/2022    TRIG 70 05/06/2021    TRIG 108 11/24/2020     Lab Results   Component Value Date    CHOLHDL 26.2 08/24/2020    CHOLHDL 45.0 10/16/2013    CHOLHDL 40.7 08/11/2009      Lab Results   Component Value Date    TSH 2.105 08/22/2022    FREET4 0.89 09/26/2012     Lab Results   Component Value Date     HGBA1C 5.3 02/26/2015     Lab Results   Component Value Date    WBC 5.99 01/05/2023    HGB 10.7 (L) 01/05/2023    HCT 33.9 (L) 01/05/2023    MCV 93 01/05/2023     01/05/2023         Results for orders placed during the hospital encounter of 04/28/22    Echo    Interpretation Summary  · The left ventricle is normal in size with concentric remodeling and mildly decreased systolic function.  · The estimated ejection fraction is 40%.  · Grade I left ventricular diastolic dysfunction.  · There are segmental left ventricular wall motion abnormalities.  · Apical akinesis  · Normal right ventricular size with normal right ventricular systolic function.  · Mild aortic regurgitation.  · There is moderate aortic valve stenosis.  · Aortic valve area is 1.15 cm2; peak velocity is 2.12 m/s; mean gradient is 18 mmHg.  · Mild tricuspid regurgitation.  · Normal central venous pressure (3 mmHg).  · The estimated PA systolic pressure is 24 mmHg.  · Overall the study quality was technically difficult.     No results found for this or any previous visit.         Assessment:       Chronic combined systolic and diastolic heart failure  Will increase the doses of diuretics and obtain blood work today    Coronary artery disease of native artery of native heart with stable angina pectoris  No symptoms of angina    Hypertension  Controlled on current medical therapy    PSVT (paroxysmal supraventricular tachycardia)  Controlled on beta-blockers       Plan:       She is to increase Lasix 40 mg every afternoon and take an extra potassium until the swelling resolved.  She is to have the BMP BNP and CBC today.  She will be seen in the office in approximately 1 month

## 2023-01-23 NOTE — ASSESSMENT & PLAN NOTE
Temporary pacemaker placed to the right femoral.  Patient is in normal sinus rhythm of beta-blockers.  We will decrease the rate of the pacemaker to 40 if there is no pacing throughout the day most likely remove the temporary pacemaker later on today.  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    She has remained in normal sinus rhythm off metoprolol.  She can be discharged home off metoprolol and follow up as an outpatient.   No

## 2023-01-31 ENCOUNTER — PATIENT MESSAGE (OUTPATIENT)
Dept: FAMILY MEDICINE | Facility: CLINIC | Age: 88
End: 2023-01-31
Payer: MEDICARE

## 2023-01-31 ENCOUNTER — EXTERNAL CHRONIC CARE MANAGEMENT (OUTPATIENT)
Dept: PRIMARY CARE CLINIC | Facility: CLINIC | Age: 88
End: 2023-01-31
Payer: MEDICARE

## 2023-01-31 DIAGNOSIS — H35.30 MACULAR DEGENERATION, UNSPECIFIED LATERALITY, UNSPECIFIED TYPE: Primary | ICD-10-CM

## 2023-01-31 PROCEDURE — 99490 CHRNC CARE MGMT STAFF 1ST 20: CPT | Mod: PBBFAC,PN | Performed by: FAMILY MEDICINE

## 2023-01-31 PROCEDURE — 99490 CHRNC CARE MGMT STAFF 1ST 20: CPT | Mod: S$PBB,,, | Performed by: FAMILY MEDICINE

## 2023-01-31 PROCEDURE — 99490 PR CHRONIC CARE MGMT, 1ST 20 MIN: ICD-10-PCS | Mod: S$PBB,,, | Performed by: FAMILY MEDICINE

## 2023-02-01 ENCOUNTER — PATIENT MESSAGE (OUTPATIENT)
Dept: FAMILY MEDICINE | Facility: CLINIC | Age: 88
End: 2023-02-01
Payer: MEDICARE

## 2023-02-01 NOTE — TELEPHONE ENCOUNTER
Referral faxed; patient notified via e-mail due to this being initial point of contact from patient regarding this matter.

## 2023-02-02 ENCOUNTER — PATIENT MESSAGE (OUTPATIENT)
Dept: CARDIOLOGY | Facility: CLINIC | Age: 88
End: 2023-02-02
Payer: MEDICARE

## 2023-02-03 ENCOUNTER — OFFICE VISIT (OUTPATIENT)
Dept: CARDIOLOGY | Facility: CLINIC | Age: 88
End: 2023-02-03
Payer: MEDICARE

## 2023-02-03 VITALS
SYSTOLIC BLOOD PRESSURE: 122 MMHG | HEART RATE: 88 BPM | HEIGHT: 58 IN | DIASTOLIC BLOOD PRESSURE: 70 MMHG | BODY MASS INDEX: 24.3 KG/M2 | OXYGEN SATURATION: 90 % | WEIGHT: 115.75 LBS

## 2023-02-03 DIAGNOSIS — I50.42 CHRONIC COMBINED SYSTOLIC AND DIASTOLIC HEART FAILURE: ICD-10-CM

## 2023-02-03 DIAGNOSIS — I47.10 PSVT (PAROXYSMAL SUPRAVENTRICULAR TACHYCARDIA): ICD-10-CM

## 2023-02-03 DIAGNOSIS — I25.118 CORONARY ARTERY DISEASE OF NATIVE ARTERY OF NATIVE HEART WITH STABLE ANGINA PECTORIS: ICD-10-CM

## 2023-02-03 DIAGNOSIS — I10 PRIMARY HYPERTENSION: ICD-10-CM

## 2023-02-03 DIAGNOSIS — I49.5 SICK SINUS SYNDROME: ICD-10-CM

## 2023-02-03 PROCEDURE — 99215 OFFICE O/P EST HI 40 MIN: CPT | Mod: PBBFAC,PN | Performed by: INTERNAL MEDICINE

## 2023-02-03 PROCEDURE — 99999 PR PBB SHADOW E&M-EST. PATIENT-LVL V: CPT | Mod: PBBFAC,,, | Performed by: INTERNAL MEDICINE

## 2023-02-03 PROCEDURE — 99999 PR PBB SHADOW E&M-EST. PATIENT-LVL V: ICD-10-PCS | Mod: PBBFAC,,, | Performed by: INTERNAL MEDICINE

## 2023-02-03 PROCEDURE — 99213 OFFICE O/P EST LOW 20 MIN: CPT | Mod: S$PBB,,, | Performed by: INTERNAL MEDICINE

## 2023-02-03 PROCEDURE — 99213 PR OFFICE/OUTPT VISIT, EST, LEVL III, 20-29 MIN: ICD-10-PCS | Mod: S$PBB,,, | Performed by: INTERNAL MEDICINE

## 2023-02-03 NOTE — ASSESSMENT & PLAN NOTE
She is having symptoms of decompensated heart failure.  The diuretics were increased last month with no significant improvement.  She has acitis therefore she needs IV diuretics.

## 2023-02-03 NOTE — PROGRESS NOTES
Patient ID:  Cheyanne Gomes is a 87 y.o. female who presents for follow-up of Edema (abdomen), Shortness of Breath, Cough, and Congestive Heart Failure      She has been having a lot of shortness of breath cough dyspnea on exertion abdominal distension.  She denies any chills any fever.  Because of the cough her primary care physician has treated her with a Z pack with no significant improvement.  She had a death in the family tonight is awake and tomorrow is the .  She would like to attend the  tomorrow afternoon.  She will be willing to go to the emergency room after the .      Past Medical History:   Diagnosis Date    Abdominal hernia     Anemia     Dr Berkowitz     Angina pectoris     Aortic valve stenosis, moderate     Back pain     Cannon's esophagus     CAD (coronary artery disease)     Cataract     ou done    CHF (congestive heart failure)     EFx 35%, Dr. Spencer 13    Chronic combined systolic and diastolic heart failure 2019    Colitis     Compression fracture     Diverticulosis     Encounter for blood transfusion     GERD (gastroesophageal reflux disease)     Hyperlipidemia     Hypertension     Irritable bowel syndrome     Myocardial infarction     Osteoarthritis     lumbar DDD    Pacemaker     Pneumonia 2017    Raynaud disease     Rheumatoid arteritis     Rheumatoid arthritis     Shingles 2017    Sjogren syndrome     Ulcerative colitis         Past Surgical History:   Procedure Laterality Date    A-V CARDIAC PACEMAKER INSERTION Left 2021    Procedure: INSERTION, CARDIAC PACEMAKER, DUAL CHAMBER  (MEDTRONIC);  Surgeon: Tera Blair MD;  Location: [facility] CATH/EP LAB;  Service: Cardiology;  Laterality: Left;    APPENDECTOMY      BACK SURGERY      CARDIAC SURGERY      CABGx3 in     CATARACT EXTRACTION      ou od d 11-15-12//     SECTION, CLASSIC      x 2    CHOLECYSTECTOMY      CLOSURE OF WOUND Right 2022    Procedure:  CLOSURE-WOUND;  Surgeon: Delvis Jackson MD;  Location: [facility] OR;  Service: ENT;  Laterality: Right;  NOSE AND CHIN    COLONOSCOPY  09/15/2011    Dr Anaya     COLONOSCOPY  01/10/2017    [facility] report sent to scanning    CORONARY ANGIOPLASTY      PCI x 1 in 2007    CORONARY ARTERY BYPASS GRAFT      3 vessel    CORONARY ARTERY BYPASS GRAFT      CYSTOSCOPY N/A 6/3/2020    Procedure: CYSTOSCOPY;  Surgeon: Miryam Bender Jr., MD;  Location: [facility] OR;  Service: Urology;  Laterality: N/A;    eyes      rk ou    FRACTURE SURGERY  06/21/2016    Dr Guido left hip     FRACTURE SURGERY  2018    Right Hip    HARVESTING OF SKIN GRAFT  9/23/2022    Procedure: SURGICAL PROCUREMENT, GRAFT, SKIN;  Surgeon: Delvis Jackson MD;  Location: [facility] OR;  Service: ENT;;  RIGHT CHEST    HYSTERECTOMY      tubal ligation, oopherectomy    INTRAMEDULLARY RODDING OF TROCHANTER OF FEMUR Right 10/8/2018    Procedure: INSERTION, INTRAMEDULLARY KELSI, FEMUR, TROCHANTER;  Surgeon: Valerio Luther MD;  Location: [facility] OR;  Service: Orthopedics;  Laterality: Right;    OOPHORECTOMY      SPINE SURGERY  8-    Silvinocameron Mckeon    UPPER GASTROINTESTINAL ENDOSCOPY      Yag Capsulotomy Right 9/25/14          Current Outpatient Medications   Medication Instructions    amLODIPine (NORVASC) 5 MG tablet TAKE 1 TABLET BY MOUTH 2 TIMES A DAY    aspirin 81 mg Tab 1 tablet, Oral, Nightly, Every day    atorvastatin (LIPITOR) 40 mg, Oral, Daily    biotin 5 mg Cap 1 tablet, Oral, 2 times daily    CALCIUM CARB/VIT D3/MINERALS (CALCIUM-VITAMIN D ORAL) 600 mg, Oral, 2 times daily, Calcium 1200 with D 3 1000    cholestyramine-aspartame (QUESTRAN LIGHT) 4 gram PwPk Start with one packet daily for three days, then if needed increase to 2 packets daily for three days, then if needed increase to 3 packets    CRANBERRY CONC/ASCORBIC ACID (CRANBERRY PLUS VITAMIN C ORAL) 1 capsule, Oral, Daily    cyanocobalamin 1,000 mcg, Every 30 days    DEXILANT 60 mg, Oral, Daily    ENTRESTO  mg  per tablet TAKE ONE TABLET BY MOUTH TWICE DAILY    fluticasone (FLONASE) 50 mcg/actuation nasal spray 2 sprays, Each Nostril, Daily    folic acid (FOLVITE) 2 mg, Oral, Daily    furosemide (LASIX) 80 MG tablet TAKE ONE TABLET BY MOUTH EVERY DAY    gabapentin (NEURONTIN) 200 mg, Oral, Nightly    HYDROmorphone (DILAUDID) 2 mg, Oral, Every 4 hours PRN    isosorbide dinitrate (ISORDIL) 10 MG tablet TAKE ONE TABLET BY MOUTH THREE TIMES DAILY    Lactobacillus acidophilus 10 billion cell Cap 1 each, Oral, Daily, 1.5 billion    magnesium oxide (MAG-OX) 400 mg (241.3 mg magnesium) tablet TAKE 1 TABLET BY MOUTH 2 TIMES A DAY    megestroL (MEGACE) 20 MG Tab TAKE 1 TABLET BY MOUTH EVERY DAY    metoprolol succinate (TOPROL-XL) 25 mg, Oral, See admin instructions, Take 1 and 1/2 tabs qam and 1 tab qpm    MULTIVITAMIN WITH MINERALS (ONE-A-DAY 50 PLUS) Tab 1 tablet, Oral, Daily, Every day    nitroGLYCERIN 0.4 MG/DOSE TL SPRY (NITROLINGUAL) 400 mcg/spray spray 1 spray, Sublingual, Every 5 min PRN, PRN    omega-3 fatty acids/fish oil (FISH OIL-OMEGA-3 FATTY ACIDS) 300-1,000 mg capsule Oral, Daily    ondansetron (ZOFRAN) 4 mg, Oral, Every 8 hours PRN    potassium chloride SA (K-DUR,KLOR-CON M) 10 MEQ tablet TAKE TWO TABLETS BY MOUTH EVERY MORNING AND 1 TABLET EVERY EVENING    promethazine (PHENERGAN) 25 mg, Oral, 2 times daily PRN    sertraline (ZOLOFT) 100 mg, Oral, Daily    tiZANidine (ZANAFLEX) 2 MG tablet TID PRN    traMADoL (ULTRAM) 50 mg tablet TAKE TWO TABLETS BY MOUTH TWICE DAILY AS NEEDED FOR SEVERE pain    vitamin E 400 mg, Oral, Daily, Every day    zinc gluconate 50 mg, Oral, Daily        Review of patient's allergies indicates:   Allergen Reactions    Adhesive     Nitrofurantoin macrocrystalline Nausea And Vomiting     Other reaction(s): very sickly    Penicillins Swelling     Other reaction(s): swelling  Other reaction(s): red skin discolorati    Sulfa (sulfonamide antibiotics) Nausea And Vomiting     Other reaction(s):  "very sickly        Review of Systems   Cardiovascular:  Positive for dyspnea on exertion and palpitations. Negative for chest pain.   Respiratory:  Positive for cough and shortness of breath.       Objective:     Vitals:    02/03/23 1444   BP: 122/70   BP Location: Left arm   Patient Position: Sitting   BP Method: Medium (Manual)   Pulse: 88   SpO2: (!) 90%   Weight: 52.5 kg (115 lb 11.9 oz)   Height: 4' 10" (1.473 m)       Physical Exam  Vitals and nursing note reviewed.   Constitutional:       Appearance: She is well-developed.   HENT:      Head: Normocephalic and atraumatic.   Eyes:      Conjunctiva/sclera: Conjunctivae normal.   Cardiovascular:      Rate and Rhythm: Normal rate and regular rhythm.      Heart sounds: Murmur (Grade 2/6 systolic murmur at the base) heard.   Pulmonary:      Effort: Pulmonary effort is normal.      Breath sounds: Rhonchi and rales present.   Abdominal:      General: Bowel sounds are normal. There is distension.      Palpations: Abdomen is soft.   Musculoskeletal:         General: Normal range of motion.   Skin:     General: Skin is warm and dry.   Neurological:      Mental Status: She is alert and oriented to person, place, and time.   Psychiatric:         Behavior: Behavior normal.         Thought Content: Thought content normal.         Judgment: Judgment normal.     CMP  Sodium   Date Value Ref Range Status   01/05/2023 138 136 - 145 mmol/L Final   12/11/2018 139 134 - 144 mmol/L      Potassium   Date Value Ref Range Status   01/05/2023 4.1 3.5 - 5.1 mmol/L Final     Chloride   Date Value Ref Range Status   01/05/2023 101 95 - 110 mmol/L Final   12/11/2018 96 (L) 98 - 110 mmol/L      CO2   Date Value Ref Range Status   01/05/2023 32 (H) 23 - 29 mmol/L Final     Glucose   Date Value Ref Range Status   01/05/2023 90 70 - 110 mg/dL Final   12/11/2018 97 70 - 99 mg/dL      BUN   Date Value Ref Range Status   01/05/2023 13 8 - 23 mg/dL Final     Creatinine   Date Value Ref Range Status "   01/05/2023 0.9 0.5 - 1.4 mg/dL Final   12/11/2018 0.95 0.60 - 1.40 mg/dL      Calcium   Date Value Ref Range Status   01/05/2023 9.6 8.7 - 10.5 mg/dL Final     Total Protein   Date Value Ref Range Status   08/22/2022 7.0 6.0 - 8.4 g/dL Final     Albumin   Date Value Ref Range Status   10/14/2022 4.0 3.6 - 4.6 g/dL Final   08/22/2022 4.0 3.5 - 5.2 g/dL Final   12/11/2018 3.9 3.1 - 4.7 g/dL      Total Bilirubin   Date Value Ref Range Status   10/14/2022 0.2 0.0 - 1.2 mg/dL Final     Alkaline Phosphatase   Date Value Ref Range Status   08/22/2022 70 55 - 135 U/L Final     AST   Date Value Ref Range Status   10/14/2022 23 0 - 40 IU/L Final     ALT   Date Value Ref Range Status   10/14/2022 16 0 - 32 IU/L Final     Anion Gap   Date Value Ref Range Status   01/05/2023 5 (L) 8 - 16 mmol/L Final     eGFR if    Date Value Ref Range Status   11/05/2021 >60.0 >60 mL/min/1.73 m^2 Final     eGFR if non    Date Value Ref Range Status   02/21/2022 56 (L) >59 mL/min/1.73 Final      BMP  Lab Results   Component Value Date     01/05/2023    K 4.1 01/05/2023     01/05/2023    CO2 32 (H) 01/05/2023    BUN 13 01/05/2023    CREATININE 0.9 01/05/2023    CALCIUM 9.6 01/05/2023    ANIONGAP 5 (L) 01/05/2023    ESTGFRAFRICA >60.0 11/05/2021    EGFRNONAA 56 (L) 02/21/2022      BNP  @LABRCNTIP(BNP,BNPTRIAGEBLO)@   Lab Results   Component Value Date    CHOL 146 10/04/2022    CHOL 113 05/06/2021    CHOL 135 11/24/2020     Lab Results   Component Value Date    HDL 52 10/04/2022    HDL 47 05/06/2021    HDL 61 11/24/2020     Lab Results   Component Value Date    LDLCALC 75 10/04/2022    LDLCALC 51 05/06/2021    LDLCALC 55 11/24/2020     Lab Results   Component Value Date    TRIG 104 10/04/2022    TRIG 70 05/06/2021    TRIG 108 11/24/2020     Lab Results   Component Value Date    CHOLHDL 26.2 08/24/2020    CHOLHDL 45.0 10/16/2013    CHOLHDL 40.7 08/11/2009      Lab Results   Component Value Date    TSH  2.105 2022    FREET4 0.89 2012     Lab Results   Component Value Date    HGBA1C 5.3 2015     Lab Results   Component Value Date    WBC 5.99 2023    HGB 10.7 (L) 2023    HCT 33.9 (L) 2023    MCV 93 2023     2023         Results for orders placed during the hospital encounter of 22    Echo    Interpretation Summary  · The left ventricle is normal in size with concentric remodeling and mildly decreased systolic function.  · The estimated ejection fraction is 40%.  · Grade I left ventricular diastolic dysfunction.  · There are segmental left ventricular wall motion abnormalities.  · Apical akinesis  · Normal right ventricular size with normal right ventricular systolic function.  · Mild aortic regurgitation.  · There is moderate aortic valve stenosis.  · Aortic valve area is 1.15 cm2; peak velocity is 2.12 m/s; mean gradient is 18 mmHg.  · Mild tricuspid regurgitation.  · Normal central venous pressure (3 mmHg).  · The estimated PA systolic pressure is 24 mmHg.  · Overall the study quality was technically difficult.     No results found for this or any previous visit.         Assessment:       Chronic combined systolic and diastolic heart failure  She is having symptoms of decompensated heart failure.  The diuretics were increased last month with no significant improvement.  She has acitis therefore she needs IV diuretics.    Coronary artery disease of native artery of native heart with stable angina pectoris  No symptoms of angina    Hypertension  Controlled on medication    Sick sinus syndrome  Does not tolerate beta-blockers well.  She had to have a temporary pacemaker in the past.    PSVT (paroxysmal supraventricular tachycardia)  She is having palpitation she might be having PSVT       Plan:       Go to the emergency room tomorrow after the  for IV diuretics.  She will be following the office in 1 month

## 2023-02-04 ENCOUNTER — HOSPITAL ENCOUNTER (OUTPATIENT)
Facility: HOSPITAL | Age: 88
Discharge: HOME | End: 2023-02-06
Attending: EMERGENCY MEDICINE | Admitting: INTERNAL MEDICINE
Payer: MEDICARE

## 2023-02-04 DIAGNOSIS — R07.9 CHEST PAIN: ICD-10-CM

## 2023-02-04 DIAGNOSIS — I50.9 CONGESTIVE HEART FAILURE, UNSPECIFIED HF CHRONICITY, UNSPECIFIED HEART FAILURE TYPE: Primary | ICD-10-CM

## 2023-02-04 LAB
ALBUMIN SERPL BCP-MCNC: 3.9 G/DL (ref 3.5–5.2)
ALP SERPL-CCNC: 77 U/L (ref 55–135)
ALT SERPL W/O P-5'-P-CCNC: 33 U/L (ref 10–44)
ANION GAP SERPL CALC-SCNC: 11 MMOL/L (ref 8–16)
AST SERPL-CCNC: 35 U/L (ref 10–40)
BASOPHILS # BLD AUTO: 0.03 K/UL (ref 0–0.2)
BASOPHILS NFR BLD: 0.5 % (ref 0–1.9)
BILIRUB SERPL-MCNC: 0.5 MG/DL (ref 0.1–1)
BNP SERPL-MCNC: 573 PG/ML (ref 0–99)
BUN SERPL-MCNC: 17 MG/DL (ref 8–23)
CALCIUM SERPL-MCNC: 9.2 MG/DL (ref 8.7–10.5)
CHLORIDE SERPL-SCNC: 102 MMOL/L (ref 95–110)
CO2 SERPL-SCNC: 29 MMOL/L (ref 23–29)
CREAT SERPL-MCNC: 1.2 MG/DL (ref 0.5–1.4)
DIFFERENTIAL METHOD: ABNORMAL
EOSINOPHIL # BLD AUTO: 0.2 K/UL (ref 0–0.5)
EOSINOPHIL NFR BLD: 3.1 % (ref 0–8)
ERYTHROCYTE [DISTWIDTH] IN BLOOD BY AUTOMATED COUNT: 13.5 % (ref 11.5–14.5)
EST. GFR  (NO RACE VARIABLE): 43.8 ML/MIN/1.73 M^2
GLUCOSE SERPL-MCNC: 94 MG/DL (ref 70–110)
HCT VFR BLD AUTO: 35.8 % (ref 37–48.5)
HGB BLD-MCNC: 11 G/DL (ref 12–16)
IMM GRANULOCYTES # BLD AUTO: 0.02 K/UL (ref 0–0.04)
IMM GRANULOCYTES NFR BLD AUTO: 0.4 % (ref 0–0.5)
INR PPP: 1 (ref 0.8–1.2)
LYMPHOCYTES # BLD AUTO: 1 K/UL (ref 1–4.8)
LYMPHOCYTES NFR BLD: 17.7 % (ref 18–48)
MCH RBC QN AUTO: 28.9 PG (ref 27–31)
MCHC RBC AUTO-ENTMCNC: 30.7 G/DL (ref 32–36)
MCV RBC AUTO: 94 FL (ref 82–98)
MONOCYTES # BLD AUTO: 0.5 K/UL (ref 0.3–1)
MONOCYTES NFR BLD: 8.7 % (ref 4–15)
NEUTROPHILS # BLD AUTO: 3.9 K/UL (ref 1.8–7.7)
NEUTROPHILS NFR BLD: 69.6 % (ref 38–73)
NRBC BLD-RTO: 0 /100 WBC
PLATELET # BLD AUTO: 153 K/UL (ref 150–450)
PMV BLD AUTO: 9.9 FL (ref 9.2–12.9)
POTASSIUM SERPL-SCNC: 3.5 MMOL/L (ref 3.5–5.1)
PROT SERPL-MCNC: 7.5 G/DL (ref 6–8.4)
PROTHROMBIN TIME: 11.3 SEC (ref 9–12.5)
RBC # BLD AUTO: 3.81 M/UL (ref 4–5.4)
SODIUM SERPL-SCNC: 142 MMOL/L (ref 136–145)
TROPONIN I SERPL HS-MCNC: 77.9 PG/ML (ref 0–14.9)
TROPONIN I SERPL HS-MCNC: 78.4 PG/ML (ref 0–14.9)
WBC # BLD AUTO: 5.54 K/UL (ref 3.9–12.7)

## 2023-02-04 PROCEDURE — G0378 HOSPITAL OBSERVATION PER HR: HCPCS

## 2023-02-04 PROCEDURE — 96374 THER/PROPH/DIAG INJ IV PUSH: CPT

## 2023-02-04 PROCEDURE — 85025 COMPLETE CBC W/AUTO DIFF WBC: CPT | Performed by: NURSE PRACTITIONER

## 2023-02-04 PROCEDURE — 93010 EKG 12-LEAD: ICD-10-PCS | Mod: ,,, | Performed by: SPECIALIST

## 2023-02-04 PROCEDURE — 99285 EMERGENCY DEPT VISIT HI MDM: CPT | Mod: 25

## 2023-02-04 PROCEDURE — 85610 PROTHROMBIN TIME: CPT | Performed by: NURSE PRACTITIONER

## 2023-02-04 PROCEDURE — 93010 ELECTROCARDIOGRAM REPORT: CPT | Mod: ,,, | Performed by: SPECIALIST

## 2023-02-04 PROCEDURE — 93005 ELECTROCARDIOGRAM TRACING: CPT | Performed by: SPECIALIST

## 2023-02-04 PROCEDURE — 84484 ASSAY OF TROPONIN QUANT: CPT | Mod: 91 | Performed by: NURSE PRACTITIONER

## 2023-02-04 PROCEDURE — 83880 ASSAY OF NATRIURETIC PEPTIDE: CPT | Performed by: NURSE PRACTITIONER

## 2023-02-04 PROCEDURE — 25000003 PHARM REV CODE 250: Performed by: EMERGENCY MEDICINE

## 2023-02-04 PROCEDURE — 63600175 PHARM REV CODE 636 W HCPCS: Performed by: EMERGENCY MEDICINE

## 2023-02-04 PROCEDURE — 80053 COMPREHEN METABOLIC PANEL: CPT | Performed by: NURSE PRACTITIONER

## 2023-02-04 RX ORDER — METOPROLOL SUCCINATE 25 MG/1
12.5 TABLET, EXTENDED RELEASE ORAL NIGHTLY
COMMUNITY
End: 2023-03-02 | Stop reason: SDUPTHER

## 2023-02-04 RX ORDER — NAPROXEN SODIUM 220 MG/1
243 TABLET, FILM COATED ORAL
Status: COMPLETED | OUTPATIENT
Start: 2023-02-04 | End: 2023-02-04

## 2023-02-04 RX ORDER — TRAMADOL HYDROCHLORIDE 50 MG/1
50 TABLET ORAL 2 TIMES DAILY PRN
COMMUNITY

## 2023-02-04 RX ORDER — FUROSEMIDE 10 MG/ML
60 INJECTION INTRAMUSCULAR; INTRAVENOUS
Status: COMPLETED | OUTPATIENT
Start: 2023-02-04 | End: 2023-02-04

## 2023-02-04 RX ORDER — POTASSIUM CHLORIDE 750 MG/1
10 TABLET, EXTENDED RELEASE ORAL NIGHTLY
COMMUNITY
End: 2023-03-02

## 2023-02-04 RX ADMIN — ASPIRIN 81 MG CHEWABLE TABLET 243 MG: 81 TABLET CHEWABLE at 10:02

## 2023-02-04 RX ADMIN — FUROSEMIDE 60 MG: 10 INJECTION, SOLUTION INTRAVENOUS at 10:02

## 2023-02-05 LAB
ANION GAP SERPL CALC-SCNC: 12 MMOL/L (ref 8–16)
BASOPHILS # BLD AUTO: 0.03 K/UL (ref 0–0.2)
BASOPHILS NFR BLD: 0.6 % (ref 0–1.9)
BUN SERPL-MCNC: 17 MG/DL (ref 8–23)
CALCIUM SERPL-MCNC: 8.6 MG/DL (ref 8.7–10.5)
CHLORIDE SERPL-SCNC: 100 MMOL/L (ref 95–110)
CO2 SERPL-SCNC: 28 MMOL/L (ref 23–29)
CREAT SERPL-MCNC: 1 MG/DL (ref 0.5–1.4)
DIFFERENTIAL METHOD: ABNORMAL
EOSINOPHIL # BLD AUTO: 0.2 K/UL (ref 0–0.5)
EOSINOPHIL NFR BLD: 3.8 % (ref 0–8)
ERYTHROCYTE [DISTWIDTH] IN BLOOD BY AUTOMATED COUNT: 13.4 % (ref 11.5–14.5)
EST. GFR  (NO RACE VARIABLE): 54.5 ML/MIN/1.73 M^2
GLUCOSE SERPL-MCNC: 119 MG/DL (ref 70–110)
HCT VFR BLD AUTO: 32.6 % (ref 37–48.5)
HGB BLD-MCNC: 10.2 G/DL (ref 12–16)
IMM GRANULOCYTES # BLD AUTO: 0.01 K/UL (ref 0–0.04)
IMM GRANULOCYTES NFR BLD AUTO: 0.2 % (ref 0–0.5)
LYMPHOCYTES # BLD AUTO: 0.9 K/UL (ref 1–4.8)
LYMPHOCYTES NFR BLD: 18 % (ref 18–48)
MAGNESIUM SERPL-MCNC: 1.7 MG/DL (ref 1.6–2.6)
MCH RBC QN AUTO: 29 PG (ref 27–31)
MCHC RBC AUTO-ENTMCNC: 31.3 G/DL (ref 32–36)
MCV RBC AUTO: 93 FL (ref 82–98)
MONOCYTES # BLD AUTO: 0.5 K/UL (ref 0.3–1)
MONOCYTES NFR BLD: 11 % (ref 4–15)
NEUTROPHILS # BLD AUTO: 3.1 K/UL (ref 1.8–7.7)
NEUTROPHILS NFR BLD: 66.4 % (ref 38–73)
NRBC BLD-RTO: 0 /100 WBC
PLATELET # BLD AUTO: 129 K/UL (ref 150–450)
PMV BLD AUTO: 9.8 FL (ref 9.2–12.9)
POTASSIUM SERPL-SCNC: 3.7 MMOL/L (ref 3.5–5.1)
RBC # BLD AUTO: 3.52 M/UL (ref 4–5.4)
SODIUM SERPL-SCNC: 140 MMOL/L (ref 136–145)
WBC # BLD AUTO: 4.71 K/UL (ref 3.9–12.7)

## 2023-02-05 PROCEDURE — 83735 ASSAY OF MAGNESIUM: CPT | Performed by: INTERNAL MEDICINE

## 2023-02-05 PROCEDURE — 25000003 PHARM REV CODE 250: Performed by: INTERNAL MEDICINE

## 2023-02-05 PROCEDURE — 80048 BASIC METABOLIC PNL TOTAL CA: CPT | Performed by: INTERNAL MEDICINE

## 2023-02-05 PROCEDURE — G0378 HOSPITAL OBSERVATION PER HR: HCPCS

## 2023-02-05 PROCEDURE — 85025 COMPLETE CBC W/AUTO DIFF WBC: CPT | Performed by: INTERNAL MEDICINE

## 2023-02-05 PROCEDURE — 36415 COLL VENOUS BLD VENIPUNCTURE: CPT | Performed by: INTERNAL MEDICINE

## 2023-02-05 PROCEDURE — 96376 TX/PRO/DX INJ SAME DRUG ADON: CPT

## 2023-02-05 PROCEDURE — 63600175 PHARM REV CODE 636 W HCPCS: Performed by: INTERNAL MEDICINE

## 2023-02-05 RX ORDER — SODIUM CHLORIDE 0.9 % (FLUSH) 0.9 %
10 SYRINGE (ML) INJECTION
Status: DISCONTINUED | OUTPATIENT
Start: 2023-02-05 | End: 2023-02-06 | Stop reason: HOSPADM

## 2023-02-05 RX ORDER — NALOXONE HCL 0.4 MG/ML
0.02 VIAL (ML) INJECTION
Status: DISCONTINUED | OUTPATIENT
Start: 2023-02-05 | End: 2023-02-06 | Stop reason: HOSPADM

## 2023-02-05 RX ORDER — SIMETHICONE 80 MG
1 TABLET,CHEWABLE ORAL 4 TIMES DAILY PRN
Status: DISCONTINUED | OUTPATIENT
Start: 2023-02-05 | End: 2023-02-06 | Stop reason: HOSPADM

## 2023-02-05 RX ORDER — POLYETHYLENE GLYCOL 3350 17 G/17G
17 POWDER, FOR SOLUTION ORAL 2 TIMES DAILY PRN
Status: DISCONTINUED | OUTPATIENT
Start: 2023-02-05 | End: 2023-02-06 | Stop reason: HOSPADM

## 2023-02-05 RX ORDER — SERTRALINE HYDROCHLORIDE 50 MG/1
100 TABLET, FILM COATED ORAL DAILY
Status: DISCONTINUED | OUTPATIENT
Start: 2023-02-05 | End: 2023-02-06 | Stop reason: HOSPADM

## 2023-02-05 RX ORDER — TRAMADOL HYDROCHLORIDE 50 MG/1
50 TABLET ORAL 2 TIMES DAILY PRN
Status: DISCONTINUED | OUTPATIENT
Start: 2023-02-05 | End: 2023-02-06 | Stop reason: HOSPADM

## 2023-02-05 RX ORDER — FUROSEMIDE 10 MG/ML
40 INJECTION INTRAMUSCULAR; INTRAVENOUS
Status: DISCONTINUED | OUTPATIENT
Start: 2023-02-05 | End: 2023-02-06 | Stop reason: HOSPADM

## 2023-02-05 RX ORDER — ONDANSETRON 2 MG/ML
4 INJECTION INTRAMUSCULAR; INTRAVENOUS EVERY 6 HOURS PRN
Status: DISCONTINUED | OUTPATIENT
Start: 2023-02-05 | End: 2023-02-06 | Stop reason: HOSPADM

## 2023-02-05 RX ORDER — AMOXICILLIN 250 MG
1 CAPSULE ORAL 2 TIMES DAILY PRN
Status: DISCONTINUED | OUTPATIENT
Start: 2023-02-05 | End: 2023-02-06 | Stop reason: HOSPADM

## 2023-02-05 RX ORDER — LANOLIN ALCOHOL/MO/W.PET/CERES
800 CREAM (GRAM) TOPICAL
Status: DISCONTINUED | OUTPATIENT
Start: 2023-02-05 | End: 2023-02-06 | Stop reason: HOSPADM

## 2023-02-05 RX ORDER — HYDROMORPHONE HYDROCHLORIDE 2 MG/1
2 TABLET ORAL EVERY 4 HOURS PRN
Status: DISCONTINUED | OUTPATIENT
Start: 2023-02-05 | End: 2023-02-06 | Stop reason: HOSPADM

## 2023-02-05 RX ORDER — SODIUM,POTASSIUM PHOSPHATES 280-250MG
2 POWDER IN PACKET (EA) ORAL
Status: DISCONTINUED | OUTPATIENT
Start: 2023-02-05 | End: 2023-02-06 | Stop reason: HOSPADM

## 2023-02-05 RX ORDER — TALC
6 POWDER (GRAM) TOPICAL NIGHTLY PRN
Status: DISCONTINUED | OUTPATIENT
Start: 2023-02-05 | End: 2023-02-06 | Stop reason: HOSPADM

## 2023-02-05 RX ORDER — GLUCAGON 1 MG
1 KIT INJECTION
Status: DISCONTINUED | OUTPATIENT
Start: 2023-02-05 | End: 2023-02-06 | Stop reason: HOSPADM

## 2023-02-05 RX ORDER — FUROSEMIDE 40 MG/1
80 TABLET ORAL DAILY
Status: DISCONTINUED | OUTPATIENT
Start: 2023-02-05 | End: 2023-02-05

## 2023-02-05 RX ORDER — POTASSIUM CHLORIDE 20 MEQ/1
20 TABLET, EXTENDED RELEASE ORAL EVERY MORNING
Status: DISCONTINUED | OUTPATIENT
Start: 2023-02-05 | End: 2023-02-06 | Stop reason: HOSPADM

## 2023-02-05 RX ORDER — PANTOPRAZOLE SODIUM 40 MG/1
40 TABLET, DELAYED RELEASE ORAL
Status: DISCONTINUED | OUTPATIENT
Start: 2023-02-05 | End: 2023-02-06 | Stop reason: HOSPADM

## 2023-02-05 RX ORDER — AMLODIPINE BESYLATE 5 MG/1
5 TABLET ORAL 2 TIMES DAILY
Status: DISCONTINUED | OUTPATIENT
Start: 2023-02-05 | End: 2023-02-06 | Stop reason: HOSPADM

## 2023-02-05 RX ORDER — ISOSORBIDE DINITRATE 10 MG/1
10 TABLET ORAL 3 TIMES DAILY
Status: DISCONTINUED | OUTPATIENT
Start: 2023-02-05 | End: 2023-02-06 | Stop reason: HOSPADM

## 2023-02-05 RX ORDER — NAPROXEN SODIUM 220 MG/1
81 TABLET, FILM COATED ORAL DAILY
Status: DISCONTINUED | OUTPATIENT
Start: 2023-02-05 | End: 2023-02-06 | Stop reason: HOSPADM

## 2023-02-05 RX ORDER — IBUPROFEN 200 MG
16 TABLET ORAL
Status: DISCONTINUED | OUTPATIENT
Start: 2023-02-05 | End: 2023-02-06 | Stop reason: HOSPADM

## 2023-02-05 RX ORDER — IBUPROFEN 200 MG
24 TABLET ORAL
Status: DISCONTINUED | OUTPATIENT
Start: 2023-02-05 | End: 2023-02-06 | Stop reason: HOSPADM

## 2023-02-05 RX ORDER — POTASSIUM CHLORIDE 750 MG/1
10 CAPSULE, EXTENDED RELEASE ORAL NIGHTLY
Status: DISCONTINUED | OUTPATIENT
Start: 2023-02-05 | End: 2023-02-06 | Stop reason: HOSPADM

## 2023-02-05 RX ORDER — METOLAZONE 2.5 MG/1
2.5 TABLET ORAL DAILY
Status: DISCONTINUED | OUTPATIENT
Start: 2023-02-05 | End: 2023-02-06 | Stop reason: HOSPADM

## 2023-02-05 RX ORDER — ACETAMINOPHEN 325 MG/1
650 TABLET ORAL EVERY 8 HOURS PRN
Status: DISCONTINUED | OUTPATIENT
Start: 2023-02-05 | End: 2023-02-06 | Stop reason: HOSPADM

## 2023-02-05 RX ORDER — ATORVASTATIN CALCIUM 40 MG/1
40 TABLET, FILM COATED ORAL DAILY
Status: DISCONTINUED | OUTPATIENT
Start: 2023-02-05 | End: 2023-02-06 | Stop reason: HOSPADM

## 2023-02-05 RX ADMIN — ATORVASTATIN CALCIUM 40 MG: 40 TABLET, FILM COATED ORAL at 08:02

## 2023-02-05 RX ADMIN — ISOSORBIDE DINITRATE 10 MG: 10 TABLET ORAL at 08:02

## 2023-02-05 RX ADMIN — Medication 800 MG: at 05:02

## 2023-02-05 RX ADMIN — METOPROLOL SUCCINATE 12.5 MG: 25 TABLET, EXTENDED RELEASE ORAL at 08:02

## 2023-02-05 RX ADMIN — POTASSIUM CHLORIDE 20 MEQ: 1500 TABLET, EXTENDED RELEASE ORAL at 08:02

## 2023-02-05 RX ADMIN — SERTRALINE HYDROCHLORIDE 100 MG: 50 TABLET ORAL at 08:02

## 2023-02-05 RX ADMIN — FUROSEMIDE 40 MG: 10 INJECTION, SOLUTION INTRAMUSCULAR; INTRAVENOUS at 03:02

## 2023-02-05 RX ADMIN — SACUBITRIL AND VALSARTAN 1 TABLET: 49; 51 TABLET, FILM COATED ORAL at 08:02

## 2023-02-05 RX ADMIN — FUROSEMIDE 80 MG: 40 TABLET ORAL at 08:02

## 2023-02-05 RX ADMIN — POTASSIUM BICARBONATE 50 MEQ: 977.5 TABLET, EFFERVESCENT ORAL at 05:02

## 2023-02-05 RX ADMIN — ISOSORBIDE DINITRATE 10 MG: 10 TABLET ORAL at 03:02

## 2023-02-05 RX ADMIN — METOLAZONE 2.5 MG: 2.5 TABLET ORAL at 08:02

## 2023-02-05 RX ADMIN — PANTOPRAZOLE SODIUM 40 MG: 40 TABLET, DELAYED RELEASE ORAL at 05:02

## 2023-02-05 RX ADMIN — POTASSIUM CHLORIDE 10 MEQ: 750 CAPSULE, EXTENDED RELEASE ORAL at 01:02

## 2023-02-05 RX ADMIN — AMLODIPINE BESYLATE 5 MG: 5 TABLET ORAL at 08:02

## 2023-02-05 RX ADMIN — ASPIRIN 81 MG CHEWABLE TABLET 81 MG: 81 TABLET CHEWABLE at 08:02

## 2023-02-05 RX ADMIN — POTASSIUM CHLORIDE 10 MEQ: 750 CAPSULE, EXTENDED RELEASE ORAL at 08:02

## 2023-02-05 NOTE — PLAN OF CARE
[facility]  Initial Discharge Assessment       Primary Care Provider: Romeo Alas MD    Admission Diagnosis: Congestive heart failure, unspecified HF chronicity, unspecified heart failure type [I50.9]    Admission Date: 2/4/2023  Expected Discharge Date:     Discharge Barriers Identified: None    Payor: MEDICARE / Plan: MEDICARE PART A & B / Product Type: Government /     Extended Emergency Contact Information  Primary Emergency Contact: CachorroLizabeth  Address: Unknown   [place]  Mobile Phone: [phone]  Relation: Sister  Preferred language: English   needed? No  Secondary Emergency Contact: Brandi Falcon  Address: unknown   [place]  Mobile Phone: [phone]  Relation: Daughter  Preferred language: English   needed? No    Discharge Plan A: Home with family  Discharge Plan B: Home with family      [facility] [address] [place] [address]  Phone: [phone] Fax: [phone]    [facility] [address]  Phone: [phone] Fax: [phone]  Assesment completed at [facility]. Primary pharmacy is [facility]     Initial Assessment (most recent)       Adult Discharge Assessment - 02/05/23 1143          Discharge Assessment    Assessment Type Discharge Planning Assessment     Confirmed/corrected address, phone number and insurance Yes     Confirmed Demographics Correct on Facesheet     Source of Information patient;family;health record     Reason For Admission Chronic combined systolic and diastolic heart failure     People in Home child(iblly), adult;grandchild(billy)     Facility Arrived From: Home     Do you expect to return to your current living situation? Yes     Do you have help at home or someone to help you manage your care at home? Yes     Who are your caregiver(s) and their phone number(s)? Lizabeth Ivory (Sister)   [phone] (Mobile)     Prior to  hospitilization cognitive status: Alert/Oriented     Current cognitive status: Alert/Oriented     Walking or Climbing Stairs ambulation difficulty, requires equipment     Mobility Management Rollator, Can e     Dressing/Bathing bathing difficulty, requires equipment     Dressing/Bathing Management Shower Chair     Home Accessibility wheelchair accessible     Home Layout Able to live on 1st floor     Equipment Currently Used at Home bedside commode;cane, straight;rollator;grab bar;shower chair     Readmission within 30 days? No     Patient currently being followed by outpatient case management? No     Do you currently have service(s) that help you manage your care at home? No     Do you take prescription medications? Yes     Do you have prescription coverage? Yes     Coverage Medicare and Blue Cross     Do you have any problems affording any of your prescribed medications? No     Is the patient taking medications as prescribed? yes     Who is going to help you get home at discharge? Lizabeth Ivory (Sister)   [phone] (Mobile)     How do you get to doctors appointments? family or friend will provide     Are you on dialysis? No     Do you take coumadin? No     Discharge Plan A Home with family     Discharge Plan B Home with family     DME Needed Upon Discharge  none     Discharge Plan discussed with: Patient;Adult children     Discharge Barriers Identified None        Physical Activity    On average, how many days per week do you engage in moderate to strenuous exercise (like a brisk walk)? 0 days     On average, how many minutes do you engage in exercise at this level? 0 min        Financial Resource Strain    How hard is it for you to pay for the very basics like food, housing, medical care, and heating? Not very hard        Housing Stability    In the last 12 months, was there a time when you were not able to pay the mortgage or rent on time? No     In the last 12 months, was there a time when you did not have a  steady place to sleep or slept in a shelter (including now)? No        Transportation Needs    In the past 12 months, has lack of transportation kept you from medical appointments or from getting medications? No     In the past 12 months, has lack of transportation kept you from meetings, work, or from getting things needed for daily living? No        Food Insecurity    Within the past 12 months, you worried that your food would run out before you got the money to buy more. Never true     Within the past 12 months, the food you bought just didn't last and you didn't have money to get more. Never true        Stress    Do you feel stress - tense, restless, nervous, or anxious, or unable to sleep at night because your mind is troubled all the time - these days? To some extent        Social Connections    In a typical week, how many times do you talk on the phone with family, friends, or neighbors? More than three times a week     How often do you get together with friends or relatives? More than three times a week     How often do you attend Protestant or Adventist services? 1 to 4 times per year     Do you belong to any clubs or organizations such as Protestant groups, unions, fraternal or athletic groups, or school groups? No     How often do you attend meetings of the clubs or organizations you belong to? 1 to 4 times per year     Are you , , , , never , or living with a partner?         Alcohol Use    Q1: How often do you have a drink containing alcohol? Never     Q2: How many drinks containing alcohol do you have on a typical day when you are drinking? Patient does not drink     Q3: How often do you have six or more drinks on one occasion? Never        OTHER    Name(s) of People in Home CachorroLizabeth (Sister)   [phone] (Mobile)

## 2023-02-05 NOTE — ED PROVIDER NOTES
Encounter Date: 2023       History     Chief Complaint   Patient presents with    Shortness of Breath     Sob with dizziness x approx 1 week.      Patient presents emergency department with reported shortness of breath worsening over the course approximately 1 week patient has a history of congestive heart failure systolic and diastolic dysfunction she complains of abdominal distention she was seen yesterday by Dr. Blair who recommended admission however patient had a  this morning for her brother-in-law that she could not miss so he advised her to come to the emergency department after the  she does report some chest discomfort she states that a few weeks ago Dr. Blair had increased her Lasix states she was currently taking 80 mg of Lasix daily he advised taken extra 40 daily which      Review of patient's allergies indicates:   Allergen Reactions    Adhesive     Nitrofurantoin macrocrystalline Nausea And Vomiting     Other reaction(s): very sickly    Penicillins Swelling     Other reaction(s): swelling  Other reaction(s): red skin discolorati    Sulfa (sulfonamide antibiotics) Nausea And Vomiting     Other reaction(s): very sickly     Past Medical History:   Diagnosis Date    Abdominal hernia     Anemia     Dr Berkowitz     Angina pectoris     Aortic valve stenosis, moderate     Back pain     Cannon's esophagus     CAD (coronary artery disease)     Cataract     ou done    CHF (congestive heart failure)     EFx 35%, Dr. Spencer 13    Chronic combined systolic and diastolic heart failure 2019    Colitis     Compression fracture     Diverticulosis     Encounter for blood transfusion     GERD (gastroesophageal reflux disease)     Hyperlipidemia     Hypertension     Irritable bowel syndrome     Myocardial infarction     Osteoarthritis     lumbar DDD    Pacemaker     Pneumonia 2017    Raynaud disease     Rheumatoid arteritis     Rheumatoid arthritis     Shingles 2017     Sjogren syndrome     Ulcerative colitis      Past Surgical History:   Procedure Laterality Date    A-V CARDIAC PACEMAKER INSERTION Left 2021    Procedure: INSERTION, CARDIAC PACEMAKER, DUAL CHAMBER  ([employer]);  Surgeon: Tera Blair MD;  Location: [facility] CATH/EP LAB;  Service: Cardiology;  Laterality: Left;    APPENDECTOMY      BACK SURGERY      CARDIAC SURGERY      CABGx3 in     CATARACT EXTRACTION      ou od d 11-15-12//     SECTION, CLASSIC      x 2    CHOLECYSTECTOMY      CLOSURE OF WOUND Right 2022    Procedure: CLOSURE-WOUND;  Surgeon: Delvis Jackson MD;  Location: [facility] OR;  Service: ENT;  Laterality: Right;  NOSE AND CHIN    COLONOSCOPY  09/15/2011    Dr Anaya     COLONOSCOPY  01/10/2017    [facility] report sent to scanning    CORONARY ANGIOPLASTY      PCI x 1 in     CORONARY ARTERY BYPASS GRAFT      3 vessel    CORONARY ARTERY BYPASS GRAFT      CYSTOSCOPY N/A 6/3/2020    Procedure: CYSTOSCOPY;  Surgeon: Miryam Bender Jr., MD;  Location: [facility] OR;  Service: Urology;  Laterality: N/A;    eyes      rk ou    FRACTURE SURGERY  2016    Dr Guido left hip     FRACTURE SURGERY  2018    Right Hip    HARVESTING OF SKIN GRAFT  2022    Procedure: SURGICAL PROCUREMENT, GRAFT, SKIN;  Surgeon: Delvis Jackson MD;  Location: [facility] OR;  Service: ENT;;  RIGHT CHEST    HYSTERECTOMY      tubal ligation, oopherectomy    INTRAMEDULLARY RODDING OF TROCHANTER OF FEMUR Right 10/8/2018    Procedure: INSERTION, INTRAMEDULLARY KELSI, FEMUR, TROCHANTER;  Surgeon: Valerio Luther MD;  Location: [facility] OR;  Service: Orthopedics;  Laterality: Right;    OOPHORECTOMY      SPINE SURGERY  2013    Silvino Mckeon    UPPER GASTROINTESTINAL ENDOSCOPY      Yag Capsulotomy Right 14     Family History   Adopted: Yes   Problem Relation Age of Onset    Heart disease Mother         aortic stenosis    Skin cancer Mother     Hypertension Father     Heart disease Father         MI    Hypertension Sister      Fibromyalgia Sister     Scleroderma Sister     Pulmonary embolism Sister     Irritable bowel syndrome Sister     Heart disease Brother         2 brothers CABG    Arthritis Brother     Crohn's disease Brother     Crohn's disease Brother     Crohn's disease Brother     Hypertension Daughter     Early death Son         accident    Lupus Cousin     Lupus Cousin     Amblyopia Neg Hx     Blindness Neg Hx     Cancer Neg Hx     Cataracts Neg Hx     Diabetes Neg Hx     Glaucoma Neg Hx     Macular degeneration Neg Hx     Retinal detachment Neg Hx     Strabismus Neg Hx     Stroke Neg Hx     Thyroid disease Neg Hx     Celiac disease Neg Hx     Cirrhosis Neg Hx     Psoriasis Neg Hx     Melanoma Neg Hx     Multiple sclerosis Neg Hx     Rheum arthritis Neg Hx     Tuberculosis Neg Hx     Lymphoma Neg Hx     Ulcerative colitis Neg Hx     Moses's disease Neg Hx     Stomach cancer Neg Hx     Rectal cancer Neg Hx     Liver disease Neg Hx     Liver cancer Neg Hx     Inflammatory bowel disease Neg Hx     Hemochromatosis Neg Hx     Esophageal cancer Neg Hx     Cystic fibrosis Neg Hx     Colon cancer Neg Hx      Social History     Tobacco Use    Smoking status: Former     Packs/day: 1.00     Years: 5.00     Pack years: 5.00     Types: Cigarettes     Quit date: 1971     Years since quittin.1    Smokeless tobacco: Never   Substance Use Topics    Alcohol use: Yes     Alcohol/week: 1.0 standard drink     Types: 1 Glasses of wine per week    Drug use: Never     Review of Systems   Constitutional:  Positive for diaphoresis. Negative for chills and fever.   HENT:  Negative for congestion.    Eyes:  Negative for pain.   Respiratory:  Positive for cough and shortness of breath.    Cardiovascular:  Positive for chest pain and leg swelling.   Gastrointestinal:  Positive for abdominal distention and nausea. Negative for abdominal pain, diarrhea and vomiting.   Genitourinary:  Negative for dysuria and frequency.   Musculoskeletal:  Negative  for back pain.     Physical Exam     Initial Vitals   BP Pulse Resp Temp SpO2   02/04/23 1950 02/04/23 1950 02/04/23 1950 02/04/23 1948 02/04/23 1950   (!) 177/74 69 16 98.2 °F (36.8 °C) 100 %      MAP       --                Physical Exam    Constitutional: She appears well-developed and well-nourished. No distress.   HENT:   Head: Normocephalic and atraumatic.   Right Ear: External ear normal.   Left Ear: External ear normal.   Mouth/Throat: Oropharynx is clear and moist.   Eyes: Conjunctivae and EOM are normal. Pupils are equal, round, and reactive to light.   Neck: Neck supple.   Normal range of motion.  Cardiovascular:  Normal rate, regular rhythm, normal heart sounds and intact distal pulses.           Pulmonary/Chest: No respiratory distress. She has rales.   Abdominal: Abdomen is soft. Bowel sounds are normal. She exhibits distension. There is no abdominal tenderness.   Musculoskeletal:         General: Edema present. Normal range of motion.      Cervical back: Normal range of motion and neck supple.     Neurological: She is alert and oriented to person, place, and time. GCS score is 15. GCS eye subscore is 4. GCS verbal subscore is 5. GCS motor subscore is 6.   Skin: Skin is warm and dry. Capillary refill takes less than 2 seconds. No rash noted.   Psychiatric: She has a normal mood and affect. Her behavior is normal.       ED Course   Procedures  Labs Reviewed   CBC W/ AUTO DIFFERENTIAL - Abnormal; Notable for the following components:       Result Value    RBC 3.81 (*)     Hemoglobin 11.0 (*)     Hematocrit 35.8 (*)     MCHC 30.7 (*)     Lymph % 17.7 (*)     All other components within normal limits   COMPREHENSIVE METABOLIC PANEL - Abnormal; Notable for the following components:    eGFR 43.8 (*)     All other components within normal limits   B-TYPE NATRIURETIC PEPTIDE - Abnormal; Notable for the following components:     (*)     All other components within normal limits   TROPONIN I HIGH  SENSITIVITY - Abnormal; Notable for the following components:    Troponin I High Sensitivity 77.9 (*)     All other components within normal limits    Narrative:      trop critical result(s) repeated. Called and verbal readback   obtained from nando laird rn er  by SYDNIE 02/04/2023 20:56   PROTIME-INR   TROPONIN I HIGH SENSITIVITY          Imaging Results              X-Ray Chest AP Portable (In process)                      Medications   furosemide injection 60 mg (60 mg Intravenous Given 2/4/23 2214)   aspirin chewable tablet 243 mg (243 mg Oral Given 2/4/23 2213)     Medical Decision Making:   Differential Diagnosis:   Congestive heart failure, coronary disease, acute coronary syndrome, pneumonia  Clinical Tests:   Lab Tests: Ordered and Reviewed  Radiological Study: Ordered and Reviewed  Medical Tests: Ordered and Reviewed  ED Management:  Laboratory evaluation reviewed patient does have evidence of heart failure troponin is elevated at 77 she has no active chest pain in the emergency department she is received Lasix 60 mg IV I have discussed patient's findings with Dr. De Jesus who will evaluate patient emergency department                        Clinical Impression:   Final diagnoses:  [R07.9] Chest pain  [I50.9] Congestive heart failure, unspecified HF chronicity, unspecified heart failure type (Primary)        ED Disposition Condition    Admit Stable                Alvaro Maciel MD  02/04/23 [id number]

## 2023-02-05 NOTE — HPI
The patient is an 87-year-old female, who presents emergency room complaining shortness of breath.  The patient states that his shortness breath has been present for about 3 days prior to presentation.  She is noticed weight gain and was seen recently by her cardiologist for heart failure.  Her Lasix was increased doses that time.  She continues to have shortness breath and weight gain.  Denies any chest pressure or dizziness.  She AICD placed in November of 2021 and coronary artery bypass approximately 20 years ago.  She was combined congestive heart failure with ejection fraction approximately 40%.  Her past medical history includes hypertension, CAD, irritable bowel syndrome and gastroesophageal reflux disease.    Plan to admit patient to observation.  Will add tubule inhibitor of Zaroxolyn in conjunction with loop inhibitor Lasix for continue heart failure.  Consult Cardiology

## 2023-02-05 NOTE — SUBJECTIVE & OBJECTIVE
Past Medical History:   Diagnosis Date    Abdominal hernia     Anemia     Dr Berkowitz     Angina pectoris     Aortic valve stenosis, moderate     Back pain     Cannon's esophagus     CAD (coronary artery disease)     Cataract     ou done    CHF (congestive heart failure)     EFx 35%, Dr. Spencer 13    Chronic combined systolic and diastolic heart failure 2019    Colitis     Compression fracture     Diverticulosis     Encounter for blood transfusion     GERD (gastroesophageal reflux disease)     Hyperlipidemia     Hypertension     Irritable bowel syndrome     Myocardial infarction     Osteoarthritis     lumbar DDD    Pacemaker     Pneumonia 2017    Raynaud disease     Rheumatoid arteritis     Rheumatoid arthritis     Shingles 2017    Sjogren syndrome     Ulcerative colitis        Past Surgical History:   Procedure Laterality Date    A-V CARDIAC PACEMAKER INSERTION Left 2021    Procedure: INSERTION, CARDIAC PACEMAKER, DUAL CHAMBER  ([employer]);  Surgeon: Tera Blair MD;  Location: [facility] CATH/EP LAB;  Service: Cardiology;  Laterality: Left;    APPENDECTOMY      BACK SURGERY      CARDIAC SURGERY      CABGx3 in     CATARACT EXTRACTION      ou od d 11-15-12//     SECTION, CLASSIC      x 2    CHOLECYSTECTOMY      CLOSURE OF WOUND Right 2022    Procedure: CLOSURE-WOUND;  Surgeon: Delvis Jackson MD;  Location: [facility] OR;  Service: ENT;  Laterality: Right;  NOSE AND CHIN    COLONOSCOPY  09/15/2011    Dr Anaya     COLONOSCOPY  01/10/2017    [facility] report sent to scanning    CORONARY ANGIOPLASTY      PCI x 1 in     CORONARY ARTERY BYPASS GRAFT      3 vessel    CORONARY ARTERY BYPASS GRAFT      CYSTOSCOPY N/A 6/3/2020    Procedure: CYSTOSCOPY;  Surgeon: Miryam Bender Jr., MD;  Location: [facility] OR;  Service: Urology;  Laterality: N/A;    eyes      rk ou    FRACTURE SURGERY  2016    Dr Guido left hip     FRACTURE SURGERY  2018    Right Hip    HARVESTING OF SKIN GRAFT   9/23/2022    Procedure: SURGICAL PROCUREMENT, GRAFT, SKIN;  Surgeon: Delvis Jackson MD;  Location: [facility] OR;  Service: ENT;;  RIGHT CHEST    HYSTERECTOMY      tubal ligation, oopherectomy    INTRAMEDULLARY RODDING OF TROCHANTER OF FEMUR Right 10/8/2018    Procedure: INSERTION, INTRAMEDULLARY KELSI, FEMUR, TROCHANTER;  Surgeon: Valerio Luther MD;  Location: [facility] OR;  Service: Orthopedics;  Laterality: Right;    OOPHORECTOMY      SPINE SURGERY  8-    Silvino Mckeon    UPPER GASTROINTESTINAL ENDOSCOPY      Yag Capsulotomy Right 9/25/14       Review of patient's allergies indicates:   Allergen Reactions    Adhesive     Nitrofurantoin macrocrystalline Nausea And Vomiting     Other reaction(s): very sickly    Penicillins Swelling     Other reaction(s): swelling  Other reaction(s): red skin discolorati    Sulfa (sulfonamide antibiotics) Nausea And Vomiting     Other reaction(s): very sickly       No current facility-administered medications on file prior to encounter.     Current Outpatient Medications on File Prior to Encounter   Medication Sig    amLODIPine (NORVASC) 5 MG tablet TAKE 1 TABLET BY MOUTH 2 TIMES A DAY (Patient taking differently: Take 5 mg by mouth 2 (two) times daily.)    aspirin 81 mg Tab Take 1 tablet by mouth every evening. Every day    atorvastatin (LIPITOR) 40 MG tablet Take 1 tablet (40 mg total) by mouth once daily.    biotin 5 mg Cap Take 1 tablet by mouth once daily.    CALCIUM CARB/VIT D3/MINERALS (CALCIUM-VITAMIN D ORAL) Take 600 mg by mouth 2 (two) times daily. Calcium 1200 with D 3 1000    CRANBERRY CONC/ASCORBIC ACID (CRANBERRY PLUS VITAMIN C ORAL) Take 1 capsule by mouth once daily.    cyanocobalamin 1,000 mcg/mL injection Inject 1,000 mcg into the muscle every 30 days.    DEXILANT 60 mg capsule Take 60 mg by mouth once daily.     ENTRESTO  mg per tablet TAKE ONE TABLET BY MOUTH TWICE DAILY (Patient taking differently: Take 1 tablet by mouth 2 (two) times daily.)    fluticasone  (FLONASE) 50 mcg/actuation nasal spray 2 sprays by Each Nare route once daily. (Patient taking differently: 2 sprays by Each Nostril route daily as needed.)    folic acid (FOLVITE) 1 MG tablet Take 2 mg by mouth once daily.     furosemide (LASIX) 80 MG tablet TAKE ONE TABLET BY MOUTH EVERY DAY (Patient taking differently: Take 80 mg by mouth once daily.)    gabapentin (NEURONTIN) 100 MG capsule Take 2 capsules (200 mg total) by mouth every evening.    HYDROmorphone (DILAUDID) 2 MG tablet Take 2 mg by mouth every 4 (four) hours as needed for Pain.    isosorbide dinitrate (ISORDIL) 10 MG tablet TAKE ONE TABLET BY MOUTH THREE TIMES DAILY (Patient taking differently: Take 10 mg by mouth 3 (three) times daily.)    Lactobacillus acidophilus 10 billion cell Cap Take 1 each by mouth once daily. 1.5 billion    magnesium oxide (MAG-OX) 400 mg (241.3 mg magnesium) tablet TAKE 1 TABLET BY MOUTH 2 TIMES A DAY (Patient taking differently: Take 400 mg by mouth 2 (two) times daily.)    metoprolol succinate (TOPROL-XL) 25 MG 24 hr tablet Take 1 tablet (25 mg total) by mouth As instructed. Take 1 and 1/2 tabs qam and 1 tab qpm (Patient taking differently: Take 25 mg by mouth once daily. Take 1 and 1/2 tabs qam and 1 tab qpm)    metoprolol succinate (TOPROL-XL) 25 MG 24 hr tablet Take 12.5 mg by mouth nightly. 25 mg QAM and 12.5 mg QHS    MULTIVITAMIN WITH MINERALS (ONE-A-DAY 50 PLUS) Tab Take 1 tablet by mouth once daily. Every day    omega-3 fatty acids/fish oil (FISH OIL-OMEGA-3 FATTY ACIDS) 300-1,000 mg capsule Take 1 capsule by mouth once daily.    ondansetron (ZOFRAN) 4 MG tablet Take 4 mg by mouth every 8 (eight) hours as needed for Nausea.     potassium chloride SA (K-DUR,KLOR-CON M) 10 MEQ tablet TAKE TWO TABLETS BY MOUTH EVERY MORNING AND 1 TABLET EVERY EVENING (Patient taking differently: Take 20 mEq by mouth every morning. 20 meq QAM and 10 meq QPM)    potassium chloride SA (K-DUR,KLOR-CON M) 10 MEQ tablet Take 10 mEq by  mouth nightly. 20 meq QAM and 10 meq QPM    sertraline (ZOLOFT) 50 MG tablet Take 2 tablets (100 mg total) by mouth once daily.    traMADoL (ULTRAM) 50 mg tablet Take 50 mg by mouth 2 (two) times daily as needed for Pain.    vitamin E 400 unit Tab Take 400 mg by mouth once daily. Every day    cholestyramine-aspartame (QUESTRAN LIGHT) 4 gram PwPk Start with one packet daily for three days, then if needed increase to 2 packets daily for three days, then if needed increase to 3 packets    megestroL (MEGACE) 20 MG Tab TAKE 1 TABLET BY MOUTH EVERY DAY    nitroGLYCERIN 0.4 MG/DOSE TL SPRY (NITROLINGUAL) 400 mcg/spray spray Place 1 spray under the tongue every 5 (five) minutes as needed for Chest pain. PRN    promethazine (PHENERGAN) 25 MG tablet Take 1 tablet (25 mg total) by mouth 2 (two) times daily as needed for Nausea.    tiZANidine (ZANAFLEX) 2 MG tablet TID PRN    traMADoL (ULTRAM) 50 mg tablet TAKE TWO TABLETS BY MOUTH TWICE DAILY AS NEEDED FOR SEVERE pain    zinc gluconate 50 mg tablet Take 50 mg by mouth once daily.    [DISCONTINUED] hydrochlorothiazide (HYDRODIURIL) 25 MG tablet Take 25 mg by mouth once daily.     Family History       Problem Relation (Age of Onset)    Arthritis Brother    Crohn's disease Brother, Brother, Brother    Early death Son    Fibromyalgia Sister    Heart disease Mother, Father, Brother    Hypertension Father, Sister, Daughter    Irritable bowel syndrome Sister    Lupus Cousin, Cousin    Pulmonary embolism Sister    Scleroderma Sister    Skin cancer Mother          Tobacco Use    Smoking status: Former     Packs/day: 1.00     Years: 5.00     Pack years: 5.00     Types: Cigarettes     Quit date: 1971     Years since quittin.1    Smokeless tobacco: Never   Substance and Sexual Activity    Alcohol use: Yes     Alcohol/week: 1.0 standard drink     Types: 1 Glasses of wine per week    Drug use: Never    Sexual activity: Not Currently     Partners: Male     Review of Systems    Constitutional: Negative.    HENT: Negative.     Eyes: Negative.    Respiratory:  Positive for shortness of breath.    Cardiovascular: Negative.    Gastrointestinal: Negative.    Endocrine: Negative.    Genitourinary: Negative.    Musculoskeletal: Negative.    Skin: Negative.    Allergic/Immunologic: Negative.    Neurological: Negative.    Hematological: Negative.    Objective:     Vital Signs (Most Recent):  Temp: 98.5 °F (36.9 °C) (02/05/23 0108)  Pulse: 69 (02/05/23 0108)  Resp: 20 (02/05/23 0108)  BP: (!) 189/75 (02/05/23 0108)  SpO2: 96 % (02/05/23 0108)   Vital Signs (24h Range):  Temp:  [98 °F (36.7 °C)-98.5 °F (36.9 °C)] 98.5 °F (36.9 °C)  Pulse:  [63-71] 69  Resp:  [16-22] 20  SpO2:  [95 %-100 %] 96 %  BP: (154-190)/(71-84) 189/75     Weight: 50.4 kg (111 lb 1.8 oz)  Body mass index is 23.22 kg/m².    Physical Exam  Vitals and nursing note reviewed. Exam conducted with a chaperone present.   HENT:      Head: Normocephalic and atraumatic.   Eyes:      Pupils: Pupils are equal, round, and reactive to light.   Cardiovascular:      Rate and Rhythm: Normal rate and regular rhythm.   Pulmonary:      Breath sounds: Rales present.   Abdominal:      General: Bowel sounds are normal.      Palpations: Abdomen is soft.   Musculoskeletal:      Right lower leg: Edema present.      Left lower leg: Edema present.   Skin:     General: Skin is warm and dry.      Capillary Refill: Capillary refill takes less than 2 seconds.   Neurological:      General: No focal deficit present.      Mental Status: She is alert.         CRANIAL NERVES     CN III, IV, VI   Pupils are equal, round, and reactive to light.     Significant Labs: All pertinent labs within the past 24 hours have been reviewed.    Significant Imaging: I have reviewed all pertinent imaging results/findings within the past 24 hours.

## 2023-02-05 NOTE — PROGRESS NOTES
"[facility] Medicine Progress Note  Patient Name: Cheyanne Gomes MRN: [id number]   Patient Class: OP- Observation  Length of Stay: 0   Admission Date: 2/4/2023  7:51 PM Attending Physician: Alex Mccray MD   Primary Care Provider: Romeo Alas MD Face-to-Face encounter date: 02/05/2023   Chief Complaint: Shortness of Breath (Sob with dizziness x approx 1 week. )      Subjective:    Interval History   Breathing better but not back to baseline  Feeling weak.   Denies chest pain, palpitations, abdominal pain, nausea/vomiting.   No concerns/issues overnight reported by the patient or the nursing staff.  Reviewed the labs and discussed the plan of care.   No family present at bedside.     Review of Systems   All other Review of Systems were found to be negative expect for that mentioned already in HPI.     Hospital Course     02/05/2023     amLODIPine  5 mg Oral BID    aspirin  81 mg Oral Daily    atorvastatin  40 mg Oral Daily    furosemide (LASIX) injection  40 mg Intravenous Q12H    isosorbide dinitrate  10 mg Oral TID    metOLazone  2.5 mg Oral Daily    metoprolol succinate  12.5 mg Oral BID    pantoprazole  40 mg Oral Before breakfast    potassium chloride  10 mEq Oral Nightly    potassium chloride SA  20 mEq Oral QAM    sacubitriL-valsartan  1 tablet Oral BID    sertraline  100 mg Oral Daily       acetaminophen, dextrose 10%, dextrose 10%, glucagon (human recombinant), glucose, glucose, HYDROmorphone, magnesium oxide, magnesium oxide, melatonin, naloxone, ondansetron, polyethylene glycol, potassium bicarbonate, potassium bicarbonate, potassium bicarbonate, potassium, sodium phosphates, potassium, sodium phosphates, potassium, sodium phosphates, senna-docusate 8.6-50 mg, simethicone, sodium chloride 0.9%, traMADoL      Objective:   Physical Exam  /70   Pulse 63   Temp 98.1 °F (36.7 °C) (Oral)   Resp 16   Ht 4' 10" (1.473 m)   Wt 50.4 kg (111 lb 1.8 oz)   SpO2 95%   " BMI 23.22 kg/m²   Constitutional: No distress.   HENT: Atraumatic.   Cardiovascular: Normal rate, regular rhythm and normal heart sounds.   Pulmonary/Chest: Effort normal. Clear to auscultation bilaterally. No wheezes.   Abdominal: Soft. Bowel sounds are normal. Exhibits no distension and no mass. No tenderness  Neurological: Alert.   Skin: Skin is warm and dry.     Labs and Imaging    Significant Labs: All pertinent labs within the past 24 hours have been reviewed.    Significant Imaging: I have reviewed all pertinent imaging results/findings within the past 24 hours.    I have reviewed the Vitals, labs and imaging as above.     Assessment & Plan:   Cheyanne Gomes is a 87 y.o. female admitted for    Active Hospital Problems    Diagnosis    *Acute on Chronic combined systolic and diastolic heart failure    Hyperlipidemia    Coronary artery disease of native artery of native heart with stable angina pectoris    Hypertension       Plan  Continue IV lasix BID, strict I&O  Replace electrolytes  PT/OT consulted   Anticipate discharge in 24 hours      Active PT: Yes  Active OT: Yes  Active SLP: No    Core measures:  - Code status:   - Diet: Cardiac  VTE Risk Mitigation (From admission, onward)           Ordered     IP VTE HIGH RISK PATIENT  Once         02/05/23 0102     Place sequential compression device  Until discontinued         02/05/23 0102                    Discharge Planning:   Discharge Planning   RAMONE: 02/06    Code Status: Full Code   Is the patient medically ready for discharge?: no    Reason for patient still in hospital (select all that apply): Patient trending condition  Discharge Plan A: Home with assistance from family        Above encounter included review of the medical records, interviewing and examining the patient face-to-face, discussion with family and other health care providers, ordering and interpreting lab/test results and formulating a plan of care.     Medical Decision Making:  [] Low  Complexity  [x] Moderate Complexity  [] High Complexity    Alex Mccray MD  [facility] Hospitalist  02/05/2023

## 2023-02-05 NOTE — ASSESSMENT & PLAN NOTE
Reason for admission  Combined congestive heart failure  Ejection fraction 40%  Add metolazone to loop inhibitor  Consult Cardiology

## 2023-02-05 NOTE — PLAN OF CARE
02/05/23 1132   JOSEPH Message   Medicare Outpatient and Observation Notification regarding financial responsibility Given to patient/caregiver;Explained to patient/caregiver;Signed/date by patient/caregiver   Date JOSEPH was signed 02/05/23   Time JOSEPH was signed 113

## 2023-02-06 VITALS
WEIGHT: 111.31 LBS | HEIGHT: 58 IN | OXYGEN SATURATION: 95 % | SYSTOLIC BLOOD PRESSURE: 146 MMHG | HEART RATE: 63 BPM | BODY MASS INDEX: 23.37 KG/M2 | DIASTOLIC BLOOD PRESSURE: 66 MMHG | TEMPERATURE: 99 F | RESPIRATION RATE: 20 BRPM

## 2023-02-06 PROBLEM — I50.43 ACUTE ON CHRONIC COMBINED SYSTOLIC AND DIASTOLIC HEART FAILURE: Status: ACTIVE | Noted: 2023-02-06

## 2023-02-06 LAB
ANION GAP SERPL CALC-SCNC: 9 MMOL/L (ref 8–16)
BASOPHILS # BLD AUTO: 0.04 K/UL (ref 0–0.2)
BASOPHILS NFR BLD: 0.8 % (ref 0–1.9)
BUN SERPL-MCNC: 19 MG/DL (ref 8–23)
CALCIUM SERPL-MCNC: 8.6 MG/DL (ref 8.7–10.5)
CHLORIDE SERPL-SCNC: 101 MMOL/L (ref 95–110)
CO2 SERPL-SCNC: 32 MMOL/L (ref 23–29)
CREAT SERPL-MCNC: 1.2 MG/DL (ref 0.5–1.4)
DIFFERENTIAL METHOD: ABNORMAL
EOSINOPHIL # BLD AUTO: 0.3 K/UL (ref 0–0.5)
EOSINOPHIL NFR BLD: 5.5 % (ref 0–8)
ERYTHROCYTE [DISTWIDTH] IN BLOOD BY AUTOMATED COUNT: 13.5 % (ref 11.5–14.5)
EST. GFR  (NO RACE VARIABLE): 43.8 ML/MIN/1.73 M^2
GLUCOSE SERPL-MCNC: 95 MG/DL (ref 70–110)
HCT VFR BLD AUTO: 33.8 % (ref 37–48.5)
HGB BLD-MCNC: 10.2 G/DL (ref 12–16)
IMM GRANULOCYTES # BLD AUTO: 0.02 K/UL (ref 0–0.04)
IMM GRANULOCYTES NFR BLD AUTO: 0.4 % (ref 0–0.5)
LYMPHOCYTES # BLD AUTO: 1 K/UL (ref 1–4.8)
LYMPHOCYTES NFR BLD: 21.1 % (ref 18–48)
MAGNESIUM SERPL-MCNC: 1.7 MG/DL (ref 1.6–2.6)
MCH RBC QN AUTO: 28.3 PG (ref 27–31)
MCHC RBC AUTO-ENTMCNC: 30.2 G/DL (ref 32–36)
MCV RBC AUTO: 94 FL (ref 82–98)
MONOCYTES # BLD AUTO: 0.5 K/UL (ref 0.3–1)
MONOCYTES NFR BLD: 10 % (ref 4–15)
NEUTROPHILS # BLD AUTO: 3 K/UL (ref 1.8–7.7)
NEUTROPHILS NFR BLD: 62.2 % (ref 38–73)
NRBC BLD-RTO: 0 /100 WBC
PLATELET # BLD AUTO: 143 K/UL (ref 150–450)
PMV BLD AUTO: 10.3 FL (ref 9.2–12.9)
POTASSIUM SERPL-SCNC: 4.2 MMOL/L (ref 3.5–5.1)
RBC # BLD AUTO: 3.6 M/UL (ref 4–5.4)
SODIUM SERPL-SCNC: 142 MMOL/L (ref 136–145)
WBC # BLD AUTO: 4.89 K/UL (ref 3.9–12.7)

## 2023-02-06 PROCEDURE — 63600175 PHARM REV CODE 636 W HCPCS: Performed by: INTERNAL MEDICINE

## 2023-02-06 PROCEDURE — G0378 HOSPITAL OBSERVATION PER HR: HCPCS

## 2023-02-06 PROCEDURE — 25000003 PHARM REV CODE 250: Performed by: INTERNAL MEDICINE

## 2023-02-06 PROCEDURE — 96376 TX/PRO/DX INJ SAME DRUG ADON: CPT

## 2023-02-06 PROCEDURE — 36415 COLL VENOUS BLD VENIPUNCTURE: CPT | Performed by: INTERNAL MEDICINE

## 2023-02-06 PROCEDURE — 80048 BASIC METABOLIC PNL TOTAL CA: CPT | Performed by: INTERNAL MEDICINE

## 2023-02-06 PROCEDURE — 83735 ASSAY OF MAGNESIUM: CPT | Performed by: INTERNAL MEDICINE

## 2023-02-06 PROCEDURE — 85025 COMPLETE CBC W/AUTO DIFF WBC: CPT | Performed by: INTERNAL MEDICINE

## 2023-02-06 PROCEDURE — 97165 OT EVAL LOW COMPLEX 30 MIN: CPT

## 2023-02-06 PROCEDURE — 97161 PT EVAL LOW COMPLEX 20 MIN: CPT

## 2023-02-06 RX ORDER — METOLAZONE 2.5 MG/1
2.5 TABLET ORAL DAILY
Qty: 30 TABLET | Refills: 0 | Status: SHIPPED | OUTPATIENT
Start: 2023-02-07 | End: 2023-03-02 | Stop reason: SDUPTHER

## 2023-02-06 RX ADMIN — METOLAZONE 2.5 MG: 2.5 TABLET ORAL at 08:02

## 2023-02-06 RX ADMIN — ATORVASTATIN CALCIUM 40 MG: 40 TABLET, FILM COATED ORAL at 08:02

## 2023-02-06 RX ADMIN — ASPIRIN 81 MG CHEWABLE TABLET 81 MG: 81 TABLET CHEWABLE at 08:02

## 2023-02-06 RX ADMIN — METOPROLOL SUCCINATE 12.5 MG: 25 TABLET, EXTENDED RELEASE ORAL at 08:02

## 2023-02-06 RX ADMIN — SACUBITRIL AND VALSARTAN 1 TABLET: 49; 51 TABLET, FILM COATED ORAL at 08:02

## 2023-02-06 RX ADMIN — SERTRALINE HYDROCHLORIDE 100 MG: 50 TABLET ORAL at 08:02

## 2023-02-06 RX ADMIN — ISOSORBIDE DINITRATE 10 MG: 10 TABLET ORAL at 08:02

## 2023-02-06 RX ADMIN — FUROSEMIDE 40 MG: 10 INJECTION, SOLUTION INTRAMUSCULAR; INTRAVENOUS at 05:02

## 2023-02-06 RX ADMIN — POTASSIUM CHLORIDE 20 MEQ: 1500 TABLET, EXTENDED RELEASE ORAL at 06:02

## 2023-02-06 RX ADMIN — AMLODIPINE BESYLATE 5 MG: 5 TABLET ORAL at 08:02

## 2023-02-06 RX ADMIN — PANTOPRAZOLE SODIUM 40 MG: 40 TABLET, DELAYED RELEASE ORAL at 05:02

## 2023-02-06 NOTE — PT/OT/SLP EVAL
Physical Therapy Evaluation and Discharge Note    Patient Name:  Cheyanne Gomes   MRN:  [id number]    Recommendations:     Discharge Recommendations: outpatient PT (Resume OP PT for general strengthening)  Discharge Equipment Recommendations: none   Barriers to discharge: None    Assessment:     Cheyanne Gomes is a 87 y.o. female admitted with a medical diagnosis of Acute on chronic combined systolic and diastolic heart failure. .  At this time, patient is functioning at their prior level of function and does not require further acute PT services. The pt reported she has been going to OP PT for general strengthening, so PT recommends pt resume OP PT upon discharge.    Recent Surgery: * No surgery found *      Plan:     During this hospitalization, patient does not require further acute PT services.  Please re-consult if situation changes.      Subjective     Chief Complaint: Pt reported her SOB & dizziness have resolved since admit.  Patient/Family Comments/goals: home w/ family and resume OP PT  Pain/Comfort:  Pain Rating 1: 0/10  Pain Rating Post-Intervention 1: 0/10    Patients cultural, spiritual, Confucianist conflicts given the current situation:      Living Environment:  Pt lives with her daughter/daughter's family in a [facility] w/ no steps to enter.  Prior to admission, patients level of function was Supervision to Modified Independent w/ 3-wheeled walker.  Equipment used at home: bedside commode, cane, straight, walker, rolling, shower chair, other (see comments) (Pt uses 3-wheeled walker).  DME owned (not currently used): none.  Upon discharge, patient will have assistance from her family.    Objective:     Communicated with ADRYAN Kaur prior to session.  Patient found HOB elevated with peripheral IV, telemetry upon PT entry to room.    General Precautions: Standard, fall, pacemaker    Orthopedic Precautions:N/A   Braces: N/A  Respiratory Status: Room air    Exams:  Cognitive Exam:  Patient is oriented to  Person, Place, Time, and Situation  RLE ROM: WFL  RLE Strength: WFL  LLE ROM: WFL  LLE Strength: WFL    Functional Mobility:  Bed Mobility:     Scooting: modified independence  Supine to Sit: modified independence  Sit to Supine: modified independence  Transfers:     Sit to Stand:  modified independence and supervision with 3-wheeled walker.  Gait: x 200 feet with 3-wheeled walker and SBA > Supervision for safety. Pt did not exhibit any instability or LOB during gt assessment and has been getting around in room using her walker without assist from staff.    AM-PAC 6 CLICK MOBILITY  Total Score:24       Treatment and Education:  Pt was educated on the following: call light use, importance of OOB activity and functional mobility to negate the negative effects of prolonged bed rest during this hospitalization, safe transfers/ambulation and discharge planning recommendations/options.      AM-PAC 6 CLICK MOBILITY  Total Score:24     Patient left HOB elevated with all lines intact, call button in reach, and RN & Dr. Mccray notified.    GOALS:   Multidisciplinary Problems       Physical Therapy Goals       Not on file                    History:     Past Medical History:   Diagnosis Date    Abdominal hernia     Anemia     Dr Berkowitz     Angina pectoris     Aortic valve stenosis, moderate     Back pain     Cannon's esophagus     CAD (coronary artery disease)     Cataract     ou done    CHF (congestive heart failure)     EFx 35%, Dr. Spencer 12/19/13    Chronic combined systolic and diastolic heart failure 09/28/2019    Colitis     Compression fracture     Diverticulosis     Encounter for blood transfusion     GERD (gastroesophageal reflux disease)     Hyperlipidemia     Hypertension     Irritable bowel syndrome     Myocardial infarction     Osteoarthritis     lumbar DDD    Pacemaker     Pneumonia 01/03/2017    Raynaud disease     Rheumatoid arteritis     Rheumatoid arthritis     Shingles 01/03/2017    Sjogren syndrome      Ulcerative colitis        Past Surgical History:   Procedure Laterality Date    A-V CARDIAC PACEMAKER INSERTION Left 2021    Procedure: INSERTION, CARDIAC PACEMAKER, DUAL CHAMBER  ([employer]);  Surgeon: Tera Blair MD;  Location: [facility] CATH/EP LAB;  Service: Cardiology;  Laterality: Left;    APPENDECTOMY      BACK SURGERY      CARDIAC SURGERY      CABGx3 in     CATARACT EXTRACTION      ou od d 11-15-12//     SECTION, CLASSIC      x 2    CHOLECYSTECTOMY      CLOSURE OF WOUND Right 2022    Procedure: CLOSURE-WOUND;  Surgeon: Delvis Jackson MD;  Location: [facility] OR;  Service: ENT;  Laterality: Right;  NOSE AND CHIN    COLONOSCOPY  09/15/2011    Dr Anaya     COLONOSCOPY  01/10/2017    [facility] report sent to scanning    CORONARY ANGIOPLASTY      PCI x 1 in     CORONARY ARTERY BYPASS GRAFT      3 vessel    CORONARY ARTERY BYPASS GRAFT      CYSTOSCOPY N/A 6/3/2020    Procedure: CYSTOSCOPY;  Surgeon: Miryam Bender Jr., MD;  Location: [facility] OR;  Service: Urology;  Laterality: N/A;    eyes      rk ou    FRACTURE SURGERY  2016    Dr Guido left hip     FRACTURE SURGERY  2018    Right Hip    HARVESTING OF SKIN GRAFT  2022    Procedure: SURGICAL PROCUREMENT, GRAFT, SKIN;  Surgeon: Delvis Jackson MD;  Location: [facility] OR;  Service: ENT;;  RIGHT CHEST    HYSTERECTOMY      tubal ligation, oopherectomy    INTRAMEDULLARY RODDING OF TROCHANTER OF FEMUR Right 10/8/2018    Procedure: INSERTION, INTRAMEDULLARY KELSI, FEMUR, TROCHANTER;  Surgeon: Valerio Luther MD;  Location: [facility] OR;  Service: Orthopedics;  Laterality: Right;    OOPHORECTOMY      SPINE SURGERY  2013    Silvino Mckeon    UPPER GASTROINTESTINAL ENDOSCOPY      Yag Capsulotomy Right 14       Time Tracking:     PT Received On: 23  PT Start Time: 930     PT Stop Time: 942  PT Total Time (min): 12 min     Billable Minutes: Evaluation 2023

## 2023-02-06 NOTE — NURSING
1910-patient -aaox4- sitting up in bed- no complaints at this present time- respiration 20 even and unlabored- nadn.- heart rhythm NSR on monitor - rate controlled- nurse will continue to monitor for any change in condition- safety maintain - bed low in lock position - bed alarm in use.. call light in reach....    1922- patient asleep in bed - Heart rhythm ST  on monitor -rate 158 lasting 10 seconds -dR. De Jesus notified - bp 104/58 - nurse will continue to monitor for any change in condition throughout shift-safety maintain- alarm in use- call light in reach....

## 2023-02-06 NOTE — PLAN OF CARE
Problem: Occupational Therapy  Goal: Occupational Therapy Goal  Outcome: Met   OT eval and dc. No OT needs identified. Pt back to her baseline ADLS and fx mobility with 3 wheeled walker in room.  Dc acute OT.

## 2023-02-06 NOTE — PLAN OF CARE
02/06/23 1307   Final Note   Assessment Type Final Discharge Note   Anticipated Discharge Disposition Home   What phone number can be called within the next 1-3 days to see how you are doing after discharge? [phone]   Hospital Resources/Appts/Education Provided Provided patient/caregiver with written discharge plan information;Appointments scheduled and added to AVS   Post-Acute Status   Discharge Delays None known at this time     Discharge orders and chart reviewed. No other discharge needs noted at this time. Pt is clear for discharge from case management. Pt is discharging to home.    PCP appointment scheduled and added to patient's AVS

## 2023-02-06 NOTE — PT/OT/SLP EVAL
Occupational Therapy   Evaluation and Discharge Note    Name: Cheyanne Gomes  MRN: [id number]  Admitting Diagnosis: Acute on chronic combined systolic and diastolic heart failure  Recent Surgery: * No surgery found *      Recommendations:     Discharge Recommendations: home  Discharge Equipment Recommendations: none  Barriers to discharge:  None    Assessment:     Cheyanne Gomes is a 87 y.o. female with a medical diagnosis of Acute on chronic combined systolic and diastolic heart failure. At this time, patient is functioning at their prior level of function and does not require further acute OT services.     Plan:     During this hospitalization, patient does not require further acute OT services.  Please re-consult if situation changes.    Plan of Care Reviewed with: patient    Subjective     Chief Complaint: none  Patient/Family Comments/goals: I am better, no more SOB.    Occupational Profile:  Living Environment: pt lives with daughter in [facility] with no steps; walk n tub with GB and BIS.  Previous level of function: Indep-Mod I ADLS, ambulated 3 wheeled rollator without a seat; does not drives; does light meal prep otherwise daughter does all IADLS,drives.   Roles and Routines: pt goes to OPPT 2 x wk for balance and strengthening; she has arthritic hand weakness per her report  Equipment Used at home: rollator, walker, rolling, cane, straight, cane, quad, bath bench, grab bar, raised toilet  Assistance upon Discharge: daughter    Pain/Comfort:  Pain Rating 1: 0/10  Pain Rating Post-Intervention 1: 0/10    Patients cultural, spiritual, Muslim conflicts given the current situation: no    Objective:     Communicated with: nurse prior to session.  Patient found up in chair with telemetry upon OT entry to room.    General Precautions: Standard, fall, aspiration  Orthopedic Precautions: N/A  Braces: N/A  Respiratory Status: Room air     Occupational Performance:    Functional Mobility/Transfers:  Patient  completed Sit <> Stand Transfer with modified independence  with  3 wheeled walker   Patient completed Toilet Transfer Step Transfer technique with modified independence with  3 wheeled walker  Functional Mobility: mod I in room with 3 wheeled walker    Activities of Daily Living:  Feeding:  modified independence .  Grooming: modified independence .  Lower Body Dressing: modified independence .  Toileting: modified independence .    Cognitive/Visual Perceptual:  Cognitive/Psychosocial Skills:     -       Oriented to: Person, Place, Time, and Situation   -       Follows Commands/attention:Follows multistep  commands  -       Communication: clear/fluent  -       Memory: No Deficits noted  -       Safety awareness/insight to disability: intact   -       Mood/Affect/Coping skills/emotional control: Appropriate to situation  Visual/Perceptual:      -Intact glasses    Physical Exam:  Balance:    -       sitting: good  standing; good  dynamic: fair plus-good-  Postural examination/scapula alignment:    -       Rounded shoulders  Dominant hand:    -       right  Upper Extremity Range of Motion:     -       Right Upper Extremity: WFL  -       Left Upper Extremity: WFL  Upper Extremity Strength:    -       Right Upper Extremity: WFL  -       Left Upper Extremity: WFL except L shld 3+/5   Strength:    -       Right Upper Extremity: WFL  -       Left Upper Extremity: WFL    AMPAC 6 Click ADL:  AMPAC Total Score: 24    Treatment & Education:  Purpose of OT and DC OT no needs, opt in agreement.    Patient left up in chair with all lines intact and call button in reach    GOALS:   Multidisciplinary Problems       Occupational Therapy Goals       Not on file              Multidisciplinary Problems (Resolved)          Problem: Occupational Therapy    Goal Priority Disciplines Outcome Interventions   Occupational Therapy Goal   (Resolved)     OT, PT/OT Met                        History:     Past Medical History:   Diagnosis Date     Abdominal hernia     Anemia     Dr Berkowitz     Angina pectoris     Aortic valve stenosis, moderate     Back pain     Cannon's esophagus     CAD (coronary artery disease)     Cataract     ou done    CHF (congestive heart failure)     EFx 35%, Dr. Spencer 13    Chronic combined systolic and diastolic heart failure 2019    Colitis     Compression fracture     Diverticulosis     Encounter for blood transfusion     GERD (gastroesophageal reflux disease)     Hyperlipidemia     Hypertension     Irritable bowel syndrome     Myocardial infarction     Osteoarthritis     lumbar DDD    Pacemaker     Pneumonia 2017    Raynaud disease     Rheumatoid arteritis     Rheumatoid arthritis     Shingles 2017    Sjogren syndrome     Ulcerative colitis          Past Surgical History:   Procedure Laterality Date    A-V CARDIAC PACEMAKER INSERTION Left 2021    Procedure: INSERTION, CARDIAC PACEMAKER, DUAL CHAMBER  ([employer]);  Surgeon: Tera Blair MD;  Location: [facility] CATH/EP LAB;  Service: Cardiology;  Laterality: Left;    APPENDECTOMY      BACK SURGERY      CARDIAC SURGERY      CABGx3 in     CATARACT EXTRACTION      ou od d 11-15-12//     SECTION, CLASSIC      x 2    CHOLECYSTECTOMY      CLOSURE OF WOUND Right 2022    Procedure: CLOSURE-WOUND;  Surgeon: Delvis Jackson MD;  Location: [facility] OR;  Service: ENT;  Laterality: Right;  NOSE AND CHIN    COLONOSCOPY  09/15/2011    Dr Anaya     COLONOSCOPY  01/10/2017    [facility] report sent to scanning    CORONARY ANGIOPLASTY      PCI x 1 in     CORONARY ARTERY BYPASS GRAFT      3 vessel    CORONARY ARTERY BYPASS GRAFT      CYSTOSCOPY N/A 6/3/2020    Procedure: CYSTOSCOPY;  Surgeon: Miryam Bender Jr., MD;  Location: [facility] OR;  Service: Urology;  Laterality: N/A;    eyes      rk ou    FRACTURE SURGERY  2016    Dr Guido left hip     FRACTURE SURGERY  2018    Right Hip    HARVESTING OF SKIN GRAFT  2022    Procedure: SURGICAL  PROCUREMENT, GRAFT, SKIN;  Surgeon: Delvis Jackson MD;  Location: [facility] OR;  Service: ENT;;  RIGHT CHEST    HYSTERECTOMY      tubal ligation, oopherectomy    INTRAMEDULLARY RODDING OF TROCHANTER OF FEMUR Right 10/8/2018    Procedure: INSERTION, INTRAMEDULLARY KELSI, FEMUR, TROCHANTER;  Surgeon: Valerio Luther MD;  Location: [facility] OR;  Service: Orthopedics;  Laterality: Right;    OOPHORECTOMY      SPINE SURGERY  8-    Silvino Mckeon    UPPER GASTROINTESTINAL ENDOSCOPY      Yag Capsulotomy Right 9/25/14       Time Tracking:     OT Date of Treatment: 02/06/23  OT Start Time: 1219  OT Stop Time: 1227  OT Total Time (min): 8 min    Billable Minutes:Evaluation 8  Total Time 8    2/6/2023

## 2023-02-06 NOTE — DISCHARGE SUMMARY
[facility]  Discharge Summary  Patient Name: Cheyanne Gomes MRN: [id number]   Patient Class: OP- Observation  Length of Stay: 0   Admission Date: 2/4/2023  7:51 PM Attending Physician: Alex Mccray MD   Primary Care Provider: Romeo Alas MD Face-to-Face encounter date: 02/06/2023   Chief Complaint: Shortness of Breath (Sob with dizziness x approx 1 week. )    Date of Discharge: 2/6/2023  Discharge Disposition:Home or Self Care    Condition: Stable       Reason for Hospitalization     Active Hospital Problems    Diagnosis    *Acute on chronic combined systolic and diastolic heart failure    Hyperlipidemia    Coronary artery disease of native artery of native heart with stable angina pectoris    Hypertension         Brief History of Present Illness    Cheyanne Gomes is a 87 y.o.  female who  has a past medical history of Abdominal hernia, Anemia, Angina pectoris, Aortic valve stenosis, moderate, Back pain, Cannon's esophagus, CAD (coronary artery disease), Cataract, CHF (congestive heart failure), Chronic combined systolic and diastolic heart failure (09/28/2019), Colitis, Compression fracture, Diverticulosis, Encounter for blood transfusion, GERD (gastroesophageal reflux disease), Hyperlipidemia, Hypertension, Irritable bowel syndrome, Myocardial infarction, Osteoarthritis, Pacemaker, Pneumonia (01/03/2017), Raynaud disease, Rheumatoid arteritis, Rheumatoid arthritis, Shingles (01/03/2017), Sjogren syndrome, and Ulcerative colitis.. The patient presented to [facility] on 2/4/2023 with a primary complaint of Shortness of Breath (Sob with dizziness x approx 1 week. )  .     For the full HPI please refer to the History & Physical from this admission.    Hospital Course By Problem with Pertinent Findings     Patient was observed in the hospital for congestive heart failure exacerbation due to non compliance. Patient was diuresed with IV diuretics with closer monitoring of  "electrolytes and I&O. Patient showed significant improvement in 24 hours. Patient IV diuretics were switched back to PO diuretics. The patient was discharged in the care of family/parents//wife, with discharge instructions were reviewed in written and verbal form. All pertinent questions were discussed and prescriptions were provided. The patient will follow up in 1 week or sooner as needed with the PCP and Cardiologist as discussed, and the patient is on board with the plan.      Patient was seen and examined on the date of discharge and determined to be suitable for discharge.    Physical Exam  BP (!) 146/66   Pulse 63   Temp 98.8 °F (37.1 °C)   Resp 20   Ht 4' 10" (1.473 m)   Wt 50.5 kg (111 lb 5.3 oz)   SpO2 95%   BMI 23.27 kg/m²   Vitals reviewed.    Constitutional: No distress.   HENT: Atraumatic.   Cardiovascular: Normal rate, regular rhythm and normal heart sounds.   Pulmonary/Chest: Effort normal. Clear to auscultation bilaterally. No wheezes.   Abdominal: Soft. Bowel sounds are normal. Exhibits no distension and no mass. No tenderness  Neurological: Alert.   Skin: Skin is warm and dry.     Following labs were Reviewed   Recent Labs   Lab 02/06/23  0342 02/06/23  0343   WBC  --  4.89   HGB  --  10.2*   HCT  --  33.8*   PLT  --  143*   CALCIUM 8.6*  --      --    K 4.2  --    CO2 32*  --      --    BUN 19  --    CREATININE 1.2  --      No results found for: POCTGLUCOSE     All labs within the past 24 hours have been reviewed    Microbiology Results (last 7 days)       ** No results found for the last 168 hours. **          X-Ray Chest AP Portable   Final Result      New minimal blunting of lateral right costophrenic angle suggesting trace right pleural effusion, without other significant change.         Electronically signed by: Lionel Balderas MD   Date:    02/05/2023   Time:    07:08          No results found. However, due to the size of the patient record, not all encounters were " searched. Please check Results Review for a complete set of results.      Consultants and Procedures   Consultants:  Consults (From admission, onward)          Status Ordering Provider     Inpatient consult to Registered Dietitian/Nutritionist  Once        Provider:  (Not yet assigned)    Acknowledged FERCHO MOBLEY     Inpatient consult to Cardiology  Once        Provider:  Mat Zarate MD    Acknowledged FERCHO MOBLEY     Inpatient consult to Hospitalist  Once        Provider:  Fercho Mobley MD    Acknowledged FERCHO MOBLEY            Procedures:   * No surgery found *     Discharge Information:   Diet:  Resume cardiac diet    Physical Activity:  Activity as tolerated    Instructions:  1. Take all medications as prescribed  2. Keep all follow-up appointments  3. Return to the hospital or call your primary care physicians if any worsening symptoms such as chest pain, shortness of breath occur.    Follow-Up Appointments:  Please call your primary care physician to schedule an appointment in 1 week time.    Pending laboratory work/Tests to be performed/followed by the Primary care Physician: None    The patient was discharged in the care of her parents//wife/family/caregiver, with discharge instructions were reviewed in written and verbal form. All pertinent questions were discussed and prescriptions were provided. The importance of making follow up appointments and compliance of medications has been stressed repeatedly. The patient will follow up in 1 week or sooner as needed with the PCP, and the patient is on board with the plan. Upon discharge, patient needs to be on following medications.    Discharge Medications:     Medication List        START taking these medications      metOLazone 2.5 MG tablet  Commonly known as: ZAROXOLYN  Take 1 tablet (2.5 mg total) by mouth once daily.  Start taking on: February 7, 2023            CHANGE how you take these medications      furosemide 80 MG tablet  Commonly  known as: LASIX  TAKE ONE TABLET BY MOUTH EVERY DAY  What changed: when to take this     * potassium chloride SA 10 MEQ tablet  Commonly known as: K-STARRKLOR-CON M  TAKE TWO TABLETS BY MOUTH EVERY MORNING AND 1 TABLET EVERY EVENING  What changed: See the new instructions.     * potassium chloride SA 10 MEQ tablet  Commonly known as: K-DUR,KLOR-CON M  What changed: Another medication with the same name was changed. Make sure you understand how and when to take each.     traMADoL 50 mg tablet  Commonly known as: ULTRAM  What changed: Another medication with the same name was removed. Continue taking this medication, and follow the directions you see here.           * This list has 2 medication(s) that are the same as other medications prescribed for you. Read the directions carefully, and ask your doctor or other care provider to review them with you.                CONTINUE taking these medications      amLODIPine 5 MG tablet  Commonly known as: NORVASC  TAKE 1 TABLET BY MOUTH 2 TIMES A DAY     aspirin 81 mg Tab     atorvastatin 40 MG tablet  Commonly known as: LIPITOR  Take 1 tablet (40 mg total) by mouth once daily.     biotin 5 mg Cap     CALCIUM-VITAMIN D ORAL     cholestyramine-aspartame 4 gram Pwpk  Commonly known as: QUESTRAN LIGHT  Start with one packet daily for three days, then if needed increase to 2 packets daily for three days, then if needed increase to 3 packets     CRANBERRY PLUS VITAMIN C ORAL     cyanocobalamin 1,000 mcg/mL injection     DEXILANT 60 mg capsule  Generic drug: dexlansoprazole     ENTRESTO  mg per tablet  Generic drug: sacubitriL-valsartan  TAKE ONE TABLET BY MOUTH TWICE DAILY     fish oil-omega-3 fatty acids 300-1,000 mg capsule     folic acid 1 MG tablet  Commonly known as: FOLVITE     gabapentin 100 MG capsule  Commonly known as: NEURONTIN  Take 2 capsules (200 mg total) by mouth every evening.     HYDROmorphone 2 MG tablet  Commonly known as: DILAUDID     isosorbide dinitrate  10 MG tablet  Commonly known as: ISORDIL  TAKE ONE TABLET BY MOUTH THREE TIMES DAILY     Lactobacillus acidophilus 10 billion cell Cap     magnesium oxide 400 mg (241.3 mg magnesium) tablet  Commonly known as: MAG-OX  TAKE 1 TABLET BY MOUTH 2 TIMES A DAY     megestroL 20 MG Tab  Commonly known as: MEGACE  TAKE 1 TABLET BY MOUTH EVERY DAY     * metoprolol succinate 25 MG 24 hr tablet  Commonly known as: TOPROL-XL     nitroGLYCERIN 0.4 MG/DOSE TL SPRY 400 mcg/spray spray  Commonly known as: NITROLINGUAL  Place 1 spray under the tongue every 5 (five) minutes as needed for Chest pain. PRN     ondansetron 4 MG tablet  Commonly known as: ZOFRAN     ONE-A-DAY 50 PLUS tablet  Generic drug: geriatric multivitamin-min     promethazine 25 MG tablet  Commonly known as: PHENERGAN  Take 1 tablet (25 mg total) by mouth 2 (two) times daily as needed for Nausea.     sertraline 50 MG tablet  Commonly known as: ZOLOFT  Take 2 tablets (100 mg total) by mouth once daily.     vitamin E 400 unit Tab     zinc gluconate 50 mg tablet           * This list has 1 medication(s) that are the same as other medications prescribed for you. Read the directions carefully, and ask your doctor or other care provider to review them with you.                STOP taking these medications      tiZANidine 2 MG tablet  Commonly known as: ZANAFLEX            ASK your doctor about these medications      fluticasone propionate 50 mcg/actuation nasal spray  Commonly known as: FLONASE  2 sprays by Each Nare route once daily.     * metoprolol succinate 25 MG 24 hr tablet  Commonly known as: TOPROL-XL  Take 1 tablet (25 mg total) by mouth As instructed. Take 1 and 1/2 tabs qam and 1 tab qpm           * This list has 1 medication(s) that are the same as other medications prescribed for you. Read the directions carefully, and ask your doctor or other care provider to review them with you.                   Where to Get Your Medications        These medications were  sent to [facility]  [address], [place] [address]      Hours: Mon-Fri, 8a-5:30p Phone: [phone]   metOLazone 2.5 MG tablet           I spent 30 minutes preparing the discharge including reviewing records from previous encounters, preparation of discharge summary, assessing and final examination of the patient, discharge medicine reconciliation, discussing plan of care, follow up and education and prescriptions.       Alex Mccray  [facility] Hospitalist  02/06/2023

## 2023-02-10 ENCOUNTER — PATIENT MESSAGE (OUTPATIENT)
Dept: CARDIOLOGY | Facility: CLINIC | Age: 88
End: 2023-02-10
Payer: MEDICARE

## 2023-02-13 NOTE — TELEPHONE ENCOUNTER
If patient is to continue taking Lasix, will need rx sent to the pharmacy for the correct dose.  EMR says she is taking 80mg.  [facility]

## 2023-02-16 ENCOUNTER — OFFICE VISIT (OUTPATIENT)
Dept: FAMILY MEDICINE | Facility: CLINIC | Age: 88
End: 2023-02-16
Attending: FAMILY MEDICINE
Payer: MEDICARE

## 2023-02-16 VITALS
TEMPERATURE: 98 F | OXYGEN SATURATION: 97 % | WEIGHT: 115.63 LBS | BODY MASS INDEX: 24.27 KG/M2 | RESPIRATION RATE: 17 BRPM | HEART RATE: 61 BPM | DIASTOLIC BLOOD PRESSURE: 67 MMHG | SYSTOLIC BLOOD PRESSURE: 147 MMHG | HEIGHT: 58 IN

## 2023-02-16 DIAGNOSIS — K13.21 LEUKOPLAKIA, TONGUE: ICD-10-CM

## 2023-02-16 DIAGNOSIS — D69.6 THROMBOCYTOPENIA, UNSPECIFIED: ICD-10-CM

## 2023-02-16 DIAGNOSIS — M05.759: ICD-10-CM

## 2023-02-16 DIAGNOSIS — R14.0 ABDOMINAL DISTENSION: ICD-10-CM

## 2023-02-16 DIAGNOSIS — I50.43 ACUTE ON CHRONIC COMBINED SYSTOLIC AND DIASTOLIC HEART FAILURE: Primary | ICD-10-CM

## 2023-02-16 DIAGNOSIS — L57.0 ACTINIC KERATOSIS: ICD-10-CM

## 2023-02-16 PROCEDURE — 99999 PR PBB SHADOW E&M-EST. PATIENT-LVL V: ICD-10-PCS | Mod: PBBFAC,,, | Performed by: FAMILY MEDICINE

## 2023-02-16 PROCEDURE — 99214 PR OFFICE/OUTPT VISIT, EST, LEVL IV, 30-39 MIN: ICD-10-PCS | Mod: S$PBB,,, | Performed by: FAMILY MEDICINE

## 2023-02-16 PROCEDURE — 99215 OFFICE O/P EST HI 40 MIN: CPT | Mod: PBBFAC,PN | Performed by: FAMILY MEDICINE

## 2023-02-16 PROCEDURE — 99999 PR PBB SHADOW E&M-EST. PATIENT-LVL V: CPT | Mod: PBBFAC,,, | Performed by: FAMILY MEDICINE

## 2023-02-16 PROCEDURE — 99214 OFFICE O/P EST MOD 30 MIN: CPT | Mod: S$PBB,,, | Performed by: FAMILY MEDICINE

## 2023-02-16 NOTE — PROGRESS NOTES
Subjective:       Patient ID: Cheyanne Gomes is a 87 y.o. female.    Chief Complaint: Hospital Follow Up (Pt states that she is here for a hospital follow up for CHF), Cough (Pt states that she has had a cough for 4 wks ), Otalgia (Pt states that she has some right outer ear pain with palpation and if she lays on that side ), and Mass (Patient has a white spot on her tip of her tongue that is causing her pain)    87-year-old female coming in follow-up from a brief hospitalization at [facility] when she presented with increasing shortness of breath on February 4th and was found to be in acute on chronic systolic and diastolic heart failure.  She apparently had not been taking her diuretic and was started on IV diuresis with rapid resolution of symptoms.  Cardiac enzymes were unremarkable and a chest x-ray showed only minimal pleural effusions.  The patient was stabilized switched over to p.o. diuretics and discharged on the 6th of February.  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  She has rheumatoid arthritis and Sjogren's syndrome.  She has chronic anemia with thrombocytopenia and is followed by Dr. Pal with elevated ferritin levels and periodic phlebotomies.  She has Barretts esophagus secondary to reflux and is on Dexilant.    Her big complaints right now are a lesion on the tip of her tongue that has been present for several weeks, a dry patch of scale on the tip of the right ear that is uncomfortable when sleeping.  She sleeps on her right side and she is had some abdominal bloating with no change in bowel movements no blood in the stool and no melanotic stool.  She does not drink carbonated beverages but she does take a fiber supplement to try to compensate  for opioids that she takes for her arthritis.  On questioning she has use simethicone in the past and did find that it helped and does not recall why she stopped using it.  She has never to her knowledge use Amitiza or Linzess.    Past Medical History:  No date: Abdominal hernia  No date: Anemia      Comment:  Dr Berkowitz   No date: Angina pectoris  No date: Aortic valve stenosis, moderate  No date: Back pain  No date: Cannon's esophagus  No date: CAD (coronary artery disease)  No date: Cataract      Comment:  ou done  No date: CHF (congestive heart failure)      Comment:  EFx 35%, Dr. 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  No date: Rheumatoid arthritis  2017: Shingles  No date: Sjogren syndrome  No date: Ulcerative colitis    Past Surgical History:  2021: A-V CARDIAC PACEMAKER INSERTION; Left      Comment:  Procedure: INSERTION, CARDIAC PACEMAKER, DUAL CHAMBER                 ([employer]);  Surgeon: Tera Blair MD;                 Location: [facility] CATH/EP LAB;  Service: Cardiology;                 Laterality: Left;  No date: APPENDECTOMY  No date: BACK SURGERY  No date: CARDIAC SURGERY      Comment:  CABGx3 in   No date: CATARACT EXTRACTION      Comment:  ou od d 11-15-12//  No date:  SECTION, CLASSIC      Comment:  x 2  No date: CHOLECYSTECTOMY  2022: CLOSURE OF WOUND; Right      Comment:  Procedure: CLOSURE-WOUND;  Surgeon: Delvis Jackson MD;  Location: [facility] OR;  Service: ENT;  Laterality:                 Right;  NOSE AND CHIN  09/15/2011: COLONOSCOPY      Comment:  Dr Anaya   01/10/2017: COLONOSCOPY      Comment:  [facility] report sent to scanning  No date: CORONARY ANGIOPLASTY      Comment:  PCI x 1 in 2007  No date: CORONARY ARTERY BYPASS GRAFT      Comment:  3 vessel  No date: CORONARY ARTERY BYPASS GRAFT  6/3/2020: CYSTOSCOPY; N/A      Comment:  Procedure: CYSTOSCOPY;  Surgeon: Miryam Bender Jr., MD;               Location: [facility] OR;  Service: Urology;  Laterality: N/A;  No date: eyes      Comment:  paul ou  06/21/2016: FRACTURE SURGERY      Comment:  Dr Guido left hip   2018: FRACTURE SURGERY      Comment:  Right Hip  9/23/2022: HARVESTING OF SKIN GRAFT      Comment:  Procedure: SURGICAL PROCUREMENT, GRAFT, SKIN;  Surgeon:                Delvis Jackson MD;  Location: [facility] OR;  Service: ENT;;               RIGHT CHEST  No date: HYSTERECTOMY      Comment:  tubal ligation, oopherectomy  10/8/2018: INTRAMEDULLARY RODDING OF TROCHANTER OF FEMUR; Right      Comment:  Procedure: INSERTION, INTRAMEDULLARY KELSI, FEMUR,                TROCHANTER;  Surgeon: Valerio Luther MD;  Location: [facility] OR;               Service: Orthopedics;  Laterality: Right;  No date: OOPHORECTOMY  8-: SPINE SURGERY      Comment:  Silvino Mike  No date: UPPER GASTROINTESTINAL ENDOSCOPY  9/25/14: Yag Capsulotomy; Right    Current Outpatient Medications on File Prior to Visit:  amLODIPine (NORVASC) 5 MG tablet, TAKE 1 TABLET BY MOUTH 2 TIMES A DAY (Patient taking differently: Take 5 mg by mouth 2 (two) times daily.), Disp: 60 tablet, Rfl: 5  aspirin 81 mg Tab, Take 1 tablet by mouth every evening. Every day, Disp: , Rfl:   atorvastatin (LIPITOR) 40 MG tablet, Take 1 tablet (40 mg total) by mouth once daily., Disp: 90 tablet, Rfl: 3  biotin 5 mg Cap, Take 1 tablet by mouth once daily., Disp: , Rfl:   CALCIUM CARB/VIT D3/MINERALS (CALCIUM-VITAMIN D ORAL), Take 600 mg by mouth 2 (two) times daily. Calcium 1200 with D 3 1000, Disp: , Rfl:    cholestyramine-aspartame (QUESTRAN LIGHT) 4 gram PwPk, Start with one packet daily for three days, then if needed increase to 2 packets daily for three days, then if needed increase to 3 packets, Disp: 60 packet, Rfl: 5  CRANBERRY CONC/ASCORBIC ACID (CRANBERRY PLUS VITAMIN C ORAL), Take 1 capsule by mouth once daily., Disp: , Rfl:   cyanocobalamin 1,000 mcg/mL injection, Inject 1,000 mcg into the muscle every 30 days., Disp: , Rfl:   DEXILANT 60 mg capsule, Take 60 mg by mouth once daily. , Disp: , Rfl: 11  ENTRESTO  mg per tablet, TAKE ONE TABLET BY MOUTH TWICE DAILY (Patient taking differently: Take 1 tablet by mouth 2 (two) times daily.), Disp: 60 tablet, Rfl: 4  fluticasone (FLONASE) 50 mcg/actuation nasal spray, 2 sprays by Each Nare route once daily. (Patient taking differently: 2 sprays by Each Nostril route daily as needed.), Disp: 16 g, Rfl: 0  folic acid (FOLVITE) 1 MG tablet, Take 2 mg by mouth once daily. , Disp: , Rfl:   furosemide (LASIX) 80 MG tablet, TAKE ONE TABLET BY MOUTH EVERY DAY (Patient taking differently: Take 80 mg by mouth once daily.), Disp: 30 tablet, Rfl: 4  gabapentin (NEURONTIN) 100 MG capsule, Take 2 capsules (200 mg total) by mouth every evening., Disp: 60 capsule, Rfl: 5  HYDROmorphone (DILAUDID) 2 MG tablet, Take 2 mg by mouth every 4 (four) hours as needed for Pain., Disp: , Rfl:   isosorbide dinitrate (ISORDIL) 10 MG tablet, TAKE ONE TABLET BY MOUTH THREE TIMES DAILY (Patient taking differently: Take 10 mg by mouth 3 (three) times daily.), Disp: 100 tablet, Rfl: 2  Lactobacillus acidophilus 10 billion cell Cap, Take 1 each by mouth once daily. 1.5 billion, Disp: , Rfl:   magnesium oxide (MAG-OX) 400 mg (241.3 mg magnesium) tablet, TAKE 1 TABLET BY MOUTH 2 TIMES A DAY (Patient taking differently: Take 400 mg by mouth 2 (two) times daily.), Disp: 120 tablet, Rfl: 2  metOLazone (ZAROXOLYN) 2.5 MG tablet, Take 1 tablet (2.5 mg total) by mouth once daily., Disp: 30 tablet,  Rfl: 0  metoprolol succinate (TOPROL-XL) 25 MG 24 hr tablet, Take 1 tablet (25 mg total) by mouth As instructed. Take 1 and 1/2 tabs qam and 1 tab qpm (Patient taking differently: Take 25 mg by mouth once daily. Take 1 and 1/2 tabs qam and 1 tab qpm), Disp: 200 tablet, Rfl: 2  metoprolol succinate (TOPROL-XL) 25 MG 24 hr tablet, Take 12.5 mg by mouth nightly. 25 mg QAM and 12.5 mg QHS, Disp: , Rfl:   MULTIVITAMIN WITH MINERALS (ONE-A-DAY 50 PLUS) Tab, Take 1 tablet by mouth once daily. Every day, Disp: , Rfl:   nitroGLYCERIN 0.4 MG/DOSE TL SPRY (NITROLINGUAL) 400 mcg/spray spray, Place 1 spray under the tongue every 5 (five) minutes as needed for Chest pain. PRN, Disp: 4.9 g, Rfl: 3  omega-3 fatty acids/fish oil (FISH OIL-OMEGA-3 FATTY ACIDS) 300-1,000 mg capsule, Take 1 capsule by mouth once daily., Disp: , Rfl:   ondansetron (ZOFRAN) 4 MG tablet, Take 4 mg by mouth every 8 (eight) hours as needed for Nausea. , Disp: , Rfl: 2  potassium chloride SA (K-DUR,KLOR-CON M) 10 MEQ tablet, TAKE TWO TABLETS BY MOUTH EVERY MORNING AND 1 TABLET EVERY EVENING (Patient taking differently: Take 20 mEq by mouth every morning. 20 meq QAM and 10 meq QPM), Disp: 270 tablet, Rfl: 2  potassium chloride SA (K-DUR,KLOR-CON M) 10 MEQ tablet, Take 10 mEq by mouth nightly. 20 meq QAM and 10 meq QPM, Disp: , Rfl:   promethazine (PHENERGAN) 25 MG tablet, Take 1 tablet (25 mg total) by mouth 2 (two) times daily as needed for Nausea., Disp: 60 tablet, Rfl: 1  sertraline (ZOLOFT) 50 MG tablet, Take 2 tablets (100 mg total) by mouth once daily., Disp: 180 tablet, Rfl: 2  traMADoL (ULTRAM) 50 mg tablet, Take 50 mg by mouth 2 (two) times daily as needed for Pain., Disp: , Rfl:   vitamin E 400 unit Tab, Take 400 mg by mouth once daily. Every day, Disp: , Rfl:   megestroL (MEGACE) 20 MG Tab, TAKE 1 TABLET BY MOUTH EVERY DAY (Patient not taking: Reported on 2/16/2023.), Disp: 90 tablet, Rfl: 0  zinc gluconate 50 mg tablet, Take 50 mg by mouth once  daily., Disp: , Rfl:   [DISCONTINUED] hydrochlorothiazide (HYDRODIURIL) 25 MG tablet, Take 25 mg by mouth once daily., Disp: , Rfl:     No current facility-administered medications on file prior to visit.        Review of Systems   Constitutional:  Negative for chills, diaphoresis and fever.   HENT:  Positive for mouth sores (White keratotic area on the tip of the tongue).    Respiratory:  Negative for chest tightness and shortness of breath.    Cardiovascular:  Negative for chest pain, palpitations and leg swelling.   Gastrointestinal:  Positive for abdominal distention. Negative for abdominal pain, blood in stool, constipation, diarrhea and nausea.   Musculoskeletal:  Positive for arthralgias.   Skin:  Positive for wound (Hyperkeratotic area on the upper portion of the ear on the right).     Objective:      Physical Exam  Vitals and nursing note reviewed.   Constitutional:       General: She is not in acute distress.     Appearance: Normal appearance. She is normal weight. She is not ill-appearing, toxic-appearing or diaphoretic.      Comments: Mildly elevated systolic blood pressure   Normal weight with a BMI of 24.2 she is up 7.9 lb from her last visit with me October 3, 2022   HENT:      Head: Normocephalic and atraumatic.      Nose: No congestion or rhinorrhea.      Mouth/Throat:      Comments: Small slightly verrucous keratotic area white in color on the very tip of the tongue  Eyes:      General: No scleral icterus.     Extraocular Movements: Extraocular movements intact.      Pupils: Pupils are equal, round, and reactive to light.   Neck:      Vascular: No carotid bruit.   Cardiovascular:      Rate and Rhythm: Normal rate and regular rhythm.      Heart sounds: Murmur (Moderately high pitched 2/6 holosystolic decrescendo murmur from mid sternum radiating towards the carotids to the right) heard.     No friction rub. No gallop.   Abdominal:      General: Abdomen is protuberant. Bowel sounds are increased.  There is distension.      Palpations: There is no mass.      Tenderness: There is no abdominal tenderness. There is no rebound.      Hernia: No hernia is present.   Musculoskeletal:      Cervical back: Normal range of motion. No rigidity or tenderness.      Right lower leg: No edema.      Left lower leg: No edema.   Lymphadenopathy:      Cervical: No cervical adenopathy.   Neurological:      Mental Status: She is alert.       Assessment:       1. Acute on chronic combined systolic and diastolic heart failure    2. Leukoplakia, tongue    3. Abdominal distension    4. Actinic keratosis    5. Thrombocytopenia, unspecified    6. Rheumatoid arthritis involving hip with positive rheumatoid factor, unspecified laterality    7. BMI 24.0-24.9, adult          Plan:       1. Acute on chronic combined systolic and diastolic heart failure  Resolved, reviewed chest x-ray from hospitalization with patient showing minimal pleural effusions    2. Leukoplakia, tongue  ENT evaluation for possible biopsy  - Ambulatory referral/consult to ENT; Future    3. Abdominal distension  Recommend she try some simethicone with each meal.  If that does not take care of the problem consider using some Linzess but she is already using Questran light for diarrhea.    4. Actinic keratosis  Patient will go to see Dr. Bartlett    5. Thrombocytopenia, unspecified  Follow-up with Dr. Pal    6. Rheumatoid arthritis involving hip with positive rheumatoid factor, unspecified laterality  No changes    7. BMI 24.0-24.9, adult  Good weight no changes needed

## 2023-02-20 ENCOUNTER — OUTPATIENT CASE MANAGEMENT (OUTPATIENT)
Dept: ADMINISTRATIVE | Facility: OTHER | Age: 88
End: 2023-02-20
Payer: MEDICARE

## 2023-02-20 NOTE — LETTER
February 22, 2023    Cheyanne Gomes  [address] [place] [id number]             [facility] [address] [place] [address] Dear Ms. Gomes:      I work with [facility] Outpatient Case Management Department. We received a referral to call you to discuss your medical history.These services are free of charge and are offered to [facility] patients who have recently been discharged from any of our facilities or who have complex medical conditions that may require the skill of a nurse to assist with management.         .      I attempted to reach you by telephone, but I was unsuccessful. Please call our department so that we can go over some questions with you regarding your health.    The Outpatient Case Management Department can be reached at [phone] from 8:00AM to 4:30 PM on Monday thru Friday. [facility] also has a program where a nurse is available 24/7 to answer questions or provide medical advice, their number is [phone].    Thanks,  Mayuri Archuleta RN   Outpatient Care Management

## 2023-02-20 NOTE — PROGRESS NOTES
02/20/23 Attempt assessment with patient/caregiver. No answer. Left message requesting call back. RN OPCM  first assessment attempt.   02/22/23 Attempt assessment with patient/caregiver. No answer. Left message requesting call back. RN OPCM  second assessment attempt. Portal message sent   02/23/23 Attempt assessment with patient/caregiver. No answer. Left message requesting call back. RN OPCM  third assessment attempt. Case closure

## 2023-02-22 ENCOUNTER — PES CALL (OUTPATIENT)
Dept: ADMINISTRATIVE | Facility: CLINIC | Age: 88
End: 2023-02-22
Payer: MEDICARE

## 2023-02-22 ENCOUNTER — PATIENT MESSAGE (OUTPATIENT)
Dept: ADMINISTRATIVE | Facility: OTHER | Age: 88
End: 2023-02-22
Payer: MEDICARE

## 2023-02-28 ENCOUNTER — EXTERNAL CHRONIC CARE MANAGEMENT (OUTPATIENT)
Dept: PRIMARY CARE CLINIC | Facility: CLINIC | Age: 88
End: 2023-02-28
Payer: MEDICARE

## 2023-02-28 PROCEDURE — 99439 CHRNC CARE MGMT STAF EA ADDL: CPT | Mod: PBBFAC,27,PN | Performed by: FAMILY MEDICINE

## 2023-02-28 PROCEDURE — 99490 CHRNC CARE MGMT STAFF 1ST 20: CPT | Mod: PBBFAC,PN | Performed by: FAMILY MEDICINE

## 2023-02-28 PROCEDURE — 99439 PR CHRONIC CARE MGMT, EA ADDTL 20 MIN: ICD-10-PCS | Mod: S$PBB,,, | Performed by: FAMILY MEDICINE

## 2023-02-28 PROCEDURE — 99439 CHRNC CARE MGMT STAF EA ADDL: CPT | Mod: S$PBB,,, | Performed by: FAMILY MEDICINE

## 2023-02-28 PROCEDURE — 99490 PR CHRONIC CARE MGMT, 1ST 20 MIN: ICD-10-PCS | Mod: S$PBB,,, | Performed by: FAMILY MEDICINE

## 2023-02-28 PROCEDURE — 99490 CHRNC CARE MGMT STAFF 1ST 20: CPT | Mod: S$PBB,,, | Performed by: FAMILY MEDICINE

## 2023-03-02 ENCOUNTER — OFFICE VISIT (OUTPATIENT)
Dept: CARDIOLOGY | Facility: CLINIC | Age: 88
End: 2023-03-02
Payer: MEDICARE

## 2023-03-02 VITALS
WEIGHT: 111.75 LBS | OXYGEN SATURATION: 92 % | SYSTOLIC BLOOD PRESSURE: 122 MMHG | BODY MASS INDEX: 23.46 KG/M2 | DIASTOLIC BLOOD PRESSURE: 60 MMHG | HEIGHT: 58 IN | HEART RATE: 87 BPM

## 2023-03-02 DIAGNOSIS — I50.9 CONGESTIVE HEART FAILURE, UNSPECIFIED HF CHRONICITY, UNSPECIFIED HEART FAILURE TYPE: Primary | ICD-10-CM

## 2023-03-02 DIAGNOSIS — I50.42 CHRONIC COMBINED SYSTOLIC AND DIASTOLIC HEART FAILURE: ICD-10-CM

## 2023-03-02 DIAGNOSIS — I49.5 SICK SINUS SYNDROME: ICD-10-CM

## 2023-03-02 DIAGNOSIS — I47.10 PSVT (PAROXYSMAL SUPRAVENTRICULAR TACHYCARDIA): ICD-10-CM

## 2023-03-02 DIAGNOSIS — Z95.0 HISTORY OF CARDIAC PACEMAKER IN SITU: ICD-10-CM

## 2023-03-02 DIAGNOSIS — I35.0 AORTIC VALVE STENOSIS, MODERATE: ICD-10-CM

## 2023-03-02 PROCEDURE — 99999 PR PBB SHADOW E&M-EST. PATIENT-LVL V: CPT | Mod: PBBFAC,,, | Performed by: INTERNAL MEDICINE

## 2023-03-02 PROCEDURE — 99214 PR OFFICE/OUTPT VISIT, EST, LEVL IV, 30-39 MIN: ICD-10-PCS | Mod: S$PBB,,, | Performed by: INTERNAL MEDICINE

## 2023-03-02 PROCEDURE — 99214 OFFICE O/P EST MOD 30 MIN: CPT | Mod: S$PBB,,, | Performed by: INTERNAL MEDICINE

## 2023-03-02 PROCEDURE — 99999 PR PBB SHADOW E&M-EST. PATIENT-LVL V: ICD-10-PCS | Mod: PBBFAC,,, | Performed by: INTERNAL MEDICINE

## 2023-03-02 PROCEDURE — 99215 OFFICE O/P EST HI 40 MIN: CPT | Mod: PBBFAC,PN | Performed by: INTERNAL MEDICINE

## 2023-03-02 RX ORDER — METOPROLOL SUCCINATE 25 MG/1
12.5 TABLET, EXTENDED RELEASE ORAL NIGHTLY
Qty: 45 TABLET | Refills: 11 | Status: SHIPPED | OUTPATIENT
Start: 2023-03-02 | End: 2023-03-03 | Stop reason: SDUPTHER

## 2023-03-02 RX ORDER — METOLAZONE 2.5 MG/1
2.5 TABLET ORAL DAILY
Qty: 30 TABLET | Refills: 0 | Status: SHIPPED | OUTPATIENT
Start: 2023-03-02 | End: 2023-05-11

## 2023-03-02 NOTE — PROGRESS NOTES
Patient ID:  Cheyanne Gomes is a 87 y.o. female who presents for follow-up of Congestive Heart Failure, Fatigue, Coronary Artery Disease, and Hospital Follow Up      She was recently admitted to [facility] in heart failure.  She was diuresed with IV diuretics.  She was placed on Zaroxolyn 2.5 mg p.o. q.a.m..  In general she is been doing better her breathing has improved.  She denies having any abdominal swelling.      Past Medical History:   Diagnosis Date    Abdominal hernia     Anemia     Dr Berkowitz     Angina pectoris     Aortic valve stenosis, moderate     Back pain     Cannon's esophagus     CAD (coronary artery disease)     Cataract     ou done    CHF (congestive heart failure)     EFx 35%, Dr. Spencer 13    Chronic combined systolic and diastolic heart failure 2019    Colitis     Compression fracture     Diverticulosis     Encounter for blood transfusion     GERD (gastroesophageal reflux disease)     Hyperlipidemia     Hypertension     Irritable bowel syndrome     Myocardial infarction     Osteoarthritis     lumbar DDD    Pacemaker     Pneumonia 2017    Raynaud disease     Rheumatoid arteritis     Rheumatoid arthritis     Shingles 2017    Sjogren syndrome     Ulcerative colitis         Past Surgical History:   Procedure Laterality Date    A-V CARDIAC PACEMAKER INSERTION Left 2021    Procedure: INSERTION, CARDIAC PACEMAKER, DUAL CHAMBER  ([employer]TRONIC);  Surgeon: Tera Blair MD;  Location: [facility] CATH/EP LAB;  Service: Cardiology;  Laterality: Left;    APPENDECTOMY      BACK SURGERY      CARDIAC SURGERY      CABGx3 in     CATARACT EXTRACTION      ou od d 11-15-12//     SECTION, CLASSIC      x 2    CHOLECYSTECTOMY      CLOSURE OF WOUND Right 2022    Procedure: CLOSURE-WOUND;  Surgeon: Delvis Jackson MD;  Location: [facility] OR;  Service: ENT;  Laterality: Right;  NOSE AND CHIN    COLONOSCOPY  09/15/2011    Dr Anaya     COLONOSCOPY   01/10/2017    [facility] report sent to scanning    CORONARY ANGIOPLASTY      PCI x 1 in 2007    CORONARY ARTERY BYPASS GRAFT      3 vessel    CORONARY ARTERY BYPASS GRAFT      CYSTOSCOPY N/A 6/3/2020    Procedure: CYSTOSCOPY;  Surgeon: Miryam Bender Jr., MD;  Location: [facility] OR;  Service: Urology;  Laterality: N/A;    eyes      rk ou    FRACTURE SURGERY  06/21/2016    Dr Guido left hip     FRACTURE SURGERY  2018    Right Hip    HARVESTING OF SKIN GRAFT  9/23/2022    Procedure: SURGICAL PROCUREMENT, GRAFT, SKIN;  Surgeon: Delvis Jackson MD;  Location: [facility] OR;  Service: ENT;;  RIGHT CHEST    HYSTERECTOMY      tubal ligation, oopherectomy    INTRAMEDULLARY RODDING OF TROCHANTER OF FEMUR Right 10/8/2018    Procedure: INSERTION, INTRAMEDULLARY KELSI, FEMUR, TROCHANTER;  Surgeon: Valerio Luther MD;  Location: [facility] OR;  Service: Orthopedics;  Laterality: Right;    OOPHORECTOMY      SPINE SURGERY  8-    Silvino Mckeon    UPPER GASTROINTESTINAL ENDOSCOPY      Yag Capsulotomy Right 9/25/14          Current Outpatient Medications   Medication Instructions    amLODIPine (NORVASC) 5 MG tablet TAKE 1 TABLET BY MOUTH 2 TIMES A DAY    aspirin 81 mg Tab 1 tablet, Oral, Nightly, Every day    atorvastatin (LIPITOR) 40 mg, Oral, Daily    biotin 5 mg Cap 1 tablet, Oral, Daily    CALCIUM CARB/VIT D3/MINERALS (CALCIUM-VITAMIN D ORAL) 600 mg, Oral, 2 times daily, Calcium 1200 with D 3 1000    CRANBERRY CONC/ASCORBIC ACID (CRANBERRY PLUS VITAMIN C ORAL) 1 capsule, Oral, Daily    cyanocobalamin 1,000 mcg, Intramuscular, Every 30 days    DEXILANT 60 mg, Oral, Daily    ENTRESTO  mg per tablet TAKE ONE TABLET BY MOUTH TWICE DAILY    fluticasone (FLONASE) 50 mcg/actuation nasal spray 2 sprays, Each Nostril, Daily    folic acid (FOLVITE) 2 mg, Oral, Daily    furosemide (LASIX) 80 MG tablet TAKE ONE TABLET BY MOUTH EVERY DAY    gabapentin (NEURONTIN) 200 mg, Oral, Nightly    HYDROmorphone (DILAUDID) 2 mg, Oral, Every 4 hours PRN    isosorbide  "dinitrate (ISORDIL) 10 MG tablet TAKE ONE TABLET BY MOUTH THREE TIMES DAILY    Lactobacillus acidophilus 10 billion cell Cap 1 each, Oral, Daily, 1.5 billion    magnesium oxide (MAG-OX) 400 mg (241.3 mg magnesium) tablet TAKE 1 TABLET BY MOUTH 2 TIMES A DAY    metOLazone (ZAROXOLYN) 2.5 mg, Oral, Daily    metoprolol succinate (TOPROL-XL) 12.5 mg, Oral, Nightly, 25 mg QAM and 12.5 mg QHS    MULTIVITAMIN WITH MINERALS (ONE-A-DAY 50 PLUS) Tab 1 tablet, Oral, Daily, Every day    nitroGLYCERIN 0.4 MG/DOSE TL SPRY (NITROLINGUAL) 400 mcg/spray spray 1 spray, Sublingual, Every 5 min PRN, PRN    omega-3 fatty acids/fish oil (FISH OIL-OMEGA-3 FATTY ACIDS) 300-1,000 mg capsule 1 capsule, Oral, Daily    ondansetron (ZOFRAN) 4 mg, Oral, Every 8 hours PRN    potassium chloride SA (K-DUR,KLOR-CON M) 10 MEQ tablet TAKE TWO TABLETS BY MOUTH EVERY MORNING AND 1 TABLET EVERY EVENING    promethazine (PHENERGAN) 25 mg, Oral, 2 times daily PRN    sertraline (ZOLOFT) 100 mg, Oral, Daily    traMADoL (ULTRAM) 50 mg, Oral, 2 times daily PRN    vitamin E 400 mg, Oral, Daily, Every day        Review of patient's allergies indicates:   Allergen Reactions    Adhesive     Nitrofurantoin macrocrystalline Nausea And Vomiting     Other reaction(s): very sickly    Penicillins Swelling     Other reaction(s): swelling  Other reaction(s): red skin discolorati    Sulfa (sulfonamide antibiotics) Nausea And Vomiting     Other reaction(s): very sickly        Review of Systems   Cardiovascular:  Positive for dyspnea on exertion. Negative for chest pain and leg swelling.   Respiratory:  Positive for shortness of breath. Negative for cough.       Objective:     Vitals:    03/02/23 1323   BP: 122/60   BP Location: Left arm   Patient Position: Sitting   BP Method: Medium (Manual)   Pulse: 87   SpO2: (!) 92%   Weight: 50.7 kg (111 lb 12.4 oz)   Height: 4' 10" (1.473 m)       Physical Exam  Vitals and nursing note reviewed.   Constitutional:       Appearance: She " is well-developed.   HENT:      Head: Normocephalic and atraumatic.   Eyes:      Conjunctiva/sclera: Conjunctivae normal.   Cardiovascular:      Rate and Rhythm: Normal rate and regular rhythm.      Heart sounds: Murmur (Grade 3/6 systolic murmur at the base) heard.   Pulmonary:      Effort: Pulmonary effort is normal.      Breath sounds: Normal breath sounds.   Abdominal:      General: Bowel sounds are normal.      Palpations: Abdomen is soft.   Musculoskeletal:         General: Normal range of motion.   Skin:     General: Skin is warm and dry.   Neurological:      Mental Status: She is alert and oriented to person, place, and time.   Psychiatric:         Behavior: Behavior normal.         Thought Content: Thought content normal.         Judgment: Judgment normal.     CMP  Sodium   Date Value Ref Range Status   02/06/2023 142 136 - 145 mmol/L Final   12/11/2018 139 134 - 144 mmol/L      Potassium   Date Value Ref Range Status   02/06/2023 4.2 3.5 - 5.1 mmol/L Final     Chloride   Date Value Ref Range Status   02/06/2023 101 95 - 110 mmol/L Final   12/11/2018 96 (L) 98 - 110 mmol/L      CO2   Date Value Ref Range Status   02/06/2023 32 (H) 23 - 29 mmol/L Final     Glucose   Date Value Ref Range Status   02/06/2023 95 70 - 110 mg/dL Final   12/11/2018 97 70 - 99 mg/dL      BUN   Date Value Ref Range Status   02/06/2023 19 8 - 23 mg/dL Final     Creatinine   Date Value Ref Range Status   02/06/2023 1.2 0.5 - 1.4 mg/dL Final   12/11/2018 0.95 0.60 - 1.40 mg/dL      Calcium   Date Value Ref Range Status   02/06/2023 8.6 (L) 8.7 - 10.5 mg/dL Final     Total Protein   Date Value Ref Range Status   02/04/2023 7.5 6.0 - 8.4 g/dL Final     Albumin   Date Value Ref Range Status   02/04/2023 3.9 3.5 - 5.2 g/dL Final   12/11/2018 3.9 3.1 - 4.7 g/dL      Total Bilirubin   Date Value Ref Range Status   02/04/2023 0.5 0.1 - 1.0 mg/dL Final     Comment:     For infants and newborns, interpretation of results should be based  on  gestational age, weight and in agreement with clinical  observations.    Premature Infant recommended reference ranges:  Up to 24 hours.............<8.0 mg/dL  Up to 48 hours............<12.0 mg/dL  3-5 days..................<15.0 mg/dL  6-29 days.................<15.0 mg/dL       Alkaline Phosphatase   Date Value Ref Range Status   02/04/2023 77 55 - 135 U/L Final     AST   Date Value Ref Range Status   02/04/2023 35 10 - 40 U/L Final     ALT   Date Value Ref Range Status   02/04/2023 33 10 - 44 U/L Final     Anion Gap   Date Value Ref Range Status   02/06/2023 9 8 - 16 mmol/L Final     eGFR if    Date Value Ref Range Status   11/05/2021 >60.0 >60 mL/min/1.73 m^2 Final     eGFR if non    Date Value Ref Range Status   02/21/2022 56 (L) >59 mL/min/1.73 Final      BMP  Lab Results   Component Value Date     02/06/2023    K 4.2 02/06/2023     02/06/2023    CO2 32 (H) 02/06/2023    BUN 19 02/06/2023    CREATININE 1.2 02/06/2023    CALCIUM 8.6 (L) 02/06/2023    ANIONGAP 9 02/06/2023    ESTGFRAFRICA >60.0 11/05/2021    EGFRNONAA 56 (L) 02/21/2022      BNP  @LABRCNTIP(BNP,BNPTRIAGEBLO)@   Lab Results   Component Value Date    CHOL 146 10/04/2022    CHOL 113 05/06/2021    CHOL 135 11/24/2020     Lab Results   Component Value Date    HDL 52 10/04/2022    HDL 47 05/06/2021    HDL 61 11/24/2020     Lab Results   Component Value Date    LDLCALC 75 10/04/2022    LDLCALC 51 05/06/2021    LDLCALC 55 11/24/2020     Lab Results   Component Value Date    TRIG 104 10/04/2022    TRIG 70 05/06/2021    TRIG 108 11/24/2020     Lab Results   Component Value Date    CHOLHDL 26.2 08/24/2020    CHOLHDL 45.0 10/16/2013    CHOLHDL 40.7 08/11/2009      Lab Results   Component Value Date    TSH 2.105 08/22/2022    FREET4 0.89 09/26/2012     Lab Results   Component Value Date    HGBA1C 5.3 02/26/2015     Lab Results   Component Value Date    WBC 4.89 02/06/2023    HGB 10.2 (L) 02/06/2023    HCT 33.8 (L)  02/06/2023    MCV 94 02/06/2023     (L) 02/06/2023         Results for orders placed during the hospital encounter of 04/28/22    Echo    Interpretation Summary  · The left ventricle is normal in size with concentric remodeling and mildly decreased systolic function.  · The estimated ejection fraction is 40%.  · Grade I left ventricular diastolic dysfunction.  · There are segmental left ventricular wall motion abnormalities.  · Apical akinesis  · Normal right ventricular size with normal right ventricular systolic function.  · Mild aortic regurgitation.  · There is moderate aortic valve stenosis.  · Aortic valve area is 1.15 cm2; peak velocity is 2.12 m/s; mean gradient is 18 mmHg.  · Mild tricuspid regurgitation.  · Normal central venous pressure (3 mmHg).  · The estimated PA systolic pressure is 24 mmHg.  · Overall the study quality was technically difficult.     No results found for this or any previous visit.         Assessment:       Chronic combined systolic and diastolic heart failure  Improved    History of cardiac pacemaker in situ  Functioning adequately    Hypertension  Controlled on current medications    Aortic valve stenosis, moderate  Stable       Plan:       Continue Zaroxolyn 2.5 mg p.o. daily.  Obtain a BMP as well as a BNP in approximately 3 weeks  She will be seen in the office in 1 month.

## 2023-03-03 ENCOUNTER — PATIENT MESSAGE (OUTPATIENT)
Dept: OTOLARYNGOLOGY | Facility: CLINIC | Age: 88
End: 2023-03-03
Payer: MEDICARE

## 2023-03-03 RX ORDER — METOPROLOL SUCCINATE 25 MG/1
25 TABLET, EXTENDED RELEASE ORAL SEE ADMIN INSTRUCTIONS
Qty: 45 TABLET | Refills: 11 | Status: SHIPPED | OUTPATIENT
Start: 2023-03-03 | End: 2024-01-11 | Stop reason: SDUPTHER

## 2023-03-03 NOTE — TELEPHONE ENCOUNTER
----- Message from Volodymyr Mosley sent at 3/3/2023 11:04 AM CST -----  Regarding: med question  Type:  Pharmacy Calling to Clarify an RX    Name of Caller:  Denae    Pharmacy Name:    [facility] [address] [address]  Phone: [phone] Fax: [phone]    Prescription Name:    metoprolol succinate (TOPROL-XL) 25 MG 24 hr tablet 45 tablet 11 3/2/2023    Sig - Route: Take 0.5 tablets (12.5 mg total) by mouth nightly. 25 mg QAM and 12.5 mg QHS - Oral   Sent to pharmacy as: metoprolol succinate (TOPROL-XL) 25 MG 24 hr tablet   Notes to Pharmacy: .   E-Prescribing Status: Receipt confirmed by pharmacy (3/2/2023  1:51 PM CST)       What do they need to clarify?:  directions    Best Call Back Number:  [phone]    Additional Information:

## 2023-03-07 ENCOUNTER — PATIENT MESSAGE (OUTPATIENT)
Dept: CARDIOLOGY | Facility: CLINIC | Age: 88
End: 2023-03-07
Payer: MEDICARE

## 2023-03-09 DIAGNOSIS — D64.9 ANEMIA: ICD-10-CM

## 2023-03-09 DIAGNOSIS — N32.1 COLO-VESICAL FISTULA: Primary | ICD-10-CM

## 2023-03-09 DIAGNOSIS — Q45.3 PANCREATIC ABNORMALITY: ICD-10-CM

## 2023-03-09 DIAGNOSIS — R15.9 FECES INCONTINENCE: ICD-10-CM

## 2023-03-09 DIAGNOSIS — K21.9 GERD (GASTROESOPHAGEAL REFLUX DISEASE): ICD-10-CM

## 2023-03-09 DIAGNOSIS — K22.70 BARRETT'S ESOPHAGUS: ICD-10-CM

## 2023-03-16 ENCOUNTER — LAB VISIT (OUTPATIENT)
Dept: LAB | Facility: HOSPITAL | Age: 88
End: 2023-03-16
Attending: INTERNAL MEDICINE
Payer: MEDICARE

## 2023-03-16 DIAGNOSIS — R79.89 ELEVATED FERRITIN: ICD-10-CM

## 2023-03-16 DIAGNOSIS — Z01.818 PREPROCEDURAL EXAMINATION: Primary | ICD-10-CM

## 2023-03-16 LAB
BASOPHILS # BLD AUTO: 0.04 K/UL (ref 0–0.2)
BASOPHILS NFR BLD: 0.6 % (ref 0–1.9)
CREAT SERPL-MCNC: 1 MG/DL (ref 0.5–1.4)
DIFFERENTIAL METHOD: ABNORMAL
EOSINOPHIL # BLD AUTO: 0.2 K/UL (ref 0–0.5)
EOSINOPHIL NFR BLD: 3.1 % (ref 0–8)
ERYTHROCYTE [DISTWIDTH] IN BLOOD BY AUTOMATED COUNT: 14.4 % (ref 11.5–14.5)
EST. GFR  (NO RACE VARIABLE): 54.5 ML/MIN/1.73 M^2
FERRITIN SERPL-MCNC: 593 NG/ML (ref 20–300)
HCT VFR BLD AUTO: 34.1 % (ref 37–48.5)
HGB BLD-MCNC: 10.5 G/DL (ref 12–16)
IMM GRANULOCYTES # BLD AUTO: 0.03 K/UL (ref 0–0.04)
IMM GRANULOCYTES NFR BLD AUTO: 0.5 % (ref 0–0.5)
LYMPHOCYTES # BLD AUTO: 1.4 K/UL (ref 1–4.8)
LYMPHOCYTES NFR BLD: 20.8 % (ref 18–48)
MCH RBC QN AUTO: 28.8 PG (ref 27–31)
MCHC RBC AUTO-ENTMCNC: 30.8 G/DL (ref 32–36)
MCV RBC AUTO: 94 FL (ref 82–98)
MONOCYTES # BLD AUTO: 0.5 K/UL (ref 0.3–1)
MONOCYTES NFR BLD: 8.3 % (ref 4–15)
NEUTROPHILS # BLD AUTO: 4.3 K/UL (ref 1.8–7.7)
NEUTROPHILS NFR BLD: 66.7 % (ref 38–73)
NRBC BLD-RTO: 0 /100 WBC
PLATELET # BLD AUTO: 129 K/UL (ref 150–450)
PMV BLD AUTO: 9.8 FL (ref 9.2–12.9)
RBC # BLD AUTO: 3.64 M/UL (ref 4–5.4)
WBC # BLD AUTO: 6.49 K/UL (ref 3.9–12.7)

## 2023-03-16 PROCEDURE — 82728 ASSAY OF FERRITIN: CPT | Performed by: INTERNAL MEDICINE

## 2023-03-16 PROCEDURE — 36415 COLL VENOUS BLD VENIPUNCTURE: CPT | Performed by: INTERNAL MEDICINE

## 2023-03-16 PROCEDURE — 82565 ASSAY OF CREATININE: CPT | Performed by: INTERNAL MEDICINE

## 2023-03-16 PROCEDURE — 85025 COMPLETE CBC W/AUTO DIFF WBC: CPT | Performed by: INTERNAL MEDICINE

## 2023-03-17 ENCOUNTER — HOSPITAL ENCOUNTER (OUTPATIENT)
Dept: RADIOLOGY | Facility: HOSPITAL | Age: 88
Discharge: HOME | End: 2023-03-17
Attending: INTERNAL MEDICINE
Payer: MEDICARE

## 2023-03-17 DIAGNOSIS — Q45.3 PANCREATIC ABNORMALITY: ICD-10-CM

## 2023-03-17 DIAGNOSIS — K21.9 GERD (GASTROESOPHAGEAL REFLUX DISEASE): ICD-10-CM

## 2023-03-17 DIAGNOSIS — K22.70 BARRETT'S ESOPHAGUS: ICD-10-CM

## 2023-03-17 DIAGNOSIS — R15.9 FECES INCONTINENCE: ICD-10-CM

## 2023-03-17 DIAGNOSIS — D64.9 ANEMIA: ICD-10-CM

## 2023-03-17 DIAGNOSIS — N32.1 COLO-VESICAL FISTULA: ICD-10-CM

## 2023-03-17 PROCEDURE — 74177 CT ABD & PELVIS W/CONTRAST: CPT | Mod: TC

## 2023-03-17 PROCEDURE — 25500020 PHARM REV CODE 255: Performed by: INTERNAL MEDICINE

## 2023-03-17 RX ADMIN — IOHEXOL 100 ML: 350 INJECTION, SOLUTION INTRAVENOUS at 09:03

## 2023-03-24 ENCOUNTER — PATIENT MESSAGE (OUTPATIENT)
Dept: UROLOGY | Facility: CLINIC | Age: 88
End: 2023-03-24
Payer: MEDICARE

## 2023-03-28 LAB
BASOPHILS # BLD AUTO: 0 X10E3/UL (ref 0–0.2)
BASOPHILS NFR BLD AUTO: 1 %
BNP SERPL-MCNC: 278.8 PG/ML (ref 0–100)
BUN SERPL-MCNC: 31 MG/DL (ref 8–27)
BUN/CREAT SERPL: 28 (ref 12–28)
CALCIUM SERPL-MCNC: 9.5 MG/DL (ref 8.7–10.3)
CHLORIDE SERPL-SCNC: 96 MMOL/L (ref 96–106)
CO2 SERPL-SCNC: 31 MMOL/L (ref 20–29)
CREAT SERPL-MCNC: 1.1 MG/DL (ref 0.57–1)
EOSINOPHIL # BLD AUTO: 0.4 X10E3/UL (ref 0–0.4)
EOSINOPHIL NFR BLD AUTO: 8 %
ERYTHROCYTE [DISTWIDTH] IN BLOOD BY AUTOMATED COUNT: 13.5 % (ref 11.7–15.4)
EST. GFR  (NO RACE VARIABLE): 49 ML/MIN/1.73
FERRITIN SERPL-MCNC: 871 NG/ML (ref 15–150)
GLUCOSE SERPL-MCNC: 96 MG/DL (ref 70–99)
HCT VFR BLD AUTO: 33.1 % (ref 34–46.6)
HGB BLD-MCNC: 10.3 G/DL (ref 11.1–15.9)
IMM GRANULOCYTES # BLD AUTO: 0 X10E3/UL (ref 0–0.1)
IMM GRANULOCYTES NFR BLD AUTO: 0 %
LYMPHOCYTES # BLD AUTO: 1 X10E3/UL (ref 0.7–3.1)
LYMPHOCYTES NFR BLD AUTO: 20 %
MCH RBC QN AUTO: 28.4 PG (ref 26.6–33)
MCHC RBC AUTO-ENTMCNC: 31.1 G/DL (ref 31.5–35.7)
MCV RBC AUTO: 91 FL (ref 79–97)
MONOCYTES # BLD AUTO: 0.6 X10E3/UL (ref 0.1–0.9)
MONOCYTES NFR BLD AUTO: 11 %
NEUTROPHILS # BLD AUTO: 3.2 X10E3/UL (ref 1.4–7)
NEUTROPHILS NFR BLD AUTO: 60 %
PLATELET # BLD AUTO: 145 X10E3/UL (ref 150–450)
POTASSIUM SERPL-SCNC: 4.6 MMOL/L (ref 3.5–5.2)
RBC # BLD AUTO: 3.63 X10E6/UL (ref 3.77–5.28)
SODIUM SERPL-SCNC: 140 MMOL/L (ref 134–144)
WBC # BLD AUTO: 5.2 X10E3/UL (ref 3.4–10.8)

## 2023-03-28 RX ORDER — SACUBITRIL AND VALSARTAN 97; 103 MG/1; MG/1
1 TABLET, FILM COATED ORAL 2 TIMES DAILY
Qty: 60 TABLET | Refills: 4 | Status: SHIPPED | OUTPATIENT
Start: 2023-03-28 | End: 2023-09-13

## 2023-03-30 ENCOUNTER — OFFICE VISIT (OUTPATIENT)
Dept: HEMATOLOGY/ONCOLOGY | Facility: CLINIC | Age: 88
End: 2023-03-30
Payer: MEDICARE

## 2023-03-30 VITALS
BODY MASS INDEX: 24.14 KG/M2 | TEMPERATURE: 98 F | SYSTOLIC BLOOD PRESSURE: 150 MMHG | RESPIRATION RATE: 20 BRPM | HEIGHT: 58 IN | HEART RATE: 86 BPM | WEIGHT: 115 LBS | DIASTOLIC BLOOD PRESSURE: 68 MMHG

## 2023-03-30 DIAGNOSIS — D63.8 ANEMIA OF CHRONIC DISEASE: Chronic | ICD-10-CM

## 2023-03-30 DIAGNOSIS — R79.89 ELEVATED FERRITIN: ICD-10-CM

## 2023-03-30 PROCEDURE — 99213 PR OFFICE/OUTPT VISIT, EST, LEVL III, 20-29 MIN: ICD-10-PCS | Mod: S$GLB,,, | Performed by: INTERNAL MEDICINE

## 2023-03-30 PROCEDURE — 99213 OFFICE O/P EST LOW 20 MIN: CPT | Mod: S$GLB,,, | Performed by: INTERNAL MEDICINE

## 2023-03-30 NOTE — ASSESSMENT & PLAN NOTE
Patient is doing ok and still has anemia but it is stable.  She has been getting phlebotomy and has had 3 done.  She has been too low to get this but if above 10g/dl then would continue.  Will continue to see her on a six month basis and discussed this today.

## 2023-03-30 NOTE — PROGRESS NOTES
PROGRESS NOTE    Subjective:       Patient ID: Cheyanne Gomes is a 87 y.o. female.    Chief Complaint:  No chief complaint on file.    anemia, iron overload and chf follow up.     History of Present Illness:   Cheyanne Gomes is a 87 y.o. female who presents with a history of anemia of chronic disease.    Patient feeling ok.  Just had skin cancer removed.     Date:  Ferritin  4/23/20 79  6/8/2021 1957  7/9/2021 1600  8/10/2021 1212  3/31/2022 1601  3/16/2023 593  3/27/2023 871-Hb 10.3    Patient had 2 units of blood at [facility] in March 2021.  She was given 3 iv iron infusions after that.  She is feeling well at this time.  No new complaints.  No bleeding, no melena.      Injectafer given May 2019        EF: 45%          Family and Social history reviewed and is unchanged from 8/9/2016.       ROS:  Review of Systems   Constitutional:  Positive for appetite change. Negative for fever and unexpected weight change.   HENT:  Negative for mouth sores.    Eyes:  Negative for visual disturbance.   Respiratory:  Negative for cough and shortness of breath.    Cardiovascular:  Negative for chest pain and leg swelling.   Gastrointestinal:  Positive for diarrhea. Negative for abdominal pain and blood in stool.   Genitourinary:  Positive for frequency. Negative for hematuria.   Musculoskeletal:  Negative for back pain.   Skin:  Negative for rash.   Neurological:  Negative for headaches.   Hematological:  Negative for adenopathy.   Psychiatric/Behavioral:  The patient is not nervous/anxious.         Current Outpatient Medications:     amLODIPine (NORVASC) 5 MG tablet, TAKE 1 TABLET BY MOUTH 2 TIMES A DAY (Patient taking differently: Take 5 mg by mouth 2 (two) times daily.), Disp: 60 tablet, Rfl: 5    aspirin 81 mg Tab, Take 1 tablet by mouth every evening. Every day, Disp: , Rfl:     atorvastatin (LIPITOR) 40 MG tablet, Take 1 tablet (40 mg total) by mouth  once daily., Disp: 90 tablet, Rfl: 3    biotin 5 mg Cap, Take 1 tablet by mouth once daily., Disp: , Rfl:     CALCIUM CARB/VIT D3/MINERALS (CALCIUM-VITAMIN D ORAL), Take 600 mg by mouth 2 (two) times daily. Calcium 1200 with D 3 1000, Disp: , Rfl:     CRANBERRY CONC/ASCORBIC ACID (CRANBERRY PLUS VITAMIN C ORAL), Take 1 capsule by mouth once daily., Disp: , Rfl:     DEXILANT 60 mg capsule, Take 60 mg by mouth once daily. , Disp: , Rfl: 11    fluticasone (FLONASE) 50 mcg/actuation nasal spray, 2 sprays by Each Nare route once daily. (Patient taking differently: 2 sprays by Each Nostril route daily as needed.), Disp: 16 g, Rfl: 0    folic acid (FOLVITE) 1 MG tablet, Take 2 mg by mouth once daily. , Disp: , Rfl:     furosemide (LASIX) 80 MG tablet, Take 1 tablet (80 mg total) by mouth once daily., Disp: 90 tablet, Rfl: 3    gabapentin (NEURONTIN) 100 MG capsule, Take 2 capsules (200 mg total) by mouth every evening., Disp: 60 capsule, Rfl: 5    HYDROmorphone (DILAUDID) 2 MG tablet, Take 2 mg by mouth every 4 (four) hours as needed for Pain., Disp: , Rfl:     isosorbide dinitrate (ISORDIL) 10 MG tablet, TAKE ONE TABLET BY MOUTH THREE TIMES DAILY (Patient taking differently: Take 10 mg by mouth 3 (three) times daily.), Disp: 100 tablet, Rfl: 2    Lactobacillus acidophilus 10 billion cell Cap, Take 1 each by mouth once daily. 1.5 billion, Disp: , Rfl:     magnesium oxide (MAG-OX) 400 mg (241.3 mg magnesium) tablet, TAKE 1 TABLET BY MOUTH 2 TIMES A DAY (Patient taking differently: Take 400 mg by mouth 2 (two) times daily.), Disp: 120 tablet, Rfl: 2    metOLazone (ZAROXOLYN) 2.5 MG tablet, Take 1 tablet (2.5 mg total) by mouth once daily., Disp: 30 tablet, Rfl: 0    metoprolol succinate (TOPROL-XL) 25 MG 24 hr tablet, Take 1 tablet (25 mg total) by mouth As instructed. 25 mg QAM and 12.5 mg QHS, Disp: 45 tablet, Rfl: 11    MULTIVITAMIN WITH MINERALS (ONE-A-DAY 50 PLUS) Tab, Take 1 tablet by mouth once daily. Every day, Disp:  ", Rfl:     nitroGLYCERIN 0.4 MG/DOSE TL SPRY (NITROLINGUAL) 400 mcg/spray spray, Place 1 spray under the tongue every 5 (five) minutes as needed for Chest pain. PRN, Disp: 4.9 g, Rfl: 3    omega-3 fatty acids/fish oil (FISH OIL-OMEGA-3 FATTY ACIDS) 300-1,000 mg capsule, Take 1 capsule by mouth once daily., Disp: , Rfl:     ondansetron (ZOFRAN) 4 MG tablet, Take 4 mg by mouth every 8 (eight) hours as needed for Nausea. , Disp: , Rfl: 2    potassium chloride SA (K-DUR,KLOR-CON M) 10 MEQ tablet, TAKE TWO TABLETS BY MOUTH EVERY MORNING AND 1 TABLET EVERY EVENING (Patient taking differently: Take 20 mEq by mouth every morning. 20 meq QAM and 10 meq QPM), Disp: 270 tablet, Rfl: 2    promethazine (PHENERGAN) 25 MG tablet, Take 1 tablet (25 mg total) by mouth 2 (two) times daily as needed for Nausea., Disp: 60 tablet, Rfl: 1    sacubitriL-valsartan (ENTRESTO)  mg per tablet, Take 1 tablet by mouth 2 (two) times daily., Disp: 60 tablet, Rfl: 4    sertraline (ZOLOFT) 50 MG tablet, Take 2 tablets (100 mg total) by mouth once daily., Disp: 180 tablet, Rfl: 2    traMADoL (ULTRAM) 50 mg tablet, Take 50 mg by mouth 2 (two) times daily as needed for Pain., Disp: , Rfl:     vitamin E 400 unit Tab, Take 400 mg by mouth once daily. Every day, Disp: , Rfl:     cyanocobalamin 1,000 mcg/mL injection, Inject 1,000 mcg into the muscle every 30 days., Disp: , Rfl:         Objective:       Physical Examination:     BP (!) 150/68   Pulse 86   Temp 97.8 °F (36.6 °C)   Resp 20   Ht 4' 10" (1.473 m)   Wt 52.2 kg (115 lb)   LMP  (LMP Unknown)   BMI 24.04 kg/m²     Physical Exam  Constitutional:       Appearance: She is well-developed.   HENT:      Head: Normocephalic and atraumatic.      Right Ear: External ear normal.      Left Ear: External ear normal.   Eyes:      Conjunctiva/sclera: Conjunctivae normal.      Pupils: Pupils are equal, round, and reactive to light.   Neck:      Thyroid: No thyromegaly.      Trachea: No tracheal " deviation.   Cardiovascular:      Rate and Rhythm: Normal rate and regular rhythm.      Heart sounds: Normal heart sounds.   Pulmonary:      Effort: Pulmonary effort is normal.      Breath sounds: Normal breath sounds.   Abdominal:      General: Bowel sounds are normal. There is no distension.      Palpations: Abdomen is soft. There is no mass.      Tenderness: There is no abdominal tenderness.   Skin:     Findings: No rash.   Neurological:      Comments: Neuro intact througout   Psychiatric:         Behavior: Behavior normal.         Thought Content: Thought content normal.         Judgment: Judgment normal.       Labs:   Recent Results (from the past 336 hour(s))   CBC Auto Differential    Collection Time: 03/27/23  7:25 AM   Result Value Ref Range    WBC 5.2 3.4 - 10.8 x10E3/uL    Hemoglobin 10.3 (L) 11.1 - 15.9 g/dL    Hematocrit 33.1 (L) 34.0 - 46.6 %    Platelets 145 (L) 150 - 450 x10E3/uL     CMP  Sodium   Date Value Ref Range Status   03/27/2023 140 134 - 144 mmol/L Final   12/11/2018 139 134 - 144 mmol/L      Potassium   Date Value Ref Range Status   03/27/2023 4.6 3.5 - 5.2 mmol/L Final     Chloride   Date Value Ref Range Status   03/27/2023 96 96 - 106 mmol/L Final   12/11/2018 96 (L) 98 - 110 mmol/L      CO2   Date Value Ref Range Status   03/27/2023 31 (H) 20 - 29 mmol/L Final     Glucose   Date Value Ref Range Status   03/27/2023 96 70 - 99 mg/dL Final   12/11/2018 97 70 - 99 mg/dL      BUN   Date Value Ref Range Status   03/27/2023 31 (H) 8 - 27 mg/dL Final     Creatinine   Date Value Ref Range Status   03/27/2023 1.10 (H) 0.57 - 1.00 mg/dL Final   12/11/2018 0.95 0.60 - 1.40 mg/dL      Calcium   Date Value Ref Range Status   03/27/2023 9.5 8.7 - 10.3 mg/dL Final     Total Protein   Date Value Ref Range Status   02/04/2023 7.5 6.0 - 8.4 g/dL Final     Albumin   Date Value Ref Range Status   02/04/2023 3.9 3.5 - 5.2 g/dL Final   12/11/2018 3.9 3.1 - 4.7 g/dL      Total Bilirubin   Date Value Ref Range  Status   02/04/2023 0.5 0.1 - 1.0 mg/dL Final     Comment:     For infants and newborns, interpretation of results should be based  on gestational age, weight and in agreement with clinical  observations.    Premature Infant recommended reference ranges:  Up to 24 hours.............<8.0 mg/dL  Up to 48 hours............<12.0 mg/dL  3-5 days..................<15.0 mg/dL  6-29 days.................<15.0 mg/dL       Alkaline Phosphatase   Date Value Ref Range Status   02/04/2023 77 55 - 135 U/L Final     AST   Date Value Ref Range Status   02/04/2023 35 10 - 40 U/L Final     ALT   Date Value Ref Range Status   02/04/2023 33 10 - 44 U/L Final     Anion Gap   Date Value Ref Range Status   02/06/2023 9 8 - 16 mmol/L Final     eGFR if    Date Value Ref Range Status   11/05/2021 >60.0 >60 mL/min/1.73 m^2 Final     eGFR if non    Date Value Ref Range Status   02/21/2022 56 (L) >59 mL/min/1.73 Final     No results found for: CEA  No results found for: PSA        Assessment/Plan:   Anemia of chronic disease  Patient is doing ok and still has anemia but it is stable.  She has been getting phlebotomy and has had 3 done.  She has been too low to get this but if above 10g/dl then would continue.  Will continue to see her on a six month basis and discussed this today.     Elevated ferritin  Her ferritin went down to 593 and is now 871.  Will continue phlebotomy as she tolerates this.      Discussion:     Follow up in about 6 months (around 9/30/2023).      Electronically signed by David Robb

## 2023-03-31 ENCOUNTER — EXTERNAL CHRONIC CARE MANAGEMENT (OUTPATIENT)
Dept: PRIMARY CARE CLINIC | Facility: CLINIC | Age: 88
End: 2023-03-31
Payer: MEDICARE

## 2023-03-31 ENCOUNTER — OFFICE VISIT (OUTPATIENT)
Dept: UROLOGY | Facility: CLINIC | Age: 88
End: 2023-03-31
Payer: MEDICARE

## 2023-03-31 VITALS
BODY MASS INDEX: 24.14 KG/M2 | SYSTOLIC BLOOD PRESSURE: 142 MMHG | HEART RATE: 76 BPM | WEIGHT: 115 LBS | DIASTOLIC BLOOD PRESSURE: 69 MMHG | HEIGHT: 58 IN

## 2023-03-31 DIAGNOSIS — N39.41 URGE INCONTINENCE: Primary | ICD-10-CM

## 2023-03-31 LAB
BACTERIA #/AREA URNS HPF: NORMAL /HPF
MICROSCOPIC COMMENT: NORMAL
RBC #/AREA URNS HPF: 0 /HPF (ref 0–4)
SQUAMOUS #/AREA URNS HPF: 0 /HPF
WBC #/AREA URNS HPF: 0 /HPF (ref 0–5)

## 2023-03-31 PROCEDURE — 99214 OFFICE O/P EST MOD 30 MIN: CPT | Mod: S$PBB,25,, | Performed by: NURSE PRACTITIONER

## 2023-03-31 PROCEDURE — 99213 OFFICE O/P EST LOW 20 MIN: CPT | Mod: PBBFAC,PN,25 | Performed by: NURSE PRACTITIONER

## 2023-03-31 PROCEDURE — 51701 INSERT BLADDER CATHETER: CPT | Mod: PBBFAC,PN | Performed by: NURSE PRACTITIONER

## 2023-03-31 PROCEDURE — 99999 PR PBB SHADOW E&M-EST. PATIENT-LVL III: CPT | Mod: PBBFAC,,, | Performed by: NURSE PRACTITIONER

## 2023-03-31 PROCEDURE — 99490 CHRNC CARE MGMT STAFF 1ST 20: CPT | Mod: PBBFAC,PN | Performed by: FAMILY MEDICINE

## 2023-03-31 PROCEDURE — 99999 PR PBB SHADOW E&M-EST. PATIENT-LVL III: ICD-10-PCS | Mod: PBBFAC,,, | Performed by: NURSE PRACTITIONER

## 2023-03-31 PROCEDURE — 99490 PR CHRONIC CARE MGMT, 1ST 20 MIN: ICD-10-PCS | Mod: S$PBB,,, | Performed by: FAMILY MEDICINE

## 2023-03-31 PROCEDURE — 87086 URINE CULTURE/COLONY COUNT: CPT | Performed by: NURSE PRACTITIONER

## 2023-03-31 PROCEDURE — 51701 PR INSERTION OF NON-INDWELLING BLADDER CATHETERIZATION FOR RESIDUAL UR: ICD-10-PCS | Mod: S$PBB,,, | Performed by: NURSE PRACTITIONER

## 2023-03-31 PROCEDURE — 99490 CHRNC CARE MGMT STAFF 1ST 20: CPT | Mod: S$PBB,,, | Performed by: FAMILY MEDICINE

## 2023-03-31 PROCEDURE — 81000 URINALYSIS NONAUTO W/SCOPE: CPT | Performed by: NURSE PRACTITIONER

## 2023-03-31 PROCEDURE — 51701 INSERT BLADDER CATHETER: CPT | Mod: S$PBB,,, | Performed by: NURSE PRACTITIONER

## 2023-03-31 PROCEDURE — 99214 PR OFFICE/OUTPT VISIT, EST, LEVL IV, 30-39 MIN: ICD-10-PCS | Mod: S$PBB,25,, | Performed by: NURSE PRACTITIONER

## 2023-03-31 NOTE — PROGRESS NOTES
CHIEF COMPLAINT:    Mrs Gomes is a 87 y.o. female presenting for stool in urine.  PRESENTING ILLNESS:    Cheyanne Gomes is a 87 y.o. female who presents for stool in urine. Last clinic visit was 12/4/20 with Dr. Bender    History with Dr. Bender  Patient with a history of multiple medical problems who presented with intermittent gross hematuria starting on 4/23/20 associated with no pain.      She underwent CT urogram which showed polycystic kidneys and asymmetric renal perfusion - left greater than right. She denies any flank pain.        Interval History     12/4/20: She is s/p cystoscopy on 6/3/20 which revealed mild to moderate bladder trabeculations. Otherwise unremarkable.       Renal ultrasound on 12/1/20 to monitor cysts revealed polycystic kidneys with the majority simple. One Bosniak 2F right renal mid-pole cyst and one questionably complex lesion at the upper pole of the left kidney without a CT correlate. Recommend continuing follow up with ultrasound or CT. No hematuria since her previous episode in 4/2020.      Patient denies any fever, chills, dysuria, urinary tract infection, bone pain or recent  trauma.     3/31/23  Patient presents to clinic today for stool in urine. Patient reports she had a few times when she wiped that she had stool in her urine. Occurred 3 months ago. She states no stool noticed in urine stream or in toilet with urine but only when she wiped. She does have fecal incontinence and urge incontinence, which is baseline. She wears depends at night and has noticed stool in depends when she wakes. She is starting PT pelvic floor for incontinence next week. Denies dysuria, gross hematuria, flank pain, fever, chills, nausea or vomiting.   Denies recurrent UTI. Last UTI was 11/28/21, klebsiella.    Pt follows Dr. Seals with GI and had negative exam per patient.        3/17/23 CT abd pelvis with contrast  Kidneys: The kidneys enhance symmetrically with moderate cortical thinning on  the right. There are bilateral renal cysts, right greater than left. There is a 4 mm nonobstructing right renal stone.. There is no hydronephrosis.  The uterus is absent. The bladder is normal.     Urine culture  Klebsiella   11/28/21  No growth   6/21/21  No growth                    8/24/20  No growth                    5/19/20     Urine cytology  Negative                      5/19/20      REVIEW OF SYSTEMS:    Review of Systems    Constitutional: Negative for fever and chills.   HENT: Negative for hearing loss.   Eyes: Negative for visual disturbance.   Respiratory: Negative for shortness of breath.   Cardiovascular: Negative for chest pain.   Gastrointestinal: Negative for nausea, vomiting.   Genitourinary:  See above  Neurological: Negative for dizziness.   Hematological: Does not bruise/bleed easily.   Psychiatric/Behavioral: Negative for confusion.     PATIENT HISTORY:    Past Medical History:   Diagnosis Date    Abdominal hernia     Anemia     Dr Berkowitz     Angina pectoris     Aortic valve stenosis, moderate     Back pain     Cannon's esophagus     CAD (coronary artery disease)     Cataract     ou done    CHF (congestive heart failure)     EFx 35%, Dr. Spencer 12/19/13    Chronic combined systolic and diastolic heart failure 09/28/2019    Colitis     Compression fracture     Diverticulosis     Encounter for blood transfusion     GERD (gastroesophageal reflux disease)     Hyperlipidemia     Hypertension     Irritable bowel syndrome     Myocardial infarction     Osteoarthritis     lumbar DDD    Pacemaker     Pneumonia 01/03/2017    Raynaud disease     Rheumatoid arteritis     Rheumatoid arthritis     Shingles 01/03/2017    Sjogren syndrome     Ulcerative colitis        Past Surgical History:   Procedure Laterality Date    A-V CARDIAC PACEMAKER INSERTION Left 11/8/2021    Procedure: INSERTION, CARDIAC PACEMAKER, DUAL CHAMBER  ([employer]);  Surgeon: Tera Blair MD;  Location: [facility] CATH/EP LAB;   Service: Cardiology;  Laterality: Left;    APPENDECTOMY      BACK SURGERY      CARDIAC SURGERY      CABGx3 in     CATARACT EXTRACTION      ou od d 11-15-12/     SECTION, CLASSIC      x 2    CHOLECYSTECTOMY      CLOSURE OF WOUND Right 2022    Procedure: CLOSURE-WOUND;  Surgeon: Delvis Jackson MD;  Location: [facility] OR;  Service: ENT;  Laterality: Right;  NOSE AND CHIN    COLONOSCOPY  09/15/2011    Dr Anaya     COLONOSCOPY  01/10/2017    [facility] report sent to scanning    CORONARY ANGIOPLASTY      PCI x 1 in     CORONARY ARTERY BYPASS GRAFT      3 vessel    CORONARY ARTERY BYPASS GRAFT      CYSTOSCOPY N/A 6/3/2020    Procedure: CYSTOSCOPY;  Surgeon: Miryam Bender Jr., MD;  Location: [facility] OR;  Service: Urology;  Laterality: N/A;    eyes      rk ou    FRACTURE SURGERY  2016    Dr Guido left hip     FRACTURE SURGERY  2018    Right Hip    HARVESTING OF SKIN GRAFT  2022    Procedure: SURGICAL PROCUREMENT, GRAFT, SKIN;  Surgeon: Delvis Jackson MD;  Location: [facility] OR;  Service: ENT;;  RIGHT CHEST    HYSTERECTOMY      tubal ligation, oopherectomy    INTRAMEDULLARY RODDING OF TROCHANTER OF FEMUR Right 10/8/2018    Procedure: INSERTION, INTRAMEDULLARY KELSI, FEMUR, TROCHANTER;  Surgeon: Valerio Luther MD;  Location: [facility] OR;  Service: Orthopedics;  Laterality: Right;    OOPHORECTOMY      SPINE SURGERY  2013    Silvino Mckeon    UPPER GASTROINTESTINAL ENDOSCOPY      Yag Capsulotomy Right 14       Family History   Adopted: Yes   Problem Relation Age of Onset    Heart disease Mother         aortic stenosis    Skin cancer Mother     Hypertension Father     Heart disease Father         MI    Hypertension Sister     Fibromyalgia Sister     Scleroderma Sister     Pulmonary embolism Sister     Irritable bowel syndrome Sister     Heart disease Brother         2 brothers CABG    Arthritis Brother     Crohn's disease Brother     Crohn's disease Brother     Crohn's disease Brother     Hypertension  Daughter     Early death Son         accident    Lupus Cousin     Lupus Cousin     Amblyopia Neg Hx     Blindness Neg Hx     Cancer Neg Hx     Cataracts Neg Hx     Diabetes Neg Hx     Glaucoma Neg Hx     Macular degeneration Neg Hx     Retinal detachment Neg Hx     Strabismus Neg Hx     Stroke Neg Hx     Thyroid disease Neg Hx     Celiac disease Neg Hx     Cirrhosis Neg Hx     Psoriasis Neg Hx     Melanoma Neg Hx     Multiple sclerosis Neg Hx     Rheum arthritis Neg Hx     Tuberculosis Neg Hx     Lymphoma Neg Hx     Ulcerative colitis Neg Hx     Moses's disease Neg Hx     Stomach cancer Neg Hx     Rectal cancer Neg Hx     Liver disease Neg Hx     Liver cancer Neg Hx     Inflammatory bowel disease Neg Hx     Hemochromatosis Neg Hx     Esophageal cancer Neg Hx     Cystic fibrosis Neg Hx     Colon cancer Neg Hx        Social History     Socioeconomic History    Marital status:     Number of children: 2    Highest education level: 12th grade   Occupational History    Occupation: retired   Tobacco Use    Smoking status: Former     Packs/day: 1.00     Years: 5.00     Pack years: 5.00     Types: Cigarettes     Quit date: 1971     Years since quittin.2    Smokeless tobacco: Never   Substance and Sexual Activity    Alcohol use: Yes     Alcohol/week: 1.0 standard drink     Types: 1 Glasses of wine per week    Drug use: Never    Sexual activity: Not Currently     Partners: Male     Social Determinants of Health     Financial Resource Strain: Low Risk     Difficulty of Paying Living Expenses: Not very hard   Food Insecurity: No Food Insecurity    Worried About Running Out of Food in the Last Year: Never true    Ran Out of Food in the Last Year: Never true   Transportation Needs: No Transportation Needs    Lack of Transportation (Medical): No    Lack of Transportation (Non-Medical): No   Physical Activity: Inactive    Days of Exercise per Week: 0 days    Minutes of Exercise per Session: 0 min   Stress: Stress  Concern Present    Feeling of Stress : To some extent   Social Connections: Moderately Integrated    Frequency of Communication with Friends and Family: More than three times a week    Frequency of Social Gatherings with Friends and Family: More than three times a week    Attends [employer] Services: 1 to 4 times per year    Active Member of Clubs or Organizations: No    Attends Club or Organization Meetings: 1 to 4 times per year    Marital Status:    Housing Stability: Unknown    Unable to Pay for Housing in the Last Year: No    Unstable Housing in the Last Year: No       Allergies:  Adhesive, Nitrofurantoin macrocrystalline, Penicillins, and Sulfa (sulfonamide antibiotics)    Medications:    Current Outpatient Medications:     amLODIPine (NORVASC) 5 MG tablet, TAKE 1 TABLET BY MOUTH 2 TIMES A DAY (Patient taking differently: Take 5 mg by mouth 2 (two) times daily.), Disp: 60 tablet, Rfl: 5    aspirin 81 mg Tab, Take 1 tablet by mouth every evening. Every day, Disp: , Rfl:     atorvastatin (LIPITOR) 40 MG tablet, Take 1 tablet (40 mg total) by mouth once daily., Disp: 90 tablet, Rfl: 3    biotin 5 mg Cap, Take 1 tablet by mouth once daily., Disp: , Rfl:     CALCIUM CARB/VIT D3/MINERALS (CALCIUM-VITAMIN D ORAL), Take 600 mg by mouth 2 (two) times daily. Calcium 1200 with D 3 1000, Disp: , Rfl:     CRANBERRY CONC/ASCORBIC ACID (CRANBERRY PLUS VITAMIN C ORAL), Take 1 capsule by mouth once daily., Disp: , Rfl:     DEXILANT 60 mg capsule, Take 60 mg by mouth once daily. , Disp: , Rfl: 11    fluticasone (FLONASE) 50 mcg/actuation nasal spray, 2 sprays by Each Nare route once daily. (Patient taking differently: 2 sprays by Each Nostril route daily as needed.), Disp: 16 g, Rfl: 0    folic acid (FOLVITE) 1 MG tablet, Take 2 mg by mouth once daily. , Disp: , Rfl:     furosemide (LASIX) 80 MG tablet, Take 1 tablet (80 mg total) by mouth once daily., Disp: 90 tablet, Rfl: 3    gabapentin (NEURONTIN) 100 MG capsule, Take  2 capsules (200 mg total) by mouth every evening., Disp: 60 capsule, Rfl: 5    HYDROmorphone (DILAUDID) 2 MG tablet, Take 2 mg by mouth every 4 (four) hours as needed for Pain., Disp: , Rfl:     isosorbide dinitrate (ISORDIL) 10 MG tablet, TAKE ONE TABLET BY MOUTH THREE TIMES DAILY (Patient taking differently: Take 10 mg by mouth 3 (three) times daily.), Disp: 100 tablet, Rfl: 2    Lactobacillus acidophilus 10 billion cell Cap, Take 1 each by mouth once daily. 1.5 billion, Disp: , Rfl:     magnesium oxide (MAG-OX) 400 mg (241.3 mg magnesium) tablet, TAKE 1 TABLET BY MOUTH 2 TIMES A DAY (Patient taking differently: Take 400 mg by mouth 2 (two) times daily.), Disp: 120 tablet, Rfl: 2    metOLazone (ZAROXOLYN) 2.5 MG tablet, Take 1 tablet (2.5 mg total) by mouth once daily., Disp: 30 tablet, Rfl: 0    metoprolol succinate (TOPROL-XL) 25 MG 24 hr tablet, Take 1 tablet (25 mg total) by mouth As instructed. 25 mg QAM and 12.5 mg QHS, Disp: 45 tablet, Rfl: 11    MULTIVITAMIN WITH MINERALS (ONE-A-DAY 50 PLUS) Tab, Take 1 tablet by mouth once daily. Every day, Disp: , Rfl:     nitroGLYCERIN 0.4 MG/DOSE TL SPRY (NITROLINGUAL) 400 mcg/spray spray, Place 1 spray under the tongue every 5 (five) minutes as needed for Chest pain. PRN, Disp: 4.9 g, Rfl: 3    omega-3 fatty acids/fish oil (FISH OIL-OMEGA-3 FATTY ACIDS) 300-1,000 mg capsule, Take 1 capsule by mouth once daily., Disp: , Rfl:     ondansetron (ZOFRAN) 4 MG tablet, Take 4 mg by mouth every 8 (eight) hours as needed for Nausea. , Disp: , Rfl: 2    potassium chloride SA (K-DUR,KLOR-CON M) 10 MEQ tablet, TAKE TWO TABLETS BY MOUTH EVERY MORNING AND 1 TABLET EVERY EVENING (Patient taking differently: Take 20 mEq by mouth every morning. 20 meq QAM and 10 meq QPM), Disp: 270 tablet, Rfl: 2    promethazine (PHENERGAN) 25 MG tablet, Take 1 tablet (25 mg total) by mouth 2 (two) times daily as needed for Nausea., Disp: 60 tablet, Rfl: 1    sacubitriL-valsartan (ENTRESTO)  mg  per tablet, Take 1 tablet by mouth 2 (two) times daily., Disp: 60 tablet, Rfl: 4    sertraline (ZOLOFT) 50 MG tablet, Take 2 tablets (100 mg total) by mouth once daily., Disp: 180 tablet, Rfl: 2    traMADoL (ULTRAM) 50 mg tablet, Take 50 mg by mouth 2 (two) times daily as needed for Pain., Disp: , Rfl:     vitamin E 400 unit Tab, Take 400 mg by mouth once daily. Every day, Disp: , Rfl:     cyanocobalamin 1,000 mcg/mL injection, Inject 1,000 mcg into the muscle every 30 days., Disp: , Rfl:     PHYSICAL EXAMINATION:    Constitutional: She is oriented to person, place, and time. She appears well-developed and well-nourished.  She is in no apparent distress.    Neck: Normal ROM.     Cardiovascular: Normal rate.      Pulmonary/Chest: Effort normal. No respiratory distress.     Abdominal:  She exhibits no distension.  There is no CVA tenderness.     Neurological: She is alert and oriented to person, place, and time.     Skin: Skin is warm and dry.     Psych: Cooperative with normal affect.    Genitourinary:  Normal external female genitalia  Urethral meatus is normal  Consent verbally obtained.  Betadine prep was applied to the urethral meatus. An in and out cath was performed. Pt last voided prior to appt.  The PVR was 40 ml.      Physical Exam      LABS:      Lab Results   Component Value Date    CREATININE 1.10 (H) 03/27/2023       IMPRESSION:    Encounter Diagnoses   Name Primary?    Urge incontinence Yes       PLAN:  -Discussed patient with Dr. Bender. CT negative for fistula. Pt denies gross hematuria. No recent UTI's. Dr. Bender feels the stool if residual from fecal incontinence since it only appeared when patient wiped. If patient develops gross hematuria or recurrent UTI, will proceed with cysto and cystogram.  -Catheterized urine today sent for micro UA and culture. Will call with results.  -Proceed with PT pelvic floor for incontinence  -RTC for gross hematuria and/or recurrent UTI with positive urine  cultures    I encouraged her or any of her family members to call or email me with questions and/or concerns.      30 minutes of total time spent on the encounter, which includes face to face time and non-face to face time preparing to see the patient (eg, review of tests), Obtaining and/or reviewing separately obtained history, Documenting clinical information in the electronic or other health record, Independently interpreting results (not separately reported) and communicating results to the patient/family/caregiver, or Care coordination (not separately reported).

## 2023-04-02 LAB — BACTERIA UR CULT: NO GROWTH

## 2023-04-03 ENCOUNTER — OFFICE VISIT (OUTPATIENT)
Dept: OTOLARYNGOLOGY | Facility: CLINIC | Age: 88
End: 2023-04-03
Attending: FAMILY MEDICINE
Payer: MEDICARE

## 2023-04-03 VITALS — WEIGHT: 113.31 LBS | BODY MASS INDEX: 23.68 KG/M2

## 2023-04-03 DIAGNOSIS — K13.21 LEUKOPLAKIA, TONGUE: Primary | ICD-10-CM

## 2023-04-03 DIAGNOSIS — H61.001 CHONDRODERMATITIS NODULARIS CHRONICA HELICIS, RIGHT: ICD-10-CM

## 2023-04-03 PROCEDURE — 99214 PR OFFICE/OUTPT VISIT, EST, LEVL IV, 30-39 MIN: ICD-10-PCS | Mod: S$PBB,,, | Performed by: OTOLARYNGOLOGY

## 2023-04-03 PROCEDURE — 99999 PR PBB SHADOW E&M-EST. PATIENT-LVL III: ICD-10-PCS | Mod: PBBFAC,,, | Performed by: OTOLARYNGOLOGY

## 2023-04-03 PROCEDURE — 99214 OFFICE O/P EST MOD 30 MIN: CPT | Mod: S$PBB,,, | Performed by: OTOLARYNGOLOGY

## 2023-04-03 PROCEDURE — 99213 OFFICE O/P EST LOW 20 MIN: CPT | Mod: PBBFAC,PO | Performed by: OTOLARYNGOLOGY

## 2023-04-03 PROCEDURE — 99999 PR PBB SHADOW E&M-EST. PATIENT-LVL III: CPT | Mod: PBBFAC,,, | Performed by: OTOLARYNGOLOGY

## 2023-04-03 NOTE — PROGRESS NOTES
Subjective:       Patient ID: Cheyanne Gomes is a 87 y.o. female.    Chief Complaint: Consult (Spot on tip of tongue and tip of R ear)      Cheyanne is here for follow-up.   She has had a spot on the tip of the tongue for months. This has been sensitive to certain foods. No trauma.   She has also had an irritated area on the right helix which causes some pain when she lays on it.     Patient validated questionnaires (if applicable):      %       No flowsheet data found.  No flowsheet data found.  No flowsheet data found.         Review of Systems   Constitutional: Negative for activity change and appetite change.   Respiratory: Negative for difficulty breathing and wheezing   Cardiovascular: Negative for chest pain.      Objective:        Constitutional:   Vital signs are normal. She appears well-developed and well-nourished.     Head:  Normocephalic and atraumatic.     Ears:  Hearing normal to normal and whispered voice; external ear normal without scars, lesions, or masses; ear canal, tympanic membrane, and middle ear normal..     Nose:  Nose normal including turbinates, nasal mucosa, sinuses and nasal septum.     Mouth/Throat  Oropharynx clear and moist without lesions or asymmetry.       3 mm area of hyperparakeratosis along right tip of tongue    Poor anterior dentition      Neck:  Neck normal without thyromegaly masses, asymmetry, normal tracheal structure, crepitus, and tenderness.       Tests / Results:  none    Assessment:       1. Leukoplakia, tongue    2. Chondrodermatitis nodularis chronica helicis, right          Plan:         Suspect related to poor dentition - has been a while since cleaning. She will visit with her dentist. If this area does not resolve, we discussed excision in the clinic.    Monitor ear - small area of suspected chondrodermatitis nodularis helicis

## 2023-04-04 ENCOUNTER — PATIENT MESSAGE (OUTPATIENT)
Dept: CARDIOLOGY | Facility: CLINIC | Age: 88
End: 2023-04-04
Payer: MEDICARE

## 2023-04-06 ENCOUNTER — PATIENT MESSAGE (OUTPATIENT)
Dept: FAMILY MEDICINE | Facility: CLINIC | Age: 88
End: 2023-04-06
Payer: MEDICARE

## 2023-04-10 ENCOUNTER — PATIENT MESSAGE (OUTPATIENT)
Dept: CARDIOLOGY | Facility: CLINIC | Age: 88
End: 2023-04-10
Payer: MEDICARE

## 2023-04-20 ENCOUNTER — INFUSION (OUTPATIENT)
Dept: INFUSION THERAPY | Facility: HOSPITAL | Age: 88
End: 2023-04-20
Attending: FAMILY MEDICINE
Payer: MEDICARE

## 2023-04-20 VITALS
TEMPERATURE: 98 F | SYSTOLIC BLOOD PRESSURE: 107 MMHG | RESPIRATION RATE: 18 BRPM | OXYGEN SATURATION: 92 % | HEART RATE: 73 BPM | DIASTOLIC BLOOD PRESSURE: 66 MMHG

## 2023-04-20 DIAGNOSIS — D63.8 CHRONIC DISEASE ANEMIA: Primary | ICD-10-CM

## 2023-04-20 PROCEDURE — 63600175 PHARM REV CODE 636 W HCPCS: Mod: JZ,JG | Performed by: FAMILY MEDICINE

## 2023-04-20 PROCEDURE — 96372 THER/PROPH/DIAG INJ SC/IM: CPT

## 2023-04-20 RX ADMIN — DENOSUMAB 60 MG: 60 INJECTION SUBCUTANEOUS at 03:04

## 2023-04-25 ENCOUNTER — PATIENT MESSAGE (OUTPATIENT)
Dept: CARDIOLOGY | Facility: CLINIC | Age: 88
End: 2023-04-25
Payer: MEDICARE

## 2023-04-25 DIAGNOSIS — N39.0 URINARY TRACT INFECTION WITHOUT HEMATURIA, SITE UNSPECIFIED: Primary | ICD-10-CM

## 2023-04-27 ENCOUNTER — PATIENT MESSAGE (OUTPATIENT)
Dept: CARDIOLOGY | Facility: CLINIC | Age: 88
End: 2023-04-27
Payer: MEDICARE

## 2023-04-27 DIAGNOSIS — I25.118 CORONARY ARTERY DISEASE OF NATIVE ARTERY OF NATIVE HEART WITH STABLE ANGINA PECTORIS: ICD-10-CM

## 2023-04-27 DIAGNOSIS — N18.31 STAGE 3A CHRONIC KIDNEY DISEASE: Primary | ICD-10-CM

## 2023-04-27 DIAGNOSIS — I50.42 CHRONIC COMBINED SYSTOLIC AND DIASTOLIC HEART FAILURE: ICD-10-CM

## 2023-04-27 LAB
APPEARANCE UR: CLEAR
BACTERIA #/AREA URNS HPF: NORMAL /[HPF]
BILIRUB UR QL STRIP: NEGATIVE
CASTS URNS QL MICRO: NORMAL /LPF
COLOR UR: YELLOW
EPI CELLS #/AREA URNS HPF: NORMAL /HPF (ref 0–10)
GLUCOSE UR QL STRIP: NEGATIVE
HGB UR QL STRIP: NEGATIVE
KETONES UR QL STRIP: NEGATIVE
LEUKOCYTE ESTERASE UR QL STRIP: NEGATIVE
MICRO URNS: NORMAL
MICRO URNS: NORMAL
NITRITE UR QL STRIP: NEGATIVE
PH UR STRIP: 7 [PH] (ref 5–7.5)
PROT UR QL STRIP: NEGATIVE
RBC #/AREA URNS HPF: NORMAL /HPF (ref 0–2)
SP GR UR STRIP: 1.01 (ref 1–1.03)
UROBILINOGEN UR STRIP-MCNC: 0.2 MG/DL (ref 0.2–1)
WBC #/AREA URNS HPF: NORMAL /HPF (ref 0–5)

## 2023-04-28 LAB
ALBUMIN SERPL-MCNC: 4.5 G/DL (ref 3.6–4.6)
ALBUMIN/GLOB SERPL: 1.9 {RATIO} (ref 1.2–2.2)
ALP SERPL-CCNC: 67 IU/L (ref 44–121)
ALT SERPL-CCNC: 18 IU/L (ref 0–32)
AST SERPL-CCNC: 29 IU/L (ref 0–40)
BILIRUB SERPL-MCNC: 0.2 MG/DL (ref 0–1.2)
BNP SERPL-MCNC: 337.4 PG/ML (ref 0–100)
BUN SERPL-MCNC: 44 MG/DL (ref 8–27)
BUN/CREAT SERPL: 27 (ref 12–28)
CALCIUM SERPL-MCNC: 9.2 MG/DL (ref 8.7–10.3)
CHLORIDE SERPL-SCNC: 96 MMOL/L (ref 96–106)
CO2 SERPL-SCNC: 29 MMOL/L (ref 20–29)
CREAT SERPL-MCNC: 1.62 MG/DL (ref 0.57–1)
EST. GFR  (NO RACE VARIABLE): 31 ML/MIN/1.73
GLOBULIN SER CALC-MCNC: 2.4 G/DL (ref 1.5–4.5)
GLUCOSE SERPL-MCNC: 100 MG/DL (ref 70–99)
POTASSIUM SERPL-SCNC: 4.6 MMOL/L (ref 3.5–5.2)
PROT SERPL-MCNC: 6.9 G/DL (ref 6–8.5)
SODIUM SERPL-SCNC: 138 MMOL/L (ref 134–144)

## 2023-04-30 ENCOUNTER — PATIENT MESSAGE (OUTPATIENT)
Dept: CARDIOLOGY | Facility: CLINIC | Age: 88
End: 2023-04-30
Payer: MEDICARE

## 2023-04-30 ENCOUNTER — EXTERNAL CHRONIC CARE MANAGEMENT (OUTPATIENT)
Dept: PRIMARY CARE CLINIC | Facility: CLINIC | Age: 88
End: 2023-04-30
Payer: MEDICARE

## 2023-04-30 PROCEDURE — 99490 PR CHRONIC CARE MGMT, 1ST 20 MIN: ICD-10-PCS | Mod: S$PBB,,, | Performed by: FAMILY MEDICINE

## 2023-04-30 PROCEDURE — 99490 CHRNC CARE MGMT STAFF 1ST 20: CPT | Mod: S$PBB,,, | Performed by: FAMILY MEDICINE

## 2023-04-30 PROCEDURE — 99490 CHRNC CARE MGMT STAFF 1ST 20: CPT | Mod: PBBFAC,PN | Performed by: FAMILY MEDICINE

## 2023-05-01 ENCOUNTER — PATIENT MESSAGE (OUTPATIENT)
Dept: CARDIOLOGY | Facility: CLINIC | Age: 88
End: 2023-05-01
Payer: MEDICARE

## 2023-05-04 ENCOUNTER — OFFICE VISIT (OUTPATIENT)
Dept: CARDIOLOGY | Facility: CLINIC | Age: 88
End: 2023-05-04
Payer: MEDICARE

## 2023-05-04 VITALS
HEART RATE: 70 BPM | HEIGHT: 58 IN | DIASTOLIC BLOOD PRESSURE: 70 MMHG | WEIGHT: 110.31 LBS | BODY MASS INDEX: 23.16 KG/M2 | OXYGEN SATURATION: 90 % | SYSTOLIC BLOOD PRESSURE: 130 MMHG

## 2023-05-04 DIAGNOSIS — N18.31 STAGE 3A CHRONIC KIDNEY DISEASE: Primary | ICD-10-CM

## 2023-05-04 DIAGNOSIS — I50.42 CHRONIC COMBINED SYSTOLIC AND DIASTOLIC HEART FAILURE: ICD-10-CM

## 2023-05-04 DIAGNOSIS — I42.0 CONGESTIVE CARDIOMYOPATHY: ICD-10-CM

## 2023-05-04 DIAGNOSIS — I35.0 AORTIC VALVE STENOSIS, MODERATE: ICD-10-CM

## 2023-05-04 PROCEDURE — 99999 PR PBB SHADOW E&M-EST. PATIENT-LVL III: CPT | Mod: PBBFAC,,, | Performed by: INTERNAL MEDICINE

## 2023-05-04 PROCEDURE — 99213 PR OFFICE/OUTPT VISIT, EST, LEVL III, 20-29 MIN: ICD-10-PCS | Mod: S$PBB,,, | Performed by: INTERNAL MEDICINE

## 2023-05-04 PROCEDURE — 99213 OFFICE O/P EST LOW 20 MIN: CPT | Mod: PBBFAC,PN | Performed by: INTERNAL MEDICINE

## 2023-05-04 PROCEDURE — 99999 PR PBB SHADOW E&M-EST. PATIENT-LVL III: ICD-10-PCS | Mod: PBBFAC,,, | Performed by: INTERNAL MEDICINE

## 2023-05-04 PROCEDURE — 99213 OFFICE O/P EST LOW 20 MIN: CPT | Mod: S$PBB,,, | Performed by: INTERNAL MEDICINE

## 2023-05-04 NOTE — PROGRESS NOTES
Patient ID:  Cheyanne Gomes is a 87 y.o. female who presents for follow-up of Congestive Heart Failure, Coronary Artery Disease, Results (labs), and Back Pain (Lower back)      She had COVID during .  She has been complaining of low back pain.  She has urinalysis that was essentially unremarkable.  Her appetite has diminished significantly.  Her BNP is 337 her creatinine is mildly elevated.  She appears to be mildly dry.      Past Medical History:   Diagnosis Date    Abdominal hernia     Anemia     Dr Berkowitz     Angina pectoris     Aortic valve stenosis, moderate     Back pain     Cannon's esophagus     CAD (coronary artery disease)     Cataract     ou done    CHF (congestive heart failure)     EFx 35%, Dr. Spencer 13    Chronic combined systolic and diastolic heart failure 2019    Colitis     Compression fracture     Diverticulosis     Encounter for blood transfusion     GERD (gastroesophageal reflux disease)     Hyperlipidemia     Hypertension     Irritable bowel syndrome     Myocardial infarction     Osteoarthritis     lumbar DDD    Pacemaker     Pneumonia 2017    Raynaud disease     Rheumatoid arteritis     Rheumatoid arthritis     Shingles 2017    Sjogren syndrome     Ulcerative colitis         Past Surgical History:   Procedure Laterality Date    A-V CARDIAC PACEMAKER INSERTION Left 2021    Procedure: INSERTION, CARDIAC PACEMAKER, DUAL CHAMBER  ([employer]);  Surgeon: Tera Blair MD;  Location: [facility] CATH/EP LAB;  Service: Cardiology;  Laterality: Left;    APPENDECTOMY      BACK SURGERY      CARDIAC SURGERY      CABGx3 in     CATARACT EXTRACTION      ou od d 11-15-12//     SECTION, CLASSIC      x 2    CHOLECYSTECTOMY      CLOSURE OF WOUND Right 2022    Procedure: CLOSURE-WOUND;  Surgeon: Delvis Jackson MD;  Location: [facility] OR;  Service: ENT;  Laterality: Right;  NOSE AND CHIN    COLONOSCOPY  09/15/2011    Dr Anaya     COLONOSCOPY   01/10/2017    [facility] report sent to scanning    CORONARY ANGIOPLASTY      PCI x 1 in 2007    CORONARY ARTERY BYPASS GRAFT      3 vessel    CORONARY ARTERY BYPASS GRAFT      CYSTOSCOPY N/A 6/3/2020    Procedure: CYSTOSCOPY;  Surgeon: Miryam Bender Jr., MD;  Location: [facility] OR;  Service: Urology;  Laterality: N/A;    eyes      rk ou    FRACTURE SURGERY  06/21/2016    Dr Guido left hip     FRACTURE SURGERY  2018    Right Hip    HARVESTING OF SKIN GRAFT  9/23/2022    Procedure: SURGICAL PROCUREMENT, GRAFT, SKIN;  Surgeon: Delvis Jackson MD;  Location: [facility] OR;  Service: ENT;;  RIGHT CHEST    HYSTERECTOMY      tubal ligation, oopherectomy    INTRAMEDULLARY RODDING OF TROCHANTER OF FEMUR Right 10/8/2018    Procedure: INSERTION, INTRAMEDULLARY KELSI, FEMUR, TROCHANTER;  Surgeon: Valerio Luther MD;  Location: [facility] OR;  Service: Orthopedics;  Laterality: Right;    OOPHORECTOMY      SPINE SURGERY  8-    Silvino Mckeon    UPPER GASTROINTESTINAL ENDOSCOPY      Yag Capsulotomy Right 9/25/14          Current Outpatient Medications   Medication Instructions    amLODIPine (NORVASC) 5 MG tablet TAKE 1 TABLET BY MOUTH 2 TIMES A DAY    aspirin 81 mg Tab 1 tablet, Oral, Nightly, Every day    atorvastatin (LIPITOR) 40 mg, Oral, Daily    biotin 5 mg Cap 1 tablet, Oral, Daily    CALCIUM CARB/VIT D3/MINERALS (CALCIUM-VITAMIN D ORAL) 600 mg, Oral, 2 times daily, Calcium 1200 with D 3 1000    CRANBERRY CONC/ASCORBIC ACID (CRANBERRY PLUS VITAMIN C ORAL) 1 capsule, Oral, Daily    DEXILANT 60 mg, Oral, Daily    fluticasone (FLONASE) 50 mcg/actuation nasal spray 2 sprays, Each Nostril, Daily    folic acid (FOLVITE) 2 mg, Oral, Daily    furosemide (LASIX) 80 mg, Oral, Daily    gabapentin (NEURONTIN) 200 mg, Oral, Nightly    HYDROmorphone (DILAUDID) 2 mg, Oral, Every 4 hours PRN    isosorbide dinitrate (ISORDIL) 10 MG tablet TAKE ONE TABLET BY MOUTH THREE TIMES DAILY    Lactobacillus acidophilus 10 billion cell Cap 1 each, Oral, Daily, 1.5 billion  "   magnesium oxide (MAG-OX) 400 mg (241.3 mg magnesium) tablet TAKE 1 TABLET BY MOUTH 2 TIMES A DAY    metOLazone (ZAROXOLYN) 2.5 mg, Oral, Daily    metoprolol succinate (TOPROL-XL) 25 mg, Oral, See admin instructions, 25 mg QAM and 12.5 mg QHS    MULTIVITAMIN WITH MINERALS (ONE-A-DAY 50 PLUS) Tab 1 tablet, Oral, Daily, Every day    nitroGLYCERIN 0.4 MG/DOSE TL SPRY (NITROLINGUAL) 400 mcg/spray spray 1 spray, Sublingual, Every 5 min PRN, PRN    omega-3 fatty acids/fish oil (FISH OIL-OMEGA-3 FATTY ACIDS) 300-1,000 mg capsule 1 capsule, Oral, Daily    ondansetron (ZOFRAN) 4 mg, Oral, Every 8 hours PRN    potassium chloride SA (K-DUR,KLOR-CON M) 10 MEQ tablet TAKE TWO TABLETS BY MOUTH EVERY MORNING AND 1 TABLET EVERY EVENING    promethazine (PHENERGAN) 25 mg, Oral, 2 times daily PRN    sacubitriL-valsartan (ENTRESTO)  mg per tablet 1 tablet, Oral, 2 times daily    sertraline (ZOLOFT) 100 mg, Oral, Daily    traMADoL (ULTRAM) 50 mg, Oral, 2 times daily PRN    vitamin E 400 mg, Oral, Daily, Every day        Review of patient's allergies indicates:   Allergen Reactions    Adhesive     Nitrofurantoin macrocrystalline Nausea And Vomiting     Other reaction(s): very sickly    Penicillins Swelling     Other reaction(s): swelling  Other reaction(s): red skin discolorati    Sulfa (sulfonamide antibiotics) Nausea And Vomiting     Other reaction(s): very sickly        Review of Systems   Constitutional: Positive for malaise/fatigue.   Cardiovascular:  Negative for chest pain, dyspnea on exertion and leg swelling.   Respiratory:  Negative for cough and shortness of breath.    Musculoskeletal:  Positive for back pain.   Gastrointestinal:  Positive for nausea.      Objective:     Vitals:    05/04/23 1338   BP: 130/70   BP Location: Left arm   Patient Position: Sitting   BP Method: Medium (Manual)   Pulse: 70   SpO2: (!) 90%   Weight: 50 kg (110 lb 5.4 oz)   Height: 4' 10" (1.473 m)       Physical Exam  Vitals and nursing note " reviewed.   Constitutional:       Appearance: She is well-developed.   HENT:      Head: Normocephalic and atraumatic.   Eyes:      Conjunctiva/sclera: Conjunctivae normal.   Cardiovascular:      Rate and Rhythm: Normal rate and regular rhythm.      Heart sounds: Murmur heard.   Pulmonary:      Effort: Pulmonary effort is normal.      Breath sounds: Normal breath sounds.   Abdominal:      General: Bowel sounds are normal.      Palpations: Abdomen is soft.   Musculoskeletal:         General: Normal range of motion.   Skin:     General: Skin is warm and dry.   Neurological:      Mental Status: She is alert and oriented to person, place, and time.   Psychiatric:         Behavior: Behavior normal.         Thought Content: Thought content normal.         Judgment: Judgment normal.     CMP  Sodium   Date Value Ref Range Status   04/27/2023 138 134 - 144 mmol/L Final   12/11/2018 139 134 - 144 mmol/L      Potassium   Date Value Ref Range Status   04/27/2023 4.6 3.5 - 5.2 mmol/L Final     Chloride   Date Value Ref Range Status   04/27/2023 96 96 - 106 mmol/L Final   12/11/2018 96 (L) 98 - 110 mmol/L      CO2   Date Value Ref Range Status   04/27/2023 29 20 - 29 mmol/L Final     Glucose   Date Value Ref Range Status   04/27/2023 100 (H) 70 - 99 mg/dL Final   12/11/2018 97 70 - 99 mg/dL      BUN   Date Value Ref Range Status   04/27/2023 44 (H) 8 - 27 mg/dL Final     Creatinine   Date Value Ref Range Status   04/27/2023 1.62 (H) 0.57 - 1.00 mg/dL Final   12/11/2018 0.95 0.60 - 1.40 mg/dL      Calcium   Date Value Ref Range Status   04/27/2023 9.2 8.7 - 10.3 mg/dL Final     Total Protein   Date Value Ref Range Status   02/04/2023 7.5 6.0 - 8.4 g/dL Final     Albumin   Date Value Ref Range Status   04/27/2023 4.5 3.6 - 4.6 g/dL Final   02/04/2023 3.9 3.5 - 5.2 g/dL Final   12/11/2018 3.9 3.1 - 4.7 g/dL      Total Bilirubin   Date Value Ref Range Status   04/27/2023 0.2 0.0 - 1.2 mg/dL Final     Alkaline Phosphatase   Date Value  Ref Range Status   02/04/2023 77 55 - 135 U/L Final     AST   Date Value Ref Range Status   04/27/2023 29 0 - 40 IU/L Final     ALT   Date Value Ref Range Status   04/27/2023 18 0 - 32 IU/L Final     Anion Gap   Date Value Ref Range Status   02/06/2023 9 8 - 16 mmol/L Final     eGFR if    Date Value Ref Range Status   11/05/2021 >60.0 >60 mL/min/1.73 m^2 Final     eGFR if non    Date Value Ref Range Status   02/21/2022 56 (L) >59 mL/min/1.73 Final      BMP  Lab Results   Component Value Date     04/27/2023    K 4.6 04/27/2023    CL 96 04/27/2023    CO2 29 04/27/2023    BUN 44 (H) 04/27/2023    CREATININE 1.62 (H) 04/27/2023    CALCIUM 9.2 04/27/2023    ANIONGAP 9 02/06/2023    ESTGFRAFRICA >60.0 11/05/2021    EGFRNONAA 56 (L) 02/21/2022      BNP  @LABRCNTIP(BNP,BNPTRIAGEBLO)@   Lab Results   Component Value Date    CHOL 146 10/04/2022    CHOL 113 05/06/2021    CHOL 135 11/24/2020     Lab Results   Component Value Date    HDL 52 10/04/2022    HDL 47 05/06/2021    HDL 61 11/24/2020     Lab Results   Component Value Date    LDLCALC 75 10/04/2022    LDLCALC 51 05/06/2021    LDLCALC 55 11/24/2020     Lab Results   Component Value Date    TRIG 104 10/04/2022    TRIG 70 05/06/2021    TRIG 108 11/24/2020     Lab Results   Component Value Date    CHOLHDL 26.2 08/24/2020    CHOLHDL 45.0 10/16/2013    CHOLHDL 40.7 08/11/2009      Lab Results   Component Value Date    TSH 2.105 08/22/2022    FREET4 0.89 09/26/2012     Lab Results   Component Value Date    HGBA1C 5.3 02/26/2015     Lab Results   Component Value Date    WBC 5.2 03/27/2023    HGB 10.3 (L) 03/27/2023    HCT 33.1 (L) 03/27/2023    MCV 91 03/27/2023     (L) 03/27/2023         Results for orders placed during the hospital encounter of 04/28/22    Echo    Interpretation Summary  · The left ventricle is normal in size with concentric remodeling and mildly decreased systolic function.  · The estimated ejection fraction is  40%.  · Grade I left ventricular diastolic dysfunction.  · There are segmental left ventricular wall motion abnormalities.  · Apical akinesis  · Normal right ventricular size with normal right ventricular systolic function.  · Mild aortic regurgitation.  · There is moderate aortic valve stenosis.  · Aortic valve area is 1.15 cm2; peak velocity is 2.12 m/s; mean gradient is 18 mmHg.  · Mild tricuspid regurgitation.  · Normal central venous pressure (3 mmHg).  · The estimated PA systolic pressure is 24 mmHg.  · Overall the study quality was technically difficult.     No results found for this or any previous visit.         Assessment:       Aortic valve stenosis, moderate  Stable    Chronic combined systolic and diastolic heart failure  She appears to be on the dry side now    CKD (chronic kidney disease)  Stable       Plan:       No diuretics for 2 days then after today's just take Lasix 80 mg daily.  If the swelling comes back then initiate therapy with Zaroxolyn every other day.  She will be seen in the office in 1 month with a BMP as well as a BNP.  She is to have blood work done in 1 week as well with a BMP and a BNP

## 2023-05-10 ENCOUNTER — PATIENT MESSAGE (OUTPATIENT)
Dept: CARDIOLOGY | Facility: CLINIC | Age: 88
End: 2023-05-10
Payer: MEDICARE

## 2023-05-10 DIAGNOSIS — N39.0 URINARY TRACT INFECTION WITHOUT HEMATURIA, SITE UNSPECIFIED: Primary | ICD-10-CM

## 2023-05-11 RX ORDER — METOLAZONE 2.5 MG/1
2.5 TABLET ORAL DAILY
Qty: 30 TABLET | Refills: 0 | Status: SHIPPED | OUTPATIENT
Start: 2023-05-11 | End: 2023-07-27 | Stop reason: SDUPTHER

## 2023-05-14 LAB
APPEARANCE UR: CLEAR
BACTERIA #/AREA URNS HPF: ABNORMAL /[HPF]
BACTERIA UR CULT: NO GROWTH
BACTERIA UR CULT: NORMAL
BILIRUB UR QL STRIP: NEGATIVE
CASTS URNS QL MICRO: ABNORMAL /LPF
COLOR UR: YELLOW
EPI CELLS #/AREA URNS HPF: ABNORMAL /HPF (ref 0–10)
GLUCOSE UR QL STRIP: NEGATIVE
HGB UR QL STRIP: ABNORMAL
KETONES UR QL STRIP: NEGATIVE
LEUKOCYTE ESTERASE UR QL STRIP: ABNORMAL
MICRO URNS: ABNORMAL
NITRITE UR QL STRIP: NEGATIVE
PH UR STRIP: 8 [PH] (ref 5–7.5)
PROT UR QL STRIP: ABNORMAL
RBC #/AREA URNS HPF: ABNORMAL /HPF (ref 0–2)
SP GR UR STRIP: 1.01 (ref 1–1.03)
URINALYSIS REFLEX: ABNORMAL
UROBILINOGEN UR STRIP-MCNC: 0.2 MG/DL (ref 0.2–1)
WBC #/AREA URNS HPF: >30 /HPF (ref 0–5)

## 2023-05-18 RX ORDER — ATORVASTATIN CALCIUM 40 MG/1
40 TABLET, FILM COATED ORAL DAILY
Qty: 90 TABLET | Refills: 3 | Status: SHIPPED | OUTPATIENT
Start: 2023-05-18 | End: 2023-10-09

## 2023-05-31 ENCOUNTER — PATIENT MESSAGE (OUTPATIENT)
Dept: DERMATOLOGY | Facility: CLINIC | Age: 88
End: 2023-05-31
Payer: MEDICARE

## 2023-05-31 ENCOUNTER — EXTERNAL CHRONIC CARE MANAGEMENT (OUTPATIENT)
Dept: PRIMARY CARE CLINIC | Facility: CLINIC | Age: 88
End: 2023-05-31
Payer: MEDICARE

## 2023-05-31 PROCEDURE — 99490 CHRNC CARE MGMT STAFF 1ST 20: CPT | Mod: PBBFAC,25,PN | Performed by: FAMILY MEDICINE

## 2023-05-31 PROCEDURE — 99439 PR CHRONIC CARE MGMT, EA ADDTL 20 MIN: ICD-10-PCS | Mod: S$PBB,,, | Performed by: FAMILY MEDICINE

## 2023-05-31 PROCEDURE — 99490 PR CHRONIC CARE MGMT, 1ST 20 MIN: ICD-10-PCS | Mod: S$PBB,,, | Performed by: FAMILY MEDICINE

## 2023-05-31 PROCEDURE — 99439 CHRNC CARE MGMT STAF EA ADDL: CPT | Mod: PBBFAC,PN | Performed by: FAMILY MEDICINE

## 2023-05-31 PROCEDURE — 99439 CHRNC CARE MGMT STAF EA ADDL: CPT | Mod: S$PBB,,, | Performed by: FAMILY MEDICINE

## 2023-05-31 PROCEDURE — 99490 CHRNC CARE MGMT STAFF 1ST 20: CPT | Mod: S$PBB,,, | Performed by: FAMILY MEDICINE

## 2023-06-05 ENCOUNTER — TELEPHONE (OUTPATIENT)
Dept: CARDIOLOGY | Facility: CLINIC | Age: 88
End: 2023-06-05
Payer: MEDICARE

## 2023-06-05 DIAGNOSIS — Z95.0 CARDIAC PACEMAKER IN SITU: Primary | ICD-10-CM

## 2023-06-06 LAB
BNP SERPL-MCNC: 230.9 PG/ML (ref 0–100)
BUN SERPL-MCNC: 32 MG/DL (ref 8–27)
BUN/CREAT SERPL: 23 (ref 12–28)
CALCIUM SERPL-MCNC: 9.2 MG/DL (ref 8.7–10.3)
CHLORIDE SERPL-SCNC: 93 MMOL/L (ref 96–106)
CO2 SERPL-SCNC: 27 MMOL/L (ref 20–29)
CREAT SERPL-MCNC: 1.38 MG/DL (ref 0.57–1)
EST. GFR  (NO RACE VARIABLE): 37 ML/MIN/1.73
GLUCOSE SERPL-MCNC: 92 MG/DL (ref 70–99)
POTASSIUM SERPL-SCNC: 3.9 MMOL/L (ref 3.5–5.2)
SODIUM SERPL-SCNC: 138 MMOL/L (ref 134–144)

## 2023-06-09 ENCOUNTER — OFFICE VISIT (OUTPATIENT)
Dept: CARDIOLOGY | Facility: CLINIC | Age: 88
End: 2023-06-09
Payer: MEDICARE

## 2023-06-09 VITALS
DIASTOLIC BLOOD PRESSURE: 80 MMHG | HEIGHT: 58 IN | HEART RATE: 69 BPM | WEIGHT: 111.31 LBS | OXYGEN SATURATION: 93 % | SYSTOLIC BLOOD PRESSURE: 120 MMHG | BODY MASS INDEX: 23.37 KG/M2 | RESPIRATION RATE: 16 BRPM

## 2023-06-09 DIAGNOSIS — I35.0 AORTIC VALVE STENOSIS, MODERATE: ICD-10-CM

## 2023-06-09 DIAGNOSIS — I47.10 PSVT (PAROXYSMAL SUPRAVENTRICULAR TACHYCARDIA): ICD-10-CM

## 2023-06-09 DIAGNOSIS — I25.118 CORONARY ARTERY DISEASE OF NATIVE ARTERY OF NATIVE HEART WITH STABLE ANGINA PECTORIS: ICD-10-CM

## 2023-06-09 DIAGNOSIS — I50.42 CHRONIC COMBINED SYSTOLIC AND DIASTOLIC HEART FAILURE: ICD-10-CM

## 2023-06-09 DIAGNOSIS — N18.31 STAGE 3A CHRONIC KIDNEY DISEASE: Primary | ICD-10-CM

## 2023-06-09 PROCEDURE — 99213 OFFICE O/P EST LOW 20 MIN: CPT | Mod: PBBFAC,PN | Performed by: INTERNAL MEDICINE

## 2023-06-09 PROCEDURE — 99999 PR PBB SHADOW E&M-EST. PATIENT-LVL III: ICD-10-PCS | Mod: PBBFAC,,, | Performed by: INTERNAL MEDICINE

## 2023-06-09 PROCEDURE — 99999 PR PBB SHADOW E&M-EST. PATIENT-LVL III: CPT | Mod: PBBFAC,,, | Performed by: INTERNAL MEDICINE

## 2023-06-09 PROCEDURE — 99213 OFFICE O/P EST LOW 20 MIN: CPT | Mod: S$PBB,,, | Performed by: INTERNAL MEDICINE

## 2023-06-09 PROCEDURE — 99213 PR OFFICE/OUTPT VISIT, EST, LEVL III, 20-29 MIN: ICD-10-PCS | Mod: S$PBB,,, | Performed by: INTERNAL MEDICINE

## 2023-06-09 NOTE — PROGRESS NOTES
Patient ID:  Cheyanne Gomes is a 87 y.o. female who presents for follow-up of Follow-up      She is been complaining of a lot of back pain.  She feels that she is dragging a little bit.  She is a little bit depressed her daughter Aneudy her  committed suicide last night.  He was a bad diabetic with bilateral amputee and he was getting very depressed.  Ms. Gomes was having some leg edema and 2 Zaroxolyn for the past few days.  Her creatinine is 1.38 BUN 32 potassium 3.9 her BNP is 230.  She complains of occasional episodes of leg cramps at night.      Past Medical History:   Diagnosis Date    Abdominal hernia     Anemia     Dr Berkowitz     Angina pectoris     Aortic valve stenosis, moderate     Back pain     Cannon's esophagus     CAD (coronary artery disease)     Cataract     ou done    CHF (congestive heart failure)     EFx 35%, Dr. Spencer 13    Chronic combined systolic and diastolic heart failure 2019    Colitis     Compression fracture     Diverticulosis     Encounter for blood transfusion     GERD (gastroesophageal reflux disease)     Hyperlipidemia     Hypertension     Irritable bowel syndrome     Myocardial infarction     Osteoarthritis     lumbar DDD    Pacemaker     Pneumonia 2017    Raynaud disease     Rheumatoid arteritis     Rheumatoid arthritis     Shingles 2017    Sjogren syndrome     Ulcerative colitis         Past Surgical History:   Procedure Laterality Date    A-V CARDIAC PACEMAKER INSERTION Left 2021    Procedure: INSERTION, CARDIAC PACEMAKER, DUAL CHAMBER  ([employer]);  Surgeon: Tera Blair MD;  Location: [facility] CATH/EP LAB;  Service: Cardiology;  Laterality: Left;    APPENDECTOMY      BACK SURGERY      CARDIAC SURGERY      CABGx3 in     CATARACT EXTRACTION      ou od d 11-15-12//     SECTION, CLASSIC      x 2    CHOLECYSTECTOMY      CLOSURE OF WOUND Right 2022    Procedure: CLOSURE-WOUND;  Surgeon: Delvis Jackson MD;   Location: [facility] OR;  Service: ENT;  Laterality: Right;  NOSE AND CHIN    COLONOSCOPY  09/15/2011    Dr Anaya     COLONOSCOPY  01/10/2017    [facility] report sent to scanning    CORONARY ANGIOPLASTY      PCI x 1 in 2007    CORONARY ARTERY BYPASS GRAFT      3 vessel    CORONARY ARTERY BYPASS GRAFT      CYSTOSCOPY N/A 6/3/2020    Procedure: CYSTOSCOPY;  Surgeon: Miryam Bender Jr., MD;  Location: [facility] OR;  Service: Urology;  Laterality: N/A;    eyes      rk ou    FRACTURE SURGERY  06/21/2016    Dr Guido left hip     FRACTURE SURGERY  2018    Right Hip    HARVESTING OF SKIN GRAFT  9/23/2022    Procedure: SURGICAL PROCUREMENT, GRAFT, SKIN;  Surgeon: Dlevis Jackson MD;  Location: [facility] OR;  Service: ENT;;  RIGHT CHEST    HYSTERECTOMY      tubal ligation, oopherectomy    INTRAMEDULLARY RODDING OF TROCHANTER OF FEMUR Right 10/8/2018    Procedure: INSERTION, INTRAMEDULLARY KELSI, FEMUR, TROCHANTER;  Surgeon: Valerio Luther MD;  Location: [facility] OR;  Service: Orthopedics;  Laterality: Right;    OOPHORECTOMY      SPINE SURGERY  8-    Silvino Mckeon    UPPER GASTROINTESTINAL ENDOSCOPY      Yag Capsulotomy Right 9/25/14          Current Outpatient Medications   Medication Instructions    amLODIPine (NORVASC) 5 MG tablet TAKE 1 TABLET BY MOUTH 2 TIMES A DAY    aspirin 81 mg Tab 1 tablet, Oral, Nightly, Every day    atorvastatin (LIPITOR) 40 mg, Oral, Daily    biotin 5 mg Cap 1 tablet, Oral, Daily    CALCIUM CARB/VIT D3/MINERALS (CALCIUM-VITAMIN D ORAL) 600 mg, Oral, 2 times daily, Calcium 1200 with D 3 1000    CRANBERRY CONC/ASCORBIC ACID (CRANBERRY PLUS VITAMIN C ORAL) 1 capsule, Oral, Daily    DEXILANT 60 mg, Oral, Daily    fluticasone (FLONASE) 50 mcg/actuation nasal spray 2 sprays, Each Nostril, Daily    folic acid (FOLVITE) 2 mg, Oral, Daily    furosemide (LASIX) 80 mg, Oral, Daily    gabapentin (NEURONTIN) 200 mg, Oral, Nightly    HYDROmorphone (DILAUDID) 2 mg, Oral, Every 4 hours PRN    isosorbide dinitrate (ISORDIL) 10 MG  "tablet TAKE ONE TABLET BY MOUTH THREE TIMES DAILY    Lactobacillus acidophilus 10 billion cell Cap 1 each, Oral, Daily, 1.5 billion    magnesium oxide (MAG-OX) 400 mg (241.3 mg magnesium) tablet TAKE 1 TABLET BY MOUTH 2 TIMES A DAY    metOLazone (ZAROXOLYN) 2.5 mg, Oral, Daily    metoprolol succinate (TOPROL-XL) 25 mg, Oral, See admin instructions, 25 mg QAM and 12.5 mg QHS    MULTIVITAMIN WITH MINERALS (ONE-A-DAY 50 PLUS) Tab 1 tablet, Oral, Daily, Every day    nitroGLYCERIN 0.4 MG/DOSE TL SPRY (NITROLINGUAL) 400 mcg/spray spray 1 spray, Sublingual, Every 5 min PRN, PRN    omega-3 fatty acids/fish oil (FISH OIL-OMEGA-3 FATTY ACIDS) 300-1,000 mg capsule 1 capsule, Oral, Daily    ondansetron (ZOFRAN) 4 mg, Oral, Every 8 hours PRN    potassium chloride SA (K-DUR,KLOR-CON M) 10 MEQ tablet TAKE TWO TABLETS BY MOUTH EVERY MORNING AND 1 TABLET EVERY EVENING    promethazine (PHENERGAN) 25 mg, Oral, 2 times daily PRN    sacubitriL-valsartan (ENTRESTO)  mg per tablet 1 tablet, Oral, 2 times daily    sertraline (ZOLOFT) 100 mg, Oral, Daily    traMADoL (ULTRAM) 50 mg, Oral, 2 times daily PRN    vitamin E 400 mg, Oral, Daily, Every day        Review of patient's allergies indicates:   Allergen Reactions    Adhesive     Nitrofurantoin macrocrystalline Nausea And Vomiting     Other reaction(s): very sickly    Penicillins Swelling     Other reaction(s): swelling  Other reaction(s): red skin discolorati    Sulfa (sulfonamide antibiotics) Nausea And Vomiting     Other reaction(s): very sickly        Review of Systems   Cardiovascular:  Positive for dyspnea on exertion and leg swelling. Negative for chest pain and palpitations.   Respiratory:  Negative for cough and shortness of breath.       Objective:     Vitals:    06/09/23 1057   BP: 120/80   BP Location: Left arm   Patient Position: Sitting   BP Method: Medium (Manual)   Pulse: 69   Resp: 16   SpO2: (!) 93%   Weight: 50.5 kg (111 lb 5.3 oz)   Height: 4' 10" (1.473 m)       " Physical Exam  Vitals and nursing note reviewed.   Constitutional:       Appearance: She is well-developed.   HENT:      Head: Normocephalic and atraumatic.   Eyes:      Conjunctiva/sclera: Conjunctivae normal.   Cardiovascular:      Rate and Rhythm: Normal rate and regular rhythm.      Heart sounds: Murmur (Grade 2/6 systolic murmur at the base) heard.   Pulmonary:      Effort: Pulmonary effort is normal.      Breath sounds: Normal breath sounds.   Abdominal:      General: Bowel sounds are normal.      Palpations: Abdomen is soft.   Musculoskeletal:         General: Normal range of motion.   Skin:     General: Skin is warm and dry.   Neurological:      Mental Status: She is alert and oriented to person, place, and time.   Psychiatric:         Behavior: Behavior normal.         Thought Content: Thought content normal.         Judgment: Judgment normal.     CMP  Sodium   Date Value Ref Range Status   06/05/2023 138 134 - 144 mmol/L Final   12/11/2018 139 134 - 144 mmol/L      Potassium   Date Value Ref Range Status   06/05/2023 3.9 3.5 - 5.2 mmol/L Final     Chloride   Date Value Ref Range Status   06/05/2023 93 (L) 96 - 106 mmol/L Final   12/11/2018 96 (L) 98 - 110 mmol/L      CO2   Date Value Ref Range Status   06/05/2023 27 20 - 29 mmol/L Final     Glucose   Date Value Ref Range Status   06/05/2023 92 70 - 99 mg/dL Final   12/11/2018 97 70 - 99 mg/dL      BUN   Date Value Ref Range Status   06/05/2023 32 (H) 8 - 27 mg/dL Final     Creatinine   Date Value Ref Range Status   06/05/2023 1.38 (H) 0.57 - 1.00 mg/dL Final   12/11/2018 0.95 0.60 - 1.40 mg/dL      Calcium   Date Value Ref Range Status   06/05/2023 9.2 8.7 - 10.3 mg/dL Final     Total Protein   Date Value Ref Range Status   02/04/2023 7.5 6.0 - 8.4 g/dL Final     Albumin   Date Value Ref Range Status   04/27/2023 4.5 3.6 - 4.6 g/dL Final   02/04/2023 3.9 3.5 - 5.2 g/dL Final   12/11/2018 3.9 3.1 - 4.7 g/dL      Total Bilirubin   Date Value Ref Range Status    04/27/2023 0.2 0.0 - 1.2 mg/dL Final     Alkaline Phosphatase   Date Value Ref Range Status   02/04/2023 77 55 - 135 U/L Final     AST   Date Value Ref Range Status   04/27/2023 29 0 - 40 IU/L Final     ALT   Date Value Ref Range Status   04/27/2023 18 0 - 32 IU/L Final     Anion Gap   Date Value Ref Range Status   02/06/2023 9 8 - 16 mmol/L Final     eGFR if    Date Value Ref Range Status   11/05/2021 >60.0 >60 mL/min/1.73 m^2 Final     eGFR if non    Date Value Ref Range Status   02/21/2022 56 (L) >59 mL/min/1.73 Final      BMP  Lab Results   Component Value Date     06/05/2023    K 3.9 06/05/2023    CL 93 (L) 06/05/2023    CO2 27 06/05/2023    BUN 32 (H) 06/05/2023    CREATININE 1.38 (H) 06/05/2023    CALCIUM 9.2 06/05/2023    ANIONGAP 9 02/06/2023    ESTGFRAFRICA >60.0 11/05/2021    EGFRNONAA 56 (L) 02/21/2022      BNP  @LABRCNTIP(BNP,BNPTRIAGEBLO)@   Lab Results   Component Value Date    CHOL 146 10/04/2022    CHOL 113 05/06/2021    CHOL 135 11/24/2020     Lab Results   Component Value Date    HDL 52 10/04/2022    HDL 47 05/06/2021    HDL 61 11/24/2020     Lab Results   Component Value Date    LDLCALC 75 10/04/2022    LDLCALC 51 05/06/2021    LDLCALC 55 11/24/2020     Lab Results   Component Value Date    TRIG 104 10/04/2022    TRIG 70 05/06/2021    TRIG 108 11/24/2020     Lab Results   Component Value Date    CHOLHDL 26.2 08/24/2020    CHOLHDL 45.0 10/16/2013    CHOLHDL 40.7 08/11/2009      Lab Results   Component Value Date    TSH 2.105 08/22/2022    FREET4 0.89 09/26/2012     Lab Results   Component Value Date    HGBA1C 5.3 02/26/2015     Lab Results   Component Value Date    WBC 5.2 03/27/2023    HGB 10.3 (L) 03/27/2023    HCT 33.1 (L) 03/27/2023    MCV 91 03/27/2023     (L) 03/27/2023         Results for orders placed during the hospital encounter of 04/28/22    Echo    Interpretation Summary  · The left ventricle is normal in size with concentric remodeling  and mildly decreased systolic function.  · The estimated ejection fraction is 40%.  · Grade I left ventricular diastolic dysfunction.  · There are segmental left ventricular wall motion abnormalities.  · Apical akinesis  · Normal right ventricular size with normal right ventricular systolic function.  · Mild aortic regurgitation.  · There is moderate aortic valve stenosis.  · Aortic valve area is 1.15 cm2; peak velocity is 2.12 m/s; mean gradient is 18 mmHg.  · Mild tricuspid regurgitation.  · Normal central venous pressure (3 mmHg).  · The estimated PA systolic pressure is 24 mmHg.  · Overall the study quality was technically difficult.     No results found for this or any previous visit.         Assessment:       Chronic combined systolic and diastolic heart failure  She appears to be fairly well compensated.  Her BNP is only 230    Coronary artery disease of native artery of native heart with stable angina pectoris  No symptoms of angina    PSVT (paroxysmal supraventricular tachycardia)  Controlled on medical therapy    CKD (chronic kidney disease)  Stable depends upon the degree of diuresing       Plan:       Continue the diuretics as present.  She will be seen in 1 month with a BMP BNP magnesium level and CBC.

## 2023-06-19 DIAGNOSIS — G62.9 PERIPHERAL POLYNEUROPATHY: ICD-10-CM

## 2023-06-19 NOTE — TELEPHONE ENCOUNTER
No care due was identified.  [facility] Embedded Care Due Messages. Reference number: [id number].   6/19/2023 8:58:11 AM CDT

## 2023-06-20 RX ORDER — GABAPENTIN 100 MG/1
200 CAPSULE ORAL NIGHTLY
Qty: 60 CAPSULE | Refills: 1 | Status: SHIPPED | OUTPATIENT
Start: 2023-06-20 | End: 2023-08-28

## 2023-06-21 ENCOUNTER — PATIENT MESSAGE (OUTPATIENT)
Dept: FAMILY MEDICINE | Facility: CLINIC | Age: 88
End: 2023-06-21
Payer: MEDICARE

## 2023-06-22 ENCOUNTER — PATIENT MESSAGE (OUTPATIENT)
Dept: OTOLARYNGOLOGY | Facility: CLINIC | Age: 88
End: 2023-06-22
Payer: MEDICARE

## 2023-06-22 NOTE — TELEPHONE ENCOUNTER
S/w pt and it's been over a month since her dental appt and the spot on her tongue has not gone away. Pt is asking if she really needs to have anything done to the spot since it's been there so long and not getting bigger. Pt thinks that if was anything serious that she would have known by now. Does pt need a f/u appt, procedure appt or any appt? Please advise.

## 2023-06-22 NOTE — TELEPHONE ENCOUNTER
Advised pt of your response. She is ok with calling if it changes or gets bigger. Pt doesn't want to proceed with biopsy at this time.

## 2023-06-30 ENCOUNTER — EXTERNAL CHRONIC CARE MANAGEMENT (OUTPATIENT)
Dept: PRIMARY CARE CLINIC | Facility: CLINIC | Age: 88
End: 2023-06-30
Payer: MEDICARE

## 2023-06-30 ENCOUNTER — HOSPITAL ENCOUNTER (EMERGENCY)
Facility: HOSPITAL | Age: 88
Discharge: HOME | End: 2023-06-30
Attending: EMERGENCY MEDICINE
Payer: MEDICARE

## 2023-06-30 VITALS
TEMPERATURE: 99 F | HEART RATE: 72 BPM | RESPIRATION RATE: 14 BRPM | DIASTOLIC BLOOD PRESSURE: 70 MMHG | WEIGHT: 115 LBS | HEIGHT: 58 IN | OXYGEN SATURATION: 98 % | BODY MASS INDEX: 24.14 KG/M2 | SYSTOLIC BLOOD PRESSURE: 145 MMHG

## 2023-06-30 DIAGNOSIS — R53.1 WEAKNESS: Primary | ICD-10-CM

## 2023-06-30 DIAGNOSIS — K59.00 CONSTIPATION, UNSPECIFIED CONSTIPATION TYPE: ICD-10-CM

## 2023-06-30 DIAGNOSIS — E86.0 DEHYDRATION: ICD-10-CM

## 2023-06-30 LAB
ALBUMIN SERPL BCP-MCNC: 4.1 G/DL (ref 3.5–5.2)
ALP SERPL-CCNC: 61 U/L (ref 55–135)
ALT SERPL W/O P-5'-P-CCNC: 23 U/L (ref 10–44)
ANION GAP SERPL CALC-SCNC: 10 MMOL/L (ref 8–16)
AST SERPL-CCNC: 36 U/L (ref 10–40)
BASOPHILS # BLD AUTO: 0.05 K/UL (ref 0–0.2)
BASOPHILS NFR BLD: 0.7 % (ref 0–1.9)
BILIRUB SERPL-MCNC: 0.7 MG/DL (ref 0.1–1)
BILIRUB UR QL STRIP: NEGATIVE
BNP SERPL-MCNC: 223 PG/ML (ref 0–99)
BUN SERPL-MCNC: 53 MG/DL (ref 8–23)
CALCIUM SERPL-MCNC: 9.9 MG/DL (ref 8.7–10.5)
CHLORIDE SERPL-SCNC: 95 MMOL/L (ref 95–110)
CLARITY UR: CLEAR
CO2 SERPL-SCNC: 32 MMOL/L (ref 23–29)
COLOR UR: YELLOW
CREAT SERPL-MCNC: 1.1 MG/DL (ref 0.5–1.4)
DIFFERENTIAL METHOD: ABNORMAL
EOSINOPHIL # BLD AUTO: 0.5 K/UL (ref 0–0.5)
EOSINOPHIL NFR BLD: 6.2 % (ref 0–8)
ERYTHROCYTE [DISTWIDTH] IN BLOOD BY AUTOMATED COUNT: 15.3 % (ref 11.5–14.5)
EST. GFR  (NO RACE VARIABLE): 48.6 ML/MIN/1.73 M^2
GLUCOSE SERPL-MCNC: 103 MG/DL (ref 70–110)
GLUCOSE UR QL STRIP: NEGATIVE
HCT VFR BLD AUTO: 37.4 % (ref 37–48.5)
HGB BLD-MCNC: 11.8 G/DL (ref 12–16)
HGB UR QL STRIP: NEGATIVE
IMM GRANULOCYTES # BLD AUTO: 0.02 K/UL (ref 0–0.04)
IMM GRANULOCYTES NFR BLD AUTO: 0.3 % (ref 0–0.5)
KETONES UR QL STRIP: NEGATIVE
LACTATE SERPL-SCNC: 1.2 MMOL/L (ref 0.5–1.9)
LEUKOCYTE ESTERASE UR QL STRIP: NEGATIVE
LYMPHOCYTES # BLD AUTO: 1.3 K/UL (ref 1–4.8)
LYMPHOCYTES NFR BLD: 17.2 % (ref 18–48)
MAGNESIUM SERPL-MCNC: 2 MG/DL (ref 1.6–2.6)
MCH RBC QN AUTO: 27.1 PG (ref 27–31)
MCHC RBC AUTO-ENTMCNC: 31.6 G/DL (ref 32–36)
MCV RBC AUTO: 86 FL (ref 82–98)
MONOCYTES # BLD AUTO: 0.6 K/UL (ref 0.3–1)
MONOCYTES NFR BLD: 8.4 % (ref 4–15)
NEUTROPHILS # BLD AUTO: 5.1 K/UL (ref 1.8–7.7)
NEUTROPHILS NFR BLD: 67.2 % (ref 38–73)
NITRITE UR QL STRIP: NEGATIVE
NRBC BLD-RTO: 0 /100 WBC
PH UR STRIP: 7 [PH] (ref 5–8)
PLATELET # BLD AUTO: 158 K/UL (ref 150–450)
PMV BLD AUTO: 9.6 FL (ref 9.2–12.9)
POTASSIUM SERPL-SCNC: 4.3 MMOL/L (ref 3.5–5.1)
PROT SERPL-MCNC: 7.3 G/DL (ref 6–8.4)
PROT UR QL STRIP: ABNORMAL
RBC # BLD AUTO: 4.36 M/UL (ref 4–5.4)
SODIUM SERPL-SCNC: 137 MMOL/L (ref 136–145)
SP GR UR STRIP: 1.01 (ref 1–1.03)
TROPONIN I SERPL HS-MCNC: 53.7 PG/ML (ref 0–14.9)
TROPONIN I SERPL HS-MCNC: 54.5 PG/ML (ref 0–14.9)
URN SPEC COLLECT METH UR: ABNORMAL
UROBILINOGEN UR STRIP-ACNC: NEGATIVE EU/DL
WBC # BLD AUTO: 7.54 K/UL (ref 3.9–12.7)

## 2023-06-30 PROCEDURE — 84484 ASSAY OF TROPONIN QUANT: CPT | Mod: 91 | Performed by: EMERGENCY MEDICINE

## 2023-06-30 PROCEDURE — 99490 CHRNC CARE MGMT STAFF 1ST 20: CPT | Mod: S$PBB,,, | Performed by: FAMILY MEDICINE

## 2023-06-30 PROCEDURE — 63600175 PHARM REV CODE 636 W HCPCS: Performed by: EMERGENCY MEDICINE

## 2023-06-30 PROCEDURE — 83880 ASSAY OF NATRIURETIC PEPTIDE: CPT | Performed by: EMERGENCY MEDICINE

## 2023-06-30 PROCEDURE — 81003 URINALYSIS AUTO W/O SCOPE: CPT | Performed by: EMERGENCY MEDICINE

## 2023-06-30 PROCEDURE — 99285 EMERGENCY DEPT VISIT HI MDM: CPT | Mod: 25

## 2023-06-30 PROCEDURE — 87154 CUL TYP ID BLD PTHGN 6+ TRGT: CPT | Performed by: EMERGENCY MEDICINE

## 2023-06-30 PROCEDURE — 93010 ELECTROCARDIOGRAM REPORT: CPT | Mod: ,,, | Performed by: INTERNAL MEDICINE

## 2023-06-30 PROCEDURE — 83605 ASSAY OF LACTIC ACID: CPT | Performed by: EMERGENCY MEDICINE

## 2023-06-30 PROCEDURE — 83735 ASSAY OF MAGNESIUM: CPT | Performed by: EMERGENCY MEDICINE

## 2023-06-30 PROCEDURE — 80053 COMPREHEN METABOLIC PANEL: CPT | Performed by: EMERGENCY MEDICINE

## 2023-06-30 PROCEDURE — 99490 CHRNC CARE MGMT STAFF 1ST 20: CPT | Mod: PBBFAC,PN | Performed by: FAMILY MEDICINE

## 2023-06-30 PROCEDURE — 87040 BLOOD CULTURE FOR BACTERIA: CPT | Mod: 59 | Performed by: EMERGENCY MEDICINE

## 2023-06-30 PROCEDURE — 99490 PR CHRONIC CARE MGMT, 1ST 20 MIN: ICD-10-PCS | Mod: S$PBB,,, | Performed by: FAMILY MEDICINE

## 2023-06-30 PROCEDURE — 93010 EKG 12-LEAD: ICD-10-PCS | Mod: ,,, | Performed by: INTERNAL MEDICINE

## 2023-06-30 PROCEDURE — 93005 ELECTROCARDIOGRAM TRACING: CPT | Performed by: INTERNAL MEDICINE

## 2023-06-30 PROCEDURE — 96374 THER/PROPH/DIAG INJ IV PUSH: CPT | Mod: 59

## 2023-06-30 PROCEDURE — 85025 COMPLETE CBC W/AUTO DIFF WBC: CPT | Performed by: EMERGENCY MEDICINE

## 2023-06-30 PROCEDURE — 25500020 PHARM REV CODE 255: Performed by: EMERGENCY MEDICINE

## 2023-06-30 RX ORDER — HYDRALAZINE HYDROCHLORIDE 20 MG/ML
10 INJECTION INTRAMUSCULAR; INTRAVENOUS
Status: COMPLETED | OUTPATIENT
Start: 2023-06-30 | End: 2023-06-30

## 2023-06-30 RX ADMIN — IOHEXOL 100 ML: 350 INJECTION, SOLUTION INTRAVENOUS at 12:06

## 2023-06-30 RX ADMIN — HYDRALAZINE HYDROCHLORIDE 10 MG: 20 INJECTION INTRAMUSCULAR; INTRAVENOUS at 11:06

## 2023-06-30 NOTE — ED PROVIDER NOTES
Encounter Date: 6/30/2023       History     Chief Complaint   Patient presents with    Weakness     Generalized weakness for couple days states just can not get around walks with walker, states feels like her legs just don't want to hold her, denies chest pain , no n/v/d     Patient presents complaining general malaise, weakness,, decreased appetite.  Symptoms ongoing for the last 2 days.  No fever or chills.  No cough no shortness of breath no joint pain.  No vomiting.  No diarrhea.    Review of patient's allergies indicates:   Allergen Reactions    Adhesive     Nitrofurantoin macrocrystalline Nausea And Vomiting     Other reaction(s): very sickly    Penicillins Swelling     Other reaction(s): swelling  Other reaction(s): red skin discolorati    Sulfa (sulfonamide antibiotics) Nausea And Vomiting     Other reaction(s): very sickly     Past Medical History:   Diagnosis Date    Abdominal hernia     Anemia     Dr Berkowitz     Angina pectoris     Aortic valve stenosis, moderate     Back pain     Cannon's esophagus     CAD (coronary artery disease)     Cataract     ou done    CHF (congestive heart failure)     EFx 35%, Dr. Spencer 12/19/13    Chronic combined systolic and diastolic heart failure 09/28/2019    Colitis     Compression fracture     Diverticulosis     Encounter for blood transfusion     GERD (gastroesophageal reflux disease)     Hyperlipidemia     Hypertension     Irritable bowel syndrome     Myocardial infarction     Osteoarthritis     lumbar DDD    Pacemaker     Pneumonia 01/03/2017    Raynaud disease     Rheumatoid arteritis     Rheumatoid arthritis     Shingles 01/03/2017    Sjogren syndrome     Ulcerative colitis      Past Surgical History:   Procedure Laterality Date    A-V CARDIAC PACEMAKER INSERTION Left 11/8/2021    Procedure: INSERTION, CARDIAC PACEMAKER, DUAL CHAMBER  ([employer]);  Surgeon: Tera Blair MD;  Location: [facility] CATH/EP LAB;  Service: Cardiology;  Laterality: Left;     APPENDECTOMY      BACK SURGERY      CARDIAC SURGERY      CABGx3 in     CATARACT EXTRACTION      ou od d 11-15-12/     SECTION, CLASSIC      x 2    CHOLECYSTECTOMY      CLOSURE OF WOUND Right 2022    Procedure: CLOSURE-WOUND;  Surgeon: Delvis Jackson MD;  Location: [facility] OR;  Service: ENT;  Laterality: Right;  NOSE AND CHIN    COLONOSCOPY  09/15/2011    Dr Anaya     COLONOSCOPY  01/10/2017    [facility] report sent to scanning    CORONARY ANGIOPLASTY      PCI x 1 in     CORONARY ARTERY BYPASS GRAFT      3 vessel    CORONARY ARTERY BYPASS GRAFT      CYSTOSCOPY N/A 6/3/2020    Procedure: CYSTOSCOPY;  Surgeon: Miryam Bender Jr., MD;  Location: [facility] OR;  Service: Urology;  Laterality: N/A;    eyes      rk ou    FRACTURE SURGERY  2016    Dr Guido left hip     FRACTURE SURGERY  2018    Right Hip    HARVESTING OF SKIN GRAFT  2022    Procedure: SURGICAL PROCUREMENT, GRAFT, SKIN;  Surgeon: Delvis Jackson MD;  Location: [facility] OR;  Service: ENT;;  RIGHT CHEST    HYSTERECTOMY      tubal ligation, oopherectomy    INTRAMEDULLARY RODDING OF TROCHANTER OF FEMUR Right 10/8/2018    Procedure: INSERTION, INTRAMEDULLARY KELSI, FEMUR, TROCHANTER;  Surgeon: Valerio Luther MD;  Location: [facility] OR;  Service: Orthopedics;  Laterality: Right;    OOPHORECTOMY      SPINE SURGERY  2013    Silvino Mckeon    UPPER GASTROINTESTINAL ENDOSCOPY      Yag Capsulotomy Right 14     Family History   Adopted: Yes   Problem Relation Age of Onset    Heart disease Mother         aortic stenosis    Skin cancer Mother     Hypertension Father     Heart disease Father         MI    Hypertension Sister     Fibromyalgia Sister     Scleroderma Sister     Pulmonary embolism Sister     Irritable bowel syndrome Sister     Heart disease Brother         2 brothers CABG    Arthritis Brother     Crohn's disease Brother     Crohn's disease Brother     Crohn's disease Brother     Hypertension Daughter     Early death Son         accident     Lupus Cousin     Lupus Cousin     Amblyopia Neg Hx     Blindness Neg Hx     Cancer Neg Hx     Cataracts Neg Hx     Diabetes Neg Hx     Glaucoma Neg Hx     Macular degeneration Neg Hx     Retinal detachment Neg Hx     Strabismus Neg Hx     Stroke Neg Hx     Thyroid disease Neg Hx     Celiac disease Neg Hx     Cirrhosis Neg Hx     Psoriasis Neg Hx     Melanoma Neg Hx     Multiple sclerosis Neg Hx     Rheum arthritis Neg Hx     Tuberculosis Neg Hx     Lymphoma Neg Hx     Ulcerative colitis Neg Hx     Moses's disease Neg Hx     Stomach cancer Neg Hx     Rectal cancer Neg Hx     Liver disease Neg Hx     Liver cancer Neg Hx     Inflammatory bowel disease Neg Hx     Hemochromatosis Neg Hx     Esophageal cancer Neg Hx     Cystic fibrosis Neg Hx     Colon cancer Neg Hx      Social History     Tobacco Use    Smoking status: Former     Packs/day: 1.00     Years: 5.00     Pack years: 5.00     Types: Cigarettes     Quit date: 1971     Years since quittin.5    Smokeless tobacco: Never   Substance Use Topics    Alcohol use: Yes     Alcohol/week: 1.0 standard drink     Types: 1 Glasses of wine per week    Drug use: Never     Review of Systems   All other systems reviewed and are negative.    Physical Exam     Initial Vitals [23 1021]   BP Pulse Resp Temp SpO2   (!) 195/101 (!) 135 16 98.3 °F (36.8 °C) 95 %      MAP       --         Physical Exam    Nursing note and vitals reviewed.  Constitutional: She appears well-developed and well-nourished.   Pleasant, polite   HENT:   Head: Normocephalic and atraumatic.   Eyes: EOM are normal.   Neck: Neck supple.   Normal range of motion.  Cardiovascular:  Normal rate, regular rhythm, normal heart sounds and intact distal pulses.           Pulmonary/Chest: Breath sounds normal. No respiratory distress.   Abdominal: Abdomen is soft.   Musculoskeletal:         General: Normal range of motion.      Cervical back: Normal range of motion and neck supple.     Neurological: She is  alert and oriented to person, place, and time. She has normal strength.   Skin: Skin is warm and dry. Capillary refill takes less than 2 seconds.   Psychiatric: She has a normal mood and affect. Her behavior is normal. Judgment and thought content normal.       ED Course   Procedures  Labs Reviewed   CBC W/ AUTO DIFFERENTIAL - Abnormal; Notable for the following components:       Result Value    Hemoglobin 11.8 (*)     MCHC 31.6 (*)     RDW 15.3 (*)     Lymph % 17.2 (*)     All other components within normal limits   COMPREHENSIVE METABOLIC PANEL - Abnormal; Notable for the following components:    CO2 32 (*)     BUN 53 (*)     eGFR 48.6 (*)     All other components within normal limits   TROPONIN I HIGH SENSITIVITY - Abnormal; Notable for the following components:    Troponin I High Sensitivity 53.7 (*)     All other components within normal limits    Narrative:      trop  critical result(s) called and verbal readback obtained from   Shruti Rogers RN by BILL 06/30/2023 12:25   B-TYPE NATRIURETIC PEPTIDE - Abnormal; Notable for the following components:     (*)     All other components within normal limits   URINALYSIS, REFLEX TO URINE CULTURE - Abnormal; Notable for the following components:    Protein, UA Trace (*)     All other components within normal limits    Narrative:     Specimen Source->Urine   TROPONIN I HIGH SENSITIVITY - Abnormal; Notable for the following components:    Troponin I High Sensitivity 54.5 (*)     All other components within normal limits    Narrative:       trop  critical result(s) called and verbal readback obtained from   Dr Agatha burnett by BILL 06/30/2023 14:11   CULTURE, BLOOD   CULTURE, BLOOD   MAGNESIUM   LACTIC ACID, PLASMA   LACTIC ACID, PLASMA          Imaging Results              CT Abdomen Pelvis With Contrast (Final result)  Result time 06/30/23 13:21:03      Final result by Dieter Chopra MD (06/30/23 13:21:03)                   Narrative:    CMS MANDATED QUALITY DATA  - CT RADIATION  436    All CT scans at this facility utilize dose modulation, iterative reconstruction, and/or weight based dosing when appropriate to reduce radiation dose to as low as reasonably achievable.    CLINICAL HISTORY:  87 years (1935) Female Abdominal abscess/infection suspected; Abdominal pain, acute, nonlocalized    TECHNIQUE:  CT ABDOMEN PELVIS WITH IV CONTRAST. Axial CT images of the abdomen and pelvis were obtained from the dome of the diaphragm to the proximal thigh.    COMPARISON:  CT from March 17, 2023.    FINDINGS:.  Lower Thorax:  Please see the dedicated CT performed concurrently and dictated separately.    CT Abdomen:  Liver: The liver is normal in size and imaging appearance.  Gallbladder: Not visualized.  Biliary Tree: No intra or extrahepatic ductal dilation.  Spleen: Within normal limits.  Pancreas: The pancreas is normal.  Adrenal Glands: The adrenal glands appear within normal limits.  Kidneys: No hydronephrosis/hydroureter or evidence of obstructive uropathy. Rounded mixed attenuation structures are seen in both kidneys, similar to the previous exam, technically indeterminate, some of which measure soft tissue attenuation, but favored to represent a combination of simple, hemorrhagic and proteinaceous cyst. There is a 3 mm nonobstructing stone in the midpole the right kidney.  Vasculature: Calcified plaques are seen in the abdominal aorta and iliacs with no aneurysm.  Lymph nodes: No abdominal lymphadenopathy is seen.  Intraperitoneal structures: There is no ascites.  Bowel: Moderate volume of stool and gas in the colon with a nonobstructive bowel gas pattern. Scattered colonic diverticula are present without focal diverticulitis.  Abdominal wall: The abdominal wall and musculature are normal.  Musculoskeletal: Advanced degenerative changes seen about the visualized thoracolumbar spine with multilevel compression deformity and vertebroplasty cement in the lumbar spine, similar  in appearance to the previous exam. Bilateral joint ORIF. Neural stimulator device box is seen in subcutaneous soft tissues of the left gluteal region. There is an old healed left inferior pubic ramus/pubic symphyseal fracture. There is diffusely decreased osseous mineralization (suggesting osteoporosis/osteopenia).    CT Pelvis:  Bladder: The urinary bladder is within normal limits.  Reproductive Organs: The uterus is not visualized/surgically absent.  Pelvic Lymph nodes: No pelvic lymphadenopathy or mass is identified.    IMPRESSION:  1. Moderate volume of stool and gas in the colon with a nonobstructive bowel gas pattern, consider correlation for constipation.  2. Scattered colonic diverticula without focal diverticulitis.  3. Nonobstructing 4 mm stone in the interpolar aspect of the right kidney.  4. Numerous additional, and incidental findings as noted above, similar to the previous exam.                  CMS MANDATED QUALITY DATA- INCIDENTAL ABDOMINAL RENAL - 405    INCIDENTAL RENAL LESION:  CONTRAST CT  TSTC, Homogeneous  Likely benign cyst not fully characterized. No further workup.  Inconclusive based on subjective evaluation = Indeterminate  Homogeneous: thin or imperceptible wall, no mural nodule septa or calcification  -10 to 20 HU Likely benign cyst. No further workup.  >20 HU Solid or complicated cystic mass. Indeterminate.  Heterogeneous: Thick or irregular wall, mural nodule, septal or calcification. Indeterminate.    TSTC = too small to characterize = less than twice the slice thickness.    RECOMMENDATION:  INDETERMINATE: Further characterization of the incidental abdominal lesion is recommended. Recommend nonemergent renal protocol MRI/CT with and without contrast.    Recommendations based on:  Management of the Incidental Renal Mass on CT: A White Paper of the ACR ICF  J Am Lidia Radiol 2018;15:264-273. 2017 American College of Radiology    Electronically signed by:  Dieter Chopra MD  6/30/2023  1:21 PM CDT Workstation: [id number]                                     CTA Chest Non-Coronary (PE Studies) (Final result)  Result time 06/30/23 13:21:05      Final result by Dieter Harrington Jr., MD (06/30/23 13:21:05)                   Narrative:    CMS MANDATED QUALITY DATA-CT RADIATION-436    HISTORY: Document history of suspected pulmonary embolism. High probability.    TECHNIQUE: Thin axial imaging through the chest was performed with 100 mL of Omnipaque 350 IV contrast, with sagittal and coronal images, MIPS, and or 3D reconstructions performed. All CT exams at this facility use dose modulation, iterative reconstruction, and or weight based dosing when appropriate to reduce radiation dose to as low as reasonably achievable.    FINDINGS: Indwelling pacemaker. Examination is of diagnostic quality as there is normal opacification of the pulmonary arterial tree out to the tertiary order branch vessels without evidence of pruning, truncation, webbing the pulmonary arterial system. Mediastinal structures demonstrate evidence of mild atheromatous calcifications projecting over the arch of aorta without evidence of aneurysmal expansion and descending thoracic aorta is tortuous and CT appearance. The cardiac chambers maintain normal size and overall configuration without evidence of significant enlargement. There is no evidence of substantial pericardial fluid. Median sternotomy wires are noted as the consequence of previous coronary artery bypass surgery. Prominent atheromatous calcifications involving all major coronary vessels. Lungs are mildly emphysematous without evidence of mass, infiltrate, or significant pleural effusion. There is no significant occlusion of the airway.    Abnormal thoracic spine with evidence of marked kyphosis, osteosclerosis, and diffuse metastatic disease of the thoracic convexity with multiple osteoporotic compression fracture primarily upper the central levels with primarily middle  and anterior column involvement. The lower cervical segments are spared.    Upper abdominal cavity demonstrates evidence of fairly prominent exophytic lesion arising from the posterior cortex of the left kidney similar superior hyperdense a benign hemorrhagic cyst. Prominent plaque deposition noted at the origin point of the celiac artery.    IMPRESSION:  1. No CT evidence of acute pulmonary thromboembolism.  2. Extensive metastatic disease entire thoracic spine with multiple osteoporotic compression fractures of the mid to lower thoracic segments with primary involving the anterior and middle column involvement with at least 50% a functional loss of the vertebral body heights at the lower thoracic and upper lumbar levels..    Electronically signed by:  Dieter Harrington MD  6/30/2023 1:21 PM CDT Workstation: [id number]                                     X-Ray Chest AP Portable (Final result)  Result time 06/30/23 11:20:56      Final result by Shawn Bustillos MD (06/30/23 11:20:56)                   Narrative:    Reason: Chest Pain    FINDINGS:    Portable chest with comparison chest x-ray February 4, 2023 show normal cardiomediastinal silhouette. Median sternotomy wires and left dual-lead cardiac pacemaker again noted.  Linear opacities of the left costophrenic angle reflect atelectasis or scarring. The lungs are otherwise clear. Pulmonary vasculature is normal. No acute osseous abnormality. Multilevel kyphoplasty changes of the thoracolumbar spine.    IMPRESSION:    No acute cardiopulmonary abnormality.    Electronically signed by:  Shawn Bustillos DO  6/30/2023 11:20 AM CDT Workstation: [id number]                                     Medications   hydrALAZINE injection 10 mg (10 mg Intravenous Given 6/30/23 1145)   iohexoL (OMNIPAQUE 350) injection 100 mL (100 mLs Intravenous Given 6/30/23 1242)     Medical Decision Making:   Initial Assessment:   Patient is in no distress  Differential Diagnosis:   Considerations  include but are not limited to hypertensive emergency, acute kidney injury, dehydration, electrolyte abnormalities, acute coronary syndrome, congestive heart failure, pulmonary embolism, sepsis, obstruction, colitis  Clinical Tests:   Lab Tests: Reviewed and Ordered  Radiological Study: Ordered and Reviewed  Medical Tests: Reviewed and Ordered  ED Management:  MDM    Patient presents for emergent evaluation of acute weakness that poses a threat to life and/or bodily function.    In the ED patient found to have acute weakness, constipation.    I ordered labs and personally reviewed them.  Labs significant for no acute abnormality, troponin elevated but no change compared to previous actually improved, no delta, BNP chronically elevated.  I ordered X-rays and personally reviewed them and reviewed the radiologist interpretation.  Xray significant for no acute cardiopulmonary process.    I ordered EKG and personally reviewed it.  EKG significant for regular rate and rhythm without ST elevation, chronic ST changes compared to previous in 1 and aVL.    I ordered CT scan and personally reviewed it and reviewed the radiologist interpretation.  CT significant for no evidence of pulmonary embolism or obstruction.      Discharge MDM    In the emergency department patient found to have no evidence of any acute emergent medical problems.  Patient had significant workup in the emergency department including CT scan of abdomen pelvis and chest as well as broad blood work.  No evidence of any overt organ failure.  Blood pressure steadily improved without intervention in the emergency department.  No evidence of sepsis.  Patient was reassured.  She was advised she does have some constipation was advised to increase fiber in her diet including dark fruit.  Patient will be discharged in stable condition.  Patient was discharged in stable condition.  Detailed return precautions discussed.                        Clinical Impression:    Final diagnoses:  [R53.1] Weakness (Primary)  [E86.0] Dehydration  [K59.00] Constipation, unspecified constipation type        ED Disposition Condition    Discharge Stable          ED Prescriptions    None       Follow-up Information       Follow up With Specialties Details Why Contact Info    Romeo Alas MD Family Medicine Schedule an appointment as soon as possible for a visit in 2 days  [address] [facility] [address]  [place] [address]  [phone]               Giancarlo Snider MD  06/30/23 1425

## 2023-07-01 LAB
ACINETOBACTER CALCOACETICUS/BAUMANNII COMPLEX: NOT DETECTED
BACTEROIDES FRAGILIS: NOT DETECTED
CANDIDA ALBICANS: NOT DETECTED
CANDIDA AURIS: NOT DETECTED
CANDIDA GLABRATA: NOT DETECTED
CANDIDA KRUSEI: NOT DETECTED
CANDIDA PARAPSILOSIS: NOT DETECTED
CANDIDA TROPICALIS: NOT DETECTED
CRYPTOCOCCUS NEOFORMANS/GATTII: NOT DETECTED
CTX-M GENE: ABNORMAL
ENTEROBACTER CLOACAE COMPLEX: NOT DETECTED
ENTEROBACTERALES: NOT DETECTED
ENTEROCOCCUS FAECALIS: NOT DETECTED
ENTEROCOCCUS FAECIUM: NOT DETECTED
ESCHERICHIA COLI: NOT DETECTED
HAEMOPHILUS INFLUENZAE: NOT DETECTED
IMP GENE: ABNORMAL
KLEBSIELLA AEROGENES: NOT DETECTED
KLEBSIELLA OXYTOCA: NOT DETECTED
KLEBSIELLA PNEUMONIAE GROUP: NOT DETECTED
KPC: ABNORMAL
LISTERIA MONOCYTOGENES: NOT DETECTED
MCR-1: ABNORMAL
MEC A/C AND MREJ (MRSA): ABNORMAL
MEC A/C: NOT DETECTED
NDM GENE: ABNORMAL
NEISSERIA MENINGITIDIS: NOT DETECTED
OXA-48-LIKE: ABNORMAL
PROTEUS SPECIES: NOT DETECTED
PSEUDOMONAS AERUGINOSA: NOT DETECTED
SALMONELLA SP: NOT DETECTED
SERRATIA MARCESCENS: NOT DETECTED
STAPHYLOCOCCUS AUREUS: NOT DETECTED
STAPHYLOCOCCUS EPIDERMIDIS: DETECTED
STAPHYLOCOCCUS LUGDUNESIS: NOT DETECTED
STAPHYLOCOCCUS SPECIES: ABNORMAL
STENOTROPHOMONAS MALTOPHILIA: NOT DETECTED
STREPTOCOCCUS AGALACTIAE: NOT DETECTED
STREPTOCOCCUS PNEUMONIAE: NOT DETECTED
STREPTOCOCCUS PYOGENES: NOT DETECTED
STREPTOCOCCUS SPECIES: NOT DETECTED
VAN A/B: ABNORMAL
VIM GENE: ABNORMAL

## 2023-07-02 LAB
BACTERIA BLD CULT: ABNORMAL

## 2023-07-05 LAB — BACTERIA BLD CULT: NORMAL

## 2023-07-06 NOTE — PROGRESS NOTES
I personally called and spoke to the patient she is aware of her blood culture results she states that she has not had any fever she reports that she still has some mild weakness however she states this has been going on prior to her visit in the emergency department states she is an appointment to see Dr. Blair I did tell the patient that she can return to the emergency department she still feels bad for further evaluation and treatment

## 2023-07-07 LAB
BASOPHILS # BLD AUTO: 0 X10E3/UL (ref 0–0.2)
BASOPHILS NFR BLD AUTO: 1 %
BNP SERPL-MCNC: 338.7 PG/ML (ref 0–100)
BUN SERPL-MCNC: 23 MG/DL (ref 8–27)
BUN/CREAT SERPL: 26 (ref 12–28)
CALCIUM SERPL-MCNC: 8.9 MG/DL (ref 8.7–10.3)
CHLORIDE SERPL-SCNC: 103 MMOL/L (ref 96–106)
CO2 SERPL-SCNC: 27 MMOL/L (ref 20–29)
CREAT SERPL-MCNC: 0.9 MG/DL (ref 0.57–1)
EOSINOPHIL # BLD AUTO: 0.5 X10E3/UL (ref 0–0.4)
EOSINOPHIL NFR BLD AUTO: 9 %
ERYTHROCYTE [DISTWIDTH] IN BLOOD BY AUTOMATED COUNT: 14.2 % (ref 11.7–15.4)
EST. GFR  (NO RACE VARIABLE): 62 ML/MIN/1.73
GLUCOSE SERPL-MCNC: 97 MG/DL (ref 70–99)
HCT VFR BLD AUTO: 35.8 % (ref 34–46.6)
HGB BLD-MCNC: 11.1 G/DL (ref 11.1–15.9)
IMM GRANULOCYTES # BLD AUTO: 0 X10E3/UL (ref 0–0.1)
IMM GRANULOCYTES NFR BLD AUTO: 0 %
LYMPHOCYTES # BLD AUTO: 1.2 X10E3/UL (ref 0.7–3.1)
LYMPHOCYTES NFR BLD AUTO: 19 %
MAGNESIUM SERPL-MCNC: 2.4 MG/DL (ref 1.6–2.3)
MCH RBC QN AUTO: 27.5 PG (ref 26.6–33)
MCHC RBC AUTO-ENTMCNC: 31 G/DL (ref 31.5–35.7)
MCV RBC AUTO: 89 FL (ref 79–97)
MONOCYTES # BLD AUTO: 0.4 X10E3/UL (ref 0.1–0.9)
MONOCYTES NFR BLD AUTO: 7 %
NEUTROPHILS # BLD AUTO: 3.8 X10E3/UL (ref 1.4–7)
NEUTROPHILS NFR BLD AUTO: 64 %
PLATELET # BLD AUTO: 149 X10E3/UL (ref 150–450)
POTASSIUM SERPL-SCNC: 5.6 MMOL/L (ref 3.5–5.2)
RBC # BLD AUTO: 4.03 X10E6/UL (ref 3.77–5.28)
SODIUM SERPL-SCNC: 140 MMOL/L (ref 134–144)
WBC # BLD AUTO: 6 X10E3/UL (ref 3.4–10.8)

## 2023-07-12 ENCOUNTER — OFFICE VISIT (OUTPATIENT)
Dept: CARDIOLOGY | Facility: CLINIC | Age: 88
End: 2023-07-12
Payer: MEDICARE

## 2023-07-12 VITALS
DIASTOLIC BLOOD PRESSURE: 70 MMHG | WEIGHT: 112.75 LBS | HEIGHT: 58 IN | OXYGEN SATURATION: 98 % | HEART RATE: 82 BPM | SYSTOLIC BLOOD PRESSURE: 140 MMHG | BODY MASS INDEX: 23.67 KG/M2

## 2023-07-12 DIAGNOSIS — I10 ESSENTIAL HYPERTENSION: ICD-10-CM

## 2023-07-12 DIAGNOSIS — I25.118 CORONARY ARTERY DISEASE OF NATIVE ARTERY OF NATIVE HEART WITH STABLE ANGINA PECTORIS: ICD-10-CM

## 2023-07-12 DIAGNOSIS — I42.0 CONGESTIVE CARDIOMYOPATHY: ICD-10-CM

## 2023-07-12 DIAGNOSIS — I50.42 CHRONIC COMBINED SYSTOLIC AND DIASTOLIC HEART FAILURE: ICD-10-CM

## 2023-07-12 DIAGNOSIS — N18.31 STAGE 3A CHRONIC KIDNEY DISEASE: Primary | ICD-10-CM

## 2023-07-12 DIAGNOSIS — I35.0 AORTIC VALVE STENOSIS, MODERATE: ICD-10-CM

## 2023-07-12 DIAGNOSIS — I47.10 PSVT (PAROXYSMAL SUPRAVENTRICULAR TACHYCARDIA): ICD-10-CM

## 2023-07-12 PROCEDURE — 99214 PR OFFICE/OUTPT VISIT, EST, LEVL IV, 30-39 MIN: ICD-10-PCS | Mod: S$PBB,,, | Performed by: INTERNAL MEDICINE

## 2023-07-12 PROCEDURE — 99999 PR PBB SHADOW E&M-EST. PATIENT-LVL III: CPT | Mod: PBBFAC,,, | Performed by: INTERNAL MEDICINE

## 2023-07-12 PROCEDURE — 99213 OFFICE O/P EST LOW 20 MIN: CPT | Mod: PBBFAC,PN | Performed by: INTERNAL MEDICINE

## 2023-07-12 PROCEDURE — 99214 OFFICE O/P EST MOD 30 MIN: CPT | Mod: S$PBB,,, | Performed by: INTERNAL MEDICINE

## 2023-07-12 PROCEDURE — 99999 PR PBB SHADOW E&M-EST. PATIENT-LVL III: ICD-10-PCS | Mod: PBBFAC,,, | Performed by: INTERNAL MEDICINE

## 2023-07-12 NOTE — PROGRESS NOTES
Patient ID:  Cheyanne Gomes is a 87 y.o. female who presents for follow-up of Hospital Follow Up (Er), Congestive Heart Failure, and Results (labs)      She had to go to the emergency room at the end of last month her heart rate and blood pressure were very elevated.  In the emergency room her troponin levels and BNP were stable for her.  She was discharged home.  Her main complaint now is that she is having a lot of leg cramps in her ankles and in her legs.  She has been on atorvastatin.  Will discontinue atorvastatin for few weeks and there is improvement of her symptoms.  The blood pressure taking by me was 168/80 she is to take amlodipine 2.5 q.a.m. and 5 mg q.p.m..      Past Medical History:   Diagnosis Date    Abdominal hernia     Anemia     Dr Berkowitz     Angina pectoris     Aortic valve stenosis, moderate     Back pain     Cannon's esophagus     CAD (coronary artery disease)     Cataract     ou done    CHF (congestive heart failure)     EFx 35%, Dr. Spencer 13    Chronic combined systolic and diastolic heart failure 2019    Colitis     Compression fracture     Diverticulosis     Encounter for blood transfusion     GERD (gastroesophageal reflux disease)     Hyperlipidemia     Hypertension     Irritable bowel syndrome     Myocardial infarction     Osteoarthritis     lumbar DDD    Pacemaker     Pneumonia 2017    Raynaud disease     Rheumatoid arteritis     Rheumatoid arthritis     Shingles 2017    Sjogren syndrome     Ulcerative colitis         Past Surgical History:   Procedure Laterality Date    A-V CARDIAC PACEMAKER INSERTION Left 2021    Procedure: INSERTION, CARDIAC PACEMAKER, DUAL CHAMBER  ([employer]TRONIC);  Surgeon: Tera Blair MD;  Location: [facility] CATH/EP LAB;  Service: Cardiology;  Laterality: Left;    APPENDECTOMY      BACK SURGERY      CARDIAC SURGERY      CABGx3 in     CATARACT EXTRACTION      ou od d 11-15-12//     SECTION, CLASSIC      x 2     CHOLECYSTECTOMY      CLOSURE OF WOUND Right 9/23/2022    Procedure: CLOSURE-WOUND;  Surgeon: Delvis Jackson MD;  Location: [facility] OR;  Service: ENT;  Laterality: Right;  NOSE AND CHIN    COLONOSCOPY  09/15/2011    Dr Anaya     COLONOSCOPY  01/10/2017    [facility] report sent to scanning    CORONARY ANGIOPLASTY      PCI x 1 in 2007    CORONARY ARTERY BYPASS GRAFT      3 vessel    CORONARY ARTERY BYPASS GRAFT      CYSTOSCOPY N/A 6/3/2020    Procedure: CYSTOSCOPY;  Surgeon: Miryam Bender Jr., MD;  Location: [facility] OR;  Service: Urology;  Laterality: N/A;    eyes      rk ou    FRACTURE SURGERY  06/21/2016    Dr Guido left hip     FRACTURE SURGERY  2018    Right Hip    HARVESTING OF SKIN GRAFT  9/23/2022    Procedure: SURGICAL PROCUREMENT, GRAFT, SKIN;  Surgeon: Delvis Jackson MD;  Location: [facility] OR;  Service: ENT;;  RIGHT CHEST    HYSTERECTOMY      tubal ligation, oopherectomy    INTRAMEDULLARY RODDING OF TROCHANTER OF FEMUR Right 10/8/2018    Procedure: INSERTION, INTRAMEDULLARY KELSI, FEMUR, TROCHANTER;  Surgeon: Valerio Luther MD;  Location: [facility] OR;  Service: Orthopedics;  Laterality: Right;    OOPHORECTOMY      SPINE SURGERY  8-    Silvino Sandsen    UPPER GASTROINTESTINAL ENDOSCOPY      Yag Capsulotomy Right 9/25/14          Current Outpatient Medications   Medication Instructions    amLODIPine (NORVASC) 5 MG tablet TAKE 1 TABLET BY MOUTH 2 TIMES A DAY    aspirin 81 mg Tab 1 tablet, Oral, Nightly, Every day    atorvastatin (LIPITOR) 40 mg, Oral, Daily    biotin 5 mg Cap 1 tablet, Oral, Daily    CALCIUM CARB/VIT D3/MINERALS (CALCIUM-VITAMIN D ORAL) 600 mg, Oral, 2 times daily, Calcium 1200 with D 3 1000    CRANBERRY CONC/ASCORBIC ACID (CRANBERRY PLUS VITAMIN C ORAL) 1 capsule, Oral, Daily    DEXILANT 60 mg, Oral, Daily    fluticasone (FLONASE) 50 mcg/actuation nasal spray 2 sprays, Each Nostril, Daily    folic acid (FOLVITE) 2 mg, Oral, Daily    furosemide (LASIX) 80 mg, Oral, Daily    gabapentin (NEURONTIN) 200 mg,  Oral, Nightly    HYDROmorphone (DILAUDID) 2 mg, Oral, Every 4 hours PRN    isosorbide dinitrate (ISORDIL) 10 MG tablet TAKE ONE TABLET BY MOUTH THREE TIMES DAILY    Lactobacillus acidophilus 10 billion cell Cap 1 each, Oral, Daily, 1.5 billion    magnesium oxide (MAG-OX) 400 mg (241.3 mg magnesium) tablet TAKE 1 TABLET BY MOUTH 2 TIMES A DAY    metOLazone (ZAROXOLYN) 2.5 mg, Oral, Daily    metoprolol succinate (TOPROL-XL) 25 mg, Oral, See admin instructions, 25 mg QAM and 12.5 mg QHS    MULTIVITAMIN WITH MINERALS (ONE-A-DAY 50 PLUS) Tab 1 tablet, Oral, Daily, Every day    nitroGLYCERIN 0.4 MG/DOSE TL SPRY (NITROLINGUAL) 400 mcg/spray spray 1 spray, Sublingual, Every 5 min PRN, PRN    omega-3 fatty acids/fish oil (FISH OIL-OMEGA-3 FATTY ACIDS) 300-1,000 mg capsule 1 capsule, Oral, Daily    ondansetron (ZOFRAN) 4 mg, Oral, Every 8 hours PRN    potassium chloride SA (K-DUR,KLOR-CON M) 10 MEQ tablet TAKE TWO TABLETS BY MOUTH EVERY MORNING AND 1 TABLET EVERY EVENING    promethazine (PHENERGAN) 25 mg, Oral, 2 times daily PRN    sacubitriL-valsartan (ENTRESTO)  mg per tablet 1 tablet, Oral, 2 times daily    sertraline (ZOLOFT) 100 mg, Oral, Daily    traMADoL (ULTRAM) 50 mg, Oral, 2 times daily PRN    vitamin E 400 mg, Oral, Daily, Every day        Review of patient's allergies indicates:   Allergen Reactions    Adhesive     Nitrofurantoin macrocrystalline Nausea And Vomiting     Other reaction(s): very sickly    Penicillins Swelling     Other reaction(s): swelling  Other reaction(s): red skin discolorati    Sulfa (sulfonamide antibiotics) Nausea And Vomiting     Other reaction(s): very sickly        Review of Systems   Cardiovascular:  Positive for dyspnea on exertion. Negative for chest pain, leg swelling and palpitations.   Respiratory:  Negative for cough and shortness of breath.    Musculoskeletal:  Positive for muscle cramps.      Objective:     Vitals:    07/12/23 0845   BP: (!) 140/70   BP Location: Left arm  "  Patient Position: Sitting   BP Method: Medium (Manual)   Pulse: 82   SpO2: 98%   Weight: 51.1 kg (112 lb 12.2 oz)   Height: 4' 10" (1.473 m)       Physical Exam  Vitals and nursing note reviewed.   Constitutional:       Appearance: She is well-developed.   HENT:      Head: Normocephalic and atraumatic.   Eyes:      Conjunctiva/sclera: Conjunctivae normal.   Cardiovascular:      Rate and Rhythm: Normal rate and regular rhythm.      Heart sounds: Murmur (Grade 3/6 systolic murmur at the base) heard.   Pulmonary:      Effort: Pulmonary effort is normal.      Breath sounds: Normal breath sounds.   Abdominal:      General: Bowel sounds are normal.      Palpations: Abdomen is soft.   Musculoskeletal:         General: Normal range of motion.   Skin:     General: Skin is warm and dry.   Neurological:      Mental Status: She is alert and oriented to person, place, and time.   Psychiatric:         Behavior: Behavior normal.         Thought Content: Thought content normal.         Judgment: Judgment normal.     CMP  Sodium   Date Value Ref Range Status   07/06/2023 140 134 - 144 mmol/L Final   12/11/2018 139 134 - 144 mmol/L      Potassium   Date Value Ref Range Status   07/06/2023 5.6 (H) 3.5 - 5.2 mmol/L Final     Chloride   Date Value Ref Range Status   07/06/2023 103 96 - 106 mmol/L Final   12/11/2018 96 (L) 98 - 110 mmol/L      CO2   Date Value Ref Range Status   07/06/2023 27 20 - 29 mmol/L Final     Glucose   Date Value Ref Range Status   07/06/2023 97 70 - 99 mg/dL Final   12/11/2018 97 70 - 99 mg/dL      BUN   Date Value Ref Range Status   07/06/2023 23 8 - 27 mg/dL Final     Creatinine   Date Value Ref Range Status   07/06/2023 0.90 0.57 - 1.00 mg/dL Final   12/11/2018 0.95 0.60 - 1.40 mg/dL      Calcium   Date Value Ref Range Status   07/06/2023 8.9 8.7 - 10.3 mg/dL Final     Total Protein   Date Value Ref Range Status   06/30/2023 7.3 6.0 - 8.4 g/dL Final     Albumin   Date Value Ref Range Status   06/30/2023 4.1 " 3.5 - 5.2 g/dL Final   12/11/2018 3.9 3.1 - 4.7 g/dL      Total Bilirubin   Date Value Ref Range Status   06/30/2023 0.7 0.1 - 1.0 mg/dL Final     Comment:     For infants and newborns, interpretation of results should be based  on gestational age, weight and in agreement with clinical  observations.    Premature Infant recommended reference ranges:  Up to 24 hours.............<8.0 mg/dL  Up to 48 hours............<12.0 mg/dL  3-5 days..................<15.0 mg/dL  6-29 days.................<15.0 mg/dL       Alkaline Phosphatase   Date Value Ref Range Status   06/30/2023 61 55 - 135 U/L Final     AST   Date Value Ref Range Status   06/30/2023 36 10 - 40 U/L Final     ALT   Date Value Ref Range Status   06/30/2023 23 10 - 44 U/L Final     Anion Gap   Date Value Ref Range Status   06/30/2023 10 8 - 16 mmol/L Final     eGFR if    Date Value Ref Range Status   11/05/2021 >60.0 >60 mL/min/1.73 m^2 Final     eGFR if non    Date Value Ref Range Status   02/21/2022 56 (L) >59 mL/min/1.73 Final      BMP  Lab Results   Component Value Date     07/06/2023    K 5.6 (H) 07/06/2023     07/06/2023    CO2 27 07/06/2023    BUN 23 07/06/2023    CREATININE 0.90 07/06/2023    CALCIUM 8.9 07/06/2023    ANIONGAP 10 06/30/2023    ESTGFRAFRICA >60.0 11/05/2021    EGFRNONAA 56 (L) 02/21/2022      BNP  @LABRCNTIP(BNP,BNPTRIAGEBLO)@   Lab Results   Component Value Date    CHOL 146 10/04/2022    CHOL 113 05/06/2021    CHOL 135 11/24/2020     Lab Results   Component Value Date    HDL 52 10/04/2022    HDL 47 05/06/2021    HDL 61 11/24/2020     Lab Results   Component Value Date    LDLCALC 75 10/04/2022    LDLCALC 51 05/06/2021    LDLCALC 55 11/24/2020     Lab Results   Component Value Date    TRIG 104 10/04/2022    TRIG 70 05/06/2021    TRIG 108 11/24/2020     Lab Results   Component Value Date    CHOLHDL 26.2 08/24/2020    CHOLHDL 45.0 10/16/2013    CHOLHDL 40.7 08/11/2009      Lab Results   Component  Value Date    TSH 2.105 08/22/2022    FREET4 0.89 09/26/2012     Lab Results   Component Value Date    HGBA1C 5.3 02/26/2015     Lab Results   Component Value Date    WBC 6.0 07/06/2023    HGB 11.1 07/06/2023    HCT 35.8 07/06/2023    MCV 89 07/06/2023     (L) 07/06/2023         Results for orders placed during the hospital encounter of 04/28/22    Echo    Interpretation Summary  · The left ventricle is normal in size with concentric remodeling and mildly decreased systolic function.  · The estimated ejection fraction is 40%.  · Grade I left ventricular diastolic dysfunction.  · There are segmental left ventricular wall motion abnormalities.  · Apical akinesis  · Normal right ventricular size with normal right ventricular systolic function.  · Mild aortic regurgitation.  · There is moderate aortic valve stenosis.  · Aortic valve area is 1.15 cm2; peak velocity is 2.12 m/s; mean gradient is 18 mmHg.  · Mild tricuspid regurgitation.  · Normal central venous pressure (3 mmHg).  · The estimated PA systolic pressure is 24 mmHg.  · Overall the study quality was technically difficult.     No results found for this or any previous visit.         Assessment:       Coronary artery disease of native artery of native heart with stable angina pectoris  Stable no symptoms of angina    Hypertension  Not well controlled she is to take amlodipine 2.5 mg q.a.m. 5 mg q.p.m.    PSVT (paroxysmal supraventricular tachycardia)  On 25 mg of metoprolol.  Checking the pacemaker she is having episodes of PSVT might require higher doses of metoprolol.    Chronic combined systolic and diastolic heart failure  She appears to be compensated today    Aortic valve stenosis, moderate  Stable       Plan:       Discontinue atorvastatin for approximately 2 weeks and see if her cramps resolved.  Take amlodipine 2.5 mg q.a.m. 5 mg q.p.m. due to hypertension  Discontinue potassium due to hyperkalemia  She takes metolazone  p.r.n. leg edema.  The  days that she takes the medication she is to take potassium pills  She will be seen in the office in 1 month with a BMP CBC and BNP

## 2023-07-12 NOTE — ASSESSMENT & PLAN NOTE
On 25 mg of metoprolol.  Checking the pacemaker she is having episodes of PSVT might require higher doses of metoprolol.

## 2023-07-28 RX ORDER — METOLAZONE 2.5 MG/1
2.5 TABLET ORAL DAILY
Qty: 30 TABLET | Refills: 0 | Status: SHIPPED | OUTPATIENT
Start: 2023-07-28 | End: 2023-09-13

## 2023-07-31 ENCOUNTER — EXTERNAL CHRONIC CARE MANAGEMENT (OUTPATIENT)
Dept: PRIMARY CARE CLINIC | Facility: CLINIC | Age: 88
End: 2023-07-31
Payer: MEDICARE

## 2023-07-31 PROCEDURE — 99490 PR CHRONIC CARE MGMT, 1ST 20 MIN: ICD-10-PCS | Mod: S$PBB,,, | Performed by: FAMILY MEDICINE

## 2023-07-31 PROCEDURE — 99490 CHRNC CARE MGMT STAFF 1ST 20: CPT | Mod: PBBFAC,PN | Performed by: FAMILY MEDICINE

## 2023-07-31 PROCEDURE — 99490 CHRNC CARE MGMT STAFF 1ST 20: CPT | Mod: S$PBB,,, | Performed by: FAMILY MEDICINE

## 2023-08-01 ENCOUNTER — PES CALL (OUTPATIENT)
Dept: ADMINISTRATIVE | Facility: CLINIC | Age: 88
End: 2023-08-01
Payer: MEDICARE

## 2023-08-10 LAB
BNP SERPL-MCNC: 327.6 PG/ML (ref 0–100)
BUN SERPL-MCNC: 43 MG/DL (ref 8–27)
BUN/CREAT SERPL: 32 (ref 12–28)
CALCIUM SERPL-MCNC: 9.5 MG/DL (ref 8.7–10.3)
CHLORIDE SERPL-SCNC: 90 MMOL/L (ref 96–106)
CO2 SERPL-SCNC: 31 MMOL/L (ref 20–29)
CREAT SERPL-MCNC: 1.34 MG/DL (ref 0.57–1)
ERYTHROCYTE [DISTWIDTH] IN BLOOD BY AUTOMATED COUNT: 14.9 % (ref 11.7–15.4)
EST. GFR  (NO RACE VARIABLE): 38 ML/MIN/1.73
GLUCOSE SERPL-MCNC: 163 MG/DL (ref 70–99)
HCT VFR BLD AUTO: 33.2 % (ref 34–46.6)
HGB BLD-MCNC: 10.4 G/DL (ref 11.1–15.9)
MCH RBC QN AUTO: 27.2 PG (ref 26.6–33)
MCHC RBC AUTO-ENTMCNC: 31.3 G/DL (ref 31.5–35.7)
MCV RBC AUTO: 87 FL (ref 79–97)
PLATELET # BLD AUTO: 171 X10E3/UL (ref 150–450)
POTASSIUM SERPL-SCNC: 4.1 MMOL/L (ref 3.5–5.2)
RBC # BLD AUTO: 3.83 X10E6/UL (ref 3.77–5.28)
SODIUM SERPL-SCNC: 136 MMOL/L (ref 134–144)
WBC # BLD AUTO: 5.9 X10E3/UL (ref 3.4–10.8)

## 2023-08-14 ENCOUNTER — OFFICE VISIT (OUTPATIENT)
Dept: CARDIOLOGY | Facility: CLINIC | Age: 88
End: 2023-08-14
Payer: MEDICARE

## 2023-08-14 VITALS
RESPIRATION RATE: 16 BRPM | HEART RATE: 71 BPM | BODY MASS INDEX: 24.48 KG/M2 | OXYGEN SATURATION: 98 % | HEIGHT: 58 IN | WEIGHT: 116.63 LBS | DIASTOLIC BLOOD PRESSURE: 70 MMHG | SYSTOLIC BLOOD PRESSURE: 126 MMHG

## 2023-08-14 DIAGNOSIS — I47.10 PSVT (PAROXYSMAL SUPRAVENTRICULAR TACHYCARDIA): ICD-10-CM

## 2023-08-14 DIAGNOSIS — I25.118 CORONARY ARTERY DISEASE OF NATIVE ARTERY OF NATIVE HEART WITH STABLE ANGINA PECTORIS: ICD-10-CM

## 2023-08-14 DIAGNOSIS — I35.0 AORTIC VALVE STENOSIS, MODERATE: ICD-10-CM

## 2023-08-14 DIAGNOSIS — N18.31 STAGE 3A CHRONIC KIDNEY DISEASE: Primary | ICD-10-CM

## 2023-08-14 DIAGNOSIS — E78.2 MIXED HYPERLIPIDEMIA: ICD-10-CM

## 2023-08-14 DIAGNOSIS — I10 ESSENTIAL HYPERTENSION: ICD-10-CM

## 2023-08-14 PROCEDURE — 99214 OFFICE O/P EST MOD 30 MIN: CPT | Mod: S$PBB,,, | Performed by: INTERNAL MEDICINE

## 2023-08-14 PROCEDURE — 99213 OFFICE O/P EST LOW 20 MIN: CPT | Mod: PBBFAC,PN | Performed by: INTERNAL MEDICINE

## 2023-08-14 PROCEDURE — 99999 PR PBB SHADOW E&M-EST. PATIENT-LVL III: CPT | Mod: PBBFAC,,, | Performed by: INTERNAL MEDICINE

## 2023-08-14 PROCEDURE — 99999 PR PBB SHADOW E&M-EST. PATIENT-LVL III: ICD-10-PCS | Mod: PBBFAC,,, | Performed by: INTERNAL MEDICINE

## 2023-08-14 PROCEDURE — 99214 PR OFFICE/OUTPT VISIT, EST, LEVL IV, 30-39 MIN: ICD-10-PCS | Mod: S$PBB,,, | Performed by: INTERNAL MEDICINE

## 2023-08-14 RX ORDER — LIFITEGRAST 50 MG/ML
1 SOLUTION/ DROPS OPHTHALMIC 2 TIMES DAILY
COMMUNITY
Start: 2023-08-09 | End: 2023-10-09

## 2023-08-14 NOTE — PROGRESS NOTES
Patient ID:  Cheyanne Gomes is a 87 y.o. female who presents for follow-up of Hyperlipidemia and Hypertension      She is considering retiring in September.  She is now on a new eye drop and is making her dizzy.  Presently she is taking Toprol-XL 25 mg q.a.m. 12.5 q.p.m..  She has a functioning pacemaker.  She is taking Zaroxolyn p.r.n..  Her leg edema and dyspnea on exertion has been fairly well controlled        Past Medical History:   Diagnosis Date    Abdominal hernia     Anemia     Dr Berkowitz     Angina pectoris     Aortic valve stenosis, moderate     Back pain     Cannon's esophagus     CAD (coronary artery disease)     Cataract     ou done    CHF (congestive heart failure)     EFx 35%, Dr. Spencer 13    Chronic combined systolic and diastolic heart failure 2019    Colitis     Compression fracture     Diverticulosis     Encounter for blood transfusion     GERD (gastroesophageal reflux disease)     Hyperlipidemia     Hypertension     Irritable bowel syndrome     Myocardial infarction     Osteoarthritis     lumbar DDD    Pacemaker     Pneumonia 2017    Raynaud disease     Rheumatoid arteritis     Rheumatoid arthritis     Shingles 2017    Sjogren syndrome     Ulcerative colitis         Past Surgical History:   Procedure Laterality Date    A-V CARDIAC PACEMAKER INSERTION Left 2021    Procedure: INSERTION, CARDIAC PACEMAKER, DUAL CHAMBER  ([employer]TRONIC);  Surgeon: Tera Blair MD;  Location: [facility] CATH/EP LAB;  Service: Cardiology;  Laterality: Left;    APPENDECTOMY      BACK SURGERY      CARDIAC SURGERY      CABGx3 in     CATARACT EXTRACTION      ou od d 11-15-12//     SECTION, CLASSIC      x 2    CHOLECYSTECTOMY      CLOSURE OF WOUND Right 2022    Procedure: CLOSURE-WOUND;  Surgeon: Delvis Jackson MD;  Location: [facility] OR;  Service: ENT;  Laterality: Right;  NOSE AND CHIN    COLONOSCOPY  09/15/2011    Dr Anaya     COLONOSCOPY  01/10/2017    [facility]  report sent to scanning    CORONARY ANGIOPLASTY      PCI x 1 in 2007    CORONARY ARTERY BYPASS GRAFT      3 vessel    CORONARY ARTERY BYPASS GRAFT      CYSTOSCOPY N/A 6/3/2020    Procedure: CYSTOSCOPY;  Surgeon: Miryam Bender Jr., MD;  Location: [facility] OR;  Service: Urology;  Laterality: N/A;    eyes      rk ou    FRACTURE SURGERY  06/21/2016    Dr Guido left hip     FRACTURE SURGERY  2018    Right Hip    HARVESTING OF SKIN GRAFT  9/23/2022    Procedure: SURGICAL PROCUREMENT, GRAFT, SKIN;  Surgeon: Delvis Jackson MD;  Location: [facility] OR;  Service: ENT;;  RIGHT CHEST    HYSTERECTOMY      tubal ligation, oopherectomy    INTRAMEDULLARY RODDING OF TROCHANTER OF FEMUR Right 10/8/2018    Procedure: INSERTION, INTRAMEDULLARY KELSI, FEMUR, TROCHANTER;  Surgeon: Valerio Luther MD;  Location: [facility] OR;  Service: Orthopedics;  Laterality: Right;    OOPHORECTOMY      SPINE SURGERY  8-    Silvino Mckeon    UPPER GASTROINTESTINAL ENDOSCOPY      Yag Capsulotomy Right 9/25/14          Current Outpatient Medications   Medication Instructions    amLODIPine (NORVASC) 5 MG tablet TAKE 1 TABLET BY MOUTH 2 TIMES A DAY    aspirin 81 mg Tab 1 tablet, Oral, Nightly, Every day    atorvastatin (LIPITOR) 40 mg, Oral, Daily    biotin 5 mg Cap 1 tablet, Oral, Daily    CALCIUM CARB/VIT D3/MINERALS (CALCIUM-VITAMIN D ORAL) 600 mg, Oral, 2 times daily, Calcium 1200 with D 3 1000    CRANBERRY CONC/ASCORBIC ACID (CRANBERRY PLUS VITAMIN C ORAL) 1 capsule, Oral, Daily    DEXILANT 60 mg, Oral, Daily    fluticasone (FLONASE) 50 mcg/actuation nasal spray 2 sprays, Each Nostril, Daily    folic acid (FOLVITE) 2 mg, Oral, Daily    furosemide (LASIX) 80 mg, Oral, Daily    gabapentin (NEURONTIN) 200 mg, Oral, Nightly    HYDROmorphone (DILAUDID) 2 mg, Oral, Every 4 hours PRN    isosorbide dinitrate (ISORDIL) 10 MG tablet TAKE ONE TABLET BY MOUTH THREE TIMES DAILY    Lactobacillus acidophilus 10 billion cell Cap 1 each, Oral, Daily, 1.5 billion    magnesium  "oxide (MAG-OX) 400 mg (241.3 mg magnesium) tablet TAKE 1 TABLET BY MOUTH 2 TIMES A DAY    metOLazone (ZAROXOLYN) 2.5 mg, Oral, Daily    metoprolol succinate (TOPROL-XL) 25 mg, Oral, See admin instructions, 25 mg QAM and 12.5 mg QHS    MULTIVITAMIN WITH MINERALS (ONE-A-DAY 50 PLUS) Tab 1 tablet, Oral, Daily, Every day    nitroGLYCERIN 0.4 MG/DOSE TL SPRY (NITROLINGUAL) 400 mcg/spray spray 1 spray, Sublingual, Every 5 min PRN, PRN    omega-3 fatty acids/fish oil (FISH OIL-OMEGA-3 FATTY ACIDS) 300-1,000 mg capsule 1 capsule, Oral, Daily    ondansetron (ZOFRAN) 4 mg, Oral, Every 8 hours PRN    potassium chloride SA (K-DUR,KLOR-CON M) 10 MEQ tablet TAKE TWO TABLETS BY MOUTH EVERY MORNING AND 1 TABLET EVERY EVENING    promethazine (PHENERGAN) 25 mg, Oral, 2 times daily PRN    sacubitriL-valsartan (ENTRESTO)  mg per tablet 1 tablet, Oral, 2 times daily    sertraline (ZOLOFT) 100 mg, Oral, Daily    traMADoL (ULTRAM) 50 mg, Oral, 2 times daily PRN    vitamin E 400 mg, Oral, Daily, Every day    XIIDRA 5 % Dpet 1 drop, Both Eyes, 2 times daily        Review of patient's allergies indicates:   Allergen Reactions    Adhesive     Nitrofurantoin macrocrystalline Nausea And Vomiting     Other reaction(s): very sickly    Penicillins Swelling     Other reaction(s): swelling  Other reaction(s): red skin discolorati    Sulfa (sulfonamide antibiotics) Nausea And Vomiting     Other reaction(s): very sickly        Review of Systems   Cardiovascular:  Positive for dyspnea on exertion and palpitations. Negative for chest pain and leg swelling.   Respiratory:  Negative for cough and shortness of breath.         Objective:     Vitals:    08/14/23 1028   BP: 126/70   Pulse: 71   Resp: 16   SpO2: 98%   Weight: 52.9 kg (116 lb 10 oz)   Height: 4' 10" (1.473 m)       Physical Exam  Vitals and nursing note reviewed.   Constitutional:       Appearance: She is well-developed.   HENT:      Head: Normocephalic and atraumatic.   Eyes:      " Conjunctiva/sclera: Conjunctivae normal.   Cardiovascular:      Rate and Rhythm: Normal rate and regular rhythm.      Heart sounds: Murmur (Grade 4/6 systolic murmur at the base) heard.   Pulmonary:      Effort: Pulmonary effort is normal.      Breath sounds: Normal breath sounds.   Abdominal:      General: Bowel sounds are normal.      Palpations: Abdomen is soft.   Musculoskeletal:         General: Normal range of motion.   Skin:     General: Skin is warm and dry.   Neurological:      Mental Status: She is alert and oriented to person, place, and time.   Psychiatric:         Behavior: Behavior normal.         Thought Content: Thought content normal.         Judgment: Judgment normal.       CMP  Sodium   Date Value Ref Range Status   08/09/2023 136 134 - 144 mmol/L Final   12/11/2018 139 134 - 144 mmol/L      Potassium   Date Value Ref Range Status   08/09/2023 4.1 3.5 - 5.2 mmol/L Final     Chloride   Date Value Ref Range Status   08/09/2023 90 (L) 96 - 106 mmol/L Final   12/11/2018 96 (L) 98 - 110 mmol/L      CO2   Date Value Ref Range Status   08/09/2023 31 (H) 20 - 29 mmol/L Final     Glucose   Date Value Ref Range Status   08/09/2023 163 (H) 70 - 99 mg/dL Final   12/11/2018 97 70 - 99 mg/dL      BUN   Date Value Ref Range Status   08/09/2023 43 (H) 8 - 27 mg/dL Final     Creatinine   Date Value Ref Range Status   08/09/2023 1.34 (H) 0.57 - 1.00 mg/dL Final   12/11/2018 0.95 0.60 - 1.40 mg/dL      Calcium   Date Value Ref Range Status   08/09/2023 9.5 8.7 - 10.3 mg/dL Final     Total Protein   Date Value Ref Range Status   06/30/2023 7.3 6.0 - 8.4 g/dL Final     Albumin   Date Value Ref Range Status   06/30/2023 4.1 3.5 - 5.2 g/dL Final   12/11/2018 3.9 3.1 - 4.7 g/dL      Total Bilirubin   Date Value Ref Range Status   06/30/2023 0.7 0.1 - 1.0 mg/dL Final     Comment:     For infants and newborns, interpretation of results should be based  on gestational age, weight and in agreement with  clinical  observations.    Premature Infant recommended reference ranges:  Up to 24 hours.............<8.0 mg/dL  Up to 48 hours............<12.0 mg/dL  3-5 days..................<15.0 mg/dL  6-29 days.................<15.0 mg/dL       Alkaline Phosphatase   Date Value Ref Range Status   06/30/2023 61 55 - 135 U/L Final     AST   Date Value Ref Range Status   06/30/2023 36 10 - 40 U/L Final     ALT   Date Value Ref Range Status   06/30/2023 23 10 - 44 U/L Final     Anion Gap   Date Value Ref Range Status   06/30/2023 10 8 - 16 mmol/L Final     eGFR if    Date Value Ref Range Status   11/05/2021 >60.0 >60 mL/min/1.73 m^2 Final     eGFR if non    Date Value Ref Range Status   02/21/2022 56 (L) >59 mL/min/1.73 Final      BMP  Lab Results   Component Value Date     08/09/2023    K 4.1 08/09/2023    CL 90 (L) 08/09/2023    CO2 31 (H) 08/09/2023    BUN 43 (H) 08/09/2023    CREATININE 1.34 (H) 08/09/2023    CALCIUM 9.5 08/09/2023    ANIONGAP 10 06/30/2023    ESTGFRAFRICA >60.0 11/05/2021    EGFRNONAA 56 (L) 02/21/2022      BNP  @LABRCNTIP(BNP,BNPTRIAGEBLO)@   Lab Results   Component Value Date    CHOL 146 10/04/2022    CHOL 113 05/06/2021    CHOL 135 11/24/2020     Lab Results   Component Value Date    HDL 52 10/04/2022    HDL 47 05/06/2021    HDL 61 11/24/2020     Lab Results   Component Value Date    LDLCALC 75 10/04/2022    LDLCALC 51 05/06/2021    LDLCALC 55 11/24/2020     Lab Results   Component Value Date    TRIG 104 10/04/2022    TRIG 70 05/06/2021    TRIG 108 11/24/2020     Lab Results   Component Value Date    CHOLHDL 26.2 08/24/2020    CHOLHDL 45.0 10/16/2013    CHOLHDL 40.7 08/11/2009      Lab Results   Component Value Date    TSH 2.105 08/22/2022    FREET4 0.89 09/26/2012     Lab Results   Component Value Date    HGBA1C 5.3 02/26/2015     Lab Results   Component Value Date    WBC 5.9 08/09/2023    HGB 10.4 (L) 08/09/2023    HCT 33.2 (L) 08/09/2023    MCV 87 08/09/2023      08/09/2023         Results for orders placed during the hospital encounter of 04/28/22    Echo    Interpretation Summary  · The left ventricle is normal in size with concentric remodeling and mildly decreased systolic function.  · The estimated ejection fraction is 40%.  · Grade I left ventricular diastolic dysfunction.  · There are segmental left ventricular wall motion abnormalities.  · Apical akinesis  · Normal right ventricular size with normal right ventricular systolic function.  · Mild aortic regurgitation.  · There is moderate aortic valve stenosis.  · Aortic valve area is 1.15 cm2; peak velocity is 2.12 m/s; mean gradient is 18 mmHg.  · Mild tricuspid regurgitation.  · Normal central venous pressure (3 mmHg).  · The estimated PA systolic pressure is 24 mmHg.  · Overall the study quality was technically difficult.     No results found for this or any previous visit.         Assessment:       Aortic valve stenosis, moderate  Stable    Coronary artery disease of native artery of native heart with stable angina pectoris  No symptoms of angina    PSVT (paroxysmal supraventricular tachycardia)  Controlled on 25 mg of metoprolol q.a.m. and 12.5 q.p.m.    Hyperlipidemia  Controlled on 40 mg of atorvastatin       Plan:       Continue the current medical therapy return to the office in 2 months with a BMP CBC as well as a BNP.

## 2023-08-25 DIAGNOSIS — G62.9 PERIPHERAL POLYNEUROPATHY: ICD-10-CM

## 2023-08-25 NOTE — TELEPHONE ENCOUNTER
No care due was identified.  [facility] Embedded Care Due Messages. Reference number: [id number].   8/25/2023 3:40:19 PM CDT

## 2023-08-28 RX ORDER — GABAPENTIN 100 MG/1
200 CAPSULE ORAL NIGHTLY
Qty: 60 CAPSULE | Refills: 0 | Status: SHIPPED | OUTPATIENT
Start: 2023-08-28 | End: 2023-10-31

## 2023-08-31 ENCOUNTER — EXTERNAL CHRONIC CARE MANAGEMENT (OUTPATIENT)
Dept: PRIMARY CARE CLINIC | Facility: CLINIC | Age: 88
End: 2023-08-31
Payer: MEDICARE

## 2023-08-31 PROCEDURE — 99490 CHRNC CARE MGMT STAFF 1ST 20: CPT | Mod: S$PBB,,, | Performed by: FAMILY MEDICINE

## 2023-08-31 PROCEDURE — 99490 CHRNC CARE MGMT STAFF 1ST 20: CPT | Mod: PBBFAC,PN | Performed by: FAMILY MEDICINE

## 2023-08-31 PROCEDURE — 99490 PR CHRONIC CARE MGMT, 1ST 20 MIN: ICD-10-PCS | Mod: S$PBB,,, | Performed by: FAMILY MEDICINE

## 2023-09-09 DIAGNOSIS — F32.9 MAJOR DEPRESSIVE DISORDER, SINGLE EPISODE, UNSPECIFIED: ICD-10-CM

## 2023-09-11 RX ORDER — SERTRALINE HYDROCHLORIDE 50 MG/1
100 TABLET, FILM COATED ORAL DAILY
Qty: 180 TABLET | Refills: 2 | Status: SHIPPED | OUTPATIENT
Start: 2023-09-11 | End: 2024-03-22 | Stop reason: SDUPTHER

## 2023-09-13 RX ORDER — METOLAZONE 2.5 MG/1
2.5 TABLET ORAL DAILY
Qty: 30 TABLET | Refills: 0 | Status: SHIPPED | OUTPATIENT
Start: 2023-09-13 | End: 2024-02-01

## 2023-09-13 RX ORDER — SACUBITRIL AND VALSARTAN 97; 103 MG/1; MG/1
1 TABLET, FILM COATED ORAL 2 TIMES DAILY
Qty: 60 TABLET | Refills: 4 | Status: SHIPPED | OUTPATIENT
Start: 2023-09-13 | End: 2024-02-27 | Stop reason: SDUPTHER

## 2023-09-21 ENCOUNTER — TELEPHONE (OUTPATIENT)
Dept: HEMATOLOGY/ONCOLOGY | Facility: CLINIC | Age: 88
End: 2023-09-21

## 2023-09-21 DIAGNOSIS — R79.89 ELEVATED FERRITIN: ICD-10-CM

## 2023-09-21 DIAGNOSIS — D63.8 ANEMIA OF CHRONIC DISEASE: Primary | ICD-10-CM

## 2023-09-21 NOTE — TELEPHONE ENCOUNTER
----- Message from Lyric Segundo sent at 9/21/2023  2:22 PM CDT -----  Pt would like lab orders for [facility], appt is Wed.     [phone]

## 2023-09-25 ENCOUNTER — LAB VISIT (OUTPATIENT)
Dept: LAB | Facility: HOSPITAL | Age: 88
End: 2023-09-25
Attending: INTERNAL MEDICINE
Payer: MEDICARE

## 2023-09-25 DIAGNOSIS — R79.89 ELEVATED FERRITIN: ICD-10-CM

## 2023-09-25 DIAGNOSIS — D63.8 ANEMIA OF CHRONIC DISEASE: ICD-10-CM

## 2023-09-25 LAB
BASOPHILS # BLD AUTO: 0.04 K/UL (ref 0–0.2)
BASOPHILS NFR BLD: 0.7 % (ref 0–1.9)
DIFFERENTIAL METHOD: ABNORMAL
EOSINOPHIL # BLD AUTO: 0.2 K/UL (ref 0–0.5)
EOSINOPHIL NFR BLD: 4 % (ref 0–8)
ERYTHROCYTE [DISTWIDTH] IN BLOOD BY AUTOMATED COUNT: 15.9 % (ref 11.5–14.5)
FERRITIN SERPL-MCNC: 582.2 NG/ML (ref 20–300)
HCT VFR BLD AUTO: 34.5 % (ref 37–48.5)
HGB BLD-MCNC: 10.8 G/DL (ref 12–16)
IMM GRANULOCYTES # BLD AUTO: 0.01 K/UL (ref 0–0.04)
IMM GRANULOCYTES NFR BLD AUTO: 0.2 % (ref 0–0.5)
LYMPHOCYTES # BLD AUTO: 1.3 K/UL (ref 1–4.8)
LYMPHOCYTES NFR BLD: 22.6 % (ref 18–48)
MCH RBC QN AUTO: 27.1 PG (ref 27–31)
MCHC RBC AUTO-ENTMCNC: 31.3 G/DL (ref 32–36)
MCV RBC AUTO: 87 FL (ref 82–98)
MONOCYTES # BLD AUTO: 0.5 K/UL (ref 0.3–1)
MONOCYTES NFR BLD: 9.1 % (ref 4–15)
NEUTROPHILS # BLD AUTO: 3.6 K/UL (ref 1.8–7.7)
NEUTROPHILS NFR BLD: 63.4 % (ref 38–73)
NRBC BLD-RTO: 0 /100 WBC
PLATELET # BLD AUTO: 143 K/UL (ref 150–450)
PMV BLD AUTO: 9.3 FL (ref 9.2–12.9)
RBC # BLD AUTO: 3.99 M/UL (ref 4–5.4)
WBC # BLD AUTO: 5.74 K/UL (ref 3.9–12.7)

## 2023-09-25 PROCEDURE — 85025 COMPLETE CBC W/AUTO DIFF WBC: CPT | Performed by: INTERNAL MEDICINE

## 2023-09-25 PROCEDURE — 36415 COLL VENOUS BLD VENIPUNCTURE: CPT | Performed by: INTERNAL MEDICINE

## 2023-09-25 PROCEDURE — 82728 ASSAY OF FERRITIN: CPT | Performed by: INTERNAL MEDICINE

## 2023-09-27 ENCOUNTER — OFFICE VISIT (OUTPATIENT)
Dept: HEMATOLOGY/ONCOLOGY | Facility: CLINIC | Age: 88
End: 2023-09-27
Payer: MEDICARE

## 2023-09-27 VITALS
WEIGHT: 115.31 LBS | SYSTOLIC BLOOD PRESSURE: 144 MMHG | TEMPERATURE: 98 F | BODY MASS INDEX: 24.2 KG/M2 | HEIGHT: 58 IN | HEART RATE: 64 BPM | RESPIRATION RATE: 16 BRPM | DIASTOLIC BLOOD PRESSURE: 70 MMHG

## 2023-09-27 DIAGNOSIS — R79.89 ELEVATED FERRITIN: ICD-10-CM

## 2023-09-27 DIAGNOSIS — D63.8 ANEMIA OF CHRONIC DISEASE: Chronic | ICD-10-CM

## 2023-09-27 PROCEDURE — 99213 OFFICE O/P EST LOW 20 MIN: CPT | Performed by: INTERNAL MEDICINE

## 2023-09-27 PROCEDURE — 99213 OFFICE O/P EST LOW 20 MIN: CPT | Mod: S$PBB,,, | Performed by: INTERNAL MEDICINE

## 2023-09-27 PROCEDURE — 99213 PR OFFICE/OUTPT VISIT, EST, LEVL III, 20-29 MIN: ICD-10-PCS | Mod: S$PBB,,, | Performed by: INTERNAL MEDICINE

## 2023-09-27 NOTE — PROGRESS NOTES
PROGRESS NOTE    Subjective:       Patient ID: Cheyanne Gomes is a 87 y.o. female.    Chief Complaint:  No chief complaint on file.    anemia, iron overload and chf follow up.     History of Present Illness:   Cheyanne Gomes is a 87 y.o. female who presents with a history of anemia of chronic disease.    Patient feeling ok.  Just had skin cancer removed.     Date:  Ferritin  4/23/20 79  6/8/2021 1957  7/9/2021 1600  8/10/2021 1212  3/31/2022 1601  3/16/2023 593  3/27/2023 871-Hb 10.3    Patient had 2 units of blood at [facility] in March 2021.  She was given 3 iv iron infusions after that.  She is feeling well at this time.  No new complaints.  No bleeding, no melena.      Injectafer given May 2019        EF: 45%          Family and Social history reviewed and is unchanged from 8/9/2016.       ROS:  Review of Systems   Constitutional:  Positive for appetite change. Negative for fever and unexpected weight change.   HENT:  Negative for mouth sores.    Eyes:  Negative for visual disturbance.   Respiratory:  Negative for cough and shortness of breath.    Cardiovascular:  Negative for chest pain and leg swelling.   Gastrointestinal:  Positive for diarrhea. Negative for abdominal pain and blood in stool.   Genitourinary:  Positive for frequency. Negative for hematuria.   Musculoskeletal:  Negative for back pain.   Skin:  Negative for rash.   Neurological:  Negative for headaches.   Hematological:  Negative for adenopathy.   Psychiatric/Behavioral:  The patient is not nervous/anxious.           Current Outpatient Medications:     amLODIPine (NORVASC) 5 MG tablet, TAKE 1 TABLET BY MOUTH 2 TIMES A DAY (Patient taking differently: Take 5 mg by mouth 2 (two) times daily.), Disp: 60 tablet, Rfl: 5    aspirin 81 mg Tab, Take 1 tablet by mouth every evening. Every day, Disp: , Rfl:     atorvastatin (LIPITOR) 40 MG tablet, Take 1 tablet (40 mg total) by mouth  once daily., Disp: 90 tablet, Rfl: 3    biotin 5 mg Cap, Take 1 tablet by mouth once daily., Disp: , Rfl:     CALCIUM CARB/VIT D3/MINERALS (CALCIUM-VITAMIN D ORAL), Take 600 mg by mouth 2 (two) times daily. Calcium 1200 with D 3 1000, Disp: , Rfl:     CRANBERRY CONC/ASCORBIC ACID (CRANBERRY PLUS VITAMIN C ORAL), Take 1 capsule by mouth once daily., Disp: , Rfl:     DEXILANT 60 mg capsule, Take 60 mg by mouth once daily. , Disp: , Rfl: 11    ENTRESTO  mg per tablet, Take 1 tablet by mouth 2 (two) times daily., Disp: 60 tablet, Rfl: 4    fluticasone (FLONASE) 50 mcg/actuation nasal spray, 2 sprays by Each Nare route once daily. (Patient taking differently: 2 sprays by Each Nostril route daily as needed.), Disp: 16 g, Rfl: 0    folic acid (FOLVITE) 1 MG tablet, Take 2 mg by mouth once daily. , Disp: , Rfl:     furosemide (LASIX) 80 MG tablet, Take 1 tablet (80 mg total) by mouth once daily., Disp: 90 tablet, Rfl: 3    gabapentin (NEURONTIN) 100 MG capsule, Take 2 capsules (200 mg total) by mouth every evening., Disp: 60 capsule, Rfl: 0    HYDROmorphone (DILAUDID) 2 MG tablet, Take 2 mg by mouth every 4 (four) hours as needed for Pain., Disp: , Rfl:     isosorbide dinitrate (ISORDIL) 10 MG tablet, TAKE ONE TABLET BY MOUTH THREE TIMES DAILY, Disp: 270 tablet, Rfl: 3    Lactobacillus acidophilus 10 billion cell Cap, Take 1 each by mouth once daily. 1.5 billion, Disp: , Rfl:     magnesium oxide (MAG-OX) 400 mg (241.3 mg magnesium) tablet, TAKE 1 TABLET BY MOUTH 2 TIMES A DAY (Patient taking differently: Take 400 mg by mouth 2 (two) times daily.), Disp: 120 tablet, Rfl: 2    metOLazone (ZAROXOLYN) 2.5 MG tablet, Take 1 tablet (2.5 mg total) by mouth once daily., Disp: 30 tablet, Rfl: 0    metoprolol succinate (TOPROL-XL) 25 MG 24 hr tablet, Take 1 tablet (25 mg total) by mouth As instructed. 25 mg QAM and 12.5 mg QHS, Disp: 45 tablet, Rfl: 11    MULTIVITAMIN WITH MINERALS (ONE-A-DAY 50 PLUS) Tab, Take 1 tablet by  "mouth once daily. Every day, Disp: , Rfl:     nitroGLYCERIN 0.4 MG/DOSE TL SPRY (NITROLINGUAL) 400 mcg/spray spray, Place 1 spray under the tongue every 5 (five) minutes as needed for Chest pain. PRN, Disp: 4.9 g, Rfl: 3    omega-3 fatty acids/fish oil (FISH OIL-OMEGA-3 FATTY ACIDS) 300-1,000 mg capsule, Take 1 capsule by mouth once daily., Disp: , Rfl:     ondansetron (ZOFRAN) 4 MG tablet, Take 4 mg by mouth every 8 (eight) hours as needed for Nausea. , Disp: , Rfl: 2    potassium chloride SA (K-DUR,KLOR-CON M) 10 MEQ tablet, TAKE TWO TABLETS BY MOUTH EVERY MORNING AND 1 TABLET EVERY EVENING (Patient taking differently: Take 20 mEq by mouth every morning. 20 meq QAM and 10 meq QPM), Disp: 270 tablet, Rfl: 2    promethazine (PHENERGAN) 25 MG tablet, Take 1 tablet (25 mg total) by mouth 2 (two) times daily as needed for Nausea., Disp: 60 tablet, Rfl: 1    sertraline (ZOLOFT) 50 MG tablet, Take 2 tablets (100 mg total) by mouth once daily., Disp: 180 tablet, Rfl: 2    traMADoL (ULTRAM) 50 mg tablet, Take 50 mg by mouth 2 (two) times daily as needed for Pain., Disp: , Rfl:     vitamin E 400 unit Tab, Take 400 mg by mouth once daily. Every day, Disp: , Rfl:     XIIDRA 5 % Dpet, Place 1 drop into both eyes 2 (two) times daily., Disp: , Rfl:         Objective:       Physical Examination:     BP (!) 144/70   Pulse 64   Temp 98.3 °F (36.8 °C)   Resp 16   Ht 4' 10" (1.473 m)   Wt 52.3 kg (115 lb 4.8 oz)   LMP  (LMP Unknown)   BMI 24.10 kg/m²     Physical Exam  Constitutional:       Appearance: She is well-developed.   HENT:      Head: Normocephalic and atraumatic.      Right Ear: External ear normal.      Left Ear: External ear normal.   Eyes:      Conjunctiva/sclera: Conjunctivae normal.      Pupils: Pupils are equal, round, and reactive to light.   Neck:      Thyroid: No thyromegaly.      Trachea: No tracheal deviation.   Cardiovascular:      Rate and Rhythm: Normal rate and regular rhythm.      Heart sounds: Normal " heart sounds.   Pulmonary:      Effort: Pulmonary effort is normal.      Breath sounds: Normal breath sounds.   Abdominal:      General: Bowel sounds are normal. There is no distension.      Palpations: Abdomen is soft. There is no mass.      Tenderness: There is no abdominal tenderness.   Skin:     Findings: No rash.   Neurological:      Comments: Neuro intact througout   Psychiatric:         Behavior: Behavior normal.         Thought Content: Thought content normal.         Judgment: Judgment normal.         Labs:   Recent Results (from the past 336 hour(s))   CBC Auto Differential    Collection Time: 09/25/23  3:30 PM   Result Value Ref Range    WBC 5.74 3.90 - 12.70 K/uL    Hemoglobin 10.8 (L) 12.0 - 16.0 g/dL    Hematocrit 34.5 (L) 37.0 - 48.5 %    Platelets 143 (L) 150 - 450 K/uL     CMP  Sodium   Date Value Ref Range Status   08/09/2023 136 134 - 144 mmol/L Final   12/11/2018 139 134 - 144 mmol/L      Potassium   Date Value Ref Range Status   08/09/2023 4.1 3.5 - 5.2 mmol/L Final     Chloride   Date Value Ref Range Status   08/09/2023 90 (L) 96 - 106 mmol/L Final   12/11/2018 96 (L) 98 - 110 mmol/L      CO2   Date Value Ref Range Status   08/09/2023 31 (H) 20 - 29 mmol/L Final     Glucose   Date Value Ref Range Status   08/09/2023 163 (H) 70 - 99 mg/dL Final   12/11/2018 97 70 - 99 mg/dL      BUN   Date Value Ref Range Status   08/09/2023 43 (H) 8 - 27 mg/dL Final     Creatinine   Date Value Ref Range Status   08/09/2023 1.34 (H) 0.57 - 1.00 mg/dL Final   12/11/2018 0.95 0.60 - 1.40 mg/dL      Calcium   Date Value Ref Range Status   08/09/2023 9.5 8.7 - 10.3 mg/dL Final     Total Protein   Date Value Ref Range Status   06/30/2023 7.3 6.0 - 8.4 g/dL Final     Albumin   Date Value Ref Range Status   06/30/2023 4.1 3.5 - 5.2 g/dL Final   12/11/2018 3.9 3.1 - 4.7 g/dL      Total Bilirubin   Date Value Ref Range Status   06/30/2023 0.7 0.1 - 1.0 mg/dL Final     Comment:     For infants and newborns, interpretation  "of results should be based  on gestational age, weight and in agreement with clinical  observations.    Premature Infant recommended reference ranges:  Up to 24 hours.............<8.0 mg/dL  Up to 48 hours............<12.0 mg/dL  3-5 days..................<15.0 mg/dL  6-29 days.................<15.0 mg/dL       Alkaline Phosphatase   Date Value Ref Range Status   06/30/2023 61 55 - 135 U/L Final     AST   Date Value Ref Range Status   06/30/2023 36 10 - 40 U/L Final     ALT   Date Value Ref Range Status   06/30/2023 23 10 - 44 U/L Final     Anion Gap   Date Value Ref Range Status   06/30/2023 10 8 - 16 mmol/L Final     eGFR if    Date Value Ref Range Status   11/05/2021 >60.0 >60 mL/min/1.73 m^2 Final     eGFR if non    Date Value Ref Range Status   02/21/2022 56 (L) >59 mL/min/1.73 Final     No results found for: "CEA"  No results found for: "PSA"        Assessment/Plan:   Elevated ferritin  Her ferritin is in the 500 range. She has been having difficulty getting phlet due to low Hb.  Will continue to work with this.  Counts still greatly improved.  Will continue to see her every six months.      Anemia of chronic disease  Hb is 10.8g/dl.  Continue to monitor this conservatively.      Discussion:     Follow up in about 6 months (around 3/27/2024).      Electronically signed by David Robb                "

## 2023-09-27 NOTE — ASSESSMENT & PLAN NOTE
Her ferritin is in the 500 range. She has been having difficulty getting phlet due to low Hb.  Will continue to work with this.  Counts still greatly improved.  Will continue to see her every six months.

## 2023-09-30 ENCOUNTER — EXTERNAL CHRONIC CARE MANAGEMENT (OUTPATIENT)
Dept: PRIMARY CARE CLINIC | Facility: CLINIC | Age: 88
End: 2023-09-30
Payer: MEDICARE

## 2023-09-30 PROCEDURE — 99490 CHRNC CARE MGMT STAFF 1ST 20: CPT | Mod: S$PBB,,, | Performed by: FAMILY MEDICINE

## 2023-09-30 PROCEDURE — 99439 PR CHRONIC CARE MGMT, EA ADDTL 20 MIN: ICD-10-PCS | Mod: S$PBB,,, | Performed by: FAMILY MEDICINE

## 2023-09-30 PROCEDURE — 99439 CHRNC CARE MGMT STAF EA ADDL: CPT | Mod: S$PBB,,, | Performed by: FAMILY MEDICINE

## 2023-09-30 PROCEDURE — 99490 PR CHRONIC CARE MGMT, 1ST 20 MIN: ICD-10-PCS | Mod: S$PBB,,, | Performed by: FAMILY MEDICINE

## 2023-09-30 PROCEDURE — 99490 CHRNC CARE MGMT STAFF 1ST 20: CPT | Mod: PBBFAC,25,PN | Performed by: FAMILY MEDICINE

## 2023-09-30 PROCEDURE — 99439 CHRNC CARE MGMT STAF EA ADDL: CPT | Mod: PBBFAC,PN | Performed by: FAMILY MEDICINE

## 2023-10-03 ENCOUNTER — TELEPHONE (OUTPATIENT)
Dept: HEMATOLOGY/ONCOLOGY | Facility: CLINIC | Age: 88
End: 2023-10-03

## 2023-10-03 DIAGNOSIS — R79.89 ELEVATED FERRITIN: Primary | ICD-10-CM

## 2023-10-03 NOTE — TELEPHONE ENCOUNTER
Spoke to pt and notified her that phlebotomy orders have been placed. She verbalized understanding and said that she would pick them up tomorrow.

## 2023-10-04 ENCOUNTER — OFFICE VISIT (OUTPATIENT)
Dept: FAMILY MEDICINE | Facility: CLINIC | Age: 88
End: 2023-10-04
Attending: FAMILY MEDICINE
Payer: MEDICARE

## 2023-10-04 VITALS
TEMPERATURE: 98 F | HEART RATE: 78 BPM | DIASTOLIC BLOOD PRESSURE: 70 MMHG | WEIGHT: 115.44 LBS | BODY MASS INDEX: 24.23 KG/M2 | RESPIRATION RATE: 95 BRPM | SYSTOLIC BLOOD PRESSURE: 138 MMHG | HEIGHT: 58 IN

## 2023-10-04 DIAGNOSIS — D69.6 THROMBOCYTOPENIA, UNSPECIFIED: ICD-10-CM

## 2023-10-04 DIAGNOSIS — K22.70 BARRETT'S ESOPHAGUS WITHOUT DYSPLASIA: ICD-10-CM

## 2023-10-04 DIAGNOSIS — N18.32 STAGE 3B CHRONIC KIDNEY DISEASE: ICD-10-CM

## 2023-10-04 DIAGNOSIS — I35.0 AORTIC VALVE STENOSIS, MODERATE: ICD-10-CM

## 2023-10-04 DIAGNOSIS — I25.118 CORONARY ARTERY DISEASE OF NATIVE ARTERY OF NATIVE HEART WITH STABLE ANGINA PECTORIS: ICD-10-CM

## 2023-10-04 DIAGNOSIS — I10 PRIMARY HYPERTENSION: Primary | ICD-10-CM

## 2023-10-04 DIAGNOSIS — M35.00 SJOGREN'S SYNDROME, WITH UNSPECIFIED ORGAN INVOLVEMENT: ICD-10-CM

## 2023-10-04 DIAGNOSIS — M81.0 SENILE OSTEOPOROSIS: ICD-10-CM

## 2023-10-04 DIAGNOSIS — R79.89 ELEVATED FERRITIN: ICD-10-CM

## 2023-10-04 DIAGNOSIS — E78.2 MIXED HYPERLIPIDEMIA: ICD-10-CM

## 2023-10-04 DIAGNOSIS — K21.9 GASTROESOPHAGEAL REFLUX DISEASE, UNSPECIFIED WHETHER ESOPHAGITIS PRESENT: ICD-10-CM

## 2023-10-04 DIAGNOSIS — I47.10 PSVT (PAROXYSMAL SUPRAVENTRICULAR TACHYCARDIA): ICD-10-CM

## 2023-10-04 DIAGNOSIS — H61.21 IMPACTED CERUMEN, RIGHT EAR: ICD-10-CM

## 2023-10-04 DIAGNOSIS — M05.759: ICD-10-CM

## 2023-10-04 PROCEDURE — 99213 OFFICE O/P EST LOW 20 MIN: CPT | Mod: PBBFAC,PN,25 | Performed by: FAMILY MEDICINE

## 2023-10-04 PROCEDURE — 99999PBSHW FLU VACCINE - QUADRIVALENT - ADJUVANTED: ICD-10-PCS | Mod: PBBFAC,,,

## 2023-10-04 PROCEDURE — 99214 OFFICE O/P EST MOD 30 MIN: CPT | Mod: S$PBB,,, | Performed by: FAMILY MEDICINE

## 2023-10-04 PROCEDURE — 99999 PR PBB SHADOW E&M-EST. PATIENT-LVL III: ICD-10-PCS | Mod: PBBFAC,,, | Performed by: FAMILY MEDICINE

## 2023-10-04 PROCEDURE — 99999 PR PBB SHADOW E&M-EST. PATIENT-LVL III: CPT | Mod: PBBFAC,,, | Performed by: FAMILY MEDICINE

## 2023-10-04 PROCEDURE — 99999PBSHW FLU VACCINE - QUADRIVALENT - ADJUVANTED: Mod: PBBFAC,,,

## 2023-10-04 PROCEDURE — G0008 ADMIN INFLUENZA VIRUS VAC: HCPCS | Mod: PBBFAC,PN

## 2023-10-04 PROCEDURE — 99214 PR OFFICE/OUTPT VISIT, EST, LEVL IV, 30-39 MIN: ICD-10-PCS | Mod: S$PBB,,, | Performed by: FAMILY MEDICINE

## 2023-10-04 RX ORDER — LINACLOTIDE 72 UG/1
72 CAPSULE, GELATIN COATED ORAL EVERY MORNING
COMMUNITY
Start: 2023-07-26 | End: 2023-11-13

## 2023-10-04 RX ORDER — PREDNISOLONE ACETATE 10 MG/ML
1 SUSPENSION/ DROPS OPHTHALMIC 2 TIMES DAILY
COMMUNITY
Start: 2023-09-26 | End: 2023-11-13

## 2023-10-04 NOTE — PROGRESS NOTES
Subjective:       Patient ID: Cheyanne Gomes is a 87 y.o. female.    Chief Complaint: Annual Exam    87-year-old female coming in for follow-up of multiple medical problems.  She is not fasting today and is due for a lipid panel.  She has a Prolia injection coming up in the near future and will need an updated calcium level, her most recent one is a little too old.  She has an appointment with Dr. Blair this coming Monday and we will try to get her lipid panel done before that visit so he has access to the results.  She has a history of hypertension, hyperlipidemia, PSVT and sick sinus syndrome with pacemaker insertion and she has moderate aortic valve stenosis with her last echocardiogram in February of 2022.  She has a history of coronary artery disease with native artery native vessels and with stable angina and had three-vessel bypass surgery in the past.  She has stage IIIB chronic kidney disease, rheumatoid arthritis and Sjogren syndrome.  She has high ferritin levels and thrombocytopenia as well as anemia of chronic disease and osteoporosis.  She has reflux and Barretts esophagus.  She has had a number of surgeries over the years.    She needs her flu vaccine and we just received the high dose yesterday so will give her that today.  She is due for a COVID booster which she will need to get at the pharmacy and she is interested in the shingles vaccine series and we discussed that which she will also need to get at the pharmacy.    Past Medical History:  No date: Abdominal hernia  No date: Anemia      Comment:  Dr Berkowitz   No date: Angina pectoris  No date: Aortic valve stenosis, moderate  No date: Back pain  No date: Cannon's esophagus  No date: CAD (coronary artery disease)  No date: Cataract      Comment:  ou done  No date: CHF (congestive heart failure)      Comment:  EFx 35%, Dr. 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  No date: Rheumatoid arthritis  2017: Shingles  No date: Sjogren syndrome  No date: Ulcerative colitis    Past Surgical History:  2021: A-V CARDIAC PACEMAKER INSERTION; Left      Comment:  Procedure: INSERTION, CARDIAC PACEMAKER, DUAL CHAMBER                 ([employer]);  Surgeon: Tera Blair MD;                 Location: [facility] CATH/EP LAB;  Service: Cardiology;                 Laterality: Left;  No date: APPENDECTOMY  No date: BACK SURGERY  No date: CARDIAC SURGERY      Comment:  CABGx3 in   No date: CATARACT EXTRACTION      Comment:  ou od d 11-15-12//  No date:  SECTION, CLASSIC      Comment:  x 2  No date: CHOLECYSTECTOMY  2022: CLOSURE OF WOUND; Right      Comment:  Procedure: CLOSURE-WOUND;  Surgeon: Delvis Jackson MD;  Location: [facility] OR;  Service: ENT;  Laterality:                Right;  NOSE AND CHIN  09/15/2011: COLONOSCOPY      Comment:  Dr Anaya   01/10/2017: COLONOSCOPY      Comment:  [facility] report sent to scanning  No date: CORONARY ANGIOPLASTY      Comment:  PCI x 1 in   No date: CORONARY ARTERY BYPASS GRAFT      Comment:  3 vessel  No date: CORONARY ARTERY BYPASS GRAFT  6/3/2020: CYSTOSCOPY; N/A      Comment:  Procedure: CYSTOSCOPY;  Surgeon: Miryam Bender Jr., MD;               Location: [facility] OR;  Service: Urology;  Laterality: N/A;  No date: eyes      Comment:  paul rodríguez  2016: FRACTURE SURGERY      Comment:  Dr Guido left hip   2018: FRACTURE SURGERY      Comment:  Right Hip  2022: HARVESTING OF SKIN GRAFT      Comment:  Procedure:  SURGICAL PROCUREMENT, GRAFT, SKIN;  Surgeon:                Delvis Jackson MD;  Location: [facility] OR;  Service: ENT;;               RIGHT CHEST  No date: HYSTERECTOMY      Comment:  tubal ligation, oopherectomy  10/8/2018: INTRAMEDULLARY RODDING OF TROCHANTER OF FEMUR; Right      Comment:  Procedure: INSERTION, INTRAMEDULLARY KELSI, FEMUR,                TROCHANTER;  Surgeon: Valerio Luther MD;  Location: [facility] OR;               Service: Orthopedics;  Laterality: Right;  No date: OOPHORECTOMY  8-: SPINE SURGERY      Comment:  Silvino Mike  No date: UPPER GASTROINTESTINAL ENDOSCOPY  9/25/14: Yag Capsulotomy; Right    Review of patient's family history indicates:  Adopted:  Yes  Problem: Heart disease      Relation: Mother          Age of Onset: (Not Specified)          Comment: aortic stenosis  Problem: Skin cancer      Relation: Mother          Age of Onset: (Not Specified)  Problem: Hypertension      Relation: Father          Age of Onset: (Not Specified)  Problem: Heart disease      Relation: Father          Age of Onset: (Not Specified)          Comment: MI  Problem: Hypertension      Relation: Sister          Age of Onset: (Not Specified)  Problem: Fibromyalgia      Relation: Sister          Age of Onset: (Not Specified)  Problem: Scleroderma      Relation: Sister          Age of Onset: (Not Specified)  Problem: Pulmonary embolism      Relation: Sister          Age of Onset: (Not Specified)  Problem: Irritable bowel syndrome      Relation: Sister          Age of Onset: (Not Specified)  Problem: Heart disease      Relation: Brother          Age of Onset: (Not Specified)          Comment: 2 brothers CABG  Problem: Arthritis      Relation: Brother          Age of Onset: (Not Specified)  Problem: Crohn's disease      Relation: Brother          Age of Onset: (Not Specified)  Problem: Crohn's disease      Relation: Brother          Age of Onset: (Not Specified)  Problem: Crohn's disease      Relation: Brother           Age of Onset: (Not Specified)  Problem: Hypertension      Relation: Daughter          Age of Onset: (Not Specified)  Problem: Early death      Relation: Son          Age of Onset: (Not Specified)          Comment: accident  Problem: Lupus      Relation: Cousin          Age of Onset: (Not Specified)  Problem: Lupus      Relation: Cousin          Age of Onset: (Not Specified)  Problem: Amblyopia      Relation: Neg Hx          Age of Onset: (Not Specified)  Problem: Blindness      Relation: Neg Hx          Age of Onset: (Not Specified)  Problem: Cancer      Relation: Neg Hx          Age of Onset: (Not Specified)  Problem: Cataracts      Relation: Neg Hx          Age of Onset: (Not Specified)  Problem: Diabetes      Relation: Neg Hx          Age of Onset: (Not Specified)  Problem: Glaucoma      Relation: Neg Hx          Age of Onset: (Not Specified)  Problem: Macular degeneration      Relation: Neg Hx          Age of Onset: (Not Specified)  Problem: Retinal detachment      Relation: Neg Hx          Age of Onset: (Not Specified)  Problem: Strabismus      Relation: Neg Hx          Age of Onset: (Not Specified)  Problem: Stroke      Relation: Neg Hx          Age of Onset: (Not Specified)  Problem: Thyroid disease      Relation: Neg Hx          Age of Onset: (Not Specified)  Problem: Celiac disease      Relation: Neg Hx          Age of Onset: (Not Specified)  Problem: Cirrhosis      Relation: Neg Hx          Age of Onset: (Not Specified)  Problem: Psoriasis      Relation: Neg Hx          Age of Onset: (Not Specified)  Problem: Melanoma      Relation: Neg Hx          Age of Onset: (Not Specified)  Problem: Multiple sclerosis      Relation: Neg Hx          Age of Onset: (Not Specified)  Problem: Rheum arthritis      Relation: Neg Hx          Age of Onset: (Not Specified)  Problem: Tuberculosis      Relation: Neg Hx          Age of Onset: (Not Specified)  Problem: Lymphoma      Relation: Neg Hx          Age of Onset: (Not  Specified)  Problem: Ulcerative colitis      Relation: Neg Hx          Age of Onset: (Not Specified)  Problem: Moses's disease      Relation: Neg Hx          Age of Onset: (Not Specified)  Problem: Stomach cancer      Relation: Neg Hx          Age of Onset: (Not Specified)  Problem: Rectal cancer      Relation: Neg Hx          Age of Onset: (Not Specified)  Problem: Liver disease      Relation: Neg Hx          Age of Onset: (Not Specified)  Problem: Liver cancer      Relation: Neg Hx          Age of Onset: (Not Specified)  Problem: Inflammatory bowel disease      Relation: Neg Hx          Age of Onset: (Not Specified)  Problem: Hemochromatosis      Relation: Neg Hx          Age of Onset: (Not Specified)  Problem: Esophageal cancer      Relation: Neg Hx          Age of Onset: (Not Specified)  Problem: Cystic fibrosis      Relation: Neg Hx          Age of Onset: (Not Specified)  Problem: Colon cancer      Relation: Neg Hx          Age of Onset: (Not Specified)    Social History    Tobacco Use      Smoking status: Former        Packs/day: 0.00        Years: 1 pack/day for 5.0 years (5.0 ttl pk-yrs)        Types: Cigarettes        Start date: 1966        Quit date: 1971        Years since quittin.7      Smokeless tobacco: Never    Alcohol use: Yes      Alcohol/week: 1.0 standard drink of alcohol      Types: 1 Glasses of wine per week    Drug use: Never    Current Outpatient Medications on File Prior to Visit:  amLODIPine (NORVASC) 5 MG tablet, TAKE 1 TABLET BY MOUTH 2 TIMES A DAY (Patient taking differently: Take 5 mg by mouth 2 (two) times daily.), Disp: 60 tablet, Rfl: 5  aspirin 81 mg Tab, Take 1 tablet by mouth every evening. Every day, Disp: , Rfl:   atorvastatin (LIPITOR) 40 MG tablet, Take 1 tablet (40 mg total) by mouth once daily., Disp: 90 tablet, Rfl: 3  biotin 5 mg Cap, Take 1 tablet by mouth once daily., Disp: , Rfl:   CALCIUM CARB/VIT D3/MINERALS (CALCIUM-VITAMIN D ORAL), Take 600 mg by mouth  2 (two) times daily. Calcium 1200 with D 3 1000, Disp: , Rfl:   CRANBERRY CONC/ASCORBIC ACID (CRANBERRY PLUS VITAMIN C ORAL), Take 1 capsule by mouth once daily., Disp: , Rfl:   DEXILANT 60 mg capsule, Take 60 mg by mouth once daily. , Disp: , Rfl: 11  ENTRESTO  mg per tablet, Take 1 tablet by mouth 2 (two) times daily., Disp: 60 tablet, Rfl: 4  fluticasone (FLONASE) 50 mcg/actuation nasal spray, 2 sprays by Each Nare route once daily. (Patient taking differently: 2 sprays by Each Nostril route daily as needed.), Disp: 16 g, Rfl: 0  folic acid (FOLVITE) 1 MG tablet, Take 2 mg by mouth once daily. , Disp: , Rfl:   furosemide (LASIX) 80 MG tablet, Take 1 tablet (80 mg total) by mouth once daily., Disp: 90 tablet, Rfl: 3  gabapentin (NEURONTIN) 100 MG capsule, Take 2 capsules (200 mg total) by mouth every evening., Disp: 60 capsule, Rfl: 0  HYDROmorphone (DILAUDID) 2 MG tablet, Take 2 mg by mouth every 4 (four) hours as needed for Pain., Disp: , Rfl:   isosorbide dinitrate (ISORDIL) 10 MG tablet, TAKE ONE TABLET BY MOUTH THREE TIMES DAILY, Disp: 270 tablet, Rfl: 3  Lactobacillus acidophilus 10 billion cell Cap, Take 1 each by mouth once daily. 1.5 billion, Disp: , Rfl:   LINZESS 72 mcg Cap capsule, Take 72 mcg by mouth every morning., Disp: , Rfl:   magnesium oxide (MAG-OX) 400 mg (241.3 mg magnesium) tablet, TAKE 1 TABLET BY MOUTH 2 TIMES A DAY (Patient taking differently: Take 400 mg by mouth 2 (two) times daily.), Disp: 120 tablet, Rfl: 2  metOLazone (ZAROXOLYN) 2.5 MG tablet, Take 1 tablet (2.5 mg total) by mouth once daily., Disp: 30 tablet, Rfl: 0  metoprolol succinate (TOPROL-XL) 25 MG 24 hr tablet, Take 1 tablet (25 mg total) by mouth As instructed. 25 mg QAM and 12.5 mg QHS, Disp: 45 tablet, Rfl: 11  MULTIVITAMIN WITH MINERALS (ONE-A-DAY 50 PLUS) Tab, Take 1 tablet by mouth once daily. Every day, Disp: , Rfl:   nitroGLYCERIN 0.4 MG/DOSE TL SPRY (NITROLINGUAL) 400 mcg/spray spray, Place 1 spray under the  tongue every 5 (five) minutes as needed for Chest pain. PRN, Disp: 4.9 g, Rfl: 3  omega-3 fatty acids/fish oil (FISH OIL-OMEGA-3 FATTY ACIDS) 300-1,000 mg capsule, Take 1 capsule by mouth once daily., Disp: , Rfl:   ondansetron (ZOFRAN) 4 MG tablet, Take 4 mg by mouth every 8 (eight) hours as needed for Nausea. , Disp: , Rfl: 2  potassium chloride SA (K-DUR,KLOR-CON M) 10 MEQ tablet, TAKE TWO TABLETS BY MOUTH EVERY MORNING AND 1 TABLET EVERY EVENING (Patient taking differently: Take 20 mEq by mouth every morning. 20 meq QAM and 10 meq QPM), Disp: 270 tablet, Rfl: 2  prednisoLONE acetate (PRED FORTE) 1 % DrpS, Place 1 drop into both eyes 2 (two) times daily., Disp: , Rfl:   promethazine (PHENERGAN) 25 MG tablet, Take 1 tablet (25 mg total) by mouth 2 (two) times daily as needed for Nausea., Disp: 60 tablet, Rfl: 1  sertraline (ZOLOFT) 50 MG tablet, Take 2 tablets (100 mg total) by mouth once daily., Disp: 180 tablet, Rfl: 2  traMADoL (ULTRAM) 50 mg tablet, Take 50 mg by mouth 2 (two) times daily as needed for Pain., Disp: , Rfl:   vitamin E 400 unit Tab, Take 400 mg by mouth once daily. Every day, Disp: , Rfl:   XIIDRA 5 % Dpet, Place 1 drop into both eyes 2 (two) times daily., Disp: , Rfl:   [DISCONTINUED] hydrochlorothiazide (HYDRODIURIL) 25 MG tablet, Take 25 mg by mouth once daily., Disp: , Rfl:     No current facility-administered medications on file prior to visit.          Review of Systems   Constitutional:  Negative for chills, diaphoresis, fatigue, fever and unexpected weight change.   HENT:  Positive for ear pain (Right ear pain with her hearing aids, no drainage). Negative for congestion, hearing loss, postnasal drip, sinus pressure, sneezing, sore throat, tinnitus and trouble swallowing.    Eyes:  Negative for itching and visual disturbance.   Respiratory:  Negative for cough, chest tightness, shortness of breath and wheezing.    Cardiovascular:  Negative for chest pain, palpitations and leg swelling.    Gastrointestinal:  Negative for abdominal pain, blood in stool, constipation, diarrhea, nausea and vomiting.   Genitourinary:  Negative for dysuria, frequency, hematuria, menstrual problem, pelvic pain, vaginal bleeding and vaginal discharge.   Musculoskeletal:  Negative for arthralgias, back pain, joint swelling and myalgias.   Neurological:  Negative for dizziness and headaches.   Hematological:  Negative for adenopathy.   Psychiatric/Behavioral:  Negative for sleep disturbance. The patient is not nervous/anxious.        Objective:      Physical Exam  Vitals and nursing note reviewed.   Constitutional:       General: She is not in acute distress.     Appearance: Normal appearance. She is well-developed and normal weight. She is not ill-appearing, toxic-appearing or diaphoretic.      Comments: Good blood pressure control  Normal pulse with a regular rhythm   Normal weight with a BMI of 24.1 she is up 7.6 lb from her last checkup with me October 3, 2022   HENT:      Head: Normocephalic and atraumatic.      Right Ear: Tympanic membrane, ear canal and external ear normal. There is impacted cerumen (Hard, dry, almost stony cerumen in the external auditory canal).      Left Ear: Tympanic membrane, ear canal and external ear normal. There is no impacted cerumen.      Nose: Nose normal. No congestion or rhinorrhea.      Mouth/Throat:      Mouth: Mucous membranes are moist.      Pharynx: Oropharynx is clear. No oropharyngeal exudate or posterior oropharyngeal erythema.   Eyes:      General: No scleral icterus.        Right eye: No discharge.         Left eye: No discharge.      Extraocular Movements: Extraocular movements intact.      Conjunctiva/sclera: Conjunctivae normal.      Pupils: Pupils are equal, round, and reactive to light.   Neck:      Thyroid: No thyromegaly.      Vascular: No carotid bruit or JVD.   Cardiovascular:      Rate and Rhythm: Normal rate and regular rhythm.      Pulses: Normal pulses.      Heart  sounds: Murmur (Somewhat high pitched 2/6 systolic ejection murmur radiating to the base of the neck on the right side) heard.      No friction rub. No gallop.   Pulmonary:      Effort: Pulmonary effort is normal. No respiratory distress.      Breath sounds: Normal breath sounds. No stridor. No wheezing, rhonchi or rales.   Chest:      Chest wall: No tenderness.   Abdominal:      General: Bowel sounds are normal. There is no distension.      Palpations: Abdomen is soft. There is no mass.      Tenderness: There is no abdominal tenderness. There is no guarding or rebound.      Hernia: No hernia is present.   Musculoskeletal:         General: No tenderness. Normal range of motion.      Cervical back: Normal range of motion and neck supple. No rigidity or tenderness.      Right lower leg: No edema.      Left lower leg: No edema.   Lymphadenopathy:      Cervical: No cervical adenopathy.   Skin:     General: Skin is warm and dry.      Coloration: Skin is not jaundiced or pale.      Findings: No bruising, erythema, lesion or rash.   Neurological:      General: No focal deficit present.      Mental Status: She is alert and oriented to person, place, and time. Mental status is at baseline.      Cranial Nerves: No cranial nerve deficit.      Sensory: No sensory deficit.      Motor: No weakness.      Coordination: Coordination normal.      Gait: Gait normal.      Deep Tendon Reflexes: Reflexes are normal and symmetric. Reflexes normal.   Psychiatric:         Mood and Affect: Mood normal.         Behavior: Behavior normal.         Thought Content: Thought content normal.         Judgment: Judgment normal.         Assessment:       1. Primary hypertension    2. Mixed hyperlipidemia    3. PSVT (paroxysmal supraventricular tachycardia)    4. Aortic valve stenosis, moderate    5. Coronary artery disease of native artery of native heart with stable angina pectoris    6. Stage 3b chronic kidney disease    7. Rheumatoid arthritis  involving hip with positive rheumatoid factor, unspecified laterality    8. Sjogren's syndrome, with unspecified organ involvement    9. Thrombocytopenia, unspecified    10. Elevated ferritin    11. Senile osteoporosis    12. Gastroesophageal reflux disease, unspecified whether esophagitis present    13. 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    14. BMI 24.0-24.9, adult        Plan:       1. Primary hypertension  Good control, no medication changes needed  - Comprehensive Metabolic Panel; Future  - Lipid Panel; Future  - CBC Auto Differential; Future    2. Mixed hyperlipidemia  Await lipid panel results.  Will try to get it done before her appointment with Dr. Blair so he can evaluate it as well  - Comprehensive Metabolic Panel; Future  - Lipid Panel; Future    3. PSVT (paroxysmal supraventricular tachycardia)  Status post pacemaker    4. Aortic valve stenosis, moderate  It sounds like she needs an up dated echocardiogram to check for severe stenosis.  She will follow-up with Dr. Blair Monday    5. Coronary artery disease of native artery of native heart with stable angina pectoris  Asymptomatic no angina at this time, followed by Dr. Blair    6. Stage 3b chronic kidney disease  Await results of updated chemistry panel which will also include her calcium  - Comprehensive Metabolic Panel; Future    7. Rheumatoid arthritis involving hip with positive rheumatoid factor, unspecified laterality  Stable    8. Sjogren's syndrome, with unspecified organ involvement  Stable    9. Thrombocytopenia, unspecified  Followed by Dr. Pal  - CBC Auto Differential; Future    10. Elevated ferritin  Followed by Dr. Pal  - CBC Auto Differential; Future    11. Senile osteoporosis  On Prolia, updated calcium level will be available    12. Gastroesophageal reflux disease, unspecified whether esophagitis present  Followed by GI    13. 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  Followed by GI    14. Impacted cerumen, right  ear  Recommend she use some warmed mineral oil nightly.  Do not microwave it, warm it in a cup of warm water before applying    15. BMI 24.0-24.9, adult  Good weight no changes needed

## 2023-10-05 ENCOUNTER — LAB VISIT (OUTPATIENT)
Dept: PRIMARY CARE CLINIC | Facility: CLINIC | Age: 88
End: 2023-10-05
Attending: FAMILY MEDICINE
Payer: MEDICARE

## 2023-10-05 DIAGNOSIS — N18.32 STAGE 3B CHRONIC KIDNEY DISEASE: ICD-10-CM

## 2023-10-05 DIAGNOSIS — E78.2 MIXED HYPERLIPIDEMIA: ICD-10-CM

## 2023-10-05 DIAGNOSIS — R79.89 ELEVATED FERRITIN: ICD-10-CM

## 2023-10-05 DIAGNOSIS — I10 PRIMARY HYPERTENSION: ICD-10-CM

## 2023-10-05 DIAGNOSIS — D69.6 THROMBOCYTOPENIA, UNSPECIFIED: ICD-10-CM

## 2023-10-05 LAB
ALBUMIN SERPL BCP-MCNC: 3.8 G/DL (ref 3.5–5.2)
ALP SERPL-CCNC: 75 U/L (ref 55–135)
ALT SERPL W/O P-5'-P-CCNC: 12 U/L (ref 10–44)
ANION GAP SERPL CALC-SCNC: 10 MMOL/L (ref 8–16)
AST SERPL-CCNC: 23 U/L (ref 10–40)
BASOPHILS # BLD AUTO: 0.04 K/UL (ref 0–0.2)
BASOPHILS NFR BLD: 0.8 % (ref 0–1.9)
BILIRUB SERPL-MCNC: 0.3 MG/DL (ref 0.1–1)
BUN SERPL-MCNC: 38 MG/DL (ref 8–23)
CALCIUM SERPL-MCNC: 8.8 MG/DL (ref 8.7–10.5)
CHLORIDE SERPL-SCNC: 93 MMOL/L (ref 95–110)
CHOLEST SERPL-MCNC: 215 MG/DL (ref 120–199)
CHOLEST/HDLC SERPL: 4.7 {RATIO} (ref 2–5)
CO2 SERPL-SCNC: 28 MMOL/L (ref 23–29)
CREAT SERPL-MCNC: 1.4 MG/DL (ref 0.5–1.4)
DIFFERENTIAL METHOD: ABNORMAL
EOSINOPHIL # BLD AUTO: 0.1 K/UL (ref 0–0.5)
EOSINOPHIL NFR BLD: 2.3 % (ref 0–8)
ERYTHROCYTE [DISTWIDTH] IN BLOOD BY AUTOMATED COUNT: 15.5 % (ref 11.5–14.5)
EST. GFR  (NO RACE VARIABLE): 36.4 ML/MIN/1.73 M^2
GLUCOSE SERPL-MCNC: 96 MG/DL (ref 70–110)
HCT VFR BLD AUTO: 32.1 % (ref 37–48.5)
HDLC SERPL-MCNC: 46 MG/DL (ref 40–75)
HDLC SERPL: 21.4 % (ref 20–50)
HGB BLD-MCNC: 10.1 G/DL (ref 12–16)
IMM GRANULOCYTES # BLD AUTO: 0.02 K/UL (ref 0–0.04)
IMM GRANULOCYTES NFR BLD AUTO: 0.4 % (ref 0–0.5)
LDLC SERPL CALC-MCNC: 137.6 MG/DL (ref 63–159)
LYMPHOCYTES # BLD AUTO: 1.1 K/UL (ref 1–4.8)
LYMPHOCYTES NFR BLD: 20.2 % (ref 18–48)
MCH RBC QN AUTO: 27.2 PG (ref 27–31)
MCHC RBC AUTO-ENTMCNC: 31.5 G/DL (ref 32–36)
MCV RBC AUTO: 86 FL (ref 82–98)
MONOCYTES # BLD AUTO: 0.4 K/UL (ref 0.3–1)
MONOCYTES NFR BLD: 7.8 % (ref 4–15)
NEUTROPHILS # BLD AUTO: 3.6 K/UL (ref 1.8–7.7)
NEUTROPHILS NFR BLD: 68.5 % (ref 38–73)
NONHDLC SERPL-MCNC: 169 MG/DL
NRBC BLD-RTO: 0 /100 WBC
PLATELET # BLD AUTO: 147 K/UL (ref 150–450)
PMV BLD AUTO: 11 FL (ref 9.2–12.9)
POTASSIUM SERPL-SCNC: 4 MMOL/L (ref 3.5–5.1)
PROT SERPL-MCNC: 6.8 G/DL (ref 6–8.4)
RBC # BLD AUTO: 3.72 M/UL (ref 4–5.4)
SODIUM SERPL-SCNC: 131 MMOL/L (ref 136–145)
TRIGL SERPL-MCNC: 157 MG/DL (ref 30–150)
WBC # BLD AUTO: 5.29 K/UL (ref 3.9–12.7)

## 2023-10-05 PROCEDURE — 80061 LIPID PANEL: CPT | Performed by: FAMILY MEDICINE

## 2023-10-05 PROCEDURE — 85025 COMPLETE CBC W/AUTO DIFF WBC: CPT | Performed by: FAMILY MEDICINE

## 2023-10-05 PROCEDURE — 36415 PR COLLECTION VENOUS BLOOD,VENIPUNCTURE: ICD-10-PCS | Mod: S$GLB,,, | Performed by: FAMILY MEDICINE

## 2023-10-05 PROCEDURE — 36415 COLL VENOUS BLD VENIPUNCTURE: CPT | Mod: S$GLB,,, | Performed by: FAMILY MEDICINE

## 2023-10-05 PROCEDURE — 80053 COMPREHEN METABOLIC PANEL: CPT | Performed by: FAMILY MEDICINE

## 2023-10-09 ENCOUNTER — PATIENT MESSAGE (OUTPATIENT)
Dept: FAMILY MEDICINE | Facility: CLINIC | Age: 88
End: 2023-10-09
Payer: MEDICARE

## 2023-10-09 ENCOUNTER — OFFICE VISIT (OUTPATIENT)
Dept: CARDIOLOGY | Facility: CLINIC | Age: 88
End: 2023-10-09
Payer: MEDICARE

## 2023-10-09 ENCOUNTER — TELEPHONE (OUTPATIENT)
Dept: FAMILY MEDICINE | Facility: CLINIC | Age: 88
End: 2023-10-09
Payer: MEDICARE

## 2023-10-09 VITALS
BODY MASS INDEX: 24.06 KG/M2 | HEIGHT: 58 IN | WEIGHT: 114.63 LBS | HEART RATE: 71 BPM | SYSTOLIC BLOOD PRESSURE: 122 MMHG | DIASTOLIC BLOOD PRESSURE: 66 MMHG | OXYGEN SATURATION: 99 %

## 2023-10-09 DIAGNOSIS — I35.0 AORTIC VALVE STENOSIS, MODERATE: ICD-10-CM

## 2023-10-09 DIAGNOSIS — N18.31 STAGE 3A CHRONIC KIDNEY DISEASE: Primary | ICD-10-CM

## 2023-10-09 DIAGNOSIS — E78.2 MIXED HYPERLIPIDEMIA: ICD-10-CM

## 2023-10-09 DIAGNOSIS — Z95.0 HISTORY OF CARDIAC PACEMAKER IN SITU: ICD-10-CM

## 2023-10-09 DIAGNOSIS — I50.9 CONGESTIVE HEART FAILURE, UNSPECIFIED HF CHRONICITY, UNSPECIFIED HEART FAILURE TYPE: ICD-10-CM

## 2023-10-09 DIAGNOSIS — I25.118 CORONARY ARTERY DISEASE OF NATIVE ARTERY OF NATIVE HEART WITH STABLE ANGINA PECTORIS: ICD-10-CM

## 2023-10-09 PROCEDURE — 99213 OFFICE O/P EST LOW 20 MIN: CPT | Mod: PBBFAC,PN | Performed by: INTERNAL MEDICINE

## 2023-10-09 PROCEDURE — 99214 OFFICE O/P EST MOD 30 MIN: CPT | Mod: S$PBB,,, | Performed by: INTERNAL MEDICINE

## 2023-10-09 PROCEDURE — 99214 PR OFFICE/OUTPT VISIT, EST, LEVL IV, 30-39 MIN: ICD-10-PCS | Mod: S$PBB,,, | Performed by: INTERNAL MEDICINE

## 2023-10-09 PROCEDURE — 99999 PR PBB SHADOW E&M-EST. PATIENT-LVL III: ICD-10-PCS | Mod: PBBFAC,,, | Performed by: INTERNAL MEDICINE

## 2023-10-09 PROCEDURE — 99999 PR PBB SHADOW E&M-EST. PATIENT-LVL III: CPT | Mod: PBBFAC,,, | Performed by: INTERNAL MEDICINE

## 2023-10-09 NOTE — PROGRESS NOTES
Patient ID:  Cheyanne Gomes is a 87 y.o. female who presents for follow-up of Follow-up      She is having a lot of pain in her left arm she has problems with her rotator cough.  She had a steroid injection with no significant improvement.  She is having dyspnea on exertion at rest she is breathing okay.  In the past she was on atorvastatin was discontinued because of leg pains.  She is taking the metolazone on daily basis rather than p.r.n..  She is drinking a lot of bottles of water.        Past Medical History:   Diagnosis Date    Abdominal hernia     Anemia     Dr Berkowitz     Angina pectoris     Aortic valve stenosis, moderate     Back pain     Cannon's esophagus     CAD (coronary artery disease)     Cataract     ou done    CHF (congestive heart failure)     EFx 35%, Dr. Spencer 13    Chronic combined systolic and diastolic heart failure 2019    Colitis     Compression fracture     Diverticulosis     Encounter for blood transfusion     GERD (gastroesophageal reflux disease)     Hyperlipidemia     Hypertension     Irritable bowel syndrome     Myocardial infarction     Osteoarthritis     lumbar DDD    Pacemaker     Pneumonia 2017    Raynaud disease     Rheumatoid arteritis     Rheumatoid arthritis     Shingles 2017    Sjogren syndrome     Ulcerative colitis         Past Surgical History:   Procedure Laterality Date    A-V CARDIAC PACEMAKER INSERTION Left 2021    Procedure: INSERTION, CARDIAC PACEMAKER, DUAL CHAMBER  (MEDTRONIC);  Surgeon: Tera Blair MD;  Location: [facility] CATH/EP LAB;  Service: Cardiology;  Laterality: Left;    APPENDECTOMY      BACK SURGERY      CARDIAC SURGERY      CABGx3 in     CATARACT EXTRACTION      ou od d 11-15-12//     SECTION, CLASSIC      x 2    CHOLECYSTECTOMY      CLOSURE OF WOUND Right 2022    Procedure: CLOSURE-WOUND;  Surgeon: Delvis Jackson MD;  Location: [facility] OR;  Service: ENT;  Laterality: Right;  NOSE AND CHIN     COLONOSCOPY  09/15/2011    Dr Anaya     COLONOSCOPY  01/10/2017    [facility] report sent to scanning    CORONARY ANGIOPLASTY      PCI x 1 in 2007    CORONARY ARTERY BYPASS GRAFT      3 vessel    CORONARY ARTERY BYPASS GRAFT      CYSTOSCOPY N/A 6/3/2020    Procedure: CYSTOSCOPY;  Surgeon: Miryam Bender Jr., MD;  Location: [facility] OR;  Service: Urology;  Laterality: N/A;    eyes      rk ou    FRACTURE SURGERY  06/21/2016    Dr Guido left hip     FRACTURE SURGERY  2018    Right Hip    HARVESTING OF SKIN GRAFT  9/23/2022    Procedure: SURGICAL PROCUREMENT, GRAFT, SKIN;  Surgeon: Delvis Jackson MD;  Location: [facility] OR;  Service: ENT;;  RIGHT CHEST    HYSTERECTOMY      tubal ligation, oopherectomy    INTRAMEDULLARY RODDING OF TROCHANTER OF FEMUR Right 10/8/2018    Procedure: INSERTION, INTRAMEDULLARY KELSI, FEMUR, TROCHANTER;  Surgeon: Valerio Luther MD;  Location: [facility] OR;  Service: Orthopedics;  Laterality: Right;    OOPHORECTOMY      SPINE SURGERY  8-    Silvino Mckeon    UPPER GASTROINTESTINAL ENDOSCOPY      Yag Capsulotomy Right 9/25/14          Current Outpatient Medications   Medication Instructions    amLODIPine (NORVASC) 5 MG tablet TAKE 1 TABLET BY MOUTH 2 TIMES A DAY    aspirin 81 mg Tab 1 tablet, Oral, Nightly, Every day    biotin 5 mg Cap 1 tablet, Oral, Daily    CALCIUM CARB/VIT D3/MINERALS (CALCIUM-VITAMIN D ORAL) 600 mg, Oral, 2 times daily, Calcium 1200 with D 3 1000    CRANBERRY CONC/ASCORBIC ACID (CRANBERRY PLUS VITAMIN C ORAL) 1 capsule, Oral, Daily    DEXILANT 60 mg, Oral, Daily    ENTRESTO  mg per tablet 1 tablet, Oral, 2 times daily    fluticasone (FLONASE) 50 mcg/actuation nasal spray 2 sprays, Each Nostril, Daily    folic acid (FOLVITE) 2 mg, Oral, Daily    furosemide (LASIX) 80 mg, Oral, Daily    gabapentin (NEURONTIN) 200 mg, Oral, Nightly    HYDROmorphone (DILAUDID) 2 mg, Oral, Every 4 hours PRN    isosorbide dinitrate (ISORDIL) 10 MG tablet TAKE ONE TABLET BY MOUTH THREE TIMES DAILY     Lactobacillus acidophilus 10 billion cell Cap 1 each, Oral, Daily, 1.5 billion    LINZESS 72 mcg, Oral, Every morning    magnesium oxide (MAG-OX) 400 mg (241.3 mg magnesium) tablet TAKE 1 TABLET BY MOUTH 2 TIMES A DAY    metOLazone (ZAROXOLYN) 2.5 mg, Oral, Daily    metoprolol succinate (TOPROL-XL) 25 mg, Oral, See admin instructions, 25 mg QAM and 12.5 mg QHS    MULTIVITAMIN WITH MINERALS (ONE-A-DAY 50 PLUS) Tab 1 tablet, Oral, Daily, Every day    nitroGLYCERIN 0.4 MG/DOSE TL SPRY (NITROLINGUAL) 400 mcg/spray spray 1 spray, Sublingual, Every 5 min PRN, PRN    omega-3 fatty acids/fish oil (FISH OIL-OMEGA-3 FATTY ACIDS) 300-1,000 mg capsule 1 capsule, Oral, Daily    ondansetron (ZOFRAN) 4 mg, Oral, Every 8 hours PRN    potassium chloride SA (K-DUR,KLOR-CON M) 10 MEQ tablet TAKE TWO TABLETS BY MOUTH EVERY MORNING AND 1 TABLET EVERY EVENING    prednisoLONE acetate (PRED FORTE) 1 % DrpS 1 drop, Both Eyes, 2 times daily    promethazine (PHENERGAN) 25 mg, Oral, 2 times daily PRN    sertraline (ZOLOFT) 100 mg, Oral, Daily    traMADoL (ULTRAM) 50 mg, Oral, 2 times daily PRN    vitamin E 400 mg, Oral, Daily, Every day        Review of patient's allergies indicates:   Allergen Reactions    Adhesive     Nitrofurantoin macrocrystalline Nausea And Vomiting     Other reaction(s): very sickly    Penicillins Swelling     Other reaction(s): swelling  Other reaction(s): red skin discolorati    Sulfa (sulfonamide antibiotics) Nausea And Vomiting     Other reaction(s): very sickly        Review of Systems   Cardiovascular:  Positive for dyspnea on exertion and leg swelling. Negative for chest pain.   Respiratory:  Negative for cough and shortness of breath.    Musculoskeletal:  Positive for joint pain.   Gastrointestinal:  Positive for heartburn.        Objective:     Vitals:    10/09/23 1048   BP: 122/66   BP Location: Left arm   Patient Position: Sitting   BP Method: Medium (Manual)   Pulse: 71   SpO2: 99%   Weight: 52 kg (114 lb  "10.2 oz)   Height: 4' 10" (1.473 m)       Physical Exam  Vitals and nursing note reviewed.   Constitutional:       Appearance: She is well-developed.   HENT:      Head: Normocephalic and atraumatic.   Eyes:      Conjunctiva/sclera: Conjunctivae normal.   Cardiovascular:      Rate and Rhythm: Normal rate and regular rhythm.      Heart sounds: Murmur (Grade 3/6 systolic murmur at the base) heard.   Pulmonary:      Effort: Pulmonary effort is normal.      Breath sounds: Normal breath sounds.   Abdominal:      General: Bowel sounds are normal.      Palpations: Abdomen is soft.   Musculoskeletal:         General: Normal range of motion.   Skin:     General: Skin is warm and dry.   Neurological:      Mental Status: She is alert and oriented to person, place, and time.   Psychiatric:         Behavior: Behavior normal.         Thought Content: Thought content normal.         Judgment: Judgment normal.       CMP  Sodium   Date Value Ref Range Status   10/05/2023 131 (L) 136 - 145 mmol/L Final   12/11/2018 139 134 - 144 mmol/L      Potassium   Date Value Ref Range Status   10/05/2023 4.0 3.5 - 5.1 mmol/L Final     Chloride   Date Value Ref Range Status   10/05/2023 93 (L) 95 - 110 mmol/L Final   12/11/2018 96 (L) 98 - 110 mmol/L      CO2   Date Value Ref Range Status   10/05/2023 28 23 - 29 mmol/L Final     Glucose   Date Value Ref Range Status   10/05/2023 96 70 - 110 mg/dL Final   12/11/2018 97 70 - 99 mg/dL      BUN   Date Value Ref Range Status   10/05/2023 38 (H) 8 - 23 mg/dL Final     Creatinine   Date Value Ref Range Status   10/05/2023 1.4 0.5 - 1.4 mg/dL Final   12/11/2018 0.95 0.60 - 1.40 mg/dL      Calcium   Date Value Ref Range Status   10/05/2023 8.8 8.7 - 10.5 mg/dL Final     Total Protein   Date Value Ref Range Status   10/05/2023 6.8 6.0 - 8.4 g/dL Final     Albumin   Date Value Ref Range Status   10/05/2023 3.8 3.5 - 5.2 g/dL Final   12/11/2018 3.9 3.1 - 4.7 g/dL      Total Bilirubin   Date Value Ref Range " Status   10/05/2023 0.3 0.1 - 1.0 mg/dL Final     Comment:     For infants and newborns, interpretation of results should be based  on gestational age, weight and in agreement with clinical  observations.    Premature Infant recommended reference ranges:  Up to 24 hours.............<8.0 mg/dL  Up to 48 hours............<12.0 mg/dL  3-5 days..................<15.0 mg/dL  6-29 days.................<15.0 mg/dL       Alkaline Phosphatase   Date Value Ref Range Status   10/05/2023 75 55 - 135 U/L Final     AST   Date Value Ref Range Status   10/05/2023 23 10 - 40 U/L Final     ALT   Date Value Ref Range Status   10/05/2023 12 10 - 44 U/L Final     Anion Gap   Date Value Ref Range Status   10/05/2023 10 8 - 16 mmol/L Final     eGFR if    Date Value Ref Range Status   11/05/2021 >60.0 >60 mL/min/1.73 m^2 Final     eGFR if non    Date Value Ref Range Status   02/21/2022 56 (L) >59 mL/min/1.73 Final      BMP  Lab Results   Component Value Date     (L) 10/05/2023    K 4.0 10/05/2023    CL 93 (L) 10/05/2023    CO2 28 10/05/2023    BUN 38 (H) 10/05/2023    CREATININE 1.4 10/05/2023    CALCIUM 8.8 10/05/2023    ANIONGAP 10 10/05/2023    ESTGFRAFRICA >60.0 11/05/2021    EGFRNONAA 56 (L) 02/21/2022      BNP  @LABRCNTIP(BNP,BNPTRIAGEBLO)@   Lab Results   Component Value Date    CHOL 215 (H) 10/05/2023    CHOL 146 10/04/2022    CHOL 113 05/06/2021     Lab Results   Component Value Date    HDL 46 10/05/2023    HDL 52 10/04/2022    HDL 47 05/06/2021     Lab Results   Component Value Date    LDLCALC 137.6 10/05/2023    LDLCALC 75 10/04/2022    LDLCALC 51 05/06/2021     Lab Results   Component Value Date    TRIG 157 (H) 10/05/2023    TRIG 104 10/04/2022    TRIG 70 05/06/2021     Lab Results   Component Value Date    CHOLHDL 21.4 10/05/2023    CHOLHDL 26.2 08/24/2020    CHOLHDL 45.0 10/16/2013      Lab Results   Component Value Date    TSH 2.105 08/22/2022    FREET4 0.89 09/26/2012     Lab Results    Component Value Date    HGBA1C 5.3 02/26/2015     Lab Results   Component Value Date    WBC 5.29 10/05/2023    HGB 10.1 (L) 10/05/2023    HCT 32.1 (L) 10/05/2023    MCV 86 10/05/2023     (L) 10/05/2023         Results for orders placed during the hospital encounter of 04/28/22    Echo    Interpretation Summary  · The left ventricle is normal in size with concentric remodeling and mildly decreased systolic function.  · The estimated ejection fraction is 40%.  · Grade I left ventricular diastolic dysfunction.  · There are segmental left ventricular wall motion abnormalities.  · Apical akinesis  · Normal right ventricular size with normal right ventricular systolic function.  · Mild aortic regurgitation.  · There is moderate aortic valve stenosis.  · Aortic valve area is 1.15 cm2; peak velocity is 2.12 m/s; mean gradient is 18 mmHg.  · Mild tricuspid regurgitation.  · Normal central venous pressure (3 mmHg).  · The estimated PA systolic pressure is 24 mmHg.  · Overall the study quality was technically difficult.     No results found for this or any previous visit.         Assessment:       Coronary artery disease of native artery of native heart with stable angina pectoris  Stable no symptoms of angina    Hyperlipidemia  During her hospitalization the atorvastatin was discontinued will resume the medication    History of cardiac pacemaker in situ  Functioning adequately    Aortic valve stenosis, moderate  Moderate to severe aortic stenosis stable       Plan:           Try atorvastatin 20 mg p.o. daily again and see if she tolerates it if not will try something else.  She is to decrease the fluid intake as well as a sodium intake.  She will be seen in the office in approximately 1 month

## 2023-10-09 NOTE — TELEPHONE ENCOUNTER
----- Message from Melinda Tano sent at 10/9/2023  7:15 AM CDT -----  Please review patient's Appointment Desk for an upcoming PROLIA INJECTION appointment.       The following are needed for this appointment.   Reminding to please contact patient, if needed:      ORDER.  Current / active in the Epic Therapy Plan, signed within a year.  LABS. Serum Calcium within 6 weeks of treatment.    DEXA SCAN within the last 2 years   DENTAL PRECAUTION CONFIRMED WITH PATIENT. No recent invasive dental procedures within the last month (tooth extraction, etc). A patient will need to bring a Dental Clearance signed by a dental provider if there was any recent dental procedure within the last month.   FOLLOW-UP APPOINTMENT within the last 12 months with the diagnosis mentioned in the visit notes.         Any item unavailable by the day prior to the scheduled appointment will cause the appointment to be CANCELLED.        To reschedule appointments, please contact [facility] INFUSION SCHEDULING POOL or call [phone] or [phone].       Thank you,  [facility], Infusion Department

## 2023-10-10 ENCOUNTER — PATIENT MESSAGE (OUTPATIENT)
Dept: CARDIOLOGY | Facility: CLINIC | Age: 88
End: 2023-10-10
Payer: MEDICARE

## 2023-10-10 NOTE — TELEPHONE ENCOUNTER
I presume this message is something that Dr. Blair told her.  There is nothing in his notes, he has not even started one.  I would agree with resuming the Lipitor and starting at a lower dose as well as reducing the Lasix as much as possible.  Is there a question for me?

## 2023-10-18 ENCOUNTER — PATIENT MESSAGE (OUTPATIENT)
Dept: CARDIOLOGY | Facility: CLINIC | Age: 88
End: 2023-10-18
Payer: MEDICARE

## 2023-10-23 ENCOUNTER — INFUSION (OUTPATIENT)
Dept: INFUSION THERAPY | Facility: HOSPITAL | Age: 88
End: 2023-10-23
Attending: FAMILY MEDICINE
Payer: MEDICARE

## 2023-10-23 VITALS
BODY MASS INDEX: 23.84 KG/M2 | HEART RATE: 85 BPM | OXYGEN SATURATION: 98 % | WEIGHT: 113.56 LBS | RESPIRATION RATE: 18 BRPM | DIASTOLIC BLOOD PRESSURE: 84 MMHG | SYSTOLIC BLOOD PRESSURE: 156 MMHG | TEMPERATURE: 98 F | HEIGHT: 58 IN

## 2023-10-23 DIAGNOSIS — D63.8 CHRONIC DISEASE ANEMIA: Primary | ICD-10-CM

## 2023-10-23 PROCEDURE — 96372 THER/PROPH/DIAG INJ SC/IM: CPT

## 2023-10-23 PROCEDURE — 63600175 PHARM REV CODE 636 W HCPCS: Mod: JZ,JG | Performed by: FAMILY MEDICINE

## 2023-10-23 RX ADMIN — DENOSUMAB 60 MG: 60 INJECTION SUBCUTANEOUS at 08:10

## 2023-10-25 ENCOUNTER — TELEPHONE (OUTPATIENT)
Dept: FAMILY MEDICINE | Facility: CLINIC | Age: 88
End: 2023-10-25
Payer: MEDICARE

## 2023-10-25 NOTE — TELEPHONE ENCOUNTER
----- Message from Hector Alfaro sent at 10/24/2023  4:47 PM CDT -----    Patient received PROLIA on:  10/23/24    THE NEXT DOSE IS SCHEDULED FOR :  4/24/24    To reschedule appointments, please contact [facility] INFUSION SCHEDULING POOL or call [phone].

## 2023-10-26 DIAGNOSIS — I63.9 CEREBRAL INFARCTION: Primary | ICD-10-CM

## 2023-10-27 ENCOUNTER — HOSPITAL ENCOUNTER (OUTPATIENT)
Dept: RADIOLOGY | Facility: HOSPITAL | Age: 88
Discharge: HOME | End: 2023-10-27
Attending: PSYCHIATRY & NEUROLOGY
Payer: MEDICARE

## 2023-10-27 DIAGNOSIS — I63.9 CEREBRAL INFARCTION: ICD-10-CM

## 2023-10-27 PROCEDURE — 70450 CT HEAD/BRAIN W/O DYE: CPT | Mod: TC

## 2023-10-31 ENCOUNTER — EXTERNAL CHRONIC CARE MANAGEMENT (OUTPATIENT)
Dept: PRIMARY CARE CLINIC | Facility: CLINIC | Age: 88
End: 2023-10-31
Payer: MEDICARE

## 2023-10-31 DIAGNOSIS — G62.9 PERIPHERAL POLYNEUROPATHY: ICD-10-CM

## 2023-10-31 PROCEDURE — 99490 PR CHRONIC CARE MGMT, 1ST 20 MIN: ICD-10-PCS | Mod: S$PBB,,, | Performed by: FAMILY MEDICINE

## 2023-10-31 PROCEDURE — 99490 CHRNC CARE MGMT STAFF 1ST 20: CPT | Mod: S$PBB,,, | Performed by: FAMILY MEDICINE

## 2023-10-31 PROCEDURE — 99490 CHRNC CARE MGMT STAFF 1ST 20: CPT | Mod: PBBFAC,PN | Performed by: FAMILY MEDICINE

## 2023-10-31 RX ORDER — GABAPENTIN 100 MG/1
200 CAPSULE ORAL NIGHTLY
Qty: 60 CAPSULE | Refills: 0 | Status: SHIPPED | OUTPATIENT
Start: 2023-10-31 | End: 2023-12-05

## 2023-10-31 NOTE — TELEPHONE ENCOUNTER
No care due was identified.  [facility] Embedded Care Due Messages. Reference number: [id number].   10/31/2023 12:10:05 PM CDT

## 2023-11-07 ENCOUNTER — LAB VISIT (OUTPATIENT)
Dept: PRIMARY CARE CLINIC | Facility: CLINIC | Age: 88
End: 2023-11-07
Payer: MEDICARE

## 2023-11-07 DIAGNOSIS — N18.31 STAGE 3A CHRONIC KIDNEY DISEASE: ICD-10-CM

## 2023-11-07 DIAGNOSIS — I50.9 CONGESTIVE HEART FAILURE, UNSPECIFIED HF CHRONICITY, UNSPECIFIED HEART FAILURE TYPE: ICD-10-CM

## 2023-11-07 DIAGNOSIS — E78.2 MIXED HYPERLIPIDEMIA: ICD-10-CM

## 2023-11-07 LAB
ANION GAP SERPL CALC-SCNC: 10 MMOL/L (ref 8–16)
BNP SERPL-MCNC: 436 PG/ML (ref 0–99)
BUN SERPL-MCNC: 17 MG/DL (ref 8–23)
CALCIUM SERPL-MCNC: 8.5 MG/DL (ref 8.7–10.5)
CHLORIDE SERPL-SCNC: 105 MMOL/L (ref 95–110)
CO2 SERPL-SCNC: 25 MMOL/L (ref 23–29)
CREAT SERPL-MCNC: 1.2 MG/DL (ref 0.5–1.4)
ERYTHROCYTE [DISTWIDTH] IN BLOOD BY AUTOMATED COUNT: 16.6 % (ref 11.5–14.5)
EST. GFR  (NO RACE VARIABLE): 43.8 ML/MIN/1.73 M^2
GLUCOSE SERPL-MCNC: 114 MG/DL (ref 70–110)
HCT VFR BLD AUTO: 34 % (ref 37–48.5)
HGB BLD-MCNC: 10.7 G/DL (ref 12–16)
MCH RBC QN AUTO: 28.3 PG (ref 27–31)
MCHC RBC AUTO-ENTMCNC: 31.5 G/DL (ref 32–36)
MCV RBC AUTO: 90 FL (ref 82–98)
PLATELET # BLD AUTO: 163 K/UL (ref 150–450)
PMV BLD AUTO: 9.8 FL (ref 9.2–12.9)
POTASSIUM SERPL-SCNC: 5.7 MMOL/L (ref 3.5–5.1)
RBC # BLD AUTO: 3.78 M/UL (ref 4–5.4)
SODIUM SERPL-SCNC: 140 MMOL/L (ref 136–145)
WBC # BLD AUTO: 6.11 K/UL (ref 3.9–12.7)

## 2023-11-07 PROCEDURE — 83880 ASSAY OF NATRIURETIC PEPTIDE: CPT | Performed by: INTERNAL MEDICINE

## 2023-11-07 PROCEDURE — 80048 BASIC METABOLIC PNL TOTAL CA: CPT | Performed by: INTERNAL MEDICINE

## 2023-11-07 PROCEDURE — 85027 COMPLETE CBC AUTOMATED: CPT | Performed by: INTERNAL MEDICINE

## 2023-11-07 PROCEDURE — 36415 PR COLLECTION VENOUS BLOOD,VENIPUNCTURE: ICD-10-PCS | Mod: S$GLB,,, | Performed by: INTERNAL MEDICINE

## 2023-11-07 PROCEDURE — 36415 COLL VENOUS BLD VENIPUNCTURE: CPT | Mod: S$GLB,,, | Performed by: INTERNAL MEDICINE

## 2023-11-13 ENCOUNTER — OFFICE VISIT (OUTPATIENT)
Dept: CARDIOLOGY | Facility: CLINIC | Age: 88
End: 2023-11-13
Payer: MEDICARE

## 2023-11-13 VITALS
WEIGHT: 110.56 LBS | OXYGEN SATURATION: 90 % | DIASTOLIC BLOOD PRESSURE: 60 MMHG | BODY MASS INDEX: 23.21 KG/M2 | HEART RATE: 69 BPM | HEIGHT: 58 IN | SYSTOLIC BLOOD PRESSURE: 128 MMHG

## 2023-11-13 DIAGNOSIS — E87.5 HYPERKALEMIA: ICD-10-CM

## 2023-11-13 DIAGNOSIS — I35.0 AORTIC VALVE STENOSIS, MODERATE: ICD-10-CM

## 2023-11-13 DIAGNOSIS — I50.9 CONGESTIVE HEART FAILURE, UNSPECIFIED HF CHRONICITY, UNSPECIFIED HEART FAILURE TYPE: ICD-10-CM

## 2023-11-13 DIAGNOSIS — Z95.0 CARDIAC PACEMAKER IN SITU: ICD-10-CM

## 2023-11-13 DIAGNOSIS — I50.42 CHRONIC COMBINED SYSTOLIC AND DIASTOLIC HEART FAILURE: ICD-10-CM

## 2023-11-13 DIAGNOSIS — Z95.0 HISTORY OF CARDIAC PACEMAKER IN SITU: ICD-10-CM

## 2023-11-13 DIAGNOSIS — N18.31 STAGE 3A CHRONIC KIDNEY DISEASE: Primary | ICD-10-CM

## 2023-11-13 PROCEDURE — 99213 PR OFFICE/OUTPT VISIT, EST, LEVL III, 20-29 MIN: ICD-10-PCS | Mod: S$PBB,,, | Performed by: INTERNAL MEDICINE

## 2023-11-13 PROCEDURE — 99213 OFFICE O/P EST LOW 20 MIN: CPT | Mod: S$PBB,,, | Performed by: INTERNAL MEDICINE

## 2023-11-13 PROCEDURE — 99999 PR PBB SHADOW E&M-EST. PATIENT-LVL V: CPT | Mod: PBBFAC,,, | Performed by: INTERNAL MEDICINE

## 2023-11-13 PROCEDURE — 99999 PR PBB SHADOW E&M-EST. PATIENT-LVL V: ICD-10-PCS | Mod: PBBFAC,,, | Performed by: INTERNAL MEDICINE

## 2023-11-13 PROCEDURE — 99215 OFFICE O/P EST HI 40 MIN: CPT | Mod: PBBFAC,PN | Performed by: INTERNAL MEDICINE

## 2023-11-13 RX ORDER — CEPHALEXIN 500 MG/1
500 CAPSULE ORAL EVERY 8 HOURS
Qty: 30 CAPSULE | Refills: 0 | Status: SHIPPED | OUTPATIENT
Start: 2023-11-13 | End: 2023-12-06 | Stop reason: ALTCHOICE

## 2023-11-13 NOTE — PROGRESS NOTES
Patient ID:  Cheyanne Gomes is a 87 y.o. female who presents for follow-up of Coronary Artery Disease, Congestive Heart Failure, and Results (labs)      She complained to her gastroenterologist of having decrease her motor skills.  She was referred to the neurologist Dr. Flores CTA of the head was done that shows a small lacunar infarct.  In the past she require blood transfusion suspect she had AVMs due to her aortic stenosis.  She hit her right leg and has an abrasion she feels that it is getting red it around the surrounding.  In the past she has taking Keflex and tolerated well.  Blood work revealed a potassium of 5.7 her BNP was 436        Past Medical History:   Diagnosis Date    Abdominal hernia     Anemia     Dr Berkowitz     Angina pectoris     Aortic valve stenosis, moderate     Back pain     Cannon's esophagus     CAD (coronary artery disease)     Cataract     ou done    CHF (congestive heart failure)     EFx 35%, Dr. Spencer 13    Chronic combined systolic and diastolic heart failure 2019    Colitis     Compression fracture     Diverticulosis     Encounter for blood transfusion     GERD (gastroesophageal reflux disease)     Hyperlipidemia     Hypertension     Irritable bowel syndrome     Myocardial infarction     Osteoarthritis     lumbar DDD    Pacemaker     Pneumonia 2017    Raynaud disease     Rheumatoid arteritis     Rheumatoid arthritis     Shingles 2017    Sjogren syndrome     Ulcerative colitis         Past Surgical History:   Procedure Laterality Date    A-V CARDIAC PACEMAKER INSERTION Left 2021    Procedure: INSERTION, CARDIAC PACEMAKER, DUAL CHAMBER  (MEDTRONIC);  Surgeon: Tera Blair MD;  Location: [facility] CATH/EP LAB;  Service: Cardiology;  Laterality: Left;    APPENDECTOMY      BACK SURGERY      CARDIAC SURGERY      CABGx3 in     CATARACT EXTRACTION      ou od d 11-15-12//     SECTION, CLASSIC      x 2    CHOLECYSTECTOMY      CLOSURE OF  WOUND Right 9/23/2022    Procedure: CLOSURE-WOUND;  Surgeon: Delvis Jackson MD;  Location: [facility] OR;  Service: ENT;  Laterality: Right;  NOSE AND CHIN    COLONOSCOPY  09/15/2011    Dr Anaya     COLONOSCOPY  01/10/2017    [facility] report sent to scanning    CORONARY ANGIOPLASTY      PCI x 1 in 2007    CORONARY ARTERY BYPASS GRAFT      3 vessel    CORONARY ARTERY BYPASS GRAFT      CYSTOSCOPY N/A 6/3/2020    Procedure: CYSTOSCOPY;  Surgeon: Miryam Bender Jr., MD;  Location: [facility] OR;  Service: Urology;  Laterality: N/A;    eyes      rk ou    FRACTURE SURGERY  06/21/2016    Dr Guido left hip     FRACTURE SURGERY  2018    Right Hip    HARVESTING OF SKIN GRAFT  9/23/2022    Procedure: SURGICAL PROCUREMENT, GRAFT, SKIN;  Surgeon: Delvis Jackson MD;  Location: [facility] OR;  Service: ENT;;  RIGHT CHEST    HYSTERECTOMY      tubal ligation, oopherectomy    INTRAMEDULLARY RODDING OF TROCHANTER OF FEMUR Right 10/8/2018    Procedure: INSERTION, INTRAMEDULLARY KELSI, FEMUR, TROCHANTER;  Surgeon: Valerio Luther MD;  Location: [facility] OR;  Service: Orthopedics;  Laterality: Right;    OOPHORECTOMY      SPINE SURGERY  8-    Silvino Mckeon    UPPER GASTROINTESTINAL ENDOSCOPY      Yag Capsulotomy Right 9/25/14          Current Outpatient Medications   Medication Instructions    amLODIPine (NORVASC) 5 MG tablet TAKE 1 TABLET BY MOUTH 2 TIMES A DAY    aspirin 81 mg Tab 1 tablet, Oral, Nightly, Every day    biotin 5 mg Cap 1 tablet, Oral, Daily    CALCIUM CARB/VIT D3/MINERALS (CALCIUM-VITAMIN D ORAL) 600 mg, Oral, 2 times daily, Calcium 1200 with D 3 1000    cephALEXin (KEFLEX) 500 mg, Oral, Every 8 hours    CRANBERRY CONC/ASCORBIC ACID (CRANBERRY PLUS VITAMIN C ORAL) 1 capsule, Oral, Daily    DEXILANT 60 mg, Oral, Daily    ENTRESTO  mg per tablet 1 tablet, Oral, 2 times daily    fluticasone (FLONASE) 50 mcg/actuation nasal spray 2 sprays, Each Nostril, Daily    folic acid (FOLVITE) 2 mg, Oral, Daily    furosemide (LASIX) 80 mg, Oral,  Daily    gabapentin (NEURONTIN) 200 mg, Oral, Nightly    HYDROmorphone (DILAUDID) 2 mg, Oral, Every 4 hours PRN    isosorbide dinitrate (ISORDIL) 10 MG tablet TAKE ONE TABLET BY MOUTH THREE TIMES DAILY    Lactobacillus acidophilus 10 billion cell Cap 1 each, Oral, Daily, 1.5 billion    magnesium oxide (MAG-OX) 400 mg (241.3 mg magnesium) tablet TAKE 1 TABLET BY MOUTH 2 TIMES A DAY    metOLazone (ZAROXOLYN) 2.5 mg, Oral, Daily    metoprolol succinate (TOPROL-XL) 25 mg, Oral, See admin instructions, 25 mg QAM and 12.5 mg QHS    MULTIVITAMIN WITH MINERALS (ONE-A-DAY 50 PLUS) Tab 1 tablet, Oral, Daily, Every day    nitroGLYCERIN 0.4 MG/DOSE TL SPRY (NITROLINGUAL) 400 mcg/spray spray 1 spray, Sublingual, Every 5 min PRN, PRN    omega-3 fatty acids/fish oil (FISH OIL-OMEGA-3 FATTY ACIDS) 300-1,000 mg capsule 1 capsule, Oral, Daily    ondansetron (ZOFRAN) 4 mg, Oral, Every 8 hours PRN    potassium chloride SA (K-DUR,KLOR-CON M) 10 MEQ tablet TAKE TWO TABLETS BY MOUTH EVERY MORNING AND 1 TABLET EVERY EVENING    promethazine (PHENERGAN) 25 mg, Oral, 2 times daily PRN    sertraline (ZOLOFT) 100 mg, Oral, Daily    traMADoL (ULTRAM) 50 mg, Oral, 2 times daily PRN    vitamin E 400 mg, Oral, Daily, Every day        Review of patient's allergies indicates:   Allergen Reactions    Adhesive     Nitrofurantoin macrocrystalline Nausea And Vomiting     Other reaction(s): very sickly    Penicillins Swelling     Other reaction(s): swelling  Other reaction(s): red skin discolorati    Sulfa (sulfonamide antibiotics) Nausea And Vomiting     Other reaction(s): very sickly        Review of Systems   Cardiovascular:  Positive for dyspnea on exertion. Negative for chest pain and palpitations.   Respiratory:  Negative for cough and shortness of breath.    Hematologic/Lymphatic: Negative for bleeding problem. Does not bruise/bleed easily.   Neurological:  Positive for weakness.        Objective:     Vitals:    11/13/23 0917   BP: 128/60   BP  "Location: Left arm   Patient Position: Sitting   BP Method: Medium (Manual)   Pulse: 69   SpO2: (!) 90%   Weight: 50.1 kg (110 lb 9 oz)   Height: 4' 10" (1.473 m)       Physical Exam  Vitals and nursing note reviewed.   Constitutional:       Appearance: She is well-developed.   HENT:      Head: Normocephalic and atraumatic.   Eyes:      Conjunctiva/sclera: Conjunctivae normal.   Cardiovascular:      Rate and Rhythm: Normal rate and regular rhythm.      Heart sounds: Murmur (Grade 3/6 systolic murmur at the base) heard.   Pulmonary:      Effort: Pulmonary effort is normal.      Breath sounds: Normal breath sounds.   Abdominal:      General: Bowel sounds are normal.      Palpations: Abdomen is soft.   Musculoskeletal:         General: Normal range of motion.   Skin:     General: Skin is warm and dry.      Findings: Lesion (Abrasion on the right leg with mild redness) present.   Neurological:      Mental Status: She is alert and oriented to person, place, and time.   Psychiatric:         Behavior: Behavior normal.         Thought Content: Thought content normal.         Judgment: Judgment normal.       CMP  Sodium   Date Value Ref Range Status   11/07/2023 140 136 - 145 mmol/L Final   12/11/2018 139 134 - 144 mmol/L      Potassium   Date Value Ref Range Status   11/07/2023 5.7 (H) 3.5 - 5.1 mmol/L Final     Comment:     *No Visible Hemolysis     Chloride   Date Value Ref Range Status   11/07/2023 105 95 - 110 mmol/L Final   12/11/2018 96 (L) 98 - 110 mmol/L      CO2   Date Value Ref Range Status   11/07/2023 25 23 - 29 mmol/L Final     Glucose   Date Value Ref Range Status   11/07/2023 114 (H) 70 - 110 mg/dL Final   12/11/2018 97 70 - 99 mg/dL      BUN   Date Value Ref Range Status   11/07/2023 17 8 - 23 mg/dL Final     Creatinine   Date Value Ref Range Status   11/07/2023 1.2 0.5 - 1.4 mg/dL Final   12/11/2018 0.95 0.60 - 1.40 mg/dL      Calcium   Date Value Ref Range Status   11/07/2023 8.5 (L) 8.7 - 10.5 mg/dL Final "     Total Protein   Date Value Ref Range Status   10/05/2023 6.8 6.0 - 8.4 g/dL Final     Albumin   Date Value Ref Range Status   10/05/2023 3.8 3.5 - 5.2 g/dL Final   12/11/2018 3.9 3.1 - 4.7 g/dL      Total Bilirubin   Date Value Ref Range Status   10/05/2023 0.3 0.1 - 1.0 mg/dL Final     Comment:     For infants and newborns, interpretation of results should be based  on gestational age, weight and in agreement with clinical  observations.    Premature Infant recommended reference ranges:  Up to 24 hours.............<8.0 mg/dL  Up to 48 hours............<12.0 mg/dL  3-5 days..................<15.0 mg/dL  6-29 days.................<15.0 mg/dL       Alkaline Phosphatase   Date Value Ref Range Status   10/05/2023 75 55 - 135 U/L Final     AST   Date Value Ref Range Status   10/05/2023 23 10 - 40 U/L Final     ALT   Date Value Ref Range Status   10/05/2023 12 10 - 44 U/L Final     Anion Gap   Date Value Ref Range Status   11/07/2023 10 8 - 16 mmol/L Final     eGFR if    Date Value Ref Range Status   11/05/2021 >60.0 >60 mL/min/1.73 m^2 Final     eGFR if non    Date Value Ref Range Status   02/21/2022 56 (L) >59 mL/min/1.73 Final      BMP  Lab Results   Component Value Date     11/07/2023    K 5.7 (H) 11/07/2023     11/07/2023    CO2 25 11/07/2023    BUN 17 11/07/2023    CREATININE 1.2 11/07/2023    CALCIUM 8.5 (L) 11/07/2023    ANIONGAP 10 11/07/2023    ESTGFRAFRICA >60.0 11/05/2021    EGFRNONAA 56 (L) 02/21/2022      BNP  @LABRCNTIP(BNP,BNPTRIAGEBLO)@   Lab Results   Component Value Date    CHOL 215 (H) 10/05/2023    CHOL 146 10/04/2022    CHOL 113 05/06/2021     Lab Results   Component Value Date    HDL 46 10/05/2023    HDL 52 10/04/2022    HDL 47 05/06/2021     Lab Results   Component Value Date    LDLCALC 137.6 10/05/2023    LDLCALC 75 10/04/2022    LDLCALC 51 05/06/2021     Lab Results   Component Value Date    TRIG 157 (H) 10/05/2023    TRIG 104 10/04/2022    TRIG 70  05/06/2021     Lab Results   Component Value Date    CHOLHDL 21.4 10/05/2023    CHOLHDL 26.2 08/24/2020    CHOLHDL 45.0 10/16/2013      Lab Results   Component Value Date    TSH 2.105 08/22/2022    FREET4 0.89 09/26/2012     Lab Results   Component Value Date    HGBA1C 5.3 02/26/2015     Lab Results   Component Value Date    WBC 6.11 11/07/2023    HGB 10.7 (L) 11/07/2023    HCT 34.0 (L) 11/07/2023    MCV 90 11/07/2023     11/07/2023         Results for orders placed during the hospital encounter of 04/28/22    Echo    Interpretation Summary  · The left ventricle is normal in size with concentric remodeling and mildly decreased systolic function.  · The estimated ejection fraction is 40%.  · Grade I left ventricular diastolic dysfunction.  · There are segmental left ventricular wall motion abnormalities.  · Apical akinesis  · Normal right ventricular size with normal right ventricular systolic function.  · Mild aortic regurgitation.  · There is moderate aortic valve stenosis.  · Aortic valve area is 1.15 cm2; peak velocity is 2.12 m/s; mean gradient is 18 mmHg.  · Mild tricuspid regurgitation.  · Normal central venous pressure (3 mmHg).  · The estimated PA systolic pressure is 24 mmHg.  · Overall the study quality was technically difficult.     No results found for this or any previous visit.      IMPRESSION:  1. Small lacunar infarct in the inferior right basal ganglia.  2. Chronic cerebral atrophy and superimposed chronic deep white matter ischemia.  3. No evidence of acute intracranial process on current examination.     Electronically signed by:  Dieter Harrington MD  10/27/2023       Assessment:       Aortic valve stenosis, moderate  Stable not bad enough for replacement    Chronic combined systolic and diastolic heart failure  Stable    History of cardiac pacemaker in situ  Functioning adequately    Hyperkalemia  She is having hyperkalemia she is to stop the potassium for 2 days and then take 1 daily        Plan:       Stop the potassium for 2 days and then decrease to once a day.  A prescription for Keflex 500 mg q.8 hours was given for the abrasion of the right leg.  She is to keep it clean and dry.  She will be seen in the office in 1 month with a BMP BNP and a CBC.

## 2023-11-16 ENCOUNTER — TELEPHONE (OUTPATIENT)
Dept: CARDIOLOGY | Facility: CLINIC | Age: 88
End: 2023-11-16
Payer: MEDICARE

## 2023-11-16 NOTE — TELEPHONE ENCOUNTER
----- Message from Sd Mckeon sent at 11/16/2023  2:49 PM CST -----  Type: Needs Medical Advice  Who Called:  Patient    Best Call Back Number:  [phone]  Additional Information: Patient states that she would like a callback from [facility] regarding the coding of her BMP labs at [facility] and the billing.

## 2023-11-30 ENCOUNTER — EXTERNAL CHRONIC CARE MANAGEMENT (OUTPATIENT)
Dept: PRIMARY CARE CLINIC | Facility: CLINIC | Age: 88
End: 2023-11-30
Payer: MEDICARE

## 2023-11-30 PROCEDURE — 99490 CHRNC CARE MGMT STAFF 1ST 20: CPT | Mod: S$PBB,,, | Performed by: FAMILY MEDICINE

## 2023-11-30 PROCEDURE — 99439 PR CHRONIC CARE MGMT, EA ADDTL 20 MIN: ICD-10-PCS | Mod: S$PBB,,, | Performed by: FAMILY MEDICINE

## 2023-11-30 PROCEDURE — 99490 PR CHRONIC CARE MGMT, 1ST 20 MIN: ICD-10-PCS | Mod: S$PBB,,, | Performed by: FAMILY MEDICINE

## 2023-11-30 PROCEDURE — 99439 CHRNC CARE MGMT STAF EA ADDL: CPT | Mod: PBBFAC,PN | Performed by: FAMILY MEDICINE

## 2023-11-30 PROCEDURE — 99439 CHRNC CARE MGMT STAF EA ADDL: CPT | Mod: S$PBB,,, | Performed by: FAMILY MEDICINE

## 2023-11-30 PROCEDURE — 99490 CHRNC CARE MGMT STAFF 1ST 20: CPT | Mod: PBBFAC,PN | Performed by: FAMILY MEDICINE

## 2023-12-04 ENCOUNTER — TELEPHONE (OUTPATIENT)
Dept: FAMILY MEDICINE | Facility: CLINIC | Age: 88
End: 2023-12-04
Payer: MEDICARE

## 2023-12-04 NOTE — TELEPHONE ENCOUNTER
----- Message from Tabitha Naranjo sent at 12/4/2023  3:04 PM CST -----  Regarding: Hosp f/u  Type:  Sooner Apoointment Request    Caller is requesting a sooner appointment.  Caller declined first available appointment listed below.  Caller will not accept being placed on the waitlist and is requesting a message be sent to doctor.    Name of Caller:Nurse Shauna/ [facility]    When is the first available appointment?1/2/2024      Would the patient rather a call back or a response via [facility]? Call back    Best Call Back Number:[phone]    Additional Information: Pt will be discharge today 12/4 and needs a hops f/u. Thank you

## 2023-12-05 ENCOUNTER — LAB VISIT (OUTPATIENT)
Dept: LAB | Facility: HOSPITAL | Age: 88
End: 2023-12-05
Attending: INTERNAL MEDICINE
Payer: MEDICARE

## 2023-12-05 DIAGNOSIS — I50.9 CONGESTIVE HEART FAILURE, UNSPECIFIED HF CHRONICITY, UNSPECIFIED HEART FAILURE TYPE: ICD-10-CM

## 2023-12-05 DIAGNOSIS — N18.31 STAGE 3A CHRONIC KIDNEY DISEASE: ICD-10-CM

## 2023-12-05 DIAGNOSIS — G62.9 PERIPHERAL POLYNEUROPATHY: ICD-10-CM

## 2023-12-05 DIAGNOSIS — Z95.0 CARDIAC PACEMAKER IN SITU: ICD-10-CM

## 2023-12-05 LAB
ANION GAP SERPL CALC-SCNC: 12 MMOL/L (ref 8–16)
BNP SERPL-MCNC: 554 PG/ML (ref 0–99)
BUN SERPL-MCNC: 33 MG/DL (ref 8–23)
CALCIUM SERPL-MCNC: 9.5 MG/DL (ref 8.7–10.5)
CHLORIDE SERPL-SCNC: 103 MMOL/L (ref 95–110)
CO2 SERPL-SCNC: 28 MMOL/L (ref 23–29)
CREAT SERPL-MCNC: 1.7 MG/DL (ref 0.5–1.4)
ERYTHROCYTE [DISTWIDTH] IN BLOOD BY AUTOMATED COUNT: 15.5 % (ref 11.5–14.5)
EST. GFR  (NO RACE VARIABLE): 28.7 ML/MIN/1.73 M^2
GLUCOSE SERPL-MCNC: 82 MG/DL (ref 70–110)
HCT VFR BLD AUTO: 32.3 % (ref 37–48.5)
HGB BLD-MCNC: 10.3 G/DL (ref 12–16)
MCH RBC QN AUTO: 27.2 PG (ref 27–31)
MCHC RBC AUTO-ENTMCNC: 31.9 G/DL (ref 32–36)
MCV RBC AUTO: 85 FL (ref 82–98)
PLATELET # BLD AUTO: 138 K/UL (ref 150–450)
PMV BLD AUTO: 10.9 FL (ref 9.2–12.9)
POTASSIUM SERPL-SCNC: 5.1 MMOL/L (ref 3.5–5.1)
RBC # BLD AUTO: 3.79 M/UL (ref 4–5.4)
SODIUM SERPL-SCNC: 143 MMOL/L (ref 136–145)
WBC # BLD AUTO: 7.61 K/UL (ref 3.9–12.7)

## 2023-12-05 PROCEDURE — 80048 BASIC METABOLIC PNL TOTAL CA: CPT | Performed by: INTERNAL MEDICINE

## 2023-12-05 PROCEDURE — 36415 COLL VENOUS BLD VENIPUNCTURE: CPT | Mod: PO | Performed by: INTERNAL MEDICINE

## 2023-12-05 PROCEDURE — 85027 COMPLETE CBC AUTOMATED: CPT | Performed by: INTERNAL MEDICINE

## 2023-12-05 PROCEDURE — 83880 ASSAY OF NATRIURETIC PEPTIDE: CPT | Performed by: INTERNAL MEDICINE

## 2023-12-05 RX ORDER — AMLODIPINE BESYLATE 5 MG/1
TABLET ORAL
Qty: 60 TABLET | Refills: 5 | Status: SHIPPED | OUTPATIENT
Start: 2023-12-05

## 2023-12-05 RX ORDER — GABAPENTIN 100 MG/1
200 CAPSULE ORAL NIGHTLY
Qty: 180 CAPSULE | Refills: 0 | Status: SHIPPED | OUTPATIENT
Start: 2023-12-05 | End: 2024-03-18

## 2023-12-05 NOTE — TELEPHONE ENCOUNTER
No care due was identified.  [facility] Embedded Care Due Messages. Reference number: [id number].   12/05/2023 9:44:45 AM CST

## 2023-12-06 ENCOUNTER — OFFICE VISIT (OUTPATIENT)
Dept: FAMILY MEDICINE | Facility: CLINIC | Age: 88
End: 2023-12-06
Payer: MEDICARE

## 2023-12-06 VITALS
BODY MASS INDEX: 23.42 KG/M2 | HEIGHT: 58 IN | HEART RATE: 67 BPM | WEIGHT: 111.56 LBS | SYSTOLIC BLOOD PRESSURE: 110 MMHG | DIASTOLIC BLOOD PRESSURE: 56 MMHG | OXYGEN SATURATION: 98 % | RESPIRATION RATE: 16 BRPM

## 2023-12-06 DIAGNOSIS — I10 PRIMARY HYPERTENSION: ICD-10-CM

## 2023-12-06 DIAGNOSIS — D69.6 THROMBOCYTOPENIA, UNSPECIFIED: ICD-10-CM

## 2023-12-06 DIAGNOSIS — Z95.1 S/P CABG (CORONARY ARTERY BYPASS GRAFT): ICD-10-CM

## 2023-12-06 DIAGNOSIS — Z95.0 CARDIAC PACEMAKER IN SITU: ICD-10-CM

## 2023-12-06 DIAGNOSIS — Z09 HOSPITAL DISCHARGE FOLLOW-UP: Primary | ICD-10-CM

## 2023-12-06 DIAGNOSIS — R01.1 SYSTOLIC MURMUR: ICD-10-CM

## 2023-12-06 DIAGNOSIS — E78.2 MIXED HYPERLIPIDEMIA: ICD-10-CM

## 2023-12-06 DIAGNOSIS — I25.118 CORONARY ARTERY DISEASE OF NATIVE ARTERY OF NATIVE HEART WITH STABLE ANGINA PECTORIS: ICD-10-CM

## 2023-12-06 PROCEDURE — 99999 PR PBB SHADOW E&M-EST. PATIENT-LVL V: ICD-10-PCS | Mod: PBBFAC,,, | Performed by: FAMILY MEDICINE

## 2023-12-06 PROCEDURE — 99495 TRANSJ CARE MGMT MOD F2F 14D: CPT | Mod: S$PBB,,, | Performed by: FAMILY MEDICINE

## 2023-12-06 PROCEDURE — 99495 TCM SERVICES (MODERATE COMPLEXITY): ICD-10-PCS | Mod: S$PBB,,, | Performed by: FAMILY MEDICINE

## 2023-12-06 PROCEDURE — 99215 OFFICE O/P EST HI 40 MIN: CPT | Mod: PBBFAC,PO | Performed by: FAMILY MEDICINE

## 2023-12-06 PROCEDURE — 99999 PR PBB SHADOW E&M-EST. PATIENT-LVL V: CPT | Mod: PBBFAC,,, | Performed by: FAMILY MEDICINE

## 2023-12-06 RX ORDER — ISOSORBIDE MONONITRATE 60 MG/1
1 TABLET, EXTENDED RELEASE ORAL EVERY MORNING
COMMUNITY
Start: 2023-12-05 | End: 2024-01-11 | Stop reason: SDUPTHER

## 2023-12-06 RX ORDER — METOPROLOL SUCCINATE 25 MG/1
25 TABLET, EXTENDED RELEASE ORAL 2 TIMES DAILY
Qty: 180 TABLET | Refills: 1 | Status: CANCELLED | OUTPATIENT
Start: 2023-12-06 | End: 2024-06-03

## 2023-12-06 NOTE — PROGRESS NOTES
Subjective:       Patient ID: Cheyanne Gomes is a 88 y.o. female.    Chief Complaint: Hospital Follow Up    Transitional Care Note    Family and/or Caretaker present at visit?  No.  Diagnostic tests reviewed/disposition: No diagnosic tests pending after this hospitalization.  Disease/illness education: fatigue, and elevated troponin  Home health/community services discussion/referrals: Patient has home health established at [facility] .   Establishment or re-establishment of referral orders for community resources: No other necessary community resources.   Discussion with other health care providers: No discussion with other health care providers necessary.        This patient is new to me.  Cheyanne Gomes presents to the clinic today for hospital discharge follow up.  Patient reports since discharge she is feeling much better.  Patient reports she was weak and dehydrated and went into the hospital.  Patient reports that she is a history of heart attack, open heart surgery, and pacemaker placement.  Patient reports when she was in the hospital her blood pressure was elevated to the increased her metoprolol.  Patient reports she is not adjusted her medications per hospital discharge follow up until she sees Dr. Valverde on Friday.  Patient had blood work done yesterday was reviewed.  Patient educated on plan of care, verbalized understanding.         Discharge note on 12/4/2023  CHIEF COMPLAINT: Fatigue, Diarrhea, and Nausea (All symptoms x approx 1 week)    HISTORY OF PRESENT ILLNESS: 88 y.o. female with a past medical history of chronic anemia, aortic valve stenosis, GERD, Cannon's esophagus, CHF, HLD, HTN, CAD, RA, Sjogren's syndrome who was brought to the emergency department by family with primary complaints of weakness and dehydration. Per the patient's daughter she has multiple concerns about her mother symptoms that have been present intermittently/worsening for the past several months.  She recently had a TIA/CVA at the end of October which has been worked up by neurology on an outpatient basis. She also has complaints of vague intermittent nausea and vomiting that have been nonbloody. Patient has had poor p.o. intake secondary to poor appetite. At the time of evaluation patient does endorse mild chest pain without shortness of breath. She does endorse mild intermittent lightheaded and dizziness.    In the ED, she was noted to have an elevated troponin of 0.10 with a delta troponin of 0.12. Her EKG shows a paced rhythm. She has a significant cardiac history with AV cardiac pacemaker, CABG x3, PCI x1. [facility] family medicine is being consulted for admission.     Brief HPI:  88 y.o. female with a past medical history of chronic anemia, aortic valve stenosis, GERD, Cannon's esophagus, CHF, HLD, HTN, CAD, RA, Sjogren's syndrome who was brought to the emergency department by family with primary complaints of weakness and dehydration. Per the patient's daughter she has multiple concerns about her mother symptoms that have been present intermittently/worsening for the past several months. She recently had a TIA/CVA at the end of October which has been worked up by neurology on an outpatient basis. She also has complaints of vague intermittent nausea and vomiting that have been nonbloody. Patient has had poor p.o. intake secondary to poor appetite. At the time of evaluation patient does endorse mild chest pain without shortness of breath. She does endorse mild intermittent lightheaded and dizziness.      Hospital Course:   Troponin elevated at admit, now trending down. CP resolved. Echo completed. Cardiology consulted and made changes to med regimen as detailed below. Patient stable with decreasing trop. Clear for discharge.    Chronic combined systolic and diastolic heart failure (HCC)  -per chart review patient is seen by cardiology, Dr. Blair.   -Most recent echocardiogram with ejection fraction of  40%, grade 1 diastolic dysfunction and mild systolic dysfunction  -Repeat echo shows EF 50-55%  -Does not appear volume overloaded on exam  -Continue Lasix 80 mg daily, metolazone 2.5 mg daily, Toprol XL 25 mg tablet daily, Norvasc 5 mg daily, Entresto  mg tablet twice daily  -BNP ordered we will follow-up  -Cardiology consulted appreciate recommendations    CKD (chronic kidney disease)  History & Physical   Review of Systems   Constitutional:  Negative for activity change, appetite change, chills, diaphoresis and fever.   HENT:  Negative for congestion, ear pain, postnasal drip, sinus pressure, sneezing and sore throat.    Eyes:  Negative for pain, discharge, redness and itching.   Respiratory:  Negative for apnea, cough, chest tightness, shortness of breath and wheezing.    Cardiovascular:  Negative for chest pain and leg swelling.   Gastrointestinal:  Negative for abdominal distention, abdominal pain, constipation, diarrhea, nausea and vomiting.   Genitourinary:  Negative for difficulty urinating, dysuria, flank pain and frequency.   Skin:  Negative for color change, rash and wound.   Neurological:  Negative for dizziness.       Patient Active Problem List   Diagnosis    Sjogren's syndrome    High risk medication use    Choroidal nevus - Right Eye    History of non-cataract eye surgery    Rheumatoid arthritis    Hypertension    Intravascular volume depletion    GERD (gastroesophageal reflux disease)    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    Aortic valve stenosis, moderate    Coronary artery disease of native artery of native heart with stable angina pectoris    Hyperlipidemia    Anemia of chronic disease    CRISTO (acute kidney injury)    Senile osteoporosis    Elevated serum creatinine    Systolic murmur    CKD (chronic kidney disease)    PSVT (paroxysmal supraventricular tachycardia)    Sick sinus syndrome    Cardiac pacemaker in situ    Chronic disease anemia    Elevated ferritin    Chronic combined  "systolic and diastolic heart failure    Thrombocytopenia, unspecified    S/P CABG (coronary artery bypass graft)       Objective:      Physical Exam  Vitals reviewed.   Constitutional:       General: She is not in acute distress.     Appearance: Normal appearance. She is well-developed.   HENT:      Head: Normocephalic.      Nose: Nose normal.   Eyes:      Conjunctiva/sclera: Conjunctivae normal.      Pupils: Pupils are equal, round, and reactive to light.   Cardiovascular:      Rate and Rhythm: Normal rate and regular rhythm.      Heart sounds: Murmur heard.   Pulmonary:      Effort: Pulmonary effort is normal. No respiratory distress.      Breath sounds: Normal breath sounds.   Musculoskeletal:      Cervical back: Normal range of motion and neck supple.   Skin:     General: Skin is warm and dry.      Findings: No rash.   Neurological:      Mental Status: She is alert and oriented to person, place, and time.   Psychiatric:         Mood and Affect: Mood normal.         Behavior: Behavior normal.         Lab Results   Component Value Date    WBC 7.61 12/05/2023    HGB 10.3 (L) 12/05/2023    HCT 32.3 (L) 12/05/2023     (L) 12/05/2023    CHOL 215 (H) 10/05/2023    TRIG 157 (H) 10/05/2023    HDL 46 10/05/2023    ALT 12 10/05/2023    AST 23 10/05/2023     12/05/2023    K 5.1 12/05/2023     12/05/2023    CREATININE 1.7 (H) 12/05/2023    BUN 33 (H) 12/05/2023    CO2 28 12/05/2023    TSH 1.933 12/04/2023    INR 1.0 02/04/2023    HGBA1C 6.3 12/04/2023     The ASCVD Risk score (Sintia DK, et al., 2019) failed to calculate for the following reasons:    The 2019 ASCVD risk score is only valid for ages 40 to 79    The patient has a prior MI or stroke diagnosis  Visit Vitals  BP (!) 110/56 (BP Location: Right arm, Patient Position: Sitting, BP Method: Medium (Manual))   Pulse 67   Resp 16   Ht 4' 10" (1.473 m)   Wt 50.6 kg (111 lb 8.8 oz)   LMP  (LMP Unknown)   SpO2 98%   BMI 23.31 kg/m²      Assessment:     "   1. Hospital discharge follow-up    2. S/P CABG (coronary artery bypass graft)    3. Cardiac pacemaker in situ    4. Systolic murmur    5. Primary hypertension    6. Mixed hyperlipidemia    7. Coronary artery disease of native artery of native heart with stable angina pectoris    8. Thrombocytopenia, unspecified        Plan:       Cheyanne was seen today for hospital follow up.    Diagnoses and all orders for this visit:    Hospital discharge follow-up  Improved    S/P CABG (coronary artery bypass graft) / Cardiac pacemaker in situ / Systolic murmur / Primary hypertension / Mixed hyperlipidemia / Coronary artery disease of native artery of native heart with stable angina pectoris  Stable  Continue medications as prescribed.  Follow up with PCP and cardiology, Dr Valverde on Friday    Thrombocytopenia, unspecified  Continue medications as prescribed.  Follow up with PCP          Follow up if symptoms worsen or fail to improve, for scheduled appt.      Future Appointments       Date Provider Specialty Appt Notes    12/7/2023 Genevieve Bartlett MD Dermatology recheck  for any places that may need to be biopsied, have places in my head I want checked, have skin tags I would like to have removed. lvm reminder    12/8/2023 Tera Blair MD Cardiology 1 month    12/15/2023 Dontae Patel DPM Podiatry Right foot, has something between the fourth and fifth toe, possibly a corn.    4/9/2024 Romeo Alas MD Family Medicine gabapentin refill    6/4/2024  Cardiology medtronic vb     10/4/2024 Romeo Alas MD Family Medicine annual               All of your core healthy metrics are met.

## 2023-12-06 NOTE — PATIENT INSTRUCTIONS
Montrell Edward,     If you are due for any health screening(s) below please notify me so we can arrange them to be ordered and scheduled to maintain your health. Most healthy patients complete it. Don't lose out on improving your health.     All of your core healthy metrics are met.                               Thank you for allowing me to be part of your healthcare team at [facility]. It is a pleasure to care for you today.   Please take all of your medications as instructed and follow all new instructions from your visit today.  If you received labs or medical tests today you should hear information about results or scheduling either by phone or mychart within approximately a week.   If you have any questions or concerns please do not hesitate to call. Have a blessed day and I hope to see you again soon.  Georgia Moore

## 2023-12-07 ENCOUNTER — OFFICE VISIT (OUTPATIENT)
Dept: DERMATOLOGY | Facility: CLINIC | Age: 88
End: 2023-12-07
Payer: MEDICARE

## 2023-12-07 VITALS — WEIGHT: 110 LBS | BODY MASS INDEX: 23.09 KG/M2 | HEIGHT: 58 IN

## 2023-12-07 DIAGNOSIS — D22.9 MULTIPLE BENIGN NEVI: ICD-10-CM

## 2023-12-07 DIAGNOSIS — Z08 ENCOUNTER FOR FOLLOW-UP SURVEILLANCE OF SKIN CANCER: Primary | ICD-10-CM

## 2023-12-07 DIAGNOSIS — L82.1 SEBORRHEIC KERATOSES: ICD-10-CM

## 2023-12-07 DIAGNOSIS — L82.0 INFLAMED SEBORRHEIC KERATOSIS: ICD-10-CM

## 2023-12-07 DIAGNOSIS — L81.4 SOLAR LENTIGO: ICD-10-CM

## 2023-12-07 DIAGNOSIS — L57.8 OTHER SKIN CHANGES DUE TO CHRONIC EXPOSURE TO NONIONIZING RADIATION: ICD-10-CM

## 2023-12-07 DIAGNOSIS — Z85.828 ENCOUNTER FOR FOLLOW-UP SURVEILLANCE OF SKIN CANCER: Primary | ICD-10-CM

## 2023-12-07 PROCEDURE — 17110 PR DESTRUCTION BENIGN LESIONS UP TO 14: ICD-10-PCS | Mod: S$GLB,,, | Performed by: DERMATOLOGY

## 2023-12-07 PROCEDURE — 17110 DESTRUCTION B9 LES UP TO 14: CPT | Mod: S$GLB,,, | Performed by: DERMATOLOGY

## 2023-12-07 PROCEDURE — 99213 PR OFFICE/OUTPT VISIT, EST, LEVL III, 20-29 MIN: ICD-10-PCS | Mod: 25,S$GLB,, | Performed by: DERMATOLOGY

## 2023-12-07 PROCEDURE — 99213 OFFICE O/P EST LOW 20 MIN: CPT | Mod: 25,S$GLB,, | Performed by: DERMATOLOGY

## 2023-12-07 NOTE — PROGRESS NOTES
Subjective:      Patient ID:  Cheyanne Gomes is a 88 y.o. female who presents for   Chief Complaint   Patient presents with    Skin Check     TBSE    Spot     Right abdomen, right thigh     LOV: 9/21/22 - BCC of nose, BCC of chin    **PACEMAKER**    Skin Check - TBSE    C/o spot on right abdomen  C/o spot on right thigh  C/o spot on back  C/o skin tags around neck    Derm Hx:  BCC - right alar rim, MO[facility] - Dr. Weir 9/22  BCC - right chin, MO[facility] - Dr. Weir 9/22  BCC - left chin, MOHS - Dr. Weir 9/22  Denies phx/fhx MM      Current Outpatient Medications:   ·  amLODIPine (NORVASC) 5 MG tablet, TAKE 1 TABLET BY MOUTH 2 TIMES A DAY, Disp: 60 tablet, Rfl: 5  ·  aspirin 81 mg Tab, Take 1 tablet by mouth every evening. Every day, Disp: , Rfl:   ·  biotin 5 mg Cap, Take 1 tablet by mouth once daily., Disp: , Rfl:   ·  CALCIUM CARB/VIT D3/MINERALS (CALCIUM-VITAMIN D ORAL), Take 600 mg by mouth 2 (two) times daily. Calcium 1200 with D 3 1000, Disp: , Rfl:   ·  CRANBERRY CONC/ASCORBIC ACID (CRANBERRY PLUS VITAMIN C ORAL), Take 1 capsule by mouth once daily., Disp: , Rfl:   ·  DEXILANT 60 mg capsule, Take 60 mg by mouth once daily. , Disp: , Rfl: 11  ·  ENTRESTO  mg per tablet, Take 1 tablet by mouth 2 (two) times daily., Disp: 60 tablet, Rfl: 4  ·  fluticasone (FLONASE) 50 mcg/actuation nasal spray, 2 sprays by Each Nare route once daily. (Patient taking differently: 2 sprays by Each Nostril route daily as needed.), Disp: 16 g, Rfl: 0  ·  folic acid (FOLVITE) 1 MG tablet, Take 2 mg by mouth once daily. , Disp: , Rfl:   ·  furosemide (LASIX) 80 MG tablet, Take 1 tablet (80 mg total) by mouth once daily., Disp: 90 tablet, Rfl: 3  ·  gabapentin (NEURONTIN) 100 MG capsule, Take 2 capsules (200 mg total) by mouth every evening., Disp: 180 capsule, Rfl: 0  ·  HYDROmorphone (DILAUDID) 2 MG tablet, Take 2 mg by mouth every 4 (four) hours as needed for Pain., Disp: , Rfl:   ·  isosorbide mononitrate (IMDUR) 60  MG 24 hr tablet, Take 1 tablet by mouth every morning., Disp: , Rfl:   ·  Lactobacillus acidophilus 10 billion cell Cap, Take 1 each by mouth once daily. 1.5 billion, Disp: , Rfl:   ·  magnesium oxide (MAG-OX) 400 mg (241.3 mg magnesium) tablet, TAKE 1 TABLET BY MOUTH 2 TIMES A DAY (Patient taking differently: Take 400 mg by mouth 2 (two) times daily.), Disp: 120 tablet, Rfl: 2  ·  metoprolol succinate (TOPROL-XL) 25 MG 24 hr tablet, Take 1 tablet (25 mg total) by mouth As instructed. 25 mg QAM and 12.5 mg QHS, Disp: 45 tablet, Rfl: 11  ·  MULTIVITAMIN WITH MINERALS (ONE-A-DAY 50 PLUS) Tab, Take 1 tablet by mouth once daily. Every day, Disp: , Rfl:   ·  nitroGLYCERIN 0.4 MG/DOSE TL SPRY (NITROLINGUAL) 400 mcg/spray spray, Place 1 spray under the tongue every 5 (five) minutes as needed for Chest pain. PRN, Disp: 4.9 g, Rfl: 3  ·  omega-3 fatty acids/fish oil (FISH OIL-OMEGA-3 FATTY ACIDS) 300-1,000 mg capsule, Take 1 capsule by mouth once daily., Disp: , Rfl:   ·  ondansetron (ZOFRAN) 4 MG tablet, Take 4 mg by mouth every 8 (eight) hours as needed for Nausea. , Disp: , Rfl: 2  ·  potassium chloride SA (K-DUR,KLOR-CON M) 10 MEQ tablet, TAKE TWO TABLETS BY MOUTH EVERY MORNING AND 1 TABLET EVERY EVENING (Patient taking differently: Take 20 mEq by mouth every morning. 20 meq QAM and 10 meq QPM), Disp: 270 tablet, Rfl: 2  ·  promethazine (PHENERGAN) 25 MG tablet, Take 1 tablet (25 mg total) by mouth 2 (two) times daily as needed for Nausea., Disp: 60 tablet, Rfl: 1  ·  sertraline (ZOLOFT) 50 MG tablet, Take 2 tablets (100 mg total) by mouth once daily., Disp: 180 tablet, Rfl: 2  ·  traMADoL (ULTRAM) 50 mg tablet, Take 50 mg by mouth 2 (two) times daily as needed for Pain., Disp: , Rfl:   ·  vitamin E 400 unit Tab, Take 400 mg by mouth once daily. Every day, Disp: , Rfl:   ·  metOLazone (ZAROXOLYN) 2.5 MG tablet, Take 1 tablet (2.5 mg total) by mouth once daily., Disp: 30 tablet, Rfl: 0  No current facility-administered  medications for this visit.            Review of Systems   Constitutional:  Negative for fever and chills.   Respiratory:  Negative for cough and shortness of breath.    Skin:  Positive for dry skin and daily sunscreen use. Negative for itching, rash, activity-related sunscreen use and wears hat.   Hematologic/Lymphatic: Bruises/bleeds easily.       Objective:   Physical Exam   Constitutional: She appears well-developed and well-nourished. No distress.   Neurological: She is alert and oriented to person, place, and time. She is not disoriented.   Psychiatric: She has a normal mood and affect.   Skin:   Areas Examined (abnormalities noted in diagram):   Scalp / Hair Palpated and Inspected  Head / Face Inspection Performed  Neck Inspection Performed  Chest / Axilla Inspection Performed  Abdomen Inspection Performed  Genitals / Buttocks / Groin Inspection Performed  Back Inspection Performed  RUE Inspected  LUE Inspection Performed  RLE Inspected  LLE Inspection Performed  Nails and Digits Inspection Performed                     Diagram Legend     Erythematous scaling macule/papule c/w actinic keratosis       Vascular papule c/w angioma      Pigmented verrucoid papule/plaque c/w seborrheic keratosis      Yellow umbilicated papule c/w sebaceous hyperplasia      Irregularly shaped tan macule c/w lentigo     1-2 mm smooth white papules consistent with Milia      Movable subcutaneous cyst with punctum c/w epidermal inclusion cyst      Subcutaneous movable cyst c/w pilar cyst      Firm pink to brown papule c/w dermatofibroma      Pedunculated fleshy papule(s) c/w skin tag(s)      Evenly pigmented macule c/w junctional nevus     Mildly variegated pigmented, slightly irregular-bordered macule c/w mildly atypical nevus      Flesh colored to evenly pigmented papule c/w intradermal nevus       Pink pearly papule/plaque c/w basal cell carcinoma      Erythematous hyperkeratotic cursted plaque c/w SCC      Surgical scar with no  sign of skin cancer recurrence      Open and closed comedones      Inflammatory papules and pustules      Verrucoid papule consistent consistent with wart     Erythematous eczematous patches and plaques     Dystrophic onycholytic nail with subungual debris c/w onychomycosis     Umbilicated papule    Erythematous-base heme-crusted tan verrucoid plaque consistent with inflamed seborrheic keratosis     Erythematous Silvery Scaling Plaque c/w Psoriasis     See annotation      Assessment / Plan:        Encounter for follow-up surveillance of skin cancer  Area of previous NMSCs examined. Sites well healed with no signs of recurrence.    Total body skin examination performed today including at least 12 points as noted in physical examination. No lesions suspicious for malignancy noted.    Inflamed seborrheic keratoses, pruritic  Cryosurgery procedure note: back x3, left sideburn    Verbal consent from the patient is obtained. Liquid nitrogen cryosurgery is applied to 4 lesions to produce a freeze injury. The patient is aware that blisters may form and is instructed on wound care with gentle cleansing and use of vaseline ointment to keep moist until healed. The patient is supplied a handout on cryosurgery and is instructed to call if lesions do not completely resolve.    Thick lesion r lateral thigh  Verbal consent obtained. 1 lesions removed with shallow shave removal after anesthesia with 1% lidocaine with epinephrine. Hemostasis achieved with aluminum chloride (NO hyfrecation, pacemaker). No complications.    Solar lentigo  This is a benign hyperpigmented sun induced lesion. Daily sun protection will reduce the number of new lesions. Treatment of these benign lesions are considered cosmetic.    Seborrheic keratoses  These are benign inherited growths without a malignant potential. Reassurance given to patient. No treatment is necessary.     Multiple benign nevi  Careful dermoscopy evaluation of nevi performed with none  identified as needing biopsy today  Monitor for new mole or moles that are becoming bigger, darker, irritated, or developing irregular borders.     Other skin changes due to chronic exposure to nonionizing radiation  Patient instructed in importance in daily broad spectrum sun protection of at least spf 30. Mineral sunscreen ingredients preferred (Zinc +/- Titanium) and can be found OTC.   Recommend Elta MD for daily use on face and neck.  Patient encouraged to wear hat for all outdoor exposure.   Also discussed sun avoidance and use of protective clothing.             Follow up in about 1 year (around 12/7/2024), or if symptoms worsen or fail to improve.

## 2023-12-07 NOTE — PATIENT INSTRUCTIONS

## 2023-12-08 ENCOUNTER — OFFICE VISIT (OUTPATIENT)
Dept: CARDIOLOGY | Facility: CLINIC | Age: 88
End: 2023-12-08
Payer: MEDICARE

## 2023-12-08 VITALS
HEART RATE: 6 BPM | SYSTOLIC BLOOD PRESSURE: 130 MMHG | DIASTOLIC BLOOD PRESSURE: 70 MMHG | BODY MASS INDEX: 23.88 KG/M2 | HEIGHT: 58 IN | WEIGHT: 113.75 LBS

## 2023-12-08 DIAGNOSIS — D63.8 ANEMIA OF CHRONIC DISEASE: Chronic | ICD-10-CM

## 2023-12-08 DIAGNOSIS — I35.0 AORTIC VALVE STENOSIS, MODERATE: ICD-10-CM

## 2023-12-08 DIAGNOSIS — N18.31 STAGE 3A CHRONIC KIDNEY DISEASE: Primary | ICD-10-CM

## 2023-12-08 DIAGNOSIS — I25.118 CORONARY ARTERY DISEASE OF NATIVE ARTERY OF NATIVE HEART WITH STABLE ANGINA PECTORIS: ICD-10-CM

## 2023-12-08 DIAGNOSIS — E87.5 HYPERKALEMIA: ICD-10-CM

## 2023-12-08 DIAGNOSIS — I50.42 CHRONIC COMBINED SYSTOLIC AND DIASTOLIC HEART FAILURE: ICD-10-CM

## 2023-12-08 DIAGNOSIS — I42.0 CONGESTIVE CARDIOMYOPATHY: ICD-10-CM

## 2023-12-08 DIAGNOSIS — Z95.0 CARDIAC PACEMAKER IN SITU: ICD-10-CM

## 2023-12-08 PROCEDURE — 99215 OFFICE O/P EST HI 40 MIN: CPT | Mod: PBBFAC,PN | Performed by: INTERNAL MEDICINE

## 2023-12-08 PROCEDURE — 99999 PR PBB SHADOW E&M-EST. PATIENT-LVL V: CPT | Mod: PBBFAC,,, | Performed by: INTERNAL MEDICINE

## 2023-12-08 PROCEDURE — 99999 PR PBB SHADOW E&M-EST. PATIENT-LVL V: ICD-10-PCS | Mod: PBBFAC,,, | Performed by: INTERNAL MEDICINE

## 2023-12-08 PROCEDURE — 99214 OFFICE O/P EST MOD 30 MIN: CPT | Mod: S$PBB,,, | Performed by: INTERNAL MEDICINE

## 2023-12-08 PROCEDURE — 99214 PR OFFICE/OUTPT VISIT, EST, LEVL IV, 30-39 MIN: ICD-10-PCS | Mod: S$PBB,,, | Performed by: INTERNAL MEDICINE

## 2023-12-08 NOTE — PROGRESS NOTES
Patient ID:  Cheyanne Gomes is a 88 y.o. female who presents for follow-up of Hospital Follow Up      She was having diarrhea nausea subsequently she went to the emergency room at [facility] she thought she was having a little stroke while she was there her blood pressure was markedly elevated.  She also fell horrible.  She stayed in the hospital overnight.  The blood pressure at some point was up to 230/87.  A day suggested for her to take Toprol-XL 50 mg daily she has been taking 25 in the morning and 12-,1/2 in the evening.  She was taking isosorbide dinitrate 3 times a day they prescribe isosorbide mononitrate 60 mg daily.  They told her to discontinue taking amlodipine altogether.  She has not made the changes in the medications yet.  Her breathing is doing better in general she has been feeling better      Aortic valve stenosis, moderate  Stable not bad enough for replacement   Chronic combined systolic and diastolic heart failure  Stable   History of cardiac pacemaker in situ  Functioning adequately   Hyperkalemia  She is having hyperkalemia she is to stop the potassium for 2 days and then take 1 daily    Past Medical History:   Diagnosis Date    Abdominal hernia     Anemia     Dr Berkowitz     Angina pectoris     Aortic valve stenosis, moderate     Back pain     Acnnon's esophagus     CAD (coronary artery disease)     Cataract     ou done    CHF (congestive heart failure)     EFx 35%, Dr. Spencer 12/19/13    Chronic combined systolic and diastolic heart failure 09/28/2019    Colitis     Compression fracture     Diverticulosis     Encounter for blood transfusion     GERD (gastroesophageal reflux disease)     Hyperlipidemia     Hypertension     Irritable bowel syndrome     Myocardial infarction     Osteoarthritis     lumbar DDD    Pacemaker     Pneumonia 01/03/2017    Raynaud disease     Rheumatoid arteritis     Rheumatoid arthritis     Shingles 01/03/2017    Sjogren syndrome     Ulcerative colitis          Past Surgical History:   Procedure Laterality Date    A-V CARDIAC PACEMAKER INSERTION Left 2021    Procedure: INSERTION, CARDIAC PACEMAKER, DUAL CHAMBER  ([employer]TRONIC);  Surgeon: Tera Blair MD;  Location: [facility] CATH/EP LAB;  Service: Cardiology;  Laterality: Left;    APPENDECTOMY      BACK SURGERY      CARDIAC SURGERY      CABGx3 in     CATARACT EXTRACTION      ou od d 11-15-12//     SECTION, CLASSIC      x 2    CHOLECYSTECTOMY      CLOSURE OF WOUND Right 2022    Procedure: CLOSURE-WOUND;  Surgeon: Delvis Jackson MD;  Location: [facility] OR;  Service: ENT;  Laterality: Right;  NOSE AND CHIN    COLONOSCOPY  09/15/2011    Dr Anaya     COLONOSCOPY  01/10/2017    [facility] report sent to scanning    CORONARY ANGIOPLASTY      PCI x 1 in     CORONARY ARTERY BYPASS GRAFT      3 vessel    CORONARY ARTERY BYPASS GRAFT      CYSTOSCOPY N/A 6/3/2020    Procedure: CYSTOSCOPY;  Surgeon: Miryam Bender Jr., MD;  Location: [facility] OR;  Service: Urology;  Laterality: N/A;    eyes      rk ou    FRACTURE SURGERY  2016    Dr Guido left hip     FRACTURE SURGERY  2018    Right Hip    HARVESTING OF SKIN GRAFT  2022    Procedure: SURGICAL PROCUREMENT, GRAFT, SKIN;  Surgeon: Delvis Jackson MD;  Location: [facility] OR;  Service: ENT;;  RIGHT CHEST    HYSTERECTOMY      tubal ligation, oopherectomy    INTRAMEDULLARY RODDING OF TROCHANTER OF FEMUR Right 10/8/2018    Procedure: INSERTION, INTRAMEDULLARY KELSI, FEMUR, TROCHANTER;  Surgeon: Valerio Luther MD;  Location: [facility] OR;  Service: Orthopedics;  Laterality: Right;    OOPHORECTOMY      SPINE SURGERY  2013    Silvino Mckeon    UPPER GASTROINTESTINAL ENDOSCOPY      Yag Capsulotomy Right 14          Current Outpatient Medications   Medication Instructions    amLODIPine (NORVASC) 5 MG tablet TAKE 1 TABLET BY MOUTH 2 TIMES A DAY    aspirin 81 mg Tab 1 tablet, Oral, Nightly, Every day    biotin 5 mg Cap 1 tablet, Oral, Daily    CALCIUM CARB/VIT D3/MINERALS  (CALCIUM-VITAMIN D ORAL) 600 mg, Oral, 2 times daily, Calcium 1200 with D 3 1000    CRANBERRY CONC/ASCORBIC ACID (CRANBERRY PLUS VITAMIN C ORAL) 1 capsule, Oral, Daily    DEXILANT 60 mg, Oral, Daily    ENTRESTO  mg per tablet 1 tablet, Oral, 2 times daily    fluticasone (FLONASE) 50 mcg/actuation nasal spray 2 sprays, Each Nostril, Daily    folic acid (FOLVITE) 2 mg, Oral, Daily    furosemide (LASIX) 80 mg, Oral, Daily    gabapentin (NEURONTIN) 200 mg, Oral, Nightly    HYDROmorphone (DILAUDID) 2 mg, Oral, Every 4 hours PRN    isosorbide mononitrate (IMDUR) 60 MG 24 hr tablet 1 tablet, Oral, Every morning    Lactobacillus acidophilus 10 billion cell Cap 1 each, Oral, Daily, 1.5 billion    magnesium oxide (MAG-OX) 400 mg (241.3 mg magnesium) tablet TAKE 1 TABLET BY MOUTH 2 TIMES A DAY    metOLazone (ZAROXOLYN) 2.5 mg, Oral, Daily    metoprolol succinate (TOPROL-XL) 25 mg, Oral, See admin instructions, 25 mg QAM and 12.5 mg QHS    MULTIVITAMIN WITH MINERALS (ONE-A-DAY 50 PLUS) Tab 1 tablet, Oral, Daily, Every day    nitroGLYCERIN 0.4 MG/DOSE TL SPRY (NITROLINGUAL) 400 mcg/spray spray 1 spray, Sublingual, Every 5 min PRN, PRN    omega-3 fatty acids/fish oil (FISH OIL-OMEGA-3 FATTY ACIDS) 300-1,000 mg capsule 1 capsule, Oral, Daily    ondansetron (ZOFRAN) 4 mg, Oral, Every 8 hours PRN    potassium chloride SA (K-DUR,KLOR-CON M) 10 MEQ tablet TAKE TWO TABLETS BY MOUTH EVERY MORNING AND 1 TABLET EVERY EVENING    promethazine (PHENERGAN) 25 mg, Oral, 2 times daily PRN    sertraline (ZOLOFT) 100 mg, Oral, Daily    traMADoL (ULTRAM) 50 mg, Oral, 2 times daily PRN    vitamin E 400 mg, Oral, Daily, Every day        Review of patient's allergies indicates:   Allergen Reactions    Adhesive     Nitrofurantoin macrocrystalline Nausea And Vomiting     Other reaction(s): very sickly    Penicillins Swelling     Other reaction(s): swelling  Other reaction(s): red skin discolorati    Sulfa (sulfonamide antibiotics) Nausea And  "Vomiting     Other reaction(s): very sickly        Review of Systems   HENT:  Positive for congestion.    Cardiovascular:  Positive for dyspnea on exertion. Negative for chest pain and palpitations.   Respiratory:  Negative for cough and shortness of breath.    Gastrointestinal:  Positive for diarrhea and nausea.        Objective:     Vitals:    12/08/23 1157   BP: 130/70   BP Location: Left arm   Patient Position: Sitting   BP Method: Medium (Manual)   Pulse: (!) 6   Weight: 51.6 kg (113 lb 12.1 oz)   Height: 4' 10" (1.473 m)       Physical Exam  Vitals and nursing note reviewed.   Constitutional:       Appearance: She is well-developed.   HENT:      Head: Normocephalic and atraumatic.   Eyes:      Conjunctiva/sclera: Conjunctivae normal.   Cardiovascular:      Rate and Rhythm: Normal rate and regular rhythm.      Heart sounds: Murmur (Grade 3/6 systolic murmur at the base) heard.   Pulmonary:      Effort: Pulmonary effort is normal.      Breath sounds: Normal breath sounds.   Abdominal:      General: Bowel sounds are normal.      Palpations: Abdomen is soft.   Musculoskeletal:         General: Normal range of motion.   Skin:     General: Skin is warm and dry.   Neurological:      Mental Status: She is alert and oriented to person, place, and time.   Psychiatric:         Behavior: Behavior normal.         Thought Content: Thought content normal.         Judgment: Judgment normal.       CMP  Sodium   Date Value Ref Range Status   12/05/2023 143 136 - 145 mmol/L Final   12/11/2018 139 134 - 144 mmol/L      Potassium   Date Value Ref Range Status   12/05/2023 5.1 3.5 - 5.1 mmol/L Final     Chloride   Date Value Ref Range Status   12/05/2023 103 95 - 110 mmol/L Final   12/11/2018 96 (L) 98 - 110 mmol/L      CO2   Date Value Ref Range Status   12/05/2023 28 23 - 29 mmol/L Final     Glucose   Date Value Ref Range Status   12/05/2023 82 70 - 110 mg/dL Final   12/11/2018 97 70 - 99 mg/dL      BUN   Date Value Ref Range " Status   12/05/2023 33 (H) 8 - 23 mg/dL Final     Creatinine   Date Value Ref Range Status   12/05/2023 1.7 (H) 0.5 - 1.4 mg/dL Final   12/11/2018 0.95 0.60 - 1.40 mg/dL      Calcium   Date Value Ref Range Status   12/05/2023 9.5 8.7 - 10.5 mg/dL Final     Total Protein   Date Value Ref Range Status   10/05/2023 6.8 6.0 - 8.4 g/dL Final     Albumin   Date Value Ref Range Status   10/05/2023 3.8 3.5 - 5.2 g/dL Final   12/11/2018 3.9 3.1 - 4.7 g/dL      Total Bilirubin   Date Value Ref Range Status   10/05/2023 0.3 0.1 - 1.0 mg/dL Final     Comment:     For infants and newborns, interpretation of results should be based  on gestational age, weight and in agreement with clinical  observations.    Premature Infant recommended reference ranges:  Up to 24 hours.............<8.0 mg/dL  Up to 48 hours............<12.0 mg/dL  3-5 days..................<15.0 mg/dL  6-29 days.................<15.0 mg/dL       Alkaline Phosphatase   Date Value Ref Range Status   10/05/2023 75 55 - 135 U/L Final     AST   Date Value Ref Range Status   10/05/2023 23 10 - 40 U/L Final     ALT   Date Value Ref Range Status   10/05/2023 12 10 - 44 U/L Final     Anion Gap   Date Value Ref Range Status   12/05/2023 12 8 - 16 mmol/L Final     eGFR if    Date Value Ref Range Status   11/05/2021 >60.0 >60 mL/min/1.73 m^2 Final     eGFR if non    Date Value Ref Range Status   02/21/2022 56 (L) >59 mL/min/1.73 Final      BMP  Lab Results   Component Value Date     12/05/2023    K 5.1 12/05/2023     12/05/2023    CO2 28 12/05/2023    BUN 33 (H) 12/05/2023    CREATININE 1.7 (H) 12/05/2023    CALCIUM 9.5 12/05/2023    ANIONGAP 12 12/05/2023    ESTGFRAFRICA >60.0 11/05/2021    EGFRNONAA 56 (L) 02/21/2022      BNP  @LABRCNTIP(BNP,BNPTRIAGEBLO)@   Lab Results   Component Value Date    CHOL 215 (H) 10/05/2023    CHOL 146 10/04/2022    CHOL 113 05/06/2021     Lab Results   Component Value Date    HDL 46 10/05/2023    HDL  52 10/04/2022    HDL 47 05/06/2021     Lab Results   Component Value Date    LDLCALC 137.6 10/05/2023    LDLCALC 75 10/04/2022    LDLCALC 51 05/06/2021     Lab Results   Component Value Date    TRIG 157 (H) 10/05/2023    TRIG 104 10/04/2022    TRIG 70 05/06/2021     Lab Results   Component Value Date    CHOLHDL 21.4 10/05/2023    CHOLHDL 26.2 08/24/2020    CHOLHDL 45.0 10/16/2013      Lab Results   Component Value Date    TSH 1.933 12/04/2023    FREET4 0.89 09/26/2012     Lab Results   Component Value Date    HGBA1C 6.3 12/04/2023     Lab Results   Component Value Date    WBC 7.61 12/05/2023    HGB 10.3 (L) 12/05/2023    HCT 32.3 (L) 12/05/2023    MCV 85 12/05/2023     (L) 12/05/2023         Results for orders placed during the hospital encounter of 04/28/22    Echo    Interpretation Summary  · The left ventricle is normal in size with concentric remodeling and mildly decreased systolic function.  · The estimated ejection fraction is 40%.  · Grade I left ventricular diastolic dysfunction.  · There are segmental left ventricular wall motion abnormalities.  · Apical akinesis  · Normal right ventricular size with normal right ventricular systolic function.  · Mild aortic regurgitation.  · There is moderate aortic valve stenosis.  · Aortic valve area is 1.15 cm2; peak velocity is 2.12 m/s; mean gradient is 18 mmHg.  · Mild tricuspid regurgitation.  · Normal central venous pressure (3 mmHg).  · The estimated PA systolic pressure is 24 mmHg.  · Overall the study quality was technically difficult.     No results found for this or any previous visit.         Assessment:       Coronary artery disease of native artery of native heart with stable angina pectoris  06/01/2016  Left main 70%  Lad 90% ostium  D1 70% ostium  Circumflex 100% ostium  % proximal 3rd  LIMA to the LAD patent  Saphenous vein graft to D1  Saphenous vein graft to OM 1    No symptoms of angina    Chronic combined systolic and diastolic heart  failure  Appears to be compensated    CKD (chronic kidney disease)  Stable    Anemia of chronic disease  Stable    Cardiac pacemaker in situ  Functioning adequately    Aortic valve stenosis, moderate  Stable       Plan:       She is to try Toprol-XL 50 mg p.o. daily and see how she tolerates it if not she can take 50 mg p.o. b.i.d..  She can try the isosorbide mononitrate long-acting see if it controlled the angina with no significant dizziness.  .  Amlodipine only take it if her systolic pressure is greater than 150  She will be seen in the office in 1 month with a BMP BNP as well as a CBC.

## 2023-12-08 NOTE — ASSESSMENT & PLAN NOTE
06/01/2016  Left main 70%  Lad 90% ostium  D1 70% ostium  Circumflex 100% ostium  % proximal 3rd  LIMA to the LAD patent  Saphenous vein graft to D1  Saphenous vein graft to OM 1    No symptoms of angina

## 2023-12-11 ENCOUNTER — PATIENT MESSAGE (OUTPATIENT)
Dept: CARDIOLOGY | Facility: CLINIC | Age: 88
End: 2023-12-11
Payer: MEDICARE

## 2023-12-12 PROCEDURE — G0180 MD CERTIFICATION HHA PATIENT: HCPCS | Mod: ,,, | Performed by: FAMILY MEDICINE

## 2023-12-13 ENCOUNTER — TELEPHONE (OUTPATIENT)
Dept: CARDIOLOGY | Facility: CLINIC | Age: 88
End: 2023-12-13
Payer: MEDICARE

## 2023-12-13 NOTE — TELEPHONE ENCOUNTER
----- Message from Sd Mckeon sent at 12/13/2023  9:40 AM CST -----  Type: Needs Medical Advice  Who Called:  Manoj ALARCON     Best Call Back Number: [phone] ext [phone]  Additional Information: Caller states that she would like a callback from [facility] regarding the patient's insurance claims.

## 2023-12-15 ENCOUNTER — OFFICE VISIT (OUTPATIENT)
Dept: PODIATRY | Facility: CLINIC | Age: 88
End: 2023-12-15
Payer: MEDICARE

## 2023-12-15 VITALS — OXYGEN SATURATION: 99 % | BODY MASS INDEX: 23.6 KG/M2 | HEART RATE: 82 BPM | HEIGHT: 58 IN | WEIGHT: 112.44 LBS

## 2023-12-15 DIAGNOSIS — G60.9 IDIOPATHIC PERIPHERAL NEUROPATHY: ICD-10-CM

## 2023-12-15 DIAGNOSIS — M79.674 PAIN OF TOE OF RIGHT FOOT: ICD-10-CM

## 2023-12-15 DIAGNOSIS — L85.1 ACQUIRED KERATODERMA: Primary | ICD-10-CM

## 2023-12-15 PROCEDURE — 99203 OFFICE O/P NEW LOW 30 MIN: CPT | Mod: S$PBB,,, | Performed by: PODIATRIST

## 2023-12-15 PROCEDURE — 99215 OFFICE O/P EST HI 40 MIN: CPT | Mod: PBBFAC,PN | Performed by: PODIATRIST

## 2023-12-15 PROCEDURE — 99999 PR PBB SHADOW E&M-EST. PATIENT-LVL V: CPT | Mod: PBBFAC,,, | Performed by: PODIATRIST

## 2023-12-15 PROCEDURE — 99999 PR PBB SHADOW E&M-EST. PATIENT-LVL V: ICD-10-PCS | Mod: PBBFAC,,, | Performed by: PODIATRIST

## 2023-12-15 PROCEDURE — 99203 PR OFFICE/OUTPT VISIT, NEW, LEVL III, 30-44 MIN: ICD-10-PCS | Mod: S$PBB,,, | Performed by: PODIATRIST

## 2023-12-15 NOTE — PROGRESS NOTES
"  [address] [facility] [address]  [place] [address]  Phone: [phone]   [phone]    Patient's PCP:Romeo Alas MD  Referring Provider: Aaareferral Self    Subjective:      Chief Complaint:: Callouses    HPI  Cheyanne Gomes is a 88 y.o. female who presents today with a complaint of right foot possible corn in between 4th and 5th toe. The current episode started a few months ago.  The symptoms include thick callous in between toes. Probable cause of complaint unknown.  The symptoms are aggravated by none. The problem has stayed the same. Treatment to date have included over the counter corn treatment which provided no relief.       Vitals:    12/15/23 1034   Pulse: 82   SpO2: 99%   Weight: 51 kg (112 lb 7 oz)   Height: 4' 10" (1.473 m)   PainSc: 0-No pain      Shoe Size: 6-6.5    Past Surgical History:   Procedure Laterality Date    A-V CARDIAC PACEMAKER INSERTION Left 2021    Procedure: INSERTION, CARDIAC PACEMAKER, DUAL CHAMBER  ([employer]);  Surgeon: Tera Blair MD;  Location: [facility] CATH/EP LAB;  Service: Cardiology;  Laterality: Left;    APPENDECTOMY      BACK SURGERY      CARDIAC SURGERY      CABGx3 in     CATARACT EXTRACTION      ou od d 11-15-12//     SECTION, CLASSIC      x 2    CHOLECYSTECTOMY      CLOSURE OF WOUND Right 2022    Procedure: CLOSURE-WOUND;  Surgeon: Delvis Jackson MD;  Location: [facility] OR;  Service: ENT;  Laterality: Right;  NOSE AND CHIN    COLONOSCOPY  09/15/2011    Dr Anaya     COLONOSCOPY  01/10/2017    [facility] report sent to scanning    CORONARY ANGIOPLASTY      PCI x 1 in     CORONARY ARTERY BYPASS GRAFT      3 vessel    CORONARY ARTERY BYPASS GRAFT      CYSTOSCOPY N/A 6/3/2020    Procedure: CYSTOSCOPY;  Surgeon: Miryam Bender Jr., MD;  Location: [facility] OR;  Service: Urology;  Laterality: N/A;    eyes      rk ou    FRACTURE SURGERY  2016    Dr Guido left hip     FRACTURE SURGERY  2018    Right Hip    HARVESTING OF SKIN GRAFT  " 9/23/2022    Procedure: SURGICAL PROCUREMENT, GRAFT, SKIN;  Surgeon: Delvis Jackson MD;  Location: [facility] OR;  Service: ENT;;  RIGHT CHEST    HYSTERECTOMY      tubal ligation, oopherectomy    INTRAMEDULLARY RODDING OF TROCHANTER OF FEMUR Right 10/8/2018    Procedure: INSERTION, INTRAMEDULLARY KELSI, FEMUR, TROCHANTER;  Surgeon: Valerio Luther MD;  Location: [facility] OR;  Service: Orthopedics;  Laterality: Right;    OOPHORECTOMY      SPINE SURGERY  8-    Silvinocameron Mckeon    UPPER GASTROINTESTINAL ENDOSCOPY      Yag Capsulotomy Right 9/25/14     Past Medical History:   Diagnosis Date    Abdominal hernia     Anemia     Dr Berkowitz     Angina pectoris     Aortic valve stenosis, moderate     Back pain     Cannon's esophagus     CAD (coronary artery disease)     Cataract     ou done    CHF (congestive heart failure)     EFx 35%, Dr. Spencer 12/19/13    Chronic combined systolic and diastolic heart failure 09/28/2019    Colitis     Compression fracture     Diverticulosis     Encounter for blood transfusion     GERD (gastroesophageal reflux disease)     Hyperlipidemia     Hypertension     Irritable bowel syndrome     Myocardial infarction     Osteoarthritis     lumbar DDD    Pacemaker     Pneumonia 01/03/2017    Raynaud disease     Rheumatoid arteritis     Rheumatoid arthritis     Shingles 01/03/2017    Sjogren syndrome     Ulcerative colitis      Family History   Adopted: Yes   Problem Relation Age of Onset    Heart disease Mother         aortic stenosis    Skin cancer Mother     Hypertension Father     Heart disease Father         MI    Hypertension Sister     Fibromyalgia Sister     Scleroderma Sister     Pulmonary embolism Sister     Irritable bowel syndrome Sister     Heart disease Brother         2 brothers CABG    Arthritis Brother     Crohn's disease Brother     Crohn's disease Brother     Crohn's disease Brother     Hypertension Daughter     Early death Son         accident    Lupus Cousin     Lupus Cousin      Amblyopia Neg Hx     Blindness Neg Hx     Cancer Neg Hx     Cataracts Neg Hx     Diabetes Neg Hx     Glaucoma Neg Hx     Macular degeneration Neg Hx     Retinal detachment Neg Hx     Strabismus Neg Hx     Stroke Neg Hx     Thyroid disease Neg Hx     Celiac disease Neg Hx     Cirrhosis Neg Hx     Psoriasis Neg Hx     Melanoma Neg Hx     Multiple sclerosis Neg Hx     Rheum arthritis Neg Hx     Tuberculosis Neg Hx     Lymphoma Neg Hx     Ulcerative colitis Neg Hx     Moses's disease Neg Hx     Stomach cancer Neg Hx     Rectal cancer Neg Hx     Liver disease Neg Hx     Liver cancer Neg Hx     Inflammatory bowel disease Neg Hx     Hemochromatosis Neg Hx     Esophageal cancer Neg Hx     Cystic fibrosis Neg Hx     Colon cancer Neg Hx         Social History:   Marital Status:   Alcohol History:  reports current alcohol use of about 1.0 standard drink of alcohol per week.  Tobacco History:  reports that she quit smoking about 52 years ago. Her smoking use included cigarettes. She started smoking about 57 years ago. She has a 5.0 pack-year smoking history. She has never used smokeless tobacco.  Drug History:  reports no history of drug use.    Review of patient's allergies indicates:   Allergen Reactions    Adhesive     Nitrofurantoin macrocrystalline Nausea And Vomiting     Other reaction(s): very sickly    Penicillins Swelling     Other reaction(s): swelling  Other reaction(s): red skin discolorati    Sulfa (sulfonamide antibiotics) Nausea And Vomiting     Other reaction(s): very sickly       Current Outpatient Medications   Medication Sig Dispense Refill    amLODIPine (NORVASC) 5 MG tablet TAKE 1 TABLET BY MOUTH 2 TIMES A DAY 60 tablet 5    aspirin 81 mg Tab Take 1 tablet by mouth every evening. Every day      biotin 5 mg Cap Take 1 tablet by mouth once daily.      CALCIUM CARB/VIT D3/MINERALS (CALCIUM-VITAMIN D ORAL) Take 600 mg by mouth 2 (two) times daily. Calcium 1200 with D 3 1000      CRANBERRY CONC/ASCORBIC  ACID (CRANBERRY PLUS VITAMIN C ORAL) Take 1 capsule by mouth once daily.      DEXILANT 60 mg capsule Take 60 mg by mouth once daily.   11    ENTRESTO  mg per tablet Take 1 tablet by mouth 2 (two) times daily. 60 tablet 4    fluticasone (FLONASE) 50 mcg/actuation nasal spray 2 sprays by Each Nare route once daily. (Patient taking differently: 2 sprays by Each Nostril route daily as needed.) 16 g 0    folic acid (FOLVITE) 1 MG tablet Take 2 mg by mouth once daily.       furosemide (LASIX) 80 MG tablet Take 1 tablet (80 mg total) by mouth once daily. 90 tablet 3    gabapentin (NEURONTIN) 100 MG capsule Take 2 capsules (200 mg total) by mouth every evening. 180 capsule 0    HYDROmorphone (DILAUDID) 2 MG tablet Take 2 mg by mouth every 4 (four) hours as needed for Pain.      isosorbide mononitrate (IMDUR) 60 MG 24 hr tablet Take 1 tablet by mouth every morning.      Lactobacillus acidophilus 10 billion cell Cap Take 1 each by mouth once daily. 1.5 billion      magnesium oxide (MAG-OX) 400 mg (241.3 mg magnesium) tablet TAKE 1 TABLET BY MOUTH 2 TIMES A DAY (Patient taking differently: Take 400 mg by mouth 2 (two) times daily.) 120 tablet 2    metoprolol succinate (TOPROL-XL) 25 MG 24 hr tablet Take 1 tablet (25 mg total) by mouth As instructed. 25 mg QAM and 12.5 mg QHS 45 tablet 11    MULTIVITAMIN WITH MINERALS (ONE-A-DAY 50 PLUS) Tab Take 1 tablet by mouth once daily. Every day      nitroGLYCERIN 0.4 MG/DOSE TL SPRY (NITROLINGUAL) 400 mcg/spray spray Place 1 spray under the tongue every 5 (five) minutes as needed for Chest pain. PRN 4.9 g 3    omega-3 fatty acids/fish oil (FISH OIL-OMEGA-3 FATTY ACIDS) 300-1,000 mg capsule Take 1 capsule by mouth once daily.      ondansetron (ZOFRAN) 4 MG tablet Take 4 mg by mouth every 8 (eight) hours as needed for Nausea.   2    potassium chloride SA (K-DUR,KLOR-CON M) 10 MEQ tablet TAKE TWO TABLETS BY MOUTH EVERY MORNING AND 1 TABLET EVERY EVENING (Patient taking differently:  Take 20 mEq by mouth every morning. 20 meq QAM and 10 meq QPM) 270 tablet 2    promethazine (PHENERGAN) 25 MG tablet Take 1 tablet (25 mg total) by mouth 2 (two) times daily as needed for Nausea. 60 tablet 1    sertraline (ZOLOFT) 50 MG tablet Take 2 tablets (100 mg total) by mouth once daily. 180 tablet 2    traMADoL (ULTRAM) 50 mg tablet Take 50 mg by mouth 2 (two) times daily as needed for Pain.      vitamin E 400 unit Tab Take 400 mg by mouth once daily. Every day      metOLazone (ZAROXOLYN) 2.5 MG tablet Take 1 tablet (2.5 mg total) by mouth once daily. 30 tablet 0     No current facility-administered medications for this visit.       Review of Systems   Constitutional:  Negative for chills, fatigue, fever and unexpected weight change.   HENT:  Negative for hearing loss and trouble swallowing.    Eyes:  Negative for photophobia and visual disturbance.   Respiratory:  Negative for cough, shortness of breath and wheezing.    Cardiovascular:  Negative for chest pain, palpitations and leg swelling.   Gastrointestinal:  Negative for abdominal pain and nausea.   Genitourinary:  Negative for dysuria and frequency.   Musculoskeletal:  Positive for arthralgias, back pain, gait problem, joint swelling and myalgias. Negative for neck pain.   Skin:  Negative for rash and wound.   Neurological:  Positive for numbness. Negative for tremors, seizures, weakness and headaches.   Hematological:  Does not bruise/bleed easily.         Objective:        Physical Exam:   Foot Exam    General  General Appearance: appears stated age and healthy   Orientation: alert and oriented to person, place, and time   Affect: appropriate   Gait: antalgic   Assistance: walker use       Right Foot/Ankle     Inspection and Palpation  Ecchymosis: none  Tenderness: (4th toe)  Swelling: (Mild lower extremity edema)  Arch: normal  Skin Exam: callus; no drainage, no ulcer and no erythema   Neurovascular  Dorsalis pedis: 2+  Posterior tibial:  2+  Capillary Refill: 2+  Varicose veins: not present  Saphenous nerve sensation: diminished  Tibial nerve sensation: diminished  Superficial peroneal nerve sensation: diminished  Deep peroneal nerve sensation: diminished  Sural nerve sensation: diminished    Edema  Type of edema: non-pitting      Left Foot/Ankle      Inspection and Palpation  Ecchymosis: none  Tenderness: none   Swelling: (Mild lower extremity edema)  Arch: normal  Skin Exam: callus; no drainage, no ulcer and no erythema   Neurovascular  Dorsalis pedis: 2+  Posterior tibial: 2+  Capillary refill: 2+  Varicose veins: not present  Saphenous nerve sensation: diminished  Tibial nerve sensation: diminished  Superficial peroneal nerve sensation: diminished  Deep peroneal nerve sensation: diminished  Sural nerve sensation: diminished    Edema  Type of edema: non-pitting        Physical Exam  Cardiovascular:      Pulses:           Dorsalis pedis pulses are 2+ on the right side and 2+ on the left side.        Posterior tibial pulses are 2+ on the right side and 2+ on the left side.   Feet:      Right foot:      Skin integrity: Callus present. No ulcer or erythema.      Left foot:      Skin integrity: Callus present. No ulcer or erythema.                   Vascular Exam     Right Pulses  Dorsalis Pedis:      2+  Posterior Tibial:      2+        Left Pulses  Dorsalis Pedis:      2+  Posterior Tibial:      2+           Imaging: none            Assessment:       1. Acquired keratoderma    2. Pain of toe of right foot    3. Idiopathic peripheral neuropathy      Plan:   Acquired keratoderma    Pain of toe of right foot    Idiopathic peripheral neuropathy      Follow up if symptoms worsen or fail to improve.    Procedures        I discussed with the patient that the bilateral calluses she has come from excess pressure.  I trimmed the bilateral calluses today as a courtesy.  She tolerated well.  I recommend a pumice stone and urea cream 40% for continued  management.  Also discussed toe spacers to limit stress between the toes.    Counseling:     I provided patient education verbally regarding:   Patient diagnosis, treatment options, as well as alternatives, risks, and benefits.     This note was created using Dragon voice recognition software that occasionally misinterpreted phrases or words.

## 2023-12-27 ENCOUNTER — PATIENT MESSAGE (OUTPATIENT)
Dept: CARDIOLOGY | Facility: CLINIC | Age: 88
End: 2023-12-27
Payer: MEDICARE

## 2023-12-28 RX ORDER — POTASSIUM CHLORIDE 750 MG/1
TABLET, EXTENDED RELEASE ORAL
Qty: 270 TABLET | Refills: 2 | Status: SHIPPED | OUTPATIENT
Start: 2023-12-28

## 2023-12-28 RX ORDER — FUROSEMIDE 80 MG/1
80 TABLET ORAL DAILY
Qty: 90 TABLET | Refills: 3 | Status: SHIPPED | OUTPATIENT
Start: 2023-12-28

## 2023-12-31 ENCOUNTER — EXTERNAL CHRONIC CARE MANAGEMENT (OUTPATIENT)
Dept: PRIMARY CARE CLINIC | Facility: CLINIC | Age: 88
End: 2023-12-31
Payer: MEDICARE

## 2023-12-31 PROCEDURE — 99490 CHRNC CARE MGMT STAFF 1ST 20: CPT | Mod: S$PBB,,, | Performed by: FAMILY MEDICINE

## 2023-12-31 PROCEDURE — 99439 CHRNC CARE MGMT STAF EA ADDL: CPT | Mod: S$PBB,,, | Performed by: FAMILY MEDICINE

## 2023-12-31 PROCEDURE — 99439 CHRNC CARE MGMT STAF EA ADDL: CPT | Mod: PBBFAC,PN | Performed by: FAMILY MEDICINE

## 2023-12-31 PROCEDURE — 99490 CHRNC CARE MGMT STAFF 1ST 20: CPT | Mod: PBBFAC,25,PN | Performed by: FAMILY MEDICINE

## 2024-01-09 ENCOUNTER — LAB VISIT (OUTPATIENT)
Dept: PRIMARY CARE CLINIC | Facility: CLINIC | Age: 89
End: 2024-01-09
Payer: MEDICARE

## 2024-01-09 DIAGNOSIS — Z95.0 CARDIAC PACEMAKER IN SITU: ICD-10-CM

## 2024-01-09 DIAGNOSIS — I42.0 CONGESTIVE CARDIOMYOPATHY: ICD-10-CM

## 2024-01-09 DIAGNOSIS — N18.31 STAGE 3A CHRONIC KIDNEY DISEASE: ICD-10-CM

## 2024-01-09 DIAGNOSIS — E87.5 HYPERKALEMIA: ICD-10-CM

## 2024-01-09 LAB
ANION GAP SERPL CALC-SCNC: 10 MMOL/L (ref 8–16)
BNP SERPL-MCNC: 715 PG/ML (ref 0–99)
BUN SERPL-MCNC: 18 MG/DL (ref 8–23)
CALCIUM SERPL-MCNC: 8.4 MG/DL (ref 8.7–10.5)
CHLORIDE SERPL-SCNC: 104 MMOL/L (ref 95–110)
CO2 SERPL-SCNC: 23 MMOL/L (ref 23–29)
CREAT SERPL-MCNC: 0.9 MG/DL (ref 0.5–1.4)
ERYTHROCYTE [DISTWIDTH] IN BLOOD BY AUTOMATED COUNT: 15.4 % (ref 11.5–14.5)
EST. GFR  (NO RACE VARIABLE): >60 ML/MIN/1.73 M^2
GLUCOSE SERPL-MCNC: 117 MG/DL (ref 70–110)
HCT VFR BLD AUTO: 32.9 % (ref 37–48.5)
HGB BLD-MCNC: 10.6 G/DL (ref 12–16)
MAGNESIUM SERPL-MCNC: 1.9 MG/DL (ref 1.6–2.6)
MCH RBC QN AUTO: 27.9 PG (ref 27–31)
MCHC RBC AUTO-ENTMCNC: 32.2 G/DL (ref 32–36)
MCV RBC AUTO: 87 FL (ref 82–98)
PLATELET # BLD AUTO: 178 K/UL (ref 150–450)
PMV BLD AUTO: 11.1 FL (ref 9.2–12.9)
POTASSIUM SERPL-SCNC: 4.4 MMOL/L (ref 3.5–5.1)
RBC # BLD AUTO: 3.8 M/UL (ref 4–5.4)
SODIUM SERPL-SCNC: 137 MMOL/L (ref 136–145)
WBC # BLD AUTO: 5.74 K/UL (ref 3.9–12.7)

## 2024-01-09 PROCEDURE — 83735 ASSAY OF MAGNESIUM: CPT | Performed by: INTERNAL MEDICINE

## 2024-01-09 PROCEDURE — 85027 COMPLETE CBC AUTOMATED: CPT | Performed by: INTERNAL MEDICINE

## 2024-01-09 PROCEDURE — 80048 BASIC METABOLIC PNL TOTAL CA: CPT | Performed by: INTERNAL MEDICINE

## 2024-01-09 PROCEDURE — 36415 COLL VENOUS BLD VENIPUNCTURE: CPT | Mod: S$GLB,,, | Performed by: INTERNAL MEDICINE

## 2024-01-09 PROCEDURE — 83880 ASSAY OF NATRIURETIC PEPTIDE: CPT | Performed by: INTERNAL MEDICINE

## 2024-01-11 ENCOUNTER — OFFICE VISIT (OUTPATIENT)
Dept: CARDIOLOGY | Facility: CLINIC | Age: 89
End: 2024-01-11
Payer: MEDICARE

## 2024-01-11 ENCOUNTER — PATIENT MESSAGE (OUTPATIENT)
Dept: CARDIOLOGY | Facility: CLINIC | Age: 89
End: 2024-01-11

## 2024-01-11 VITALS
HEART RATE: 77 BPM | DIASTOLIC BLOOD PRESSURE: 70 MMHG | SYSTOLIC BLOOD PRESSURE: 130 MMHG | BODY MASS INDEX: 23.51 KG/M2 | OXYGEN SATURATION: 91 % | HEIGHT: 58 IN | WEIGHT: 112 LBS

## 2024-01-11 DIAGNOSIS — K21.9 GASTROESOPHAGEAL REFLUX DISEASE, UNSPECIFIED WHETHER ESOPHAGITIS PRESENT: ICD-10-CM

## 2024-01-11 DIAGNOSIS — Z95.1 S/P CABG (CORONARY ARTERY BYPASS GRAFT): ICD-10-CM

## 2024-01-11 DIAGNOSIS — I35.0 AORTIC VALVE STENOSIS, MODERATE: ICD-10-CM

## 2024-01-11 DIAGNOSIS — N18.31 STAGE 3A CHRONIC KIDNEY DISEASE: Primary | ICD-10-CM

## 2024-01-11 DIAGNOSIS — I25.118 CORONARY ARTERY DISEASE OF NATIVE ARTERY OF NATIVE HEART WITH STABLE ANGINA PECTORIS: ICD-10-CM

## 2024-01-11 DIAGNOSIS — I47.10 PSVT (PAROXYSMAL SUPRAVENTRICULAR TACHYCARDIA): ICD-10-CM

## 2024-01-11 DIAGNOSIS — I50.42 CHRONIC COMBINED SYSTOLIC AND DIASTOLIC HEART FAILURE: ICD-10-CM

## 2024-01-11 DIAGNOSIS — Z95.0 CARDIAC PACEMAKER IN SITU: ICD-10-CM

## 2024-01-11 PROCEDURE — 99214 OFFICE O/P EST MOD 30 MIN: CPT | Mod: S$PBB,,, | Performed by: INTERNAL MEDICINE

## 2024-01-11 PROCEDURE — 99999 PR PBB SHADOW E&M-EST. PATIENT-LVL V: CPT | Mod: PBBFAC,,, | Performed by: INTERNAL MEDICINE

## 2024-01-11 PROCEDURE — 99215 OFFICE O/P EST HI 40 MIN: CPT | Mod: PBBFAC,PN | Performed by: INTERNAL MEDICINE

## 2024-01-11 RX ORDER — FAMOTIDINE 20 MG/1
20 TABLET, FILM COATED ORAL NIGHTLY
Qty: 30 TABLET | Refills: 11 | Status: SHIPPED | OUTPATIENT
Start: 2024-01-11 | End: 2024-05-14 | Stop reason: ALTCHOICE

## 2024-01-11 RX ORDER — NITROGLYCERIN 400 UG/1
1 SPRAY ORAL EVERY 5 MIN PRN
Qty: 4.9 G | Refills: 3 | Status: SHIPPED | OUTPATIENT
Start: 2024-01-11

## 2024-01-11 RX ORDER — METOPROLOL SUCCINATE 50 MG/1
50 TABLET, EXTENDED RELEASE ORAL 2 TIMES DAILY
Qty: 180 TABLET | Refills: 3 | Status: SHIPPED | OUTPATIENT
Start: 2024-01-11

## 2024-01-11 RX ORDER — ISOSORBIDE MONONITRATE 60 MG/1
60 TABLET, EXTENDED RELEASE ORAL EVERY MORNING
Qty: 30 TABLET | Refills: 11 | Status: SHIPPED | OUTPATIENT
Start: 2024-01-11 | End: 2024-04-09 | Stop reason: ALTCHOICE

## 2024-01-11 NOTE — PROGRESS NOTES
Patient ID:  Cheyanne Gomes is a 88 y.o. female who presents for follow-up of Coronary Artery Disease, Congestive Heart Failure, and Results (labs)      Coronary artery disease of native artery of native heart with stable angina pectoris  06/01/2016  Left main 70%  Lad 90% ostium  D1 70% ostium  Circumflex 100% ostium  % proximal 3rd  LIMA to the LAD patent  Saphenous vein graft to D1  Saphenous vein graft to OM 1     No symptoms of angina     Chronic combined systolic and diastolic heart failure  Appears to be compensated     CKD (chronic kidney disease)  Stable     Anemia of chronic disease  Stable     Cardiac pacemaker in situ  Functioning adequately     Aortic valve stenosis, moderate  Stable    She is having indigestion usually occurs at night when she is laying down it is like heartburn at times her heart is beating fast.  She has history of sleep apnea.  During the Christmas holidays she had COVID.  Since then she has been having some dyspnea on exertion.  She is taking amlodipine 5 mg at bedtime Toprol XL 50 mg in the morning 25 in the evening.          Past Medical History:   Diagnosis Date    Abdominal hernia     Anemia     Dr Berkowitz     Angina pectoris     Aortic valve stenosis, moderate     Back pain     Cannon's esophagus     CAD (coronary artery disease)     Cataract     ou done    CHF (congestive heart failure)     EFx 35%, Dr. Spencer 12/19/13    Chronic combined systolic and diastolic heart failure 09/28/2019    Colitis     Compression fracture     Diverticulosis     Encounter for blood transfusion     GERD (gastroesophageal reflux disease)     Hyperlipidemia     Hypertension     Irritable bowel syndrome     Myocardial infarction     Osteoarthritis     lumbar DDD    Pacemaker     Pneumonia 01/03/2017    Raynaud disease     Rheumatoid arteritis     Rheumatoid arthritis     Shingles 01/03/2017    Sjogren syndrome     Ulcerative colitis         Past Surgical History:   Procedure  Laterality Date    A-V CARDIAC PACEMAKER INSERTION Left 2021    Procedure: INSERTION, CARDIAC PACEMAKER, DUAL CHAMBER  ([employer]);  Surgeon: Tera Blair MD;  Location: [facility] CATH/EP LAB;  Service: Cardiology;  Laterality: Left;    APPENDECTOMY      BACK SURGERY      CARDIAC SURGERY      CABGx3 in     CATARACT EXTRACTION      ou od d 11-15-12//     SECTION, CLASSIC      x 2    CHOLECYSTECTOMY      CLOSURE OF WOUND Right 2022    Procedure: CLOSURE-WOUND;  Surgeon: Delvis Jackson MD;  Location: [facility] OR;  Service: ENT;  Laterality: Right;  NOSE AND CHIN    COLONOSCOPY  09/15/2011    Dr Anaya     COLONOSCOPY  01/10/2017    [facility] report sent to scanning    CORONARY ANGIOPLASTY      PCI x 1 in     CORONARY ARTERY BYPASS GRAFT      3 vessel    CORONARY ARTERY BYPASS GRAFT      CYSTOSCOPY N/A 6/3/2020    Procedure: CYSTOSCOPY;  Surgeon: Miryam Bender Jr., MD;  Location: [facility] OR;  Service: Urology;  Laterality: N/A;    eyes      rk ou    FRACTURE SURGERY  2016    Dr Guido left hip     FRACTURE SURGERY  2018    Right Hip    HARVESTING OF SKIN GRAFT  2022    Procedure: SURGICAL PROCUREMENT, GRAFT, SKIN;  Surgeon: Delvis Jackson MD;  Location: [facility] OR;  Service: ENT;;  RIGHT CHEST    HYSTERECTOMY      tubal ligation, oopherectomy    INTRAMEDULLARY RODDING OF TROCHANTER OF FEMUR Right 10/8/2018    Procedure: INSERTION, INTRAMEDULLARY KELSI, FEMUR, TROCHANTER;  Surgeon: Valerio Ltuher MD;  Location: [facility] OR;  Service: Orthopedics;  Laterality: Right;    OOPHORECTOMY      SPINE SURGERY  2013    Silvino Mckeon    UPPER GASTROINTESTINAL ENDOSCOPY      Yag Capsulotomy Right 14          Current Outpatient Medications   Medication Instructions    amLODIPine (NORVASC) 5 MG tablet TAKE 1 TABLET BY MOUTH 2 TIMES A DAY    aspirin 81 mg Tab 1 tablet, Oral, Nightly, Every day    biotin 5 mg Cap 1 tablet, Oral, Daily    CALCIUM CARB/VIT D3/MINERALS (CALCIUM-VITAMIN D ORAL) 600 mg, Oral, 2  times daily, Calcium 1200 with D 3 1000    CRANBERRY CONC/ASCORBIC ACID (CRANBERRY PLUS VITAMIN C ORAL) 1 capsule, Oral, Daily    DEXILANT 60 mg, Oral, Daily    ENTRESTO  mg per tablet 1 tablet, Oral, 2 times daily    famotidine (PEPCID) 20 mg, Oral, Nightly    fluticasone (FLONASE) 50 mcg/actuation nasal spray 2 sprays, Each Nostril, Daily    folic acid (FOLVITE) 2 mg, Oral, Daily    furosemide (LASIX) 80 mg, Oral, Daily    gabapentin (NEURONTIN) 200 mg, Oral, Nightly    HYDROmorphone (DILAUDID) 2 mg, Oral, Every 4 hours PRN    isosorbide mononitrate (IMDUR) 60 mg, Oral, Every morning    Lactobacillus acidophilus 10 billion cell Cap 1 each, Oral, Daily, 1.5 billion    magnesium oxide (MAG-OX) 400 mg (241.3 mg magnesium) tablet TAKE 1 TABLET BY MOUTH 2 TIMES A DAY    metOLazone (ZAROXOLYN) 2.5 mg, Oral, Daily    metoprolol succinate (TOPROL-XL) 50 mg, Oral, 2 times daily, 25 mg QAM and 12.5 mg QHS    MULTIVITAMIN WITH MINERALS (ONE-A-DAY 50 PLUS) Tab 1 tablet, Oral, Daily, Every day    nitroGLYCERIN 0.4 MG/DOSE TL SPRY (NITROLINGUAL) 400 mcg/spray spray 1 spray, Sublingual, Every 5 min PRN, PRN    omega-3 fatty acids/fish oil (FISH OIL-OMEGA-3 FATTY ACIDS) 300-1,000 mg capsule 1 capsule, Oral, Daily    ondansetron (ZOFRAN) 4 mg, Oral, Every 8 hours PRN    potassium chloride SA (K-DUR,KLOR-CON M) 10 MEQ tablet TAKE TWO TABLETS BY MOUTH EVERY MORNING AND 1 TABLET EVERY EVENING    promethazine (PHENERGAN) 25 mg, Oral, 2 times daily PRN    sertraline (ZOLOFT) 100 mg, Oral, Daily    traMADoL (ULTRAM) 50 mg, Oral, 2 times daily PRN    vitamin E 400 mg, Oral, Daily, Every day        Review of patient's allergies indicates:   Allergen Reactions    Adhesive     Nitrofurantoin macrocrystalline Nausea And Vomiting     Other reaction(s): very sickly    Penicillins Swelling     Other reaction(s): swelling  Other reaction(s): red skin discolorati    Sulfa (sulfonamide antibiotics) Nausea And Vomiting     Other reaction(s):  "very sickly        Review of Systems   Cardiovascular:  Positive for dyspnea on exertion. Negative for chest pain and leg swelling.   Respiratory:  Negative for cough and shortness of breath.    Gastrointestinal:  Positive for bloating, dysphagia and heartburn.        Objective:     Vitals:    01/11/24 1302   BP: 130/70   BP Location: Left arm   Patient Position: Sitting   BP Method: Medium (Manual)   Pulse: 77   SpO2: (!) 91%   Weight: 50.8 kg (111 lb 15.9 oz)   Height: 4' 10" (1.473 m)       Physical Exam  Vitals and nursing note reviewed.   Constitutional:       Appearance: She is well-developed.   HENT:      Head: Normocephalic and atraumatic.   Eyes:      Conjunctiva/sclera: Conjunctivae normal.   Cardiovascular:      Rate and Rhythm: Normal rate and regular rhythm.      Heart sounds: Murmur (4/6 systolic murmur at the base) heard.   Pulmonary:      Effort: Pulmonary effort is normal.      Breath sounds: Normal breath sounds.   Abdominal:      General: Bowel sounds are normal.      Palpations: Abdomen is soft.   Musculoskeletal:         General: Normal range of motion.   Skin:     General: Skin is warm and dry.   Neurological:      Mental Status: She is alert and oriented to person, place, and time.   Psychiatric:         Behavior: Behavior normal.         Thought Content: Thought content normal.         Judgment: Judgment normal.       CMP  Sodium   Date Value Ref Range Status   01/09/2024 137 136 - 145 mmol/L Final   12/11/2018 139 134 - 144 mmol/L      Potassium   Date Value Ref Range Status   01/09/2024 4.4 3.5 - 5.1 mmol/L Final     Chloride   Date Value Ref Range Status   01/09/2024 104 95 - 110 mmol/L Final   12/11/2018 96 (L) 98 - 110 mmol/L      CO2   Date Value Ref Range Status   01/09/2024 23 23 - 29 mmol/L Final     Glucose   Date Value Ref Range Status   01/09/2024 117 (H) 70 - 110 mg/dL Final   12/11/2018 97 70 - 99 mg/dL      BUN   Date Value Ref Range Status   01/09/2024 18 8 - 23 mg/dL Final "     Creatinine   Date Value Ref Range Status   01/09/2024 0.9 0.5 - 1.4 mg/dL Final   12/11/2018 0.95 0.60 - 1.40 mg/dL      Calcium   Date Value Ref Range Status   01/09/2024 8.4 (L) 8.7 - 10.5 mg/dL Final     Total Protein   Date Value Ref Range Status   10/05/2023 6.8 6.0 - 8.4 g/dL Final     Albumin   Date Value Ref Range Status   10/05/2023 3.8 3.5 - 5.2 g/dL Final   12/11/2018 3.9 3.1 - 4.7 g/dL      Total Bilirubin   Date Value Ref Range Status   10/05/2023 0.3 0.1 - 1.0 mg/dL Final     Comment:     For infants and newborns, interpretation of results should be based  on gestational age, weight and in agreement with clinical  observations.    Premature Infant recommended reference ranges:  Up to 24 hours.............<8.0 mg/dL  Up to 48 hours............<12.0 mg/dL  3-5 days..................<15.0 mg/dL  6-29 days.................<15.0 mg/dL       Alkaline Phosphatase   Date Value Ref Range Status   10/05/2023 75 55 - 135 U/L Final     AST   Date Value Ref Range Status   10/05/2023 23 10 - 40 U/L Final     ALT   Date Value Ref Range Status   10/05/2023 12 10 - 44 U/L Final     Anion Gap   Date Value Ref Range Status   01/09/2024 10 8 - 16 mmol/L Final     eGFR if    Date Value Ref Range Status   11/05/2021 >60.0 >60 mL/min/1.73 m^2 Final     eGFR if non    Date Value Ref Range Status   02/21/2022 56 (L) >59 mL/min/1.73 Final      BMP  Lab Results   Component Value Date     01/09/2024    K 4.4 01/09/2024     01/09/2024    CO2 23 01/09/2024    BUN 18 01/09/2024    CREATININE 0.9 01/09/2024    CALCIUM 8.4 (L) 01/09/2024    ANIONGAP 10 01/09/2024    ESTGFRAFRICA >60.0 11/05/2021    EGFRNONAA 56 (L) 02/21/2022      BNP  @LABRCNTIP(BNP,BNPTRIAGEBLO)@   Lab Results   Component Value Date    CHOL 215 (H) 10/05/2023    CHOL 146 10/04/2022    CHOL 113 05/06/2021     Lab Results   Component Value Date    HDL 46 10/05/2023    HDL 52 10/04/2022    HDL 47 05/06/2021     Lab  Results   Component Value Date    LDLCALC 137.6 10/05/2023    LDLCALC 75 10/04/2022    LDLCALC 51 05/06/2021     Lab Results   Component Value Date    TRIG 157 (H) 10/05/2023    TRIG 104 10/04/2022    TRIG 70 05/06/2021     Lab Results   Component Value Date    CHOLHDL 21.4 10/05/2023    CHOLHDL 26.2 08/24/2020    CHOLHDL 45.0 10/16/2013      Lab Results   Component Value Date    TSH 1.933 12/04/2023    FREET4 0.89 09/26/2012     Lab Results   Component Value Date    HGBA1C 6.3 12/04/2023     Lab Results   Component Value Date    WBC 5.74 01/09/2024    HGB 10.6 (L) 01/09/2024    HCT 32.9 (L) 01/09/2024    MCV 87 01/09/2024     01/09/2024         Results for orders placed during the hospital encounter of 04/28/22    Echo    Interpretation Summary  · The left ventricle is normal in size with concentric remodeling and mildly decreased systolic function.  · The estimated ejection fraction is 40%.  · Grade I left ventricular diastolic dysfunction.  · There are segmental left ventricular wall motion abnormalities.  · Apical akinesis  · Normal right ventricular size with normal right ventricular systolic function.  · Mild aortic regurgitation.  · There is moderate aortic valve stenosis.  · Aortic valve area is 1.15 cm2; peak velocity is 2.12 m/s; mean gradient is 18 mmHg.  · Mild tricuspid regurgitation.  · Normal central venous pressure (3 mmHg).  · The estimated PA systolic pressure is 24 mmHg.  · Overall the study quality was technically difficult.     No results found for this or any previous visit.         Assessment:       Coronary artery disease of native artery of native heart with stable angina pectoris  Severe coronary artery disease with previous coronary artery bypass.  She denies symptoms of angina.    Hypertension  Will increase the doses of Toprol-XL to 50 mg p.o. b.i.d.    S/P CABG (coronary artery bypass graft)  Stable    Chronic combined systolic and diastolic heart failure  Stable    Cardiac  pacemaker in situ  Function in pacemaker.  She has symptoms of palpitations will increase the doses of beta-blockers.    GERD (gastroesophageal reflux disease)  She has history of gastroesophageal reflux with Cannon's esophagus.  Will add Pepcid 20 mg q.h.s. as well as Tums       Plan:       Increase the Toprol XL to 50 mg p.o. b.i.d..  Start taking Pepcid 20 mg q.h.s. as well as Tums.  She will be seen in the office in 1 month with a BMP as well as a BNP and a CBC.

## 2024-01-11 NOTE — H&P
[facility]  History & Physical    Patient Name: Cheyanne Gomes  MRN: [id number]  Patient Class: OP- Observation  Admission Date: 2/4/2023  Attending Physician: Fercho De Jesus MD   Primary Care Provider: Romeo Alas MD         Patient information was obtained from patient, past medical records and ER records.     Subjective:     Principal Problem:Chronic combined systolic and diastolic heart failure    Chief Complaint:   Chief Complaint   Patient presents with    Shortness of Breath     Sob with dizziness x approx 1 week.         HPI: The patient is an 87-year-old female, who presents emergency room complaining shortness of breath.  The patient states that his shortness breath has been present for about 3 days prior to presentation.  She is noticed weight gain and was seen recently by her cardiologist for heart failure.  Her Lasix was increased doses that time.  She continues to have shortness breath and weight gain.  Denies any chest pressure or dizziness.  She AICD placed in November of 2021 and coronary artery bypass approximately 20 years ago.  She was combined congestive heart failure with ejection fraction approximately 40%.  Her past medical history includes hypertension, CAD, irritable bowel syndrome and gastroesophageal reflux disease.    Plan to admit patient to observation.  Will add tubule inhibitor of Zaroxolyn in conjunction with loop inhibitor Lasix for continue heart failure.  Consult Cardiology      Past Medical History:   Diagnosis Date    Abdominal hernia     Anemia     Dr Berkowitz     Angina pectoris     Aortic valve stenosis, moderate     Back pain     Cannon's esophagus     CAD (coronary artery disease)     Cataract     ou done    CHF (congestive heart failure)     EFx 35%, Dr. Spencer 12/19/13    Chronic combined systolic and diastolic heart failure 09/28/2019    Colitis     Compression fracture     Diverticulosis     Encounter for blood  [At Term] : at term transfusion     GERD (gastroesophageal reflux disease)     Hyperlipidemia     Hypertension     Irritable bowel syndrome     Myocardial infarction     Osteoarthritis     lumbar DDD    Pacemaker     Pneumonia 2017    Raynaud disease     Rheumatoid arteritis     Rheumatoid arthritis     Shingles 2017    Sjogren syndrome     Ulcerative colitis        Past Surgical History:   Procedure Laterality Date    A-V CARDIAC PACEMAKER INSERTION Left 2021    Procedure: INSERTION, CARDIAC PACEMAKER, DUAL CHAMBER  ([employer]);  Surgeon: Tera Blair MD;  Location: [facility] CATH/EP LAB;  Service: Cardiology;  Laterality: Left;    APPENDECTOMY      BACK SURGERY      CARDIAC SURGERY      CABGx3 in     CATARACT EXTRACTION      ou od d 11-15-12//     SECTION, CLASSIC      x 2    CHOLECYSTECTOMY      CLOSURE OF WOUND Right 2022    Procedure: CLOSURE-WOUND;  Surgeon: Delvis Jackson MD;  Location: [facility] OR;  Service: ENT;  Laterality: Right;  NOSE AND CHIN    COLONOSCOPY  09/15/2011    Dr Anaya     COLONOSCOPY  01/10/2017    [facility] report sent to scanning    CORONARY ANGIOPLASTY      PCI x 1 in     CORONARY ARTERY BYPASS GRAFT      3 vessel    CORONARY ARTERY BYPASS GRAFT      CYSTOSCOPY N/A 6/3/2020    Procedure: CYSTOSCOPY;  Surgeon: Miryam Bender Jr., MD;  Location: [facility] OR;  Service: Urology;  Laterality: N/A;    eyes      rk ou    FRACTURE SURGERY  2016    Dr Guido left hip     FRACTURE SURGERY  2018    Right Hip    HARVESTING OF SKIN GRAFT  2022    Procedure: SURGICAL PROCUREMENT, GRAFT, SKIN;  Surgeon: Delvis Jackson MD;  Location: [facility] OR;  Service: ENT;;  RIGHT CHEST    HYSTERECTOMY      tubal ligation, oopherectomy    INTRAMEDULLARY RODDING OF TROCHANTER OF FEMUR Right 10/8/2018    Procedure: INSERTION, INTRAMEDULLARY KELSI, FEMUR, TROCHANTER;  Surgeon: Valerio Luther MD;  Location: [facility] OR;  Service: Orthopedics;  Laterality: Right;     OOPHORECTOMY      SPINE SURGERY  8-    Silvino Mckeon    UPPER GASTROINTESTINAL ENDOSCOPY      Yag Capsulotomy Right 9/25/14       Review of patient's allergies indicates:   Allergen Reactions    Adhesive     Nitrofurantoin macrocrystalline Nausea And Vomiting     Other reaction(s): very sickly    Penicillins Swelling     Other reaction(s): swelling  Other reaction(s): red skin discolorati    Sulfa (sulfonamide antibiotics) Nausea And Vomiting     Other reaction(s): very sickly       No current facility-administered medications on file prior to encounter.     Current Outpatient Medications on File Prior to Encounter   Medication Sig    amLODIPine (NORVASC) 5 MG tablet TAKE 1 TABLET BY MOUTH 2 TIMES A DAY (Patient taking differently: Take 5 mg by mouth 2 (two) times daily.)    aspirin 81 mg Tab Take 1 tablet by mouth every evening. Every day    atorvastatin (LIPITOR) 40 MG tablet Take 1 tablet (40 mg total) by mouth once daily.    biotin 5 mg Cap Take 1 tablet by mouth once daily.    CALCIUM CARB/VIT D3/MINERALS (CALCIUM-VITAMIN D ORAL) Take 600 mg by mouth 2 (two) times daily. Calcium 1200 with D 3 1000    CRANBERRY CONC/ASCORBIC ACID (CRANBERRY PLUS VITAMIN C ORAL) Take 1 capsule by mouth once daily.    cyanocobalamin 1,000 mcg/mL injection Inject 1,000 mcg into the muscle every 30 days.    DEXILANT 60 mg capsule Take 60 mg by mouth once daily.     ENTRESTO  mg per tablet TAKE ONE TABLET BY MOUTH TWICE DAILY (Patient taking differently: Take 1 tablet by mouth 2 (two) times daily.)    fluticasone (FLONASE) 50 mcg/actuation nasal spray 2 sprays by Each Nare route once daily. (Patient taking differently: 2 sprays by Each Nostril route daily as needed.)    folic acid (FOLVITE) 1 MG tablet Take 2 mg by mouth once daily.     furosemide (LASIX) 80 MG tablet TAKE ONE TABLET BY MOUTH EVERY DAY (Patient taking differently: Take 80 mg by mouth once daily.)    gabapentin (NEURONTIN) 100 MG capsule  [None] : there were no delivery complications Take 2 capsules (200 mg total) by mouth every evening.    HYDROmorphone (DILAUDID) 2 MG tablet Take 2 mg by mouth every 4 (four) hours as needed for Pain.    isosorbide dinitrate (ISORDIL) 10 MG tablet TAKE ONE TABLET BY MOUTH THREE TIMES DAILY (Patient taking differently: Take 10 mg by mouth 3 (three) times daily.)    Lactobacillus acidophilus 10 billion cell Cap Take 1 each by mouth once daily. 1.5 billion    magnesium oxide (MAG-OX) 400 mg (241.3 mg magnesium) tablet TAKE 1 TABLET BY MOUTH 2 TIMES A DAY (Patient taking differently: Take 400 mg by mouth 2 (two) times daily.)    metoprolol succinate (TOPROL-XL) 25 MG 24 hr tablet Take 1 tablet (25 mg total) by mouth As instructed. Take 1 and 1/2 tabs qam and 1 tab qpm (Patient taking differently: Take 25 mg by mouth once daily. Take 1 and 1/2 tabs qam and 1 tab qpm)    metoprolol succinate (TOPROL-XL) 25 MG 24 hr tablet Take 12.5 mg by mouth nightly. 25 mg QAM and 12.5 mg QHS    MULTIVITAMIN WITH MINERALS (ONE-A-DAY 50 PLUS) Tab Take 1 tablet by mouth once daily. Every day    omega-3 fatty acids/fish oil (FISH OIL-OMEGA-3 FATTY ACIDS) 300-1,000 mg capsule Take 1 capsule by mouth once daily.    ondansetron (ZOFRAN) 4 MG tablet Take 4 mg by mouth every 8 (eight) hours as needed for Nausea.     potassium chloride SA (K-DUR,KLOR-CON M) 10 MEQ tablet TAKE TWO TABLETS BY MOUTH EVERY MORNING AND 1 TABLET EVERY EVENING (Patient taking differently: Take 20 mEq by mouth every morning. 20 meq QAM and 10 meq QPM)    potassium chloride SA (K-DUR,KLOR-CON M) 10 MEQ tablet Take 10 mEq by mouth nightly. 20 meq QAM and 10 meq QPM    sertraline (ZOLOFT) 50 MG tablet Take 2 tablets (100 mg total) by mouth once daily.    traMADoL (ULTRAM) 50 mg tablet Take 50 mg by mouth 2 (two) times daily as needed for Pain.    vitamin E 400 unit Tab Take 400 mg by mouth once daily. Every day    cholestyramine-aspartame (QUESTRAN LIGHT) 4 gram PwPk Start with one packet daily for  three days, then if needed increase to 2 packets daily for three days, then if needed increase to 3 packets    megestroL (MEGACE) 20 MG Tab TAKE 1 TABLET BY MOUTH EVERY DAY    nitroGLYCERIN 0.4 MG/DOSE TL SPRY (NITROLINGUAL) 400 mcg/spray spray Place 1 spray under the tongue every 5 (five) minutes as needed for Chest pain. PRN    promethazine (PHENERGAN) 25 MG tablet Take 1 tablet (25 mg total) by mouth 2 (two) times daily as needed for Nausea.    tiZANidine (ZANAFLEX) 2 MG tablet TID PRN    traMADoL (ULTRAM) 50 mg tablet TAKE TWO TABLETS BY MOUTH TWICE DAILY AS NEEDED FOR SEVERE pain    zinc gluconate 50 mg tablet Take 50 mg by mouth once daily.    [DISCONTINUED] hydrochlorothiazide (HYDRODIURIL) 25 MG tablet Take 25 mg by mouth once daily.     Family History       Problem Relation (Age of Onset)    Arthritis Brother    Crohn's disease Brother, Brother, Brother    Early death Son    Fibromyalgia Sister    Heart disease Mother, Father, Brother    Hypertension Father, Sister, Daughter    Irritable bowel syndrome Sister    Lupus Cousin, Cousin    Pulmonary embolism Sister    Scleroderma Sister    Skin cancer Mother          Tobacco Use    Smoking status: Former     Packs/day: 1.00     Years: 5.00     Pack years: 5.00     Types: Cigarettes     Quit date: 1971     Years since quittin.1    Smokeless tobacco: Never   Substance and Sexual Activity    Alcohol use: Yes     Alcohol/week: 1.0 standard drink     Types: 1 Glasses of wine per week    Drug use: Never    Sexual activity: Not Currently     Partners: Male     Review of Systems   Constitutional: Negative.    HENT: Negative.     Eyes: Negative.    Respiratory:  Positive for shortness of breath.    Cardiovascular: Negative.    Gastrointestinal: Negative.    Endocrine: Negative.    Genitourinary: Negative.    Musculoskeletal: Negative.    Skin: Negative.    Allergic/Immunologic: Negative.    Neurological: Negative.    Hematological: Negative.     Objective:     Vital Signs (Most Recent):  Temp: 98.5 °F (36.9 °C) (02/05/23 0108)  Pulse: 69 (02/05/23 0108)  Resp: 20 (02/05/23 0108)  BP: (!) 189/75 (02/05/23 0108)  SpO2: 96 % (02/05/23 0108)   Vital Signs (24h Range):  Temp:  [98 °F (36.7 °C)-98.5 °F (36.9 °C)] 98.5 °F (36.9 °C)  Pulse:  [63-71] 69  Resp:  [16-22] 20  SpO2:  [95 %-100 %] 96 %  BP: (154-190)/(71-84) 189/75     Weight: 50.4 kg (111 lb 1.8 oz)  Body mass index is 23.22 kg/m².    Physical Exam  Vitals and nursing note reviewed. Exam conducted with a chaperone present.   HENT:      Head: Normocephalic and atraumatic.   Eyes:      Pupils: Pupils are equal, round, and reactive to light.   Cardiovascular:      Rate and Rhythm: Normal rate and regular rhythm.   Pulmonary:      Breath sounds: Rales present.   Abdominal:      General: Bowel sounds are normal.      Palpations: Abdomen is soft.   Musculoskeletal:      Right lower leg: Edema present.      Left lower leg: Edema present.   Skin:     General: Skin is warm and dry.      Capillary Refill: Capillary refill takes less than 2 seconds.   Neurological:      General: No focal deficit present.      Mental Status: She is alert.         CRANIAL NERVES     CN III, IV, VI   Pupils are equal, round, and reactive to light.     Significant Labs: All pertinent labs within the past 24 hours have been reviewed.    Significant Imaging: I have reviewed all pertinent imaging results/findings within the past 24 hours.    Assessment/Plan:     * Chronic combined systolic and diastolic heart failure  Reason for admission  Combined congestive heart failure  Ejection fraction 40%  Add metolazone to loop inhibitor  Consult Cardiology      Hyperlipidemia  Obtain fasting lipid panel    Treat appropriately    Coronary artery disease of native artery of native heart with stable angina pectoris  Known disease  Stable angina        Hypertension  Monitor and treat        VTE Risk Mitigation (From admission, onward)          Ordered     IP VTE HIGH RISK PATIENT  Once         02/05/23 0102     Place sequential compression device  Until discontinued         02/05/23 0102                   Fercho De Jesus MD  Department of Hospital Medicine   [facility]

## 2024-01-18 ENCOUNTER — EXTERNAL HOME HEALTH (OUTPATIENT)
Dept: HOME HEALTH SERVICES | Facility: HOSPITAL | Age: 89
End: 2024-01-18
Payer: MEDICARE

## 2024-01-23 NOTE — ASSESSMENT & PLAN NOTE
She has history of gastroesophageal reflux with Cannon's esophagus.  Will add Pepcid 20 mg q.h.s. as well as Tums

## 2024-01-23 NOTE — ASSESSMENT & PLAN NOTE
Severe coronary artery disease with previous coronary artery bypass.  She denies symptoms of angina.

## 2024-01-23 NOTE — ASSESSMENT & PLAN NOTE
Function in pacemaker.  She has symptoms of palpitations will increase the doses of beta-blockers.

## 2024-01-31 ENCOUNTER — EXTERNAL CHRONIC CARE MANAGEMENT (OUTPATIENT)
Dept: PRIMARY CARE CLINIC | Facility: CLINIC | Age: 89
End: 2024-01-31
Payer: MEDICARE

## 2024-01-31 PROCEDURE — 99490 CHRNC CARE MGMT STAFF 1ST 20: CPT | Mod: PBBFAC,PN | Performed by: FAMILY MEDICINE

## 2024-01-31 PROCEDURE — 99490 CHRNC CARE MGMT STAFF 1ST 20: CPT | Mod: S$PBB,,, | Performed by: FAMILY MEDICINE

## 2024-02-01 RX ORDER — METOLAZONE 2.5 MG/1
2.5 TABLET ORAL DAILY
Qty: 30 TABLET | Refills: 0 | Status: SHIPPED | OUTPATIENT
Start: 2024-02-01 | End: 2024-02-27 | Stop reason: SDUPTHER

## 2024-02-08 ENCOUNTER — TELEPHONE (OUTPATIENT)
Dept: FAMILY MEDICINE | Facility: CLINIC | Age: 89
End: 2024-02-08
Payer: MEDICARE

## 2024-02-08 ENCOUNTER — LAB VISIT (OUTPATIENT)
Dept: PRIMARY CARE CLINIC | Facility: CLINIC | Age: 89
End: 2024-02-08
Payer: MEDICARE

## 2024-02-08 DIAGNOSIS — Z95.0 CARDIAC PACEMAKER IN SITU: ICD-10-CM

## 2024-02-08 DIAGNOSIS — I47.10 PSVT (PAROXYSMAL SUPRAVENTRICULAR TACHYCARDIA): ICD-10-CM

## 2024-02-08 DIAGNOSIS — I35.0 AORTIC VALVE STENOSIS, MODERATE: ICD-10-CM

## 2024-02-08 DIAGNOSIS — N18.31 STAGE 3A CHRONIC KIDNEY DISEASE: ICD-10-CM

## 2024-02-08 LAB
ANION GAP SERPL CALC-SCNC: 12 MMOL/L (ref 8–16)
BNP SERPL-MCNC: 791 PG/ML (ref 0–99)
BUN SERPL-MCNC: 41 MG/DL (ref 8–23)
CALCIUM SERPL-MCNC: 9.4 MG/DL (ref 8.7–10.5)
CHLORIDE SERPL-SCNC: 98 MMOL/L (ref 95–110)
CO2 SERPL-SCNC: 29 MMOL/L (ref 23–29)
CREAT SERPL-MCNC: 1.6 MG/DL (ref 0.5–1.4)
ERYTHROCYTE [DISTWIDTH] IN BLOOD BY AUTOMATED COUNT: 15.5 % (ref 11.5–14.5)
EST. GFR  (NO RACE VARIABLE): 30.8 ML/MIN/1.73 M^2
GLUCOSE SERPL-MCNC: 101 MG/DL (ref 70–110)
HCT VFR BLD AUTO: 35.8 % (ref 37–48.5)
HGB BLD-MCNC: 11.3 G/DL (ref 12–16)
MCH RBC QN AUTO: 28.2 PG (ref 27–31)
MCHC RBC AUTO-ENTMCNC: 31.6 G/DL (ref 32–36)
MCV RBC AUTO: 89 FL (ref 82–98)
PLATELET # BLD AUTO: 143 K/UL (ref 150–450)
PMV BLD AUTO: 11.8 FL (ref 9.2–12.9)
POTASSIUM SERPL-SCNC: 4.2 MMOL/L (ref 3.5–5.1)
RBC # BLD AUTO: 4.01 M/UL (ref 4–5.4)
SODIUM SERPL-SCNC: 139 MMOL/L (ref 136–145)
WBC # BLD AUTO: 6.62 K/UL (ref 3.9–12.7)

## 2024-02-08 PROCEDURE — 83880 ASSAY OF NATRIURETIC PEPTIDE: CPT | Performed by: INTERNAL MEDICINE

## 2024-02-08 PROCEDURE — 36415 COLL VENOUS BLD VENIPUNCTURE: CPT | Mod: S$GLB,,, | Performed by: INTERNAL MEDICINE

## 2024-02-08 PROCEDURE — 80048 BASIC METABOLIC PNL TOTAL CA: CPT | Performed by: INTERNAL MEDICINE

## 2024-02-08 PROCEDURE — 85027 COMPLETE CBC AUTOMATED: CPT | Performed by: INTERNAL MEDICINE

## 2024-02-08 NOTE — TELEPHONE ENCOUNTER
Spoke to patient    She will get RSV vaccine at [facility] pharmacy    Rx called in for insurance purposes

## 2024-02-08 NOTE — TELEPHONE ENCOUNTER
----- Message from Magnus Wolf sent at 2/8/2024 10:18 AM CST -----  Type: Needs Medical Advice  Who Called:  pt  Best Call Back Number: [phone]    Additional Information: Pt is calling the office to see if she can come back to office on 2/14 in the afternoon to get rsv shot or  an order for one if pt needs to get from pharmacy.please call back and advise.

## 2024-02-14 ENCOUNTER — TELEPHONE (OUTPATIENT)
Dept: CARDIOLOGY | Facility: CLINIC | Age: 89
End: 2024-02-14
Payer: MEDICARE

## 2024-02-14 DIAGNOSIS — I50.42 CHRONIC COMBINED SYSTOLIC AND DIASTOLIC HEART FAILURE: Primary | ICD-10-CM

## 2024-02-14 DIAGNOSIS — I35.0 AORTIC VALVE STENOSIS, MODERATE: ICD-10-CM

## 2024-02-14 NOTE — TELEPHONE ENCOUNTER
----- Message from Volodymyr Mosley sent at 2/14/2024  9:44 AM CST -----  Regarding: results  Type:  Test Results    Who Called:  Cheyanne    Name of Test (Lab/Mammo/Etc):  labs    Date of Test:  2/8/24    Ordering Provider:  Dr Blair    Where the test was performed:  [facility]    Best Call Back Number:  [phone]    Additional Information:  pt had to cancel appt today b/c has flu. Please call to discuss results

## 2024-02-27 RX ORDER — SACUBITRIL AND VALSARTAN 97; 103 MG/1; MG/1
1 TABLET, FILM COATED ORAL 2 TIMES DAILY
Qty: 60 TABLET | Refills: 4 | Status: SHIPPED | OUTPATIENT
Start: 2024-02-27

## 2024-02-27 RX ORDER — METOLAZONE 2.5 MG/1
2.5 TABLET ORAL DAILY
Qty: 30 TABLET | Refills: 0 | Status: SHIPPED | OUTPATIENT
Start: 2024-02-27 | End: 2024-05-14

## 2024-02-29 ENCOUNTER — EXTERNAL CHRONIC CARE MANAGEMENT (OUTPATIENT)
Dept: PRIMARY CARE CLINIC | Facility: CLINIC | Age: 89
End: 2024-02-29
Payer: MEDICARE

## 2024-02-29 PROCEDURE — 99490 CHRNC CARE MGMT STAFF 1ST 20: CPT | Mod: PBBFAC,PN | Performed by: FAMILY MEDICINE

## 2024-02-29 PROCEDURE — 99490 CHRNC CARE MGMT STAFF 1ST 20: CPT | Mod: S$PBB,,, | Performed by: FAMILY MEDICINE

## 2024-03-07 ENCOUNTER — LAB VISIT (OUTPATIENT)
Dept: PRIMARY CARE CLINIC | Facility: CLINIC | Age: 89
End: 2024-03-07
Payer: MEDICARE

## 2024-03-07 DIAGNOSIS — I47.10 PSVT (PAROXYSMAL SUPRAVENTRICULAR TACHYCARDIA): ICD-10-CM

## 2024-03-07 DIAGNOSIS — I35.0 AORTIC VALVE STENOSIS, MODERATE: ICD-10-CM

## 2024-03-07 DIAGNOSIS — R79.89 ELEVATED FERRITIN: ICD-10-CM

## 2024-03-07 DIAGNOSIS — I25.118 CORONARY ARTERY DISEASE OF NATIVE ARTERY OF NATIVE HEART WITH STABLE ANGINA PECTORIS: ICD-10-CM

## 2024-03-07 DIAGNOSIS — N18.31 STAGE 3A CHRONIC KIDNEY DISEASE: ICD-10-CM

## 2024-03-07 DIAGNOSIS — I10 ESSENTIAL HYPERTENSION: ICD-10-CM

## 2024-03-07 LAB
ANION GAP SERPL CALC-SCNC: 13 MMOL/L (ref 8–16)
BNP SERPL-MCNC: 369 PG/ML (ref 0–99)
BUN SERPL-MCNC: 33 MG/DL (ref 8–23)
CALCIUM SERPL-MCNC: 9.6 MG/DL (ref 8.7–10.5)
CHLORIDE SERPL-SCNC: 94 MMOL/L (ref 95–110)
CO2 SERPL-SCNC: 29 MMOL/L (ref 23–29)
CREAT SERPL-MCNC: 1.7 MG/DL (ref 0.5–1.4)
ERYTHROCYTE [DISTWIDTH] IN BLOOD BY AUTOMATED COUNT: 15.1 % (ref 11.5–14.5)
EST. GFR  (NO RACE VARIABLE): 28.7 ML/MIN/1.73 M^2
FERRITIN SERPL-MCNC: 559 NG/ML (ref 20–300)
GLUCOSE SERPL-MCNC: 117 MG/DL (ref 70–110)
HCT VFR BLD AUTO: 33.5 % (ref 37–48.5)
HGB BLD-MCNC: 10.5 G/DL (ref 12–16)
MCH RBC QN AUTO: 27.7 PG (ref 27–31)
MCHC RBC AUTO-ENTMCNC: 31.3 G/DL (ref 32–36)
MCV RBC AUTO: 88 FL (ref 82–98)
PLATELET # BLD AUTO: 150 K/UL (ref 150–450)
PMV BLD AUTO: 10.3 FL (ref 9.2–12.9)
POTASSIUM SERPL-SCNC: 3.9 MMOL/L (ref 3.5–5.1)
RBC # BLD AUTO: 3.79 M/UL (ref 4–5.4)
SODIUM SERPL-SCNC: 136 MMOL/L (ref 136–145)
WBC # BLD AUTO: 5.14 K/UL (ref 3.9–12.7)

## 2024-03-07 PROCEDURE — 85027 COMPLETE CBC AUTOMATED: CPT | Performed by: INTERNAL MEDICINE

## 2024-03-07 PROCEDURE — 82728 ASSAY OF FERRITIN: CPT | Performed by: INTERNAL MEDICINE

## 2024-03-07 PROCEDURE — 83880 ASSAY OF NATRIURETIC PEPTIDE: CPT | Performed by: INTERNAL MEDICINE

## 2024-03-07 PROCEDURE — 36415 COLL VENOUS BLD VENIPUNCTURE: CPT | Mod: S$GLB,,, | Performed by: INTERNAL MEDICINE

## 2024-03-07 PROCEDURE — 80048 BASIC METABOLIC PNL TOTAL CA: CPT | Performed by: INTERNAL MEDICINE

## 2024-03-14 ENCOUNTER — OFFICE VISIT (OUTPATIENT)
Dept: CARDIOLOGY | Facility: CLINIC | Age: 89
End: 2024-03-14
Payer: MEDICARE

## 2024-03-14 ENCOUNTER — PATIENT MESSAGE (OUTPATIENT)
Dept: CARDIOLOGY | Facility: CLINIC | Age: 89
End: 2024-03-14

## 2024-03-14 VITALS
DIASTOLIC BLOOD PRESSURE: 60 MMHG | HEART RATE: 62 BPM | BODY MASS INDEX: 23.99 KG/M2 | SYSTOLIC BLOOD PRESSURE: 120 MMHG | OXYGEN SATURATION: 94 % | HEIGHT: 58 IN | WEIGHT: 114.31 LBS

## 2024-03-14 DIAGNOSIS — I35.0 AORTIC VALVE STENOSIS, MODERATE: ICD-10-CM

## 2024-03-14 DIAGNOSIS — Z95.0 CARDIAC PACEMAKER IN SITU: ICD-10-CM

## 2024-03-14 DIAGNOSIS — N18.32 STAGE 3B CHRONIC KIDNEY DISEASE: ICD-10-CM

## 2024-03-14 DIAGNOSIS — I25.118 CORONARY ARTERY DISEASE OF NATIVE ARTERY OF NATIVE HEART WITH STABLE ANGINA PECTORIS: ICD-10-CM

## 2024-03-14 DIAGNOSIS — I50.42 CHRONIC COMBINED SYSTOLIC AND DIASTOLIC HEART FAILURE: Primary | ICD-10-CM

## 2024-03-14 DIAGNOSIS — I47.10 PSVT (PAROXYSMAL SUPRAVENTRICULAR TACHYCARDIA): ICD-10-CM

## 2024-03-14 PROCEDURE — 99213 OFFICE O/P EST LOW 20 MIN: CPT | Mod: S$PBB,,, | Performed by: INTERNAL MEDICINE

## 2024-03-14 PROCEDURE — 99999 PR PBB SHADOW E&M-EST. PATIENT-LVL III: CPT | Mod: PBBFAC,,, | Performed by: INTERNAL MEDICINE

## 2024-03-14 PROCEDURE — 99213 OFFICE O/P EST LOW 20 MIN: CPT | Mod: PBBFAC,PN | Performed by: INTERNAL MEDICINE

## 2024-03-14 NOTE — PROGRESS NOTES
Patient ID:  Cheyanne Gomes is a 88 y.o. female who presents for follow-up of Coronary Artery Disease, Congestive Heart Failure, Dizziness, and Results (labs)      Coronary artery disease of native artery of native heart with stable angina pectoris  Severe coronary artery disease with previous coronary artery bypass.  She denies symptoms of angina.     Hypertension  Will increase the doses of Toprol-XL to 50 mg p.o. b.i.d.     S/P CABG (coronary artery bypass graft)  Stable     Chronic combined systolic and diastolic heart failure  Stable     Cardiac pacemaker in situ  Function in pacemaker.  She has symptoms of palpitations will increase the doses of beta-blockers.     GERD (gastroesophageal reflux disease)  She has history of gastroesophageal reflux with Cannon's esophagus.  Will add Pepcid 20 mg q.h.s. as well as Tums    She does not feel well.  She is off balanceShe feels like she is going to fall backwards.  She complains of palpitations at night during the last visit the Toprol was increased to 50 mg b.i.d..  Her blood pressure 142/60 sitting and standing.  She has significant hypertension as well as aortic stenosis and LV aneurysm.  Will recommend for her to go to PT for balance.  Consider decreasing the amlodipine.             Past Medical History:   Diagnosis Date    Abdominal hernia     Anemia     Dr Berkowitz     Angina pectoris     Aortic valve stenosis, moderate     Back pain     Cannon's esophagus     CAD (coronary artery disease)     Cataract     ou done    CHF (congestive heart failure)     EFx 35%, Dr. Spencer 12/19/13    Chronic combined systolic and diastolic heart failure 09/28/2019    Colitis     Compression fracture     Diverticulosis     Encounter for blood transfusion     GERD (gastroesophageal reflux disease)     Hyperlipidemia     Hypertension     Irritable bowel syndrome     Myocardial infarction     Osteoarthritis     lumbar DDD    Pacemaker     Pneumonia 01/03/2017    Raynaud  disease     Rheumatoid arteritis     Rheumatoid arthritis     Shingles 2017    Sjogren syndrome     Ulcerative colitis         Past Surgical History:   Procedure Laterality Date    A-V CARDIAC PACEMAKER INSERTION Left 2021    Procedure: INSERTION, CARDIAC PACEMAKER, DUAL CHAMBER  ([employer]);  Surgeon: Tera Bliar MD;  Location: [facility] CATH/EP LAB;  Service: Cardiology;  Laterality: Left;    APPENDECTOMY      BACK SURGERY      CARDIAC SURGERY      CABGx3 in     CATARACT EXTRACTION      ou od d 11-15-12//     SECTION, CLASSIC      x 2    CHOLECYSTECTOMY      CLOSURE OF WOUND Right 2022    Procedure: CLOSURE-WOUND;  Surgeon: Delvis Jackson MD;  Location: [facility] OR;  Service: ENT;  Laterality: Right;  NOSE AND CHIN    COLONOSCOPY  09/15/2011    Dr Anaya     COLONOSCOPY  01/10/2017    [facility] report sent to scanning    CORONARY ANGIOPLASTY      PCI x 1 in     CORONARY ARTERY BYPASS GRAFT      3 vessel    CORONARY ARTERY BYPASS GRAFT      CYSTOSCOPY N/A 6/3/2020    Procedure: CYSTOSCOPY;  Surgeon: Miryam Bender Jr., MD;  Location: [facility] OR;  Service: Urology;  Laterality: N/A;    eyes      rk ou    FRACTURE SURGERY  2016    Dr Guido left hip     FRACTURE SURGERY  2018    Right Hip    HARVESTING OF SKIN GRAFT  2022    Procedure: SURGICAL PROCUREMENT, GRAFT, SKIN;  Surgeon: Delvis Jackson MD;  Location: [facility] OR;  Service: ENT;;  RIGHT CHEST    HYSTERECTOMY      tubal ligation, oopherectomy    INTRAMEDULLARY RODDING OF TROCHANTER OF FEMUR Right 10/8/2018    Procedure: INSERTION, INTRAMEDULLARY KESLI, FEMUR, TROCHANTER;  Surgeon: Valerio Luther MD;  Location: [facility] OR;  Service: Orthopedics;  Laterality: Right;    OOPHORECTOMY      SPINE SURGERY  2013    Silvino Mckeon    UPPER GASTROINTESTINAL ENDOSCOPY      Yag Capsulotomy Right 14          Current Outpatient Medications   Medication Instructions    amLODIPine (NORVASC) 5 MG tablet TAKE 1 TABLET BY MOUTH 2 TIMES A DAY     aspirin 81 mg Tab 1 tablet, Oral, Nightly, Every day    biotin 5 mg Cap 1 tablet, Oral, Daily    CALCIUM CARB/VIT D3/MINERALS (CALCIUM-VITAMIN D ORAL) 600 mg, Oral, 2 times daily, Calcium 1200 with D 3 1000    CEQUA 0.09 % Dpet 1 drop, Both Eyes, 2 times daily    CRANBERRY CONC/ASCORBIC ACID (CRANBERRY PLUS VITAMIN C ORAL) 1 capsule, Oral, Daily    DEXILANT 60 mg, Oral, Daily    ENTRESTO  mg per tablet 1 tablet, Oral, 2 times daily    famotidine (PEPCID) 20 mg, Oral, Nightly    fluticasone (FLONASE) 50 mcg/actuation nasal spray 2 sprays, Each Nostril, Daily    folic acid (FOLVITE) 2 mg, Oral, Daily    furosemide (LASIX) 80 mg, Oral, Daily    gabapentin (NEURONTIN) 200 mg, Oral, Nightly    HYDROmorphone (DILAUDID) 2 mg, Oral, Every 4 hours PRN    isosorbide mononitrate (IMDUR) 60 mg, Oral, Every morning    Lactobacillus acidophilus 10 billion cell Cap 1 each, Oral, Daily, 1.5 billion    magnesium oxide (MAG-OX) 400 mg (241.3 mg magnesium) tablet TAKE 1 TABLET BY MOUTH 2 TIMES A DAY    metOLazone (ZAROXOLYN) 2.5 mg, Oral, Daily    metoprolol succinate (TOPROL-XL) 50 mg, Oral, 2 times daily, 25 mg QAM and 12.5 mg QHS    MULTIVITAMIN WITH MINERALS (ONE-A-DAY 50 PLUS) Tab 1 tablet, Oral, Daily, Every day    nitroGLYCERIN 0.4 MG/DOSE TL SPRY (NITROLINGUAL) 400 mcg/spray spray 1 spray, Sublingual, Every 5 min PRN, PRN    omega-3 fatty acids/fish oil (FISH OIL-OMEGA-3 FATTY ACIDS) 300-1,000 mg capsule 1 capsule, Oral, Daily    ondansetron (ZOFRAN) 4 mg, Oral, Every 8 hours PRN    potassium chloride SA (K-DUR,KLOR-CON M) 10 MEQ tablet TAKE TWO TABLETS BY MOUTH EVERY MORNING AND 1 TABLET EVERY EVENING    promethazine (PHENERGAN) 25 mg, Oral, 2 times daily PRN    sertraline (ZOLOFT) 100 mg, Oral, Daily    traMADoL (ULTRAM) 50 mg, Oral, 2 times daily PRN    vitamin E 400 mg, Oral, Daily, Every day        Review of patient's allergies indicates:   Allergen Reactions    Adhesive     Nitrofurantoin macrocrystalline Nausea  "And Vomiting     Other reaction(s): very sickly    Penicillins Swelling     Other reaction(s): swelling  Other reaction(s): red skin discolorati    Sulfa (sulfonamide antibiotics) Nausea And Vomiting     Other reaction(s): very sickly        Review of Systems   Cardiovascular:  Positive for palpitations. Negative for chest pain and dyspnea on exertion.   Gastrointestinal:  Negative for abdominal pain and heartburn.   Neurological:  Positive for loss of balance.        Objective:     Vitals:    03/14/24 1115   BP: 120/60   BP Location: Left arm   Patient Position: Sitting   BP Method: Medium (Manual)   Pulse: 62   SpO2: (!) 94%   Weight: 51.9 kg (114 lb 4.9 oz)   Height: 4' 10" (1.473 m)       Physical Exam  Vitals and nursing note reviewed.   Constitutional:       Appearance: She is well-developed.   HENT:      Head: Normocephalic and atraumatic.   Eyes:      Conjunctiva/sclera: Conjunctivae normal.   Cardiovascular:      Rate and Rhythm: Normal rate and regular rhythm.      Heart sounds: Normal heart sounds.   Pulmonary:      Effort: Pulmonary effort is normal.      Breath sounds: Normal breath sounds.   Abdominal:      General: Bowel sounds are normal.      Palpations: Abdomen is soft.   Musculoskeletal:         General: Normal range of motion.   Skin:     General: Skin is warm and dry.   Neurological:      Mental Status: She is alert and oriented to person, place, and time.   Psychiatric:         Behavior: Behavior normal.         Thought Content: Thought content normal.         Judgment: Judgment normal.       CMP  Sodium   Date Value Ref Range Status   03/07/2024 136 136 - 145 mmol/L Final   12/11/2018 139 134 - 144 mmol/L      Potassium   Date Value Ref Range Status   03/07/2024 3.9 3.5 - 5.1 mmol/L Final     Chloride   Date Value Ref Range Status   03/07/2024 94 (L) 95 - 110 mmol/L Final   12/11/2018 96 (L) 98 - 110 mmol/L      CO2   Date Value Ref Range Status   03/07/2024 29 23 - 29 mmol/L Final     Glucose "   Date Value Ref Range Status   03/07/2024 117 (H) 70 - 110 mg/dL Final   12/11/2018 97 70 - 99 mg/dL      BUN   Date Value Ref Range Status   03/07/2024 33 (H) 8 - 23 mg/dL Final     Creatinine   Date Value Ref Range Status   03/07/2024 1.7 (H) 0.5 - 1.4 mg/dL Final   12/11/2018 0.95 0.60 - 1.40 mg/dL      Calcium   Date Value Ref Range Status   03/07/2024 9.6 8.7 - 10.5 mg/dL Final     Total Protein   Date Value Ref Range Status   10/05/2023 6.8 6.0 - 8.4 g/dL Final     Albumin   Date Value Ref Range Status   10/05/2023 3.8 3.5 - 5.2 g/dL Final   12/11/2018 3.9 3.1 - 4.7 g/dL      Total Bilirubin   Date Value Ref Range Status   10/05/2023 0.3 0.1 - 1.0 mg/dL Final     Comment:     For infants and newborns, interpretation of results should be based  on gestational age, weight and in agreement with clinical  observations.    Premature Infant recommended reference ranges:  Up to 24 hours.............<8.0 mg/dL  Up to 48 hours............<12.0 mg/dL  3-5 days..................<15.0 mg/dL  6-29 days.................<15.0 mg/dL       Alkaline Phosphatase   Date Value Ref Range Status   10/05/2023 75 55 - 135 U/L Final     AST   Date Value Ref Range Status   10/05/2023 23 10 - 40 U/L Final     ALT   Date Value Ref Range Status   10/05/2023 12 10 - 44 U/L Final     Anion Gap   Date Value Ref Range Status   03/07/2024 13 8 - 16 mmol/L Final     eGFR if    Date Value Ref Range Status   11/05/2021 >60.0 >60 mL/min/1.73 m^2 Final     eGFR if non    Date Value Ref Range Status   02/21/2022 56 (L) >59 mL/min/1.73 Final      BMP  Lab Results   Component Value Date     03/07/2024    K 3.9 03/07/2024    CL 94 (L) 03/07/2024    CO2 29 03/07/2024    BUN 33 (H) 03/07/2024    CREATININE 1.7 (H) 03/07/2024    CALCIUM 9.6 03/07/2024    ANIONGAP 13 03/07/2024    ESTGFRAFRICA >60.0 11/05/2021    EGFRNONAA 56 (L) 02/21/2022      BNP  @LABRCNTIP(BNP,BNPTRIAGEBLO)@   Lab Results   Component Value Date     CHOL 215 (H) 10/05/2023    CHOL 146 10/04/2022    CHOL 113 05/06/2021     Lab Results   Component Value Date    HDL 46 10/05/2023    HDL 52 10/04/2022    HDL 47 05/06/2021     Lab Results   Component Value Date    LDLCALC 137.6 10/05/2023    LDLCALC 75 10/04/2022    LDLCALC 51 05/06/2021     Lab Results   Component Value Date    TRIG 157 (H) 10/05/2023    TRIG 104 10/04/2022    TRIG 70 05/06/2021     Lab Results   Component Value Date    CHOLHDL 21.4 10/05/2023    CHOLHDL 26.2 08/24/2020    CHOLHDL 45.0 10/16/2013      Lab Results   Component Value Date    TSH 1.933 12/04/2023    FREET4 0.89 09/26/2012     Lab Results   Component Value Date    HGBA1C 6.3 12/04/2023     Lab Results   Component Value Date    WBC 5.14 03/07/2024    HGB 10.5 (L) 03/07/2024    HCT 33.5 (L) 03/07/2024    MCV 88 03/07/2024     03/07/2024         Results for orders placed during the hospital encounter of 04/28/22    Echo    Interpretation Summary  · The left ventricle is normal in size with concentric remodeling and mildly decreased systolic function.  · The estimated ejection fraction is 40%.  · Grade I left ventricular diastolic dysfunction.  · There are segmental left ventricular wall motion abnormalities.  · Apical akinesis  · Normal right ventricular size with normal right ventricular systolic function.  · Mild aortic regurgitation.  · There is moderate aortic valve stenosis.  · Aortic valve area is 1.15 cm2; peak velocity is 2.12 m/s; mean gradient is 18 mmHg.  · Mild tricuspid regurgitation.  · Normal central venous pressure (3 mmHg).  · The estimated PA systolic pressure is 24 mmHg.  · Overall the study quality was technically difficult.     No results found for this or any previous visit.         Assessment:       Coronary artery disease of native artery of native heart with stable angina pectoris  She denies any symptoms of angina    Hypertension  During the last visit Toprol us increase to 50 mg b.i.d..  Blood pressure  142/60 sitting and standing.    PSVT (paroxysmal supraventricular tachycardia)  On higher doses of beta-blockers    Cardiac pacemaker in situ  Functioning pacemaker    CKD (chronic kidney disease)  Stable    Aortic valve stenosis, moderate  Aware    Chronic combined systolic and diastolic heart failure  Adjusting medications and diuretics on regular basis.       Plan:       Go to PT for balance assessment and therapy.  Consider decreasing amlodipine.  She will be seen in the office in 1 month with a BMP BNP and a CBC

## 2024-03-18 DIAGNOSIS — F32.9 MAJOR DEPRESSIVE DISORDER, SINGLE EPISODE, UNSPECIFIED: ICD-10-CM

## 2024-03-18 DIAGNOSIS — G62.9 PERIPHERAL POLYNEUROPATHY: ICD-10-CM

## 2024-03-18 RX ORDER — GABAPENTIN 100 MG/1
200 CAPSULE ORAL NIGHTLY
Qty: 180 CAPSULE | Refills: 0 | Status: SHIPPED | OUTPATIENT
Start: 2024-03-18 | End: 2024-04-09 | Stop reason: SDUPTHER

## 2024-03-18 NOTE — TELEPHONE ENCOUNTER
No care due was identified.  [facility] Embedded Care Due Messages. Reference number: [id number].   3/18/2024 10:18:23 AM CDT

## 2024-03-22 RX ORDER — SERTRALINE HYDROCHLORIDE 50 MG/1
100 TABLET, FILM COATED ORAL DAILY
Qty: 180 TABLET | Refills: 2 | Status: SHIPPED | OUTPATIENT
Start: 2024-03-22

## 2024-03-25 ENCOUNTER — OFFICE VISIT (OUTPATIENT)
Dept: HEMATOLOGY/ONCOLOGY | Facility: CLINIC | Age: 89
End: 2024-03-25
Payer: MEDICARE

## 2024-03-25 VITALS
BODY MASS INDEX: 23.09 KG/M2 | DIASTOLIC BLOOD PRESSURE: 66 MMHG | RESPIRATION RATE: 18 BRPM | TEMPERATURE: 98 F | HEART RATE: 80 BPM | HEIGHT: 58 IN | WEIGHT: 110 LBS | SYSTOLIC BLOOD PRESSURE: 146 MMHG

## 2024-03-25 DIAGNOSIS — R79.89 ELEVATED FERRITIN: ICD-10-CM

## 2024-03-25 DIAGNOSIS — D63.8 ANEMIA OF CHRONIC DISEASE: Primary | Chronic | ICD-10-CM

## 2024-03-25 PROCEDURE — 99213 OFFICE O/P EST LOW 20 MIN: CPT | Performed by: INTERNAL MEDICINE

## 2024-03-25 PROCEDURE — 99213 OFFICE O/P EST LOW 20 MIN: CPT | Mod: S$PBB,,, | Performed by: INTERNAL MEDICINE

## 2024-03-25 RX ORDER — CYCLOSPORINE 0 G/ML
1 SOLUTION/ DROPS OPHTHALMIC; TOPICAL 2 TIMES DAILY
COMMUNITY
Start: 2024-02-20

## 2024-03-25 NOTE — ASSESSMENT & PLAN NOTE
Her count is 559 today.  Will continue with phlebotomy as needed to bring this down but need to be cautious with ongoing anemia.  Will watch this for now and have her back in 4 months with labs to see if her count goes up.

## 2024-03-25 NOTE — ASSESSMENT & PLAN NOTE
Hb hb is stable at 10.5g/dl and we discussed this today.  I have to balance this with the need for ongoing phlebotomy for her iron OL but she is not terribly symptomatic at this time.

## 2024-03-25 NOTE — PROGRESS NOTES
PROGRESS NOTE    Subjective:       Patient ID: Cheyanne Gomes is a 88 y.o. female.    Chief Complaint:  No chief complaint on file.  anemia, iron overload and chf follow up.     History of Present Illness:   Cheyanne Gomes is a 88 y.o. female who presents with a history of anemia of chronic disease.    Ms. Gomes is doing ok today.  She has no new complaints.  Her last phlebotomy was about 3 months ago.      Date:  Ferritin  4/23/20 79  6/8/2021 1957  7/9/2021 1600  8/10/2021 1212  3/31/2022 1601  3/16/2023 593  3/27/2023 871-Hb 10.3  3/7/2024: 559-Hb 10.5g/dl    Patient had 2 units of blood at [facility] in March 2021.  She was given 3 iv iron infusions after that.  She is feeling well at this time.  No new complaints.  No bleeding, no melena.      Injectafer given May 2019        EF: 45%          Family and Social history reviewed and is unchanged from 8/9/2016.       ROS:  Review of Systems   Constitutional:  Positive for appetite change. Negative for fever and unexpected weight change.   HENT:  Negative for mouth sores.    Eyes:  Negative for visual disturbance.   Respiratory:  Negative for cough and shortness of breath.    Cardiovascular:  Negative for chest pain and leg swelling.   Gastrointestinal:  Positive for diarrhea. Negative for abdominal pain and blood in stool.   Genitourinary:  Positive for frequency. Negative for hematuria.   Musculoskeletal:  Negative for back pain.   Skin:  Negative for rash.   Neurological:  Negative for headaches.   Hematological:  Negative for adenopathy.   Psychiatric/Behavioral:  The patient is not nervous/anxious.           Current Outpatient Medications:     amLODIPine (NORVASC) 5 MG tablet, TAKE 1 TABLET BY MOUTH 2 TIMES A DAY, Disp: 60 tablet, Rfl: 5    aspirin 81 mg Tab, Take 1 tablet by mouth every evening. Every day, Disp: , Rfl:     biotin 5 mg Cap, Take 1 tablet by mouth once daily., Disp: ,  Rfl:     CALCIUM CARB/VIT D3/MINERALS (CALCIUM-VITAMIN D ORAL), Take 600 mg by mouth 2 (two) times daily. Calcium 1200 with D 3 1000, Disp: , Rfl:     CEQUA 0.09 % Dpet, Place 1 drop into both eyes 2 (two) times daily., Disp: , Rfl:     CRANBERRY CONC/ASCORBIC ACID (CRANBERRY PLUS VITAMIN C ORAL), Take 1 capsule by mouth once daily., Disp: , Rfl:     DEXILANT 60 mg capsule, Take 60 mg by mouth once daily. , Disp: , Rfl: 11    ENTRESTO  mg per tablet, Take 1 tablet by mouth 2 (two) times daily., Disp: 60 tablet, Rfl: 4    famotidine (PEPCID) 20 MG tablet, Take 1 tablet (20 mg total) by mouth every evening., Disp: 30 tablet, Rfl: 11    fluticasone (FLONASE) 50 mcg/actuation nasal spray, 2 sprays by Each Nare route once daily. (Patient taking differently: 2 sprays by Each Nostril route daily as needed.), Disp: 16 g, Rfl: 0    folic acid (FOLVITE) 1 MG tablet, Take 2 mg by mouth once daily. , Disp: , Rfl:     furosemide (LASIX) 80 MG tablet, Take 1 tablet (80 mg total) by mouth once daily., Disp: 90 tablet, Rfl: 3    gabapentin (NEURONTIN) 100 MG capsule, Take 2 capsules (200 mg total) by mouth every evening., Disp: 180 capsule, Rfl: 0    HYDROmorphone (DILAUDID) 2 MG tablet, Take 2 mg by mouth every 4 (four) hours as needed for Pain., Disp: , Rfl:     isosorbide mononitrate (IMDUR) 60 MG 24 hr tablet, Take 1 tablet (60 mg total) by mouth every morning., Disp: 30 tablet, Rfl: 11    Lactobacillus acidophilus 10 billion cell Cap, Take 1 each by mouth once daily. 1.5 billion, Disp: , Rfl:     magnesium oxide (MAG-OX) 400 mg (241.3 mg magnesium) tablet, TAKE 1 TABLET BY MOUTH 2 TIMES A DAY (Patient taking differently: Take 400 mg by mouth 2 (two) times daily.), Disp: 120 tablet, Rfl: 2    metOLazone (ZAROXOLYN) 2.5 MG tablet, Take 1 tablet (2.5 mg total) by mouth once daily., Disp: 30 tablet, Rfl: 0    metoprolol succinate (TOPROL-XL) 50 MG 24 hr tablet, Take 1 tablet (50 mg total) by mouth 2 (two) times daily. 25 mg  "QAM and 12.5 mg QHS (Patient taking differently: Take 50 mg by mouth 2 (two) times daily.), Disp: 180 tablet, Rfl: 3    MULTIVITAMIN WITH MINERALS (ONE-A-DAY 50 PLUS) Tab, Take 1 tablet by mouth once daily. Every day, Disp: , Rfl:     nitroGLYCERIN 0.4 MG/DOSE TL SPRY (NITROLINGUAL) 400 mcg/spray spray, Place 1 spray under the tongue every 5 (five) minutes as needed for Chest pain. PRN, Disp: 4.9 g, Rfl: 3    omega-3 fatty acids/fish oil (FISH OIL-OMEGA-3 FATTY ACIDS) 300-1,000 mg capsule, Take 1 capsule by mouth once daily., Disp: , Rfl:     ondansetron (ZOFRAN) 4 MG tablet, Take 4 mg by mouth every 8 (eight) hours as needed for Nausea. , Disp: , Rfl: 2    potassium chloride SA (K-DUR,KLOR-CON M) 10 MEQ tablet, TAKE TWO TABLETS BY MOUTH EVERY MORNING AND 1 TABLET EVERY EVENING, Disp: 270 tablet, Rfl: 2    promethazine (PHENERGAN) 25 MG tablet, Take 1 tablet (25 mg total) by mouth 2 (two) times daily as needed for Nausea., Disp: 60 tablet, Rfl: 1    sertraline (ZOLOFT) 50 MG tablet, Take 2 tablets (100 mg total) by mouth once daily., Disp: 180 tablet, Rfl: 2    traMADoL (ULTRAM) 50 mg tablet, Take 50 mg by mouth 2 (two) times daily as needed for Pain., Disp: , Rfl:     vitamin E 400 unit Tab, Take 400 mg by mouth once daily. Every day, Disp: , Rfl:         Objective:       Physical Examination:     BP (!) 146/66   Pulse 80   Temp 98.1 °F (36.7 °C)   Resp 18   Ht 4' 10" (1.473 m)   Wt 49.9 kg (110 lb)   LMP  (LMP Unknown)   BMI 22.99 kg/m²     Physical Exam  Constitutional:       Appearance: She is well-developed.   HENT:      Head: Normocephalic and atraumatic.      Right Ear: External ear normal.      Left Ear: External ear normal.   Eyes:      Conjunctiva/sclera: Conjunctivae normal.      Pupils: Pupils are equal, round, and reactive to light.   Neck:      Thyroid: No thyromegaly.      Trachea: No tracheal deviation.   Cardiovascular:      Rate and Rhythm: Normal rate and regular rhythm.      Heart sounds: " Normal heart sounds.   Pulmonary:      Effort: Pulmonary effort is normal.      Breath sounds: Normal breath sounds.   Abdominal:      General: Bowel sounds are normal. There is no distension.      Palpations: Abdomen is soft. There is no mass.      Tenderness: There is no abdominal tenderness.   Skin:     Findings: No rash.   Neurological:      Comments: Neuro intact througout   Psychiatric:         Behavior: Behavior normal.         Thought Content: Thought content normal.         Judgment: Judgment normal.         Labs:   No results found for this or any previous visit (from the past 336 hour(s)).    CMP  Sodium   Date Value Ref Range Status   03/07/2024 136 136 - 145 mmol/L Final   12/11/2018 139 134 - 144 mmol/L      Potassium   Date Value Ref Range Status   03/07/2024 3.9 3.5 - 5.1 mmol/L Final     Chloride   Date Value Ref Range Status   03/07/2024 94 (L) 95 - 110 mmol/L Final   12/11/2018 96 (L) 98 - 110 mmol/L      CO2   Date Value Ref Range Status   03/07/2024 29 23 - 29 mmol/L Final     Glucose   Date Value Ref Range Status   03/07/2024 117 (H) 70 - 110 mg/dL Final   12/11/2018 97 70 - 99 mg/dL      BUN   Date Value Ref Range Status   03/07/2024 33 (H) 8 - 23 mg/dL Final     Creatinine   Date Value Ref Range Status   03/07/2024 1.7 (H) 0.5 - 1.4 mg/dL Final   12/11/2018 0.95 0.60 - 1.40 mg/dL      Calcium   Date Value Ref Range Status   03/07/2024 9.6 8.7 - 10.5 mg/dL Final     Total Protein   Date Value Ref Range Status   10/05/2023 6.8 6.0 - 8.4 g/dL Final     Albumin   Date Value Ref Range Status   10/05/2023 3.8 3.5 - 5.2 g/dL Final   12/11/2018 3.9 3.1 - 4.7 g/dL      Total Bilirubin   Date Value Ref Range Status   10/05/2023 0.3 0.1 - 1.0 mg/dL Final     Comment:     For infants and newborns, interpretation of results should be based  on gestational age, weight and in agreement with clinical  observations.    Premature Infant recommended reference ranges:  Up to 24 hours.............<8.0 mg/dL  Up  "to 48 hours............<12.0 mg/dL  3-5 days..................<15.0 mg/dL  6-29 days.................<15.0 mg/dL       Alkaline Phosphatase   Date Value Ref Range Status   10/05/2023 75 55 - 135 U/L Final     AST   Date Value Ref Range Status   10/05/2023 23 10 - 40 U/L Final     ALT   Date Value Ref Range Status   10/05/2023 12 10 - 44 U/L Final     Anion Gap   Date Value Ref Range Status   03/07/2024 13 8 - 16 mmol/L Final     eGFR if    Date Value Ref Range Status   11/05/2021 >60.0 >60 mL/min/1.73 m^2 Final     eGFR if non    Date Value Ref Range Status   02/21/2022 56 (L) >59 mL/min/1.73 Final     No results found for: "CEA"  No results found for: "PSA"        Assessment/Plan:   Anemia of chronic disease  Hb hb is stable at 10.5g/dl and we discussed this today.  I have to balance this with the need for ongoing phlebotomy for her iron OL but she is not terribly symptomatic at this time.      Elevated ferritin  Her count is 559 today.  Will continue with phlebotomy as needed to bring this down but need to be cautious with ongoing anemia.  Will watch this for now and have her back in 4 months with labs to see if her count goes up.      Discussion:     Follow up in about 4 months (around 7/25/2024).      Electronically signed by David Robb                "

## 2024-03-27 NOTE — ASSESSMENT & PLAN NOTE
During the last visit Toprol us increase to 50 mg b.i.d..  Blood pressure 142/60 sitting and standing.

## 2024-03-31 ENCOUNTER — EXTERNAL CHRONIC CARE MANAGEMENT (OUTPATIENT)
Dept: PRIMARY CARE CLINIC | Facility: CLINIC | Age: 89
End: 2024-03-31
Payer: MEDICARE

## 2024-03-31 PROCEDURE — 99490 CHRNC CARE MGMT STAFF 1ST 20: CPT | Mod: S$PBB,,, | Performed by: FAMILY MEDICINE

## 2024-03-31 PROCEDURE — 99490 CHRNC CARE MGMT STAFF 1ST 20: CPT | Mod: PBBFAC,PN | Performed by: FAMILY MEDICINE

## 2024-04-04 ENCOUNTER — TELEPHONE (OUTPATIENT)
Dept: FAMILY MEDICINE | Facility: CLINIC | Age: 89
End: 2024-04-04
Payer: MEDICARE

## 2024-04-04 NOTE — TELEPHONE ENCOUNTER
----- Message from Melinda Tano sent at 4/4/2024  9:05 AM CDT -----  Please review patient's Appointment Desk for an upcoming PROLIA INJECTION appointment.       The following are needed for this appointment.   Reminding to please contact patient, if needed:      ORDER.  Current / active in the Epic Therapy Plan, signed within a year.  LABS. Serum Calcium within 6 weeks of treatment.    DEXA SCAN within the last 2 years   DENTAL PRECAUTION CONFIRMED WITH PATIENT. No recent invasive dental procedures within the last month (tooth extraction, etc). A patient will need to bring a Dental Clearance signed by a dental provider if there was any recent dental procedure within the last month.   FOLLOW-UP APPOINTMENT within the last 12 months with the diagnosis mentioned in the visit notes.         Any item unavailable by the day prior to the scheduled appointment will cause the appointment to be CANCELLED.        To reschedule appointments, please contact [facility] INFUSION SCHEDULING POOL or call [phone].       Thank you,  [facility], Infusion Department

## 2024-04-05 ENCOUNTER — LAB VISIT (OUTPATIENT)
Dept: PRIMARY CARE CLINIC | Facility: CLINIC | Age: 89
End: 2024-04-05
Payer: MEDICARE

## 2024-04-05 DIAGNOSIS — I47.10 PSVT (PAROXYSMAL SUPRAVENTRICULAR TACHYCARDIA): ICD-10-CM

## 2024-04-05 DIAGNOSIS — I35.0 AORTIC VALVE STENOSIS, MODERATE: ICD-10-CM

## 2024-04-05 DIAGNOSIS — I50.42 CHRONIC COMBINED SYSTOLIC AND DIASTOLIC HEART FAILURE: ICD-10-CM

## 2024-04-05 DIAGNOSIS — I25.118 CORONARY ARTERY DISEASE OF NATIVE ARTERY OF NATIVE HEART WITH STABLE ANGINA PECTORIS: ICD-10-CM

## 2024-04-05 LAB
ANION GAP SERPL CALC-SCNC: 11 MMOL/L (ref 8–16)
ANION GAP SERPL CALC-SCNC: 11 MMOL/L (ref 8–16)
BNP SERPL-MCNC: 1174 PG/ML (ref 0–99)
BNP SERPL-MCNC: 1174 PG/ML (ref 0–99)
BUN SERPL-MCNC: 24 MG/DL (ref 8–23)
BUN SERPL-MCNC: 24 MG/DL (ref 8–23)
CALCIUM SERPL-MCNC: 10 MG/DL (ref 8.7–10.5)
CALCIUM SERPL-MCNC: 10 MG/DL (ref 8.7–10.5)
CHLORIDE SERPL-SCNC: 99 MMOL/L (ref 95–110)
CHLORIDE SERPL-SCNC: 99 MMOL/L (ref 95–110)
CO2 SERPL-SCNC: 31 MMOL/L (ref 23–29)
CO2 SERPL-SCNC: 31 MMOL/L (ref 23–29)
CREAT SERPL-MCNC: 1.2 MG/DL (ref 0.5–1.4)
CREAT SERPL-MCNC: 1.2 MG/DL (ref 0.5–1.4)
ERYTHROCYTE [DISTWIDTH] IN BLOOD BY AUTOMATED COUNT: 14.9 % (ref 11.5–14.5)
EST. GFR  (NO RACE VARIABLE): 43.5 ML/MIN/1.73 M^2
EST. GFR  (NO RACE VARIABLE): 43.5 ML/MIN/1.73 M^2
GLUCOSE SERPL-MCNC: 99 MG/DL (ref 70–110)
GLUCOSE SERPL-MCNC: 99 MG/DL (ref 70–110)
HCT VFR BLD AUTO: 36.7 % (ref 37–48.5)
HGB BLD-MCNC: 11.3 G/DL (ref 12–16)
MCH RBC QN AUTO: 28 PG (ref 27–31)
MCHC RBC AUTO-ENTMCNC: 30.8 G/DL (ref 32–36)
MCV RBC AUTO: 91 FL (ref 82–98)
PLATELET # BLD AUTO: 177 K/UL (ref 150–450)
PMV BLD AUTO: 10.7 FL (ref 9.2–12.9)
POTASSIUM SERPL-SCNC: 4.8 MMOL/L (ref 3.5–5.1)
POTASSIUM SERPL-SCNC: 4.8 MMOL/L (ref 3.5–5.1)
RBC # BLD AUTO: 4.03 M/UL (ref 4–5.4)
SODIUM SERPL-SCNC: 141 MMOL/L (ref 136–145)
SODIUM SERPL-SCNC: 141 MMOL/L (ref 136–145)
WBC # BLD AUTO: 5.71 K/UL (ref 3.9–12.7)

## 2024-04-05 PROCEDURE — 80048 BASIC METABOLIC PNL TOTAL CA: CPT | Performed by: INTERNAL MEDICINE

## 2024-04-05 PROCEDURE — 85027 COMPLETE CBC AUTOMATED: CPT | Performed by: INTERNAL MEDICINE

## 2024-04-05 PROCEDURE — 36415 COLL VENOUS BLD VENIPUNCTURE: CPT | Mod: S$GLB,,, | Performed by: INTERNAL MEDICINE

## 2024-04-05 PROCEDURE — 83880 ASSAY OF NATRIURETIC PEPTIDE: CPT | Performed by: INTERNAL MEDICINE

## 2024-04-09 ENCOUNTER — TELEPHONE (OUTPATIENT)
Dept: CARDIOLOGY | Facility: CLINIC | Age: 89
End: 2024-04-09
Payer: MEDICARE

## 2024-04-09 ENCOUNTER — OFFICE VISIT (OUTPATIENT)
Dept: FAMILY MEDICINE | Facility: CLINIC | Age: 89
End: 2024-04-09
Attending: FAMILY MEDICINE
Payer: MEDICARE

## 2024-04-09 ENCOUNTER — OFFICE VISIT (OUTPATIENT)
Dept: CARDIOLOGY | Facility: CLINIC | Age: 89
End: 2024-04-09
Payer: MEDICARE

## 2024-04-09 VITALS
WEIGHT: 111 LBS | HEART RATE: 70 BPM | SYSTOLIC BLOOD PRESSURE: 130 MMHG | DIASTOLIC BLOOD PRESSURE: 70 MMHG | HEIGHT: 58 IN | BODY MASS INDEX: 23.3 KG/M2

## 2024-04-09 VITALS
HEIGHT: 58 IN | SYSTOLIC BLOOD PRESSURE: 138 MMHG | BODY MASS INDEX: 23.55 KG/M2 | TEMPERATURE: 98 F | OXYGEN SATURATION: 95 % | HEART RATE: 64 BPM | DIASTOLIC BLOOD PRESSURE: 72 MMHG | WEIGHT: 112.19 LBS

## 2024-04-09 DIAGNOSIS — D69.6 THROMBOCYTOPENIA, UNSPECIFIED: ICD-10-CM

## 2024-04-09 DIAGNOSIS — G62.9 PERIPHERAL POLYNEUROPATHY: ICD-10-CM

## 2024-04-09 DIAGNOSIS — I49.5 SICK SINUS SYNDROME: ICD-10-CM

## 2024-04-09 DIAGNOSIS — I15.1 HYPERTENSION SECONDARY TO OTHER RENAL DISORDERS: ICD-10-CM

## 2024-04-09 DIAGNOSIS — I25.118 CORONARY ARTERY DISEASE OF NATIVE ARTERY OF NATIVE HEART WITH STABLE ANGINA PECTORIS: ICD-10-CM

## 2024-04-09 DIAGNOSIS — Z95.1 S/P CABG (CORONARY ARTERY BYPASS GRAFT): ICD-10-CM

## 2024-04-09 DIAGNOSIS — J43.9 PULMONARY EMPHYSEMA, UNSPECIFIED EMPHYSEMA TYPE: ICD-10-CM

## 2024-04-09 DIAGNOSIS — I35.0 AORTIC VALVE STENOSIS, MODERATE: ICD-10-CM

## 2024-04-09 DIAGNOSIS — I50.43 ACUTE ON CHRONIC COMBINED SYSTOLIC AND DIASTOLIC HEART FAILURE: ICD-10-CM

## 2024-04-09 DIAGNOSIS — M05.759: ICD-10-CM

## 2024-04-09 DIAGNOSIS — Z95.0 CARDIAC PACEMAKER IN SITU: ICD-10-CM

## 2024-04-09 DIAGNOSIS — I50.42 CHRONIC COMBINED SYSTOLIC AND DIASTOLIC HEART FAILURE: ICD-10-CM

## 2024-04-09 DIAGNOSIS — N18.32 STAGE 3B CHRONIC KIDNEY DISEASE: ICD-10-CM

## 2024-04-09 DIAGNOSIS — K21.9 GASTROESOPHAGEAL REFLUX DISEASE, UNSPECIFIED WHETHER ESOPHAGITIS PRESENT: ICD-10-CM

## 2024-04-09 DIAGNOSIS — G47.33 OBSTRUCTIVE SLEEP APNEA: ICD-10-CM

## 2024-04-09 DIAGNOSIS — R09.02 HYPOXIA: Primary | ICD-10-CM

## 2024-04-09 DIAGNOSIS — I47.10 PSVT (PAROXYSMAL SUPRAVENTRICULAR TACHYCARDIA): Primary | ICD-10-CM

## 2024-04-09 DIAGNOSIS — I47.10 PSVT (PAROXYSMAL SUPRAVENTRICULAR TACHYCARDIA): ICD-10-CM

## 2024-04-09 PROCEDURE — 99213 OFFICE O/P EST LOW 20 MIN: CPT | Mod: PBBFAC,27,PN | Performed by: INTERNAL MEDICINE

## 2024-04-09 PROCEDURE — 99214 OFFICE O/P EST MOD 30 MIN: CPT | Mod: S$PBB,,, | Performed by: INTERNAL MEDICINE

## 2024-04-09 PROCEDURE — 99214 OFFICE O/P EST MOD 30 MIN: CPT | Mod: S$PBB,,, | Performed by: FAMILY MEDICINE

## 2024-04-09 PROCEDURE — 99999 PR PBB SHADOW E&M-EST. PATIENT-LVL IV: CPT | Mod: PBBFAC,,, | Performed by: FAMILY MEDICINE

## 2024-04-09 PROCEDURE — 99214 OFFICE O/P EST MOD 30 MIN: CPT | Mod: PBBFAC,PN | Performed by: FAMILY MEDICINE

## 2024-04-09 PROCEDURE — 99999 PR PBB SHADOW E&M-EST. PATIENT-LVL III: CPT | Mod: PBBFAC,,, | Performed by: INTERNAL MEDICINE

## 2024-04-09 RX ORDER — ISOSORBIDE DINITRATE 10 MG/1
10 TABLET ORAL 3 TIMES DAILY
Qty: 270 TABLET | Refills: 3 | Status: SHIPPED | OUTPATIENT
Start: 2024-04-09 | End: 2025-04-09

## 2024-04-09 RX ORDER — GABAPENTIN 100 MG/1
200 CAPSULE ORAL NIGHTLY
Qty: 180 CAPSULE | Refills: 1 | Status: SHIPPED | OUTPATIENT
Start: 2024-04-09 | End: 2025-04-09

## 2024-04-09 NOTE — PROGRESS NOTES
Patient ID:  Cheyanne Gomes is a 88 y.o. female who presents for follow-up of Congestive Heart Failure, Coronary Artery Disease, and Results (labs)      Coronary artery disease of native artery of native heart with stable angina pectoris  She denies any symptoms of angina     Hypertension  During the last visit Toprol us increase to 50 mg b.i.d..  Blood pressure 142/60 sitting and standing.     PSVT (paroxysmal supraventricular tachycardia)  On higher doses of beta-blockers     Cardiac pacemaker in situ  Functioning pacemaker     CKD (chronic kidney disease)  Stable     Aortic valve stenosis, moderate  Aware     Chronic combined systolic and diastolic heart failure  Adjusting medications and diuretics on regular basis.  She went to Dr. Alas today her heart rate was increase her oxygen saturation poor was 70 they placed her on oxygen.  After few minutes her blood pressure improved her oxygen saturation improved as well.  She had a sleep study done she had 51 episodes of apnea.  Her O2 saturations were dropping down to 70%.  She is going to be getting oxygen most likely tomorrow.  She is to get her CPAP machine in the very near future.  During the last hospitalization the isosorbide dinitrate was changed to isosorbide mononitrate.  Will go ahead and change to the isosorbide dinitrate since we using the nitrates for heart failure.  During the last office visit she was complaining of some retrosternal discomfort mostly at night Pepcid was added to her medicines she is feeling better.  She denies any chest pains whatsoever.  Her BNP is mildly elevated.  Her BUN and creatinine is doing better she appears to be a volume overload.  She noticed some shortness of breath and took the Zaroxolyn today.  She is to take it for the next few days until her breathing improves.  She is also to use oxygen at night while she is asleep.        Past Medical History:   Diagnosis Date    Abdominal hernia     Anemia     Dr Berkowitz      Angina pectoris     Aortic valve stenosis, moderate     Back pain     Cannon's esophagus     CAD (coronary artery disease)     Cataract     ou done    CHF (congestive heart failure)     EFx 35%, Dr. Spencer 13    Chronic combined systolic and diastolic heart failure 2019    Colitis     Compression fracture     Diverticulosis     Encounter for blood transfusion     GERD (gastroesophageal reflux disease)     Hyperlipidemia     Hypertension     Irritable bowel syndrome     Myocardial infarction     Obstructive sleep apnea 2024    Osteoarthritis     lumbar DDD    Pacemaker     Pneumonia 2017    Pulmonary emphysema, unspecified emphysema type 2024    Raynaud disease     Rheumatoid arteritis     Rheumatoid arthritis     Shingles 2017    Sjogren syndrome     Ulcerative colitis         Past Surgical History:   Procedure Laterality Date    A-V CARDIAC PACEMAKER INSERTION Left 2021    Procedure: INSERTION, CARDIAC PACEMAKER, DUAL CHAMBER  (MEDTRONIC);  Surgeon: Tera Blair MD;  Location: [facility] CATH/EP LAB;  Service: Cardiology;  Laterality: Left;    APPENDECTOMY      BACK SURGERY      CARDIAC SURGERY      CABGx3 in     CATARACT EXTRACTION      ou od d 11-15-12//     SECTION, CLASSIC      x 2    CHOLECYSTECTOMY      CLOSURE OF WOUND Right 2022    Procedure: CLOSURE-WOUND;  Surgeon: Delvis Jackson MD;  Location: [facility] OR;  Service: ENT;  Laterality: Right;  NOSE AND CHIN    COLONOSCOPY  09/15/2011    Dr Anaya     COLONOSCOPY  01/10/2017    [facility] report sent to scanning    CORONARY ANGIOPLASTY      PCI x 1 in     CORONARY ARTERY BYPASS GRAFT      3 vessel    CORONARY ARTERY BYPASS GRAFT      CYSTOSCOPY N/A 6/3/2020    Procedure: CYSTOSCOPY;  Surgeon: Miryam Bender Jr., MD;  Location: [facility] OR;  Service: Urology;  Laterality: N/A;    eyes      rk ou    FRACTURE SURGERY  2016    Dr Guido left hip     FRACTURE SURGERY  2018    Right Hip    HARVESTING  OF SKIN GRAFT  9/23/2022    Procedure: SURGICAL PROCUREMENT, GRAFT, SKIN;  Surgeon: Delvis Jackson MD;  Location: [facility] OR;  Service: ENT;;  RIGHT CHEST    HYSTERECTOMY      tubal ligation, oopherectomy    INTRAMEDULLARY RODDING OF TROCHANTER OF FEMUR Right 10/8/2018    Procedure: INSERTION, INTRAMEDULLARY KELSI, FEMUR, TROCHANTER;  Surgeon: Valerio Luther MD;  [facility] OR;  Service: Orthopedics;  Laterality: Right;    OOPHORECTOMY      SPINE SURGERY  8-    Silvino Mckeon    UPPER GASTROINTESTINAL ENDOSCOPY      Yag Capsulotomy Right 9/25/14          Current Outpatient Medications   Medication Instructions    amLODIPine (NORVASC) 5 MG tablet TAKE 1 TABLET BY MOUTH 2 TIMES A DAY    aspirin 81 mg Tab 1 tablet, Oral, Nightly, Every day    biotin 5 mg Cap 1 tablet, Oral, Daily    CALCIUM CARB/VIT D3/MINERALS (CALCIUM-VITAMIN D ORAL) 600 mg, Oral, 2 times daily, Calcium 1200 with D 3 1000    CEQUA 0.09 % Dpet 1 drop, Both Eyes, 2 times daily    CRANBERRY CONC/ASCORBIC ACID (CRANBERRY PLUS VITAMIN C ORAL) 1 capsule, Oral, Daily    DEXILANT 60 mg, Oral, Daily    ENTRESTO  mg per tablet 1 tablet, Oral, 2 times daily    famotidine (PEPCID) 20 mg, Oral, Nightly    fluticasone (FLONASE) 50 mcg/actuation nasal spray 2 sprays, Each Nostril, Daily    folic acid (FOLVITE) 2 mg, Oral, Daily    furosemide (LASIX) 80 mg, Oral, Daily    gabapentin (NEURONTIN) 200 mg, Oral, Nightly    HYDROmorphone (DILAUDID) 2 mg, Oral, Every 4 hours PRN    isosorbide dinitrate (ISORDIL) 10 mg, Oral, 3 times daily    Lactobacillus acidophilus 10 billion cell Cap 1 each, Oral, Daily, 1.5 billion    magnesium oxide (MAG-OX) 400 mg (241.3 mg magnesium) tablet TAKE 1 TABLET BY MOUTH 2 TIMES A DAY    metOLazone (ZAROXOLYN) 2.5 mg, Oral, Daily    metoprolol succinate (TOPROL-XL) 50 mg, Oral, 2 times daily, 25 mg QAM and 12.5 mg QHS    MULTIVITAMIN WITH MINERALS (ONE-A-DAY 50 PLUS) Tab 1 tablet, Oral, Daily, Every day    nitroGLYCERIN 0.4  "MG/DOSE TL SPRY (NITROLINGUAL) 400 mcg/spray spray 1 spray, Sublingual, Every 5 min PRN, PRN    omega-3 fatty acids/fish oil (FISH OIL-OMEGA-3 FATTY ACIDS) 300-1,000 mg capsule 1 capsule, Oral, Daily    ondansetron (ZOFRAN) 4 mg, Oral, Every 8 hours PRN    potassium chloride SA (K-DUR,KLOR-CON M) 10 MEQ tablet TAKE TWO TABLETS BY MOUTH EVERY MORNING AND 1 TABLET EVERY EVENING    promethazine (PHENERGAN) 25 mg, Oral, 2 times daily PRN    sertraline (ZOLOFT) 100 mg, Oral, Daily    traMADoL (ULTRAM) 50 mg, Oral, 2 times daily PRN    vitamin E 400 mg, Oral, Daily, Every day        Review of patient's allergies indicates:   Allergen Reactions    Adhesive     Nitrofurantoin macrocrystalline Nausea And Vomiting     Other reaction(s): very sickly    Penicillins Swelling     Other reaction(s): swelling  Other reaction(s): red skin discolorati    Sulfa (sulfonamide antibiotics) Nausea And Vomiting     Other reaction(s): very sickly        Review of Systems   Cardiovascular:  Positive for dyspnea on exertion and palpitations. Negative for chest pain.   Respiratory:  Negative for cough.         Objective:     Vitals:    04/09/24 1559   BP: 130/70   BP Location: Left arm   Patient Position: Sitting   BP Method: Medium (Manual)   Pulse: 70   Weight: 50.4 kg (111 lb)   Height: 4' 10" (1.473 m)       Physical Exam  Vitals and nursing note reviewed.   Constitutional:       Appearance: She is well-developed.   HENT:      Head: Normocephalic and atraumatic.   Eyes:      Conjunctiva/sclera: Conjunctivae normal.   Cardiovascular:      Rate and Rhythm: Normal rate and regular rhythm.      Heart sounds: Murmur (Grade 3/6 systolic murmur at the base) heard.   Pulmonary:      Effort: Pulmonary effort is normal.      Breath sounds: Normal breath sounds.   Abdominal:      General: Bowel sounds are normal.      Palpations: Abdomen is soft.   Musculoskeletal:         General: Normal range of motion.   Skin:     General: Skin is warm and dry. "   Neurological:      Mental Status: She is alert and oriented to person, place, and time.   Psychiatric:         Behavior: Behavior normal.         Thought Content: Thought content normal.         Judgment: Judgment normal.       CMP  Sodium   Date Value Ref Range Status   04/05/2024 141 136 - 145 mmol/L Final   04/05/2024 141 136 - 145 mmol/L Final   12/11/2018 139 134 - 144 mmol/L      Potassium   Date Value Ref Range Status   04/05/2024 4.8 3.5 - 5.1 mmol/L Final   04/05/2024 4.8 3.5 - 5.1 mmol/L Final     Chloride   Date Value Ref Range Status   04/05/2024 99 95 - 110 mmol/L Final   04/05/2024 99 95 - 110 mmol/L Final   12/11/2018 96 (L) 98 - 110 mmol/L      CO2   Date Value Ref Range Status   04/05/2024 31 (H) 23 - 29 mmol/L Final   04/05/2024 31 (H) 23 - 29 mmol/L Final     Glucose   Date Value Ref Range Status   04/05/2024 99 70 - 110 mg/dL Final   04/05/2024 99 70 - 110 mg/dL Final   12/11/2018 97 70 - 99 mg/dL      BUN   Date Value Ref Range Status   04/05/2024 24 (H) 8 - 23 mg/dL Final   04/05/2024 24 (H) 8 - 23 mg/dL Final     Creatinine   Date Value Ref Range Status   04/05/2024 1.2 0.5 - 1.4 mg/dL Final   04/05/2024 1.2 0.5 - 1.4 mg/dL Final   12/11/2018 0.95 0.60 - 1.40 mg/dL      Calcium   Date Value Ref Range Status   04/05/2024 10.0 8.7 - 10.5 mg/dL Final   04/05/2024 10.0 8.7 - 10.5 mg/dL Final     Total Protein   Date Value Ref Range Status   10/05/2023 6.8 6.0 - 8.4 g/dL Final     Albumin   Date Value Ref Range Status   10/05/2023 3.8 3.5 - 5.2 g/dL Final   12/11/2018 3.9 3.1 - 4.7 g/dL      Total Bilirubin   Date Value Ref Range Status   10/05/2023 0.3 0.1 - 1.0 mg/dL Final     Comment:     For infants and newborns, interpretation of results should be based  on gestational age, weight and in agreement with clinical  observations.    Premature Infant recommended reference ranges:  Up to 24 hours.............<8.0 mg/dL  Up to 48 hours............<12.0 mg/dL  3-5 days..................<15.0  mg/dL  6-29 days.................<15.0 mg/dL       Alkaline Phosphatase   Date Value Ref Range Status   10/05/2023 75 55 - 135 U/L Final     AST   Date Value Ref Range Status   10/05/2023 23 10 - 40 U/L Final     ALT   Date Value Ref Range Status   10/05/2023 12 10 - 44 U/L Final     Anion Gap   Date Value Ref Range Status   04/05/2024 11 8 - 16 mmol/L Final   04/05/2024 11 8 - 16 mmol/L Final     eGFR if    Date Value Ref Range Status   11/05/2021 >60.0 >60 mL/min/1.73 m^2 Final     eGFR if non    Date Value Ref Range Status   02/21/2022 56 (L) >59 mL/min/1.73 Final      BMP  Lab Results   Component Value Date     04/05/2024     04/05/2024    K 4.8 04/05/2024    K 4.8 04/05/2024    CL 99 04/05/2024    CL 99 04/05/2024    CO2 31 (H) 04/05/2024    CO2 31 (H) 04/05/2024    BUN 24 (H) 04/05/2024    BUN 24 (H) 04/05/2024    CREATININE 1.2 04/05/2024    CREATININE 1.2 04/05/2024    CALCIUM 10.0 04/05/2024    CALCIUM 10.0 04/05/2024    ANIONGAP 11 04/05/2024    ANIONGAP 11 04/05/2024    ESTGFRAFRICA >60.0 11/05/2021    EGFRNONAA 56 (L) 02/21/2022      BNP  @LABRCNTIP(BNP,BNPTRIAGEBLO)@   Lab Results   Component Value Date    CHOL 215 (H) 10/05/2023    CHOL 146 10/04/2022    CHOL 113 05/06/2021     Lab Results   Component Value Date    HDL 46 10/05/2023    HDL 52 10/04/2022    HDL 47 05/06/2021     Lab Results   Component Value Date    LDLCALC 137.6 10/05/2023    LDLCALC 75 10/04/2022    LDLCALC 51 05/06/2021     Lab Results   Component Value Date    TRIG 157 (H) 10/05/2023    TRIG 104 10/04/2022    TRIG 70 05/06/2021     Lab Results   Component Value Date    CHOLHDL 21.4 10/05/2023    CHOLHDL 26.2 08/24/2020    CHOLHDL 45.0 10/16/2013      Lab Results   Component Value Date    TSH 1.933 12/04/2023    FREET4 0.89 09/26/2012     Lab Results   Component Value Date    HGBA1C 6.3 12/04/2023     Lab Results   Component Value Date    WBC 5.71 04/05/2024    HGB 11.3 (L) 04/05/2024     HCT 36.7 (L) 04/05/2024    MCV 91 04/05/2024     04/05/2024         Results for orders placed during the hospital encounter of 04/28/22    Echo    Interpretation Summary  · The left ventricle is normal in size with concentric remodeling and mildly decreased systolic function.  · The estimated ejection fraction is 40%.  · Grade I left ventricular diastolic dysfunction.  · There are segmental left ventricular wall motion abnormalities.  · Apical akinesis  · Normal right ventricular size with normal right ventricular systolic function.  · Mild aortic regurgitation.  · There is moderate aortic valve stenosis.  · Aortic valve area is 1.15 cm2; peak velocity is 2.12 m/s; mean gradient is 18 mmHg.  · Mild tricuspid regurgitation.  · Normal central venous pressure (3 mmHg).  · The estimated PA systolic pressure is 24 mmHg.  · Overall the study quality was technically difficult.     No results found for this or any previous visit.     EKG  Results for orders placed or performed during the hospital encounter of 06/30/23   EKG 12-lead    Collection Time: 06/30/23 11:04 AM    Narrative    Test Reason : R07.9,    Vent. Rate : 062 BPM     Atrial Rate : 036 BPM     P-R Int : 230 ms          QRS Dur : 102 ms      QT Int : 422 ms       P-R-T Axes : 000 -42 113 degrees     QTc Int : 428 ms    Atrial-paced rhythm with prolonged AV conduction  Left axis deviation  Voltage criteria for left ventricular hypertrophy  T wave abnormality, consider lateral ischemia  Abnormal ECG  When compared with ECG of 04-FEB-2023 20:05,  The axis Shifted left  Criteria for Septal infarct are no longer Present  T wave inversion no longer evident in Inferior leads  T wave inversion less evident in Anterior-lateral leads  Confirmed by Tera Blair MD ([id number]) on 7/2/2023 3:33:14 PM    Referred By: [email or web address]   SELF           Confirmed By:Tera Blair MD      Stress  No results found for this or any previous visit.             Assessment:        Coronary artery disease of native artery of native heart with stable angina pectoris  Her angina is stable    Hypertension  On Toprol XL, Entresto, isosorbide dinitrate, diuretics    Aortic valve stenosis, moderate  Moderate aortic stenosis    PSVT (paroxysmal supraventricular tachycardia)  She is to take oxygen at night.  If she continues to have palpitations she is to take extra 25 mg of Toprol-XL    Acute on chronic combined systolic and diastolic heart failure  She is to take extra doses of Zaroxolyn in addition to her Lasix.  Change the isosorbide mononitrate to isosorbide dinitrate.    Obstructive sleep apnea  She is to get her CPAP machine.  And oxygen to wear at night.       Plan:       Take Zaroxolyn for the next few days until her symptoms of heart failure improved.  Change the isosorbide mononitrate to isosorbide dinitrate 10 mg t.i.d..  She is to take extra metoprolol 25 mg at night if she has palpitations during the night.  She is to use her oxygen at night.  She will be seen in the office in 1 month with a BMP BNP as well as a CBC.

## 2024-04-09 NOTE — PROGRESS NOTES
Subjective:       Patient ID: Cheyanne Gomes is a 88 y.o. female.    Chief Complaint: Follow-up (6 month)    88-year-old female with a suspected history of emphysema based on chest x-ray report but not pulmonary function testing, combined acute on chronic systolic and diastolic heart failure, paroxysmal supraventricular tachycardia and sick sinus syndrome with pacemaker, hypertension, hyperlipidemia, coronary artery disease native artery native heart and stable angina with three-vessel bypass surgery in 2000 chronic kidney disease, rheumatoid arthritis and Sjogren syndrome, thrombocytopenia, anemia of chronic disease, reflux and Barretts esophagus comes in for six-month follow-up and renewal of gabapentin for chronic pain.  She is on a Dilaudid pain pump filled every two months.  She also has tramadol which she uses rarely and has a few sedating agents such as Zoloft, Zofran, and Phenergan in addition to the gabapentin.  The patient was noted to have an oxygen saturation of 72% when she arrived today and she was noticeably short of breath.  She has no complaints of chest pain, no cough, no fever chills or night sweats, she has no orthopnea but she typically sleeps with her bed elevated at about a 30 degree angle which is her comfortable position with her rheumatoid arthritis and back pain.  She has not been seeing Pulmonary but she does see Dr. Blair in Cardiology regularly and, in fact, she has an appointment with him directly after this one.  The patient was put on oxygen and her pulse of 120 came down to 64 her oxygen went up to 95% on 3 L. I see no evidence of drug-induced hypoxia, she is awake alert and oriented and conversant on oxygen with no evidence of shortness of breath.    Past Medical History:  No date: Abdominal hernia  No date: Anemia      Comment:  Dr Berkowitz   No date: Angina pectoris  No date: Aortic valve stenosis, moderate  No date: Back pain  No date: Cannon's esophagus  No date: CAD  (coronary artery disease)  No date: Cataract      Comment:  ou done  No date: CHF (congestive heart failure)      Comment:  EFx 35%, Dr. 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  No date: Rheumatoid arthritis  2017: Shingles  No date: Sjogren syndrome  No date: Ulcerative colitis    Past Surgical History:  2021: A-V CARDIAC PACEMAKER INSERTION; Left      Comment:  Procedure: INSERTION, CARDIAC PACEMAKER, DUAL CHAMBER                 ([employer]);  Surgeon: Tera Blair MD;                 Location: [facility] CATH/EP LAB;  Service: Cardiology;                 Laterality: Left;  No date: APPENDECTOMY  No date: BACK SURGERY  No date: CARDIAC SURGERY      Comment:  CABGx3 in   No date: CATARACT EXTRACTION      Comment:  ou od d 11-15-12//  No date:  SECTION, CLASSIC      Comment:  x 2  No date: CHOLECYSTECTOMY  2022: CLOSURE OF WOUND; Right      Comment:  Procedure: CLOSURE-WOUND;  Surgeon: Delvis Jackson MD;  Location: [facility] OR;  Service: ENT;  Laterality:                Right;  NOSE AND CHIN  09/15/2011: COLONOSCOPY      Comment:  Dr Anaya   01/10/2017: COLONOSCOPY      Comment:  [facility] report sent to scanning  No date: CORONARY ANGIOPLASTY      Comment:  PCI x 1 in   No date: CORONARY ARTERY BYPASS GRAFT      Comment:  3 vessel  No date: CORONARY ARTERY BYPASS GRAFT  6/3/2020: CYSTOSCOPY; N/A      Comment:  Procedure: CYSTOSCOPY;  Surgeon:  Miryam Bender Jr., MD;               Location: [facility] OR;  Service: Urology;  Laterality: N/A;  No date: eyes      Comment:  rk ou  06/21/2016: FRACTURE SURGERY      Comment:  Dr Guido left hip   2018: FRACTURE SURGERY      Comment:  Right Hip  9/23/2022: HARVESTING OF SKIN GRAFT      Comment:  Procedure: SURGICAL PROCUREMENT, GRAFT, SKIN;  Surgeon:                Delvis Jackson MD;  Location: [facility] OR;  Service: ENT;;               RIGHT CHEST  No date: HYSTERECTOMY      Comment:  tubal ligation, oopherectomy  10/8/2018: INTRAMEDULLARY RODDING OF TROCHANTER OF FEMUR; Right      Comment:  Procedure: INSERTION, INTRAMEDULLARY KELSI, FEMUR,                TROCHANTER;  Surgeon: Valerio Luther MD;  Location: [facility] OR;               Service: Orthopedics;  Laterality: Right;  No date: OOPHORECTOMY  8-: SPINE SURGERY      Comment:  Silvino Mckeon  No date: UPPER GASTROINTESTINAL ENDOSCOPY  9/25/14: Yag Capsulotomy; Right    Current Outpatient Medications on File Prior to Visit:  amLODIPine (NORVASC) 5 MG tablet, TAKE 1 TABLET BY MOUTH 2 TIMES A DAY, Disp: 60 tablet, Rfl: 5  aspirin 81 mg Tab, Take 1 tablet by mouth every evening. Every day, Disp: , Rfl:   biotin 5 mg Cap, Take 1 tablet by mouth once daily., Disp: , Rfl:   CALCIUM CARB/VIT D3/MINERALS (CALCIUM-VITAMIN D ORAL), Take 600 mg by mouth 2 (two) times daily. Calcium 1200 with D 3 1000, Disp: , Rfl:   CEQUA 0.09 % Dpet, Place 1 drop into both eyes 2 (two) times daily., Disp: , Rfl:   CRANBERRY CONC/ASCORBIC ACID (CRANBERRY PLUS VITAMIN C ORAL), Take 1 capsule by mouth once daily., Disp: , Rfl:   DEXILANT 60 mg capsule, Take 60 mg by mouth once daily. , Disp: , Rfl: 11  ENTRESTO  mg per tablet, Take 1 tablet by mouth 2 (two) times daily., Disp: 60 tablet, Rfl: 4  famotidine (PEPCID) 20 MG tablet, Take 1 tablet (20 mg total) by mouth every evening., Disp: 30 tablet, Rfl: 11  fluticasone (FLONASE) 50 mcg/actuation nasal spray, 2 sprays by Each Nare route once  daily. (Patient taking differently: 2 sprays by Each Nostril route daily as needed.), Disp: 16 g, Rfl: 0  folic acid (FOLVITE) 1 MG tablet, Take 2 mg by mouth once daily. , Disp: , Rfl:   furosemide (LASIX) 80 MG tablet, Take 1 tablet (80 mg total) by mouth once daily., Disp: 90 tablet, Rfl: 3  HYDROmorphone (DILAUDID) 2 MG tablet, Take 2 mg by mouth every 4 (four) hours as needed for Pain., Disp: , Rfl:   isosorbide mononitrate (IMDUR) 60 MG 24 hr tablet, Take 1 tablet (60 mg total) by mouth every morning., Disp: 30 tablet, Rfl: 11  Lactobacillus acidophilus 10 billion cell Cap, Take 1 each by mouth once daily. 1.5 billion, Disp: , Rfl:   magnesium oxide (MAG-OX) 400 mg (241.3 mg magnesium) tablet, TAKE 1 TABLET BY MOUTH 2 TIMES A DAY (Patient taking differently: Take 400 mg by mouth 2 (two) times daily.), Disp: 120 tablet, Rfl: 2  metoprolol succinate (TOPROL-XL) 50 MG 24 hr tablet, Take 1 tablet (50 mg total) by mouth 2 (two) times daily. 25 mg QAM and 12.5 mg QHS (Patient taking differently: Take 50 mg by mouth 2 (two) times daily.), Disp: 180 tablet, Rfl: 3  MULTIVITAMIN WITH MINERALS (ONE-A-DAY 50 PLUS) Tab, Take 1 tablet by mouth once daily. Every day, Disp: , Rfl:   nitroGLYCERIN 0.4 MG/DOSE TL SPRY (NITROLINGUAL) 400 mcg/spray spray, Place 1 spray under the tongue every 5 (five) minutes as needed for Chest pain. PRN, Disp: 4.9 g, Rfl: 3  omega-3 fatty acids/fish oil (FISH OIL-OMEGA-3 FATTY ACIDS) 300-1,000 mg capsule, Take 1 capsule by mouth once daily., Disp: , Rfl:   ondansetron (ZOFRAN) 4 MG tablet, Take 4 mg by mouth every 8 (eight) hours as needed for Nausea. , Disp: , Rfl: 2  potassium chloride SA (K-DUR,KLOR-CON M) 10 MEQ tablet, TAKE TWO TABLETS BY MOUTH EVERY MORNING AND 1 TABLET EVERY EVENING, Disp: 270 tablet, Rfl: 2  promethazine (PHENERGAN) 25 MG tablet, Take 1 tablet (25 mg total) by mouth 2 (two) times daily as needed for Nausea., Disp: 60 tablet, Rfl: 1  sertraline (ZOLOFT) 50 MG tablet,  Take 2 tablets (100 mg total) by mouth once daily., Disp: 180 tablet, Rfl: 2  traMADoL (ULTRAM) 50 mg tablet, Take 50 mg by mouth 2 (two) times daily as needed for Pain., Disp: , Rfl:   vitamin E 400 unit Tab, Take 400 mg by mouth once daily. Every day, Disp: , Rfl:   [DISCONTINUED] gabapentin (NEURONTIN) 100 MG capsule, Take 2 capsules (200 mg total) by mouth every evening., Disp: 180 capsule, Rfl: 0  metOLazone (ZAROXOLYN) 2.5 MG tablet, Take 1 tablet (2.5 mg total) by mouth once daily., Disp: 30 tablet, Rfl: 0  [DISCONTINUED] hydrochlorothiazide (HYDRODIURIL) 25 MG tablet, Take 25 mg by mouth once daily., Disp: , Rfl:     No current facility-administered medications on file prior to visit.          Review of Systems   Constitutional:  Negative for chills, diaphoresis and fever.   Respiratory:  Positive for shortness of breath. Negative for cough and chest tightness.    Cardiovascular:  Negative for chest pain, palpitations and leg swelling.   Gastrointestinal:  Negative for nausea and vomiting.   Musculoskeletal:  Positive for arthralgias and back pain.       Objective:      Physical Exam  Vitals and nursing note reviewed.   Constitutional:       General: She is not in acute distress.     Appearance: Normal appearance. She is normal weight. She is not ill-appearing, toxic-appearing or diaphoretic.      Comments: Fair blood pressure control   Initial tachycardia at a rate of 120 dropped down to 64 on oxygen  Normal weight with a BMI of 23.5 she is down 3.2 lb from her October 4, 2023 visit with me   Neck:      Vascular: No carotid bruit.   Cardiovascular:      Rate and Rhythm: Normal rate and regular rhythm.      Heart sounds: Normal heart sounds. No murmur heard.     No friction rub. No gallop.   Pulmonary:      Effort: Pulmonary effort is normal. No respiratory distress.      Breath sounds: Normal breath sounds. No stridor. No wheezing, rhonchi or rales.      Comments: Overall diminished breath sounds in the  bases  Musculoskeletal:      Cervical back: Normal range of motion and neck supple. No rigidity or tenderness.      Right lower leg: No edema.      Left lower leg: No edema.   Lymphadenopathy:      Cervical: No cervical adenopathy.   Neurological:      Mental Status: She is alert.         Assessment:       1. Hypoxia    2. Coronary artery disease of native artery of native heart with stable angina pectoris    3. Chronic combined systolic and diastolic heart failure    4. PSVT (paroxysmal supraventricular tachycardia)    5. S/P CABG (coronary artery bypass graft)    6. Sick sinus syndrome    7. Stage 3b chronic kidney disease    8. Gastroesophageal reflux disease, unspecified whether esophagitis present    9. Peripheral polyneuropathy    10. Pulmonary emphysema, unspecified emphysema type    11. Rheumatoid arthritis involving hip with positive rheumatoid factor, unspecified laterality    12. Thrombocytopenia, unspecified    13. Acute on chronic combined systolic and diastolic heart failure    14. BMI 23.0-23.9, adult        Plan:       1. Hypoxia  Appears to be secondary to heart failure but she does not have any rales present, she does have some diminished breath sounds generally and there was a report last year from the emergency room where a chest x-ray was read as showing evidence of emphysema.  She has not had pulmonary function tests as of yet.  We will attempt to get them  In the meanwhile we will get her set up for oxygen based on her presenting oxygen saturation of 72% today on room air at rest  - OXYGEN FOR HOME USE  - Complete PFT with bronchodilator; Future    2. Coronary artery disease of native artery of native heart with stable angina pectoris  Asymptomatic at this time, no chest pain and no orthopnea but she is hypoxic    3. Chronic combined systolic and diastolic heart failure  See above.  She will be seeing Dr. Blair immediately after this visit  - OXYGEN FOR HOME USE  - Complete PFT with  bronchodilator; Future    4. PSVT (paroxysmal supraventricular tachycardia)  Improved on oxygen    5. S/P CABG (coronary artery bypass graft)  Stable    6. Sick sinus syndrome  Stable with pacemaker    7. Stage 3b chronic kidney disease  BMP  Lab Results   Component Value Date     04/05/2024     04/05/2024    K 4.8 04/05/2024    K 4.8 04/05/2024    CL 99 04/05/2024    CL 99 04/05/2024    CO2 31 (H) 04/05/2024    CO2 31 (H) 04/05/2024    BUN 24 (H) 04/05/2024    BUN 24 (H) 04/05/2024    CREATININE 1.2 04/05/2024    CREATININE 1.2 04/05/2024    CALCIUM 10.0 04/05/2024    CALCIUM 10.0 04/05/2024    ANIONGAP 11 04/05/2024    ANIONGAP 11 04/05/2024    EGFRNORACEVR 43.5 (A) 04/05/2024    EGFRNORACEVR 43.5 (A) 04/05/2024     Stable    8. Gastroesophageal reflux disease, unspecified whether esophagitis present  No sensation of heartburn, she could well have lost the sensation and possibly have aggravation of her respiratory status by reflux and aspiration    9. Peripheral polyneuropathy  Stable, gabapentin refilled,  checked with no inappropriate activity  - gabapentin (NEURONTIN) 100 MG capsule; Take 2 capsules (200 mg total) by mouth every evening.  Dispense: 180 capsule; Refill: 1    10. Pulmonary emphysema, unspecified emphysema type  Suspected.  Pulmonary function test hopefully will define this better    11. Rheumatoid arthritis involving hip with positive rheumatoid factor, unspecified laterality  Not seeing Rheumatology currently.  She is on a pain pump with Dilaudid    12. Thrombocytopenia, unspecified  Lab Results   Component Value Date     04/05/2024         13. Acute on chronic combined systolic and diastolic heart failure  Followed by Cardiology, appears stable but she is hypoxic with a number of possible etiologies    14. BMI 23.0-23.9, adult  Good weight for age no changes needed

## 2024-04-09 NOTE — TELEPHONE ENCOUNTER
Patient:   BALJINDER SHAW            MRN: [id number]            FIN: [id number]              Age:   63 years     Sex:  FEMALE     :  56   Associated Diagnoses:   None   Author:   KAREN TOMAS     Chief Complaint:  Chest Pain      History of present illness:      Ms. Shaw is a 64 y/o F with PMHx of R breast cancer who was sent from outpatient ECHO stress exercise test regarding LAD ischemia now s/p PCI in cath lab with stent placement. Pt seen in cath lab holding, resting comfortably lying in bed. Pt notes that she had been experiencing tight L CP radiating down the L UE for the past 2 months.  The pain has been intermittent with constant severity and comes on when she is exerting herself and resolves  with 15 min of rest.  Pt notes experiencing nausea with the CP.      Pt denies any recent fever, chills, change in vision or hearing, SOB, cough, leg swelling, vomiting, diarrhea, constipation, hematochezia, dysuria, hematuria, dysuria, rashes, and HA.      Review of Systems:  Constitutional: No fever, or chills.  Skin: No rash  Eyes: No recent vision problems or eye pain    ENT: No congestion, ear pain, or sore throat  Endocrine: No thyroid problems    Cardiovascular: L CP radiating down L UE  Respiratory: No cough, shortness of breath, congestion, or wheezing  Gastrointestinal: Nausea.  No vomiting, diarrhea, constipation, or hematochezia.  Musculoskeletal: No joint swelling    Neurologic: No headache  Hematologic: No unusual bruising or bleeding  Psychiatric: No psychiatric problems, hallucinations or depression    Past medical history:  Breast ca  GERD (gastroesophageal reflux disease)  Hypertension  Risk factors for obstructive sleep apnea    Past surgical history:  Abdominal hysterectomy: 10/19/02  Lumpectomy of breast, Left, ; Right,   Bunionectomy, Bilateral    Family history:   Mom - Multiple MIs.  First MI age 50.  Father - HTN.  Stomach ulcer.   of stomach aneurysm.  Sister  ----- Message from Himanshu Shah sent at 4/9/2024  3:36 PM CDT -----  Regarding: late  Contact: patient  Type:  Patient Returning Call    Who Called:patient  Who Left Message for Patient:office staff  Does the patient know what this is regarding?:may be late due to car will not start  Would the patient rather a call back or a response via [facility]? Please advise  Best Call Back Number:[phone]  Additional Information: patient is trying to be on time please hold appointment/ I informed patient if she is more than 15 late she has to reschedule/ sorry         1 - Heart blockage.  Sister 2 - Heart disease.    Social history:  _   Alcohol  Details: Use: socially.  Details: Use: Current.  Frequency: 1-2 times per month.  Substance Abuse  Details: Use: None.  Details: Use: None.  Tobacco  Details: Smoker in [place]: No.  Smoked/Smokeless Tobacco Last 30 Days: No.  Smoking Tobacco Use: Former smoker.  Smokeless Tobacco Use Never.  Details: Smoked/Smokeless Tobacco Last 30 Days: No.  Use: Never smoker.  Cultural/Jewish Practices  Details: Jewish or Cultural Practices While in Hospital: Yes.; Comment(s): jehovahs witness     Home Medications (12) Active  anastrozole 1 mg oral tablet 1 mg = 1 tab, Oral, Daily  anastrozole 1 mg oral tablet 1 mg = 1 tab, Oral, Daily  aspirin 81 mg oral tablet 81 mg = 1 tab, Oral, Daily  Biotin Forte oral tablet 1 tab, Oral, Daily  Claritin oral 10 mg tablet 10 mg = 1 tab, Oral, Daily  ferrous sulfate oral 325 mg tablet [65 mg iron] (Feosol) 325 mg = 1 tab, Oral, Daily  multivitamin oral tablet 1 tab, Oral, Daily  Omega-3 oral capsule , Oral, Daily  Prevacid oral 30 mg EC capsule 1 cap, Oral, Daily  Probiotic Formula oral capsule 1 cap, Oral, Daily  senna oral tablet 2 tab, PRN, Oral, Q Bedtime  Vitamin D3 oral 1,000 unit tablet 1,000 IU = 1 tab, Oral, Daily  Medications (8) Active  Scheduled: (4)  Aspirin 81 mg chew tab  81 mg 1 tab, Chewed, Daily  Atorvastatin 80 mg tab  80 mg 1 tab, Oral, Q Bedtime  Metoprolol succinate 25 mg XL tab  25 mg 1 tab, Oral, Daily  Ticagrelor 90 mg tab  90 mg 1 tab, Oral, BID  Continuous: (2)  bivalirudin 250 mg [1.75 mg/kg/hr] + Dextrose 5% 50 mL  50 mL, IV, 24.29 mL/hr  Sodium Chloride 0.9% 1,000 mL  1,000 mL, IV, 100 mL/hr  PRN: (2)  Acetaminophen 325 mg tab  650 mg 2 tab, Oral, Q4H  Senna-docusate sodium 8.6-50 mg tab  2 tab, Oral, Q Bedtime      Allergies (1) Active Reaction  NKA None Documented      Immunizations:  Pneumovax _  Flu shot _    CODE STATUS:  Full code      Vitals between:   12-SEP-2019  16:21:26   TO   13-SEP-2019 16:21:26                   LAST RESULT MINIMUM MAXIMUM  Temperature 36.9 36.1 36.9  Heart Rate 58 58 61  Respiratory Rate 16 14 16  NISBP           140 139 140  NIDBP           103 84 103  NIMBP           113 102 113  SpO2                    99 99 99    Physical exam:  General: Laying comfortably.  Nails done.  Glitter on eye lashes.  Head: Normocephalic.  Atraumatic.  Eyes: EOMI.  Ears, nose, throat: Moist mucous membranes.  No erythema.  Neck: Trachea midline.  Respiratory:  Lungs CTA.  No rales, ronchi, or wheezes.  Cardiac: RRR.  No murmurs, rubs, or gallops.  Abdomen:  Soft.   Musculoskeletal: No arthralgia or weakness.  Neurologic: Alert.  AOx4.  Vascular: No edema to the LE.  Pedal pulses present.  Skin: 2\" eczema to the R lateral heel.  Psychiatric: Normal mood and affct.    Labs:     Labs between:  12-SEP-2019 16:21 to 13-SEP-2019 16:21    CBC:                 WBC  HgB  Hct  Plt  MCV  RDW   13-SEP-2019 6.7  12.0  37.2  330  91.0  13.5                    Assessment/Plan:   62 y/o F with PMHx of R breast cancer  who presents after outpatient ECHO stress exercise test with findings of LAD ischemia, now s/p PCI with stent placement (9/13/19), admitted to [facility] for further monitoring    #chest pain  #LAD ischemia  -hx of L sided CP with radiation down LUE x2mo and family hx of heart dz  -ECHO stress test 9/13/19 showed findings consistent with LAD ischemia and/or multivessel disease, ECHO EF 55-60%  -s/p PCI with stent x1 placement 9/13/19  -ASA 81mg, ticagrelor 180mg, Atorvastatin 80mg, metoprolol 25mg qd,  -consulted PCP Dr. Garcia and Cardiology Dr. Daniela Pickett    #History of breast cancer  -s/p lumpectomy left breast 2011 and lumpectomy right breast 2016  -s/p chemotherapy and radiation; ALND 10/20/2015, completed XRT Jan 2016, completed chemotherapy May 2016     Notified PCP, Primary Care Physician      Physician Name:  PASQUALE, HAYDEN NASCIMENTO  Specialty :  INTERNAL  MEDICINE    Consulting Physicians     Physician Name:  TOMASZ MENDOZA, CANDACE MORALES Speciality:  CARDIOLOGY Consult Reason:  post pci     VTE PPx: Bivalirudin 250mg+D5@24.29cc/hr  FEN: Lyte replace as needed, cardiac diet, IVF NS @ 100cc/hr  Dispo: Tele  Code Status: Full Code      Pt seen and discussed with [facility], to be discussed with attending, Dr. Parekh  Note co-writeen with Liza Rosen, MS3    Keila Xie, PGY-2  Internal Medicine, Team A  Contact through [employer]

## 2024-04-10 NOTE — ASSESSMENT & PLAN NOTE
She is to take extra doses of Zaroxolyn in addition to her Lasix.  Change the isosorbide mononitrate to isosorbide dinitrate.

## 2024-04-10 NOTE — ASSESSMENT & PLAN NOTE
She is to take oxygen at night.  If she continues to have palpitations she is to take extra 25 mg of Toprol-XL

## 2024-04-12 ENCOUNTER — TELEPHONE (OUTPATIENT)
Dept: CARDIOLOGY | Facility: CLINIC | Age: 89
End: 2024-04-12
Payer: MEDICARE

## 2024-04-12 NOTE — TELEPHONE ENCOUNTER
----- Message from Himanshu Shah sent at 4/12/2024  3:40 PM CDT -----  Regarding: return call  Contact: Jaguar with [facility]  Type:  Patient Returning Call    Who Called:Jaguar with [facility]  Who Left Message for Patient:office nurse  Does the patient know what this is regarding?:confirm the patient uses oxygen  Would the patient rather a call back or a response via [facility]? Please call  Best Call Back Number:[phone]  Additional Information:

## 2024-04-15 ENCOUNTER — HOSPITAL ENCOUNTER (OUTPATIENT)
Dept: PULMONOLOGY | Facility: HOSPITAL | Age: 89
Discharge: HOME | End: 2024-04-15
Attending: FAMILY MEDICINE
Payer: MEDICARE

## 2024-04-15 ENCOUNTER — TELEPHONE (OUTPATIENT)
Dept: FAMILY MEDICINE | Facility: CLINIC | Age: 89
End: 2024-04-15
Payer: MEDICARE

## 2024-04-15 DIAGNOSIS — R09.02 HYPOXIA: ICD-10-CM

## 2024-04-15 DIAGNOSIS — R26.89 IMBALANCE: Primary | ICD-10-CM

## 2024-04-15 DIAGNOSIS — I50.42 CHRONIC COMBINED SYSTOLIC AND DIASTOLIC HEART FAILURE: ICD-10-CM

## 2024-04-15 PROCEDURE — 94010 BREATHING CAPACITY TEST: CPT

## 2024-04-15 PROCEDURE — 94727 GAS DIL/WSHOT DETER LNG VOL: CPT

## 2024-04-15 PROCEDURE — 94729 DIFFUSING CAPACITY: CPT

## 2024-04-15 NOTE — TELEPHONE ENCOUNTER
----- Message from Sunshine Alex, Patient Care Assistant sent at 4/15/2024 10:47 AM CDT -----  Regarding: advice  Contact: Antonioia with [facility]  Type: Needs Medical Advice    Who Called:  Byron with [facility]    Best Call Back Number: [phone]      Additional Information: Byron with [facility] states she would like a callback regarding if the pt is on oxygen. Please call to advise. Thanks!

## 2024-04-15 NOTE — TELEPHONE ENCOUNTER
----- Message from Lay Jaramillo sent at 4/15/2024 11:44 AM CDT -----  Regarding: Needs Medical Order Status  Contact: patient at [phone]  Type: Needs Medical Order Status  Who Called:  patient at [phone]    Additional Information: checking on referral order for physical therapy that was to be faxed to [facility] in [place]. Please resend to fax [phone]. Please call and advise. Thank you

## 2024-04-16 NOTE — TELEPHONE ENCOUNTER
S/w [facility] and advised pcp was ordering oxygen. Advised will fax office note to [phone]

## 2024-04-16 NOTE — TELEPHONE ENCOUNTER
----- Message from Casandra Moseley sent at 4/16/2024  9:48 AM CDT -----  Type:  Needs Medical Advice    Who Called: latasha/ [facility]   Best Call Back Number: [phone]   Additional Information:  Requesting call back  wants to know if the pt is oxygen     please advise thank you

## 2024-04-17 ENCOUNTER — TELEPHONE (OUTPATIENT)
Dept: FAMILY MEDICINE | Facility: CLINIC | Age: 89
End: 2024-04-17
Payer: MEDICARE

## 2024-04-17 DIAGNOSIS — M81.0 OSTEOPOROSIS, UNSPECIFIED OSTEOPOROSIS TYPE, UNSPECIFIED PATHOLOGICAL FRACTURE PRESENCE: Primary | ICD-10-CM

## 2024-04-17 NOTE — TELEPHONE ENCOUNTER
----- Message from Hcetor Alfaro sent at 4/17/2024 12:03 PM CDT -----    Patient had to reschedule her next Prolia injection from 4/24 to 6/4 due to getting dental work on 4/22, waiting 1 month out for dental precaution.       Patient's Dexa scan is now past 2 years.  Please order and schedule patient for a Dexa Scan appointment needed prior to Prolia.     She stated she will be getting BMP labs drawn in late May for Dr Blair, can use that for Prolia.       Thank you,  Hector  [facility]

## 2024-04-22 ENCOUNTER — TELEPHONE (OUTPATIENT)
Dept: FAMILY MEDICINE | Facility: CLINIC | Age: 89
End: 2024-04-22
Payer: MEDICARE

## 2024-04-22 NOTE — TELEPHONE ENCOUNTER
No answer   Voice mail is broken Unable to leave a message after several time      ----- Message from Kimmy Thompson sent at 4/22/2024 11:55 AM CDT -----  Regarding: Needs return call  Type: Needs Medical Advice  Who Called:  [facility]- [facility] Call Back Number: [phone]  Additional Information: They faxed over for APX and cardio screening on Friday, she was trying to confirm the fax was received please call to shakeel

## 2024-04-23 ENCOUNTER — OFFICE VISIT (OUTPATIENT)
Dept: PULMONOLOGY | Facility: CLINIC | Age: 89
End: 2024-04-23
Payer: MEDICARE

## 2024-04-23 VITALS
HEART RATE: 68 BPM | OXYGEN SATURATION: 95 % | WEIGHT: 114.5 LBS | BODY MASS INDEX: 23.94 KG/M2 | SYSTOLIC BLOOD PRESSURE: 122 MMHG | DIASTOLIC BLOOD PRESSURE: 64 MMHG

## 2024-04-23 DIAGNOSIS — G47.33 OBSTRUCTIVE SLEEP APNEA: Primary | ICD-10-CM

## 2024-04-23 PROCEDURE — 99999 PR PBB SHADOW E&M-EST. PATIENT-LVL III: CPT | Mod: PBBFAC,,, | Performed by: STUDENT IN AN ORGANIZED HEALTH CARE EDUCATION/TRAINING PROGRAM

## 2024-04-23 PROCEDURE — 99202 OFFICE O/P NEW SF 15 MIN: CPT | Mod: S$PBB,,, | Performed by: STUDENT IN AN ORGANIZED HEALTH CARE EDUCATION/TRAINING PROGRAM

## 2024-04-23 PROCEDURE — 99213 OFFICE O/P EST LOW 20 MIN: CPT | Mod: PBBFAC,PO | Performed by: STUDENT IN AN ORGANIZED HEALTH CARE EDUCATION/TRAINING PROGRAM

## 2024-04-23 NOTE — PROGRESS NOTES
4/23/2024    Cheyanne Gomes  New Patient Consult    Chief Complaint   Patient presents with    Shortness of Breath       HPI:Ms Gomes is a 88 year old who presents for evaluation. She has history of emphsyema, aortic stenosis, CAD, CABG, hx of sjogrens and RA.    She has a history of smoking. Started at age around high school, 1953, then she quit in 1971. She was smoking around 1 pack per day or less. No frequent hsitory of penumonias or bornchitis. She has trouble breathing on occasion. She has CHF and aortic stenosis wchih she things is primary reason for her dyspnea.     She had PFTs which looked ok, she had a low DLCO.     She has a diuretic regimen, lasix and spironolactone.   She takes 80mg marylou lasix daily     The chief complaint problem is New to me    PFSH:  Past Medical History:   Diagnosis Date    Abdominal hernia     Anemia     Dr Berkowitz     Angina pectoris     Aortic valve stenosis, moderate     Back pain     Cannon's esophagus     CAD (coronary artery disease)     Cataract     ou done    CHF (congestive heart failure)     EFx 35%, Dr. Spencer 12/19/13    Chronic combined systolic and diastolic heart failure 09/28/2019    Colitis     Compression fracture     Diverticulosis     Encounter for blood transfusion     GERD (gastroesophageal reflux disease)     Hyperlipidemia     Hypertension     Irritable bowel syndrome     Myocardial infarction     Obstructive sleep apnea 4/9/2024    Osteoarthritis     lumbar DDD    Pacemaker     Pneumonia 01/03/2017    Pulmonary emphysema, unspecified emphysema type 4/9/2024    Raynaud disease     Rheumatoid arteritis     Rheumatoid arthritis     Shingles 01/03/2017    Sjogren syndrome     Ulcerative colitis          Past Surgical History:   Procedure Laterality Date    A-V CARDIAC PACEMAKER INSERTION Left 11/8/2021    Procedure: INSERTION, CARDIAC PACEMAKER, DUAL CHAMBER  ([employer]);  Surgeon: Tera Blair MD;  Location: [facility] CATH/EP LAB;  Service:  Cardiology;  Laterality: Left;    APPENDECTOMY      BACK SURGERY      CARDIAC SURGERY      CABGx3 in     CATARACT EXTRACTION      ou od d 11-15-12/     SECTION, CLASSIC      x 2    CHOLECYSTECTOMY      CLOSURE OF WOUND Right 2022    Procedure: CLOSURE-WOUND;  Surgeon: Delvis Jackson MD;  Location: [facility] OR;  Service: ENT;  Laterality: Right;  NOSE AND CHIN    COLONOSCOPY  09/15/2011    Dr Anaya     COLONOSCOPY  01/10/2017    [facility] report sent to scanning    CORONARY ANGIOPLASTY      PCI x 1 in     CORONARY ARTERY BYPASS GRAFT      3 vessel    CORONARY ARTERY BYPASS GRAFT      CYSTOSCOPY N/A 6/3/2020    Procedure: CYSTOSCOPY;  Surgeon: Miryam Bender Jr., MD;  Location: [facility] OR;  Service: Urology;  Laterality: N/A;    eyes      rk ou    FRACTURE SURGERY  2016    Dr Guido left hip     FRACTURE SURGERY  2018    Right Hip    HARVESTING OF SKIN GRAFT  2022    Procedure: SURGICAL PROCUREMENT, GRAFT, SKIN;  Surgeon: Delvis Jackson MD;  Location: [facility] OR;  Service: ENT;;  RIGHT CHEST    HYSTERECTOMY      tubal ligation, oopherectomy    INTRAMEDULLARY RODDING OF TROCHANTER OF FEMUR Right 10/8/2018    Procedure: INSERTION, INTRAMEDULLARY KELSI, FEMUR, TROCHANTER;  Surgeon: Valerio Luther MD;  Location: [facility] OR;  Service: Orthopedics;  Laterality: Right;    OOPHORECTOMY      SPINE SURGERY  2013    Silvino Mckeon    UPPER GASTROINTESTINAL ENDOSCOPY      Yag Capsulotomy Right 14     Social History     Tobacco Use    Smoking status: Former     Current packs/day: 0.00     Average packs/day: 1 pack/day for 5.0 years (5.0 ttl pk-yrs)     Types: Cigarettes     Start date: 1966     Quit date: 1971     Years since quittin.3    Smokeless tobacco: Never   Substance Use Topics    Alcohol use: Yes     Alcohol/week: 1.0 standard drink of alcohol     Types: 1 Glasses of wine per week    Drug use: Never     Family History   Adopted: Yes   Problem Relation Name Age of Onset    Heart disease  Mother          aortic stenosis    Skin cancer Mother      Hypertension Father      Heart disease Father          MI    Hypertension Sister      Fibromyalgia Sister      Scleroderma Sister      Pulmonary embolism Sister      Irritable bowel syndrome Sister      Heart disease Brother          2 brothers CABG    Arthritis Brother      Crohn's disease Brother      Crohn's disease Brother      Crohn's disease Brother      Hypertension Daughter      Early death Son          accident    Lupus Cousin      Lupus Cousin      Amblyopia Neg Hx      Blindness Neg Hx      Cancer Neg Hx      Cataracts Neg Hx      Diabetes Neg Hx      Glaucoma Neg Hx      Macular degeneration Neg Hx      Retinal detachment Neg Hx      Strabismus Neg Hx      Stroke Neg Hx      Thyroid disease Neg Hx      Celiac disease Neg Hx      Cirrhosis Neg Hx      Psoriasis Neg Hx      Melanoma Neg Hx      Multiple sclerosis Neg Hx      Rheum arthritis Neg Hx      Tuberculosis Neg Hx      Lymphoma Neg Hx      Ulcerative colitis Neg Hx      Moses's disease Neg Hx      Stomach cancer Neg Hx      Rectal cancer Neg Hx      Liver disease Neg Hx      Liver cancer Neg Hx      Inflammatory bowel disease Neg Hx      Hemochromatosis Neg Hx      Esophageal cancer Neg Hx      Cystic fibrosis Neg Hx      Colon cancer Neg Hx       Review of patient's allergies indicates:   Allergen Reactions    Adhesive     Nitrofurantoin macrocrystalline Nausea And Vomiting     Other reaction(s): very sickly    Penicillins Swelling     Other reaction(s): swelling  Other reaction(s): red skin discolorati    Sulfa (sulfonamide antibiotics) Nausea And Vomiting     Other reaction(s): very sickly       Performance Status:The patient's activity level is regular exercise.      Review of Systems:   Review of Systems   Constitutional:  Negative for chills, fever, malaise/fatigue and weight loss.   HENT:  Negative for congestion, sinus pain and sore throat.    Eyes:  Negative for blurred vision and  pain.   Respiratory:  Positive for shortness of breath and wheezing. Negative for cough.    Cardiovascular:  Negative for chest pain, palpitations, orthopnea, claudication and leg swelling.   Gastrointestinal:  Negative for abdominal pain, constipation, diarrhea, heartburn, melena, nausea and vomiting.   Genitourinary:  Negative for dysuria, frequency, hematuria and urgency.   Skin:  Negative for itching and rash.   Neurological:  Negative for dizziness, seizures, loss of consciousness and headaches.   Endo/Heme/Allergies:  Negative for environmental allergies and polydipsia. Does not bruise/bleed easily.   Psychiatric/Behavioral:  Negative for depression. The patient is not nervous/anxious.         Exam:  Physical Exam  Vitals reviewed.   Constitutional:       General: She is not in acute distress.     Appearance: She is well-developed. She is not diaphoretic.   HENT:      Head: Normocephalic and atraumatic.      Mouth/Throat:      Pharynx: No oropharyngeal exudate or posterior oropharyngeal erythema.   Eyes:      General: No scleral icterus.     Pupils: Pupils are equal, round, and reactive to light.   Neck:      Vascular: No JVD.   Cardiovascular:      Rate and Rhythm: Normal rate and regular rhythm.      Heart sounds: Normal heart sounds. No murmur heard.  Pulmonary:      Effort: Pulmonary effort is normal. No respiratory distress.      Breath sounds: Wheezing present.   Abdominal:      General: Bowel sounds are normal. There is no distension.      Palpations: Abdomen is soft.      Tenderness: There is no abdominal tenderness.   Musculoskeletal:         General: No swelling.      Cervical back: Normal range of motion and neck supple. No rigidity.   Skin:     General: Skin is warm and dry.      Capillary Refill: Capillary refill takes less than 2 seconds.      Coloration: Skin is not pale.      Findings: No rash.   Neurological:      General: No focal deficit present.      Mental Status: She is alert and oriented  to person, place, and time.      Cranial Nerves: No cranial nerve deficit.      Motor: No weakness or abnormal muscle tone.          Radiographs (ct chest and cxr) reviewed: results reviewed     Labs reviewed     Lab Results   Component Value Date    WBC 5.71 04/05/2024    HGB 11.3 (L) 04/05/2024    HCT 36.7 (L) 04/05/2024    MCV 91 04/05/2024     04/05/2024       CMP  Sodium   Date Value Ref Range Status   04/05/2024 141 136 - 145 mmol/L Final   04/05/2024 141 136 - 145 mmol/L Final   12/11/2018 139 134 - 144 mmol/L      Potassium   Date Value Ref Range Status   04/05/2024 4.8 3.5 - 5.1 mmol/L Final   04/05/2024 4.8 3.5 - 5.1 mmol/L Final     Chloride   Date Value Ref Range Status   04/05/2024 99 95 - 110 mmol/L Final   04/05/2024 99 95 - 110 mmol/L Final   12/11/2018 96 (L) 98 - 110 mmol/L      CO2   Date Value Ref Range Status   04/05/2024 31 (H) 23 - 29 mmol/L Final   04/05/2024 31 (H) 23 - 29 mmol/L Final     Glucose   Date Value Ref Range Status   04/05/2024 99 70 - 110 mg/dL Final   04/05/2024 99 70 - 110 mg/dL Final   12/11/2018 97 70 - 99 mg/dL      BUN   Date Value Ref Range Status   04/05/2024 24 (H) 8 - 23 mg/dL Final   04/05/2024 24 (H) 8 - 23 mg/dL Final     Creatinine   Date Value Ref Range Status   04/05/2024 1.2 0.5 - 1.4 mg/dL Final   04/05/2024 1.2 0.5 - 1.4 mg/dL Final   12/11/2018 0.95 0.60 - 1.40 mg/dL      Calcium   Date Value Ref Range Status   04/05/2024 10.0 8.7 - 10.5 mg/dL Final   04/05/2024 10.0 8.7 - 10.5 mg/dL Final     Total Protein   Date Value Ref Range Status   10/05/2023 6.8 6.0 - 8.4 g/dL Final     Albumin   Date Value Ref Range Status   10/05/2023 3.8 3.5 - 5.2 g/dL Final   12/11/2018 3.9 3.1 - 4.7 g/dL      Total Bilirubin   Date Value Ref Range Status   10/05/2023 0.3 0.1 - 1.0 mg/dL Final     Comment:     For infants and newborns, interpretation of results should be based  on gestational age, weight and in agreement with clinical  observations.    Premature Infant  recommended reference ranges:  Up to 24 hours.............<8.0 mg/dL  Up to 48 hours............<12.0 mg/dL  3-5 days..................<15.0 mg/dL  6-29 days.................<15.0 mg/dL       Alkaline Phosphatase   Date Value Ref Range Status   10/05/2023 75 55 - 135 U/L Final     AST   Date Value Ref Range Status   10/05/2023 23 10 - 40 U/L Final     ALT   Date Value Ref Range Status   10/05/2023 12 10 - 44 U/L Final     Anion Gap   Date Value Ref Range Status   04/05/2024 11 8 - 16 mmol/L Final   04/05/2024 11 8 - 16 mmol/L Final     eGFR   Date Value Ref Range Status   04/05/2024 43.5 (A) >60 mL/min/1.73 m^2 Final   04/05/2024 43.5 (A) >60 mL/min/1.73 m^2 Final         PFT results reviewed      Plan:  Clinical impression is apparently straight forward and impression with management as below.    She reports that she does not have any history of frequent pneumonias or bornchitis.   She can't walk well, but she thinks its due to weakness or balance. Breathing and SOB are not reallly limitations for here.   She is using oxygen only at night.   She has sleep apnea- I think she likely needs repeat testing with additional CPAP titration- it sounds like her AHI may not be well controlled and she was using oxygen therapy.     Will have her follow up with [facility]- in 3-6 months after sleep study.     Addend- Patient is familiar with Dr Saha and would like to follow up with her.      Problem List Items Addressed This Visit    None      No follow-ups on file.    Discussed with patient above for education the following:      There are no Patient Instructions on file for this visit.

## 2024-04-24 ENCOUNTER — TELEPHONE (OUTPATIENT)
Dept: PULMONOLOGY | Facility: CLINIC | Age: 89
End: 2024-04-24
Payer: MEDICARE

## 2024-04-24 ENCOUNTER — TELEPHONE (OUTPATIENT)
Dept: FAMILY MEDICINE | Facility: CLINIC | Age: 89
End: 2024-04-24
Payer: MEDICARE

## 2024-04-24 NOTE — TELEPHONE ENCOUNTER
----- Message from Sunshine Alex, Patient Care Assistant sent at 4/24/2024  2:10 PM CDT -----  Regarding: advice  Contact: Ruth with [facility]  Type: Needs Medical Advice    Who Called:  Ruth with [facility]    Best Call Back Number:      Additional Information: Ruth with [facility] states she would like a callback regarding a fax that was sent to the office. Please call to advise. Thanks!

## 2024-04-24 NOTE — TELEPHONE ENCOUNTER
Called facility and told them this order for genetic testing is denied as not medically necessary.

## 2024-04-24 NOTE — TELEPHONE ENCOUNTER
Called pt but no answer, MD would like both test done     ----- Message from Micha Gamez sent at 4/24/2024 11:36 AM CDT -----  Type: Needs Medical Advice  Who Called:  [facility]  Pharmacy name and phone #:    Best Call Back Number: [phone]  Additional Information: pt is calling the office for/ some omne from [facility] is calling to confirm which sleep study the pt is suppose to do. Please call back to advise.

## 2024-04-24 NOTE — TELEPHONE ENCOUNTER
----- Message from Virginia Dill sent at 4/24/2024 11:47 AM CDT -----  Type:  Patient Returning Call    Who Called:  pt   Who Left Message for Patient:  nurse   Does the patient know what this is regarding?:  no  Best Call Back Number:  [phone] (work)    Additional Information:  please advise

## 2024-04-30 ENCOUNTER — EXTERNAL CHRONIC CARE MANAGEMENT (OUTPATIENT)
Dept: PRIMARY CARE CLINIC | Facility: CLINIC | Age: 89
End: 2024-04-30
Payer: MEDICARE

## 2024-04-30 ENCOUNTER — OFFICE VISIT (OUTPATIENT)
Dept: FAMILY MEDICINE | Facility: CLINIC | Age: 89
End: 2024-04-30
Attending: FAMILY MEDICINE
Payer: MEDICARE

## 2024-04-30 VITALS
OXYGEN SATURATION: 94 % | SYSTOLIC BLOOD PRESSURE: 138 MMHG | HEIGHT: 58 IN | WEIGHT: 113.44 LBS | BODY MASS INDEX: 23.81 KG/M2 | DIASTOLIC BLOOD PRESSURE: 72 MMHG | TEMPERATURE: 99 F | HEART RATE: 81 BPM

## 2024-04-30 DIAGNOSIS — I35.0 AORTIC VALVE STENOSIS, MODERATE: ICD-10-CM

## 2024-04-30 DIAGNOSIS — I50.43 ACUTE ON CHRONIC COMBINED SYSTOLIC AND DIASTOLIC HEART FAILURE: ICD-10-CM

## 2024-04-30 DIAGNOSIS — M05.759: ICD-10-CM

## 2024-04-30 DIAGNOSIS — D63.8 ANEMIA OF CHRONIC DISEASE: Chronic | ICD-10-CM

## 2024-04-30 DIAGNOSIS — I15.1 HYPERTENSION SECONDARY TO OTHER RENAL DISORDERS: ICD-10-CM

## 2024-04-30 DIAGNOSIS — I25.118 CORONARY ARTERY DISEASE OF NATIVE ARTERY OF NATIVE HEART WITH STABLE ANGINA PECTORIS: ICD-10-CM

## 2024-04-30 DIAGNOSIS — G47.33 OBSTRUCTIVE SLEEP APNEA: ICD-10-CM

## 2024-04-30 DIAGNOSIS — R53.83 FATIGUE, UNSPECIFIED TYPE: Primary | ICD-10-CM

## 2024-04-30 DIAGNOSIS — N18.32 STAGE 3B CHRONIC KIDNEY DISEASE: ICD-10-CM

## 2024-04-30 DIAGNOSIS — R73.03 PREDIABETES: ICD-10-CM

## 2024-04-30 PROCEDURE — 99213 OFFICE O/P EST LOW 20 MIN: CPT | Mod: PBBFAC,PN | Performed by: FAMILY MEDICINE

## 2024-04-30 PROCEDURE — 99439 CHRNC CARE MGMT STAF EA ADDL: CPT | Mod: PBBFAC,PN | Performed by: FAMILY MEDICINE

## 2024-04-30 PROCEDURE — 99490 CHRNC CARE MGMT STAFF 1ST 20: CPT | Mod: S$PBB,,, | Performed by: FAMILY MEDICINE

## 2024-04-30 PROCEDURE — 99999 PR PBB SHADOW E&M-EST. PATIENT-LVL III: CPT | Mod: PBBFAC,,, | Performed by: FAMILY MEDICINE

## 2024-04-30 PROCEDURE — 99490 CHRNC CARE MGMT STAFF 1ST 20: CPT | Mod: PBBFAC,PN | Performed by: FAMILY MEDICINE

## 2024-04-30 PROCEDURE — 99439 CHRNC CARE MGMT STAF EA ADDL: CPT | Mod: S$PBB,,, | Performed by: FAMILY MEDICINE

## 2024-04-30 PROCEDURE — 99214 OFFICE O/P EST MOD 30 MIN: CPT | Mod: S$PBB,,, | Performed by: FAMILY MEDICINE

## 2024-04-30 NOTE — PROGRESS NOTES
Subjective:       Patient ID: Cheyanne Gomes is a 88 y.o. female.    Chief Complaint: No chief complaint on file.    88-year-old female coming in for general checkup complaining of chronic fatigue, malaise, and just not feeling well.  She was last seen April 9, 2024 with hypoxia including a pulse ox of 72% on room air when she presented increasing the 95% on 3 L of nasal cannula oxygen.  She had a suspected history of emphysema based on imaging but not documented on pulmonary function testing, combined acute on chronic systolic and diastolic heart failure, paroxysmal supraventricular tachycardia and sick sinus syndrome with a pacemaker, hypertension, hyperlipidemia, coronary artery disease with three-vessel bypass surgery in the year 2000, chronic kidney disease, rheumatoid arthritis and Sjogren syndrome, anemia of chronic disease, Barretts esophagus and chronic pain on gabapentin.  She is on a Dilaudid pain pump filled every two months and also has some tramadol that she uses rarely.  She did have some sedating agents on her medication list including Zoloft, Zofran, and Phenergan in addition to the gabapentin.  She was followed by Dr. Blair in Cardiology and actually had an appointment with him after our visit at that time.  She was placed on oxygen, her pulse of 120 came down 64 in the oxygen saturation went up to 95% as noted above.  She was awake alert an oriented with no evidence of sedation she had no complaints of chest pain, cough, fever, chills, or night sweats and denied any orthopnea but did report that she slept with her head up at about a 30 degree angle because of comfort with her rheumatoid arthritis and back pain but not for dyspnea.  She was noted to have some generally diminished breath sounds but no rales.  A pulmonary function test with bronchodilator was ordered and was read by Dr. Saha at a later date with no evidence of COPD and in fact was normal with the exception of some abnormal  "diffusion.  We also ordered home oxygen based on her initial presenting oxygen saturation on room air with mild exertion consisting of walking into the office.    She was seen by Dr. Blair soon after, they did not apparently check a pulse ox at that time.  He noted that she had had a sleep study done with 51 episodes of apnea and oxygen saturations dropping down to 70%.  A CPAP machine had been ordered.  He also changed her isosorbide mononitrate to isosorbide dinitrate which was better suited for heart failure.  He felt that she was suffering from volume overload and had her take Zaroxolyn for several days until her breathing improved.  He also instructed her to use the oxygen at night while she was sleeping regardless of whether she felt short of breath.    The patient tells me that she got her CPAP machine with nasal pillows apparently initially but she was unable to wear them because they made her nose hurt.  She was then switch to a facemask which was also uncomfortable due to the tightness of the harness.  I do not know what her pressure settings are but she thinks it was "four".    She has had a number of lab tests done over recent months.  She had a CBC and BNP with a BMP ordered by Dr. Blair on April 5th.  The hemoglobin was 11.3 up from 10.51 month prior with a normal MCV not suggesting iron deficiency, a normal white blood cell count and normal platelet count.  The BNP was elevated at 1174 up from 369 March 7, 2024.  The chemistry panel had a GFR of 43.5 with a BUN of 24 and a creatinine of 1.2.  Her blood glucose was 99 her CO2 was 31 and the electrolytes were otherwise normal.  A ferritin level done on March 7, 2024 by Dr. Pal's was 559 and was showing a steady downward trend towards normal.  She had a normal magnesium level of 1.9 on January 9, 2024.  Other labs were significantly earlier and probably not contributory.    Past Medical History:  No date: Abdominal hernia  No date: Anemia    "   Comment:  Dr Berkowitz   No date: Angina pectoris  No date: Aortic valve stenosis, moderate  No date: Back pain  No date: Cannon's esophagus  No date: CAD (coronary artery disease)  No date: Cataract      Comment:  ou done  No date: CHF (congestive heart failure)      Comment:  EFx 35%, Dr. 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  No date: Rheumatoid arthritis  2017: Shingles  No date: Sjogren syndrome  No date: Ulcerative colitis    Past Surgical History:  2021: A-V CARDIAC PACEMAKER INSERTION; Left      Comment:  Procedure: INSERTION, CARDIAC PACEMAKER, DUAL CHAMBER                 ([employer]);  Surgeon: Tera Blair MD;                 Location: [facility] CATH/EP LAB;  Service: Cardiology;                 Laterality: Left;  No date: APPENDECTOMY  No date: BACK SURGERY  No date: CARDIAC SURGERY      Comment:  CABGx3 in   No date: CATARACT EXTRACTION      Comment:  ou od d 11-15-12//  No date:  SECTION, CLASSIC      Comment:  x 2  No date: CHOLECYSTECTOMY  2022: CLOSURE OF WOUND; Right      Comment:  Procedure: CLOSURE-WOUND;  Surgeon: Delvis Jackson MD;  Location: [facility] OR;  Service: ENT;  Laterality:                Right;  NOSE AND CHIN  09/15/2011: COLONOSCOPY      Comment:  Dr Anaya   01/10/2017: COLONOSCOPY      Comment:  [facility] report sent to scanning  No date: CORONARY ANGIOPLASTY      Comment:  PCI x  1 in 2007  No date: CORONARY ARTERY BYPASS GRAFT      Comment:  3 vessel  No date: CORONARY ARTERY BYPASS GRAFT  6/3/2020: CYSTOSCOPY; N/A      Comment:  Procedure: CYSTOSCOPY;  Surgeon: Miryam Bender Jr., MD;               Location: [facility] OR;  Service: Urology;  Laterality: N/A;  No date: eyes      Comment:  rk ou  06/21/2016: FRACTURE SURGERY      Comment:  Dr Guido left hip   2018: FRACTURE SURGERY      Comment:  Right Hip  9/23/2022: HARVESTING OF SKIN GRAFT      Comment:  Procedure: SURGICAL PROCUREMENT, GRAFT, SKIN;  Surgeon:                eDlvis Jackson MD;  Location: [facility] OR;  Service: ENT;;               RIGHT CHEST  No date: HYSTERECTOMY      Comment:  tubal ligation, oopherectomy  10/8/2018: INTRAMEDULLARY RODDING OF TROCHANTER OF FEMUR; Right      Comment:  Procedure: INSERTION, INTRAMEDULLARY KELSI, FEMUR,                TROCHANTER;  Surgeon: Valerio Luther MD;  Location: [facility] OR;               Service: Orthopedics;  Laterality: Right;  No date: OOPHORECTOMY  8-: SPINE SURGERY      Comment:  Silvino Mckeon  No date: UPPER GASTROINTESTINAL ENDOSCOPY  9/25/14: Yag Capsulotomy; Right    Current Outpatient Medications on File Prior to Visit:  amLODIPine (NORVASC) 5 MG tablet, TAKE 1 TABLET BY MOUTH 2 TIMES A DAY, Disp: 60 tablet, Rfl: 5  aspirin 81 mg Tab, Take 1 tablet by mouth every evening. Every day, Disp: , Rfl:   biotin 5 mg Cap, Take 1 tablet by mouth once daily., Disp: , Rfl:   CALCIUM CARB/VIT D3/MINERALS (CALCIUM-VITAMIN D ORAL), Take 600 mg by mouth 2 (two) times daily. Calcium 1200 with D 3 1000, Disp: , Rfl:   CEQUA 0.09 % Dpet, Place 1 drop into both eyes 2 (two) times daily., Disp: , Rfl:   CRANBERRY CONC/ASCORBIC ACID (CRANBERRY PLUS VITAMIN C ORAL), Take 1 capsule by mouth once daily., Disp: , Rfl:   DEXILANT 60 mg capsule, Take 60 mg by mouth once daily. , Disp: , Rfl: 11  ENTRESTO  mg per tablet, Take 1 tablet by mouth 2 (two) times daily., Disp: 60 tablet, Rfl: 4  famotidine  (PEPCID) 20 MG tablet, Take 1 tablet (20 mg total) by mouth every evening., Disp: 30 tablet, Rfl: 11  fluticasone (FLONASE) 50 mcg/actuation nasal spray, 2 sprays by Each Nare route once daily. (Patient taking differently: 2 sprays by Each Nostril route daily as needed.), Disp: 16 g, Rfl: 0  folic acid (FOLVITE) 1 MG tablet, Take 2 mg by mouth once daily. , Disp: , Rfl:   furosemide (LASIX) 80 MG tablet, Take 1 tablet (80 mg total) by mouth once daily., Disp: 90 tablet, Rfl: 3  gabapentin (NEURONTIN) 100 MG capsule, Take 2 capsules (200 mg total) by mouth every evening., Disp: 180 capsule, Rfl: 1  HYDROmorphone (DILAUDID) 2 MG tablet, Take 2 mg by mouth every 4 (four) hours as needed for Pain., Disp: , Rfl:   isosorbide dinitrate (ISORDIL) 10 MG tablet, Take 1 tablet (10 mg total) by mouth 3 (three) times daily., Disp: 270 tablet, Rfl: 3  Lactobacillus acidophilus 10 billion cell Cap, Take 1 each by mouth once daily. 1.5 billion, Disp: , Rfl:   magnesium oxide (MAG-OX) 400 mg (241.3 mg magnesium) tablet, TAKE 1 TABLET BY MOUTH 2 TIMES A DAY (Patient taking differently: Take 400 mg by mouth 2 (two) times daily.), Disp: 120 tablet, Rfl: 2  metoprolol succinate (TOPROL-XL) 50 MG 24 hr tablet, Take 1 tablet (50 mg total) by mouth 2 (two) times daily. 25 mg QAM and 12.5 mg QHS (Patient taking differently: Take 50 mg by mouth 2 (two) times daily.), Disp: 180 tablet, Rfl: 3  MULTIVITAMIN WITH MINERALS (ONE-A-DAY 50 PLUS) Tab, Take 1 tablet by mouth once daily. Every day, Disp: , Rfl:   nitroGLYCERIN 0.4 MG/DOSE TL SPRY (NITROLINGUAL) 400 mcg/spray spray, Place 1 spray under the tongue every 5 (five) minutes as needed for Chest pain. PRN, Disp: 4.9 g, Rfl: 3  omega-3 fatty acids/fish oil (FISH OIL-OMEGA-3 FATTY ACIDS) 300-1,000 mg capsule, Take 1 capsule by mouth once daily., Disp: , Rfl:   ondansetron (ZOFRAN) 4 MG tablet, Take 4 mg by mouth every 8 (eight) hours as needed for Nausea. , Disp: , Rfl: 2  potassium chloride  SA (K-DUR,KLOR-CON M) 10 MEQ tablet, TAKE TWO TABLETS BY MOUTH EVERY MORNING AND 1 TABLET EVERY EVENING, Disp: 270 tablet, Rfl: 2  promethazine (PHENERGAN) 25 MG tablet, Take 1 tablet (25 mg total) by mouth 2 (two) times daily as needed for Nausea., Disp: 60 tablet, Rfl: 1  sertraline (ZOLOFT) 50 MG tablet, Take 2 tablets (100 mg total) by mouth once daily., Disp: 180 tablet, Rfl: 2  traMADoL (ULTRAM) 50 mg tablet, Take 50 mg by mouth 2 (two) times daily as needed for Pain., Disp: , Rfl:   vitamin E 400 unit Tab, Take 400 mg by mouth once daily. Every day, Disp: , Rfl:   metOLazone (ZAROXOLYN) 2.5 MG tablet, Take 1 tablet (2.5 mg total) by mouth once daily., Disp: 30 tablet, Rfl: 0  [DISCONTINUED] hydrochlorothiazide (HYDRODIURIL) 25 MG tablet, Take 25 mg by mouth once daily., Disp: , Rfl:     No current facility-administered medications on file prior to visit.          Review of Systems   Constitutional:  Negative for activity change and unexpected weight change.   HENT:  Positive for rhinorrhea. Negative for hearing loss and trouble swallowing.    Eyes:  Positive for visual disturbance. Negative for discharge.   Respiratory:  Negative for chest tightness and wheezing.    Cardiovascular:  Positive for palpitations. Negative for chest pain.   Gastrointestinal:  Positive for diarrhea. Negative for blood in stool, constipation and vomiting.   Endocrine: Negative for polydipsia and polyuria.   Genitourinary:  Negative for difficulty urinating, dysuria, hematuria and menstrual problem.   Musculoskeletal:  Positive for arthralgias and joint swelling. Negative for neck pain.   Neurological:  Positive for weakness and headaches.   Psychiatric/Behavioral:  Positive for confusion, dysphoric mood and sleep disturbance (She does admit to waking up fatigued with non restorative sleep although she does not recall waking up during the night more than once or twice to urinate).        Objective:      Physical Exam  Vitals and  nursing note reviewed.   Constitutional:       General: She is not in acute distress.     Appearance: Normal appearance. She is normal weight. She is ill-appearing (She appears chronically ill and tired). She is not toxic-appearing or diaphoretic.      Comments: Her blood pressure was satisfactory at 138/72, pulse was 81 and regular and her oxygen saturation today was 94%.  Her weight is normal at a BMI of 23.7 and she is up 1.2 lb from her April 9 visit with me.   HENT:      Head: Normocephalic and atraumatic.   Eyes:      General: No scleral icterus.     Extraocular Movements: Extraocular movements intact.      Pupils: Pupils are equal, round, and reactive to light.   Neck:      Vascular: No carotid bruit.   Cardiovascular:      Rate and Rhythm: Normal rate and regular rhythm.      Heart sounds: Murmur (Somewhat low to moderate pitched decrescendo systolic murmur radiating across from the left upper sternal border to the right upper sternal border and the base of the neck) heard.      No friction rub. No gallop.   Pulmonary:      Effort: Pulmonary effort is normal.      Breath sounds: No decreased breath sounds, wheezing, rhonchi or rales.   Chest:      Chest wall: No tenderness or crepitus. There is no dullness to percussion.   Musculoskeletal:      Cervical back: Normal range of motion and neck supple. No rigidity or tenderness.      Right lower leg: No edema (Trace).      Left lower leg: No edema (Trace plus).   Lymphadenopathy:      Cervical: No cervical adenopathy.   Skin:     General: Skin is warm and dry.      Coloration: Skin is not jaundiced or pale.      Findings: No bruising, erythema or rash.   Neurological:      General: No focal deficit present.      Mental Status: She is alert and oriented to person, place, and time. Mental status is at baseline.   Psychiatric:      Comments: She admits to feeling depressed, stating that she feels it is due to chronic illness and not feeling good.  She is on  sertraline 100 mg daily and feels it does help but of course can not change her situation and chronic ill condition.         Assessment:       1. Fatigue, unspecified type    2. Prediabetes    3. Hypertension secondary to other renal disorders    4. Coronary artery disease of native artery of native heart with stable angina pectoris    5. Aortic valve stenosis, moderate    6. Acute on chronic combined systolic and diastolic heart failure    7. Stage 3b chronic kidney disease    8. Rheumatoid arthritis involving hip with positive rheumatoid factor, unspecified laterality    9. Anemia of chronic disease    10. Obstructive sleep apnea    11. BMI 23.0-23.9, adult        Plan:       1. Fatigue, unspecified type  Probably multifactorial, she has not had a thyroid check recently and does have a history of prediabetes so will update the TSH and A1c to see if anything potentially correctable can be found there.  I suspect the majority of her illness may be more related to the sleep apnea and if she can get her CPAP adjusted to a usable state I think it will help.  - TSH; Future  - Hemoglobin A1C; Future  - Comprehensive Metabolic Panel; Future    2. Prediabetes  Await A1c result  - Hemoglobin A1C; Future  - Comprehensive Metabolic Panel; Future    3. Hypertension secondary to other renal disorders  Fairly well controlled, she does have a bit of polypharmacy going on and I would try not to add anymore medications to the mix if possible.    4. Coronary artery disease of native artery of native heart with stable angina pectoris  Asymptomatic with no chest pains, questionably no orthopnea    5. Aortic valve stenosis, moderate  Reviewed her most recent echocardiogram done at [facility] in [place] December 4, 2024:      CONCLUSIONS:   1. Normal left ventricular cavity size. Low normal left ventricular systolic function.   LVEF 50 - 55%. Indeterminate diastolic dysfunction.   2. Normal right ventricular  size and systolic function. A pacemaker or ICD lead is noted   in the right ventricle.   3. Mild mitral valve regurgitation. Mitral valve leaflets appear mildly thickened. Mild   mitral annular calcification.   4. Mild aortic valve regurgitation. Aortic valve cusps appear moderately calcified.   Reduced excursion of the aortic cusps.   5. Mild tricuspid valve regurgitation. The estimated right ventricular systolic   pressure/PASP is 35-40 mmHg.     6. Acute on chronic combined systolic and diastolic heart failure  As noted above ejection fraction estimated at 50 to 55% with indeterminate diastolic function    7. Stage 3b chronic kidney disease  BMP  Lab Results   Component Value Date     04/05/2024     04/05/2024    K 4.8 04/05/2024    K 4.8 04/05/2024    CL 99 04/05/2024    CL 99 04/05/2024    CO2 31 (H) 04/05/2024    CO2 31 (H) 04/05/2024    BUN 24 (H) 04/05/2024    BUN 24 (H) 04/05/2024    CREATININE 1.2 04/05/2024    CREATININE 1.2 04/05/2024    CALCIUM 10.0 04/05/2024    CALCIUM 10.0 04/05/2024    ANIONGAP 11 04/05/2024    ANIONGAP 11 04/05/2024    EGFRNORACEVR 43.5 (A) 04/05/2024    EGFRNORACEVR 43.5 (A) 04/05/2024     Stable but may be contributing to some of her symptoms and we will have to be mindful of renal function with her dosing of medications    8. Rheumatoid arthritis involving hip with positive rheumatoid factor, unspecified laterality  She is not on any anti inflammatories or immune modulators at this time.    9. Anemia of chronic disease  Lab Results   Component Value Date    WBC 5.71 04/05/2024    HGB 11.3 (L) 04/05/2024    HCT 36.7 (L) 04/05/2024    MCV 91 04/05/2024     04/05/2024       Stable and improving    10. Obstructive sleep apnea  A repeat sleep study was ordered to be done in-house, not a home study as her previous one had been.  Results are pending, hopefully they can find a mask or other suitable device that she can tolerate    11. BMI 23.0-23.9, adult  Good  weight, slight increase                dysphoric mood: Yes

## 2024-05-03 ENCOUNTER — LAB VISIT (OUTPATIENT)
Dept: PRIMARY CARE CLINIC | Facility: CLINIC | Age: 89
End: 2024-05-03
Attending: FAMILY MEDICINE
Payer: MEDICARE

## 2024-05-03 DIAGNOSIS — I25.118 CORONARY ARTERY DISEASE OF NATIVE ARTERY OF NATIVE HEART WITH STABLE ANGINA PECTORIS: ICD-10-CM

## 2024-05-03 DIAGNOSIS — N18.32 STAGE 3B CHRONIC KIDNEY DISEASE: ICD-10-CM

## 2024-05-03 DIAGNOSIS — R53.83 FATIGUE, UNSPECIFIED TYPE: ICD-10-CM

## 2024-05-03 DIAGNOSIS — Z95.0 CARDIAC PACEMAKER IN SITU: ICD-10-CM

## 2024-05-03 DIAGNOSIS — I47.10 PSVT (PAROXYSMAL SUPRAVENTRICULAR TACHYCARDIA): ICD-10-CM

## 2024-05-03 DIAGNOSIS — R73.03 PREDIABETES: ICD-10-CM

## 2024-05-03 LAB
ALBUMIN SERPL BCP-MCNC: 3.6 G/DL (ref 3.5–5.2)
ALP SERPL-CCNC: 91 U/L (ref 55–135)
ALT SERPL W/O P-5'-P-CCNC: 19 U/L (ref 10–44)
ANION GAP SERPL CALC-SCNC: 12 MMOL/L (ref 8–16)
ANION GAP SERPL CALC-SCNC: 12 MMOL/L (ref 8–16)
AST SERPL-CCNC: 27 U/L (ref 10–40)
BILIRUB SERPL-MCNC: 0.5 MG/DL (ref 0.1–1)
BNP SERPL-MCNC: 1412 PG/ML (ref 0–99)
BUN SERPL-MCNC: 23 MG/DL (ref 8–23)
BUN SERPL-MCNC: 23 MG/DL (ref 8–23)
CALCIUM SERPL-MCNC: 9.1 MG/DL (ref 8.7–10.5)
CALCIUM SERPL-MCNC: 9.1 MG/DL (ref 8.7–10.5)
CHLORIDE SERPL-SCNC: 99 MMOL/L (ref 95–110)
CHLORIDE SERPL-SCNC: 99 MMOL/L (ref 95–110)
CO2 SERPL-SCNC: 27 MMOL/L (ref 23–29)
CO2 SERPL-SCNC: 27 MMOL/L (ref 23–29)
CREAT SERPL-MCNC: 1.1 MG/DL (ref 0.5–1.4)
CREAT SERPL-MCNC: 1.1 MG/DL (ref 0.5–1.4)
ERYTHROCYTE [DISTWIDTH] IN BLOOD BY AUTOMATED COUNT: 14.8 % (ref 11.5–14.5)
EST. GFR  (NO RACE VARIABLE): 48.3 ML/MIN/1.73 M^2
EST. GFR  (NO RACE VARIABLE): 48.3 ML/MIN/1.73 M^2
ESTIMATED AVG GLUCOSE: 117 MG/DL (ref 68–131)
GLUCOSE SERPL-MCNC: 97 MG/DL (ref 70–110)
GLUCOSE SERPL-MCNC: 97 MG/DL (ref 70–110)
HBA1C MFR BLD: 5.7 % (ref 4–5.6)
HCT VFR BLD AUTO: 31.7 % (ref 37–48.5)
HGB BLD-MCNC: 9.8 G/DL (ref 12–16)
MCH RBC QN AUTO: 27 PG (ref 27–31)
MCHC RBC AUTO-ENTMCNC: 30.9 G/DL (ref 32–36)
MCV RBC AUTO: 87 FL (ref 82–98)
PLATELET # BLD AUTO: 168 K/UL (ref 150–450)
PMV BLD AUTO: 10.9 FL (ref 9.2–12.9)
POTASSIUM SERPL-SCNC: 4.4 MMOL/L (ref 3.5–5.1)
POTASSIUM SERPL-SCNC: 4.4 MMOL/L (ref 3.5–5.1)
PROT SERPL-MCNC: 6.7 G/DL (ref 6–8.4)
RBC # BLD AUTO: 3.63 M/UL (ref 4–5.4)
SODIUM SERPL-SCNC: 138 MMOL/L (ref 136–145)
SODIUM SERPL-SCNC: 138 MMOL/L (ref 136–145)
TSH SERPL DL<=0.005 MIU/L-ACNC: 2.61 UIU/ML (ref 0.4–4)
WBC # BLD AUTO: 5.37 K/UL (ref 3.9–12.7)

## 2024-05-03 PROCEDURE — 80053 COMPREHEN METABOLIC PANEL: CPT | Performed by: FAMILY MEDICINE

## 2024-05-03 PROCEDURE — 36415 COLL VENOUS BLD VENIPUNCTURE: CPT | Mod: S$GLB,,, | Performed by: FAMILY MEDICINE

## 2024-05-03 PROCEDURE — 85027 COMPLETE CBC AUTOMATED: CPT | Performed by: INTERNAL MEDICINE

## 2024-05-03 PROCEDURE — 83036 HEMOGLOBIN GLYCOSYLATED A1C: CPT | Performed by: FAMILY MEDICINE

## 2024-05-03 PROCEDURE — 83880 ASSAY OF NATRIURETIC PEPTIDE: CPT | Performed by: INTERNAL MEDICINE

## 2024-05-03 PROCEDURE — 84443 ASSAY THYROID STIM HORMONE: CPT | Performed by: FAMILY MEDICINE

## 2024-05-07 ENCOUNTER — PROCEDURE VISIT (OUTPATIENT)
Dept: SLEEP MEDICINE | Facility: HOSPITAL | Age: 89
End: 2024-05-07
Attending: STUDENT IN AN ORGANIZED HEALTH CARE EDUCATION/TRAINING PROGRAM
Payer: MEDICARE

## 2024-05-07 DIAGNOSIS — G47.33 OBSTRUCTIVE SLEEP APNEA: ICD-10-CM

## 2024-05-07 PROCEDURE — 95811 POLYSOM 6/>YRS CPAP 4/> PARM: CPT

## 2024-05-08 ENCOUNTER — HOSPITAL ENCOUNTER (OUTPATIENT)
Dept: RADIOLOGY | Facility: HOSPITAL | Age: 89
Discharge: HOME | End: 2024-05-08
Attending: FAMILY MEDICINE
Payer: MEDICARE

## 2024-05-08 ENCOUNTER — OFFICE VISIT (OUTPATIENT)
Dept: CARDIOLOGY | Facility: CLINIC | Age: 89
End: 2024-05-08
Payer: MEDICARE

## 2024-05-08 VITALS
DIASTOLIC BLOOD PRESSURE: 70 MMHG | WEIGHT: 108.69 LBS | BODY MASS INDEX: 22.81 KG/M2 | HEART RATE: 87 BPM | OXYGEN SATURATION: 90 % | SYSTOLIC BLOOD PRESSURE: 130 MMHG | HEIGHT: 58 IN

## 2024-05-08 DIAGNOSIS — I35.0 AORTIC VALVE STENOSIS, MODERATE: ICD-10-CM

## 2024-05-08 DIAGNOSIS — I47.10 PSVT (PAROXYSMAL SUPRAVENTRICULAR TACHYCARDIA): ICD-10-CM

## 2024-05-08 DIAGNOSIS — M81.0 OSTEOPOROSIS, UNSPECIFIED OSTEOPOROSIS TYPE, UNSPECIFIED PATHOLOGICAL FRACTURE PRESENCE: ICD-10-CM

## 2024-05-08 DIAGNOSIS — D63.8 ANEMIA OF CHRONIC DISEASE: Chronic | ICD-10-CM

## 2024-05-08 DIAGNOSIS — I25.118 CORONARY ARTERY DISEASE OF NATIVE ARTERY OF NATIVE HEART WITH STABLE ANGINA PECTORIS: ICD-10-CM

## 2024-05-08 DIAGNOSIS — N18.32 STAGE 3B CHRONIC KIDNEY DISEASE: Primary | ICD-10-CM

## 2024-05-08 DIAGNOSIS — I42.0 CONGESTIVE CARDIOMYOPATHY: ICD-10-CM

## 2024-05-08 DIAGNOSIS — Z95.0 PACEMAKER: ICD-10-CM

## 2024-05-08 DIAGNOSIS — I50.43 ACUTE ON CHRONIC COMBINED SYSTOLIC AND DIASTOLIC HEART FAILURE: ICD-10-CM

## 2024-05-08 DIAGNOSIS — G47.33 OBSTRUCTIVE SLEEP APNEA: ICD-10-CM

## 2024-05-08 DIAGNOSIS — I49.5 SICK SINUS SYNDROME: ICD-10-CM

## 2024-05-08 PROCEDURE — 77081 DXA BONE DENSITY APPENDICULR: CPT | Mod: TC,PO

## 2024-05-08 PROCEDURE — 99213 OFFICE O/P EST LOW 20 MIN: CPT | Mod: PBBFAC,PN | Performed by: INTERNAL MEDICINE

## 2024-05-08 PROCEDURE — 77081 DXA BONE DENSITY APPENDICULR: CPT | Mod: 26,,, | Performed by: RADIOLOGY

## 2024-05-08 PROCEDURE — 99999 PR PBB SHADOW E&M-EST. PATIENT-LVL III: CPT | Mod: PBBFAC,,, | Performed by: INTERNAL MEDICINE

## 2024-05-08 PROCEDURE — 99214 OFFICE O/P EST MOD 30 MIN: CPT | Mod: S$PBB,,, | Performed by: INTERNAL MEDICINE

## 2024-05-08 NOTE — PROGRESS NOTES
Patient ID:  Cheyanne Gomes is a 88 y.o. female who presents for follow-up of PSVT, Congestive Heart Failure, Palpitations, and Coronary Artery Disease      Coronary artery disease of native artery of native heart with stable angina pectoris  Her angina is stable     Hypertension  On Toprol XL, Entresto, isosorbide dinitrate, diuretics     Aortic valve stenosis, moderate  Moderate aortic stenosis     PSVT (paroxysmal supraventricular tachycardia)  She is to take oxygen at night.  If she continues to have palpitations she is to take extra 25 mg of Toprol-XL     Acute on chronic combined systolic and diastolic heart failure  She is to take extra doses of Zaroxolyn in addition to her Lasix.  Change the isosorbide mononitrate to isosorbide dinitrate.     Obstructive sleep apnea  She is to get her CPAP machine.  And oxygen to wear at night.  During the last visit the Zaroxolyn was increased to extra doses and she was started on isosorbide mononitrate 3 times a day.  Her oxygen has been doing better she feels better.  She started physical therapy.  She is using the BiPAP machine.        Past Medical History:   Diagnosis Date    Abdominal hernia     Anemia     Dr Berkowitz     Angina pectoris     Aortic valve stenosis, moderate     Back pain     Cannon's esophagus     CAD (coronary artery disease)     Cataract     ou done    CHF (congestive heart failure)     EFx 35%, Dr. Spencer 12/19/13    Chronic combined systolic and diastolic heart failure 09/28/2019    Colitis     Compression fracture     Diverticulosis     Encounter for blood transfusion     GERD (gastroesophageal reflux disease)     Hyperlipidemia     Hypertension     Irritable bowel syndrome     Myocardial infarction     Obstructive sleep apnea 4/9/2024    Osteoarthritis     lumbar DDD    Pacemaker     Pneumonia 01/03/2017    Pulmonary emphysema, unspecified emphysema type 4/9/2024    Raynaud disease     Rheumatoid arteritis     Rheumatoid arthritis      Shingles 2017    Sjogren syndrome     Ulcerative colitis         Past Surgical History:   Procedure Laterality Date    A-V CARDIAC PACEMAKER INSERTION Left 2021    Procedure: INSERTION, CARDIAC PACEMAKER, DUAL CHAMBER  ([employer]);  Surgeon: Tera Blair MD;  Location: [facility] CATH/EP LAB;  Service: Cardiology;  Laterality: Left;    APPENDECTOMY      BACK SURGERY      CARDIAC SURGERY      CABGx3 in     CATARACT EXTRACTION      ou od d 11-15-12//     SECTION, CLASSIC      x 2    CHOLECYSTECTOMY      CLOSURE OF WOUND Right 2022    Procedure: CLOSURE-WOUND;  Surgeon: Delvis Jackson MD;  Location: [facility] OR;  Service: ENT;  Laterality: Right;  NOSE AND CHIN    COLONOSCOPY  09/15/2011    Dr Anaya     COLONOSCOPY  01/10/2017    [facility] report sent to scanning    CORONARY ANGIOPLASTY      PCI x 1 in     CORONARY ARTERY BYPASS GRAFT      3 vessel    CORONARY ARTERY BYPASS GRAFT      COSMETIC SURGERY  on nose    CYSTOSCOPY N/A 2020    Procedure: CYSTOSCOPY;  Surgeon: Miryam Bender Jr., MD;  Location: [facility] OR;  Service: Urology;  Laterality: N/A;    EYE SURGERY      eyes      rk ou    FRACTURE SURGERY  2016    Dr Guido left hip     FRACTURE SURGERY  2018    Right Hip    HARVESTING OF SKIN GRAFT  2022    Procedure: SURGICAL PROCUREMENT, GRAFT, SKIN;  Surgeon: Delvis Jackson MD;  Location: [facility] OR;  Service: ENT;;  RIGHT CHEST    HYSTERECTOMY      tubal ligation, oopherectomy    INTRAMEDULLARY RODDING OF TROCHANTER OF FEMUR Right 10/08/2018    Procedure: INSERTION, INTRAMEDULLARY KELSI, FEMUR, TROCHANTER;  Surgeon: Valerio Luther MD;  Location: [facility] OR;  Service: Orthopedics;  Laterality: Right;    OOPHORECTOMY      SPINE SURGERY  2013    Silvino Mike    TUBAL LIGATION      UPPER GASTROINTESTINAL ENDOSCOPY      Yag Capsulotomy Right 2014          Current Outpatient Medications   Medication Instructions    amLODIPine (NORVASC) 5 MG tablet TAKE 1 TABLET BY MOUTH 2  TIMES A DAY    aspirin 81 mg Tab 1 tablet, Oral, Nightly, Every day    biotin 5 mg Cap 1 tablet, Oral, Daily    CALCIUM CARB/VIT D3/MINERALS (CALCIUM-VITAMIN D ORAL) 600 mg, Oral, 2 times daily, Calcium 1200 with D 3 1000    CEQUA 0.09 % Dpet 1 drop, Both Eyes, 2 times daily    CRANBERRY CONC/ASCORBIC ACID (CRANBERRY PLUS VITAMIN C ORAL) 1 capsule, Oral, Daily    DEXILANT 60 mg, Oral, Daily    empagliflozin (JARDIANCE) 10 mg, Oral, Daily    ENTRESTO  mg per tablet 1 tablet, Oral, 2 times daily    fluticasone (FLONASE) 50 mcg/actuation nasal spray 2 sprays, Each Nostril, Daily    folic acid (FOLVITE) 2 mg, Oral, Daily    furosemide (LASIX) 80 mg, Oral, Daily    gabapentin (NEURONTIN) 200 mg, Oral, Nightly    HYDROmorphone (DILAUDID) 2 mg, Oral, Every 4 hours PRN    isosorbide dinitrate (ISORDIL) 10 mg, Oral, 3 times daily    Lactobacillus acidophilus 10 billion cell Cap 1 each, Oral, Daily, 1.5 billion    magnesium oxide (MAG-OX) 400 mg (241.3 mg magnesium) tablet TAKE 1 TABLET BY MOUTH 2 TIMES A DAY    metOLazone (ZAROXOLYN) 2.5 mg, Oral, Daily    metoprolol succinate (TOPROL-XL) 50 mg, Oral, 2 times daily, 25 mg QAM and 12.5 mg QHS    MULTIVITAMIN WITH MINERALS (ONE-A-DAY 50 PLUS) Tab 1 tablet, Oral, Daily, Every day    nitroGLYCERIN 0.4 MG/DOSE TL SPRY (NITROLINGUAL) 400 mcg/spray spray 1 spray, Sublingual, Every 5 min PRN, PRN    omega-3 fatty acids/fish oil (FISH OIL-OMEGA-3 FATTY ACIDS) 300-1,000 mg capsule 1 capsule, Oral, Daily    ondansetron (ZOFRAN) 4 mg, Oral, Every 8 hours PRN    potassium chloride SA (K-DUR,KLOR-CON M) 10 MEQ tablet TAKE TWO TABLETS BY MOUTH EVERY MORNING AND 1 TABLET EVERY EVENING    promethazine (PHENERGAN) 25 mg, Oral, 2 times daily PRN    sertraline (ZOLOFT) 100 mg, Oral, Daily    traMADoL (ULTRAM) 50 mg, Oral, 2 times daily PRN    vitamin E 400 mg, Oral, Daily, Every day        Review of patient's allergies indicates:   Allergen Reactions    Adhesive     Nitrofurantoin  "macrocrystalline Nausea And Vomiting     Other reaction(s): very sickly    Penicillins Swelling     Other reaction(s): swelling  Other reaction(s): red skin discolorati    Sulfa (sulfonamide antibiotics) Nausea And Vomiting     Other reaction(s): very sickly        Review of Systems   Cardiovascular:  Positive for dyspnea on exertion. Negative for chest pain, leg swelling and palpitations.   Respiratory:  Negative for cough and shortness of breath.         Objective:     Vitals:    05/08/24 1444   BP: 130/70   BP Location: Left arm   Patient Position: Sitting   BP Method: Medium (Manual)   Pulse: 87   SpO2: (!) 90%   Weight: 49.3 kg (108 lb 11 oz)   Height: 4' 10" (1.473 m)       Physical Exam  Vitals and nursing note reviewed.   Constitutional:       Appearance: She is well-developed.   HENT:      Head: Normocephalic and atraumatic.   Eyes:      Conjunctiva/sclera: Conjunctivae normal.   Cardiovascular:      Rate and Rhythm: Normal rate and regular rhythm.      Heart sounds: Murmur (Grade 4/6 systolic murmur at the base) heard.   Pulmonary:      Effort: Pulmonary effort is normal.      Breath sounds: Normal breath sounds.   Abdominal:      General: Bowel sounds are normal.      Palpations: Abdomen is soft.   Musculoskeletal:         General: Normal range of motion.   Skin:     General: Skin is warm and dry.   Neurological:      Mental Status: She is alert and oriented to person, place, and time.   Psychiatric:         Behavior: Behavior normal.         Thought Content: Thought content normal.         Judgment: Judgment normal.       CMP  Sodium   Date Value Ref Range Status   05/03/2024 138 136 - 145 mmol/L Final   05/03/2024 138 136 - 145 mmol/L Final   12/11/2018 139 134 - 144 mmol/L      Potassium   Date Value Ref Range Status   05/03/2024 4.4 3.5 - 5.1 mmol/L Final   05/03/2024 4.4 3.5 - 5.1 mmol/L Final     Chloride   Date Value Ref Range Status   05/03/2024 99 95 - 110 mmol/L Final   05/03/2024 99 95 - 110 " mmol/L Final   12/11/2018 96 (L) 98 - 110 mmol/L      CO2   Date Value Ref Range Status   05/03/2024 27 23 - 29 mmol/L Final   05/03/2024 27 23 - 29 mmol/L Final     Glucose   Date Value Ref Range Status   05/03/2024 97 70 - 110 mg/dL Final   05/03/2024 97 70 - 110 mg/dL Final   12/11/2018 97 70 - 99 mg/dL      BUN   Date Value Ref Range Status   05/03/2024 23 8 - 23 mg/dL Final   05/03/2024 23 8 - 23 mg/dL Final     Creatinine   Date Value Ref Range Status   05/03/2024 1.1 0.5 - 1.4 mg/dL Final   05/03/2024 1.1 0.5 - 1.4 mg/dL Final   12/11/2018 0.95 0.60 - 1.40 mg/dL      Calcium   Date Value Ref Range Status   05/03/2024 9.1 8.7 - 10.5 mg/dL Final   05/03/2024 9.1 8.7 - 10.5 mg/dL Final     Total Protein   Date Value Ref Range Status   05/03/2024 6.7 6.0 - 8.4 g/dL Final     Albumin   Date Value Ref Range Status   05/03/2024 3.6 3.5 - 5.2 g/dL Final   12/11/2018 3.9 3.1 - 4.7 g/dL      Total Bilirubin   Date Value Ref Range Status   05/03/2024 0.5 0.1 - 1.0 mg/dL Final     Comment:     For infants and newborns, interpretation of results should be based  on gestational age, weight and in agreement with clinical  observations.    Premature Infant recommended reference ranges:  Up to 24 hours.............<8.0 mg/dL  Up to 48 hours............<12.0 mg/dL  3-5 days..................<15.0 mg/dL  6-29 days.................<15.0 mg/dL       Alkaline Phosphatase   Date Value Ref Range Status   05/03/2024 91 55 - 135 U/L Final     AST   Date Value Ref Range Status   05/03/2024 27 10 - 40 U/L Final     ALT   Date Value Ref Range Status   05/03/2024 19 10 - 44 U/L Final     Anion Gap   Date Value Ref Range Status   05/03/2024 12 8 - 16 mmol/L Final   05/03/2024 12 8 - 16 mmol/L Final     eGFR if    Date Value Ref Range Status   11/05/2021 >60.0 >60 mL/min/1.73 m^2 Final     eGFR if non    Date Value Ref Range Status   02/21/2022 56 (L) >59 mL/min/1.73 Final      BMP  Lab Results   Component  Value Date     05/03/2024     05/03/2024    K 4.4 05/03/2024    K 4.4 05/03/2024    CL 99 05/03/2024    CL 99 05/03/2024    CO2 27 05/03/2024    CO2 27 05/03/2024    BUN 23 05/03/2024    BUN 23 05/03/2024    CREATININE 1.1 05/03/2024    CREATININE 1.1 05/03/2024    CALCIUM 9.1 05/03/2024    CALCIUM 9.1 05/03/2024    ANIONGAP 12 05/03/2024    ANIONGAP 12 05/03/2024    ESTGFRAFRICA >60.0 11/05/2021    EGFRNONAA 56 (L) 02/21/2022      BNP  @LABRCNTIP(BNP,BNPTRIAGEBLO)@   Lab Results   Component Value Date    CHOL 215 (H) 10/05/2023    CHOL 146 10/04/2022    CHOL 113 05/06/2021     Lab Results   Component Value Date    HDL 46 10/05/2023    HDL 52 10/04/2022    HDL 47 05/06/2021     Lab Results   Component Value Date    LDLCALC 137.6 10/05/2023    LDLCALC 75 10/04/2022    LDLCALC 51 05/06/2021     Lab Results   Component Value Date    TRIG 157 (H) 10/05/2023    TRIG 104 10/04/2022    TRIG 70 05/06/2021     Lab Results   Component Value Date    CHOLHDL 21.4 10/05/2023    CHOLHDL 26.2 08/24/2020    CHOLHDL 45.0 10/16/2013      Lab Results   Component Value Date    TSH 2.606 05/03/2024    FREET4 0.89 09/26/2012     Lab Results   Component Value Date    HGBA1C 5.7 (H) 05/03/2024     Lab Results   Component Value Date    WBC 5.37 05/03/2024    HGB 9.8 (L) 05/03/2024    HCT 31.7 (L) 05/03/2024    MCV 87 05/03/2024     05/03/2024         Results for orders placed during the hospital encounter of 04/28/22    Echo    Interpretation Summary  · The left ventricle is normal in size with concentric remodeling and mildly decreased systolic function.  · The estimated ejection fraction is 40%.  · Grade I left ventricular diastolic dysfunction.  · There are segmental left ventricular wall motion abnormalities.  · Apical akinesis  · Normal right ventricular size with normal right ventricular systolic function.  · Mild aortic regurgitation.  · There is moderate aortic valve stenosis.  · Aortic valve area is 1.15 cm2;  peak velocity is 2.12 m/s; mean gradient is 18 mmHg.  · Mild tricuspid regurgitation.  · Normal central venous pressure (3 mmHg).  · The estimated PA systolic pressure is 24 mmHg.  · Overall the study quality was technically difficult.     No results found for this or any previous visit.     EKG  Results for orders placed or performed during the hospital encounter of 06/30/23   EKG 12-lead    Collection Time: 06/30/23 11:04 AM    Narrative    Test Reason : R07.9,    Vent. Rate : 062 BPM     Atrial Rate : 036 BPM     P-R Int : 230 ms          QRS Dur : 102 ms      QT Int : 422 ms       P-R-T Axes : 000 -42 113 degrees     QTc Int : 428 ms    Atrial-paced rhythm with prolonged AV conduction  Left axis deviation  Voltage criteria for left ventricular hypertrophy  T wave abnormality, consider lateral ischemia  Abnormal ECG  When compared with ECG of 04-FEB-2023 20:05,  The axis Shifted left  Criteria for Septal infarct are no longer Present  T wave inversion no longer evident in Inferior leads  T wave inversion less evident in Anterior-lateral leads  Confirmed by Tera Blair MD ([id number]) on 7/2/2023 3:33:14 PM    Referred By: [email or web address]   SELF           Confirmed By:Tera Blair MD      Stress  No results found for this or any previous visit.             Assessment:       Coronary artery disease of native artery of native heart with stable angina pectoris  Stable no symptoms of angina    PSVT (paroxysmal supraventricular tachycardia)  Better controlled on higher doses of metoprolol    Aortic valve stenosis, moderate  Moderate degree of aortic stenosis    Sick sinus syndrome  Status post pacemaker placement    Pacemaker  No mode switching.  99% atrial pacing    Obstructive sleep apnea  Obstructive sleep apnea on BiPAP machine.    Anemia of chronic disease  Stable    Acute on chronic combined systolic and diastolic heart failure  Will go ahead and add Jardiance.       Plan:       She is to take Zaroxolyn 3 times a  week.  Will add Jardiance 10 mg p.o. daily.  She will be seen in the office in 1 month with a BMP as well as a BNP and a CBC

## 2024-05-09 ENCOUNTER — TELEPHONE (OUTPATIENT)
Dept: PULMONOLOGY | Facility: CLINIC | Age: 89
End: 2024-05-09
Payer: MEDICARE

## 2024-05-09 NOTE — TELEPHONE ENCOUNTER
----- Message from Cece Tompkins sent at 5/9/2024 10:20 AM CDT -----  Regarding: Test results  Contact: pt  Type:  Test Results    Who Called: pt    Name of Test (Lab/Mammo/Etc): sleep study    Date of Test:  tuesday    Ordering Provider: Familia    Where the test was performed: [facility]    Would the patient rather a call back or a response via [facility]? Call back    Best Call Back Number: [phone]    Additional Information:  pt requesting a f/u phone call to discuss CPAP machine and results of the sleep study.

## 2024-05-09 NOTE — TELEPHONE ENCOUNTER
Spoke to patient.   She was calling to inquire about her sleep study results.  I informed her the provider hasn't reviewed them as of yet and once they are reviewed we will call her.  She stated she has a machine but she will need to know the new pressures.  NITISH.

## 2024-05-14 ENCOUNTER — OFFICE VISIT (OUTPATIENT)
Dept: FAMILY MEDICINE | Facility: CLINIC | Age: 89
End: 2024-05-14
Payer: MEDICARE

## 2024-05-14 ENCOUNTER — HOSPITAL ENCOUNTER (OUTPATIENT)
Dept: RADIOLOGY | Facility: CLINIC | Age: 89
Discharge: HOME | End: 2024-05-14
Attending: PHYSICIAN ASSISTANT
Payer: MEDICARE

## 2024-05-14 ENCOUNTER — OFFICE VISIT (OUTPATIENT)
Dept: PULMONOLOGY | Facility: CLINIC | Age: 89
End: 2024-05-14
Payer: MEDICARE

## 2024-05-14 VITALS
HEART RATE: 61 BPM | DIASTOLIC BLOOD PRESSURE: 60 MMHG | WEIGHT: 106 LBS | OXYGEN SATURATION: 97 % | BODY MASS INDEX: 22.25 KG/M2 | SYSTOLIC BLOOD PRESSURE: 120 MMHG | HEIGHT: 58 IN

## 2024-05-14 VITALS
HEIGHT: 58 IN | BODY MASS INDEX: 22.37 KG/M2 | WEIGHT: 106.56 LBS | HEART RATE: 64 BPM | TEMPERATURE: 99 F | DIASTOLIC BLOOD PRESSURE: 62 MMHG | SYSTOLIC BLOOD PRESSURE: 136 MMHG

## 2024-05-14 DIAGNOSIS — M54.2 NECK PAIN: ICD-10-CM

## 2024-05-14 DIAGNOSIS — M54.2 NECK PAIN: Primary | ICD-10-CM

## 2024-05-14 DIAGNOSIS — G47.34 NOCTURNAL HYPOXEMIA: ICD-10-CM

## 2024-05-14 DIAGNOSIS — G47.33 OSA (OBSTRUCTIVE SLEEP APNEA): Primary | ICD-10-CM

## 2024-05-14 PROCEDURE — 99213 OFFICE O/P EST LOW 20 MIN: CPT | Mod: S$PBB,,, | Performed by: PHYSICIAN ASSISTANT

## 2024-05-14 PROCEDURE — 99203 OFFICE O/P NEW LOW 30 MIN: CPT | Mod: S$GLB,,, | Performed by: NURSE PRACTITIONER

## 2024-05-14 PROCEDURE — 99999 PR PBB SHADOW E&M-EST. PATIENT-LVL III: CPT | Mod: PBBFAC,,, | Performed by: PHYSICIAN ASSISTANT

## 2024-05-14 PROCEDURE — 99213 OFFICE O/P EST LOW 20 MIN: CPT | Mod: PBBFAC,PN | Performed by: PHYSICIAN ASSISTANT

## 2024-05-14 PROCEDURE — 72040 X-RAY EXAM NECK SPINE 2-3 VW: CPT | Mod: TC,FY,PO

## 2024-05-14 PROCEDURE — 72040 X-RAY EXAM NECK SPINE 2-3 VW: CPT | Mod: 26,,, | Performed by: STUDENT IN AN ORGANIZED HEALTH CARE EDUCATION/TRAINING PROGRAM

## 2024-05-14 NOTE — PROGRESS NOTES
SUBJECTIVE:    Patient ID: Cheyanne Gomes is a 88 y.o. female.    Chief Complaint: Establish Care and Apnea    HPI  Patient here today to establish care for EMMA. The patient was sleeping on a CPAP with oxygen bled in but still feeling exhaused all day, falling asleep throughout the day. She had a CPAP titration recently which showed she actually failed CPAP and needed BIPAP with 2 liters of oxygen bled in.  The patient still works to keep busy.    Past Medical History:   Diagnosis Date    Abdominal hernia     Anemia     Dr Berkowitz     Angina pectoris     Aortic valve stenosis, moderate     Back pain     Cannon's esophagus     CAD (coronary artery disease)     Cataract     ou done    CHF (congestive heart failure)     EFx 35%, Dr. Spencer 13    Chronic combined systolic and diastolic heart failure 2019    Colitis     Compression fracture     Diverticulosis     Encounter for blood transfusion     GERD (gastroesophageal reflux disease)     Hyperlipidemia     Hypertension     Irritable bowel syndrome     Myocardial infarction     Obstructive sleep apnea 2024    Osteoarthritis     lumbar DDD    Pacemaker     Pneumonia 2017    Pulmonary emphysema, unspecified emphysema type 2024    Raynaud disease     Rheumatoid arteritis     Rheumatoid arthritis     Shingles 2017    Sjogren syndrome     Ulcerative colitis      Past Surgical History:   Procedure Laterality Date    A-V CARDIAC PACEMAKER INSERTION Left 2021    Procedure: INSERTION, CARDIAC PACEMAKER, DUAL CHAMBER  ([employer]);  Surgeon: Tera Blair MD;  Location: [facility] CATH/EP LAB;  Service: Cardiology;  Laterality: Left;    APPENDECTOMY      BACK SURGERY      CARDIAC SURGERY      CABGx3 in     CATARACT EXTRACTION      ou od d 11-15-12//     SECTION, CLASSIC      x 2    CHOLECYSTECTOMY      CLOSURE OF WOUND Right 2022    Procedure: CLOSURE-WOUND;  Surgeon: Delvis Jackson MD;  Location: [facility] OR;   Service: ENT;  Laterality: Right;  NOSE AND CHIN    COLONOSCOPY  09/15/2011    Dr Anaya     COLONOSCOPY  01/10/2017    [facility] report sent to scanning    CORONARY ANGIOPLASTY      PCI x 1 in 2007    CORONARY ARTERY BYPASS GRAFT      3 vessel    CORONARY ARTERY BYPASS GRAFT      CYSTOSCOPY N/A 6/3/2020    Procedure: CYSTOSCOPY;  Surgeon: Miryam Bedner Jr., MD;  Location: [facility] OR;  Service: Urology;  Laterality: N/A;    eyes      rk ou    FRACTURE SURGERY  06/21/2016    Dr Guido left hip     FRACTURE SURGERY  2018    Right Hip    HARVESTING OF SKIN GRAFT  9/23/2022    Procedure: SURGICAL PROCUREMENT, GRAFT, SKIN;  Surgeon: Delvis Jackson MD;  Location: [facility] OR;  Service: ENT;;  RIGHT CHEST    HYSTERECTOMY      tubal ligation, oopherectomy    INTRAMEDULLARY RODDING OF TROCHANTER OF FEMUR Right 10/8/2018    Procedure: INSERTION, INTRAMEDULLARY KELSI, FEMUR, TROCHANTER;  Surgeon: Valerio Luther MD;  [email or web address] OR;  Service: Orthopedics;  Laterality: Right;    OOPHORECTOMY      SPINE SURGERY  8-    Silvino Mckeon    UPPER GASTROINTESTINAL ENDOSCOPY      Yag Capsulotomy Right 9/25/14     Family History   Adopted: Yes   Problem Relation Name Age of Onset    Heart disease Mother          aortic stenosis    Skin cancer Mother      Hypertension Father      Heart disease Father          MI    Hypertension Sister      Fibromyalgia Sister      Scleroderma Sister      Pulmonary embolism Sister      Irritable bowel syndrome Sister      Heart disease Brother          2 brothers CABG    Arthritis Brother      Crohn's disease Brother      Crohn's disease Brother      Crohn's disease Brother      Hypertension Daughter      Early death Son          accident    Lupus Cousin      Lupus Cousin      Amblyopia Neg Hx      Blindness Neg Hx      Cancer Neg Hx      Cataracts Neg Hx      Diabetes Neg Hx      Glaucoma Neg Hx      Macular degeneration Neg Hx      Retinal detachment Neg Hx      Strabismus Neg Hx      Stroke Neg Hx      Thyroid  disease Neg Hx      Celiac disease Neg Hx      Cirrhosis Neg Hx      Psoriasis Neg Hx      Melanoma Neg Hx      Multiple sclerosis Neg Hx      Rheum arthritis Neg Hx      Tuberculosis Neg Hx      Lymphoma Neg Hx      Ulcerative colitis Neg Hx      Moses's disease Neg Hx      Stomach cancer Neg Hx      Rectal cancer Neg Hx      Liver disease Neg Hx      Liver cancer Neg Hx      Inflammatory bowel disease Neg Hx      Hemochromatosis Neg Hx      Esophageal cancer Neg Hx      Cystic fibrosis Neg Hx      Colon cancer Neg Hx          Social History:   Marital Status:   Occupation: Data Unavailable  Alcohol History:  reports current alcohol use of about 1.0 standard drink of alcohol per week.  Tobacco History:  reports that she quit smoking about 53 years ago. Her smoking use included cigarettes. She started smoking about 58 years ago. She has a 5 pack-year smoking history. She has never used smokeless tobacco.  Drug History:  reports no history of drug use.    Review of patient's allergies indicates:   Allergen Reactions    Adhesive     Nitrofurantoin macrocrystalline Nausea And Vomiting     Other reaction(s): very sickly    Penicillins Swelling     Other reaction(s): swelling  Other reaction(s): red skin discolorati    Sulfa (sulfonamide antibiotics) Nausea And Vomiting     Other reaction(s): very sickly       Current Outpatient Medications   Medication Sig Dispense Refill    amLODIPine (NORVASC) 5 MG tablet TAKE 1 TABLET BY MOUTH 2 TIMES A DAY 60 tablet 5    aspirin 81 mg Tab Take 1 tablet by mouth every evening. Every day      biotin 5 mg Cap Take 1 tablet by mouth once daily.      CALCIUM CARB/VIT D3/MINERALS (CALCIUM-VITAMIN D ORAL) Take 600 mg by mouth 2 (two) times daily. Calcium 1200 with D 3 1000      CEQUA 0.09 % Dpet Place 1 drop into both eyes 2 (two) times daily.      CRANBERRY CONC/ASCORBIC ACID (CRANBERRY PLUS VITAMIN C ORAL) Take 1 capsule by mouth once daily.      DEXILANT 60 mg capsule Take 60 mg  by mouth once daily.   11    empagliflozin (JARDIANCE) 10 mg tablet Take 1 tablet (10 mg total) by mouth once daily. 30 tablet 1    ENTRESTO  mg per tablet Take 1 tablet by mouth 2 (two) times daily. 60 tablet 4    famotidine (PEPCID) 20 MG tablet Take 1 tablet (20 mg total) by mouth every evening. 30 tablet 11    fluticasone (FLONASE) 50 mcg/actuation nasal spray 2 sprays by Each Nare route once daily. (Patient taking differently: 2 sprays by Each Nostril route daily as needed.) 16 g 0    folic acid (FOLVITE) 1 MG tablet Take 2 mg by mouth once daily.       furosemide (LASIX) 80 MG tablet Take 1 tablet (80 mg total) by mouth once daily. 90 tablet 3    gabapentin (NEURONTIN) 100 MG capsule Take 2 capsules (200 mg total) by mouth every evening. 180 capsule 1    HYDROmorphone (DILAUDID) 2 MG tablet Take 2 mg by mouth every 4 (four) hours as needed for Pain.      isosorbide dinitrate (ISORDIL) 10 MG tablet Take 1 tablet (10 mg total) by mouth 3 (three) times daily. 270 tablet 3    Lactobacillus acidophilus 10 billion cell Cap Take 1 each by mouth once daily. 1.5 billion      magnesium oxide (MAG-OX) 400 mg (241.3 mg magnesium) tablet TAKE 1 TABLET BY MOUTH 2 TIMES A DAY (Patient taking differently: Take 400 mg by mouth 2 (two) times daily.) 120 tablet 2    MULTIVITAMIN WITH MINERALS (ONE-A-DAY 50 PLUS) Tab Take 1 tablet by mouth once daily. Every day      nitroGLYCERIN 0.4 MG/DOSE TL SPRY (NITROLINGUAL) 400 mcg/spray spray Place 1 spray under the tongue every 5 (five) minutes as needed for Chest pain. PRN 4.9 g 3    omega-3 fatty acids/fish oil (FISH OIL-OMEGA-3 FATTY ACIDS) 300-1,000 mg capsule Take 1 capsule by mouth once daily.      ondansetron (ZOFRAN) 4 MG tablet Take 4 mg by mouth every 8 (eight) hours as needed for Nausea.   2    potassium chloride SA (K-DUR,KLOR-CON M) 10 MEQ tablet TAKE TWO TABLETS BY MOUTH EVERY MORNING AND 1 TABLET EVERY EVENING 270 tablet 2    promethazine (PHENERGAN) 25 MG tablet  "Take 1 tablet (25 mg total) by mouth 2 (two) times daily as needed for Nausea. 60 tablet 1    sertraline (ZOLOFT) 50 MG tablet Take 2 tablets (100 mg total) by mouth once daily. 180 tablet 2    traMADoL (ULTRAM) 50 mg tablet Take 50 mg by mouth 2 (two) times daily as needed for Pain.      vitamin E 400 unit Tab Take 400 mg by mouth once daily. Every day      metOLazone (ZAROXOLYN) 2.5 MG tablet Take 1 tablet (2.5 mg total) by mouth once daily. 30 tablet 0    metoprolol succinate (TOPROL-XL) 50 MG 24 hr tablet Take 1 tablet (50 mg total) by mouth 2 (two) times daily. 25 mg QAM and 12.5 mg QHS (Patient taking differently: Take 50 mg by mouth 2 (two) times daily.) 180 tablet 3     No current facility-administered medications for this visit.           Review of Systems  General: Feeling Well.fatigued and sleepy all the time   Eyes: Vision is good.  ENT:  No sinusitis or pharyngitis.   Heart:: No chest pain or palpitations.  Lungs: No cough, sputum, or wheezing.  GI: No Nausea, vomiting, constipation, diarrhea, or reflux.  : No dysuria, hesitancy, or nocturia.  Musculoskeletal: No joint pain or myalgias.  Skin: No lesions or rashes.  Neuro: No headaches or neuropathy.  Lymph: No edema or adenopathy.  Psych: No anxiety or depression.  Endo: No weight change.    OBJECTIVE:      /60 (BP Location: Right arm, Patient Position: Sitting, BP Method: Medium (Manual))   Pulse 61   Ht 4' 10" (1.473 m)   Wt 48.1 kg (106 lb)   LMP  (LMP Unknown)   SpO2 97%   BMI 22.15 kg/m²     Physical Exam  GENERAL: Older patient in no distress.  HEENT: Pupils equal and reactive. Extraocular movements intact. Nose intact.      Pharynx moist. Mal[place]atti 4  NECK: Supple.   HEART: Regular rate and rhythm. No murmur or gallop auscultated.  LUNGS: Clear to auscultation and percussion. Lung excursion symmetrical. No change in fremitus. No adventitial noises.  ABDOMEN: Bowel sounds present. Non-tender, no masses palpated.  EXTREMITIES: " Normal muscle tone and joint movement, no cyanosis or clubbing.   LYMPHATICS: No adenopathy palpated, no edema.  SKIN: Dry, intact, no lesions.   NEURO: Cranial nerves II-XII intact. Motor strength 5/5 bilaterally, upper and lower extremities.  PSYCH: Appropriate affect.    Assessment:       1. EMMA (obstructive sleep apnea)    2. Nocturnal hypoxemia          Plan:       EMMA (obstructive sleep apnea)  -     BIPAP FOR HOME USE    Nocturnal hypoxemia  -     BIPAP FOR HOME USE         Follow up in about 2 months (around 7/14/2024).      Bipap ordered 19/15cm with 2 liters bled in  Need to sleep on whenever sleeping

## 2024-05-14 NOTE — PROGRESS NOTES
Subjective     Patient ID: Cheyanne Gomes is a 88 y.o. female.    Chief Complaint: Neck Pain and Headache    States for the last week she has had a left-sided neck pain that radiates up into her head that is worse on turning her head or putting her head forward.  She thought she may have slept wrong but states the pain is not improving.  She does have a Dilaudid pain pump and does take tramadol as needed but states this is not touching her pain.  She did recently have a sleep study and was shown that the CPAP was not strong enough and she does have a BiPAP coming which she hopes helps her fatigue.  She does say in regards to her neck pain that swallowing seems to make the neck pain worse but she has not having any difficulty swallowing.  She denies any sore throat.  No fever and no unexplained weight loss.        Current Outpatient Medications:     amLODIPine (NORVASC) 5 MG tablet, TAKE 1 TABLET BY MOUTH 2 TIMES A DAY (Patient taking differently: Take 5 mg by mouth once daily.), Disp: 60 tablet, Rfl: 5    aspirin 81 mg Tab, Take 1 tablet by mouth every evening. Every day, Disp: , Rfl:     biotin 5 mg Cap, Take 1 tablet by mouth once daily., Disp: , Rfl:     CALCIUM CARB/VIT D3/MINERALS (CALCIUM-VITAMIN D ORAL), Take 600 mg by mouth 2 (two) times daily. Calcium 1200 with D 3 1000, Disp: , Rfl:     CEQUA 0.09 % Dpet, Place 1 drop into both eyes 2 (two) times daily., Disp: , Rfl:     CRANBERRY CONC/ASCORBIC ACID (CRANBERRY PLUS VITAMIN C ORAL), Take 1 capsule by mouth once daily., Disp: , Rfl:     DEXILANT 60 mg capsule, Take 60 mg by mouth once daily. , Disp: , Rfl: 11    empagliflozin (JARDIANCE) 10 mg tablet, Take 1 tablet (10 mg total) by mouth once daily., Disp: 30 tablet, Rfl: 1    ENTRESTO  mg per tablet, Take 1 tablet by mouth 2 (two) times daily., Disp: 60 tablet, Rfl: 4    fluticasone (FLONASE) 50 mcg/actuation nasal spray, 2 sprays by Each Nare route once daily. (Patient taking differently: 2  sprays by Each Nostril route daily as needed.), Disp: 16 g, Rfl: 0    folic acid (FOLVITE) 1 MG tablet, Take 2 mg by mouth once daily. , Disp: , Rfl:     furosemide (LASIX) 80 MG tablet, Take 1 tablet (80 mg total) by mouth once daily., Disp: 90 tablet, Rfl: 3    gabapentin (NEURONTIN) 100 MG capsule, Take 2 capsules (200 mg total) by mouth every evening., Disp: 180 capsule, Rfl: 1    HYDROmorphone (DILAUDID) 2 MG tablet, Take 2 mg by mouth every 4 (four) hours as needed for Pain., Disp: , Rfl:     isosorbide dinitrate (ISORDIL) 10 MG tablet, Take 1 tablet (10 mg total) by mouth 3 (three) times daily., Disp: 270 tablet, Rfl: 3    Lactobacillus acidophilus 10 billion cell Cap, Take 1 each by mouth once daily. 1.5 billion, Disp: , Rfl:     magnesium oxide (MAG-OX) 400 mg (241.3 mg magnesium) tablet, TAKE 1 TABLET BY MOUTH 2 TIMES A DAY (Patient taking differently: Take 400 mg by mouth 2 (two) times daily.), Disp: 120 tablet, Rfl: 2    metOLazone (ZAROXOLYN) 2.5 MG tablet, Take 1 tablet (2.5 mg total) by mouth once daily., Disp: 30 tablet, Rfl: 0    metoprolol succinate (TOPROL-XL) 50 MG 24 hr tablet, Take 1 tablet (50 mg total) by mouth 2 (two) times daily. 25 mg QAM and 12.5 mg QHS (Patient taking differently: Take 50 mg by mouth 2 (two) times daily.), Disp: 180 tablet, Rfl: 3    MULTIVITAMIN WITH MINERALS (ONE-A-DAY 50 PLUS) Tab, Take 1 tablet by mouth once daily. Every day, Disp: , Rfl:     omega-3 fatty acids/fish oil (FISH OIL-OMEGA-3 FATTY ACIDS) 300-1,000 mg capsule, Take 1 capsule by mouth once daily., Disp: , Rfl:     ondansetron (ZOFRAN) 4 MG tablet, Take 4 mg by mouth every 8 (eight) hours as needed for Nausea. , Disp: , Rfl: 2    potassium chloride SA (K-DUR,KLOR-CON M) 10 MEQ tablet, TAKE TWO TABLETS BY MOUTH EVERY MORNING AND 1 TABLET EVERY EVENING (Patient taking differently: 10 mEq once daily.), Disp: 270 tablet, Rfl: 2    promethazine (PHENERGAN) 25 MG tablet, Take 1 tablet (25 mg total) by mouth 2  "(two) times daily as needed for Nausea., Disp: 60 tablet, Rfl: 1    sertraline (ZOLOFT) 50 MG tablet, Take 2 tablets (100 mg total) by mouth once daily., Disp: 180 tablet, Rfl: 2    traMADoL (ULTRAM) 50 mg tablet, Take 50 mg by mouth 2 (two) times daily as needed for Pain., Disp: , Rfl:     vitamin E 400 unit Tab, Take 400 mg by mouth once daily. Every day, Disp: , Rfl:     nitroGLYCERIN 0.4 MG/DOSE TL SPRY (NITROLINGUAL) 400 mcg/spray spray, Place 1 spray under the tongue every 5 (five) minutes as needed for Chest pain. PRN (Patient not taking: Reported on 5/14/2024), Disp: 4.9 g, Rfl: 3     Review of Systems   Constitutional:  Negative for activity change and unexpected weight change.   HENT:  Negative for hearing loss, rhinorrhea, sore throat and trouble swallowing.    Eyes:  Negative for discharge and visual disturbance.   Respiratory:  Negative for chest tightness and wheezing.    Cardiovascular:  Negative for chest pain and palpitations.   Gastrointestinal:  Negative for blood in stool, constipation, diarrhea and vomiting.   Endocrine: Negative for polydipsia and polyuria.   Genitourinary:  Negative for difficulty urinating, dysuria, hematuria and menstrual problem.   Musculoskeletal:  Positive for arthralgias, neck pain and neck stiffness. Negative for joint swelling.   Neurological:  Positive for weakness. Negative for headaches.   Psychiatric/Behavioral:  Positive for dysphoric mood. Negative for confusion.           Objective   Vitals:    05/14/24 1347   BP: 136/62   BP Location: Left arm   Patient Position: Sitting   BP Method: Medium (Manual)   Pulse: 64   Temp: 98.8 °F (37.1 °C)   TempSrc: Oral   Weight: 48.4 kg (106 lb 9.5 oz)   Height: 4' 10" (1.473 m)      Physical Exam  Constitutional:       Appearance: She is well-developed.   HENT:      Head: Normocephalic and atraumatic.      Mouth/Throat:      Mouth: Mucous membranes are moist.      Pharynx: Oropharynx is clear. No oropharyngeal exudate or " posterior oropharyngeal erythema.   Eyes:      Conjunctiva/sclera: Conjunctivae normal.      Pupils: Pupils are equal, round, and reactive to light.   Neck:      Thyroid: No thyroid mass.      Vascular: No JVD.      Trachea: Trachea and phonation normal.   Cardiovascular:      Rate and Rhythm: Normal rate and regular rhythm.      Heart sounds: No murmur heard.     No friction rub. No gallop.   Pulmonary:      Effort: Pulmonary effort is normal. No respiratory distress.      Breath sounds: Normal breath sounds. No wheezing or rales.   Musculoskeletal:      Cervical back: Normal range of motion and neck supple. Pain with movement, spinous process tenderness and muscular tenderness present.   Lymphadenopathy:      Cervical: No cervical adenopathy.   Skin:     General: Skin is warm and dry.   Neurological:      Mental Status: She is alert and oriented to person, place, and time.   Psychiatric:         Mood and Affect: Mood normal.         Behavior: Behavior normal.         Thought Content: Thought content normal.         Judgment: Judgment normal.            Assessment and Plan     1. Neck pain  -     X-Ray Cervical Spine AP And Lateral; Future; Expected date: 05/14/2024      Patient is already on pain medication.  We will get an x-ray to rule out any additional cause of neck pain but likely just muscle pain and spasm.  Encouraged patient to use a moist heating pad on her neck and do gentle stretching until we have x-ray results.  She expressed understanding.         No follow-ups on file.

## 2024-05-21 ENCOUNTER — TELEPHONE (OUTPATIENT)
Dept: FAMILY MEDICINE | Facility: CLINIC | Age: 89
End: 2024-05-21
Payer: MEDICARE

## 2024-05-21 DIAGNOSIS — M81.0 OSTEOPOROSIS, UNSPECIFIED OSTEOPOROSIS TYPE, UNSPECIFIED PATHOLOGICAL FRACTURE PRESENCE: Primary | ICD-10-CM

## 2024-05-21 NOTE — TELEPHONE ENCOUNTER
----- Message from Hector Alfaro sent at 5/21/2024 11:59 AM CDT -----    Patient has duplicate therapy plans for Prolia, and a 3rd one from Dr Erazo who had retired.     Please discontinue the others so that we can use only 1 for insurance referral and billing purposes.      One of the the plans has a non-covered dx, please check that the diagnosis linked is the covered dx for Prolia: M81.0.          Thank you,  Hector   [facility]

## 2024-05-21 NOTE — TELEPHONE ENCOUNTER
Hector- I removed the old orders and entered a new one. Please review and let me know if anything else is needed.

## 2024-05-23 ENCOUNTER — PATIENT MESSAGE (OUTPATIENT)
Dept: CARDIOLOGY | Facility: CLINIC | Age: 89
End: 2024-05-23
Payer: MEDICARE

## 2024-05-24 ENCOUNTER — TELEPHONE (OUTPATIENT)
Dept: FAMILY MEDICINE | Facility: CLINIC | Age: 89
End: 2024-05-24
Payer: MEDICARE

## 2024-05-29 ENCOUNTER — TELEPHONE (OUTPATIENT)
Dept: FAMILY MEDICINE | Facility: CLINIC | Age: 89
End: 2024-05-29
Payer: MEDICARE

## 2024-05-30 ENCOUNTER — LAB VISIT (OUTPATIENT)
Dept: PRIMARY CARE CLINIC | Facility: CLINIC | Age: 89
End: 2024-05-30
Payer: MEDICARE

## 2024-05-30 DIAGNOSIS — I42.0 CONGESTIVE CARDIOMYOPATHY: ICD-10-CM

## 2024-05-30 DIAGNOSIS — N18.32 STAGE 3B CHRONIC KIDNEY DISEASE: ICD-10-CM

## 2024-05-30 LAB
ANION GAP SERPL CALC-SCNC: 11 MMOL/L (ref 8–16)
BNP SERPL-MCNC: 794 PG/ML (ref 0–99)
BUN SERPL-MCNC: 29 MG/DL (ref 8–23)
CALCIUM SERPL-MCNC: 9.6 MG/DL (ref 8.7–10.5)
CHLORIDE SERPL-SCNC: 99 MMOL/L (ref 95–110)
CO2 SERPL-SCNC: 31 MMOL/L (ref 23–29)
CREAT SERPL-MCNC: 1.3 MG/DL (ref 0.5–1.4)
ERYTHROCYTE [DISTWIDTH] IN BLOOD BY AUTOMATED COUNT: 15.1 % (ref 11.5–14.5)
EST. GFR  (NO RACE VARIABLE): 39.6 ML/MIN/1.73 M^2
GLUCOSE SERPL-MCNC: 87 MG/DL (ref 70–110)
HCT VFR BLD AUTO: 33.7 % (ref 37–48.5)
HGB BLD-MCNC: 10.2 G/DL (ref 12–16)
MCH RBC QN AUTO: 26.9 PG (ref 27–31)
MCHC RBC AUTO-ENTMCNC: 30.3 G/DL (ref 32–36)
MCV RBC AUTO: 89 FL (ref 82–98)
PLATELET # BLD AUTO: 182 K/UL (ref 150–450)
PMV BLD AUTO: 10.1 FL (ref 9.2–12.9)
POTASSIUM SERPL-SCNC: 5 MMOL/L (ref 3.5–5.1)
RBC # BLD AUTO: 3.79 M/UL (ref 4–5.4)
SODIUM SERPL-SCNC: 141 MMOL/L (ref 136–145)
WBC # BLD AUTO: 5.77 K/UL (ref 3.9–12.7)

## 2024-05-30 PROCEDURE — 80048 BASIC METABOLIC PNL TOTAL CA: CPT | Performed by: INTERNAL MEDICINE

## 2024-05-30 PROCEDURE — 83880 ASSAY OF NATRIURETIC PEPTIDE: CPT | Performed by: INTERNAL MEDICINE

## 2024-05-30 PROCEDURE — 85027 COMPLETE CBC AUTOMATED: CPT | Performed by: INTERNAL MEDICINE

## 2024-05-30 PROCEDURE — 36415 COLL VENOUS BLD VENIPUNCTURE: CPT | Mod: S$GLB,,, | Performed by: INTERNAL MEDICINE

## 2024-05-31 ENCOUNTER — EXTERNAL CHRONIC CARE MANAGEMENT (OUTPATIENT)
Dept: PRIMARY CARE CLINIC | Facility: CLINIC | Age: 89
End: 2024-05-31
Payer: MEDICARE

## 2024-05-31 DIAGNOSIS — Z95.0 PACEMAKER: Primary | ICD-10-CM

## 2024-05-31 PROCEDURE — 99490 CHRNC CARE MGMT STAFF 1ST 20: CPT | Mod: PBBFAC,PN | Performed by: FAMILY MEDICINE

## 2024-05-31 PROCEDURE — 99490 CHRNC CARE MGMT STAFF 1ST 20: CPT | Mod: S$PBB,,, | Performed by: FAMILY MEDICINE

## 2024-05-31 PROCEDURE — 99439 CHRNC CARE MGMT STAF EA ADDL: CPT | Mod: PBBFAC,PN | Performed by: FAMILY MEDICINE

## 2024-05-31 PROCEDURE — 99439 CHRNC CARE MGMT STAF EA ADDL: CPT | Mod: S$PBB,,, | Performed by: FAMILY MEDICINE

## 2024-06-04 ENCOUNTER — INFUSION (OUTPATIENT)
Dept: INFUSION THERAPY | Facility: HOSPITAL | Age: 89
End: 2024-06-04
Attending: FAMILY MEDICINE
Payer: MEDICARE

## 2024-06-04 ENCOUNTER — HOSPITAL ENCOUNTER (OUTPATIENT)
Dept: CARDIOLOGY | Facility: CLINIC | Age: 89
Discharge: HOME | End: 2024-06-04
Attending: INTERNAL MEDICINE
Payer: MEDICARE

## 2024-06-04 ENCOUNTER — OFFICE VISIT (OUTPATIENT)
Dept: CARDIOLOGY | Facility: CLINIC | Age: 89
End: 2024-06-04
Payer: MEDICARE

## 2024-06-04 VITALS
WEIGHT: 107.38 LBS | HEART RATE: 64 BPM | DIASTOLIC BLOOD PRESSURE: 70 MMHG | BODY MASS INDEX: 22.54 KG/M2 | OXYGEN SATURATION: 95 % | HEIGHT: 58 IN | SYSTOLIC BLOOD PRESSURE: 142 MMHG

## 2024-06-04 VITALS
RESPIRATION RATE: 17 BRPM | OXYGEN SATURATION: 93 % | WEIGHT: 109.31 LBS | DIASTOLIC BLOOD PRESSURE: 78 MMHG | HEART RATE: 80 BPM | BODY MASS INDEX: 22.94 KG/M2 | TEMPERATURE: 98 F | SYSTOLIC BLOOD PRESSURE: 147 MMHG | HEIGHT: 58 IN

## 2024-06-04 DIAGNOSIS — Z95.0 CARDIAC PACEMAKER IN SITU: ICD-10-CM

## 2024-06-04 DIAGNOSIS — D63.8 ANEMIA OF CHRONIC DISEASE: ICD-10-CM

## 2024-06-04 DIAGNOSIS — N18.32 STAGE 3B CHRONIC KIDNEY DISEASE: ICD-10-CM

## 2024-06-04 DIAGNOSIS — M81.0 SENILE OSTEOPOROSIS: Primary | ICD-10-CM

## 2024-06-04 DIAGNOSIS — I35.0 AORTIC VALVE STENOSIS, MODERATE: Primary | ICD-10-CM

## 2024-06-04 DIAGNOSIS — I25.118 CORONARY ARTERY DISEASE OF NATIVE ARTERY OF NATIVE HEART WITH STABLE ANGINA PECTORIS: ICD-10-CM

## 2024-06-04 DIAGNOSIS — Z95.0 PACEMAKER: ICD-10-CM

## 2024-06-04 DIAGNOSIS — I15.1 HYPERTENSION SECONDARY TO OTHER RENAL DISORDERS: ICD-10-CM

## 2024-06-04 PROCEDURE — 63600175 PHARM REV CODE 636 W HCPCS: Mod: JZ,JG | Performed by: FAMILY MEDICINE

## 2024-06-04 PROCEDURE — 93288 INTERROG EVL PM/LDLS PM IP: CPT | Mod: 26,S$PBB,, | Performed by: INTERNAL MEDICINE

## 2024-06-04 PROCEDURE — 99215 OFFICE O/P EST HI 40 MIN: CPT | Mod: PBBFAC,25,PN | Performed by: INTERNAL MEDICINE

## 2024-06-04 PROCEDURE — 96372 THER/PROPH/DIAG INJ SC/IM: CPT

## 2024-06-04 PROCEDURE — 99214 OFFICE O/P EST MOD 30 MIN: CPT | Mod: S$PBB,,, | Performed by: INTERNAL MEDICINE

## 2024-06-04 PROCEDURE — 99999 PR PBB SHADOW E&M-EST. PATIENT-LVL V: CPT | Mod: PBBFAC,,, | Performed by: INTERNAL MEDICINE

## 2024-06-04 RX ADMIN — DENOSUMAB 60 MG: 60 INJECTION SUBCUTANEOUS at 11:06

## 2024-06-04 NOTE — PROGRESS NOTES
Patient ID:  Cheyanne Gomes is a 88 y.o. female who presents for follow-up of Congestive Heart Failure, Coronary Artery Disease, and Results (labs)      Coronary artery disease of native artery of native heart with stable angina pectoris  Her angina is stable     Hypertension  On Toprol XL, Entresto, isosorbide dinitrate, diuretics     Aortic valve stenosis, moderate  Moderate aortic stenosis     PSVT (paroxysmal supraventricular tachycardia)  She is to take oxygen at night.  If she continues to have palpitations she is to take extra 25 mg of Toprol-XL     Acute on chronic combined systolic and diastolic heart failure  She is to take extra doses of Zaroxolyn in addition to her Lasix.  Change the isosorbide mononitrate to isosorbide dinitrate.     Obstructive sleep apnea  She is to get her CPAP machine.  And oxygen to wear at night.    During the last office visit the isosorbide mononitrate was changed to isosorbide dinitrate to help her with the heart failure.  She is having a lot of headache with the isosorbide dinitrate.  She has not sure if the symptoms are due for her neck problems.  She went to see Dr. Alas's nurse practitioner an order physical therapy.  She has been getting BiPAP and home oxygen at night.  At present she has taking the Lasix 80 mg q.a.m. and spironolactone as needed she require twice last week.  In general her breathing is doing better she is having no symptoms of angina        Past Medical History:   Diagnosis Date    Abdominal hernia     Anemia     Dr Berkowitz     Angina pectoris     Aortic valve stenosis, moderate     Back pain     Cannon's esophagus     CAD (coronary artery disease)     Cataract     ou done    CHF (congestive heart failure)     EFx 35%, Dr. Spencer 12/19/13    Chronic combined systolic and diastolic heart failure 09/28/2019    Colitis     Compression fracture     Diverticulosis     Encounter for blood transfusion     GERD (gastroesophageal reflux disease)      Hyperlipidemia     Hypertension     Irritable bowel syndrome     Myocardial infarction     Obstructive sleep apnea 2024    Osteoarthritis     lumbar DDD    Pacemaker     Pneumonia 2017    Pulmonary emphysema, unspecified emphysema type 2024    Raynaud disease     Rheumatoid arteritis     Rheumatoid arthritis     Shingles 2017    Sjogren syndrome     Ulcerative colitis         Past Surgical History:   Procedure Laterality Date    A-V CARDIAC PACEMAKER INSERTION Left 2021    Procedure: INSERTION, CARDIAC PACEMAKER, DUAL CHAMBER  (MEDTRONIC);  Surgeon: Tera Blair MD;  Location: [facility] CATH/EP LAB;  Service: Cardiology;  Laterality: Left;    APPENDECTOMY      BACK SURGERY      CARDIAC SURGERY      CABGx3 in     CATARACT EXTRACTION      ou od d 11-15-12//     SECTION, CLASSIC      x 2    CHOLECYSTECTOMY      CLOSURE OF WOUND Right 2022    Procedure: CLOSURE-WOUND;  Surgeon: Delvis Jackson MD;  Location: [facility] OR;  Service: ENT;  Laterality: Right;  NOSE AND CHIN    COLONOSCOPY  09/15/2011    Dr Anaya     COLONOSCOPY  01/10/2017    [facility] report sent to scanning    CORONARY ANGIOPLASTY      PCI x 1 in     CORONARY ARTERY BYPASS GRAFT      3 vessel    CORONARY ARTERY BYPASS GRAFT      COSMETIC SURGERY  on nose    CYSTOSCOPY N/A 2020    Procedure: CYSTOSCOPY;  Surgeon: Miryam Bender Jr., MD;  Location: [facility] OR;  Service: Urology;  Laterality: N/A;    EYE SURGERY      eyes      rk ou    FRACTURE SURGERY  2016    Dr Guido left hip     FRACTURE SURGERY  2018    Right Hip    HARVESTING OF SKIN GRAFT  2022    Procedure: SURGICAL PROCUREMENT, GRAFT, SKIN;  Surgeon: Delvis Jackson MD;  Location: [facility] OR;  Service: ENT;;  RIGHT CHEST    HYSTERECTOMY      tubal ligation, oopherectomy    INTRAMEDULLARY RODDING OF TROCHANTER OF FEMUR Right 10/08/2018    Procedure: INSERTION, INTRAMEDULLARY KELSI, FEMUR, TROCHANTER;  Surgeon: Valerio Luther MD;  Location: [facility]  OR;  Service: Orthopedics;  Laterality: Right;    OOPHORECTOMY      SPINE SURGERY  08/30/2013    Silvino Mckeon    TUBAL LIGATION      UPPER GASTROINTESTINAL ENDOSCOPY      Yag Capsulotomy Right 09/25/2014          Current Outpatient Medications   Medication Instructions    amLODIPine (NORVASC) 5 MG tablet TAKE 1 TABLET BY MOUTH 2 TIMES A DAY    aspirin 81 mg Tab 1 tablet, Oral, Nightly, Every day    biotin 5 mg Cap 1 tablet, Oral, Daily    CALCIUM CARB/VIT D3/MINERALS (CALCIUM-VITAMIN D ORAL) 600 mg, Oral, 2 times daily, Calcium 1200 with D 3 1000    CEQUA 0.09 % Dpet 1 drop, Both Eyes, 2 times daily    CRANBERRY CONC/ASCORBIC ACID (CRANBERRY PLUS VITAMIN C ORAL) 1 capsule, Oral, Daily    DEXILANT 60 mg, Oral, Daily    ENTRESTO  mg per tablet 1 tablet, Oral, 2 times daily    fluticasone (FLONASE) 50 mcg/actuation nasal spray 2 sprays, Each Nostril, Daily    folic acid (FOLVITE) 2 mg, Oral, Daily    furosemide (LASIX) 80 mg, Oral, Daily    gabapentin (NEURONTIN) 200 mg, Oral, Nightly    isosorbide dinitrate (ISORDIL) 10 mg, Oral, 3 times daily    JARDIANCE 10 mg, Oral    Lactobacillus acidophilus 10 billion cell Cap 1 each, Oral, Daily, 1.5 billion    magnesium oxide (MAG-OX) 400 mg (241.3 mg magnesium) tablet TAKE 1 TABLET BY MOUTH 2 TIMES A DAY    metOLazone (ZAROXOLYN) 2.5 mg, Oral, Daily    metoprolol succinate (TOPROL-XL) 50 mg, Oral, 2 times daily, 25 mg QAM and 12.5 mg QHS    MULTIVITAMIN WITH MINERALS (ONE-A-DAY 50 PLUS) Tab 1 tablet, Oral, Daily, Every day    nitroGLYCERIN 0.4 MG/DOSE TL SPRY (NITROLINGUAL) 400 mcg/spray spray 1 spray, Sublingual, Every 5 min PRN, PRN    omega-3 fatty acids/fish oil (FISH OIL-OMEGA-3 FATTY ACIDS) 300-1,000 mg capsule 1 capsule, Oral, Daily    ondansetron (ZOFRAN) 4 mg, Oral, Every 8 hours PRN    potassium chloride SA (K-DUR,KLOR-CON M) 10 MEQ tablet TAKE TWO TABLETS BY MOUTH EVERY MORNING AND 1 TABLET EVERY EVENING    promethazine (PHENERGAN) 25 mg, Oral, 2 times daily  "PRN    sertraline (ZOLOFT) 100 mg, Oral, Daily    traMADoL (ULTRAM) 50 mg, Oral, 2 times daily PRN    vitamin E 400 mg, Oral, Daily, Every day        Review of patient's allergies indicates:   Allergen Reactions    Adhesive     Nitrofurantoin macrocrystalline Nausea And Vomiting     Other reaction(s): very sickly    Penicillins Swelling     Other reaction(s): swelling  Other reaction(s): red skin discolorati    Sulfa (sulfonamide antibiotics) Nausea And Vomiting     Other reaction(s): very sickly        Review of Systems   Cardiovascular:  Positive for dyspnea on exertion. Negative for chest pain and palpitations.   Respiratory:  Negative for cough and shortness of breath.         Objective:     Vitals:    06/04/24 1406   BP: (!) 142/70   BP Location: Left arm   Patient Position: Sitting   BP Method: Medium (Manual)   Pulse: 64   SpO2: 95%   Weight: 48.7 kg (107 lb 5.8 oz)   Height: 4' 10" (1.473 m)       Physical Exam  Vitals and nursing note reviewed.   Constitutional:       Appearance: She is well-developed.   HENT:      Head: Normocephalic and atraumatic.   Eyes:      Conjunctiva/sclera: Conjunctivae normal.   Neck:      Vascular: Carotid bruit present.   Cardiovascular:      Rate and Rhythm: Normal rate and regular rhythm.      Heart sounds: Murmur heard.   Pulmonary:      Effort: Pulmonary effort is normal.      Breath sounds: Normal breath sounds.   Abdominal:      General: Bowel sounds are normal.      Palpations: Abdomen is soft.   Musculoskeletal:         General: Normal range of motion.   Skin:     General: Skin is warm and dry.   Neurological:      Mental Status: She is alert and oriented to person, place, and time.   Psychiatric:         Behavior: Behavior normal.         Thought Content: Thought content normal.         Judgment: Judgment normal.       CMP  Sodium   Date Value Ref Range Status   05/30/2024 141 136 - 145 mmol/L Final   12/11/2018 139 134 - 144 mmol/L      Potassium   Date Value Ref Range " Status   05/30/2024 5.0 3.5 - 5.1 mmol/L Final     Chloride   Date Value Ref Range Status   05/30/2024 99 95 - 110 mmol/L Final   12/11/2018 96 (L) 98 - 110 mmol/L      CO2   Date Value Ref Range Status   05/30/2024 31 (H) 23 - 29 mmol/L Final     Glucose   Date Value Ref Range Status   05/30/2024 87 70 - 110 mg/dL Final   12/11/2018 97 70 - 99 mg/dL      BUN   Date Value Ref Range Status   05/30/2024 29 (H) 8 - 23 mg/dL Final     Creatinine   Date Value Ref Range Status   05/30/2024 1.3 0.5 - 1.4 mg/dL Final   12/11/2018 0.95 0.60 - 1.40 mg/dL      Calcium   Date Value Ref Range Status   05/30/2024 9.6 8.7 - 10.5 mg/dL Final     Total Protein   Date Value Ref Range Status   05/03/2024 6.7 6.0 - 8.4 g/dL Final     Albumin   Date Value Ref Range Status   05/03/2024 3.6 3.5 - 5.2 g/dL Final   12/11/2018 3.9 3.1 - 4.7 g/dL      Total Bilirubin   Date Value Ref Range Status   05/03/2024 0.5 0.1 - 1.0 mg/dL Final     Comment:     For infants and newborns, interpretation of results should be based  on gestational age, weight and in agreement with clinical  observations.    Premature Infant recommended reference ranges:  Up to 24 hours.............<8.0 mg/dL  Up to 48 hours............<12.0 mg/dL  3-5 days..................<15.0 mg/dL  6-29 days.................<15.0 mg/dL       Alkaline Phosphatase   Date Value Ref Range Status   05/03/2024 91 55 - 135 U/L Final     AST   Date Value Ref Range Status   05/03/2024 27 10 - 40 U/L Final     ALT   Date Value Ref Range Status   05/03/2024 19 10 - 44 U/L Final     Anion Gap   Date Value Ref Range Status   05/30/2024 11 8 - 16 mmol/L Final     eGFR if    Date Value Ref Range Status   11/05/2021 >60.0 >60 mL/min/1.73 m^2 Final     eGFR if non    Date Value Ref Range Status   02/21/2022 56 (L) >59 mL/min/1.73 Final      BMP  Lab Results   Component Value Date     05/30/2024    K 5.0 05/30/2024    CL 99 05/30/2024    CO2 31 (H) 05/30/2024    BUN  29 (H) 05/30/2024    CREATININE 1.3 05/30/2024    CALCIUM 9.6 05/30/2024    ANIONGAP 11 05/30/2024    ESTGFRAFRICA >60.0 11/05/2021    EGFRNONAA 56 (L) 02/21/2022      BNP  @LABRCNTIP(BNP,BNPTRIAGEBLO)@   Lab Results   Component Value Date    CHOL 215 (H) 10/05/2023    CHOL 146 10/04/2022    CHOL 113 05/06/2021     Lab Results   Component Value Date    HDL 46 10/05/2023    HDL 52 10/04/2022    HDL 47 05/06/2021     Lab Results   Component Value Date    LDLCALC 137.6 10/05/2023    LDLCALC 75 10/04/2022    LDLCALC 51 05/06/2021     Lab Results   Component Value Date    TRIG 157 (H) 10/05/2023    TRIG 104 10/04/2022    TRIG 70 05/06/2021     Lab Results   Component Value Date    CHOLHDL 21.4 10/05/2023    CHOLHDL 26.2 08/24/2020    CHOLHDL 45.0 10/16/2013      Lab Results   Component Value Date    TSH 2.606 05/03/2024    FREET4 0.89 09/26/2012     Lab Results   Component Value Date    HGBA1C 5.7 (H) 05/03/2024     Lab Results   Component Value Date    WBC 5.77 05/30/2024    HGB 10.2 (L) 05/30/2024    HCT 33.7 (L) 05/30/2024    MCV 89 05/30/2024     05/30/2024         Results for orders placed during the hospital encounter of 04/28/22    Echo    Interpretation Summary  · The left ventricle is normal in size with concentric remodeling and mildly decreased systolic function.  · The estimated ejection fraction is 40%.  · Grade I left ventricular diastolic dysfunction.  · There are segmental left ventricular wall motion abnormalities.  · Apical akinesis  · Normal right ventricular size with normal right ventricular systolic function.  · Mild aortic regurgitation.  · There is moderate aortic valve stenosis.  · Aortic valve area is 1.15 cm2; peak velocity is 2.12 m/s; mean gradient is 18 mmHg.  · Mild tricuspid regurgitation.  · Normal central venous pressure (3 mmHg).  · The estimated PA systolic pressure is 24 mmHg.  · Overall the study quality was technically difficult.     No results found for this or any previous  visit.     EKG  Results for orders placed or performed during the hospital encounter of 06/30/23   EKG 12-lead    Collection Time: 06/30/23 11:04 AM    Narrative    Test Reason : R07.9,    Vent. Rate : 062 BPM     Atrial Rate : 036 BPM     P-R Int : 230 ms          QRS Dur : 102 ms      QT Int : 422 ms       P-R-T Axes : 000 -42 113 degrees     QTc Int : 428 ms    Atrial-paced rhythm with prolonged AV conduction  Left axis deviation  Voltage criteria for left ventricular hypertrophy  T wave abnormality, consider lateral ischemia  Abnormal ECG  When compared with ECG of 04-FEB-2023 20:05,  The axis Shifted left  Criteria for Septal infarct are no longer Present  T wave inversion no longer evident in Inferior leads  T wave inversion less evident in Anterior-lateral leads  Confirmed by Tera Blair MD ([id number]) on 7/2/2023 3:33:14 PM    Referred By: [email or web address]   SELF           Confirmed By:Tera Blair MD      Stress  No results found for this or any previous visit.             Assessment:       Coronary artery disease of native artery of native heart with stable angina pectoris  No symptoms of angina    Hypertension  Controlled on Toprol-XL, Entresto, isosorbide dinitrate and diuretic    Pacemaker  The pacemaker is functioning adequately    Aortic valve stenosis, moderate  Moderate aortic stenosis will follow with an echocardiogram    CKD (chronic kidney disease)  Stable       Plan:       She is to take her Micro-K every other day.  Will obtain an echocardiogram to assess her left ventricular function and aortic stenosis she has a left carotid bruit it that could be radiation of her aortic stenosis versus carotid stenosis.  Will obtain an echocardiogram.  She is to change back to isosorbide mononitrate if her headaches does not improve.  She will be seen in the office in 1 month with a BMP BNP as well as a CBC.

## 2024-06-04 NOTE — PLAN OF CARE
Problem: Fatigue  Goal: Improved Activity Tolerance  Intervention: Promote Improved Energy  Flowsheets (Taken 6/4/2024 1110)  Fatigue Management: fatigue-related activity identified  Activity Management: Ambulated -L4

## 2024-06-06 LAB
BATTERY VOLTAGE (V): 3.02 V
IMPEDANCE RA LEAD (NATIVE): 418 OHMS
IMPEDANCE RA LEAD: 437 OHMS
P/R-WAVE RA LEAD (NATIVE): 1.8 MV
P/R-WAVE RA LEAD: 0.9 MV
THRESHOLD MS RA LEAD (NATIVE): 0.4 MS
THRESHOLD MS RA LEAD: 0.4 MS
THRESHOLD V RA LEAD (NATIVE): 0.6 V
THRESHOLD V RA LEAD: 0.8 V

## 2024-06-07 ENCOUNTER — HOSPITAL ENCOUNTER (OUTPATIENT)
Dept: RADIOLOGY | Facility: HOSPITAL | Age: 89
Discharge: HOME | End: 2024-06-07
Attending: INTERNAL MEDICINE
Payer: MEDICARE

## 2024-06-07 DIAGNOSIS — I35.0 AORTIC VALVE STENOSIS, MODERATE: ICD-10-CM

## 2024-06-07 DIAGNOSIS — N18.32 STAGE 3B CHRONIC KIDNEY DISEASE: ICD-10-CM

## 2024-06-07 DIAGNOSIS — D63.8 ANEMIA OF CHRONIC DISEASE: ICD-10-CM

## 2024-06-07 PROCEDURE — 93880 EXTRACRANIAL BILAT STUDY: CPT | Mod: TC,PO

## 2024-06-07 PROCEDURE — 93880 EXTRACRANIAL BILAT STUDY: CPT | Mod: 26,,, | Performed by: RADIOLOGY

## 2024-06-11 RX ORDER — EMPAGLIFLOZIN 10 MG/1
10 TABLET, FILM COATED ORAL
Qty: 30 TABLET | Refills: 1 | Status: SHIPPED | OUTPATIENT
Start: 2024-06-11

## 2024-06-12 ENCOUNTER — PATIENT MESSAGE (OUTPATIENT)
Dept: CARDIOLOGY | Facility: CLINIC | Age: 89
End: 2024-06-12
Payer: MEDICARE

## 2024-06-30 ENCOUNTER — EXTERNAL CHRONIC CARE MANAGEMENT (OUTPATIENT)
Dept: PRIMARY CARE CLINIC | Facility: CLINIC | Age: 89
End: 2024-06-30
Payer: MEDICARE

## 2024-06-30 PROCEDURE — 99490 CHRNC CARE MGMT STAFF 1ST 20: CPT | Mod: PBBFAC,PN | Performed by: FAMILY MEDICINE

## 2024-06-30 PROCEDURE — 99439 CHRNC CARE MGMT STAF EA ADDL: CPT | Mod: PBBFAC,PN | Performed by: FAMILY MEDICINE

## 2024-06-30 PROCEDURE — 99490 CHRNC CARE MGMT STAFF 1ST 20: CPT | Mod: S$PBB,,, | Performed by: FAMILY MEDICINE

## 2024-06-30 PROCEDURE — 99439 CHRNC CARE MGMT STAF EA ADDL: CPT | Mod: S$PBB,,, | Performed by: FAMILY MEDICINE

## 2024-07-01 ENCOUNTER — TELEPHONE (OUTPATIENT)
Dept: FAMILY MEDICINE | Facility: CLINIC | Age: 89
End: 2024-07-01
Payer: MEDICARE

## 2024-07-01 NOTE — TELEPHONE ENCOUNTER
----- Message from Hector Alfaro sent at 6/28/2024  5:00 PM CDT -----    Patient received PROLIA on:  6/4/24    THE NEXT DOSE IS SCHEDULED FOR :  12/5/24    To reschedule appointments, please contact [facility] INFUSION SCHEDULING POOL or call [phone].

## 2024-07-03 ENCOUNTER — HOSPITAL ENCOUNTER (OUTPATIENT)
Dept: CARDIOLOGY | Facility: HOSPITAL | Age: 89
Discharge: HOME | End: 2024-07-03
Attending: INTERNAL MEDICINE
Payer: MEDICARE

## 2024-07-03 VITALS — WEIGHT: 107.38 LBS | HEIGHT: 58 IN | BODY MASS INDEX: 22.54 KG/M2

## 2024-07-03 DIAGNOSIS — D63.8 ANEMIA OF CHRONIC DISEASE: ICD-10-CM

## 2024-07-03 DIAGNOSIS — I35.0 AORTIC VALVE STENOSIS, MODERATE: ICD-10-CM

## 2024-07-03 DIAGNOSIS — N18.32 STAGE 3B CHRONIC KIDNEY DISEASE: ICD-10-CM

## 2024-07-03 PROCEDURE — 93306 TTE W/DOPPLER COMPLETE: CPT | Mod: 26,,, | Performed by: INTERNAL MEDICINE

## 2024-07-03 PROCEDURE — 93306 TTE W/DOPPLER COMPLETE: CPT

## 2024-07-05 LAB
AORTIC ROOT ANNULUS: 2.7 CM
AORTIC VALVE CUSP SEPERATION: 0.9 CM
AV INDEX (PROSTH): 0.32
AV MEAN GRADIENT: 10 MMHG
AV PEAK GRADIENT: 19 MMHG
AV VALVE AREA BY VELOCITY RATIO: 0.92 CM²
AV VALVE AREA: 0.9 CM²
AV VELOCITY RATIO: 0.32
BSA FOR ECHO PROCEDURE: 1.41 M2
CV ECHO LV RWT: 0.54 CM
DOP CALC AO PEAK VEL: 2.19 M/S
DOP CALC AO VTI: 45.6 CM
DOP CALC LVOT AREA: 2.8 CM2
DOP CALC LVOT DIAMETER: 1.9 CM
DOP CALC LVOT PEAK VEL: 0.71 M/S
DOP CALC LVOT STROKE VOLUME: 41.09 CM3
DOP CALC MV VTI: 38.6 CM
DOP CALCLVOT PEAK VEL VTI: 14.5 CM
E WAVE DECELERATION TIME: 213 MSEC
E/A RATIO: 1.39
E/E' RATIO: 17.45 M/S
ECHO LV POSTERIOR WALL: 1 CM (ref 0.6–1.1)
EJECTION FRACTION: 55 %
FRACTIONAL SHORTENING: 30 % (ref 28–44)
INTERVENTRICULAR SEPTUM: 1 CM (ref 0.6–1.1)
IVC DIAMETER: 2.3 CM
IVRT: 63 MSEC
LEFT ATRIUM AREA SYSTOLIC (APICAL 2 CHAMBER): 24.5 CM2
LEFT ATRIUM AREA SYSTOLIC (APICAL 4 CHAMBER): 20.3 CM2
LEFT ATRIUM SIZE: 4.3 CM
LEFT ATRIUM VOLUME INDEX MOD: 52.3 ML/M2
LEFT ATRIUM VOLUME MOD: 73.2 CM3
LEFT INTERNAL DIMENSION IN SYSTOLE: 2.6 CM (ref 2.1–4)
LEFT VENTRICLE DIASTOLIC VOLUME INDEX: 41.52 ML/M2
LEFT VENTRICLE DIASTOLIC VOLUME: 58.13 ML
LEFT VENTRICLE END SYSTOLIC VOLUME APICAL 2 CHAMBER: 87.8 ML
LEFT VENTRICLE END SYSTOLIC VOLUME APICAL 4 CHAMBER: 59.8 ML
LEFT VENTRICLE MASS INDEX: 80 G/M2
LEFT VENTRICLE SYSTOLIC VOLUME INDEX: 17.6 ML/M2
LEFT VENTRICLE SYSTOLIC VOLUME: 24.61 ML
LEFT VENTRICULAR INTERNAL DIMENSION IN DIASTOLE: 3.7 CM (ref 3.5–6)
LEFT VENTRICULAR MASS: 112.54 G
LV LATERAL E/E' RATIO: 13.71 M/S
LV SEPTAL E/E' RATIO: 24 M/S
LVED V (TEICH): 58.13 ML
LVES V (TEICH): 24.61 ML
LVOT MG: 1 MMHG
LVOT MV: 0.44 CM/S
MV MEAN GRADIENT: 2 MMHG
MV PEAK A VEL: 0.69 M/S
MV PEAK E VEL: 0.96 M/S
MV PEAK GRADIENT: 5 MMHG
MV VALVE AREA BY CONTINUITY EQUATION: 1.06 CM2
OHS CV RV/LV RATIO: 0.65 CM
PISA TR MAX VEL: 2.66 M/S
PV MV: 0.53 M/S
PV PEAK GRADIENT: 3 MMHG
PV PEAK VELOCITY: 0.82 M/S
RA PRESSURE ESTIMATED: 3 MMHG
RIGHT VENTRICULAR END-DIASTOLIC DIMENSION: 2.4 CM
RV TB RVSP: 6 MMHG
RV TISSUE DOPPLER FREE WALL SYSTOLIC VELOCITY 1 (APICAL 4 CHAMBER VIEW): 8.81 CM/S
TDI LATERAL: 0.07 M/S
TDI SEPTAL: 0.04 M/S
TDI: 0.06 M/S
TR MAX PG: 28 MMHG
TRICUSPID ANNULAR PLANE SYSTOLIC EXCURSION: 1.72 CM
TV REST PULMONARY ARTERY PRESSURE: 31 MMHG
Z-SCORE OF LEFT VENTRICULAR DIMENSION IN END DIASTOLE: -1.61
Z-SCORE OF LEFT VENTRICULAR DIMENSION IN END SYSTOLE: -0.29

## 2024-07-11 ENCOUNTER — LAB VISIT (OUTPATIENT)
Dept: LAB | Facility: HOSPITAL | Age: 89
End: 2024-07-11
Attending: INTERNAL MEDICINE
Payer: MEDICARE

## 2024-07-11 ENCOUNTER — OFFICE VISIT (OUTPATIENT)
Dept: CARDIOLOGY | Facility: CLINIC | Age: 89
End: 2024-07-11
Payer: MEDICARE

## 2024-07-11 VITALS
DIASTOLIC BLOOD PRESSURE: 62 MMHG | WEIGHT: 109.69 LBS | BODY MASS INDEX: 23.02 KG/M2 | HEART RATE: 58 BPM | SYSTOLIC BLOOD PRESSURE: 122 MMHG | HEIGHT: 58 IN

## 2024-07-11 DIAGNOSIS — I35.0 AORTIC VALVE STENOSIS, MODERATE: Primary | ICD-10-CM

## 2024-07-11 DIAGNOSIS — G47.33 OBSTRUCTIVE SLEEP APNEA: ICD-10-CM

## 2024-07-11 DIAGNOSIS — D63.8 ANEMIA OF CHRONIC DISEASE: Chronic | ICD-10-CM

## 2024-07-11 DIAGNOSIS — I35.0 AORTIC VALVE STENOSIS, MODERATE: ICD-10-CM

## 2024-07-11 DIAGNOSIS — Z95.0 PACEMAKER: ICD-10-CM

## 2024-07-11 DIAGNOSIS — I50.43 ACUTE ON CHRONIC COMBINED SYSTOLIC AND DIASTOLIC HEART FAILURE: ICD-10-CM

## 2024-07-11 DIAGNOSIS — I25.118 CORONARY ARTERY DISEASE OF NATIVE ARTERY OF NATIVE HEART WITH STABLE ANGINA PECTORIS: ICD-10-CM

## 2024-07-11 LAB
ANION GAP SERPL CALC-SCNC: 8 MMOL/L (ref 8–16)
BUN SERPL-MCNC: 72 MG/DL (ref 8–23)
CALCIUM SERPL-MCNC: 9.4 MG/DL (ref 8.7–10.5)
CHLORIDE SERPL-SCNC: 86 MMOL/L (ref 95–110)
CO2 SERPL-SCNC: 35 MMOL/L (ref 23–29)
CREAT SERPL-MCNC: 3.8 MG/DL (ref 0.5–1.4)
EST. GFR  (NO RACE VARIABLE): 10.9 ML/MIN/1.73 M^2
GLUCOSE SERPL-MCNC: 100 MG/DL (ref 70–110)
MAGNESIUM SERPL-MCNC: 3.2 MG/DL (ref 1.6–2.6)
POTASSIUM SERPL-SCNC: 4.9 MMOL/L (ref 3.5–5.1)
SODIUM SERPL-SCNC: 129 MMOL/L (ref 136–145)

## 2024-07-11 PROCEDURE — 80048 BASIC METABOLIC PNL TOTAL CA: CPT | Performed by: INTERNAL MEDICINE

## 2024-07-11 PROCEDURE — 99999 PR PBB SHADOW E&M-EST. PATIENT-LVL III: CPT | Mod: PBBFAC,,, | Performed by: INTERNAL MEDICINE

## 2024-07-11 PROCEDURE — 36415 COLL VENOUS BLD VENIPUNCTURE: CPT | Performed by: INTERNAL MEDICINE

## 2024-07-11 PROCEDURE — 99213 OFFICE O/P EST LOW 20 MIN: CPT | Mod: PBBFAC,PN | Performed by: INTERNAL MEDICINE

## 2024-07-11 PROCEDURE — 99213 OFFICE O/P EST LOW 20 MIN: CPT | Mod: S$PBB,,, | Performed by: INTERNAL MEDICINE

## 2024-07-11 PROCEDURE — 83735 ASSAY OF MAGNESIUM: CPT | Performed by: INTERNAL MEDICINE

## 2024-07-11 NOTE — PROGRESS NOTES
Patient ID:  Cheyanne Gomes is a 88 y.o. female who presents for follow-up of Coronary Artery Disease, Congestive Heart Failure, Extremity Weakness, Results (Echo & labs), and episode on sunday      Coronary artery disease of native artery of native heart with stable angina pectoris  No symptoms of angina     Hypertension  Controlled on Toprol-XL, Entresto, isosorbide dinitrate and diuretic     Pacemaker  The pacemaker is functioning adequately     Aortic valve stenosis, moderate  Moderate aortic stenosis will follow with an echocardiogram     CKD (chronic kidney disease)  Stable    Sunday afternoon she started jerking and had some changes in her vision.  Her legs were very weak.  Her eyes have been messed up since then.  She is taking metolazone almost every day because of symptoms of heart failure.  The device was interrogated in the office there was no evidence of atrial or ventricular a arrhythmias.        Past Medical History:   Diagnosis Date    Abdominal hernia     Anemia     Dr Berkowitz     Angina pectoris     Aortic valve stenosis, moderate     Back pain     Cannon's esophagus     CAD (coronary artery disease)     Cataract     ou done    CHF (congestive heart failure)     EFx 35%, Dr. Spencer 12/19/13    Chronic combined systolic and diastolic heart failure 09/28/2019    Colitis     Compression fracture     Diverticulosis     Encounter for blood transfusion     GERD (gastroesophageal reflux disease)     Hyperlipidemia     Hypertension     Irritable bowel syndrome     Myocardial infarction     Obstructive sleep apnea 4/9/2024    Osteoarthritis     lumbar DDD    Pacemaker     Pneumonia 01/03/2017    Pulmonary emphysema, unspecified emphysema type 4/9/2024    Raynaud disease     Rheumatoid arteritis     Rheumatoid arthritis     Shingles 01/03/2017    Sjogren syndrome     Ulcerative colitis         Past Surgical History:   Procedure Laterality Date    A-V CARDIAC PACEMAKER INSERTION Left 11/08/2021     Procedure: INSERTION, CARDIAC PACEMAKER, DUAL CHAMBER  ([employer]);  Surgeon: Tera Blair MD;  Location: [facility] CATH/EP LAB;  Service: Cardiology;  Laterality: Left;    APPENDECTOMY      BACK SURGERY      CARDIAC SURGERY      CABGx3 in     CATARACT EXTRACTION      ou od d 11-15-12//     SECTION, CLASSIC      x 2    CHOLECYSTECTOMY      CLOSURE OF WOUND Right 2022    Procedure: CLOSURE-WOUND;  Surgeon: Delvis Jackson MD;  Location: [facility] OR;  Service: ENT;  Laterality: Right;  NOSE AND CHIN    COLONOSCOPY  09/15/2011    Dr Anaya     COLONOSCOPY  01/10/2017    [facility] report sent to scanning    CORONARY ANGIOPLASTY      PCI x 1 in     CORONARY ARTERY BYPASS GRAFT      3 vessel    CORONARY ARTERY BYPASS GRAFT      COSMETIC SURGERY  on nose    CYSTOSCOPY N/A 2020    Procedure: CYSTOSCOPY;  Surgeon: Miryam Bender Jr., MD;  Location: [facility] OR;  Service: Urology;  Laterality: N/A;    EYE SURGERY      eyes      rk ou    FRACTURE SURGERY  2016    Dr Guido left hip     FRACTURE SURGERY  2018    Right Hip    HARVESTING OF SKIN GRAFT  2022    Procedure: SURGICAL PROCUREMENT, GRAFT, SKIN;  Surgeon: Delvis Jackson MD;  Location: [facility] OR;  Service: ENT;;  RIGHT CHEST    HYSTERECTOMY      tubal ligation, oopherectomy    INTRAMEDULLARY RODDING OF TROCHANTER OF FEMUR Right 10/08/2018    Procedure: INSERTION, INTRAMEDULLARY KELSI, FEMUR, TROCHANTER;  Surgeon: Valerio Luther MD;  Location: [facility] OR;  Service: Orthopedics;  Laterality: Right;    OOPHORECTOMY      SPINE SURGERY  2013    Silvino Sandsen    TUBAL LIGATION      UPPER GASTROINTESTINAL ENDOSCOPY      Yag Capsulotomy Right 2014          Current Outpatient Medications   Medication Instructions    amLODIPine (NORVASC) 5 MG tablet TAKE 1 TABLET BY MOUTH 2 TIMES A DAY    aspirin 81 mg Tab 1 tablet, Oral, Nightly, Every day    biotin 5 mg Cap 1 tablet, Oral, Daily    CALCIUM CARB/VIT D3/MINERALS (CALCIUM-VITAMIN D ORAL) 600 mg,  Oral, Daily, Calcium 1200 with D 3 1000    CEQUA 0.09 % Dpet 1 drop, Both Eyes, 2 times daily    DEXILANT 60 mg, Oral, Daily    ENTRESTO  mg per tablet 1 tablet, Oral, 2 times daily    fluticasone (FLONASE) 50 mcg/actuation nasal spray 2 sprays, Each Nostril, Daily    folic acid (FOLVITE) 2 mg, Oral, Daily    furosemide (LASIX) 80 mg, Oral, Daily    gabapentin (NEURONTIN) 200 mg, Oral, Nightly    isosorbide mononitrate (IMDUR) 60 mg, Oral    JARDIANCE 10 mg, Oral    Lactobacillus acidophilus 10 billion cell Cap 1 each, Oral, Daily, 1.5 billion    magnesium oxide (MAG-OX) 400 mg (241.3 mg magnesium) tablet TAKE 1 TABLET BY MOUTH 2 TIMES A DAY    metOLazone (ZAROXOLYN) 2.5 mg, Oral, Daily    metoprolol succinate (TOPROL-XL) 50 mg, Oral, 2 times daily, 25 mg QAM and 12.5 mg QHS    MULTIVITAMIN WITH MINERALS (ONE-A-DAY 50 PLUS) Tab 1 tablet, Oral, Daily    mupirocin (BACTROBAN) 1 g, Topical (Top), 3 times daily    nitroGLYCERIN 0.4 MG/DOSE TL SPRY (NITROLINGUAL) 400 mcg/spray spray 1 spray, Sublingual, Every 5 min PRN, PRN    omega-3 fatty acids/fish oil (FISH OIL-OMEGA-3 FATTY ACIDS) 300-1,000 mg capsule 1 capsule, Oral, Daily    ondansetron (ZOFRAN) 4 mg, Oral, Every 8 hours PRN    potassium chloride SA (K-DUR,KLOR-CON M) 10 MEQ tablet TAKE TWO TABLETS BY MOUTH EVERY MORNING AND 1 TABLET EVERY EVENING    promethazine (PHENERGAN) 25 mg, Oral, 2 times daily PRN    sertraline (ZOLOFT) 100 mg, Oral, Daily    traMADoL (ULTRAM) 50 mg, Oral, 2 times daily PRN    vitamin E 400 mg, Oral, Daily        Review of patient's allergies indicates:   Allergen Reactions    Adhesive     Nitrofurantoin macrocrystalline Nausea And Vomiting     Other reaction(s): very sickly    Penicillins Swelling     Other reaction(s): swelling  Other reaction(s): red skin discolorati    Sulfa (sulfonamide antibiotics) Nausea And Vomiting     Other reaction(s): very sickly        Review of Systems   Eyes:  Positive for visual disturbance.  "  Cardiovascular:  Negative for chest pain, dyspnea on exertion and palpitations.   Respiratory:  Negative for cough and shortness of breath.         Objective:     Vitals:    07/11/24 1412   BP: 122/62   BP Location: Left arm   Patient Position: Sitting   BP Method: Medium (Manual)   Pulse: (!) 58   Weight: 49.7 kg (109 lb 10.9 oz)   Height: 4' 10" (1.473 m)       Physical Exam  Vitals and nursing note reviewed.   Constitutional:       Appearance: She is well-developed.   HENT:      Head: Normocephalic and atraumatic.   Eyes:      Conjunctiva/sclera: Conjunctivae normal.   Cardiovascular:      Rate and Rhythm: Normal rate and regular rhythm.      Heart sounds: Murmur (Grade 4/6 systolic murmur at the base) heard.   Pulmonary:      Effort: Pulmonary effort is normal.      Breath sounds: Normal breath sounds.   Abdominal:      General: Bowel sounds are normal.      Palpations: Abdomen is soft.   Musculoskeletal:         General: Normal range of motion.   Skin:     General: Skin is warm and dry.   Neurological:      Mental Status: She is alert and oriented to person, place, and time.   Psychiatric:         Behavior: Behavior normal.         Thought Content: Thought content normal.         Judgment: Judgment normal.       CMP  Sodium   Date Value Ref Range Status   07/20/2024 133 (L) 136 - 145 mmol/L Final   12/11/2018 139 134 - 144 mmol/L      Potassium   Date Value Ref Range Status   07/20/2024 4.0 3.5 - 5.1 mmol/L Final     Chloride   Date Value Ref Range Status   07/20/2024 91 (L) 95 - 110 mmol/L Final   12/11/2018 96 (L) 98 - 110 mmol/L      CO2   Date Value Ref Range Status   07/20/2024 27 23 - 29 mmol/L Final     Glucose   Date Value Ref Range Status   07/20/2024 87 70 - 110 mg/dL Final   12/11/2018 97 70 - 99 mg/dL      BUN   Date Value Ref Range Status   07/20/2024 72 (H) 8 - 23 mg/dL Final     Creatinine   Date Value Ref Range Status   07/20/2024 3.4 (H) 0.5 - 1.4 mg/dL Final   12/11/2018 0.95 0.60 - 1.40 " mg/dL      Calcium   Date Value Ref Range Status   07/20/2024 7.0 (L) 8.7 - 10.5 mg/dL Final     Total Protein   Date Value Ref Range Status   07/20/2024 6.6 6.0 - 8.4 g/dL Final     Albumin   Date Value Ref Range Status   07/20/2024 3.8 3.5 - 5.2 g/dL Final   12/11/2018 3.9 3.1 - 4.7 g/dL      Total Bilirubin   Date Value Ref Range Status   07/20/2024 0.5 0.1 - 1.0 mg/dL Final     Comment:     For infants and newborns, interpretation of results should be based  on gestational age, weight and in agreement with clinical  observations.    Premature Infant recommended reference ranges:  Up to 24 hours.............<8.0 mg/dL  Up to 48 hours............<12.0 mg/dL  3-5 days..................<15.0 mg/dL  6-29 days.................<15.0 mg/dL       Alkaline Phosphatase   Date Value Ref Range Status   07/20/2024 57 55 - 135 U/L Final     AST   Date Value Ref Range Status   07/20/2024 19 10 - 40 U/L Final     ALT   Date Value Ref Range Status   07/20/2024 11 10 - 44 U/L Final     Anion Gap   Date Value Ref Range Status   07/20/2024 15 8 - 16 mmol/L Final     eGFR if    Date Value Ref Range Status   11/05/2021 >60.0 >60 mL/min/1.73 m^2 Final     eGFR if non    Date Value Ref Range Status   02/21/2022 56 (L) >59 mL/min/1.73 Final      BMP  Lab Results   Component Value Date     (L) 07/20/2024    K 4.0 07/20/2024    CL 91 (L) 07/20/2024    CO2 27 07/20/2024    BUN 72 (H) 07/20/2024    CREATININE 3.4 (H) 07/20/2024    CALCIUM 7.0 (L) 07/20/2024    ANIONGAP 15 07/20/2024    ESTGFRAFRICA >60.0 11/05/2021    EGFRNONAA 56 (L) 02/21/2022      BNP  @LABRCNTIP(BNP,BNPTRIAGEBLO)@   Lab Results   Component Value Date    CHOL 215 (H) 10/05/2023    CHOL 146 10/04/2022    CHOL 113 05/06/2021     Lab Results   Component Value Date    HDL 46 10/05/2023    HDL 52 10/04/2022    HDL 47 05/06/2021     Lab Results   Component Value Date    LDLCALC 137.6 10/05/2023    LDLCALC 75 10/04/2022    LDLCALC 51  05/06/2021     Lab Results   Component Value Date    TRIG 157 (H) 10/05/2023    TRIG 104 10/04/2022    TRIG 70 05/06/2021     Lab Results   Component Value Date    CHOLHDL 21.4 10/05/2023    CHOLHDL 26.2 08/24/2020    CHOLHDL 45.0 10/16/2013      Lab Results   Component Value Date    TSH 2.606 05/03/2024    FREET4 0.89 09/26/2012     Lab Results   Component Value Date    HGBA1C 5.7 (H) 05/03/2024     Lab Results   Component Value Date    WBC 5.92 07/20/2024    HGB 9.8 (L) 07/20/2024    HCT 30.7 (L) 07/20/2024    MCV 86 07/20/2024     (L) 07/20/2024         Results for orders placed during the hospital encounter of 07/03/24    Echo Saline Bubble? No    Interpretation Summary    Left Ventricle: The left ventricle is normal in size. Mildly increased wall thickness. There is mild concentric hypertrophy. Regional wall motion abnormalities present.  Apical aneurysm. There is low normal systolic function. Ejection fraction by visual approximation is 55%. Grade III diastolic dysfunction.    Right Ventricle: Normal right ventricular cavity size. Wall thickness is normal. Systolic function is normal. Pacemaker lead present in the ventricle.    Left Atrium: Left atrium is moderately dilated.    Right Atrium: Right atrium is mildly dilated.    Aortic Valve: The aortic valve is a trileaflet valve. Moderately restricted motion. There is moderate stenosis. Aortic valve area by VTI is 0.90 cm². Aortic valve peak velocity is 2.19 m/s. Mean gradient is 10 mmHg. The dimensionless index is 0.32. There is mild aortic regurgitation with a centrally directed jet.    Mitral Valve: There is mild regurgitation with a posterolateral eccentriccally directed jet.    Tricuspid Valve: There is mild to moderate regurgitation.    IVC/SVC: Normal venous pressure at 3 mmHg.    The estimated pulmonary artery systolic pressure is 31 mmHg.     No results found for this or any previous visit.     EKG  Results for orders placed or performed during  the hospital encounter of 07/17/24   EKG 12-lead    Collection Time: 07/17/24  8:11 PM   Result Value Ref Range    QRS Duration 102 ms    [facility]HS QTC Calculation 453 ms    Narrative    Test Reason : R06.02,    Vent. Rate : 061 BPM     Atrial Rate : 061 BPM     P-R Int : 000 ms          QRS Dur : 102 ms      QT Int : 450 ms       P-R-T Axes : 000 -39 149 degrees     QTc Int : 453 ms    Atrial-paced rhythm with occasional AV dual-paced complexes  Left axis deviation  Minimal voltage criteria for LVH, may be normal variant ( R in aVL )  ST and T wave abnormality, consider lateral ischemia  Abnormal ECG  When compared with ECG of 30-JUN-2023 11:04,  Electronic ventricular pacemaker has replaced Electronic atrial pacemaker  Confirmed by Elliot DEAN, Mat CLEMENTE ([id number]) on 7/18/2024 10:09:33 PM    Referred By: [email or web address]   SELF           Confirmed By:Mat Zarate MD      Stress  No results found for this or any previous visit.             Assessment:       Acute on chronic combined systolic and diastolic heart failure  She is on large doses of diuretics, Entresto, Jardiance, metoprolol, isosorbide mononitrate    Aortic valve stenosis, moderate  Moderate aortic stenosis stable    Coronary artery disease of native artery of native heart with stable angina pectoris  Status post coronary artery bypass.  She has a patent LIMA to the LAD saphenous vein graft to the D1 and OM.  She has not having any symptoms of angina    Pacemaker  Status post pacemaker implant functioning adequately    Anemia of chronic disease  Stable    Obstructive sleep apnea  Compliant with CPAP machine       Plan:       The device has been interrogated there was no a arrhythmias.  She has to have a BNP today follow-up with her primary care physician.  Return to the office in 1 month

## 2024-07-15 ENCOUNTER — PATIENT MESSAGE (OUTPATIENT)
Dept: CARDIOLOGY | Facility: CLINIC | Age: 89
End: 2024-07-15
Payer: MEDICARE

## 2024-07-15 ENCOUNTER — HOSPITAL ENCOUNTER (EMERGENCY)
Facility: HOSPITAL | Age: 89
Discharge: HOME | End: 2024-07-15
Attending: STUDENT IN AN ORGANIZED HEALTH CARE EDUCATION/TRAINING PROGRAM
Payer: MEDICARE

## 2024-07-15 VITALS
HEART RATE: 72 BPM | RESPIRATION RATE: 18 BRPM | DIASTOLIC BLOOD PRESSURE: 85 MMHG | SYSTOLIC BLOOD PRESSURE: 174 MMHG | OXYGEN SATURATION: 97 % | TEMPERATURE: 99 F | HEIGHT: 58 IN | WEIGHT: 100 LBS | BODY MASS INDEX: 20.99 KG/M2

## 2024-07-15 DIAGNOSIS — A49.8 CLOSTRIDIOIDES DIFFICILE INFECTION: ICD-10-CM

## 2024-07-15 DIAGNOSIS — K22.70 BARRETT'S ESOPHAGUS: ICD-10-CM

## 2024-07-15 DIAGNOSIS — R15.9 INCONTINENCE OF FECES WITH FECAL URGENCY: ICD-10-CM

## 2024-07-15 DIAGNOSIS — R15.2 INCONTINENCE OF FECES WITH FECAL URGENCY: ICD-10-CM

## 2024-07-15 DIAGNOSIS — S09.90XA CLOSED HEAD INJURY, INITIAL ENCOUNTER: Primary | ICD-10-CM

## 2024-07-15 DIAGNOSIS — N17.9 AKI (ACUTE KIDNEY INJURY): Primary | ICD-10-CM

## 2024-07-15 DIAGNOSIS — K86.89 PANCREATIC INSUFFICIENCY: ICD-10-CM

## 2024-07-15 PROCEDURE — 99285 EMERGENCY DEPT VISIT HI MDM: CPT | Mod: 25

## 2024-07-15 RX ORDER — LIDOCAINE HYDROCHLORIDE 10 MG/ML
10 INJECTION INFILTRATION; PERINEURAL
Status: DISCONTINUED | OUTPATIENT
Start: 2024-07-15 | End: 2024-07-16 | Stop reason: HOSPADM

## 2024-07-15 NOTE — FIRST PROVIDER EVALUATION
Emergency Department TeleTriage Encounter Note      CHIEF COMPLAINT    Chief Complaint   Patient presents with    Fall    Head Injury     Fall backwards hit head on concrete  / lo laceration  , no loc        VITAL SIGNS   Initial Vitals [07/15/24 1729]   BP Pulse Resp Temp SpO2   (!) 174/85 72 18 98.7 °F (37.1 °C) 97 %      MAP       --            ALLERGIES    Review of patient's allergies indicates:   Allergen Reactions    Adhesive     Nitrofurantoin macrocrystalline Nausea And Vomiting     Other reaction(s): very sickly    Penicillins Swelling     Other reaction(s): swelling  Other reaction(s): red skin discolorati    Sulfa (sulfonamide antibiotics) Nausea And Vomiting     Other reaction(s): very sickly       PROVIDER TRIAGE NOTE  Verbal consent for the teletriage evaluation was given by the patient at the start of the evaluation.  All efforts will be made to maintain patient's privacy during the evaluation.      This is a teletriage evaluation of a 88 y.o. female presenting to the ED with c/o fell backwards and hit head on cement.  3 open wounds.  Last tetanus unknown.  No LOC.  Denies neck pain. Limited physical exam via telehealth: The patient is awake, alert, answering questions appropriately and is not in respiratory distress.  As the Teletriage provider, I performed an initial assessment and ordered appropriate labs and imaging studies, if any, to facilitate the patient's care once placed in the ED. Once a room is available, care and a full evaluation will be completed by an alternate ED provider.  Any additional orders and the final disposition will be determined by that provider.  All imaging and labs will not be followed-up by the Teletriage Team, including myself.          ORDERS  Labs Reviewed - No data to display    ED Orders (720h ago, onward)      Start Ordered     Status Ordering Provider    07/16/24 0600 07/15/24 2354  Wound care routine - Clean wound  Daily        Comments: Clean Wound    Ordered  BRIGETTE VALLE    07/16/24 0600 07/15/24 1735  Wound care routine - Irrigate wound  Daily        Comments: Irrigate Wound    Ordered BRGIETTE VALLE    07/16/24 0600 07/15/24 1735  Wound care routine - Sterile Gloves to Bedside  Daily        Comments: Provide sterile gloves to bedside    Ordered BRIGETTE VALLE    07/15/24 1835 07/15/24 1735  Tdap (BOOSTRIX) vaccine injection 0.5 mL  vaccine x 1 dose         Ordered BRIGETTE VALLE    07/15/24 1745 07/15/24 1735  LIDOcaine HCL 10 mg/ml (1%) injection 10 mL  ED 1 Time         Ordered BRIGETTE VALLE    07/15/24 1745 07/15/24 1735  neomycin-bacitracnZn-polymyxnB packet  ED 1 Time         Ordered BRIGETTE VALLE    07/15/24 1736 07/15/24 1735  CT Head Without Contrast  1 time imaging         Ordered BRIGETTE VALLE    07/15/24 1736 07/15/24 1735  Provide suture tray to patient bedside  Once         Ordered BRIGETTE VALLE    07/15/24 1736 07/15/24 1735  Change dressing - apply dry sterile dressing  Once        Comments: Apply dry sterile dressing.    Ordered BRIGETTE VALLE              Virtual Visit Note: The provider triage portion of this emergency department evaluation and documentation was performed via [employer], a HIPAA-compliant telemedicine application, in concert with a tele-presenter in the room. A face to face patient evaluation with one of my colleagues will occur once the patient is placed in an emergency department room.      DISCLAIMER: This note was prepared with [employer] voice recognition transcription software. Garbled syntax, mangled pronouns, and other bizarre constructions may be attributed to that software system.

## 2024-07-16 NOTE — ED PROVIDER NOTES
Encounter Date: 7/15/2024       History     Chief Complaint   Patient presents with    Fall    Head Injury     Fall backwards hit head on concrete  / lo laceration  , no loc      88-year-old female presents for mechanical fall backwards striking the back of her head.  She was getting out of the car, lost her balance and fell backwards.  She did not lose consciousness.  She has no other symptoms.      Review of patient's allergies indicates:   Allergen Reactions    Adhesive     Nitrofurantoin macrocrystalline Nausea And Vomiting     Other reaction(s): very sickly    Penicillins Swelling     Other reaction(s): swelling  Other reaction(s): red skin discolorati    Sulfa (sulfonamide antibiotics) Nausea And Vomiting     Other reaction(s): very sickly     Past Medical History:   Diagnosis Date    Abdominal hernia     Anemia     Dr Berkowitz     Angina pectoris     Aortic valve stenosis, moderate     Back pain     Cannon's esophagus     CAD (coronary artery disease)     Cataract     ou done    CHF (congestive heart failure)     EFx 35%, Dr. Spencer 12/19/13    Chronic combined systolic and diastolic heart failure 09/28/2019    Colitis     Compression fracture     Diverticulosis     Encounter for blood transfusion     GERD (gastroesophageal reflux disease)     Hyperlipidemia     Hypertension     Irritable bowel syndrome     Myocardial infarction     Obstructive sleep apnea 4/9/2024    Osteoarthritis     lumbar DDD    Pacemaker     Pneumonia 01/03/2017    Pulmonary emphysema, unspecified emphysema type 4/9/2024    Raynaud disease     Rheumatoid arteritis     Rheumatoid arthritis     Shingles 01/03/2017    Sjogren syndrome     Ulcerative colitis      Past Surgical History:   Procedure Laterality Date    A-V CARDIAC PACEMAKER INSERTION Left 11/08/2021    Procedure: INSERTION, CARDIAC PACEMAKER, DUAL CHAMBER  ([employer]);  Surgeon: Tera Blair MD;  Location: [facility] CATH/EP LAB;  Service: Cardiology;  Laterality: Left;     APPENDECTOMY      BACK SURGERY      CARDIAC SURGERY      CABGx3 in     CATARACT EXTRACTION      ou od d 11-15-12/     SECTION, CLASSIC      x 2    CHOLECYSTECTOMY      CLOSURE OF WOUND Right 2022    Procedure: CLOSURE-WOUND;  Surgeon: Delvis Jackson MD;  Location: [facility] OR;  Service: ENT;  Laterality: Right;  NOSE AND CHIN    COLONOSCOPY  09/15/2011    Dr Anaya     COLONOSCOPY  01/10/2017    [facility] report sent to scanning    CORONARY ANGIOPLASTY      PCI x 1 in     CORONARY ARTERY BYPASS GRAFT      3 vessel    CORONARY ARTERY BYPASS GRAFT      COSMETIC SURGERY  on nose    CYSTOSCOPY N/A 2020    Procedure: CYSTOSCOPY;  Surgeon: Miryam Bender Jr., MD;  Location: [facility] OR;  Service: Urology;  Laterality: N/A;    EYE SURGERY      eyes      rk ou    FRACTURE SURGERY  2016    Dr Guido left hip     FRACTURE SURGERY  2018    Right Hip    HARVESTING OF SKIN GRAFT  2022    Procedure: SURGICAL PROCUREMENT, GRAFT, SKIN;  Surgeon: Delvis Jackson MD;  Location: [facility] OR;  Service: ENT;;  RIGHT CHEST    HYSTERECTOMY      tubal ligation, oopherectomy    INTRAMEDULLARY RODDING OF TROCHANTER OF FEMUR Right 10/08/2018    Procedure: INSERTION, INTRAMEDULLARY KELSI, FEMUR, TROCHANTER;  Surgeon: Valerio Luther MD;  Location: [facility] OR;  Service: Orthopedics;  Laterality: Right;    OOPHORECTOMY      SPINE SURGERY  2013    Silvino Mckeon    TUBAL LIGATION      UPPER GASTROINTESTINAL ENDOSCOPY      Yag Capsulotomy Right 2014     Family History   Adopted: Yes   Problem Relation Name Age of Onset    Heart disease Mother Bedelia Pounds         aortic stenosis    Skin cancer Mother Bedelia Pounds     Arthritis Mother Bedelia Pounds     Hypertension Father [place] Pounds     Heart disease Father [place] Pounds         MI    Hypertension Sister Lizabeth Ivory     Fibromyalgia Sister Lizabeth Ivory     Scleroderma Sister Lizabeth Ivory     Pulmonary embolism Sister Lizabeth Ivory     Irritable bowel  syndrome Sister Fidelina Palm     Hypertension Sister Fidelina Palm     Heart disease Brother Bakari Pounds         2 brothers CABG    Arthritis Brother Bakari Pounds     Crohn's disease Brother Bakari Pounds     Crohn's disease Brother      Crohn's disease Brother      Hypertension Daughter Brandi Falcon     Early death Son Femi [place]         accident    Hypertension Son Femi Mireya     Cancer Maternal Aunt Shannan Byrdtit     Lupus Cousin      Lupus Cousin      Amblyopia Neg Hx      Blindness Neg Hx      Cataracts Neg Hx      Diabetes Neg Hx      Glaucoma Neg Hx      Macular degeneration Neg Hx      Retinal detachment Neg Hx      Strabismus Neg Hx      Stroke Neg Hx      Thyroid disease Neg Hx      Celiac disease Neg Hx      Cirrhosis Neg Hx      Psoriasis Neg Hx      Melanoma Neg Hx      Multiple sclerosis Neg Hx      Rheum arthritis Neg Hx      Tuberculosis Neg Hx      Lymphoma Neg Hx      Ulcerative colitis Neg Hx      Moses's disease Neg Hx      Stomach cancer Neg Hx      Rectal cancer Neg Hx      Liver disease Neg Hx      Liver cancer Neg Hx      Inflammatory bowel disease Neg Hx      Hemochromatosis Neg Hx      Esophageal cancer Neg Hx      Cystic fibrosis Neg Hx      Colon cancer Neg Hx       Social History     Tobacco Use    Smoking status: Former     Current packs/day: 0.00     Average packs/day: 1 pack/day for 18.0 years (18.0 ttl pk-yrs)     Types: Cigarettes     Start date: 1953     Quit date: 1971     Years since quittin.5    Smokeless tobacco: Never   Substance Use Topics    Alcohol use: Not Currently     Alcohol/week: 1.0 standard drink of alcohol     Types: 1 Glasses of wine per week     Comment: not at all in months    Drug use: Never     Review of Systems   Constitutional:  Negative for activity change, appetite change, chills, fever and unexpected weight change.   HENT:  Negative for dental problem and drooling.    Eyes:  Negative for discharge and itching.   Respiratory:  Negative for  cough, chest tightness, shortness of breath, wheezing and stridor.    Cardiovascular:  Negative for chest pain, palpitations and leg swelling.   Gastrointestinal:  Negative for abdominal distention, abdominal pain, diarrhea and nausea.   Genitourinary:  Negative for difficulty urinating, dysuria, frequency and urgency.   Musculoskeletal:  Negative for back pain, gait problem and joint swelling.   Neurological:  Positive for headaches. Negative for dizziness, syncope and numbness.   Psychiatric/Behavioral:  Negative for agitation, behavioral problems and confusion.        Physical Exam     Initial Vitals [07/15/24 1729]   BP Pulse Resp Temp SpO2   (!) 174/85 72 18 98.7 °F (37.1 °C) 97 %      MAP       --         Physical Exam    Nursing note and vitals reviewed.  Constitutional: She appears well-developed and well-nourished. She is not diaphoretic.   HENT:   Head: Normocephalic.   Mouth/Throat: Oropharynx is clear and moist.   Occipital hematoma   Eyes: EOM are normal. Pupils are equal, round, and reactive to light. Right eye exhibits no discharge. Left eye exhibits no discharge.   Neck: No tracheal deviation present.   Normal range of motion.  Cardiovascular:  Normal rate, regular rhythm and intact distal pulses.           Pulmonary/Chest: No respiratory distress. She has no wheezes. She exhibits no tenderness.   Abdominal: Abdomen is soft. She exhibits no distension. There is no abdominal tenderness.   Musculoskeletal:         General: No tenderness or edema. Normal range of motion.      Cervical back: Normal range of motion.     Neurological: She is alert and oriented to person, place, and time. She has normal strength. No cranial nerve deficit or sensory deficit. GCS eye subscore is 4. GCS verbal subscore is 5. GCS motor subscore is 6.   Skin: Skin is warm and dry. No rash noted.   Psychiatric: She has a normal mood and affect. Her behavior is normal. Thought content normal.         ED Course   Procedures  Labs  Reviewed - No data to display       Imaging Results              CT Head Without Contrast (Final result)  Result time 07/15/24 18:39:13      Final result by Carlos Lu MD (07/15/24 18:39:13)                   Impression:      1. No evidence of an acute intracranial abnormality.  2. Mild generalized cerebral volume loss and mild scattered nonspecific supratentorial white matter hypoattenuation probably reflecting sequelae of chronic small vessel ischemic change.  3. Chronic right mastoid effusion.      Electronically signed by: Carlos Lu  Date:    07/15/2024  Time:    18:39               Narrative:    EXAMINATION:  CT HEAD WITHOUT CONTRAST    CLINICAL HISTORY:  Head trauma, minor (Age >= 65y);    TECHNIQUE:  Low dose axial images were obtained through the head.  Coronal and sagittal reformations were also performed. Contrast was not administered.    COMPARISON:  10/27/2023    FINDINGS:  No acute intracranial hemorrhage.    Mild prominence of the ventricles and sulci compatible with generalized cerebral volume loss.  No hydrocephalus.    Mild scattered hypoattenuation within the supratentorial white matter, nonspecific but probably reflecting sequelae of chronic small vessel ischemic change.  Gray-white matter differentiation appears preserved without evidence of acute major vascular territory infarct.  Chronic dense calcification of the choroid plexus in the atrium of the left lateral ventricle no significant mass effect or midline shift.    No displaced calvarial fracture.    The visualized paranasal sinuses are essentially clear. Complete opacification of the right mastoid air cells, mastoid antrum and aditus antrum, stable from prior exam dated 10/27/2023.  Trace opacification of the dependent left mastoid air cells.    Bilateral lens replacements.    Prominent calcific atherosclerosis of the internal carotid and vertebral arteries at the skull base.    Multiple scattered punctate calcifications in the left  greater than right parotid glands suggestive of sialolithiasis, similar to prior exam.                                       Medications - No data to display    Medical Decision Making  Amount and/or Complexity of Data Reviewed  Radiology:  Decision-making details documented in ED Course.               ED Course as of 07/16/24 0137   Mon Jul 15, 2024   2219 CT Head Without Contrast  88-year-old female presents for evaluation of mechanical fall, backwards, striking her occiput on the ground.  She had no loss of consciousness.  GCS 15.  No bleeding.  No neck pain.  CT head without acute abnormality, no evidence of skull fracture, no evidence of subdural hematoma or other acute intracranial abnormality.  Patient overall well-appearing, wants to go home.  She is stable for discharge with outpatient follow-up and return precautions. [BS]      ED Course User Index  [BS] Jorge Lemus MD                           Clinical Impression:  Final diagnoses:  [S09.90XA] Closed head injury, initial encounter (Primary)          ED Disposition Condition    Discharge Stable          ED Prescriptions    None       Follow-up Information       Follow up With Specialties Details Why Contact Info    Romeo Alas MD Family Medicine Call in 1 day To set up a follow-up appointment, To recheck today's symptoms [address] [facility]  [address]  [place] [address]  [phone]               Jorge Lemus MD  07/16/24 0137

## 2024-07-16 NOTE — DISCHARGE INSTRUCTIONS

## 2024-07-17 ENCOUNTER — HOSPITAL ENCOUNTER (INPATIENT)
Facility: HOSPITAL | Age: 89
LOS: 9 days | Discharge: HOSPICE | DRG: 682 | End: 2024-07-26
Attending: EMERGENCY MEDICINE | Admitting: INTERNAL MEDICINE
Payer: MEDICARE
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
## 2024-07-17 DIAGNOSIS — N17.9 AKI (ACUTE KIDNEY INJURY): ICD-10-CM

## 2024-07-17 DIAGNOSIS — T14.90XA INJURY: ICD-10-CM

## 2024-07-17 DIAGNOSIS — R06.02 SHORTNESS OF BREATH: ICD-10-CM

## 2024-07-17 DIAGNOSIS — R07.9 CHEST PAIN: ICD-10-CM

## 2024-07-17 LAB
ALBUMIN SERPL BCP-MCNC: 3.8 G/DL (ref 3.5–5.2)
ALP SERPL-CCNC: 87 U/L (ref 55–135)
ALT SERPL W/O P-5'-P-CCNC: 14 U/L (ref 10–44)
ANION GAP SERPL CALC-SCNC: 13 MMOL/L (ref 8–16)
AST SERPL-CCNC: 24 U/L (ref 10–40)
BACTERIA #/AREA URNS HPF: ABNORMAL /HPF
BASOPHILS # BLD AUTO: 0.03 K/UL (ref 0–0.2)
BASOPHILS NFR BLD: 0.5 % (ref 0–1.9)
BILIRUB SERPL-MCNC: 0.3 MG/DL (ref 0.1–1)
BILIRUB UR QL STRIP: NEGATIVE
BNP SERPL-MCNC: 1039 PG/ML (ref 0–99)
BUN SERPL-MCNC: 74 MG/DL (ref 8–23)
CALCIUM SERPL-MCNC: 7.3 MG/DL (ref 8.7–10.5)
CHLORIDE SERPL-SCNC: 87 MMOL/L (ref 95–110)
CLARITY UR: ABNORMAL
CO2 SERPL-SCNC: 29 MMOL/L (ref 23–29)
COLOR UR: YELLOW
CREAT SERPL-MCNC: 3.5 MG/DL (ref 0.5–1.4)
CREAT UR-MCNC: 30.1 MG/DL (ref 15–325)
DIFFERENTIAL METHOD BLD: ABNORMAL
EOSINOPHIL # BLD AUTO: 0.2 K/UL (ref 0–0.5)
EOSINOPHIL NFR BLD: 2.6 % (ref 0–8)
ERYTHROCYTE [DISTWIDTH] IN BLOOD BY AUTOMATED COUNT: 16.2 % (ref 11.5–14.5)
EST. GFR  (NO RACE VARIABLE): 12.1 ML/MIN/1.73 M^2
GLUCOSE SERPL-MCNC: 120 MG/DL (ref 70–110)
GLUCOSE UR QL STRIP: ABNORMAL
HCT VFR BLD AUTO: 30.9 % (ref 37–48.5)
HGB BLD-MCNC: 9.7 G/DL (ref 12–16)
HGB UR QL STRIP: NEGATIVE
IMM GRANULOCYTES # BLD AUTO: 0.03 K/UL (ref 0–0.04)
IMM GRANULOCYTES NFR BLD AUTO: 0.5 % (ref 0–0.5)
KETONES UR QL STRIP: NEGATIVE
LEUKOCYTE ESTERASE UR QL STRIP: ABNORMAL
LYMPHOCYTES # BLD AUTO: 1.1 K/UL (ref 1–4.8)
LYMPHOCYTES NFR BLD: 17.8 % (ref 18–48)
MCH RBC QN AUTO: 27.2 PG (ref 27–31)
MCHC RBC AUTO-ENTMCNC: 31.4 G/DL (ref 32–36)
MCV RBC AUTO: 87 FL (ref 82–98)
MICROSCOPIC COMMENT: ABNORMAL
MONOCYTES # BLD AUTO: 0.6 K/UL (ref 0.3–1)
MONOCYTES NFR BLD: 9.3 % (ref 4–15)
NEUTROPHILS # BLD AUTO: 4.2 K/UL (ref 1.8–7.7)
NEUTROPHILS NFR BLD: 69.3 % (ref 38–73)
NITRITE UR QL STRIP: POSITIVE
NRBC BLD-RTO: 0 /100 WBC
PH UR STRIP: 7 [PH] (ref 5–8)
PLATELET # BLD AUTO: 118 K/UL (ref 150–450)
PMV BLD AUTO: 9.6 FL (ref 9.2–12.9)
POTASSIUM SERPL-SCNC: 4.1 MMOL/L (ref 3.5–5.1)
PROT SERPL-MCNC: 6.9 G/DL (ref 6–8.4)
PROT UR QL STRIP: NEGATIVE
RBC # BLD AUTO: 3.56 M/UL (ref 4–5.4)
SODIUM SERPL-SCNC: 129 MMOL/L (ref 136–145)
SODIUM UR-SCNC: 59 MMOL/L (ref 20–250)
SP GR UR STRIP: 1.01 (ref 1–1.03)
URN SPEC COLLECT METH UR: ABNORMAL
UROBILINOGEN UR STRIP-ACNC: NEGATIVE EU/DL
WBC # BLD AUTO: 6.12 K/UL (ref 3.9–12.7)
WBC #/AREA URNS HPF: 7 /HPF (ref 0–5)

## 2024-07-17 PROCEDURE — 87186 SC STD MICRODIL/AGAR DIL: CPT | Performed by: NURSE PRACTITIONER

## 2024-07-17 PROCEDURE — 81001 URINALYSIS AUTO W/SCOPE: CPT | Performed by: NURSE PRACTITIONER

## 2024-07-17 PROCEDURE — 83880 ASSAY OF NATRIURETIC PEPTIDE: CPT | Performed by: STUDENT IN AN ORGANIZED HEALTH CARE EDUCATION/TRAINING PROGRAM

## 2024-07-17 PROCEDURE — 82570 ASSAY OF URINE CREATININE: CPT | Performed by: STUDENT IN AN ORGANIZED HEALTH CARE EDUCATION/TRAINING PROGRAM

## 2024-07-17 PROCEDURE — 84300 ASSAY OF URINE SODIUM: CPT | Performed by: STUDENT IN AN ORGANIZED HEALTH CARE EDUCATION/TRAINING PROGRAM

## 2024-07-17 PROCEDURE — 93010 ELECTROCARDIOGRAM REPORT: CPT | Mod: ,,, | Performed by: GENERAL PRACTICE

## 2024-07-17 PROCEDURE — 87086 URINE CULTURE/COLONY COUNT: CPT | Performed by: NURSE PRACTITIONER

## 2024-07-17 PROCEDURE — 63600175 PHARM REV CODE 636 W HCPCS: Performed by: INTERNAL MEDICINE

## 2024-07-17 PROCEDURE — 12000002 HC ACUTE/MED SURGE SEMI-PRIVATE ROOM

## 2024-07-17 PROCEDURE — 93005 ELECTROCARDIOGRAM TRACING: CPT | Performed by: GENERAL PRACTICE

## 2024-07-17 PROCEDURE — 96374 THER/PROPH/DIAG INJ IV PUSH: CPT

## 2024-07-17 PROCEDURE — 85025 COMPLETE CBC W/AUTO DIFF WBC: CPT | Performed by: NURSE PRACTITIONER

## 2024-07-17 PROCEDURE — 80053 COMPREHEN METABOLIC PANEL: CPT | Performed by: NURSE PRACTITIONER

## 2024-07-17 PROCEDURE — 99285 EMERGENCY DEPT VISIT HI MDM: CPT | Mod: 25

## 2024-07-17 RX ORDER — TALC
6 POWDER (GRAM) TOPICAL NIGHTLY PRN
Status: DISCONTINUED | OUTPATIENT
Start: 2024-07-17 | End: 2024-07-26 | Stop reason: HOSPADM

## 2024-07-17 RX ORDER — FUROSEMIDE 10 MG/ML
20 INJECTION INTRAMUSCULAR; INTRAVENOUS EVERY 12 HOURS
Status: DISCONTINUED | OUTPATIENT
Start: 2024-07-17 | End: 2024-07-20

## 2024-07-17 RX ORDER — METOPROLOL SUCCINATE 50 MG/1
50 TABLET, EXTENDED RELEASE ORAL DAILY
Status: DISCONTINUED | OUTPATIENT
Start: 2024-07-18 | End: 2024-07-26 | Stop reason: HOSPADM

## 2024-07-17 RX ORDER — LANOLIN ALCOHOL/MO/W.PET/CERES
800 CREAM (GRAM) TOPICAL
Status: DISCONTINUED | OUTPATIENT
Start: 2024-07-17 | End: 2024-07-26 | Stop reason: HOSPADM

## 2024-07-17 RX ORDER — METOPROLOL SUCCINATE 50 MG/1
50 TABLET, EXTENDED RELEASE ORAL 2 TIMES DAILY
Status: DISCONTINUED | OUTPATIENT
Start: 2024-07-17 | End: 2024-07-17

## 2024-07-17 RX ORDER — ACETAMINOPHEN 325 MG/1
650 TABLET ORAL EVERY 8 HOURS PRN
Status: DISCONTINUED | OUTPATIENT
Start: 2024-07-17 | End: 2024-07-26 | Stop reason: HOSPADM

## 2024-07-17 RX ORDER — ISOSORBIDE MONONITRATE 60 MG/1
60 TABLET, EXTENDED RELEASE ORAL DAILY
Status: DISCONTINUED | OUTPATIENT
Start: 2024-07-18 | End: 2024-07-26 | Stop reason: HOSPADM

## 2024-07-17 RX ORDER — ACETAMINOPHEN 325 MG/1
650 TABLET ORAL EVERY 4 HOURS PRN
Status: DISCONTINUED | OUTPATIENT
Start: 2024-07-17 | End: 2024-07-26 | Stop reason: HOSPADM

## 2024-07-17 RX ORDER — NAPROXEN SODIUM 220 MG/1
81 TABLET, FILM COATED ORAL DAILY
Status: DISCONTINUED | OUTPATIENT
Start: 2024-07-18 | End: 2024-07-26 | Stop reason: HOSPADM

## 2024-07-17 RX ORDER — ONDANSETRON HYDROCHLORIDE 2 MG/ML
4 INJECTION, SOLUTION INTRAVENOUS EVERY 6 HOURS PRN
Status: DISCONTINUED | OUTPATIENT
Start: 2024-07-17 | End: 2024-07-26 | Stop reason: HOSPADM

## 2024-07-17 RX ORDER — ENOXAPARIN SODIUM 100 MG/ML
30 INJECTION SUBCUTANEOUS EVERY 24 HOURS
Status: DISCONTINUED | OUTPATIENT
Start: 2024-07-17 | End: 2024-07-26 | Stop reason: HOSPADM

## 2024-07-17 RX ORDER — PANTOPRAZOLE SODIUM 40 MG/1
40 TABLET, DELAYED RELEASE ORAL DAILY
Status: DISCONTINUED | OUTPATIENT
Start: 2024-07-18 | End: 2024-07-26 | Stop reason: HOSPADM

## 2024-07-17 RX ORDER — SODIUM,POTASSIUM PHOSPHATES 280-250MG
2 POWDER IN PACKET (EA) ORAL
Status: DISCONTINUED | OUTPATIENT
Start: 2024-07-17 | End: 2024-07-26 | Stop reason: HOSPADM

## 2024-07-17 RX ORDER — ALUMINUM HYDROXIDE, MAGNESIUM HYDROXIDE, AND SIMETHICONE 1200; 120; 1200 MG/30ML; MG/30ML; MG/30ML
30 SUSPENSION ORAL 4 TIMES DAILY PRN
Status: DISCONTINUED | OUTPATIENT
Start: 2024-07-17 | End: 2024-07-26 | Stop reason: HOSPADM

## 2024-07-17 RX ORDER — HYDROCODONE BITARTRATE AND ACETAMINOPHEN 5; 325 MG/1; MG/1
1 TABLET ORAL EVERY 6 HOURS PRN
Status: DISCONTINUED | OUTPATIENT
Start: 2024-07-17 | End: 2024-07-26 | Stop reason: HOSPADM

## 2024-07-17 RX ORDER — LANOLIN ALCOHOL/MO/W.PET/CERES
400 CREAM (GRAM) TOPICAL 2 TIMES DAILY
Status: DISCONTINUED | OUTPATIENT
Start: 2024-07-17 | End: 2024-07-18

## 2024-07-17 RX ADMIN — FUROSEMIDE 20 MG: 10 INJECTION, SOLUTION INTRAMUSCULAR; INTRAVENOUS at 10:07

## 2024-07-17 NOTE — FIRST PROVIDER EVALUATION
Emergency Department TeleTriage Encounter Note      CHIEF COMPLAINT    Chief Complaint   Patient presents with    abnormal labs     Sent by Dr Malone sent her for decreased GFR of 10    Leg Injury       VITAL SIGNS   Initial Vitals [07/17/24 1801]   BP Pulse Resp Temp SpO2   (!) 145/78 72 20 98.5 °F (36.9 °C) 96 %      MAP       --            ALLERGIES    Review of patient's allergies indicates:   Allergen Reactions    Adhesive     Nitrofurantoin macrocrystalline Nausea And Vomiting     Other reaction(s): very sickly    Penicillins Swelling     Other reaction(s): swelling  Other reaction(s): red skin discolorati    Sulfa (sulfonamide antibiotics) Nausea And Vomiting     Other reaction(s): very sickly       PROVIDER TRIAGE NOTE  Verbal consent for the teletriage evaluation was given by the patient at the start of the evaluation.  All efforts will be made to maintain patient's privacy during the evaluation.      This is a teletriage evaluation of a 88 y.o. female presenting to the ED with c/o eGFR 10 and creatinine 3/8 on 7/11/2024 - denies symptoms. Also injured right knee and has bruising. Limited physical exam via telehealth: The patient is awake, alert, answering questions appropriately and is not in respiratory distress.  As the Teletriage provider, I performed an initial assessment and ordered appropriate labs and imaging studies, if any, to facilitate the patient's care once placed in the ED. Once a room is available, care and a full evaluation will be completed by an alternate ED provider.  Any additional orders and the final disposition will be determined by that provider.  All imaging and labs will not be followed-up by the Teletriage Team, including myself.          ORDERS  Labs Reviewed   CBC W/ AUTO DIFFERENTIAL   COMPREHENSIVE METABOLIC PANEL   URINALYSIS, REFLEX TO URINE CULTURE       ED Orders (720h ago, onward)      Start Ordered     Status Ordering Provider    07/17/24 1811 07/17/24 1810  X-Ray Knee  3 View Right  1 time imaging         Ordered BRIGETTE VALLE    07/17/24 1810 07/17/24 1810  Saline lock IV  Once         Ordered BRIGETTE VALLE    07/17/24 1810 07/17/24 1810  Cardiac Monitoring - Adult  Continuous        Comments: Notify Physician If:    Ordered BRIGETTE VALLE    07/17/24 1810 07/17/24 1810  Pulse Oximetry Continuous  Continuous         Ordered BRIGETTE VALLE    07/17/24 1810 07/17/24 1810  CBC auto differential  STAT         Ordered BRIGETTE VALLE    07/17/24 1810 07/17/24 1810  Comprehensive metabolic panel  STAT         Ordered BRIGETTE VALLE    07/17/24 1810 07/17/24 1810  Urinalysis, Reflex to Urine Culture Urine, Clean Catch  STAT         Ordered BRIGETTE VALLE              Virtual Visit Note: The provider triage portion of this emergency department evaluation and documentation was performed via [employer], a HIPAA-compliant telemedicine application, in concert with a tele-presenter in the room. A face to face patient evaluation with one of my colleagues will occur once the patient is placed in an emergency department room.      DISCLAIMER: This note was prepared with [employer] voice recognition transcription software. Garbled syntax, mangled pronouns, and other bizarre constructions may be attributed to that software system.

## 2024-07-18 ENCOUNTER — TELEPHONE (OUTPATIENT)
Dept: FAMILY MEDICINE | Facility: CLINIC | Age: 89
End: 2024-07-18
Payer: MEDICARE

## 2024-07-18 ENCOUNTER — TELEPHONE (OUTPATIENT)
Facility: CLINIC | Age: 89
End: 2024-07-18
Payer: MEDICARE

## 2024-07-18 DIAGNOSIS — G47.33 OBSTRUCTIVE SLEEP APNEA SYNDROME: Primary | ICD-10-CM

## 2024-07-18 DIAGNOSIS — R79.89 ELEVATED FERRITIN: Primary | ICD-10-CM

## 2024-07-18 PROBLEM — N39.0 UTI (URINARY TRACT INFECTION): Status: ACTIVE | Noted: 2024-07-18
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
## 2024-07-18 LAB
ALBUMIN SERPL BCP-MCNC: 3.7 G/DL (ref 3.5–5.2)
ALP SERPL-CCNC: 65 U/L (ref 55–135)
ALT SERPL W/O P-5'-P-CCNC: 12 U/L (ref 10–44)
ANION GAP SERPL CALC-SCNC: 9 MMOL/L (ref 8–16)
AST SERPL-CCNC: 28 U/L (ref 10–40)
BASOPHILS # BLD AUTO: 0.03 K/UL (ref 0–0.2)
BASOPHILS NFR BLD: 0.6 % (ref 0–1.9)
BILIRUB SERPL-MCNC: 0.5 MG/DL (ref 0.1–1)
BUN SERPL-MCNC: 75 MG/DL (ref 8–23)
CALCIUM SERPL-MCNC: 7.2 MG/DL (ref 8.7–10.5)
CHLORIDE SERPL-SCNC: 88 MMOL/L (ref 95–110)
CO2 SERPL-SCNC: 33 MMOL/L (ref 23–29)
CREAT SERPL-MCNC: 3.3 MG/DL (ref 0.5–1.4)
DIFFERENTIAL METHOD BLD: ABNORMAL
EOSINOPHIL # BLD AUTO: 0.2 K/UL (ref 0–0.5)
EOSINOPHIL NFR BLD: 4.9 % (ref 0–8)
ERYTHROCYTE [DISTWIDTH] IN BLOOD BY AUTOMATED COUNT: 16.4 % (ref 11.5–14.5)
EST. GFR  (NO RACE VARIABLE): 12.9 ML/MIN/1.73 M^2
GLUCOSE SERPL-MCNC: 104 MG/DL (ref 70–110)
HCT VFR BLD AUTO: 32 % (ref 37–48.5)
HGB BLD-MCNC: 9.8 G/DL (ref 12–16)
IMM GRANULOCYTES # BLD AUTO: 0.02 K/UL (ref 0–0.04)
IMM GRANULOCYTES NFR BLD AUTO: 0.4 % (ref 0–0.5)
LYMPHOCYTES # BLD AUTO: 1.2 K/UL (ref 1–4.8)
LYMPHOCYTES NFR BLD: 24.8 % (ref 18–48)
MAGNESIUM SERPL-MCNC: 3.2 MG/DL (ref 1.6–2.6)
MCH RBC QN AUTO: 26.4 PG (ref 27–31)
MCHC RBC AUTO-ENTMCNC: 30.6 G/DL (ref 32–36)
MCV RBC AUTO: 86 FL (ref 82–98)
MONOCYTES # BLD AUTO: 0.6 K/UL (ref 0.3–1)
MONOCYTES NFR BLD: 11.2 % (ref 4–15)
NEUTROPHILS # BLD AUTO: 2.9 K/UL (ref 1.8–7.7)
NEUTROPHILS NFR BLD: 58.1 % (ref 38–73)
NRBC BLD-RTO: 0 /100 WBC
OHS QRS DURATION: 102 MS
OHS QTC CALCULATION: 453 MS
PLATELET # BLD AUTO: 109 K/UL (ref 150–450)
PMV BLD AUTO: 9.7 FL (ref 9.2–12.9)
POTASSIUM SERPL-SCNC: 4.2 MMOL/L (ref 3.5–5.1)
PROT SERPL-MCNC: 6.7 G/DL (ref 6–8.4)
RBC # BLD AUTO: 3.71 M/UL (ref 4–5.4)
SODIUM SERPL-SCNC: 130 MMOL/L (ref 136–145)
WBC # BLD AUTO: 4.91 K/UL (ref 3.9–12.7)

## 2024-07-18 PROCEDURE — 12000002 HC ACUTE/MED SURGE SEMI-PRIVATE ROOM

## 2024-07-18 PROCEDURE — 85025 COMPLETE CBC W/AUTO DIFF WBC: CPT | Performed by: INTERNAL MEDICINE

## 2024-07-18 PROCEDURE — 63600175 PHARM REV CODE 636 W HCPCS: Performed by: INTERNAL MEDICINE

## 2024-07-18 PROCEDURE — 25000003 PHARM REV CODE 250

## 2024-07-18 PROCEDURE — 80053 COMPREHEN METABOLIC PANEL: CPT | Performed by: INTERNAL MEDICINE

## 2024-07-18 PROCEDURE — 83735 ASSAY OF MAGNESIUM: CPT | Performed by: INTERNAL MEDICINE

## 2024-07-18 PROCEDURE — 25000003 PHARM REV CODE 250: Performed by: INTERNAL MEDICINE

## 2024-07-18 PROCEDURE — 94761 N-INVAS EAR/PLS OXIMETRY MLT: CPT

## 2024-07-18 PROCEDURE — 36415 COLL VENOUS BLD VENIPUNCTURE: CPT | Performed by: INTERNAL MEDICINE

## 2024-07-18 RX ORDER — AMLODIPINE BESYLATE 5 MG/1
5 TABLET ORAL 2 TIMES DAILY
Status: DISCONTINUED | OUTPATIENT
Start: 2024-07-18 | End: 2024-07-20

## 2024-07-18 RX ORDER — LEVOFLOXACIN 250 MG/1
250 TABLET ORAL
Status: DISCONTINUED | OUTPATIENT
Start: 2024-07-18 | End: 2024-07-23

## 2024-07-18 RX ADMIN — AMLODIPINE BESYLATE 5 MG: 5 TABLET ORAL at 10:07

## 2024-07-18 RX ADMIN — FUROSEMIDE 20 MG: 10 INJECTION, SOLUTION INTRAMUSCULAR; INTRAVENOUS at 10:07

## 2024-07-18 RX ADMIN — AMLODIPINE BESYLATE 5 MG: 5 TABLET ORAL at 12:07

## 2024-07-18 RX ADMIN — FUROSEMIDE 20 MG: 10 INJECTION, SOLUTION INTRAMUSCULAR; INTRAVENOUS at 09:07

## 2024-07-18 RX ADMIN — ISOSORBIDE MONONITRATE 60 MG: 60 TABLET, EXTENDED RELEASE ORAL at 09:07

## 2024-07-18 RX ADMIN — Medication 400 MG: at 12:07

## 2024-07-18 RX ADMIN — ASPIRIN 81 MG 81 MG: 81 TABLET ORAL at 09:07

## 2024-07-18 RX ADMIN — PANTOPRAZOLE SODIUM 40 MG: 40 TABLET, DELAYED RELEASE ORAL at 06:07

## 2024-07-18 RX ADMIN — METOPROLOL SUCCINATE 50 MG: 50 TABLET, FILM COATED, EXTENDED RELEASE ORAL at 09:07

## 2024-07-18 RX ADMIN — ENOXAPARIN SODIUM 30 MG: 30 INJECTION SUBCUTANEOUS at 05:07

## 2024-07-18 RX ADMIN — ENOXAPARIN SODIUM 30 MG: 30 INJECTION SUBCUTANEOUS at 12:07

## 2024-07-18 RX ADMIN — LEVOFLOXACIN 250 MG: 250 TABLET, FILM COATED ORAL at 12:07

## 2024-07-18 NOTE — SUBJECTIVE & OBJECTIVE
Past Medical History:   Diagnosis Date    Abdominal hernia     Anemia     Dr Berkowitz     Angina pectoris     Aortic valve stenosis, moderate     Back pain     Cannon's esophagus     CAD (coronary artery disease)     Cataract     ou done    CHF (congestive heart failure)     EFx 35%, Dr. Spencer 13    Chronic combined systolic and diastolic heart failure 2019    Colitis     Compression fracture     Diverticulosis     Encounter for blood transfusion     GERD (gastroesophageal reflux disease)     Hyperlipidemia     Hypertension     Irritable bowel syndrome     Myocardial infarction     Obstructive sleep apnea 2024    Osteoarthritis     lumbar DDD    Pacemaker     Pneumonia 2017    Pulmonary emphysema, unspecified emphysema type 2024    Raynaud disease     Rheumatoid arteritis     Rheumatoid arthritis     Shingles 2017    Sjogren syndrome     Ulcerative colitis        Past Surgical History:   Procedure Laterality Date    A-V CARDIAC PACEMAKER INSERTION Left 2021    Procedure: INSERTION, CARDIAC PACEMAKER, DUAL CHAMBER  ([employer]TRONIC);  Surgeon: Tera Blair MD;  Location: [facility] CATH/EP LAB;  Service: Cardiology;  Laterality: Left;    APPENDECTOMY      BACK SURGERY      CARDIAC SURGERY      CABGx3 in     CATARACT EXTRACTION      ou od d 11-15-12//     SECTION, CLASSIC      x 2    CHOLECYSTECTOMY      CLOSURE OF WOUND Right 2022    Procedure: CLOSURE-WOUND;  Surgeon: Delvis Jackson MD;  Location: [facility] OR;  Service: ENT;  Laterality: Right;  NOSE AND CHIN    COLONOSCOPY  09/15/2011    Dr Anaya     COLONOSCOPY  01/10/2017    [facility] report sent to scanning    CORONARY ANGIOPLASTY      PCI x 1 in     CORONARY ARTERY BYPASS GRAFT      3 vessel    CORONARY ARTERY BYPASS GRAFT      COSMETIC SURGERY  on nose    CYSTOSCOPY N/A 2020    Procedure: CYSTOSCOPY;  Surgeon: Miryam Bender Jr., MD;  Location: [facility] OR;  Service: Urology;  Laterality: N/A;    EYE  SURGERY      eyes      rk ou    FRACTURE SURGERY  06/21/2016    Dr Guido left hip     FRACTURE SURGERY  2018    Right Hip    HARVESTING OF SKIN GRAFT  09/23/2022    Procedure: SURGICAL PROCUREMENT, GRAFT, SKIN;  Surgeon: Delvis Jackson MD;  Location: [facility] OR;  Service: ENT;;  RIGHT CHEST    HYSTERECTOMY      tubal ligation, oopherectomy    INTRAMEDULLARY RODDING OF TROCHANTER OF FEMUR Right 10/08/2018    Procedure: INSERTION, INTRAMEDULLARY KELSI, FEMUR, TROCHANTER;  Surgeon: Valerio Luther MD;  Location: [facility] OR;  Service: Orthopedics;  Laterality: Right;    OOPHORECTOMY      SPINE SURGERY  08/30/2013    Silvino Mike    TUBAL LIGATION      UPPER GASTROINTESTINAL ENDOSCOPY      Yag Capsulotomy Right 09/25/2014       Review of patient's allergies indicates:   Allergen Reactions    Adhesive     Nitrofurantoin macrocrystalline Nausea And Vomiting     Other reaction(s): very sickly    Penicillins Swelling     Other reaction(s): swelling  Other reaction(s): red skin discolorati    Sulfa (sulfonamide antibiotics) Nausea And Vomiting     Other reaction(s): very sickly       Current Facility-Administered Medications on File Prior to Encounter   Medication    denosumab (PROLIA) injection 60 mg     Current Outpatient Medications on File Prior to Encounter   Medication Sig    amLODIPine (NORVASC) 5 MG tablet TAKE 1 TABLET BY MOUTH 2 TIMES A DAY (Patient taking differently: Take 5 mg by mouth once daily.)    aspirin 81 mg Tab Take 1 tablet by mouth every evening. Every day    biotin 5 mg Cap Take 1 tablet by mouth once daily.    CALCIUM CARB/VIT D3/MINERALS (CALCIUM-VITAMIN D ORAL) Take 600 mg by mouth 2 (two) times daily. Calcium 1200 with D 3 1000    CEQUA 0.09 % Dpet Place 1 drop into both eyes 2 (two) times daily.    CRANBERRY CONC/ASCORBIC ACID (CRANBERRY PLUS VITAMIN C ORAL) Take 1 capsule by mouth once daily.    DEXILANT 60 mg capsule Take 60 mg by mouth once daily.     ENTRESTO  mg per tablet Take 1 tablet by mouth  2 (two) times daily.    fluticasone (FLONASE) 50 mcg/actuation nasal spray 2 sprays by Each Nare route once daily. (Patient taking differently: 2 sprays by Each Nostril route daily as needed.)    folic acid (FOLVITE) 1 MG tablet Take 2 mg by mouth once daily.     furosemide (LASIX) 80 MG tablet Take 1 tablet (80 mg total) by mouth once daily.    gabapentin (NEURONTIN) 100 MG capsule Take 2 capsules (200 mg total) by mouth every evening.    JARDIANCE 10 mg tablet Take 1 tablet (10 mg total) by mouth once daily.    Lactobacillus acidophilus 10 billion cell Cap Take 1 each by mouth once daily. 1.5 billion    magnesium oxide (MAG-OX) 400 mg (241.3 mg magnesium) tablet TAKE 1 TABLET BY MOUTH 2 TIMES A DAY (Patient taking differently: Take 400 mg by mouth 2 (two) times daily.)    metOLazone (ZAROXOLYN) 2.5 MG tablet Take 1 tablet (2.5 mg total) by mouth once daily.    metoprolol succinate (TOPROL-XL) 50 MG 24 hr tablet Take 1 tablet (50 mg total) by mouth 2 (two) times daily. 25 mg QAM and 12.5 mg QHS (Patient taking differently: Take 50 mg by mouth 2 (two) times daily.)    MULTIVITAMIN WITH MINERALS (ONE-A-DAY 50 PLUS) Tab Take 1 tablet by mouth once daily. Every day    nitroGLYCERIN 0.4 MG/DOSE TL SPRY (NITROLINGUAL) 400 mcg/spray spray Place 1 spray under the tongue every 5 (five) minutes as needed for Chest pain. PRN    omega-3 fatty acids/fish oil (FISH OIL-OMEGA-3 FATTY ACIDS) 300-1,000 mg capsule Take 1 capsule by mouth once daily.    ondansetron (ZOFRAN) 4 MG tablet Take 4 mg by mouth every 8 (eight) hours as needed for Nausea.     potassium chloride SA (K-DUR,KLOR-CON M) 10 MEQ tablet TAKE TWO TABLETS BY MOUTH EVERY MORNING AND 1 TABLET EVERY EVENING (Patient taking differently: 10 mEq once daily.)    promethazine (PHENERGAN) 25 MG tablet Take 1 tablet (25 mg total) by mouth 2 (two) times daily as needed for Nausea.    sertraline (ZOLOFT) 50 MG tablet Take 2 tablets (100 mg total) by mouth once daily.    traMADoL  (ULTRAM) 50 mg tablet Take 50 mg by mouth 2 (two) times daily as needed for Pain.    vitamin E 400 unit Tab Take 400 mg by mouth once daily. Every day    [DISCONTINUED] hydrochlorothiazide (HYDRODIURIL) 25 MG tablet Take 25 mg by mouth once daily.     Family History       Problem Relation (Age of Onset)    Arthritis Mother, Brother    Cancer Maternal Aunt    Crohn's disease Brother, Brother, Brother    Early death Son    Fibromyalgia Sister    Heart disease Mother, Father, Brother    Hypertension Father, Sister, Sister, Daughter, Son    Irritable bowel syndrome Sister    Lupus Cousin, Cousin    Pulmonary embolism Sister    Scleroderma Sister    Skin cancer Mother          Tobacco Use    Smoking status: Former     Current packs/day: 0.00     Average packs/day: 1 pack/day for 18.0 years (18.0 ttl pk-yrs)     Types: Cigarettes     Start date: 1953     Quit date: 1971     Years since quittin.5    Smokeless tobacco: Never   Substance and Sexual Activity    Alcohol use: Not Currently     Alcohol/week: 1.0 standard drink of alcohol     Types: 1 Glasses of wine per week     Comment: not at all in months    Drug use: Never    Sexual activity: Not Currently     Partners: Male     Review of Systems   Constitutional:  Negative for activity change and appetite change.   HENT:  Negative for congestion and dental problem.    Eyes:  Negative for discharge and itching.   Respiratory:  Negative for shortness of breath.    Cardiovascular:  Negative for chest pain.   Gastrointestinal:  Negative for abdominal distention and abdominal pain.   Endocrine: Negative for cold intolerance.   Genitourinary:  Negative for difficulty urinating and dysuria.   Musculoskeletal:  Negative for arthralgias and back pain.   Skin:  Negative for color change.   Neurological:  Negative for dizziness and facial asymmetry.   Hematological:  Negative for adenopathy.   Psychiatric/Behavioral:  Negative for agitation and behavioral problems.       Objective:     Vital Signs (Most Recent):  Temp: 98.5 °F (36.9 °C) (07/17/24 1801)  Pulse: 62 (07/17/24 1945)  Resp: 18 (07/17/24 1945)  BP: (!) 149/68 (07/17/24 1900)  SpO2: 95 % (07/17/24 1945) Vital Signs (24h Range):  Temp:  [98.5 °F (36.9 °C)] 98.5 °F (36.9 °C)  Pulse:  [62-72] 62  Resp:  [18-20] 18  SpO2:  [95 %-96 %] 95 %  BP: (145-149)/(68-78) 149/68     Weight: 52.2 kg (115 lb)  Body mass index is 24.04 kg/m².     Physical Exam  Vitals and nursing note reviewed.   Constitutional:       General: She is not in acute distress.  HENT:      Head: Atraumatic.      Right Ear: External ear normal.      Left Ear: External ear normal.      Nose: Nose normal.      Mouth/Throat:      Mouth: Mucous membranes are moist.   Eyes:      Extraocular Movements: Extraocular movements intact.   Cardiovascular:      Rate and Rhythm: Normal rate.   Pulmonary:      Effort: Pulmonary effort is normal.   Abdominal:      General: There is distension.      Palpations: Abdomen is soft.   Musculoskeletal:         General: Normal range of motion.      Cervical back: Normal range of motion.      Right lower leg: Edema present.      Left lower leg: Edema present.   Skin:     General: Skin is warm.      Comments: Ecchymosis on LE s more on RLE   Neurological:      Mental Status: She is alert and oriented to person, place, and time.                Significant Labs: All pertinent labs within the past 24 hours have been reviewed.  CBC:   Recent Labs   Lab 07/17/24  1830   WBC 6.12   HGB 9.7*   HCT 30.9*   *     CMP:   Recent Labs   Lab 07/17/24  1830   *   K 4.1   CL 87*   CO2 29   *   BUN 74*   CREATININE 3.5*   CALCIUM 7.3*   PROT 6.9   ALBUMIN 3.8   BILITOT 0.3   ALKPHOS 87   AST 24   ALT 14   ANIONGAP 13       Significant Imaging: I have reviewed all pertinent imaging results/findings within the past 24 hours.

## 2024-07-18 NOTE — ED PROVIDER NOTES
Encounter Date: 7/17/2024       History     Chief Complaint   Patient presents with    abnormal labs     Sent by Dr Malone sent her for decreased GFR of 10    Leg Injury     HPI    88-year-old female with past medical history of CAD, heart failure reduced ejection fraction (last TTE 2023 with EF of 50-55%), GERD, EMMA, aortic valve stenosis, paroxysmal SVT presents to the emergency department by request of her primary care provider after being found to have an CRISTO.  Patient denies any chest pain, shortness of breath, nausea, vomiting.  Does endorse lower extremity edema though states that it was her baseline that she has been taking Lasix for this.  Of note patient did have a fall on the 15th of July with an occipital hematoma.  She had a CT scan performed at that time that showed no acute intracranial abnormality.  Patient states that at that time she also injured her right knee and has been experiencing right knee pain.  Review of patient's allergies indicates:   Allergen Reactions    Adhesive     Nitrofurantoin macrocrystalline Nausea And Vomiting     Other reaction(s): very sickly    Penicillins Swelling     Other reaction(s): swelling  Other reaction(s): red skin discolorati    Sulfa (sulfonamide antibiotics) Nausea And Vomiting     Other reaction(s): very sickly     Past Medical History:   Diagnosis Date    Abdominal hernia     Anemia     Dr Berkowitz     Angina pectoris     Aortic valve stenosis, moderate     Back pain     Cannon's esophagus     CAD (coronary artery disease)     Cataract     ou done    CHF (congestive heart failure)     EFx 35%, Dr. Spencer 12/19/13    Chronic combined systolic and diastolic heart failure 09/28/2019    Colitis     Compression fracture     Diverticulosis     Encounter for blood transfusion     GERD (gastroesophageal reflux disease)     Hyperlipidemia     Hypertension     Irritable bowel syndrome     Myocardial infarction     Obstructive sleep apnea 4/9/2024    Osteoarthritis      lumbar DDD    Pacemaker     Pneumonia 2017    Pulmonary emphysema, unspecified emphysema type 2024    Raynaud disease     Rheumatoid arteritis     Rheumatoid arthritis     Shingles 2017    Sjogren syndrome     Ulcerative colitis      Past Surgical History:   Procedure Laterality Date    A-V CARDIAC PACEMAKER INSERTION Left 2021    Procedure: INSERTION, CARDIAC PACEMAKER, DUAL CHAMBER  ([employer]);  Surgeon: Tera Blair MD;  Location: [facility] CATH/EP LAB;  Service: Cardiology;  Laterality: Left;    APPENDECTOMY      BACK SURGERY      CARDIAC SURGERY      CABGx3 in     CATARACT EXTRACTION      ou od d 11-15-12//     SECTION, CLASSIC      x 2    CHOLECYSTECTOMY      CLOSURE OF WOUND Right 2022    Procedure: CLOSURE-WOUND;  Surgeon: Delvis Jackosn MD;  Location: [facility] OR;  Service: ENT;  Laterality: Right;  NOSE AND CHIN    COLONOSCOPY  09/15/2011    Dr Anaya     COLONOSCOPY  01/10/2017    [facility] report sent to scanning    CORONARY ANGIOPLASTY      PCI x 1 in     CORONARY ARTERY BYPASS GRAFT      3 vessel    CORONARY ARTERY BYPASS GRAFT      COSMETIC SURGERY  on nose    CYSTOSCOPY N/A 2020    Procedure: CYSTOSCOPY;  Surgeon: Miryam Bender Jr., MD;  Location: [facility] OR;  Service: Urology;  Laterality: N/A;    EYE SURGERY      eyes      rk ou    FRACTURE SURGERY  2016    Dr Guido left hip     FRACTURE SURGERY  2018    Right Hip    HARVESTING OF SKIN GRAFT  2022    Procedure: SURGICAL PROCUREMENT, GRAFT, SKIN;  Surgeon: Delvis Jackson MD;  Location: [facility] OR;  Service: ENT;;  RIGHT CHEST    HYSTERECTOMY      tubal ligation, oopherectomy    INTRAMEDULLARY RODDING OF TROCHANTER OF FEMUR Right 10/08/2018    Procedure: INSERTION, INTRAMEDULLARY KELSI, FEMUR, TROCHANTER;  Surgeon: Valerio Luther MD;  Location: [facility] OR;  Service: Orthopedics;  Laterality: Right;    OOPHORECTOMY      SPINE SURGERY  2013    Silvino Mckeon    TUBAL LIGATION      UPPER  GASTROINTESTINAL ENDOSCOPY      Yag Capsulotomy Right 09/25/2014     Family History   Adopted: Yes   Problem Relation Name Age of Onset    Heart disease Mother Bedelia Pounds         aortic stenosis    Skin cancer Mother Bedelia Pounds     Arthritis Mother Bedelia Pounds     Hypertension Father Saravanan Pounds     Heart disease Father Saravanan Pounds         MI    Hypertension Sister Lizabeth Ivory     Fibromyalgia Sister Lizabeth Ivory     Scleroderma Sister Lizabeth Ivory     Pulmonary embolism Sister Lizabeth Ivory     Irritable bowel syndrome Sister Fidelina Palm     Hypertension Sister Fidelina Palm     Heart disease Brother Bakari Pounds         2 brothers CABG    Arthritis Brother Bakari Pounds     Crohn's disease Brother Bakari Pounds     Crohn's disease Brother      Crohn's disease Brother      Hypertension Daughter Brandi Falcon     Early death Son Femi [place]         accident    Hypertension Son Femi Mireya     Cancer Maternal Aunt Shannan Raudel     Lupus Cousin      Lupus Cousin      Amblyopia Neg Hx      Blindness Neg Hx      Cataracts Neg Hx      Diabetes Neg Hx      Glaucoma Neg Hx      Macular degeneration Neg Hx      Retinal detachment Neg Hx      Strabismus Neg Hx      Stroke Neg Hx      Thyroid disease Neg Hx      Celiac disease Neg Hx      Cirrhosis Neg Hx      Psoriasis Neg Hx      Melanoma Neg Hx      Multiple sclerosis Neg Hx      Rheum arthritis Neg Hx      Tuberculosis Neg Hx      Lymphoma Neg Hx      Ulcerative colitis Neg Hx      Moses's disease Neg Hx      Stomach cancer Neg Hx      Rectal cancer Neg Hx      Liver disease Neg Hx      Liver cancer Neg Hx      Inflammatory bowel disease Neg Hx      Hemochromatosis Neg Hx      Esophageal cancer Neg Hx      Cystic fibrosis Neg Hx      Colon cancer Neg Hx       Social History     Tobacco Use    Smoking status: Former     Current packs/day: 0.00     Average packs/day: 1 pack/day for 18.0 years (18.0 ttl pk-yrs)     Types: Cigarettes     Start date: 7/1/1953      Quit date: 1971     Years since quittin.5    Smokeless tobacco: Never   Substance Use Topics    Alcohol use: Not Currently     Alcohol/week: 1.0 standard drink of alcohol     Types: 1 Glasses of wine per week     Comment: not at all in months    Drug use: Never     Review of Systems   Constitutional:  Negative for fever.   HENT:  Negative for sore throat.    Respiratory:  Negative for shortness of breath.    Cardiovascular:  Positive for leg swelling. Negative for chest pain.   Gastrointestinal:  Negative for nausea.   Genitourinary:  Negative for dysuria.   Musculoskeletal:  Negative for back pain.   Skin:  Negative for rash.   Neurological:  Negative for weakness.   Hematological:  Does not bruise/bleed easily.       Physical Exam     Initial Vitals [24 1801]   BP Pulse Resp Temp SpO2   (!) 145/78 72 20 98.5 °F (36.9 °C) 96 %      MAP       --         Physical Exam    Vitals reviewed.  Constitutional: She appears well-developed and well-nourished.  Non-toxic appearance.   HENT:   Head: Normocephalic and atraumatic.   Eyes: EOM are normal. Pupils are equal, round, and reactive to light.   Neck: Neck supple.   Normal range of motion.  Cardiovascular:  Normal rate and regular rhythm.           Musculoskeletal:      Cervical back: Normal range of motion and neck supple.      Comments: Bilateral lower extremity edema with 2+ pitting edema  2+ DP pulses bilaterally  Significant ecchymosis noted towards patients RLE with mild tenderness to palpation towards R shin, sensation intact      Neurological: She is alert and oriented to person, place, and time.   Skin: Skin is warm and dry.         ED Course   Procedures  Labs Reviewed   CBC W/ AUTO DIFFERENTIAL - Abnormal; Notable for the following components:       Result Value    RBC 3.56 (*)     Hemoglobin 9.7 (*)     Hematocrit 30.9 (*)     MCHC 31.4 (*)     RDW 16.2 (*)     Platelets 118 (*)     Lymph % 17.8 (*)     All other components within normal  limits   COMPREHENSIVE METABOLIC PANEL - Abnormal; Notable for the following components:    Sodium 129 (*)     Chloride 87 (*)     Glucose 120 (*)     BUN 74 (*)     Creatinine 3.5 (*)     Calcium 7.3 (*)     eGFR 12.1 (*)     All other components within normal limits   URINALYSIS, REFLEX TO URINE CULTURE - Abnormal; Notable for the following components:    Appearance, UA Hazy (*)     Glucose, UA 2+ (*)     Nitrite, UA Positive (*)     Leukocytes, UA 3+ (*)     All other components within normal limits    Narrative:     Specimen Source->Urine   B-TYPE NATRIURETIC PEPTIDE - Abnormal; Notable for the following components:    BNP 1,039 (*)     All other components within normal limits   URINALYSIS MICROSCOPIC - Abnormal; Notable for the following components:    WBC, UA 7 (*)     Bacteria Many (*)     All other components within normal limits    Narrative:     Specimen Source->Urine   SODIUM, URINE, RANDOM    Narrative:     Specimen Source->Urine   CREATININE, URINE, RANDOM    Narrative:     Specimen Source->Urine          Imaging Results              US Retroperitoneal Complete (Final result)  Result time 07/18/24 01:00:43      Final result by Rosetta Landry MD (07/18/24 01:00:43)                   Impression:      No evidence of acute abnormality involving the kidneys bilaterally.  No hydronephrosis.    Numerous renal cysts which appear simple.    Borderline normal to mildly elevated resistive index on the right now noted noting limitations of evaluation secondary to patient positioning/bowel gas.      Electronically signed by: Rosetta Landry  Date:    07/18/2024  Time:    01:00               Narrative:    EXAMINATION:  US RETROPERITONEAL COMPLETE    CLINICAL HISTORY:  kevan;    TECHNIQUE:  Ultrasound of the kidneys and urinary bladder was performed including color flow and Doppler evaluation of the kidneys.    COMPARISON:  Renal/retroperitoneal ultrasound 06/13/2022    FINDINGS:  Right kidney: The right kidney  measures 14.5 x 6.6 x 5.8 cm.  No cortical thinning. No loss of corticomedullary distinction. Resistive index measures 0.75.  There are numerable cysts again noted which have a simple cyst appearance.  Dominant cyst in the upper pole measures 5.4 cm.  There are no identifiable shadowing calculi and there is no hydronephrosis.    Left kidney: The left kidney measures 14.4 by 5.1 x 5.4 cm.  No cortical thinning. No loss of corticomedullary distinction. Resistive index measures 0.7.  There are multiple cysts with 2 dominant cysts in the upper lower pole.  The largest cyst measures 4 cm and cysts have a simple appearance.  There is no convincing renal stone. No hydronephrosis.    The bladder is partially distended at the time of scanning and has an unremarkable appearance.                                       X-Ray Tibia Fibula 2 View Right (Final result)  Result time 07/17/24 20:32:38      Final result by Carmelo Landry MD (07/17/24 20:32:38)                   Impression:      No acute findings evident the right tibia and fibula.      Electronically signed by: Carmelo Landry  Date:    07/17/2024  Time:    20:32               Narrative:    EXAMINATION:  XR TIBIA FIBULA 2 VIEW RIGHT    CLINICAL HISTORY:  Unspecified fall, initial encounter    TECHNIQUE:  AP and lateral views of the right tibia and fibula were performed.    COMPARISON:  05/12/2021    FINDINGS:  Femoral intramedullary niko with twin distal inter sh.  Screws appear stable.  Osteoarthritic changes throughout the tibia fibula and knee with vascular calcifications.  No acute fracture or malalignment.  Chondrocalcinosis of the menisci.  Infrapatellar space unremarkable.                                       X-Ray Knee Complete 4 or more Views Right (Final result)  Result time 07/17/24 20:31:37   Procedure changed from X-Ray Knee 3 View Right     Final result by Sera Acosta MD (07/17/24 20:31:37)                   Impression:      As  above.      Electronically signed by: Sera Acosta  Date:    07/17/2024  Time:    20:31               Narrative:    EXAMINATION:  XR KNEE COMP 4 OR MORE VIEWS RIGHT    CLINICAL HISTORY:  Injury;  Injury, unspecified, initial encounter    TECHNIQUE:  Four views    COMPARISON:  None    FINDINGS:  Osteopenia.  No acute fracture dislocation.  Chondrocalcinosis.  Minimal tricompartment osteoarthritis.  No significant joint effusion.  Intramedullary nail within the distal femur with distal interlocking screws                                       X-Ray Foot Complete Right (Final result)  Result time 07/17/24 20:27:28      Final result by Carmelo Landry MD (07/17/24 20:27:28)                   Impression:      Negative right foot.      Electronically signed by: Carmelo Landry  Date:    07/17/2024  Time:    20:27               Narrative:    EXAMINATION:  XR FOOT COMPLETE 3 VIEW RIGHT    CLINICAL HISTORY:  . Unspecified fall, initial encounter    TECHNIQUE:  AP, lateral, and oblique views of the right foot were performed.    COMPARISON:  None    FINDINGS:  Osteopenia.  Mild degenerative change.  No fracture.  Vascular calcifications.  No soft tissue swelling gas or foreign body.                                       X-Ray Ankle Complete Right (Final result)  Result time 07/17/24 20:29:49      Final result by Sera Acosta MD (07/17/24 20:29:49)                   Impression:      As above.      Electronically signed by: Sera Acosta  Date:    07/17/2024  Time:    20:29               Narrative:    EXAMINATION:  XR ANKLE COMPLETE 3 VIEW RIGHT    CLINICAL HISTORY:  Unspecified fall, initial encounter    TECHNIQUE:  AP, lateral, and oblique images of the right ankle were performed.    COMPARISON:  None    FINDINGS:  Osteopenia. No acute fracture, dislocation, or traumatic malalignment.  Joint spaces are maintained.  Ankle swelling.  Vascular calcifications                                       X-Ray  Chest AP Portable (Final result)  Result time 07/17/24 20:24:10      Final result by Sera Acosta MD (07/17/24 20:24:10)                   Impression:      No acute findings.      Electronically signed by: Sera Acosta  Date:    07/17/2024  Time:    20:24               Narrative:    EXAMINATION:  XR CHEST AP PORTABLE    CLINICAL HISTORY:  CHF;    TECHNIQUE:  Single frontal view of the chest was performed.    COMPARISON:  06/30/2023    FINDINGS:  No focal consolidation.  Mildly enlarged cardiac silhouette.  Status post CABG.  Bones are unchanged.                                       Medications   aspirin chewable tablet 81 mg (has no administration in time range)   pantoprazole EC tablet 40 mg (has no administration in time range)   isosorbide mononitrate 24 hr tablet 60 mg (has no administration in time range)   magnesium oxide tablet 400 mg (400 mg Oral Given 7/18/24 0014)   melatonin tablet 6 mg (has no administration in time range)   ondansetron injection 4 mg (has no administration in time range)   acetaminophen tablet 650 mg (has no administration in time range)   aluminum-magnesium hydroxide-simethicone 200-200-20 mg/5 mL suspension 30 mL (has no administration in time range)   acetaminophen tablet 650 mg (has no administration in time range)   HYDROcodone-acetaminophen 5-325 mg per tablet 1 tablet (has no administration in time range)   potassium bicarbonate disintegrating tablet 50 mEq (has no administration in time range)   potassium bicarbonate disintegrating tablet 35 mEq (has no administration in time range)   potassium bicarbonate disintegrating tablet 60 mEq (has no administration in time range)   magnesium oxide tablet 800 mg ( Oral Canceled Entry 7/18/24 0013)   magnesium oxide tablet 800 mg (has no administration in time range)   potassium, sodium phosphates 280-160-250 mg packet 2 packet (has no administration in time range)   potassium, sodium phosphates 280-160-250 mg packet 2  "packet (has no administration in time range)   potassium, sodium phosphates 280-160-250 mg packet 2 packet (has no administration in time range)   enoxaparin injection 30 mg (30 mg Subcutaneous Given 7/18/24 0012)   metoprolol succinate (TOPROL-XL) 24 hr tablet 50 mg (has no administration in time range)   furosemide injection 20 mg (20 mg Intravenous Given 7/17/24 2208)     Medical Decision Making  Amount and/or Complexity of Data Reviewed  Labs: ordered. Decision-making details documented in ED Course.  Radiology: ordered.    Risk  Decision regarding hospitalization.    In brief, 88-year-old female with past medical history of CAD, heart failure reduced ejection fraction (last TTE 2023 with EF of 50-55%), GERD, EMMA, aortic valve stenosis, paroxysmal SVT presents to the emergency department by request of her primary care provider after being found to have an CRISTO.  The patient also found to have right knee pain after experiencing a fall proximally 2 days ago.    Chart Review:  On chart review patient was seen July 15th after fall found to have an occipital hematoma.    BP (!) 172/70   Pulse 72   Temp 98.5 °F (36.9 °C)   Resp 18   Ht 4' 9" (1.448 m)   Wt 53.8 kg (118 lb 9.6 oz)   LMP  (LMP Unknown)   SpO2 95%   BMI 25.66 kg/m²     Differentials:  Right knee fracture/dislocation, right tib-fib fracture/dislocation, right foot or ankle fracture/dislocation, musculoskeletal injury, prerenal injury, contraction alkalosis, intrarenal injury, post renal CRISTO, hepatorenal syndrome, heart failure exacerbation, among others.    Orders:  CBC, CMP, BNP, x-ray right foot/ankle/knee/tib-fib, chest x-ray, EKG, urine creatinine, urine sodium    EKG:  EKG returned at an atrial paced rhythm at a rate of 61 beats per minute without acute ST elevation or depression.    Chest x-ray:  Chest x-ray without acute cardiopulmonary abnormality, pacemaker leads appear unchanged in position, no focal consolidation concerning for " pneumonia, no pneumothorax    Results:  CMP returned with sodium of 129, chloride 87, BUN of 74 and creatinine of 3.5.  Prior creatinine 2 weeks ago was at 1.3.  CBC with hemoglobin of 9.7.  BNP elevated at a 1039.  X-rays of the right foot/ankle/tibia/knee without acute fracture or dislocation.    Interventions:  Medicine consultation    Plan:  Patient was consulted to the medicine service for heart failure exacerbation and for CRISTO.  Case was discussed with Dr. Valles.        This text was transcribed using voice software.    MDM MATRIX SUMMARY    Number and Complexity of Problems Addressed    Moderate  []  1 or more chronic illnesses with exacerbation, progression or side effects of treatment   []  2 or more stable chronic illnesses        []  1 undiagnosed new problem with uncertain prognosis       []  1 acute illness with systemic symptoms         []  1 acute complicated injury             High  []  1 or more chronic illnesses with severe exacerbation, progression, or side effects of treatment  [x]  1 acute or chronic illness or injury that poses a threat to life or bodily function    Amount and/or Complexity of Data to be Reviewed and Analyzed    Moderate (must meet the requirements of at least 1 out of 3 categories)  Extensive (must meet the requirements of at least 2 out of 3 categories)    Category 1: Tests, documents or independent historian(s)  Any combination of 3 from the following:  [x]  [x]  [x] Review of prior external note(s) from each unique source    [x]  [x]  [x] Review of the result(s) of each unique test                              [x]  [x]  [x] Ordering of each unique test                                                       []  []  []  Assessment requiring an independent historian(s)                  Category 2: Independent interpretation of tests  [x]  Independent interpretation of a test that is typically billed by another physician/other qualified health care professional (ie EKG, plain  Xray, CT, MRI, ultrasound)    Category 3: Discussion of management or test interpretation  [x]  Discussion of management or test interpretation with external physician/other qualified health care professional/appropriate source    Risk of Complications and/or Morbidity or Mortality of Patient Management    Moderate risk of morbidity from additional diagnostic testing or treatment  []  Prescription drug management  []  Decision regarding minor surgery with identified patient or procedure risk factors  []  Diagnosis or treatment significantly limited by social determinants of health    High risk of morbidity from additional diagnostic testing or treatment  []  Drug therapy requiring intensive monitoring for toxicity  []  Decision regarding emergent major surgery  [x]  Decision regarding hospitalization or escalation of hospital-level of care  []  Decision not to resuscitate or to de-escalate care because of poor prognosis  []  Parenteral controlled substances    I have not obtained history from independent historian.  Considered and  Does not  need advanced imaging at this time. Considered and does not need antibiotics based on history and physical. I have independently interpretered labs and other ordered studies.  Does not  require hospitalization at this time.    Cassandra David MD  PGY-4 [facility]  8:43 PM 7/17/2024               ED Course as of 07/18/24 0309   Wed Jul 17, 2024 1921 Sodium(!): 129 [SB]   1921 Creatinine(!): 3.5 [SB]   1956 I have personally seen and examined the patient.  I have reviewed and agree with the resident's findings, including all diagnostic interpretations and treatment plans as documented.      Presented with significant acute kidney injury.  Two weeks ago creatinine normal.  Noted mild hyponatremia as well.  Significantly elevated BUN as well.  Hemoglobin stable.  Hospital Medicine to admit    Erik Valles MD MPH     []      ED Course User Index  [BH] Erik Valles  MD Tia  [SB] Cassandra David MD                           Clinical Impression:  Final diagnoses:  [T14.90XA] Injury  [W19.XXXA] Fall  [R06.02] Shortness of breath  [N17.9] CRISTO (acute kidney injury)          ED Disposition Condition    Admit                 Cassandra David MD  Resident  07/17/24 [id number]       Cassandra David MD  Resident  07/17/24 [id number]       Erik Valles Jr., MD  07/18/24 [id number]

## 2024-07-18 NOTE — HPI
88 year old pt getting admitted with CRISTO  Blood work was done by CRYSTAL DEAN few days ago and it showed high crt level  Pt was referred to [facility] and pt was instructed to come to ER by [facility]ph[facility] DEAN  Pt denies shortness of breath but endorses 5 pound weight gain  Also pt has swelling on legs and abdominal distension which she attributes due to fluid gain  She has ecchymosis on both legs and more on RLE   Pt had a fall few days ago and had ER visit 2  & all imaging studies were normal

## 2024-07-18 NOTE — H&P
[facility] - Emergency Dept  Hospital Medicine  History & Physical    Patient Name: Cheyanne Gomes  MRN: [id number]  Patient Class: IP- Inpatient  Admission Date: 7/17/2024  Attending Physician: Clark Berry MD   Primary Care Provider: Romeo Alas MD         Patient information was obtained from patient, past medical records, and ER records.     Subjective:     Principal Problem:CRISTO (acute kidney injury)    Chief Complaint:   Chief Complaint   Patient presents with    abnormal labs     Sent by Dr Malone sent her for decreased GFR of 10    Leg Injury        HPI: 88 year old pt getting admitted with CRISTO  Blood work was done by GI MD few days ago and it showed high crt level  Pt was referred to Nephro MD and pt was instructed to come to ER by Nephro MD  Pt denies shortness of breath but endorses 5 pound weight gain  Also pt has swelling on legs and abdominal distension which she attributes due to fluid gain  She has ecchymosis on both legs and more on RLE   Pt had a fall few days ago and had ER visit 2  & all imaging studies were normal      Past Medical History:   Diagnosis Date    Abdominal hernia     Anemia     Dr Berkowitz     Angina pectoris     Aortic valve stenosis, moderate     Back pain     Cannon's esophagus     CAD (coronary artery disease)     Cataract     ou done    CHF (congestive heart failure)     EFx 35%, Dr. Spencer 12/19/13    Chronic combined systolic and diastolic heart failure 09/28/2019    Colitis     Compression fracture     Diverticulosis     Encounter for blood transfusion     GERD (gastroesophageal reflux disease)     Hyperlipidemia     Hypertension     Irritable bowel syndrome     Myocardial infarction     Obstructive sleep apnea 4/9/2024    Osteoarthritis     lumbar DDD    Pacemaker     Pneumonia 01/03/2017    Pulmonary emphysema, unspecified emphysema type 4/9/2024    Raynaud disease     Rheumatoid arteritis     Rheumatoid arthritis     Shingles 01/03/2017     Sjogren syndrome     Ulcerative colitis        Past Surgical History:   Procedure Laterality Date    A-V CARDIAC PACEMAKER INSERTION Left 2021    Procedure: INSERTION, CARDIAC PACEMAKER, DUAL CHAMBER  ([employer]TR[employer]IC);  Surgeon: Tera Blair MD;  Location: [facility] CATH/EP LAB;  Service: Cardiology;  Laterality: Left;    APPENDECTOMY      BACK SURGERY      CARDIAC SURGERY      CABGx3 in     CATARACT EXTRACTION      ou od d 11-15-12//     SECTION, CLASSIC      x 2    CHOLECYSTECTOMY      CLOSURE OF WOUND Right 2022    Procedure: CLOSURE-WOUND;  Surgeon: Delvis Jackson MD;  Location: [facility] OR;  Service: ENT;  Laterality: Right;  NOSE AND CHIN    COLONOSCOPY  09/15/2011    Dr Anaya     COLONOSCOPY  01/10/2017    [facility] report sent to scanning    CORONARY ANGIOPLASTY      PCI x 1 in     CORONARY ARTERY BYPASS GRAFT      3 vessel    CORONARY ARTERY BYPASS GRAFT      COSMETIC SURGERY  on nose    CYSTOSCOPY N/A 2020    Procedure: CYSTOSCOPY;  Surgeon: Miryam Bender Jr., MD;  Location: [facility] OR;  Service: Urology;  Laterality: N/A;    EYE SURGERY      eyes      rk ou    FRACTURE SURGERY  2016    Dr Guido left hip     FRACTURE SURGERY  2018    Right Hip    HARVESTING OF SKIN GRAFT  2022    Procedure: SURGICAL PROCUREMENT, GRAFT, SKIN;  Surgeon: Delvis Jackson MD;  Location: [facility] OR;  Service: ENT;;  RIGHT CHEST    HYSTERECTOMY      tubal ligation, oopherectomy    INTRAMEDULLARY RODDING OF TROCHANTER OF FEMUR Right 10/08/2018    Procedure: INSERTION, INTRAMEDULLARY KELSI, FEMUR, TROCHANTER;  Surgeon: Valerio Luther MD;  Location: [facility] OR;  Service: Orthopedics;  Laterality: Right;    OOPHORECTOMY      SPINE SURGERY  2013    Silvino Mike    TUBAL LIGATION      UPPER GASTROINTESTINAL ENDOSCOPY      Yag Capsulotomy Right 2014       Review of patient's allergies indicates:   Allergen Reactions    Adhesive     Nitrofurantoin macrocrystalline Nausea And Vomiting     Other  reaction(s): very sickly    Penicillins Swelling     Other reaction(s): swelling  Other reaction(s): red skin discolorati    Sulfa (sulfonamide antibiotics) Nausea And Vomiting     Other reaction(s): very sickly       Current Facility-Administered Medications on File Prior to Encounter   Medication    denosumab (PROLIA) injection 60 mg     Current Outpatient Medications on File Prior to Encounter   Medication Sig    amLODIPine (NORVASC) 5 MG tablet TAKE 1 TABLET BY MOUTH 2 TIMES A DAY (Patient taking differently: Take 5 mg by mouth once daily.)    aspirin 81 mg Tab Take 1 tablet by mouth every evening. Every day    biotin 5 mg Cap Take 1 tablet by mouth once daily.    CALCIUM CARB/VIT D3/MINERALS (CALCIUM-VITAMIN D ORAL) Take 600 mg by mouth 2 (two) times daily. Calcium 1200 with D 3 1000    CEQUA 0.09 % Dpet Place 1 drop into both eyes 2 (two) times daily.    CRANBERRY CONC/ASCORBIC ACID (CRANBERRY PLUS VITAMIN C ORAL) Take 1 capsule by mouth once daily.    DEXILANT 60 mg capsule Take 60 mg by mouth once daily.     ENTRESTO  mg per tablet Take 1 tablet by mouth 2 (two) times daily.    fluticasone (FLONASE) 50 mcg/actuation nasal spray 2 sprays by Each Nare route once daily. (Patient taking differently: 2 sprays by Each Nostril route daily as needed.)    folic acid (FOLVITE) 1 MG tablet Take 2 mg by mouth once daily.     furosemide (LASIX) 80 MG tablet Take 1 tablet (80 mg total) by mouth once daily.    gabapentin (NEURONTIN) 100 MG capsule Take 2 capsules (200 mg total) by mouth every evening.    JARDIANCE 10 mg tablet Take 1 tablet (10 mg total) by mouth once daily.    Lactobacillus acidophilus 10 billion cell Cap Take 1 each by mouth once daily. 1.5 billion    magnesium oxide (MAG-OX) 400 mg (241.3 mg magnesium) tablet TAKE 1 TABLET BY MOUTH 2 TIMES A DAY (Patient taking differently: Take 400 mg by mouth 2 (two) times daily.)    metOLazone (ZAROXOLYN) 2.5 MG tablet Take 1 tablet (2.5 mg total) by mouth once  daily.    metoprolol succinate (TOPROL-XL) 50 MG 24 hr tablet Take 1 tablet (50 mg total) by mouth 2 (two) times daily. 25 mg QAM and 12.5 mg QHS (Patient taking differently: Take 50 mg by mouth 2 (two) times daily.)    MULTIVITAMIN WITH MINERALS (ONE-A-DAY 50 PLUS) Tab Take 1 tablet by mouth once daily. Every day    nitroGLYCERIN 0.4 MG/DOSE TL SPRY (NITROLINGUAL) 400 mcg/spray spray Place 1 spray under the tongue every 5 (five) minutes as needed for Chest pain. PRN    omega-3 fatty acids/fish oil (FISH OIL-OMEGA-3 FATTY ACIDS) 300-1,000 mg capsule Take 1 capsule by mouth once daily.    ondansetron (ZOFRAN) 4 MG tablet Take 4 mg by mouth every 8 (eight) hours as needed for Nausea.     potassium chloride SA (K-DUR,KLOR-CON M) 10 MEQ tablet TAKE TWO TABLETS BY MOUTH EVERY MORNING AND 1 TABLET EVERY EVENING (Patient taking differently: 10 mEq once daily.)    promethazine (PHENERGAN) 25 MG tablet Take 1 tablet (25 mg total) by mouth 2 (two) times daily as needed for Nausea.    sertraline (ZOLOFT) 50 MG tablet Take 2 tablets (100 mg total) by mouth once daily.    traMADoL (ULTRAM) 50 mg tablet Take 50 mg by mouth 2 (two) times daily as needed for Pain.    vitamin E 400 unit Tab Take 400 mg by mouth once daily. Every day    [DISCONTINUED] hydrochlorothiazide (HYDRODIURIL) 25 MG tablet Take 25 mg by mouth once daily.     Family History       Problem Relation (Age of Onset)    Arthritis Mother, Brother    Cancer Maternal Aunt    Crohn's disease Brother, Brother, Brother    Early death Son    Fibromyalgia Sister    Heart disease Mother, Father, Brother    Hypertension Father, Sister, Sister, Daughter, Son    Irritable bowel syndrome Sister    Lupus Cousin, Cousin    Pulmonary embolism Sister    Scleroderma Sister    Skin cancer Mother          Tobacco Use    Smoking status: Former     Current packs/day: 0.00     Average packs/day: 1 pack/day for 18.0 years (18.0 ttl pk-yrs)     Types: Cigarettes     Start date: 7/1/1953      Quit date: 1971     Years since quittin.5    Smokeless tobacco: Never   Substance and Sexual Activity    Alcohol use: Not Currently     Alcohol/week: 1.0 standard drink of alcohol     Types: 1 Glasses of wine per week     Comment: not at all in months    Drug use: Never    Sexual activity: Not Currently     Partners: Male     Review of Systems   Constitutional:  Negative for activity change and appetite change.   HENT:  Negative for congestion and dental problem.    Eyes:  Negative for discharge and itching.   Respiratory:  Negative for shortness of breath.    Cardiovascular:  Negative for chest pain.   Gastrointestinal:  Negative for abdominal distention and abdominal pain.   Endocrine: Negative for cold intolerance.   Genitourinary:  Negative for difficulty urinating and dysuria.   Musculoskeletal:  Negative for arthralgias and back pain.   Skin:  Negative for color change.   Neurological:  Negative for dizziness and facial asymmetry.   Hematological:  Negative for adenopathy.   Psychiatric/Behavioral:  Negative for agitation and behavioral problems.      Objective:     Vital Signs (Most Recent):  Temp: 98.5 °F (36.9 °C) (24 180)  Pulse: 62 (24)  Resp: 18 (24)  BP: (!) 149/68 (24 1900)  SpO2: 95 % (24) Vital Signs (24h Range):  Temp:  [98.5 °F (36.9 °C)] 98.5 °F (36.9 °C)  Pulse:  [62-72] 62  Resp:  [18-20] 18  SpO2:  [95 %-96 %] 95 %  BP: (145-149)/(68-78) 149/68     Weight: 52.2 kg (115 lb)  Body mass index is 24.04 kg/m².     Physical Exam  Vitals and nursing note reviewed.   Constitutional:       General: She is not in acute distress.  HENT:      Head: Atraumatic.      Right Ear: External ear normal.      Left Ear: External ear normal.      Nose: Nose normal.      Mouth/Throat:      Mouth: Mucous membranes are moist.   Eyes:      Extraocular Movements: Extraocular movements intact.   Cardiovascular:      Rate and Rhythm: Normal rate.   Pulmonary:       Effort: Pulmonary effort is normal.   Abdominal:      General: There is distension.      Palpations: Abdomen is soft.   Musculoskeletal:         General: Normal range of motion.      Cervical back: Normal range of motion.      Right lower leg: Edema present.      Left lower leg: Edema present.   Skin:     General: Skin is warm.      Comments: Ecchymosis on LE s more on RLE   Neurological:      Mental Status: She is alert and oriented to person, place, and time.                Significant Labs: All pertinent labs within the past 24 hours have been reviewed.  CBC:   Recent Labs   Lab 07/17/24  1830   WBC 6.12   HGB 9.7*   HCT 30.9*   *     CMP:   Recent Labs   Lab 07/17/24  1830   *   K 4.1   CL 87*   CO2 29   *   BUN 74*   CREATININE 3.5*   CALCIUM 7.3*   PROT 6.9   ALBUMIN 3.8   BILITOT 0.3   ALKPHOS 87   AST 24   ALT 14   ANIONGAP 13       Significant Imaging: I have reviewed all pertinent imaging results/findings within the past 24 hours.  Assessment/Plan:     * CRISTO (acute kidney injury)  Mostly from hypervolemia/CRS  Maintain iv lasix  USS retroperitoneum  Consult Nephro MD    Acute on chronic combined systolic and diastolic heart failure  Maintain iv lasix with close monitoring of renal panels      S/P CABG (coronary artery bypass graft)  Aware       CKD (chronic kidney disease)  CKD as per chart review  Stage unknown      VTE Risk Mitigation (From admission, onward)           Ordered     enoxaparin injection 30 mg  Daily         07/17/24 2111     IP VTE HIGH RISK PATIENT  Once         07/17/24 2111     Place sequential compression device  Until discontinued         07/17/24 2111                                    Clark Berry MD  Department of Hospital Medicine  [facility] - Emergency Dept

## 2024-07-18 NOTE — TELEPHONE ENCOUNTER
----- Message from Yoselin Aquino sent at 7/18/2024  8:41 AM CDT -----  Type: Needs Medical Advice  Who Called:  pt  daughter  Symptoms (please be specific):    How long has patient had these symptoms:    Pharmacy name and phone #:    Best Call Back Number: [phone]  Additional Information: Pt returned call  please call back  pt is in [facility]

## 2024-07-18 NOTE — CONSULTS
"Consult Note  Nephrology    Consult Requested By: Aidan Brown DO    Reason for Consult: CRISTO      SUBJECTIVE:     History of Present Illness:  89 y/o female patient referred to nephrology by primary MD, then to ER per nephrology.  Blood work done out patient had revealed elevated Scr.  C/o 5 lb wt gain, LE edema, and abdominal distension.  Nephrology is consulted for CRISTO.    7/18  renal function incrementally improved overnight.  Getting lasix 20 mg IVP bid.  UOP not recorded. UA noted--has UTI, renal US done--no hydronephrosis.  Old labs reviewed, six months ago, BUN/Scr/eGFR were normal, then appears renal function began to decline--CKD 3?--will see where renal function falls out.  Pt states she has diarrhea "off and on", no n/v, no burning or pain w/urination, sometimes feels like she has to go and can't.      Assessment/plan:    CRISTO  CKD 3?  FVO  HTN  Mild hyponatremia  hypermagnesemia    --CRISTO, possibly r/t FVO or UTI.  Bedside nurse messaged primary team re: antibiotic tx.  Ok w/lasix for now, trend BUN/Scr.  Renal US, UA done  --Avoid nephrotoxic agents.  No NSAIDs, COXibs, or aminoglycoside antibiotics.  Dose all medications for level of renal function.  --IV lasix bid  --avoid hypotension.  Keep MAP >55  --Na+ improving w/diuresis  --stopped Mg+ supplement    Past Medical History:   Diagnosis Date    Abdominal hernia     Anemia     Dr Berkowitz     Angina pectoris     Aortic valve stenosis, moderate     Back pain     Cannon's esophagus     CAD (coronary artery disease)     Cataract     ou done    CHF (congestive heart failure)     EFx 35%, Dr. Spencer 12/19/13    Chronic combined systolic and diastolic heart failure 09/28/2019    Colitis     Compression fracture     Diverticulosis     Encounter for blood transfusion     GERD (gastroesophageal reflux disease)     Hyperlipidemia     Hypertension     Irritable bowel syndrome     Myocardial infarction     Obstructive sleep apnea 4/9/2024    Osteoarthritis  "    lumbar DDD    Pacemaker     Pneumonia 2017    Pulmonary emphysema, unspecified emphysema type 2024    Raynaud disease     Rheumatoid arteritis     Rheumatoid arthritis     Shingles 2017    Sjogren syndrome     Ulcerative colitis      Past Surgical History:   Procedure Laterality Date    A-V CARDIAC PACEMAKER INSERTION Left 2021    Procedure: INSERTION, CARDIAC PACEMAKER, DUAL CHAMBER  ([employer]TR[employer]IC);  Surgeon: Tera Blair MD;  Location: [facility] CATH/EP LAB;  Service: Cardiology;  Laterality: Left;    APPENDECTOMY      BACK SURGERY      CARDIAC SURGERY      CABGx3 in     CATARACT EXTRACTION      ou od d 11-15-12//     SECTION, CLASSIC      x 2    CHOLECYSTECTOMY      CLOSURE OF WOUND Right 2022    Procedure: CLOSURE-WOUND;  Surgeon: Delvis Jackson MD;  Location: [facility] OR;  Service: ENT;  Laterality: Right;  NOSE AND CHIN    COLONOSCOPY  09/15/2011    Dr Anaya     COLONOSCOPY  01/10/2017    [facility] report sent to scanning    CORONARY ANGIOPLASTY      PCI x 1 in     CORONARY ARTERY BYPASS GRAFT      3 vessel    CORONARY ARTERY BYPASS GRAFT      COSMETIC SURGERY  on nose    CYSTOSCOPY N/A 2020    Procedure: CYSTOSCOPY;  Surgeon: Miryam Bender Jr., MD;  Location: [facility] OR;  Service: Urology;  Laterality: N/A;    EYE SURGERY      eyes      rk ou    FRACTURE SURGERY  2016    Dr Guido left hip     FRACTURE SURGERY  2018    Right Hip    HARVESTING OF SKIN GRAFT  2022    Procedure: SURGICAL PROCUREMENT, GRAFT, SKIN;  Surgeon: Delvis Jackson MD;  Location: [facility] OR;  Service: ENT;;  RIGHT CHEST    HYSTERECTOMY      tubal ligation, oopherectomy    INTRAMEDULLARY RODDING OF TROCHANTER OF FEMUR Right 10/08/2018    Procedure: INSERTION, INTRAMEDULLARY KELSI, FEMUR, TROCHANTER;  Surgeon: Valerio Luther MD;  Location: [facility] OR;  Service: Orthopedics;  Laterality: Right;    OOPHORECTOMY      SPINE SURGERY  2013    Silvino Mckeon    TUBAL LIGATION      UPPER  GASTROINTESTINAL ENDOSCOPY      Yag Capsulotomy Right 09/25/2014     Family History   Adopted: Yes   Problem Relation Name Age of Onset    Heart disease Mother Bedelia Pounds         aortic stenosis    Skin cancer Mother Bedelia Pounds     Arthritis Mother Bedelia Pounds     Hypertension Father Saravanan Pounds     Heart disease Father Saravanan Pounds         MI    Hypertension Sister Lizabeth Ivory     Fibromyalgia Sister Lizabeth Ivory     Scleroderma Sister Lizabeth Ivory     Pulmonary embolism Sister Lizabeth Ivory     Irritable bowel syndrome Sister Fidelina Palm     Hypertension Sister Fidelina Palm     Heart disease Brother Bakari Pounds         2 brothers CABG    Arthritis Brother Bakari Pounds     Crohn's disease Brother Bakari Pounds     Crohn's disease Brother      Crohn's disease Brother      Hypertension Daughter Brandi Falcon     Early death Son Femi Lynn         accident    Hypertension Son Femi Mireya     Cancer Maternal Aunt Shannan Raudel     Lupus Cousin      Lupus Cousin      Amblyopia Neg Hx      Blindness Neg Hx      Cataracts Neg Hx      Diabetes Neg Hx      Glaucoma Neg Hx      Macular degeneration Neg Hx      Retinal detachment Neg Hx      Strabismus Neg Hx      Stroke Neg Hx      Thyroid disease Neg Hx      Celiac disease Neg Hx      Cirrhosis Neg Hx      Psoriasis Neg Hx      Melanoma Neg Hx      Multiple sclerosis Neg Hx      Rheum arthritis Neg Hx      Tuberculosis Neg Hx      Lymphoma Neg Hx      Ulcerative colitis Neg Hx      Moses's disease Neg Hx      Stomach cancer Neg Hx      Rectal cancer Neg Hx      Liver disease Neg Hx      Liver cancer Neg Hx      Inflammatory bowel disease Neg Hx      Hemochromatosis Neg Hx      Esophageal cancer Neg Hx      Cystic fibrosis Neg Hx      Colon cancer Neg Hx       Social History     Tobacco Use    Smoking status: Former     Current packs/day: 0.00     Average packs/day: 1 pack/day for 18.0 years (18.0 ttl pk-yrs)     Types: Cigarettes     Start date: 7/1/1953      Quit date: 1971     Years since quittin.5    Smokeless tobacco: Never   Substance Use Topics    Alcohol use: Not Currently     Alcohol/week: 1.0 standard drink of alcohol     Types: 1 Glasses of wine per week     Comment: not at all in months    Drug use: Never       Review of patient's allergies indicates:   Allergen Reactions    Adhesive     Nitrofurantoin macrocrystalline Nausea And Vomiting     Other reaction(s): very sickly    Penicillins Swelling     Other reaction(s): swelling  Other reaction(s): red skin discolorati    Sulfa (sulfonamide antibiotics) Nausea And Vomiting     Other reaction(s): very sickly        Review of Systems:  As above    OBJECTIVE:     Vital Signs Range (Last 24H):  Temp:  [98.2 °F (36.8 °C)-98.5 °F (36.9 °C)]   Pulse:  [62-78]   Resp:  [17-20]   BP: (145-172)/(60-78)   SpO2:  [95 %-96 %]     Physical Exam:  General- NAD noted  HEENT- WNL  Neck- supple  CV- Regular rate and rhythm  Resp- Lungs CTA Bilaterally, No increased WOB  GI- Non tender/non-distended, BS normoactive x4 quads  Extrem- No cyanosis, clubbing, +trace edema.  Derm- skin w/d  Neuro-  No flap.     Body mass index is 25.66 kg/m².    Laboratory:  CBC:   Recent Labs   Lab 24  0450   WBC 4.91   RBC 3.71*   HGB 9.8*   HCT 32.0*   *   MCV 86   MCH 26.4*   MCHC 30.6*     CMP:   Recent Labs   Lab 24  0450      CALCIUM 7.2*   ALBUMIN 3.7   PROT 6.7   *   K 4.2   CO2 33*   CL 88*   BUN 75*   CREATININE 3.3*   ALKPHOS 65   ALT 12   AST 28   BILITOT 0.5     Recent Labs   Lab 24  2147   COLORU Yellow   SPECGRAV 1.010   PHUR 7.0   PROTEINUA Negative   BACTERIA Many*   NITRITE Positive*   LEUKOCYTESUR 3+*   UROBILINOGEN Negative       Diagnostic Results:  Labs: Reviewed  US: Reviewed      ASSESSMENT/PLAN:     Active Hospital Problems    Diagnosis  POA    *CRISTO (acute kidney injury) [N17.9]  Yes    S/P CABG (coronary artery bypass graft) [Z95.1]  Not Applicable    Acute on chronic  combined systolic and diastolic heart failure [I50.43]  Yes    CKD (chronic kidney disease) [N18.9]  Yes      Resolved Hospital Problems   No resolved problems to display.         Thank you for allowing us to participate in the care of your patient. We will follow the patient and provide recommendations as needed.      Jes Dye NP

## 2024-07-18 NOTE — NURSING
Nurses Note -- 4 Eyes      7/18/2024   12:50 AM      Skin assessed during: Admit      [] No Altered Skin Integrity Present    []Prevention Measures Documented      [x] Yes- Altered Skin Integrity Present or Discovered   [x] LDA Added if Not in Epic (Describe Wound)   [x] New Altered Skin Integrity was Present on Admit and Documented in LDA   [x] Wound Image Taken    Wound Care Consulted? Yes    Attending Nurse:  Elissa Lambert RN/Staff Member:   [id number]

## 2024-07-18 NOTE — ASSESSMENT & PLAN NOTE
2/2 hypervolemia/CRS  Renal US unremarkable  Nephrology consulted  Continue IV lasix   -will place on clindamycin and cipro for now  -wound care eval in am  -plain film ordered  -percocet prn

## 2024-07-18 NOTE — TELEPHONE ENCOUNTER
Follow up call placed to patient.  Call answered by patient's daughter (Brandi) who states patient was currently admitted to [facility].  Mrs. Paz stated patient was using the restroom at time of call.  Writer requested to notify patient office received message regarding reason for appointment cancellation.  Advised for patient to reach out to office if office can assist in any way.  Mrs. Paz verbalized understanding, states will update patient.

## 2024-07-18 NOTE — NURSING
Patient signed AMA form regarding bed alarm, copy placed in chart. Patient has history of falls and a recent fall this week. I explained plan of care and new medications with patient and daughter at bedside.  Patient and daughter agree that she should not be ambulating without assistance, patient states she only wants bed alarm off when daughter is present at the bedside. Daughter agrees that she is able to fully participate in care with patient and if she leaves the bedside will inform the nursing station in order for staff to place patient on bed alarm to prevent any falls. Both parties verbalized understanding and were given opportunity to ask questions.

## 2024-07-18 NOTE — PROGRESS NOTES
[facility]  Progress Note    Patient Name: Cheyanne Gomes  MRN: [id number]  Patient Class: IP- Inpatient   Admission Date: 7/17/2024  Length of Stay: 1 days  Attending Physician: Aidan Brown DO  Primary Care Provider: Romeo Alas MD        Subjective:     Principal Problem:CRISTO (acute kidney injury)        HPI:  88 year old pt getting admitted with CRISTO  Blood work was done by GI MD few days ago and it showed high crt level  Pt was referred to Nephro MD and pt was instructed to come to ER by Nephro MD  Pt denies shortness of breath but endorses 5 pound weight gain  Also pt has swelling on legs and abdominal distension which she attributes due to fluid gain  She has ecchymosis on both legs and more on RLE   Pt had a fall few days ago and had ER visit 2  & all imaging studies were normal      Overview/Hospital Course:  No notes on file    Interval History: Patient seen and examined.  NAD.  Started on levaquin for UTI, urine cx pending.  Nephrology consulted, continue lasix.     Review of Systems   Constitutional:  Positive for unexpected weight change (weight gain).   Respiratory:  Negative for chest tightness and shortness of breath.    Cardiovascular:  Negative for chest pain and palpitations.   Gastrointestinal:  Negative for abdominal pain, nausea and vomiting.   Genitourinary:  Negative for dysuria.   Neurological:  Negative for headaches.     Objective:     Vital Signs (Most Recent):  Temp: 98.1 °F (36.7 °C) (07/18/24 1215)  Pulse: 61 (07/18/24 1215)  Resp: 17 (07/18/24 1215)  BP: (!) 146/69 (07/18/24 1215)  SpO2: 96 % (07/18/24 1215) Vital Signs (24h Range):  Temp:  [97.7 °F (36.5 °C)-98.5 °F (36.9 °C)] 98.1 °F (36.7 °C)  Pulse:  [61-78] 61  Resp:  [17-20] 17  SpO2:  [95 %-99 %] 96 %  BP: (145-172)/(60-91) 146/69     Weight: 53.8 kg (118 lb 9.6 oz)  Body mass index is 25.66 kg/m².    Intake/Output Summary (Last 24 hours) at 7/18/2024 1402  Last data filed at 7/18/2024  1301  Gross per 24 hour   Intake --   Output 100 ml   Net -100 ml         Physical Exam  Vitals and nursing note reviewed.   Constitutional:       General: She is not in acute distress.     Appearance: Normal appearance.   HENT:      Head: Normocephalic.      Mouth/Throat:      Mouth: Mucous membranes are moist.      Pharynx: Oropharynx is clear.   Cardiovascular:      Rate and Rhythm: Normal rate and regular rhythm.   Pulmonary:      Effort: Pulmonary effort is normal. No respiratory distress.      Breath sounds: Normal breath sounds.   Abdominal:      General: Bowel sounds are normal.      Palpations: Abdomen is soft.      Tenderness: There is no abdominal tenderness.   Musculoskeletal:      Right lower leg: Edema (trace) present.      Left lower leg: Edema (trace) present.   Skin:     General: Skin is warm and dry.      Comments: Bruising to RLE   Neurological:      General: No focal deficit present.      Mental Status: She is alert and oriented to person, place, and time.   Psychiatric:         Mood and Affect: Mood normal.             Significant Labs: All pertinent labs within the past 24 hours have been reviewed.  Recent Lab Results         07/18/24  0450   07/17/24  2147   07/17/24  1959   07/17/24  1830        Albumin 3.7       3.8       ALP 65       87       ALT 12       14       Anion Gap 9       13       Appearance, UA   Hazy           AST 28       24       Bacteria, UA   Many           Baso # 0.03       0.03       Basophil % 0.6       0.5       Bilirubin (UA)   Negative           BILIRUBIN TOTAL 0.5  Comment: For infants and newborns, interpretation of results should be based  on gestational age, weight and in agreement with clinical  observations.    Premature Infant recommended reference ranges:  Up to 24 hours.............<8.0 mg/dL  Up to 48 hours............<12.0 mg/dL  3-5 days..................<15.0 mg/dL  6-29 days.................<15.0 mg/dL         0.3  Comment: For infants and newborns,  interpretation of results should be based  on gestational age, weight and in agreement with clinical  observations.    Premature Infant recommended reference ranges:  Up to 24 hours.............<8.0 mg/dL  Up to 48 hours............<12.0 mg/dL  3-5 days..................<15.0 mg/dL  6-29 days.................<15.0 mg/dL         BNP     1,039  Comment: Values of less than 100 pg/ml are consistent with non-CHF populations.         BUN 75       74       Calcium 7.2       7.3       Chloride 88       87       CO2 33       29       Color, UA   Yellow           Creatinine 3.3       3.5       Urine Creatinine   30.1  Comment: The random urine reference ranges provided were established   for 24 hour urine collections.  No reference ranges exist for  random urine specimens.  Correlate clinically.             Differential Method Automated       Automated       eGFR 12.9       12.1       Eos # 0.2       0.2       Eos % 4.9       2.6       Glucose 104       120       Glucose, UA   2+           Gran # (ANC) 2.9       4.2       Gran % 58.1       69.3       Hematocrit 32.0       30.9       Hemoglobin 9.8       9.7       Immature Grans (Abs) 0.02  Comment: Mild elevation in immature granulocytes is non specific and   can be seen in a variety of conditions including stress response,   acute inflammation, trauma and pregnancy. Correlation with other   laboratory and clinical findings is essential.         0.03  Comment: Mild elevation in immature granulocytes is non specific and   can be seen in a variety of conditions including stress response,   acute inflammation, trauma and pregnancy. Correlation with other   laboratory and clinical findings is essential.         Immature Granulocytes 0.4       0.5       Ketones, UA   Negative           Leukocyte Esterase, UA   3+           Lymph # 1.2       1.1       Lymph % 24.8       17.8       Magnesium  3.2             MCH 26.4       27.2       MCHC 30.6       31.4       MCV 86       87        Microscopic Comment   SEE COMMENT  Comment: Other formed elements not mentioned in the report are not   present in the microscopic examination.              Mono # 0.6       0.6       Mono % 11.2       9.3       MPV 9.7       9.6       NITRITE UA   Positive           nRBC 0       0       Blood, UA   Negative           pH, UA   7.0           Platelet Count 109       118       Potassium 4.2       4.1       PROTEIN TOTAL 6.7       6.9       Protein, UA   Negative  Comment: Recommend a 24 hour urine protein or a urine   protein/creatinine ratio if globulin induced proteinuria is  clinically suspected.             RBC 3.71       3.56       RDW 16.4       16.2       Sodium 130       129       Sodium, Urine   59  Comment: The random urine reference ranges provided were established   for 24 hour urine collections.  No reference ranges exist for  random urine specimens.  Correlate clinically.             Spec Grav UA   1.010           Specimen UA   Urine, Clean Catch           UROBILINOGEN UA   Negative           WBC, UA   7           WBC 4.91       6.12               Significant Imaging: I have reviewed all pertinent imaging results/findings within the past 24 hours.    Assessment/Plan:      * CRISTO (acute kidney injury)  2/2 hypervolemia/CRS  Renal US unremarkable  Nephrology consulted  Continue IV lasix    Fall  Had fall on 7/15  CTH done on 7/15 without acute abnormality  PT/OT ordered      UTI (urinary tract infection)  Urinalysis +leuks and nitrites  Urine cx pending  Multiple abx allergies, will start on levaquin       Acute on chronic combined systolic and diastolic heart failure  Echo 6/4/24:    Left Ventricle: The left ventricle is normal in size. Mildly increased wall thickness. There is mild concentric hypertrophy. Regional wall motion abnormalities present.  Apical aneurysm. There is low normal systolic function. Ejection fraction by visual approximation is 55%. Grade III diastolic dysfunction.    Right Ventricle:  Normal right ventricular cavity size. Wall thickness is normal. Systolic function is normal. Pacemaker lead present in the ventricle.    Left Atrium: Left atrium is moderately dilated.    Right Atrium: Right atrium is mildly dilated.    Aortic Valve: The aortic valve is a trileaflet valve. Moderately restricted motion. There is moderate stenosis. Aortic valve area by VTI is 0.90 cm². Aortic valve peak velocity is 2.19 m/s. Mean gradient is 10 mmHg. The dimensionless index is 0.32. There is mild aortic regurgitation with a centrally directed jet.    Mitral Valve: There is mild regurgitation with a posterolateral eccentriccally directed jet.    Tricuspid Valve: There is mild to moderate regurgitation.    IVC/SVC: Normal venous pressure at 3 mmHg.    The estimated pulmonary artery systolic pressure is 31 mmHg.    Continue IV lasix      CKD (chronic kidney disease)  CKD as per chart review  Stage unknown, possibly 3?      VTE Risk Mitigation (From admission, onward)           Ordered     enoxaparin injection 30 mg  Daily         07/17/24 2111     IP VTE HIGH RISK PATIENT  Once         07/17/24 2111     Place sequential compression device  Until discontinued         07/17/24 2111                    Discharge Planning   RAMONE: 7/21/2024     Code Status: Full Code   Is the patient medically ready for discharge?:     Reason for patient still in hospital (select all that apply): Patient trending condition and Consult recommendations                     TAY Acosta  Department of Hospital Medicine   Replaced by [facility]

## 2024-07-18 NOTE — SUBJECTIVE & OBJECTIVE
Interval History: Patient seen and examined.  NAD.  Started on levaquin for UTI, urine cx pending.  Nephrology consulted, continue lasix.     Review of Systems   Constitutional:  Positive for unexpected weight change (weight gain).   Respiratory:  Negative for chest tightness and shortness of breath.    Cardiovascular:  Negative for chest pain and palpitations.   Gastrointestinal:  Negative for abdominal pain, nausea and vomiting.   Genitourinary:  Negative for dysuria.   Neurological:  Negative for headaches.     Objective:     Vital Signs (Most Recent):  Temp: 98.1 °F (36.7 °C) (07/18/24 1215)  Pulse: 61 (07/18/24 1215)  Resp: 17 (07/18/24 1215)  BP: (!) 146/69 (07/18/24 1215)  SpO2: 96 % (07/18/24 1215) Vital Signs (24h Range):  Temp:  [97.7 °F (36.5 °C)-98.5 °F (36.9 °C)] 98.1 °F (36.7 °C)  Pulse:  [61-78] 61  Resp:  [17-20] 17  SpO2:  [95 %-99 %] 96 %  BP: (145-172)/(60-91) 146/69     Weight: 53.8 kg (118 lb 9.6 oz)  Body mass index is 25.66 kg/m².    Intake/Output Summary (Last 24 hours) at 7/18/2024 1402  Last data filed at 7/18/2024 1301  Gross per 24 hour   Intake --   Output 100 ml   Net -100 ml         Physical Exam  Vitals and nursing note reviewed.   Constitutional:       General: She is not in acute distress.     Appearance: Normal appearance.   HENT:      Head: Normocephalic.      Mouth/Throat:      Mouth: Mucous membranes are moist.      Pharynx: Oropharynx is clear.   Cardiovascular:      Rate and Rhythm: Normal rate and regular rhythm.   Pulmonary:      Effort: Pulmonary effort is normal. No respiratory distress.      Breath sounds: Normal breath sounds.   Abdominal:      General: Bowel sounds are normal.      Palpations: Abdomen is soft.      Tenderness: There is no abdominal tenderness.   Musculoskeletal:      Right lower leg: Edema (trace) present.      Left lower leg: Edema (trace) present.   Skin:     General: Skin is warm and dry.      Comments: Bruising to RLE   Neurological:      General:  No focal deficit present.      Mental Status: She is alert and oriented to person, place, and time.   Psychiatric:         Mood and Affect: Mood normal.             Significant Labs: All pertinent labs within the past 24 hours have been reviewed.  Recent Lab Results         07/18/24  0450   07/17/24  2147   07/17/24 1959   07/17/24  1830        Albumin 3.7       3.8       ALP 65       87       ALT 12       14       Anion Gap 9       13       Appearance, UA   Hazy           AST 28       24       Bacteria, UA   Many           Baso # 0.03       0.03       Basophil % 0.6       0.5       Bilirubin (UA)   Negative           BILIRUBIN TOTAL 0.5  Comment: For infants and newborns, interpretation of results should be based  on gestational age, weight and in agreement with clinical  observations.    Premature Infant recommended reference ranges:  Up to 24 hours.............<8.0 mg/dL  Up to 48 hours............<12.0 mg/dL  3-5 days..................<15.0 mg/dL  6-29 days.................<15.0 mg/dL         0.3  Comment: For infants and newborns, interpretation of results should be based  on gestational age, weight and in agreement with clinical  observations.    Premature Infant recommended reference ranges:  Up to 24 hours.............<8.0 mg/dL  Up to 48 hours............<12.0 mg/dL  3-5 days..................<15.0 mg/dL  6-29 days.................<15.0 mg/dL         BNP     1,039  Comment: Values of less than 100 pg/ml are consistent with non-CHF populations.         BUN 75       74       Calcium 7.2       7.3       Chloride 88       87       CO2 33       29       Color, UA   Yellow           Creatinine 3.3       3.5       Urine Creatinine   30.1  Comment: The random urine reference ranges provided were established   for 24 hour urine collections.  No reference ranges exist for  random urine specimens.  Correlate clinically.             Differential Method Automated       Automated       eGFR 12.9       12.1       Eos #  0.2       0.2       Eos % 4.9       2.6       Glucose 104       120       Glucose, UA   2+           Gran # (ANC) 2.9       4.2       Gran % 58.1       69.3       Hematocrit 32.0       30.9       Hemoglobin 9.8       9.7       Immature Grans (Abs) 0.02  Comment: Mild elevation in immature granulocytes is non specific and   can be seen in a variety of conditions including stress response,   acute inflammation, trauma and pregnancy. Correlation with other   laboratory and clinical findings is essential.         0.03  Comment: Mild elevation in immature granulocytes is non specific and   can be seen in a variety of conditions including stress response,   acute inflammation, trauma and pregnancy. Correlation with other   laboratory and clinical findings is essential.         Immature Granulocytes 0.4       0.5       Ketones, UA   Negative           Leukocyte Esterase, UA   3+           Lymph # 1.2       1.1       Lymph % 24.8       17.8       Magnesium  3.2             MCH 26.4       27.2       MCHC 30.6       31.4       MCV 86       87       Microscopic Comment   SEE COMMENT  Comment: Other formed elements not mentioned in the report are not   present in the microscopic examination.              Mono # 0.6       0.6       Mono % 11.2       9.3       MPV 9.7       9.6       NITRITE UA   Positive           nRBC 0       0       Blood, UA   Negative           pH, UA   7.0           Platelet Count 109       118       Potassium 4.2       4.1       PROTEIN TOTAL 6.7       6.9       Protein, UA   Negative  Comment: Recommend a 24 hour urine protein or a urine   protein/creatinine ratio if globulin induced proteinuria is  clinically suspected.             RBC 3.71       3.56       RDW 16.4       16.2       Sodium 130       129       Sodium, Urine   59  Comment: The random urine reference ranges provided were established   for 24 hour urine collections.  No reference ranges exist for  random urine specimens.  Correlate  clinically.             Spec Grav UA   1.010           Specimen UA   Urine, Clean Catch           UROBILINOGEN UA   Negative           WBC, UA   7           WBC 4.91       6.12               Significant Imaging: I have reviewed all pertinent imaging results/findings within the past 24 hours.

## 2024-07-18 NOTE — ASSESSMENT & PLAN NOTE
Echo 6/4/24:    Left Ventricle: The left ventricle is normal in size. Mildly increased wall thickness. There is mild concentric hypertrophy. Regional wall motion abnormalities present.  Apical aneurysm. There is low normal systolic function. Ejection fraction by visual approximation is 55%. Grade III diastolic dysfunction.    Right Ventricle: Normal right ventricular cavity size. Wall thickness is normal. Systolic function is normal. Pacemaker lead present in the ventricle.    Left Atrium: Left atrium is moderately dilated.    Right Atrium: Right atrium is mildly dilated.    Aortic Valve: The aortic valve is a trileaflet valve. Moderately restricted motion. There is moderate stenosis. Aortic valve area by VTI is 0.90 cm². Aortic valve peak velocity is 2.19 m/s. Mean gradient is 10 mmHg. The dimensionless index is 0.32. There is mild aortic regurgitation with a centrally directed jet.    Mitral Valve: There is mild regurgitation with a posterolateral eccentriccally directed jet.    Tricuspid Valve: There is mild to moderate regurgitation.    IVC/SVC: Normal venous pressure at 3 mmHg.    The estimated pulmonary artery systolic pressure is 31 mmHg.    Continue IV lasix

## 2024-07-18 NOTE — TELEPHONE ENCOUNTER
Is This A New Presentation, Or A Follow-Up?: Follow Up [facility] Pemphig[facility] Zora Mccauley Staff     ----- Message from Zora Mccauley sent at 7/18/2024  8:21 AM CDT -----  Type: Needs Medical Advice  Who Called:  pt   Symptoms (please be specific):  Hosp encounter 07/17 last night   Best Call Back Number: [phone]  Additional Information: pt stated she was admitted in hosp last night and just wanted to advise office as to why pt canceled appt please ensure to call back to advise asap thanks!

## 2024-07-18 NOTE — TELEPHONE ENCOUNTER
Call placed to patient to confirm office received message regarding reason for appointment cancellation.  No answer received.  Left message indicating above, and requesting for patient to reach out to the office when she is ready to reschedule, or if she needs any further assistance.

## 2024-07-18 NOTE — TELEPHONE ENCOUNTER
Called pt and spoke to her regarding a lab (ferritin) needed to be completed for her office visit with Dr. Robb on 07/22/2024. Pt states she IS currently at [facility] and will get the lab done there.

## 2024-07-18 NOTE — ASSESSMENT & PLAN NOTE
Urinalysis +leuks and nitrites  Urine cx pending  Multiple abx allergies, will start on levaquin

## 2024-07-19 PROBLEM — N18.9 ACUTE KIDNEY INJURY SUPERIMPOSED ON CHRONIC KIDNEY DISEASE: Status: ACTIVE | Noted: 2019-10-10

## 2024-07-19 PROBLEM — D64.9 ANEMIA: Status: ACTIVE | Noted: 2024-07-19

## 2024-07-19 PROBLEM — E83.41 HYPERMAGNESEMIA: Status: ACTIVE | Noted: 2024-07-19

## 2024-07-19 LAB
25(OH)D3+25(OH)D2 SERPL-MCNC: 42 NG/ML (ref 30–96)
ALBUMIN SERPL BCP-MCNC: 3.8 G/DL (ref 3.5–5.2)
ALP SERPL-CCNC: 62 U/L (ref 55–135)
ALT SERPL W/O P-5'-P-CCNC: 12 U/L (ref 10–44)
ANION GAP SERPL CALC-SCNC: 11 MMOL/L (ref 8–16)
ANION GAP SERPL CALC-SCNC: 12 MMOL/L (ref 8–16)
AST SERPL-CCNC: 21 U/L (ref 10–40)
BASOPHILS # BLD AUTO: 0.03 K/UL (ref 0–0.2)
BASOPHILS NFR BLD: 0.6 % (ref 0–1.9)
BILIRUB SERPL-MCNC: 0.4 MG/DL (ref 0.1–1)
BUN SERPL-MCNC: 73 MG/DL (ref 8–23)
BUN SERPL-MCNC: 73 MG/DL (ref 8–23)
CALCIUM SERPL-MCNC: 7.2 MG/DL (ref 8.7–10.5)
CALCIUM SERPL-MCNC: 7.2 MG/DL (ref 8.7–10.5)
CHLORIDE SERPL-SCNC: 88 MMOL/L (ref 95–110)
CHLORIDE SERPL-SCNC: 89 MMOL/L (ref 95–110)
CO2 SERPL-SCNC: 31 MMOL/L (ref 23–29)
CO2 SERPL-SCNC: 32 MMOL/L (ref 23–29)
CREAT SERPL-MCNC: 3.2 MG/DL (ref 0.5–1.4)
CREAT SERPL-MCNC: 3.2 MG/DL (ref 0.5–1.4)
DIFFERENTIAL METHOD BLD: ABNORMAL
EOSINOPHIL # BLD AUTO: 0.2 K/UL (ref 0–0.5)
EOSINOPHIL NFR BLD: 3.5 % (ref 0–8)
ERYTHROCYTE [DISTWIDTH] IN BLOOD BY AUTOMATED COUNT: 16.4 % (ref 11.5–14.5)
EST. GFR  (NO RACE VARIABLE): 13.4 ML/MIN/1.73 M^2
EST. GFR  (NO RACE VARIABLE): 13.4 ML/MIN/1.73 M^2
FERRITIN SERPL-MCNC: 454.9 NG/ML (ref 20–300)
GLUCOSE SERPL-MCNC: 90 MG/DL (ref 70–110)
GLUCOSE SERPL-MCNC: 94 MG/DL (ref 70–110)
HCT VFR BLD AUTO: 30.5 % (ref 37–48.5)
HGB BLD-MCNC: 9.6 G/DL (ref 12–16)
IMM GRANULOCYTES # BLD AUTO: 0.03 K/UL (ref 0–0.04)
IMM GRANULOCYTES NFR BLD AUTO: 0.6 % (ref 0–0.5)
IRON SERPL-MCNC: 33 UG/DL (ref 30–160)
LYMPHOCYTES # BLD AUTO: 1 K/UL (ref 1–4.8)
LYMPHOCYTES NFR BLD: 19.1 % (ref 18–48)
MAGNESIUM SERPL-MCNC: 2.9 MG/DL (ref 1.6–2.6)
MAGNESIUM SERPL-MCNC: 2.9 MG/DL (ref 1.6–2.6)
MCH RBC QN AUTO: 26.9 PG (ref 27–31)
MCHC RBC AUTO-ENTMCNC: 31.5 G/DL (ref 32–36)
MCV RBC AUTO: 85 FL (ref 82–98)
MONOCYTES # BLD AUTO: 0.5 K/UL (ref 0.3–1)
MONOCYTES NFR BLD: 9.7 % (ref 4–15)
NEUTROPHILS # BLD AUTO: 3.4 K/UL (ref 1.8–7.7)
NEUTROPHILS NFR BLD: 66.5 % (ref 38–73)
NRBC BLD-RTO: 0 /100 WBC
PHOSPHATE SERPL-MCNC: 3.3 MG/DL (ref 2.7–4.5)
PLATELET # BLD AUTO: 132 K/UL (ref 150–450)
PMV BLD AUTO: 10.4 FL (ref 9.2–12.9)
POTASSIUM SERPL-SCNC: 3.6 MMOL/L (ref 3.5–5.1)
POTASSIUM SERPL-SCNC: 3.8 MMOL/L (ref 3.5–5.1)
PROT SERPL-MCNC: 6.8 G/DL (ref 6–8.4)
PTH-INTACT SERPL-MCNC: 439.8 PG/ML (ref 9–77)
RBC # BLD AUTO: 3.57 M/UL (ref 4–5.4)
SATURATED IRON: 13 % (ref 20–50)
SODIUM SERPL-SCNC: 131 MMOL/L (ref 136–145)
SODIUM SERPL-SCNC: 132 MMOL/L (ref 136–145)
TOTAL IRON BINDING CAPACITY: 249 UG/DL (ref 250–450)
TRANSFERRIN SERPL-MCNC: 178 MG/DL (ref 200–375)
WBC # BLD AUTO: 5.17 K/UL (ref 3.9–12.7)

## 2024-07-19 PROCEDURE — 84100 ASSAY OF PHOSPHORUS: CPT | Performed by: INTERNAL MEDICINE

## 2024-07-19 PROCEDURE — 36415 COLL VENOUS BLD VENIPUNCTURE: CPT | Performed by: INTERNAL MEDICINE

## 2024-07-19 PROCEDURE — 97116 GAIT TRAINING THERAPY: CPT

## 2024-07-19 PROCEDURE — 83540 ASSAY OF IRON: CPT | Performed by: INTERNAL MEDICINE

## 2024-07-19 PROCEDURE — 84466 ASSAY OF TRANSFERRIN: CPT | Performed by: INTERNAL MEDICINE

## 2024-07-19 PROCEDURE — 25000003 PHARM REV CODE 250: Performed by: INTERNAL MEDICINE

## 2024-07-19 PROCEDURE — 85025 COMPLETE CBC W/AUTO DIFF WBC: CPT | Performed by: INTERNAL MEDICINE

## 2024-07-19 PROCEDURE — 97165 OT EVAL LOW COMPLEX 30 MIN: CPT

## 2024-07-19 PROCEDURE — 83735 ASSAY OF MAGNESIUM: CPT | Performed by: INTERNAL MEDICINE

## 2024-07-19 PROCEDURE — 83970 ASSAY OF PARATHORMONE: CPT | Performed by: INTERNAL MEDICINE

## 2024-07-19 PROCEDURE — 80053 COMPREHEN METABOLIC PANEL: CPT | Performed by: INTERNAL MEDICINE

## 2024-07-19 PROCEDURE — 80048 BASIC METABOLIC PNL TOTAL CA: CPT | Performed by: INTERNAL MEDICINE

## 2024-07-19 PROCEDURE — 63600175 PHARM REV CODE 636 W HCPCS: Performed by: INTERNAL MEDICINE

## 2024-07-19 PROCEDURE — 12000002 HC ACUTE/MED SURGE SEMI-PRIVATE ROOM

## 2024-07-19 PROCEDURE — 97161 PT EVAL LOW COMPLEX 20 MIN: CPT

## 2024-07-19 PROCEDURE — 83735 ASSAY OF MAGNESIUM: CPT | Mod: 91 | Performed by: INTERNAL MEDICINE

## 2024-07-19 PROCEDURE — 87040 BLOOD CULTURE FOR BACTERIA: CPT | Mod: 59

## 2024-07-19 PROCEDURE — 82728 ASSAY OF FERRITIN: CPT | Performed by: INTERNAL MEDICINE

## 2024-07-19 PROCEDURE — 82306 VITAMIN D 25 HYDROXY: CPT | Performed by: INTERNAL MEDICINE

## 2024-07-19 RX ORDER — POTASSIUM CHLORIDE 20 MEQ/1
40 TABLET, EXTENDED RELEASE ORAL ONCE
Status: COMPLETED | OUTPATIENT
Start: 2024-07-19 | End: 2024-07-19

## 2024-07-19 RX ADMIN — AMLODIPINE BESYLATE 5 MG: 5 TABLET ORAL at 08:07

## 2024-07-19 RX ADMIN — METOPROLOL SUCCINATE 50 MG: 50 TABLET, FILM COATED, EXTENDED RELEASE ORAL at 09:07

## 2024-07-19 RX ADMIN — ENOXAPARIN SODIUM 30 MG: 30 INJECTION SUBCUTANEOUS at 04:07

## 2024-07-19 RX ADMIN — PANTOPRAZOLE SODIUM 40 MG: 40 TABLET, DELAYED RELEASE ORAL at 05:07

## 2024-07-19 RX ADMIN — AMLODIPINE BESYLATE 5 MG: 5 TABLET ORAL at 09:07

## 2024-07-19 RX ADMIN — FUROSEMIDE 20 MG: 10 INJECTION, SOLUTION INTRAMUSCULAR; INTRAVENOUS at 08:07

## 2024-07-19 RX ADMIN — POTASSIUM CHLORIDE 40 MEQ: 1500 TABLET, EXTENDED RELEASE ORAL at 01:07

## 2024-07-19 RX ADMIN — ISOSORBIDE MONONITRATE 60 MG: 60 TABLET, EXTENDED RELEASE ORAL at 09:07

## 2024-07-19 RX ADMIN — ASPIRIN 81 MG 81 MG: 81 TABLET ORAL at 09:07

## 2024-07-19 NOTE — SUBJECTIVE & OBJECTIVE
Interval History: Patient seen and examined.  NAD.  Urine cx prelim GNRs, continue levaquin renally dosed.  Serum creatinine 3.2 today.  Net negative 850cc over the last 24 hours, also with multiple unmeasured voids.  Nephrology consulted, continue IV lasix.     Review of Systems   Constitutional:  Positive for unexpected weight change (weight gain).   Respiratory:  Negative for chest tightness and shortness of breath.    Cardiovascular:  Negative for chest pain and palpitations.   Gastrointestinal:  Negative for abdominal pain, nausea and vomiting.   Genitourinary:  Negative for dysuria.   Neurological:  Negative for headaches.     Objective:     Vital Signs (Most Recent):  Temp: 97.6 °F (36.4 °C) (07/19/24 1100)  Pulse: 65 (07/19/24 1100)  Resp: 18 (07/19/24 1100)  BP: (!) 146/76 (notified notified) (07/19/24 1100)  SpO2: (!) 91 % (notified nurse) (07/19/24 1100) Vital Signs (24h Range):  Temp:  [97.6 °F (36.4 °C)-98.9 °F (37.2 °C)] 97.6 °F (36.4 °C)  Pulse:  [60-75] 65  Resp:  [18] 18  SpO2:  [91 %-98 %] 91 %  BP: (137-178)/(72-77) 146/76     Weight: 48.1 kg (106 lb)  Body mass index is 22.94 kg/m².          Physical Exam  Vitals and nursing note reviewed.   Constitutional:       General: She is not in acute distress.     Appearance: Normal appearance.   HENT:      Head: Normocephalic.      Mouth/Throat:      Mouth: Mucous membranes are moist.      Pharynx: Oropharynx is clear.   Cardiovascular:      Rate and Rhythm: Normal rate and regular rhythm.   Pulmonary:      Effort: Pulmonary effort is normal. No respiratory distress.      Breath sounds: Normal breath sounds.   Abdominal:      General: Bowel sounds are normal.      Palpations: Abdomen is soft.      Tenderness: There is no abdominal tenderness.   Musculoskeletal:      Right lower leg: Edema (trace) present.      Left lower leg: Edema (trace) present.   Skin:     General: Skin is warm and dry.      Comments: Bruising to RLE   Neurological:      General: No  focal deficit present.      Mental Status: She is alert and oriented to person, place, and time.   Psychiatric:         Mood and Affect: Mood normal.             Significant Labs: All pertinent labs within the past 24 hours have been reviewed.  Recent Lab Results         07/19/24  0426   07/19/24  0044        Albumin 3.8         ALP 62         ALT 12         Anion Gap 12   11       AST 21         Baso # 0.03         Basophil % 0.6         BILIRUBIN TOTAL 0.4  Comment: For infants and newborns, interpretation of results should be based  on gestational age, weight and in agreement with clinical  observations.    Premature Infant recommended reference ranges:  Up to 24 hours.............<8.0 mg/dL  Up to 48 hours............<12.0 mg/dL  3-5 days..................<15.0 mg/dL  6-29 days.................<15.0 mg/dL           BUN 73   73       Calcium 7.2   7.2       Chloride 88   89       CO2 32   31       Creatinine 3.2   3.2       Differential Method Automated         eGFR 13.4   13.4       Eos # 0.2         Eos % 3.5         Ferritin 454.9         Glucose 90   94       Gran # (ANC) 3.4         Gran % 66.5         Hematocrit 30.5         Hemoglobin 9.6         Immature Grans (Abs) 0.03  Comment: Mild elevation in immature granulocytes is non specific and   can be seen in a variety of conditions including stress response,   acute inflammation, trauma and pregnancy. Correlation with other   laboratory and clinical findings is essential.           Immature Granulocytes 0.6         Iron 33         Lymph # 1.0         Lymph % 19.1         Magnesium  2.9   2.9       MCH 26.9         MCHC 31.5         MCV 85         Mono # 0.5         Mono % 9.7         MPV 10.4         nRBC 0         Phosphorus Level 3.3         Platelet Count 132         Potassium 3.6   3.8       PROTEIN TOTAL 6.8         .8         RBC 3.57         RDW 16.4         Saturated Iron 13         Sodium 132   131       TIBC 249         Transferrin 178          Vitamin D 42  Comment: Vitamin D deficiency.........<10 ng/mL                              Vitamin D insufficiency......10-29 ng/mL       Vitamin D sufficiency........> or equal to 30 ng/mL  Vitamin D toxicity............>100 ng/mL           WBC 5.17                 Significant Imaging: I have reviewed all pertinent imaging results/findings within the past 24 hours.

## 2024-07-19 NOTE — ASSESSMENT & PLAN NOTE
2/2 CRS  Echo with aortic stenosis and diastolic dysfunction  Renal US unremarkable  Nephrology consulted  Continue IV lasix  Serum creatinine 3.2  Net negative 850cc over the last 24 hours, also with multiple unmeasured voids

## 2024-07-19 NOTE — ASSESSMENT & PLAN NOTE
Patient's anemia is currently stable. Has not received any PRBCs to date. Etiology likely d/t chronic disease due to Chronic Kidney Disease  Current CBC reviewed-   Lab Results   Component Value Date    HGB 9.6 (L) 07/19/2024    HCT 30.5 (L) 07/19/2024     Monitor serial CBC and transfuse if patient becomes hemodynamically unstable, symptomatic or H/H drops below 7/21.

## 2024-07-19 NOTE — PROGRESS NOTES
"Progress Note  Nephrology    Consult Requested By: Aidan Brown DO    Reason for Consult: CRISTO      SUBJECTIVE:     History of Present Illness:  87 y/o female patient referred to nephrology by primary MD, then to ER per nephrology.  Blood work done out patient had revealed elevated Scr.  C/o 5 lb wt gain, LE edema, and abdominal distension.  Nephrology is consulted for CRISTO.    7/18  renal function incrementally improved overnight.  Getting lasix 20 mg IVP bid.  UOP not recorded. UA noted--has UTI, renal US done--no hydronephrosis.  Old labs reviewed, six months ago, BUN/Scr/eGFR were normal, then appears renal function began to decline--CKD 3?--will see where renal function falls out.  Pt states she has diarrhea "off and on", no n/v, no burning or pain w/urination, sometimes feels like she has to go and can't.    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    Review of Systems:  As above    OBJECTIVE:     Vital Signs Range (Last 24H):  Temp:  [97.6 °F (36.4 °C)-98.9 °F (37.2 °C)]   Pulse:  [60-75]   Resp:  [18]   BP: (137-178)/(72-77)   SpO2:  [91 %-98 %]     Physical Exam:  General- NAD noted  HEENT- WNL  Neck- supple  CV- Regular rate and rhythm  Resp- Lungs CTA Bilaterally, No increased WOB  GI- Non tender/non-distended, BS normoactive x4 quads  Extrem- No cyanosis, clubbing, +trace edema.  Derm- skin w/d  Neuro-  No flap.     Body mass " index is 22.94 kg/m².    Laboratory:  CBC:   Recent Labs   Lab 07/19/24  0426   WBC 5.17   RBC 3.57*   HGB 9.6*   HCT 30.5*   *   MCV 85   MCH 26.9*   MCHC 31.5*     CMP:   Recent Labs   Lab 07/19/24  0426   GLU 90   CALCIUM 7.2*   ALBUMIN 3.8   PROT 6.8   *   K 3.6   CO2 32*   CL 88*   BUN 73*   CREATININE 3.2*   ALKPHOS 62   ALT 12   AST 21   BILITOT 0.4     Recent Labs   Lab 07/17/24  2147   COLORU Yellow   SPECGRAV 1.010   PHUR 7.0   PROTEINUA Negative   BACTERIA Many*   NITRITE Positive*   LEUKOCYTESUR 3+*   UROBILINOGEN Negative       Diagnostic Results:  Labs: Reviewed  US: Reviewed      ASSESSMENT/PLAN:     Active Hospital Problems    Diagnosis  POA    *CRISTO (acute kidney injury) [N17.9]  Yes    UTI (urinary tract infection) [N39.0]  Yes    Fall [W19.XXXA]  Yes    Acute on chronic combined systolic and diastolic heart failure [I50.43]  Yes    CKD (chronic kidney disease) [N18.9]  Yes      Resolved Hospital Problems   No resolved problems to display.     Patient care time was spent personally by me on the following activities: > 35 minutes  Obtaining a history.  Examination of patient.  Providing medical care at the patients bedside.  Developing a treatment plan with patient or surrogate and bedside caregivers.  Ordering and reviewing laboratory studies, radiographic studies, pulse oximetry.  Ordering and performing treatments and interventions.  Evaluation of patient's response to treatment.  Discussions with consultants while on the unit and immediately available to the patient.  Re-evaluation of the patient's condition.  Documentation in the medical record.      Thank you for allowing us to participate in the care of your patient. We will follow the patient and provide recommendations as needed.      Skylar Sifuentes MD

## 2024-07-19 NOTE — PROGRESS NOTES
[facility]  Progress Note    Patient Name: Cheyanne Gomes  MRN: [id number]  Patient Class: IP- Inpatient   Admission Date: 7/17/2024  Length of Stay: 2 days  Attending Physician: Aidan Brown DO  Primary Care Provider: Romeo Alas MD        Subjective:     Principal Problem:Acute kidney injury superimposed on chronic kidney disease        HPI:  88 year old pt getting admitted with CRISTO  Blood work was done by GI MD few days ago and it showed high crt level  Pt was referred to Nephro MD and pt was instructed to come to ER by Nephro MD  Pt denies shortness of breath but endorses 5 pound weight gain  Also pt has swelling on legs and abdominal distension which she attributes due to fluid gain  She has ecchymosis on both legs and more on RLE   Pt had a fall few days ago and had ER visit 2  & all imaging studies were normal      Overview/Hospital Course:  No notes on file    Interval History: Patient seen and examined.  NAD.  Urine cx prelim GNRs, continue levaquin renally dosed.  Serum creatinine 3.2 today.  Net negative 850cc over the last 24 hours, also with multiple unmeasured voids.  Nephrology consulted, continue IV lasix.     Review of Systems   Constitutional:  Positive for unexpected weight change (weight gain).   Respiratory:  Negative for chest tightness and shortness of breath.    Cardiovascular:  Negative for chest pain and palpitations.   Gastrointestinal:  Negative for abdominal pain, nausea and vomiting.   Genitourinary:  Negative for dysuria.   Neurological:  Negative for headaches.     Objective:     Vital Signs (Most Recent):  Temp: 97.6 °F (36.4 °C) (07/19/24 1100)  Pulse: 65 (07/19/24 1100)  Resp: 18 (07/19/24 1100)  BP: (!) 146/76 (notified notified) (07/19/24 1100)  SpO2: (!) 91 % (notified nurse) (07/19/24 1100) Vital Signs (24h Range):  Temp:  [97.6 °F (36.4 °C)-98.9 °F (37.2 °C)] 97.6 °F (36.4 °C)  Pulse:  [60-75] 65  Resp:  [18] 18  SpO2:  [91 %-98 %]  91 %  BP: (137-178)/(72-77) 146/76     Weight: 48.1 kg (106 lb)  Body mass index is 22.94 kg/m².          Physical Exam  Vitals and nursing note reviewed.   Constitutional:       General: She is not in acute distress.     Appearance: Normal appearance.   HENT:      Head: Normocephalic.      Mouth/Throat:      Mouth: Mucous membranes are moist.      Pharynx: Oropharynx is clear.   Cardiovascular:      Rate and Rhythm: Normal rate and regular rhythm.   Pulmonary:      Effort: Pulmonary effort is normal. No respiratory distress.      Breath sounds: Normal breath sounds.   Abdominal:      General: Bowel sounds are normal.      Palpations: Abdomen is soft.      Tenderness: There is no abdominal tenderness.   Musculoskeletal:      Right lower leg: Edema (trace) present.      Left lower leg: Edema (trace) present.   Skin:     General: Skin is warm and dry.      Comments: Bruising to RLE   Neurological:      General: No focal deficit present.      Mental Status: She is alert and oriented to person, place, and time.   Psychiatric:         Mood and Affect: Mood normal.             Significant Labs: All pertinent labs within the past 24 hours have been reviewed.  Recent Lab Results         07/19/24  0426   07/19/24  0044        Albumin 3.8         ALP 62         ALT 12         Anion Gap 12   11       AST 21         Baso # 0.03         Basophil % 0.6         BILIRUBIN TOTAL 0.4  Comment: For infants and newborns, interpretation of results should be based  on gestational age, weight and in agreement with clinical  observations.    Premature Infant recommended reference ranges:  Up to 24 hours.............<8.0 mg/dL  Up to 48 hours............<12.0 mg/dL  3-5 days..................<15.0 mg/dL  6-29 days.................<15.0 mg/dL           BUN 73   73       Calcium 7.2   7.2       Chloride 88   89       CO2 32   31       Creatinine 3.2   3.2       Differential Method Automated         eGFR 13.4   13.4       Eos # 0.2         Eos  % 3.5         Ferritin 454.9         Glucose 90   94       Gran # (ANC) 3.4         Gran % 66.5         Hematocrit 30.5         Hemoglobin 9.6         Immature Grans (Abs) 0.03  Comment: Mild elevation in immature granulocytes is non specific and   can be seen in a variety of conditions including stress response,   acute inflammation, trauma and pregnancy. Correlation with other   laboratory and clinical findings is essential.           Immature Granulocytes 0.6         Iron 33         Lymph # 1.0         Lymph % 19.1         Magnesium  2.9   2.9       MCH 26.9         MCHC 31.5         MCV 85         Mono # 0.5         Mono % 9.7         MPV 10.4         nRBC 0         Phosphorus Level 3.3         Platelet Count 132         Potassium 3.6   3.8       PROTEIN TOTAL 6.8         .8         RBC 3.57         RDW 16.4         Saturated Iron 13         Sodium 132   131       TIBC 249         Transferrin 178         Vitamin D 42  Comment: Vitamin D deficiency.........<10 ng/mL                              Vitamin D insufficiency......10-29 ng/mL       Vitamin D sufficiency........> or equal to 30 ng/mL  Vitamin D toxicity............>100 ng/mL           WBC 5.17                 Significant Imaging: I have reviewed all pertinent imaging results/findings within the past 24 hours.    Assessment/Plan:      * Acute kidney injury superimposed on chronic kidney disease  2/2 CRS  Echo with aortic stenosis and diastolic dysfunction  Renal US unremarkable  Nephrology consulted  Continue IV lasix  Serum creatinine 3.2  Net negative 850cc over the last 24 hours, also with multiple unmeasured voids    Anemia  Patient's anemia is currently stable. Has not received any PRBCs to date. Etiology likely d/t chronic disease due to Chronic Kidney Disease  Current CBC reviewed-   Lab Results   Component Value Date    HGB 9.6 (L) 07/19/2024    HCT 30.5 (L) 07/19/2024     Monitor serial CBC and transfuse if patient becomes hemodynamically  unstable, symptomatic or H/H drops below 7/21.    Hypermagnesemia  Stop mag supplements  Trend daily      Fall  Had fall on 7/15  CTH done on 7/15 without acute abnormality  PT/OT ordered      UTI (urinary tract infection)  Urinalysis +leuks and nitrites  Urine cx prelim GNRs  Multiple abx allergies, continue levaquin renally dosed      Acute on chronic combined systolic and diastolic heart failure  Echo 6/4/24:    Left Ventricle: The left ventricle is normal in size. Mildly increased wall thickness. There is mild concentric hypertrophy. Regional wall motion abnormalities present.  Apical aneurysm. There is low normal systolic function. Ejection fraction by visual approximation is 55%. Grade III diastolic dysfunction.    Right Ventricle: Normal right ventricular cavity size. Wall thickness is normal. Systolic function is normal. Pacemaker lead present in the ventricle.    Left Atrium: Left atrium is moderately dilated.    Right Atrium: Right atrium is mildly dilated.    Aortic Valve: The aortic valve is a trileaflet valve. Moderately restricted motion. There is moderate stenosis. Aortic valve area by VTI is 0.90 cm². Aortic valve peak velocity is 2.19 m/s. Mean gradient is 10 mmHg. The dimensionless index is 0.32. There is mild aortic regurgitation with a centrally directed jet.    Mitral Valve: There is mild regurgitation with a posterolateral eccentriccally directed jet.    Tricuspid Valve: There is mild to moderate regurgitation.    IVC/SVC: Normal venous pressure at 3 mmHg.    The estimated pulmonary artery systolic pressure is 31 mmHg.    Continue IV lasix        VTE Risk Mitigation (From admission, onward)           Ordered     enoxaparin injection 30 mg  Daily         07/17/24 2111     IP VTE HIGH RISK PATIENT  Once         07/17/24 2111     Place sequential compression device  Until discontinued         07/17/24 2111                    Discharge Planning   RAMONE: 7/22/2024     Code Status: Full Code   Is the  patient medically ready for discharge?:     Reason for patient still in hospital (select all that apply): Patient trending condition, Treatment, and Consult recommendations  Discharge Plan A: Home, Home with family                  TAY Acosta  Department of Hospital Medicine   [facility]

## 2024-07-19 NOTE — PT/OT/SLP EVAL
Occupational Therapy Evaluation    Name: Cheyanne Gomes  MRN: [id number]  Admitting Diagnosis: Acute kidney injury superimposed on chronic kidney disease  Recent Surgery: * No surgery found *    Recommendations:     Discharge Recommendations: Low Intensity Therapy  Discharge Equipment Recommendations: none  Barriers to discharge: None    Assessment:     Cheyanne Gomes is a 88 y.o. female with a medical diagnosis of Acute kidney injury superimposed on chronic kidney disease. She presents with performance deficits affecting function including weakness, impaired endurance, impaired self care skills and impaired functional mobility.    Rehab Prognosis: Good; patient would benefit from acute OT services to address these deficits and reach maximum level of function.    Plan:     Patient to be seen 3 x/week to address the above listed problems via self-care/home management, therapeutic activities, therapeutic exercises  Plan of Care Expires: 08/02/24  Plan of Care Reviewed with: patient    Subjective     Chief Complaint: Fatigue  Patient Comments/Goals: To return to PLOF.   Pain/Comfort:  Pain Rating 1: 0/10    Social History:  Living Environment: Patient lives with her daughter in a single story house.  Prior Level of Function: Prior to admission, patient was independent with ADLs using rolling walker for mobility.   Equipment Used at Home: walker, rolling  DME owned (not currently used): bedside commode  Assistance Upon Discharge: family    Objective:     Communicated with nurse prior to session. Patient found HOB elevated upon OT entry to room.    General Precautions: Standard, fall   Orthopedic Precautions: N/A   Braces: N/A    Respiratory Status: Room air    Occupational Performance    Bed Mobility:   Supine to sit with stand by assistance  Sit to Supine with stand by assistance    Functional Mobility/Transfers:  Sit <> Stand Transfer with minimum assistance with rolling walker    Activities of Daily  Living:  Feeding: independence  Grooming: set up assistance  Upper Body Dressing: set up assistance  Lower Body Dressing: stand by assistance  Toileting: stand by assistance    Cognitive/Visual Perceptual:  Cognitive/Psychosocial Skills: -     Oriented to: Person, Place, Time, Situation  -     Follows Commands/attention: Follows multistep commands  -     Communication: clear/fluent  -     Memory: No Deficits noted  -     Safety awareness/insight to disability: intact  -     Mood/Affect/Coping skills/emotional control: Cooperative and Pleasant    Physical Exam:  Upper Extremity Range of Motion:  -       Right Upper Extremity: WFL  -       Left Upper Extremity: WFL  Upper Extremity Strength: -       Right Upper Extremity: WFL  -       Left Upper Extremity: WFL    AMPAC 6 Click ADL:  AMPAC Total Score: 21      Patient left HOB elevated with all lines intact and call button in reach.    GOALS:   Multidisciplinary Problems       Occupational Therapy Goals          Problem: Occupational Therapy    Goal Priority Disciplines Outcome Interventions   Occupational Therapy Goal     OT, PT/OT Progressing    Description: Goals to be met by: 8/2/2024     Patient will increase functional independence with ADLs by performing:    UE Dressing with Modified [place].  LE Dressing with Modified [place].  Grooming while seated with Modified [place].  Toileting from toilet with Supervision for hygiene and clothing management.   Toilet transfer to toilet with Supervision.                         History:     Past Medical History:   Diagnosis Date    Abdominal hernia     Anemia     Dr Berkowitz     Angina pectoris     Aortic valve stenosis, moderate     Back pain     Cannon's esophagus     CAD (coronary artery disease)     Cataract     ou done    CHF (congestive heart failure)     EFx 35%, Dr. Spencer 12/19/13    Chronic combined systolic and diastolic heart failure 09/28/2019    Colitis     Compression fracture      Diverticulosis     Encounter for blood transfusion     GERD (gastroesophageal reflux disease)     Hyperlipidemia     Hypertension     Irritable bowel syndrome     Myocardial infarction     Obstructive sleep apnea 2024    Osteoarthritis     lumbar DDD    Pacemaker     Pneumonia 2017    Pulmonary emphysema, unspecified emphysema type 2024    Raynaud disease     Rheumatoid arteritis     Rheumatoid arthritis     Shingles 2017    Sjogren syndrome     Ulcerative colitis          Past Surgical History:   Procedure Laterality Date    A-V CARDIAC PACEMAKER INSERTION Left 2021    Procedure: INSERTION, CARDIAC PACEMAKER, DUAL CHAMBER  ([employer]);  Surgeon: Tera Blair MD;  Location: [facility] CATH/EP LAB;  Service: Cardiology;  Laterality: Left;    APPENDECTOMY      BACK SURGERY      CARDIAC SURGERY      CABGx3 in     CATARACT EXTRACTION      ou od d 11-15-12//     SECTION, CLASSIC      x 2    CHOLECYSTECTOMY      CLOSURE OF WOUND Right 2022    Procedure: CLOSURE-WOUND;  Surgeon: Delvis Jackson MD;  Location: [facility] OR;  Service: ENT;  Laterality: Right;  NOSE AND CHIN    COLONOSCOPY  09/15/2011    Dr Anaya     COLONOSCOPY  01/10/2017    [facility] report sent to scanning    CORONARY ANGIOPLASTY      PCI x 1 in     CORONARY ARTERY BYPASS GRAFT      3 vessel    CORONARY ARTERY BYPASS GRAFT      COSMETIC SURGERY  on nose    CYSTOSCOPY N/A 2020    Procedure: CYSTOSCOPY;  Surgeon: Miryam Bender Jr., MD;  Location: [facility] OR;  Service: Urology;  Laterality: N/A;    EYE SURGERY      eyes      rk ou    FRACTURE SURGERY  2016    Dr Guido left hip     FRACTURE SURGERY  2018    Right Hip    HARVESTING OF SKIN GRAFT  2022    Procedure: SURGICAL PROCUREMENT, GRAFT, SKIN;  Surgeon: Delvis Jackson MD;  Location: [facility] OR;  Service: ENT;;  RIGHT CHEST    HYSTERECTOMY      tubal ligation, oopherectomy    INTRAMEDULLARY RODDING OF TROCHANTER OF FEMUR Right 10/08/2018     Procedure: INSERTION, INTRAMEDULLARY KELSI, FEMUR, TROCHANTER;  Surgeon: Valerio Luther MD;  Location: [facility];  Service: Orthopedics;  Laterality: Right;    OOPHORECTOMY      SPINE SURGERY  08/30/2013    Silvino Mike    TUBAL LIGATION      UPPER GASTROINTESTINAL ENDOSCOPY      Yag Capsulotomy Right 09/25/2014       Time Tracking:     OT Date of Treatment: 07/19/24  OT Start Time: 1134  OT Stop Time: 1142  OT Total Time (min): 8 min    Billable Minutes: Evaluation 8    7/19/2024

## 2024-07-19 NOTE — NURSING
Pt. Seen for Wound Consult.    Cleansed RLE abrasion with NS and gauze. Patted dry with gauze.  Applied double layered Xeroform gauze to wound bed only.  Covered with adhesive bordered foam dressing. Rogelio. Well.

## 2024-07-19 NOTE — ASSESSMENT & PLAN NOTE
Urinalysis +leuks and nitrites  Urine cx prelim GNRs  Multiple abx allergies, continue levaquin renally dosed

## 2024-07-19 NOTE — PLAN OF CARE
Problem: Occupational Therapy  Goal: Occupational Therapy Goal  Description: Goals to be met by: 8/2/2024     Patient will increase functional independence with ADLs by performing:    UE Dressing with Modified [place].  LE Dressing with Modified [place].  Grooming while seated with Modified [place].  Toileting from toilet with Supervision for hygiene and clothing management.   Toilet transfer to toilet with Supervision.    OT evaluation completed. POC established.

## 2024-07-19 NOTE — PT/OT/SLP EVAL
"Physical Therapy Evaluation    Patient Name:  Cheyanne Gomes   MRN:  [id number]    Recommendations:     Discharge Recommendations: Low Intensity Therapy   Discharge Equipment Recommendations: none   Barriers to discharge: None    Assessment:     Cheyanne Gomes is a 88 y.o. female admitted with a medical diagnosis of Acute kidney injury superimposed on chronic kidney disease.  She presents with the following impairments/functional limitations: weakness, impaired endurance, impaired functional mobility, gait instability, impaired balance, pain.    Pt found HOB elevated and agreeable to working with PT. Pt A & O x  4 and has the following co-morbidities: UTI, CKD, Pacemaker, s/p CABG.  Pt tolerated session well and required only CGA > SBA for safe mobilization during session today. Pt would benefit from acute PT during hospitalization to increase strength, endurance and safety with mobility and would benefit from Low Intensity Therapy upon discharge home.      Rehab Prognosis: Good and Fair; patient would benefit from acute skilled PT services to address these deficits and reach maximum level of function.    Recent Surgery: * No surgery found *      Plan:     During this hospitalization, patient to be seen 6 x/week to address the identified rehab impairments via gait training, therapeutic activities, therapeutic exercises and progress toward the following goals:    Plan of Care Expires:  08/16/24    Subjective     Chief Complaint: "soreness" from her fall. Pt reported she was trying to get into the passenger side of the car by herself while daughter was putting her walker in the back of the car. She had to let go of the car to use both hands to lift the left leg in causing her to lose her balance.   Patient/Family Comments/goals: return to home with daughter to assist  Pain/Comfort:  Pain Rating 1: 0/10 ("just soreness")  Location - Side 1: Right  Location 1: leg  Pain Addressed 1: Reposition, Distraction, " Cessation of Activity  Pain Rating Post-Intervention 1: 0/10    Patients cultural, spiritual, [facility] conflicts given the current situation:      Living Environment:  Pt lives in a H with ALLY & daughter lives with.  Prior to admission, patients level of function was MI with 3wheeled walker.  Equipment used at home: rollator.  DME owned (not currently used): none.  Upon discharge, patient will have assistance from her daughter .    Objective:     Communicated with ADRYAN Lyons prior to session.  Patient found HOB elevated with peripheral IV, telemetry  upon PT entry to room.    General Precautions: Standard, fall, pacemaker  Orthopedic Precautions:N/A   Braces: N/A  Respiratory Status: Room air    Exams:  Cognitive Exam:  Patient is oriented to Person, Place, Time, and Situation  RLE ROM: WFL  RLE Strength: WFL  LLE ROM: WFL  LLE Strength: WFL    Functional Mobility:  Bed Mobility:     Scooting: contact guard assistance  Supine to Sit: contact guard assistance  Sit to Supine: contact guard assistance  Transfers:     Sit to Stand:  contact guard assistance with 3ww  Gait: x 110' with 3ww and CGa > SBA for safety      AM-PAC 6 CLICK MOBILITY  Total Score:21       Treatment & Education:  Pt was educated on the following: call light use, importance of OOB activity and functional mobility to negate the negative effects of prolonged bed rest during this hospitalization, safe transfers/ambulation and discharge planning recommendations/options.      Patient left HOB elevated with all lines intact, call button in reach, and RN notified.    GOALS:   Multidisciplinary Problems       Physical Therapy Goals          Problem: Physical Therapy    Goal Priority Disciplines Outcome Goal Variances Interventions   Physical Therapy Goal     PT, PT/OT      Description: Goals to be met by: 24     Patient will increase functional independence with mobility by performin. Supine to sit with Supervision  2. Sit to stand  transfer with Supervision  3. Bed to chair transfer with Supervision using Rollator  4. Gait  x 200 feet with Supervision using Rollator.                              History:     Past Medical History:   Diagnosis Date    Abdominal hernia     Anemia     Dr Berkowitz     Angina pectoris     Aortic valve stenosis, moderate     Back pain     Cannon's esophagus     CAD (coronary artery disease)     Cataract     ou done    CHF (congestive heart failure)     EFx 35%, Dr. Spencer 13    Chronic combined systolic and diastolic heart failure 2019    Colitis     Compression fracture     Diverticulosis     Encounter for blood transfusion     GERD (gastroesophageal reflux disease)     Hyperlipidemia     Hypertension     Irritable bowel syndrome     Myocardial infarction     Obstructive sleep apnea 2024    Osteoarthritis     lumbar DDD    Pacemaker     Pneumonia 2017    Pulmonary emphysema, unspecified emphysema type 2024    Raynaud disease     Rheumatoid arteritis     Rheumatoid arthritis     Shingles 2017    Sjogren syndrome     Ulcerative colitis        Past Surgical History:   Procedure Laterality Date    A-V CARDIAC PACEMAKER INSERTION Left 2021    Procedure: INSERTION, CARDIAC PACEMAKER, DUAL CHAMBER  ([employer]);  Surgeon: Tera Blair MD;  Location: [facility] CATH/EP LAB;  Service: Cardiology;  Laterality: Left;    APPENDECTOMY      BACK SURGERY      CARDIAC SURGERY      CABGx3 in     CATARACT EXTRACTION      ou od d 11-15-12//     SECTION, CLASSIC      x 2    CHOLECYSTECTOMY      CLOSURE OF WOUND Right 2022    Procedure: CLOSURE-WOUND;  Surgeon: Delvis Jackson MD;  Location: [facility] OR;  Service: ENT;  Laterality: Right;  NOSE AND CHIN    COLONOSCOPY  09/15/2011    Dr Anaya     COLONOSCOPY  01/10/2017    [facility] report sent to scanning    CORONARY ANGIOPLASTY      PCI x 1 in     CORONARY ARTERY BYPASS GRAFT      3 vessel    CORONARY ARTERY BYPASS GRAFT       COSMETIC SURGERY  on nose    CYSTOSCOPY N/A 06/03/2020    Procedure: CYSTOSCOPY;  Surgeon: Miryam Bender Jr., MD;  Location: [facility] OR;  Service: Urology;  Laterality: N/A;    EYE SURGERY      eyes      rk ou    FRACTURE SURGERY  06/21/2016    Dr Guido left hip     FRACTURE SURGERY  2018    Right Hip    HARVESTING OF SKIN GRAFT  09/23/2022    Procedure: SURGICAL PROCUREMENT, GRAFT, SKIN;  Surgeon: Delvis Jackson MD;  Location: [facility] OR;  Service: ENT;;  RIGHT CHEST    HYSTERECTOMY      tubal ligation, oopherectomy    INTRAMEDULLARY RODDING OF TROCHANTER OF FEMUR Right 10/08/2018    Procedure: INSERTION, INTRAMEDULLARY KELSI, FEMUR, TROCHANTER;  Surgeon: Valerio Luther MD;  Location: [facility] OR;  Service: Orthopedics;  Laterality: Right;    OOPHORECTOMY      SPINE SURGERY  08/30/2013    Silvino Mckeon    TUBAL LIGATION      UPPER GASTROINTESTINAL ENDOSCOPY      Yag Capsulotomy Right 09/25/2014       Time Tracking:     PT Received On: 07/19/24  PT Start Time: 1009     PT Stop Time: 1025  PT Total Time (min): 16 min     Billable Minutes: Evaluation 8 and Gait Training 8      07/19/2024

## 2024-07-19 NOTE — PLAN OF CARE
[facility]  Initial Discharge Assessment       Primary Care Provider: Romeo Alas MD    Admission Diagnosis: CRISTO (acute kidney injury) [N17.9]    Admission Date: 7/17/2024  Expected Discharge Date: 7/22/2024    Transition of Care Barriers: None    Payor: MEDICARE / Plan: MEDICARE PART A & B / Product Type: Government /     Extended Emergency Contact Information  Primary Emergency Contact: Lizabeth Ivory  Address: Unknown   [place]  Mobile Phone: [phone]  Relation: Sister  Preferred language: English   needed? No  Secondary Emergency Contact: Brandi Falcon  Address: unknown   [place]  Mobile Phone: [phone]  Relation: Daughter  Preferred language: English   needed? No    Discharge Plan A: Home, Home with family  Discharge Plan B: Home Health      [facility] [address] [address]  Phone: [phone] Fax: [phone]    SW met with patient at bedside to complete discharge planning assessment.  Patient alert and oriented xs 4.  Patient verified all demographic information on facesheet is correct.  Patient verified PCP is Dr. Alas.  Patient verified primary health insurance is Medicare and secondary is BCBS.  Patient with NO home health but has listed DME.  Patient with POA (daughter) and Living Will.  Patient not on dialysis or medication coumadin.  Patient with no 30 day admission.  Patient with no financial issues at this time.  Patient family will provide transportation upon discharge from facility.  Patient independent with ADLs, live with daughter, daughter drives.      Initial Assessment (most recent)       Adult Discharge Assessment - 07/19/24 1423          Discharge Assessment    Assessment Type Discharge Planning Assessment     Confirmed/corrected address, phone number and insurance Yes     Confirmed Demographics Correct on Facesheet     Source of Information  patient     Communicated RAMONE with patient/caregiver Date not available/Unable to determine     People in Home child(billy), adult     Facility Arrived From: home     Do you expect to return to your current living situation? Yes     Do you have help at home or someone to help you manage your care at home? Yes     Who are your caregiver(s) and their phone number(s)? daughter     Prior to hospitilization cognitive status: Alert/Oriented     Current cognitive status: Alert/Oriented     Walking or Climbing Stairs Difficulty yes     Walking or Climbing Stairs ambulation difficulty, requires equipment;stair climbing difficulty, requires equipment     Dressing/Bathing Difficulty yes     Dressing/Bathing bathing difficulty, requires equipment;dressing difficulty, requires equipment     Equipment Currently Used at Home bedside commode;cane, straight;oxygen;walker, jeanmarie;BIPAP;wheelchair;shower chair     Readmission within 30 days? No     Patient currently being followed by outpatient case management? No     Do you currently have service(s) that help you manage your care at home? No     Do you take prescription medications? Yes     Do you have prescription coverage? Yes     Do you have any problems affording any of your prescribed medications? No     Is the patient taking medications as prescribed? yes     Who is going to help you get home at discharge? daughter     How do you get to doctors appointments? family or friend will provide     Are you on dialysis? No     Do you take coumadin? No     Discharge Plan A Home;Home with family     Discharge Plan B Home Health     DME Needed Upon Discharge  none     Discharge Plan discussed with: Patient     Transition of Care Barriers None

## 2024-07-19 NOTE — PLAN OF CARE
Goals to be met by: 24     Patient will increase functional independence with mobility by performin. Supine to sit with Supervision  2. Sit to stand transfer with Supervision  3. Bed to chair transfer with Supervision using Rollator  4. Gait  x 200 feet with Supervision using Rollator.

## 2024-07-20 ENCOUNTER — PATIENT MESSAGE (OUTPATIENT)
Facility: CLINIC | Age: 89
End: 2024-07-20
Payer: MEDICARE

## 2024-07-20 PROBLEM — J18.9 PNEUMONIA: Status: ACTIVE | Noted: 2024-07-20

## 2024-07-20 PROBLEM — N25.81 SECONDARY HYPERPARATHYROIDISM: Status: ACTIVE | Noted: 2024-07-20

## 2024-07-20 LAB
ALBUMIN SERPL BCP-MCNC: 3.8 G/DL (ref 3.5–5.2)
ALP SERPL-CCNC: 57 U/L (ref 55–135)
ALT SERPL W/O P-5'-P-CCNC: 11 U/L (ref 10–44)
ANION GAP SERPL CALC-SCNC: 15 MMOL/L (ref 8–16)
AST SERPL-CCNC: 19 U/L (ref 10–40)
BACTERIA UR CULT: ABNORMAL
BASOPHILS # BLD AUTO: 0.03 K/UL (ref 0–0.2)
BASOPHILS NFR BLD: 0.5 % (ref 0–1.9)
BILIRUB SERPL-MCNC: 0.5 MG/DL (ref 0.1–1)
BNP SERPL-MCNC: 1770 PG/ML (ref 0–99)
BUN SERPL-MCNC: 72 MG/DL (ref 8–23)
CALCIUM SERPL-MCNC: 7 MG/DL (ref 8.7–10.5)
CHLORIDE SERPL-SCNC: 91 MMOL/L (ref 95–110)
CO2 SERPL-SCNC: 27 MMOL/L (ref 23–29)
CREAT SERPL-MCNC: 3.4 MG/DL (ref 0.5–1.4)
DIFFERENTIAL METHOD BLD: ABNORMAL
EOSINOPHIL # BLD AUTO: 0.1 K/UL (ref 0–0.5)
EOSINOPHIL NFR BLD: 2.2 % (ref 0–8)
ERYTHROCYTE [DISTWIDTH] IN BLOOD BY AUTOMATED COUNT: 16.3 % (ref 11.5–14.5)
EST. GFR  (NO RACE VARIABLE): 12.5 ML/MIN/1.73 M^2
GLUCOSE SERPL-MCNC: 87 MG/DL (ref 70–110)
HCT VFR BLD AUTO: 30.7 % (ref 37–48.5)
HGB BLD-MCNC: 9.8 G/DL (ref 12–16)
IMM GRANULOCYTES # BLD AUTO: 0.02 K/UL (ref 0–0.04)
IMM GRANULOCYTES NFR BLD AUTO: 0.3 % (ref 0–0.5)
LYMPHOCYTES # BLD AUTO: 1.1 K/UL (ref 1–4.8)
LYMPHOCYTES NFR BLD: 17.7 % (ref 18–48)
MAGNESIUM SERPL-MCNC: 2.8 MG/DL (ref 1.6–2.6)
MCH RBC QN AUTO: 27.4 PG (ref 27–31)
MCHC RBC AUTO-ENTMCNC: 31.9 G/DL (ref 32–36)
MCV RBC AUTO: 86 FL (ref 82–98)
MONOCYTES # BLD AUTO: 0.5 K/UL (ref 0.3–1)
MONOCYTES NFR BLD: 7.8 % (ref 4–15)
NEUTROPHILS # BLD AUTO: 4.2 K/UL (ref 1.8–7.7)
NEUTROPHILS NFR BLD: 71.5 % (ref 38–73)
NRBC BLD-RTO: 0 /100 WBC
PLATELET # BLD AUTO: 135 K/UL (ref 150–450)
PMV BLD AUTO: 9.6 FL (ref 9.2–12.9)
POTASSIUM SERPL-SCNC: 4 MMOL/L (ref 3.5–5.1)
PROT SERPL-MCNC: 6.6 G/DL (ref 6–8.4)
RBC # BLD AUTO: 3.58 M/UL (ref 4–5.4)
SODIUM SERPL-SCNC: 133 MMOL/L (ref 136–145)
WBC # BLD AUTO: 5.92 K/UL (ref 3.9–12.7)

## 2024-07-20 PROCEDURE — 97116 GAIT TRAINING THERAPY: CPT | Mod: CQ

## 2024-07-20 PROCEDURE — 94761 N-INVAS EAR/PLS OXIMETRY MLT: CPT

## 2024-07-20 PROCEDURE — 25000003 PHARM REV CODE 250: Performed by: INTERNAL MEDICINE

## 2024-07-20 PROCEDURE — 25000003 PHARM REV CODE 250

## 2024-07-20 PROCEDURE — 99900035 HC TECH TIME PER 15 MIN (STAT)

## 2024-07-20 PROCEDURE — 36415 COLL VENOUS BLD VENIPUNCTURE: CPT | Performed by: INTERNAL MEDICINE

## 2024-07-20 PROCEDURE — 83735 ASSAY OF MAGNESIUM: CPT | Performed by: INTERNAL MEDICINE

## 2024-07-20 PROCEDURE — 63600175 PHARM REV CODE 636 W HCPCS: Performed by: INTERNAL MEDICINE

## 2024-07-20 PROCEDURE — 99900031 HC PATIENT EDUCATION (STAT)

## 2024-07-20 PROCEDURE — 85025 COMPLETE CBC W/AUTO DIFF WBC: CPT | Performed by: INTERNAL MEDICINE

## 2024-07-20 PROCEDURE — 83880 ASSAY OF NATRIURETIC PEPTIDE: CPT | Performed by: INTERNAL MEDICINE

## 2024-07-20 PROCEDURE — 99406 BEHAV CHNG SMOKING 3-10 MIN: CPT

## 2024-07-20 PROCEDURE — 94640 AIRWAY INHALATION TREATMENT: CPT

## 2024-07-20 PROCEDURE — 80053 COMPREHEN METABOLIC PANEL: CPT | Performed by: INTERNAL MEDICINE

## 2024-07-20 PROCEDURE — 12000002 HC ACUTE/MED SURGE SEMI-PRIVATE ROOM

## 2024-07-20 PROCEDURE — 94799 UNLISTED PULMONARY SVC/PX: CPT

## 2024-07-20 PROCEDURE — 25000242 PHARM REV CODE 250 ALT 637 W/ HCPCS

## 2024-07-20 RX ORDER — FUROSEMIDE 20 MG/1
20 TABLET ORAL 2 TIMES DAILY
Status: DISCONTINUED | OUTPATIENT
Start: 2024-07-20 | End: 2024-07-21

## 2024-07-20 RX ORDER — AMLODIPINE BESYLATE 5 MG/1
10 TABLET ORAL 2 TIMES DAILY
Status: DISCONTINUED | OUTPATIENT
Start: 2024-07-20 | End: 2024-07-21

## 2024-07-20 RX ORDER — LANOLIN ALCOHOL/MO/W.PET/CERES
1 CREAM (GRAM) TOPICAL DAILY
Status: DISCONTINUED | OUTPATIENT
Start: 2024-07-20 | End: 2024-07-26 | Stop reason: HOSPADM

## 2024-07-20 RX ORDER — CALCITRIOL 0.25 UG/1
0.25 CAPSULE ORAL DAILY
Status: DISCONTINUED | OUTPATIENT
Start: 2024-07-20 | End: 2024-07-26 | Stop reason: HOSPADM

## 2024-07-20 RX ORDER — IPRATROPIUM BROMIDE AND ALBUTEROL SULFATE 2.5; .5 MG/3ML; MG/3ML
3 SOLUTION RESPIRATORY (INHALATION) EVERY 6 HOURS
Status: DISCONTINUED | OUTPATIENT
Start: 2024-07-20 | End: 2024-07-21

## 2024-07-20 RX ADMIN — IPRATROPIUM BROMIDE AND ALBUTEROL SULFATE 3 ML: 2.5; .5 SOLUTION RESPIRATORY (INHALATION) at 07:07

## 2024-07-20 RX ADMIN — FERROUS SULFATE TAB 325 MG (65 MG ELEMENTAL FE) 1 EACH: 325 (65 FE) TAB at 12:07

## 2024-07-20 RX ADMIN — ONDANSETRON 4 MG: 2 INJECTION INTRAMUSCULAR; INTRAVENOUS at 09:07

## 2024-07-20 RX ADMIN — ENOXAPARIN SODIUM 30 MG: 30 INJECTION SUBCUTANEOUS at 05:07

## 2024-07-20 RX ADMIN — AMLODIPINE BESYLATE 10 MG: 5 TABLET ORAL at 09:07

## 2024-07-20 RX ADMIN — FUROSEMIDE 20 MG: 10 INJECTION, SOLUTION INTRAMUSCULAR; INTRAVENOUS at 09:07

## 2024-07-20 RX ADMIN — ASPIRIN 81 MG 81 MG: 81 TABLET ORAL at 09:07

## 2024-07-20 RX ADMIN — AMLODIPINE BESYLATE 10 MG: 5 TABLET ORAL at 08:07

## 2024-07-20 RX ADMIN — ISOSORBIDE MONONITRATE 60 MG: 60 TABLET, EXTENDED RELEASE ORAL at 09:07

## 2024-07-20 RX ADMIN — METOPROLOL SUCCINATE 50 MG: 50 TABLET, FILM COATED, EXTENDED RELEASE ORAL at 09:07

## 2024-07-20 RX ADMIN — CALCITRIOL CAPSULES 0.25 MCG 0.25 MCG: 0.25 CAPSULE ORAL at 12:07

## 2024-07-20 RX ADMIN — PANTOPRAZOLE SODIUM 40 MG: 40 TABLET, DELAYED RELEASE ORAL at 05:07

## 2024-07-20 RX ADMIN — FUROSEMIDE 20 MG: 20 TABLET ORAL at 05:07

## 2024-07-20 RX ADMIN — LEVOFLOXACIN 250 MG: 250 TABLET, FILM COATED ORAL at 12:07

## 2024-07-20 NOTE — ASSESSMENT & PLAN NOTE
Status post coronary artery bypass.  She has a patent LIMA to the LAD saphenous vein graft to the D1 and OM.  She has not having any symptoms of angina

## 2024-07-20 NOTE — PT/OT/SLP PROGRESS
Physical Therapy Treatment    Patient Name:  Cheyanne Gomes   MRN:  [id number]    Recommendations:     Discharge Recommendations: Low Intensity Therapy  Discharge Equipment Recommendations: none  Barriers to discharge:  weakness, impaired endurance, impaired self care skills, impaired functional mobility    Assessment:     Cheyanne Gomes is a 88 y.o. female admitted with a medical diagnosis of Acute kidney injury superimposed on chronic kidney disease.  She presents with the following impairments/functional limitations: weakness, impaired endurance, impaired self care skills, impaired functional mobility.    Pt found resting with HOB elevated, agreeable to skilled physical therapy session today.     She transferred from supine to sitting EOB with CGA. Once EOB, pt transferred from sitting to standing with 3WW and SBA.     She ambulated 300ft with 3WW and SBA. She demonstrated slow gustavo but otherwise had no deficit with gait technique. She ambulated back to her room where she was agreeable to sitting up in bed side chair.     Pt left sitting up in chair, chair alarm on, call light within reach, all needs met, and RN notified.     Rehab Prognosis: Fair; patient would benefit from acute skilled PT services to address these deficits and reach maximum level of function.    Recent Surgery: * No surgery found *      Plan:     During this hospitalization, patient to be seen 6 x/week to address the identified rehab impairments via gait training, therapeutic activities, therapeutic exercises and progress toward the following goals:    Plan of Care Expires:  08/16/24    Subjective     Chief Complaint: none  Patient/Family Comments/goals: to get better and go back home  Pain/Comfort:  Pain Rating 1: 0/10      Objective:     Communicated with RN prior to session.  Patient found HOB elevated with peripheral IV, telemetry upon PT entry to room.     General Precautions: Standard, fall  Orthopedic Precautions: N/A  Braces:  N/A  Respiratory Status: Room air     Functional Mobility:  Bed Mobility:     Supine to Sit: contact guard assistance  Transfers:     Sit to Stand:  stand by assistance with 3 wheeled walker  Gait: 300ft with 3WW  and SBA.       AM-PAC 6 CLICK MOBILITY          Treatment & Education:  Pt educated on importance of time OOB, importance of intermittent mobility, safe techniques for transfers/ambulation, discharge recommendations/options, and use of call light for assistance and fall prevention.      Patient left up in chair with all lines intact, call button in reach, chair alarm on, and RN notified..    GOALS:   Multidisciplinary Problems       Physical Therapy Goals          Problem: Physical Therapy    Goal Priority Disciplines Outcome Goal Variances Interventions   Physical Therapy Goal     PT, PT/OT      Description: Goals to be met by: 24     Patient will increase functional independence with mobility by performin. Supine to sit with Supervision  2. Sit to stand transfer with Supervision  3. Bed to chair transfer with Supervision using Rollator  4. Gait  x 200 feet with Supervision using Rollator.                              Time Tracking:     PT Received On: 24  PT Start Time: 1055     PT Stop Time: 1103  PT Total Time (min): 8 min     Billable Minutes: Gait Training 8    Treatment Type: Treatment  PT/PTA: PTA     Number of PTA visits since last PT visit: 2024

## 2024-07-20 NOTE — ASSESSMENT & PLAN NOTE
Echo 6/4/24:    Left Ventricle: The left ventricle is normal in size. Mildly increased wall thickness. There is mild concentric hypertrophy. Regional wall motion abnormalities present.  Apical aneurysm. There is low normal systolic function. Ejection fraction by visual approximation is 55%. Grade III diastolic dysfunction.    Right Ventricle: Normal right ventricular cavity size. Wall thickness is normal. Systolic function is normal. Pacemaker lead present in the ventricle.    Left Atrium: Left atrium is moderately dilated.    Right Atrium: Right atrium is mildly dilated.    Aortic Valve: The aortic valve is a trileaflet valve. Moderately restricted motion. There is moderate stenosis. Aortic valve area by VTI is 0.90 cm². Aortic valve peak velocity is 2.19 m/s. Mean gradient is 10 mmHg. The dimensionless index is 0.32. There is mild aortic regurgitation with a centrally directed jet.    Mitral Valve: There is mild regurgitation with a posterolateral eccentriccally directed jet.    Tricuspid Valve: There is mild to moderate regurgitation.    IVC/SVC: Normal venous pressure at 3 mmHg.    The estimated pulmonary artery systolic pressure is 31 mmHg.    IV lasix changed to PO  Entresto on hold due to kidney function

## 2024-07-20 NOTE — PROGRESS NOTES
[facility] Medicine  Progress Note    Patient Name: Cheyanne Gomes  MRN: [id number]  Patient Class: IP- Inpatient   Admission Date: 7/17/2024  Length of Stay: 3 days  Attending Physician: Cleve Gee MD  Primary Care Provider: Romeo Alas MD        Subjective:     Principal Problem:Acute kidney injury superimposed on chronic kidney disease        HPI:  88 year old pt getting admitted with CRISTO  Blood work was done by GI MD few days ago and it showed high crt level  Pt was referred to Nephro MD and pt was instructed to come to ER by Nephro MD  Pt denies shortness of breath but endorses 5 pound weight gain  Also pt has swelling on legs and abdominal distension which she attributes due to fluid gain  She has ecchymosis on both legs and more on RLE   Pt had a fall few days ago and had ER visit 2  & all imaging studies were normal      Overview/Hospital Course:  No notes on file    Interval History: Patient seen and examined.  NAD.  Serum creatinine stable.  IV lasix changed to PO.  Started on calcitriol and iron per nephrology.  Net neg 440 cc plus 2 unmeasured urine occurrences.  CXR with concern for pneumonia, added duonebs, continue levaquin.  Urine cx resulted, sensitive to levaquin, continue.      Review of Systems   Constitutional:  Positive for unexpected weight change (weight gain).   Respiratory:  Negative for chest tightness and shortness of breath.    Cardiovascular:  Negative for chest pain and palpitations.   Gastrointestinal:  Positive for diarrhea. Negative for abdominal pain, nausea and vomiting.   Genitourinary:  Negative for dysuria.   Neurological:  Negative for headaches.     Objective:     Vital Signs (Most Recent):  Temp: 97.5 °F (36.4 °C) (07/20/24 1130)  Pulse: 64 (07/20/24 1130)  Resp: 16 (07/20/24 1130)  BP: (!) 152/71 (07/20/24 1130)  SpO2: (!) 94 % (07/20/24 1130) Vital Signs (24h Range):  Temp:  [97.5 °F (36.4 °C)-98.5 °F (36.9 °C)] 97.5 °F (36.4  High Dose Vitamin A Pregnancy And Lactation Text: High dose vitamin A therapy is contraindicated during pregnancy and breast feeding. Topical Retinoid Pregnancy And Lactation Text: This medication is Pregnancy Category C. It is unknown if this medication is excreted in breast milk. °C)  Pulse:  [60-64] 64  Resp:  [16-18] 16  SpO2:  [93 %-97 %] 94 %  BP: (123-173)/(64-78) 152/71     Weight: 46.2 kg (101 lb 12.8 oz)  Body mass index is 22.03 kg/m².          Physical Exam  Vitals and nursing note reviewed.   Constitutional:       General: She is not in acute distress.     Appearance: Normal appearance.   HENT:      Head: Normocephalic.      Mouth/Throat:      Mouth: Mucous membranes are moist.      Pharynx: Oropharynx is clear.   Cardiovascular:      Rate and Rhythm: Normal rate and regular rhythm.   Pulmonary:      Effort: Pulmonary effort is normal. No respiratory distress.      Breath sounds: Normal breath sounds.   Abdominal:      General: Bowel sounds are normal.      Palpations: Abdomen is soft.      Tenderness: There is no abdominal tenderness.   Musculoskeletal:      Right lower leg: No edema.      Left lower leg: No edema.   Skin:     General: Skin is warm and dry.      Comments: Bruising to RLE   Neurological:      General: No focal deficit present.      Mental Status: She is alert and oriented to person, place, and time.   Psychiatric:         Mood and Affect: Mood normal.             Significant Labs: All pertinent labs within the past 24 hours have been reviewed.  Recent Lab Results         07/20/24  0549   07/19/24  1540        Albumin 3.8         ALP 57         ALT 11         Anion Gap 15         AST 19         Baso # 0.03         Basophil % 0.5         BILIRUBIN TOTAL 0.5  Comment: For infants and newborns, interpretation of results should be based  on gestational age, weight and in agreement with clinical  observations.    Premature Infant recommended reference ranges:  Up to 24 hours.............<8.0 mg/dL  Up to 48 hours............<12.0 mg/dL  3-5 days..................<15.0 mg/dL  6-29 days.................<15.0 mg/dL           Blood Culture, Routine   No Growth to date  [P]          No Growth to date  [P]       BUN 72         Calcium 7.0         Chloride 91         CO2 27          Creatinine 3.4         Differential Method Automated         eGFR 12.5         Eos # 0.1         Eos % 2.2         Glucose 87         Gran # (ANC) 4.2         Gran % 71.5         Hematocrit 30.7         Hemoglobin 9.8         Immature Grans (Abs) 0.02  Comment: Mild elevation in immature granulocytes is non specific and   can be seen in a variety of conditions including stress response,   acute inflammation, trauma and pregnancy. Correlation with other   laboratory and clinical findings is essential.           Immature Granulocytes 0.3         Lymph # 1.1         Lymph % 17.7         Magnesium  2.8         MCH 27.4         MCHC 31.9         MCV 86         Mono # 0.5         Mono % 7.8         MPV 9.6         nRBC 0         Platelet Count 135         Potassium 4.0         PROTEIN TOTAL 6.6         RBC 3.58         RDW 16.3         Sodium 133         WBC 5.92                  [P] - Preliminary Result               Significant Imaging: I have reviewed all pertinent imaging results/findings within the past 24 hours.    Assessment/Plan:      * Acute kidney injury superimposed on chronic kidney disease  2/2 CRS  Echo with aortic stenosis and diastolic dysfunction  Renal US unremarkable  Nephrology consulted  IV lasix changed to PO  Serum creatinine stable  Net negative 440cc over the last 24 hours, also with 2 unmeasured urine occurrences  Strict I&Os  Hold entresto    Pneumonia  CXR 7/20: Development of faint alveolar opacity left upper lobe suggestive of pneumonia   Continue levaquin  Duonebs    Antibiotics (From admission, onward)      Start     Stop Route Frequency Ordered    07/18/24 1230  levoFLOXacin tablet 250 mg         -- Oral Every 48 hours (non-standard times) 07/18/24 1048                  Secondary hyperparathyroidism  Low Ca  Phos, 25 vitamin D normal  Started on calcitriol daily per nephrology    Anemia  Patient's anemia is currently stable. Has not received any PRBCs to date. Etiology likely d/t  Azithromycin Pregnancy And Lactation Text: This medication is considered safe during pregnancy and is also secreted in breast milk. Doxycycline Counseling:  Patient counseled regarding possible photosensitivity and increased risk for sunburn.  Patient instructed to avoid sunlight, if possible.  When exposed to sunlight, patients should wear protective clothing, sunglasses, and sunscreen.  The patient was instructed to call the office immediately if the following severe adverse effects occur:  hearing changes, easy bruising/bleeding, severe headache, or vision changes.  The patient verbalized understanding of the proper use and possible adverse effects of doxycycline.  All of the patient's questions and concerns were addressed. chronic disease due to Chronic Kidney Disease  Current CBC reviewed-   Lab Results   Component Value Date    HGB 9.8 (L) 07/20/2024    HCT 30.7 (L) 07/20/2024     Monitor serial CBC and transfuse if patient becomes hemodynamically unstable, symptomatic or H/H drops below 7/21.  Iron low, start iron supplement daily    Hypermagnesemia  Stop mag supplements  Trend daily      Fall  Had fall on 7/15  CTH done on 7/15 without acute abnormality  PT/OT ordered      UTI (urinary tract infection)  Urinalysis +leuks and nitrites  Urine cx +morganella morganii   Continue levaquin  Blood cx no growth    Acute on chronic combined systolic and diastolic heart failure  Echo 6/4/24:    Left Ventricle: The left ventricle is normal in size. Mildly increased wall thickness. There is mild concentric hypertrophy. Regional wall motion abnormalities present.  Apical aneurysm. There is low normal systolic function. Ejection fraction by visual approximation is 55%. Grade III diastolic dysfunction.    Right Ventricle: Normal right ventricular cavity size. Wall thickness is normal. Systolic function is normal. Pacemaker lead present in the ventricle.    Left Atrium: Left atrium is moderately dilated.    Right Atrium: Right atrium is mildly dilated.    Aortic Valve: The aortic valve is a trileaflet valve. Moderately restricted motion. There is moderate stenosis. Aortic valve area by VTI is 0.90 cm². Aortic valve peak velocity is 2.19 m/s. Mean gradient is 10 mmHg. The dimensionless index is 0.32. There is mild aortic regurgitation with a centrally directed jet.    Mitral Valve: There is mild regurgitation with a posterolateral eccentriccally directed jet.    Tricuspid Valve: There is mild to moderate regurgitation.    IVC/SVC: Normal venous pressure at 3 mmHg.    The estimated pulmonary artery systolic pressure is 31 mmHg.    IV lasix changed to PO  Entresto on hold due to kidney function    HTN (hypertension)  Chronic, uncontrolled. Latest  blood pressure and vitals reviewed-     Temp:  [97.5 °F (36.4 °C)-98.5 °F (36.9 °C)]   Pulse:  [60-64]   Resp:  [16-18]   BP: (123-173)/(64-78)   SpO2:  [93 %-97 %] .     Increase amlodipine to 10mg daily      VTE Risk Mitigation (From admission, onward)           Ordered     enoxaparin injection 30 mg  Daily         07/17/24 2111     IP VTE HIGH RISK PATIENT  Once         07/17/24 2111     Place sequential compression device  Until discontinued         07/17/24 2111                    Discharge Planning   RAMONE: 7/22/2024     Code Status: Full Code   Is the patient medically ready for discharge?:     Reason for patient still in hospital (select all that apply): Patient trending condition, Treatment, and Consult recommendations  Discharge Plan A: Home, Home with family                  TAY Acosta  Department of Hospital Medicine   [facility]     Aklief counseling:  Patient advised to apply a pea-sized amount only at bedtime and wait 30 minutes after washing their face before applying.  If too drying, patient may add a non-comedogenic moisturizer.  The most commonly reported side effects including irritation, redness, scaling, dryness, stinging, burning, itching, and increased risk of sunburn.  The patient verbalized understanding of the proper use and possible adverse effects of retinoids.  All of the patient's questions and concerns were addressed. Winlevi Counseling:  I discussed with the patient the risks of topical clascoterone including but not limited to erythema, scaling, itching, and stinging. Patient voiced their understanding. Detail Level: Detailed Tazorac Pregnancy And Lactation Text: This medication is not safe during pregnancy. It is unknown if this medication is excreted in breast milk. Azelaic Acid Counseling: Patient counseled that medicine may cause skin irritation and to avoid applying near the eyes.  In the event of skin irritation, the patient was advised to reduce the amount of the drug applied or use it less frequently.   The patient verbalized understanding of the proper use and possible adverse effects of azelaic acid.  All of the patient's questions and concerns were addressed. Erythromycin Counseling:  I discussed with the patient the risks of erythromycin including but not limited to GI upset, allergic reaction, drug rash, diarrhea, increase in liver enzymes, and yeast infections. Minocycline Pregnancy And Lactation Text: This medication is Pregnancy Category D and not consider safe during pregnancy. It is also excreted in breast milk. Patient Specific Counseling (Will Not Stick From Patient To Patient): Worsening acne around menstrual cycle despite increasing spironolactone to 100 mg qam. Discussed OCP and poss starting accutane, but pt reports OCP has caused weight gain, fatigue in the past. May consider Josselyn in the future. Fow now, continue Spironolactone 100 mg qam and add Winlevi- samples given. Pt not sexually active with no plans for pregnancy. Azelaic Acid Pregnancy And Lactation Text: This medication is considered safe during pregnancy and breast feeding. Erythromycin Pregnancy And Lactation Text: This medication is Pregnancy Category B and is considered safe during pregnancy. It is also excreted in breast milk. Topical Clindamycin Counseling: Patient counseled that this medication may cause skin irritation or allergic reactions.  In the event of skin irritation, the patient was advised to reduce the amount of the drug applied or use it less frequently.   The patient verbalized understanding of the proper use and possible adverse effects of clindamycin.  All of the patient's questions and concerns were addressed. Birth Control Pills Counseling: Birth Control Pill Counseling: I discussed with the patient the potential side effects of OCPs including but not limited to increased risk of stroke, heart attack, thrombophlebitis, deep venous thrombosis, hepatic adenomas, breast changes, GI upset, headaches, and depression.  The patient verbalized understanding of the proper use and possible adverse effects of OCPs. All of the patient's questions and concerns were addressed. Spironolactone Counseling: Patient advised regarding risks of diarrhea, abdominal pain, hyperkalemia, birth defects (for female patients), liver toxicity and renal toxicity. The patient may need blood work to monitor liver and kidney function and potassium levels while on therapy. The patient verbalized understanding of the proper use and possible adverse effects of spironolactone.  All of the patient's questions and concerns were addressed. Tetracycline Counseling: Patient counseled regarding possible photosensitivity and increased risk for sunburn.  Patient instructed to avoid sunlight, if possible.  When exposed to sunlight, patients should wear protective clothing, sunglasses, and sunscreen.  The patient was instructed to call the office immediately if the following severe adverse effects occur:  hearing changes, easy bruising/bleeding, severe headache, or vision changes.  The patient verbalized understanding of the proper use and possible adverse effects of tetracycline.  All of the patient's questions and concerns were addressed. Patient understands to avoid pregnancy while on therapy due to potential birth defects. Benzoyl Peroxide Pregnancy And Lactation Text: This medication is Pregnancy Category C. It is unknown if benzoyl peroxide is excreted in breast milk. Topical Sulfur Applications Counseling: Topical Sulfur Counseling: Patient counseled that this medication may cause skin irritation or allergic reactions.  In the event of skin irritation, the patient was advised to reduce the amount of the drug applied or use it less frequently.   The patient verbalized understanding of the proper use and possible adverse effects of topical sulfur application.  All of the patient's questions and concerns were addressed. Use Enhanced Medication Counseling?: No Isotretinoin Pregnancy And Lactation Text: This medication is Pregnancy Category X and is considered extremely dangerous during pregnancy. It is unknown if it is excreted in breast milk. Dapsone Counseling: I discussed with the patient the risks of dapsone including but not limited to hemolytic anemia, agranulocytosis, rashes, methemoglobinemia, kidney failure, peripheral neuropathy, headaches, GI upset, and liver toxicity.  Patients who start dapsone require monitoring including baseline LFTs and weekly CBCs for the first month, then every month thereafter.  The patient verbalized understanding of the proper use and possible adverse effects of dapsone.  All of the patient's questions and concerns were addressed. Topical Sulfur Applications Pregnancy And Lactation Text: This medication is Pregnancy Category C and has an unknown safety profile during pregnancy. It is unknown if this topical medication is excreted in breast milk. Topical Retinoid counseling:  Patient advised to apply a pea-sized amount only at bedtime and wait 30 minutes after washing their face before applying.  If too drying, patient may add a non-comedogenic moisturizer. The patient verbalized understanding of the proper use and possible adverse effects of retinoids.  All of the patient's questions and concerns were addressed. Dapsone Pregnancy And Lactation Text: This medication is Pregnancy Category C and is not considered safe during pregnancy or breast feeding. Minocycline Counseling: Patient advised regarding possible photosensitivity and discoloration of the teeth, skin, lips, tongue and gums.  Patient instructed to avoid sunlight, if possible.  When exposed to sunlight, patients should wear protective clothing, sunglasses, and sunscreen.  The patient was instructed to call the office immediately if the following severe adverse effects occur:  hearing changes, easy bruising/bleeding, severe headache, or vision changes.  The patient verbalized understanding of the proper use and possible adverse effects of minocycline.  All of the patient's questions and concerns were addressed. Tazorac Counseling:  Patient advised that medication is irritating and drying.  Patient may need to apply sparingly and wash off after an hour before eventually leaving it on overnight.  The patient verbalized understanding of the proper use and possible adverse effects of tazorac.  All of the patient's questions and concerns were addressed. Winlevi Pregnancy And Lactation Text: This medication is considered safe during pregnancy and breastfeeding. Doxycycline Pregnancy And Lactation Text: This medication is Pregnancy Category D and not consider safe during pregnancy. It is also excreted in breast milk but is considered safe for shorter treatment courses. Azithromycin Counseling:  I discussed with the patient the risks of azithromycin including but not limited to GI upset, allergic reaction, drug rash, diarrhea, and yeast infections. Sarecycline Counseling: Patient advised regarding possible photosensitivity and discoloration of the teeth, skin, lips, tongue and gums.  Patient instructed to avoid sunlight, if possible.  When exposed to sunlight, patients should wear protective clothing, sunglasses, and sunscreen.  The patient was instructed to call the office immediately if the following severe adverse effects occur:  hearing changes, easy bruising/bleeding, severe headache, or vision changes.  The patient verbalized understanding of the proper use and possible adverse effects of sarecycline.  All of the patient's questions and concerns were addressed. Aklief Pregnancy And Lactation Text: It is unknown if this medication is safe to use during pregnancy.  It is unknown if this medication is excreted in breast milk.  Breastfeeding women should use the topical cream on the smallest area of the skin for the shortest time needed while breastfeeding.  Do not apply to nipple and areola. Bactrim Counseling:  I discussed with the patient the risks of sulfa antibiotics including but not limited to GI upset, allergic reaction, drug rash, diarrhea, dizziness, photosensitivity, and yeast infections.  Rarely, more serious reactions can occur including but not limited to aplastic anemia, agranulocytosis, methemoglobinemia, blood dyscrasias, liver or kidney failure, lung infiltrates or desquamative/blistering drug rashes. Bactrim Pregnancy And Lactation Text: This medication is Pregnancy Category D and is known to cause fetal risk.  It is also excreted in breast milk. Isotretinoin Counseling: Patient should get monthly blood tests, not donate blood, not drive at night if vision affected, not share medication, and not undergo elective surgery for 6 months after tx completed. Side effects reviewed, pt to contact office should one occur. Birth Control Pills Pregnancy And Lactation Text: This medication should be avoided if pregnant and for the first 30 days post-partum. Topical Clindamycin Pregnancy And Lactation Text: This medication is Pregnancy Category B and is considered safe during pregnancy. It is unknown if it is excreted in breast milk. Benzoyl Peroxide Counseling: Patient counseled that medicine may cause skin irritation and bleach clothing.  In the event of skin irritation, the patient was advised to reduce the amount of the drug applied or use it less frequently.   The patient verbalized understanding of the proper use and possible adverse effects of benzoyl peroxide.  All of the patient's questions and concerns were addressed. High Dose Vitamin A Counseling: Side effects reviewed, pt to contact office should one occur. Spironolactone Pregnancy And Lactation Text: This medication can cause feminization of the male fetus and should be avoided during pregnancy. The active metabolite is also found in breast milk. Patient Specific Counseling (Will Not Stick From Patient To Patient): Psoriasis improved with methotrexate 7.5 mg/week in the past but recurred after stopping. Pt with no plans for pregnancy. Restart MTX 7.5 mg/week. AST, ALT checked today.

## 2024-07-20 NOTE — ASSESSMENT & PLAN NOTE
Chronic, uncontrolled. Latest blood pressure and vitals reviewed-     Temp:  [97.5 °F (36.4 °C)-98.5 °F (36.9 °C)]   Pulse:  [60-64]   Resp:  [16-18]   BP: (123-173)/(64-78)   SpO2:  [93 %-97 %] .     Increase amlodipine to 10mg daily

## 2024-07-20 NOTE — ASSESSMENT & PLAN NOTE
Patient's anemia is currently stable. Has not received any PRBCs to date. Etiology likely d/t chronic disease due to Chronic Kidney Disease  Current CBC reviewed-   Lab Results   Component Value Date    HGB 9.8 (L) 07/20/2024    HCT 30.7 (L) 07/20/2024     Monitor serial CBC and transfuse if patient becomes hemodynamically unstable, symptomatic or H/H drops below 7/21.  Iron low, start iron supplement daily

## 2024-07-20 NOTE — ASSESSMENT & PLAN NOTE
CXR 7/20: Development of faint alveolar opacity left upper lobe suggestive of pneumonia   Continue levaquin  Duonebs    Antibiotics (From admission, onward)      Start     Stop Route Frequency Ordered    07/18/24 1230  levoFLOXacin tablet 250 mg         -- Oral Every 48 hours (non-standard times) 07/18/24 1048

## 2024-07-20 NOTE — PLAN OF CARE
Problem: Adult Inpatient Plan of Care  Goal: Plan of Care Review  Outcome: Progressing  Goal: Patient-Specific Goal (Individualized)  Outcome: Progressing  Goal: Absence of Hospital-Acquired Illness or Injury  Outcome: Progressing  Goal: Optimal Comfort and Wellbeing  Outcome: Progressing  Goal: Readiness for Transition of Care  Outcome: Progressing     Problem: Acute Kidney Injury/Impairment  Goal: Fluid and Electrolyte Balance  Outcome: Progressing  Goal: Improved Oral Intake  Outcome: Progressing  Goal: Effective Renal Function  Outcome: Progressing     Problem: Fall Injury Risk  Goal: Absence of Fall and Fall-Related Injury  Outcome: Progressing     Problem: Skin Injury Risk Increased  Goal: Skin Health and Integrity  Outcome: Progressing     Problem: Wound  Goal: Optimal Coping  Outcome: Progressing  Goal: Optimal Functional Ability  Outcome: Progressing  Goal: Absence of Infection Signs and Symptoms  Outcome: Progressing  Goal: Improved Oral Intake  Outcome: Progressing  Goal: Optimal Pain Control and Function  Outcome: Progressing  Goal: Skin Health and Integrity  Outcome: Progressing  Goal: Optimal Wound Healing  Outcome: Progressing     Problem: Pneumonia  Goal: Fluid Balance  Outcome: Progressing  Goal: Resolution of Infection Signs and Symptoms  Outcome: Progressing  Goal: Effective Oxygenation and Ventilation  Outcome: Progressing

## 2024-07-20 NOTE — ASSESSMENT & PLAN NOTE
2/2 CRS  Echo with aortic stenosis and diastolic dysfunction  Renal US unremarkable  Nephrology consulted  IV lasix changed to PO  Serum creatinine stable  Net negative 440cc over the last 24 hours, also with 2 unmeasured urine occurrences  Strict I&Os  Hold entresto

## 2024-07-20 NOTE — ASSESSMENT & PLAN NOTE
Urinalysis +leuks and nitrites  Urine cx +morganella morganii   Continue levaquin  Blood cx no growth

## 2024-07-20 NOTE — SUBJECTIVE & OBJECTIVE
Interval History: Patient seen and examined.  NAD.  Serum creatinine stable.  IV lasix changed to PO.  Started on calcitriol and iron per nephrology.  Net neg 440 cc plus 2 unmeasured urine occurrences.  CXR with concern for pneumonia, added duonebs, continue levaquin.  Urine cx resulted, sensitive to levaquin, continue.      Review of Systems   Constitutional:  Positive for unexpected weight change (weight gain).   Respiratory:  Negative for chest tightness and shortness of breath.    Cardiovascular:  Negative for chest pain and palpitations.   Gastrointestinal:  Positive for diarrhea. Negative for abdominal pain, nausea and vomiting.   Genitourinary:  Negative for dysuria.   Neurological:  Negative for headaches.     Objective:     Vital Signs (Most Recent):  Temp: 97.5 °F (36.4 °C) (07/20/24 1130)  Pulse: 64 (07/20/24 1130)  Resp: 16 (07/20/24 1130)  BP: (!) 152/71 (07/20/24 1130)  SpO2: (!) 94 % (07/20/24 1130) Vital Signs (24h Range):  Temp:  [97.5 °F (36.4 °C)-98.5 °F (36.9 °C)] 97.5 °F (36.4 °C)  Pulse:  [60-64] 64  Resp:  [16-18] 16  SpO2:  [93 %-97 %] 94 %  BP: (123-173)/(64-78) 152/71     Weight: 46.2 kg (101 lb 12.8 oz)  Body mass index is 22.03 kg/m².          Physical Exam  Vitals and nursing note reviewed.   Constitutional:       General: She is not in acute distress.     Appearance: Normal appearance.   HENT:      Head: Normocephalic.      Mouth/Throat:      Mouth: Mucous membranes are moist.      Pharynx: Oropharynx is clear.   Cardiovascular:      Rate and Rhythm: Normal rate and regular rhythm.   Pulmonary:      Effort: Pulmonary effort is normal. No respiratory distress.      Breath sounds: Normal breath sounds.   Abdominal:      General: Bowel sounds are normal.      Palpations: Abdomen is soft.      Tenderness: There is no abdominal tenderness.   Musculoskeletal:      Right lower leg: No edema.      Left lower leg: No edema.   Skin:     General: Skin is warm and dry.      Comments: Bruising to  RLE   Neurological:      General: No focal deficit present.      Mental Status: She is alert and oriented to person, place, and time.   Psychiatric:         Mood and Affect: Mood normal.             Significant Labs: All pertinent labs within the past 24 hours have been reviewed.  Recent Lab Results         07/20/24  0549   07/19/24  1540        Albumin 3.8         ALP 57         ALT 11         Anion Gap 15         AST 19         Baso # 0.03         Basophil % 0.5         BILIRUBIN TOTAL 0.5  Comment: For infants and newborns, interpretation of results should be based  on gestational age, weight and in agreement with clinical  observations.    Premature Infant recommended reference ranges:  Up to 24 hours.............<8.0 mg/dL  Up to 48 hours............<12.0 mg/dL  3-5 days..................<15.0 mg/dL  6-29 days.................<15.0 mg/dL           Blood Culture, Routine   No Growth to date  [P]          No Growth to date  [P]       BUN 72         Calcium 7.0         Chloride 91         CO2 27         Creatinine 3.4         Differential Method Automated         eGFR 12.5         Eos # 0.1         Eos % 2.2         Glucose 87         Gran # (ANC) 4.2         Gran % 71.5         Hematocrit 30.7         Hemoglobin 9.8         Immature Grans (Abs) 0.02  Comment: Mild elevation in immature granulocytes is non specific and   can be seen in a variety of conditions including stress response,   acute inflammation, trauma and pregnancy. Correlation with other   laboratory and clinical findings is essential.           Immature Granulocytes 0.3         Lymph # 1.1         Lymph % 17.7         Magnesium  2.8         MCH 27.4         MCHC 31.9         MCV 86         Mono # 0.5         Mono % 7.8         MPV 9.6         nRBC 0         Platelet Count 135         Potassium 4.0         PROTEIN TOTAL 6.6         RBC 3.58         RDW 16.3         Sodium 133         WBC 5.92                  [P] - Preliminary Result                Significant Imaging: I have reviewed all pertinent imaging results/findings within the past 24 hours.

## 2024-07-20 NOTE — PROGRESS NOTES
"Progress Note  Nephrology    Consult Requested By: Cleve Gee MD    Reason for Consult: CRISTO      SUBJECTIVE:     History of Present Illness:  89 y/o female patient referred to nephrology by primary MD, then to ER per nephrology.  Blood work done out patient had revealed elevated Scr.  C/o 5 lb wt gain, LE edema, and abdominal distension.  Nephrology is consulted for CRISTO.    7/18  renal function incrementally improved overnight.  Getting lasix 20 mg IVP bid.  UOP not recorded. UA noted--has UTI, renal US done--no hydronephrosis.  Old labs reviewed, six months ago, BUN/Scr/eGFR were normal, then appears renal function began to decline--CKD 3?--will see where renal function falls out.  Pt states she has diarrhea "off and on", no n/v, no burning or pain w/urination, sometimes feels like she has to go and can't.    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, severe AS)  HTN  CHF  Hyponatremia  HypoK  HyperMg  SHPT  Anemia fo CKD  UTI  HCAP    - renal function is stable  - BP is high at times- monitor today- continue norvasc  - f/u BNP- switch IV lasix to PO-  hold entresto and metolazone- cardiac diet, 1.5L fluid restriction- daily weight- strict ins/outs- alkaosis stable  - serum Na improving  - serum K replete  - hold Mg supplements  - low Ca- phos, 25 vitamin D normal- PTH high- start calcitriol daily  - component of WILLIAMS/CKD- supplement iron  - CXR noted- dose antbx for CrCl < 20    Review of Systems:  As above    OBJECTIVE:     Vital Signs Range (Last 24H):  Temp:  [97.6 °F (36.4 °C)-98.5 °F (36.9 °C)]   Pulse:  [60-65]   Resp:  [16-18]   BP: (123-173)/(64-78)   SpO2:  [91 %-97 %]     Physical Exam:  General- NAD noted  HEENT- WNL  Neck- supple  CV- Regular rate and rhythm  Resp- Lungs CTA Bilaterally, No increased WOB  GI- Non tender/non-distended, " BS normoactive x4 quads  Extrem- No cyanosis, clubbing, +trace edema.  Derm- skin w/d  Neuro-  No flap.     Body mass index is 22.03 kg/m².    Laboratory:  CBC:   Recent Labs   Lab 07/20/24  0549   WBC 5.92   RBC 3.58*   HGB 9.8*   HCT 30.7*   *   MCV 86   MCH 27.4   MCHC 31.9*     CMP:   Recent Labs   Lab 07/20/24  0549   GLU 87   CALCIUM 7.0*   ALBUMIN 3.8   PROT 6.6   *   K 4.0   CO2 27   CL 91*   BUN 72*   CREATININE 3.4*   ALKPHOS 57   ALT 11   AST 19   BILITOT 0.5     Recent Labs   Lab 07/17/24  2147   COLORU Yellow   SPECGRAV 1.010   PHUR 7.0   PROTEINUA Negative   BACTERIA Many*   NITRITE Positive*   LEUKOCYTESUR 3+*   UROBILINOGEN Negative       Diagnostic Results:  Labs: Reviewed  US: Reviewed      ASSESSMENT/PLAN:     Active Hospital Problems    Diagnosis  POA    *Acute kidney injury superimposed on chronic kidney disease [N17.9, N18.9]  Yes    Hypermagnesemia [E83.41]  Yes    Anemia [D64.9]  Yes    UTI (urinary tract infection) [N39.0]  Yes    Fall [W19.XXXA]  Yes    Acute on chronic combined systolic and diastolic heart failure [I50.43]  Yes      Resolved Hospital Problems   No resolved problems to display.     Patient care time was spent personally by me on the following activities: > 35 minutes  Obtaining a history.  Examination of patient.  Providing medical care at the patients bedside.  Developing a treatment plan with patient or surrogate and bedside caregivers.  Ordering and reviewing laboratory studies, radiographic studies, pulse oximetry.  Ordering and performing treatments and interventions.  Evaluation of patient's response to treatment.  Discussions with consultants while on the unit and immediately available to the patient.  Re-evaluation of the patient's condition.  Documentation in the medical record.      Thank you for allowing us to participate in the care of your patient. We will follow the patient and provide recommendations as needed.      Skylar Sifuentes MD

## 2024-07-21 PROBLEM — E83.41 HYPERMAGNESEMIA: Status: RESOLVED | Noted: 2024-07-19 | Resolved: 2024-07-21

## 2024-07-21 LAB
ALBUMIN SERPL BCP-MCNC: 3.8 G/DL (ref 3.5–5.2)
ALP SERPL-CCNC: 54 U/L (ref 55–135)
ALT SERPL W/O P-5'-P-CCNC: 11 U/L (ref 10–44)
ANION GAP SERPL CALC-SCNC: 13 MMOL/L (ref 8–16)
AST SERPL-CCNC: 20 U/L (ref 10–40)
BASOPHILS # BLD AUTO: 0.03 K/UL (ref 0–0.2)
BASOPHILS NFR BLD: 0.5 % (ref 0–1.9)
BILIRUB SERPL-MCNC: 0.5 MG/DL (ref 0.1–1)
BUN SERPL-MCNC: 71 MG/DL (ref 8–23)
CALCIUM SERPL-MCNC: 7.2 MG/DL (ref 8.7–10.5)
CHLORIDE SERPL-SCNC: 90 MMOL/L (ref 95–110)
CO2 SERPL-SCNC: 29 MMOL/L (ref 23–29)
CREAT SERPL-MCNC: 4.1 MG/DL (ref 0.5–1.4)
DIFFERENTIAL METHOD BLD: ABNORMAL
EOSINOPHIL # BLD AUTO: 0.1 K/UL (ref 0–0.5)
EOSINOPHIL NFR BLD: 1.9 % (ref 0–8)
ERYTHROCYTE [DISTWIDTH] IN BLOOD BY AUTOMATED COUNT: 16.4 % (ref 11.5–14.5)
EST. GFR  (NO RACE VARIABLE): 10 ML/MIN/1.73 M^2
GLUCOSE SERPL-MCNC: 84 MG/DL (ref 70–110)
HCT VFR BLD AUTO: 29.3 % (ref 37–48.5)
HGB BLD-MCNC: 9.3 G/DL (ref 12–16)
IMM GRANULOCYTES # BLD AUTO: 0.03 K/UL (ref 0–0.04)
IMM GRANULOCYTES NFR BLD AUTO: 0.5 % (ref 0–0.5)
LYMPHOCYTES # BLD AUTO: 1.1 K/UL (ref 1–4.8)
LYMPHOCYTES NFR BLD: 18.4 % (ref 18–48)
MAGNESIUM SERPL-MCNC: 2.6 MG/DL (ref 1.6–2.6)
MCH RBC QN AUTO: 27.1 PG (ref 27–31)
MCHC RBC AUTO-ENTMCNC: 31.7 G/DL (ref 32–36)
MCV RBC AUTO: 85 FL (ref 82–98)
MONOCYTES # BLD AUTO: 0.6 K/UL (ref 0.3–1)
MONOCYTES NFR BLD: 10.6 % (ref 4–15)
NEUTROPHILS # BLD AUTO: 4 K/UL (ref 1.8–7.7)
NEUTROPHILS NFR BLD: 68.1 % (ref 38–73)
NRBC BLD-RTO: 0 /100 WBC
PLATELET # BLD AUTO: 132 K/UL (ref 150–450)
PMV BLD AUTO: 9.6 FL (ref 9.2–12.9)
POTASSIUM SERPL-SCNC: 3.9 MMOL/L (ref 3.5–5.1)
PROT SERPL-MCNC: 7 G/DL (ref 6–8.4)
RBC # BLD AUTO: 3.43 M/UL (ref 4–5.4)
SODIUM SERPL-SCNC: 132 MMOL/L (ref 136–145)
WBC # BLD AUTO: 5.83 K/UL (ref 3.9–12.7)

## 2024-07-21 PROCEDURE — 99900031 HC PATIENT EDUCATION (STAT)

## 2024-07-21 PROCEDURE — 12000002 HC ACUTE/MED SURGE SEMI-PRIVATE ROOM

## 2024-07-21 PROCEDURE — 25000003 PHARM REV CODE 250: Performed by: INTERNAL MEDICINE

## 2024-07-21 PROCEDURE — 25000003 PHARM REV CODE 250

## 2024-07-21 PROCEDURE — 94761 N-INVAS EAR/PLS OXIMETRY MLT: CPT

## 2024-07-21 PROCEDURE — 85025 COMPLETE CBC W/AUTO DIFF WBC: CPT | Performed by: INTERNAL MEDICINE

## 2024-07-21 PROCEDURE — 83735 ASSAY OF MAGNESIUM: CPT | Performed by: INTERNAL MEDICINE

## 2024-07-21 PROCEDURE — 25000242 PHARM REV CODE 250 ALT 637 W/ HCPCS

## 2024-07-21 PROCEDURE — 63600175 PHARM REV CODE 636 W HCPCS: Performed by: INTERNAL MEDICINE

## 2024-07-21 PROCEDURE — 94640 AIRWAY INHALATION TREATMENT: CPT

## 2024-07-21 PROCEDURE — 99900035 HC TECH TIME PER 15 MIN (STAT)

## 2024-07-21 PROCEDURE — 80053 COMPREHEN METABOLIC PANEL: CPT | Performed by: INTERNAL MEDICINE

## 2024-07-21 PROCEDURE — 36415 COLL VENOUS BLD VENIPUNCTURE: CPT | Performed by: INTERNAL MEDICINE

## 2024-07-21 RX ORDER — IPRATROPIUM BROMIDE AND ALBUTEROL SULFATE 2.5; .5 MG/3ML; MG/3ML
3 SOLUTION RESPIRATORY (INHALATION)
Status: DISCONTINUED | OUTPATIENT
Start: 2024-07-22 | End: 2024-07-23

## 2024-07-21 RX ORDER — SODIUM CHLORIDE 9 MG/ML
INJECTION, SOLUTION INTRAVENOUS CONTINUOUS
Status: ACTIVE | OUTPATIENT
Start: 2024-07-21 | End: 2024-07-21

## 2024-07-21 RX ORDER — AMLODIPINE BESYLATE 5 MG/1
5 TABLET ORAL 2 TIMES DAILY
Status: DISCONTINUED | OUTPATIENT
Start: 2024-07-22 | End: 2024-07-26 | Stop reason: HOSPADM

## 2024-07-21 RX ADMIN — FUROSEMIDE 20 MG: 20 TABLET ORAL at 09:07

## 2024-07-21 RX ADMIN — METOPROLOL SUCCINATE 50 MG: 50 TABLET, FILM COATED, EXTENDED RELEASE ORAL at 09:07

## 2024-07-21 RX ADMIN — FERROUS SULFATE TAB 325 MG (65 MG ELEMENTAL FE) 1 EACH: 325 (65 FE) TAB at 09:07

## 2024-07-21 RX ADMIN — IPRATROPIUM BROMIDE AND ALBUTEROL SULFATE 3 ML: 2.5; .5 SOLUTION RESPIRATORY (INHALATION) at 07:07

## 2024-07-21 RX ADMIN — SODIUM CHLORIDE: 9 INJECTION, SOLUTION INTRAVENOUS at 12:07

## 2024-07-21 RX ADMIN — PANTOPRAZOLE SODIUM 40 MG: 40 TABLET, DELAYED RELEASE ORAL at 05:07

## 2024-07-21 RX ADMIN — AMLODIPINE BESYLATE 10 MG: 5 TABLET ORAL at 09:07

## 2024-07-21 RX ADMIN — IPRATROPIUM BROMIDE AND ALBUTEROL SULFATE 3 ML: 2.5; .5 SOLUTION RESPIRATORY (INHALATION) at 02:07

## 2024-07-21 RX ADMIN — ENOXAPARIN SODIUM 30 MG: 30 INJECTION SUBCUTANEOUS at 05:07

## 2024-07-21 RX ADMIN — IPRATROPIUM BROMIDE AND ALBUTEROL SULFATE 3 ML: 2.5; .5 SOLUTION RESPIRATORY (INHALATION) at 08:07

## 2024-07-21 RX ADMIN — ISOSORBIDE MONONITRATE 60 MG: 60 TABLET, EXTENDED RELEASE ORAL at 09:07

## 2024-07-21 RX ADMIN — ASPIRIN 81 MG 81 MG: 81 TABLET ORAL at 09:07

## 2024-07-21 RX ADMIN — CALCITRIOL CAPSULES 0.25 MCG 0.25 MCG: 0.25 CAPSULE ORAL at 09:07

## 2024-07-21 NOTE — SUBJECTIVE & OBJECTIVE
Interval History: Patient seen and examined.  NAD.  Serum creatinine worse.  Lasix stopped.        Review of Systems   Respiratory:  Negative for chest tightness and shortness of breath.    Cardiovascular:  Negative for chest pain and palpitations.   Gastrointestinal:  Negative for abdominal pain, diarrhea, nausea and vomiting.   Genitourinary:  Negative for dysuria.   Neurological:  Negative for headaches.     Objective:     Vital Signs (Most Recent):  Temp: 97.8 °F (36.6 °C) (07/21/24 1146)  Pulse: 77 (07/21/24 1146)  Resp: 18 (07/21/24 1146)  BP: (!) 133/59 (07/21/24 1146)  SpO2: (!) 93 % (07/21/24 0750) Vital Signs (24h Range):  Temp:  [97.8 °F (36.6 °C)-98.8 °F (37.1 °C)] 97.8 °F (36.6 °C)  Pulse:  [59-77] 77  Resp:  [16-18] 18  SpO2:  [93 %-97 %] 93 %  BP: (111-148)/(59-70) 133/59     Weight: 46.2 kg (101 lb 12.8 oz)  Body mass index is 22.03 kg/m².          Physical Exam  Vitals and nursing note reviewed.   Constitutional:       General: She is not in acute distress.     Appearance: Normal appearance.   HENT:      Head: Normocephalic.      Mouth/Throat:      Mouth: Mucous membranes are moist.      Pharynx: Oropharynx is clear.   Cardiovascular:      Rate and Rhythm: Normal rate and regular rhythm.   Pulmonary:      Effort: Pulmonary effort is normal. No respiratory distress.      Breath sounds: Normal breath sounds.   Abdominal:      General: Bowel sounds are normal.      Palpations: Abdomen is soft.      Tenderness: There is no abdominal tenderness.   Musculoskeletal:      Right lower leg: No edema.      Left lower leg: No edema.   Skin:     General: Skin is warm and dry.      Comments: Bruising to RLE   Neurological:      General: No focal deficit present.      Mental Status: She is alert and oriented to person, place, and time.   Psychiatric:         Mood and Affect: Mood normal.             Significant Labs: All pertinent labs within the past 24 hours have been reviewed.  Recent Lab Results          07/21/24  0549        Albumin 3.8       ALP 54       ALT 11       Anion Gap 13       AST 20       Baso # 0.03       Basophil % 0.5       BILIRUBIN TOTAL 0.5  Comment: For infants and newborns, interpretation of results should be based  on gestational age, weight and in agreement with clinical  observations.    Premature Infant recommended reference ranges:  Up to 24 hours.............<8.0 mg/dL  Up to 48 hours............<12.0 mg/dL  3-5 days..................<15.0 mg/dL  6-29 days.................<15.0 mg/dL         BUN 71       Calcium 7.2       Chloride 90       CO2 29       Creatinine 4.1       Differential Method Automated       eGFR 10.0       Eos # 0.1       Eos % 1.9       Glucose 84       Gran # (ANC) 4.0       Gran % 68.1       Hematocrit 29.3       Hemoglobin 9.3       Immature Grans (Abs) 0.03  Comment: Mild elevation in immature granulocytes is non specific and   can be seen in a variety of conditions including stress response,   acute inflammation, trauma and pregnancy. Correlation with other   laboratory and clinical findings is essential.         Immature Granulocytes 0.5       Lymph # 1.1       Lymph % 18.4       Magnesium  2.6       MCH 27.1       MCHC 31.7       MCV 85       Mono # 0.6       Mono % 10.6       MPV 9.6       nRBC 0       Platelet Count 132       Potassium 3.9       PROTEIN TOTAL 7.0       RBC 3.43       RDW 16.4       Sodium 132       WBC 5.83               Significant Imaging: I have reviewed all pertinent imaging results/findings within the past 24 hours.

## 2024-07-21 NOTE — PROGRESS NOTES
[facility]  Progress Note    Patient Name: Cheyanne Gomes  MRN: [id number]  Patient Class: IP- Inpatient   Admission Date: 7/17/2024  Length of Stay: 4 days  Attending Physician: Cleve Gee MD  Primary Care Provider: Romeo Alas MD        Subjective:     Principal Problem:Acute kidney injury superimposed on chronic kidney disease        HPI:  88 year old pt getting admitted with CRISTO  Blood work was done by GI MD few days ago and it showed high crt level  Pt was referred to Nephro MD and pt was instructed to come to ER by Nephro MD  Pt denies shortness of breath but endorses 5 pound weight gain  Also pt has swelling on legs and abdominal distension which she attributes due to fluid gain  She has ecchymosis on both legs and more on RLE   Pt had a fall few days ago and had ER visit 2  & all imaging studies were normal      Overview/Hospital Course:  No notes on file    Interval History: Patient seen and examined.  NAD.  Serum creatinine worse.  Lasix stopped.  500cc normal saline ordered per nephrology.      Review of Systems   Respiratory:  Negative for chest tightness and shortness of breath.    Cardiovascular:  Negative for chest pain and palpitations.   Gastrointestinal:  Negative for abdominal pain, diarrhea, nausea and vomiting.   Genitourinary:  Negative for dysuria.   Neurological:  Negative for headaches.     Objective:     Vital Signs (Most Recent):  Temp: 97.8 °F (36.6 °C) (07/21/24 1146)  Pulse: 77 (07/21/24 1146)  Resp: 18 (07/21/24 1146)  BP: (!) 133/59 (07/21/24 1146)  SpO2: (!) 93 % (07/21/24 0750) Vital Signs (24h Range):  Temp:  [97.8 °F (36.6 °C)-98.8 °F (37.1 °C)] 97.8 °F (36.6 °C)  Pulse:  [59-77] 77  Resp:  [16-18] 18  SpO2:  [93 %-97 %] 93 %  BP: (111-148)/(59-70) 133/59     Weight: 46.2 kg (101 lb 12.8 oz)  Body mass index is 22.03 kg/m².          Physical Exam  Vitals and nursing note reviewed.   Constitutional:       General: She is not in acute  distress.     Appearance: Normal appearance.   HENT:      Head: Normocephalic.      Mouth/Throat:      Mouth: Mucous membranes are moist.      Pharynx: Oropharynx is clear.   Cardiovascular:      Rate and Rhythm: Normal rate and regular rhythm.   Pulmonary:      Effort: Pulmonary effort is normal. No respiratory distress.      Breath sounds: Normal breath sounds.   Abdominal:      General: Bowel sounds are normal.      Palpations: Abdomen is soft.      Tenderness: There is no abdominal tenderness.   Musculoskeletal:      Right lower leg: No edema.      Left lower leg: No edema.   Skin:     General: Skin is warm and dry.      Comments: Bruising to RLE   Neurological:      General: No focal deficit present.      Mental Status: She is alert and oriented to person, place, and time.   Psychiatric:         Mood and Affect: Mood normal.             Significant Labs: All pertinent labs within the past 24 hours have been reviewed.  Recent Lab Results         07/21/24  0549        Albumin 3.8       ALP 54       ALT 11       Anion Gap 13       AST 20       Baso # 0.03       Basophil % 0.5       BILIRUBIN TOTAL 0.5  Comment: For infants and newborns, interpretation of results should be based  on gestational age, weight and in agreement with clinical  observations.    Premature Infant recommended reference ranges:  Up to 24 hours.............<8.0 mg/dL  Up to 48 hours............<12.0 mg/dL  3-5 days..................<15.0 mg/dL  6-29 days.................<15.0 mg/dL         BUN 71       Calcium 7.2       Chloride 90       CO2 29       Creatinine 4.1       Differential Method Automated       eGFR 10.0       Eos # 0.1       Eos % 1.9       Glucose 84       Gran # (ANC) 4.0       Gran % 68.1       Hematocrit 29.3       Hemoglobin 9.3       Immature Grans (Abs) 0.03  Comment: Mild elevation in immature granulocytes is non specific and   can be seen in a variety of conditions including stress response,   acute inflammation, trauma  and pregnancy. Correlation with other   laboratory and clinical findings is essential.         Immature Granulocytes 0.5       Lymph # 1.1       Lymph % 18.4       Magnesium  2.6       MCH 27.1       MCHC 31.7       MCV 85       Mono # 0.6       Mono % 10.6       MPV 9.6       nRBC 0       Platelet Count 132       Potassium 3.9       PROTEIN TOTAL 7.0       RBC 3.43       RDW 16.4       Sodium 132       WBC 5.83               Significant Imaging: I have reviewed all pertinent imaging results/findings within the past 24 hours.    Assessment/Plan:      * Acute kidney injury superimposed on chronic kidney disease  2/2 CRS  Echo with aortic stenosis and diastolic dysfunction  Renal US unremarkable  Nephrology consulted  Serum creatinine worsening, 4.1 today  Urine output not measured over night  Strict I&Os  Hold entresto  Lasix stopped today, 500cc NS ordered     Pneumonia  CXR 7/20: Development of faint alveolar opacity left upper lobe suggestive of pneumonia   Continue levaquin  Duonebs    Antibiotics (From admission, onward)      Start     Stop Route Frequency Ordered    07/18/24 1230  levoFLOXacin tablet 250 mg         -- Oral Every 48 hours (non-standard times) 07/18/24 1048                  Secondary hyperparathyroidism  Low Ca  Phos, 25 vitamin D normal  Started on calcitriol daily per nephrology    Anemia  Patient's anemia is currently stable. Has not received any PRBCs to date. Etiology likely d/t chronic disease due to Chronic Kidney Disease  Current CBC reviewed-   Lab Results   Component Value Date    HGB 9.3 (L) 07/21/2024    HCT 29.3 (L) 07/21/2024     Monitor serial CBC and transfuse if patient becomes hemodynamically unstable, symptomatic or H/H drops below 7/21.  Iron low, start iron supplement daily    Fall  Had fall on 7/15  CTH done on 7/15 without acute abnormality  PT/OT ordered      UTI (urinary tract infection)  Urinalysis +leuks and nitrites  Urine cx +morganella morganii   Continue  levaquin  Blood cx no growth    Acute on chronic combined systolic and diastolic heart failure  Echo 6/4/24:    Left Ventricle: The left ventricle is normal in size. Mildly increased wall thickness. There is mild concentric hypertrophy. Regional wall motion abnormalities present.  Apical aneurysm. There is low normal systolic function. Ejection fraction by visual approximation is 55%. Grade III diastolic dysfunction.    Right Ventricle: Normal right ventricular cavity size. Wall thickness is normal. Systolic function is normal. Pacemaker lead present in the ventricle.    Left Atrium: Left atrium is moderately dilated.    Right Atrium: Right atrium is mildly dilated.    Aortic Valve: The aortic valve is a trileaflet valve. Moderately restricted motion. There is moderate stenosis. Aortic valve area by VTI is 0.90 cm². Aortic valve peak velocity is 2.19 m/s. Mean gradient is 10 mmHg. The dimensionless index is 0.32. There is mild aortic regurgitation with a centrally directed jet.    Mitral Valve: There is mild regurgitation with a posterolateral eccentriccally directed jet.    Tricuspid Valve: There is mild to moderate regurgitation.    IVC/SVC: Normal venous pressure at 3 mmHg.    The estimated pulmonary artery systolic pressure is 31 mmHg.    Entresto and lasix on hold due to CRISTO on CKD    HTN (hypertension)  Chronic, controlled. Latest blood pressure and vitals reviewed-     Temp:  [97.8 °F (36.6 °C)-98.8 °F (37.1 °C)]   Pulse:  [59-77]   Resp:  [16-18]   BP: (111-148)/(59-70)   SpO2:  [93 %-97 %] .     Amlodipine decreased back to 5mg      VTE Risk Mitigation (From admission, onward)           Ordered     enoxaparin injection 30 mg  Daily         07/17/24 2111     IP VTE HIGH RISK PATIENT  Once         07/17/24 2111     Place sequential compression device  Until discontinued         07/17/24 2111                    Discharge Planning   RAMONE: 7/23/2024     Code Status: Full Code   Is the patient medically ready for  discharge?:     Reason for patient still in hospital (select all that apply): Patient trending condition and Consult recommendations  Discharge Plan A: Home, Home with family                  TAY Acosta  Department of Hospital Medicine   Formerly [facility], 1928–1983

## 2024-07-21 NOTE — ASSESSMENT & PLAN NOTE
Patient's anemia is currently stable. Has not received any PRBCs to date. Etiology likely d/t chronic disease due to Chronic Kidney Disease  Current CBC reviewed-   Lab Results   Component Value Date    HGB 9.3 (L) 07/21/2024    HCT 29.3 (L) 07/21/2024     Monitor serial CBC and transfuse if patient becomes hemodynamically unstable, symptomatic or H/H drops below 7/21.  Iron low, start iron supplement daily

## 2024-07-21 NOTE — CARE UPDATE
07/20/24 1951   Patient Assessment/Suction   Level of Consciousness (AVPU) alert   Respiratory Effort Unlabored   Expansion/Accessory Muscles/Retractions no retractions;no use of accessory muscles   All Lung Fields Breath Sounds Anterior:;Lateral:;clear   Cough Frequency infrequent   PRE-TX-O2   Device (Oxygen Therapy) room air   SpO2 (!) 94 %   Pulse Oximetry Type Intermittent   $ Pulse Oximetry - Multiple Charge Pulse Oximetry - Multiple   Pulse 65   Resp 18   Aerosol Therapy   $ Aerosol Therapy Charges Aerosol Treatment   Daily Review of Necessity (SVN) completed   Respiratory Treatment Status (SVN) given   Treatment Route (SVN) mask;oxygen   Patient Position HOB elevated   Post Treatment Assessment (SVN) breath sounds unchanged   Signs of Intolerance (SVN) none   Breath Sounds Post-Respiratory Treatment   Throughout All Fields Post-Treatment All Fields   Throughout All Fields Post-Treatment no change   Post-treatment Heart Rate (beats/min) 85   Post-treatment Resp Rate (breaths/min) 20   Education   $ Education 15 min;Bronchodilator   Tobacco Cessation Intervention   Do you use any type of tobacco product? No   $ Smoking Cessation Charges Smoking Cessation - Intermediate (Non-CTTS)   Respiratory Evaluation   $ Care Plan Tech Time 15 min   $ Respiratory Evaluation Complete   Evaluation For New Orders   Admitting Diagnosis CRISTO   Pulmonary Diagnosis pulmonary emphysema   Home Oxygen   Has Home Oxygen? No   Home Aerosol, MDI, DPI, and Other Treatments/Therapies   Home Respiratory Therapy Per Patient/Review of Chart No   Oxygen Care Plan   Rationale No rational found   Bronchodilator Care Plan   Bronchodilator Care Plan Aerosol   Aerosol Meds w/ frequency Albuterol - Ipratropium (DUO-NEB) 0.5mg-3mg(2.5ml) 3ml Nebulizer Solution2.5mg Q 6Hr   Rationale Shortness of breath (increased WOB)   Atelectasis Care Plan   Rationale No Rational Found   Airway Clearance Care Plan   Rationale No rationale found

## 2024-07-21 NOTE — PROGRESS NOTES
"Progress Note  Nephrology    Consult Requested By: Cleve Gee MD    Reason for Consult: CRISTO      SUBJECTIVE:     History of Present Illness:  89 y/o female patient referred to nephrology by primary MD, then to ER per nephrology.  Blood work done out patient had revealed elevated Scr.  C/o 5 lb wt gain, LE edema, and abdominal distension.  Nephrology is consulted for CRISTO.    7/18  renal function incrementally improved overnight.  Getting lasix 20 mg IVP bid.  UOP not recorded. UA noted--has UTI, renal US done--no hydronephrosis.  Old labs reviewed, six months ago, BUN/Scr/eGFR were normal, then appears renal function began to decline--CKD 3?--will see where renal function falls out.  Pt states she has diarrhea "off and on", no n/v, no burning or pain w/urination, sometimes feels like she has to go and can't.    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, severe AS)  HTN  CHF  Hyponatremia  HypoK  HyperMg  SHPT  Anemia fo CKD  UTI  HCAP    - renal function is worse- stop PO lasix- give 500cc NS- need strict measurement of UOP today- will need to modify dose of lasix I think  - BP stabilized- change norvasc back to 5mg BID  - BNP hasn't changed much but with severe CRISTO so that may be contributing to elevation- comfortable on exam, on RA- holding lasix due to CRISTO- hold entresto and metolazone as well- cardiac diet, 1.5L fluid restriction  - serum Na improved  - serum K acceptable  - hold Mg supplements  - low Ca noted- phos, 25 vitamin D normal- PTH high- started calcitriol daily this admission  - component of WILLIAMS/CKD- added iron supplement this admission  - CXR noted- dose antbx for CrCl < 20    Review of Systems:  As " above    OBJECTIVE:     Vital Signs Range (Last 24H):  Temp:  [97.5 °F (36.4 °C)-98.8 °F (37.1 °C)]   Pulse:  [59-73]   Resp:  [16-18]   BP: (111-152)/(63-71)   SpO2:  [93 %-97 %]     Physical Exam:  General- NAD noted  HEENT- WNL  Neck- supple  CV- Regular rate and rhythm  Resp- Lungs CTA Bilaterally, No increased WOB  GI- Non tender/non-distended, BS normoactive x4 quads  Extrem- No cyanosis, clubbing, +trace edema.  Derm- skin w/d  Neuro-  No flap.     Body mass index is 22.03 kg/m².    Laboratory:  CBC:   Recent Labs   Lab 07/21/24  0549   WBC 5.83   RBC 3.43*   HGB 9.3*   HCT 29.3*   *   MCV 85   MCH 27.1   MCHC 31.7*     CMP:   Recent Labs   Lab 07/21/24  0549   GLU 84   CALCIUM 7.2*   ALBUMIN 3.8   PROT 7.0   *   K 3.9   CO2 29   CL 90*   BUN 71*   CREATININE 4.1*   ALKPHOS 54*   ALT 11   AST 20   BILITOT 0.5     Recent Labs   Lab 07/17/24  2147   COLORU Yellow   SPECGRAV 1.010   PHUR 7.0   PROTEINUA Negative   BACTERIA Many*   NITRITE Positive*   LEUKOCYTESUR 3+*   UROBILINOGEN Negative       Diagnostic Results:  Labs: Reviewed  US: Reviewed      ASSESSMENT/PLAN:     Active Hospital Problems    Diagnosis  POA    *Acute kidney injury superimposed on chronic kidney disease [N17.9, N18.9]  Yes    Secondary hyperparathyroidism [N25.81]  Yes    Pneumonia [J18.9]  No    Hypermagnesemia [E83.41]  Yes    Anemia [D64.9]  Yes    UTI (urinary tract infection) [N39.0]  Yes    Fall [W19.XXXA]  Yes    Acute on chronic combined systolic and diastolic heart failure [I50.43]  Yes    HTN (hypertension) [I10]  Yes      Resolved Hospital Problems   No resolved problems to display.     Patient care time was spent personally by me on the following activities: > 35 minutes  Obtaining a history.  Examination of patient.  Providing medical care at the patients bedside.  Developing a treatment plan with patient or surrogate and bedside caregivers.  Ordering and reviewing laboratory studies, radiographic studies, pulse  oximetry.  Ordering and performing treatments and interventions.  Evaluation of patient's response to treatment.  Discussions with consultants while on the unit and immediately available to the patient.  Re-evaluation of the patient's condition.  Documentation in the medical record.      Thank you for allowing us to participate in the care of your patient. We will follow the patient and provide recommendations as needed.      Skylar Sifuentes MD

## 2024-07-21 NOTE — CARE UPDATE
07/21/24 0749   Patient Assessment/Suction   Level of Consciousness (AVPU) alert   Respiratory Effort Normal;Unlabored   Expansion/Accessory Muscles/Retractions no use of accessory muscles;expansion symmetric;no retractions   All Lung Fields Breath Sounds clear   Rhythm/Pattern, Respiratory unlabored;pattern regular;depth regular   Cough Frequency infrequent   PRE-TX-O2   Device (Oxygen Therapy) room air   SpO2 (!) 93 %   Pulse Oximetry Type Intermittent   $ Pulse Oximetry - Multiple Charge Pulse Oximetry - Multiple   Pulse 60   Resp 18   Aerosol Therapy   $ Aerosol Therapy Charges Aerosol Treatment   Daily Review of Necessity (SVN) completed   Respiratory Treatment Status (SVN) given   Treatment Route (SVN) oxygen;mask   Patient Position HOB elevated   Post Treatment Assessment (SVN) breath sounds unchanged   Signs of Intolerance (SVN) none

## 2024-07-21 NOTE — ASSESSMENT & PLAN NOTE
Chronic, controlled. Latest blood pressure and vitals reviewed-     Temp:  [97.8 °F (36.6 °C)-98.8 °F (37.1 °C)]   Pulse:  [59-77]   Resp:  [16-18]   BP: (111-148)/(59-70)   SpO2:  [93 %-97 %] .     Amlodipine decreased back to 5mg

## 2024-07-21 NOTE — ASSESSMENT & PLAN NOTE
2/2 CRS  Echo with aortic stenosis and diastolic dysfunction  Renal US unremarkable  Nephrology consulted  Serum creatinine on admission 3.5, baseline 1.3 2 weeks ago  Serum creatinine worsened on 7/21 to 4.1, today 4.0  500cc urine output overnight but also with urine occurrences documented  Strict I&Os  Hold entresto  Was initially on IV lasix, then lasix stopped on 7/21 due to worsening creatinine. Nephrology recommending to hold lasix one more day.

## 2024-07-21 NOTE — ASSESSMENT & PLAN NOTE
2/2 CRS  Echo with aortic stenosis and diastolic dysfunction  Renal US unremarkable  Nephrology consulted  Serum creatinine worsening, 4.1 today  Urine output not measured over night  Strict I&Os  Hold entresto

## 2024-07-21 NOTE — ASSESSMENT & PLAN NOTE
Echo 6/4/24:    Left Ventricle: The left ventricle is normal in size. Mildly increased wall thickness. There is mild concentric hypertrophy. Regional wall motion abnormalities present.  Apical aneurysm. There is low normal systolic function. Ejection fraction by visual approximation is 55%. Grade III diastolic dysfunction.    Right Ventricle: Normal right ventricular cavity size. Wall thickness is normal. Systolic function is normal. Pacemaker lead present in the ventricle.    Left Atrium: Left atrium is moderately dilated.    Right Atrium: Right atrium is mildly dilated.    Aortic Valve: The aortic valve is a trileaflet valve. Moderately restricted motion. There is moderate stenosis. Aortic valve area by VTI is 0.90 cm². Aortic valve peak velocity is 2.19 m/s. Mean gradient is 10 mmHg. The dimensionless index is 0.32. There is mild aortic regurgitation with a centrally directed jet.    Mitral Valve: There is mild regurgitation with a posterolateral eccentriccally directed jet.    Tricuspid Valve: There is mild to moderate regurgitation.    IVC/SVC: Normal venous pressure at 3 mmHg.    The estimated pulmonary artery systolic pressure is 31 mmHg.    Entresto and lasix on hold due to CRISTO on CKD

## 2024-07-22 LAB
ALBUMIN SERPL BCP-MCNC: 3.6 G/DL (ref 3.5–5.2)
ALP SERPL-CCNC: 47 U/L (ref 55–135)
ALT SERPL W/O P-5'-P-CCNC: 11 U/L (ref 10–44)
ANION GAP SERPL CALC-SCNC: 13 MMOL/L (ref 8–16)
AST SERPL-CCNC: 20 U/L (ref 10–40)
BASOPHILS # BLD AUTO: 0.02 K/UL (ref 0–0.2)
BASOPHILS NFR BLD: 0.4 % (ref 0–1.9)
BILIRUB SERPL-MCNC: 0.4 MG/DL (ref 0.1–1)
BUN SERPL-MCNC: 69 MG/DL (ref 8–23)
CALCIUM SERPL-MCNC: 6.7 MG/DL (ref 8.7–10.5)
CHLORIDE SERPL-SCNC: 94 MMOL/L (ref 95–110)
CO2 SERPL-SCNC: 27 MMOL/L (ref 23–29)
CREAT SERPL-MCNC: 4 MG/DL (ref 0.5–1.4)
DIFFERENTIAL METHOD BLD: ABNORMAL
EOSINOPHIL # BLD AUTO: 0.1 K/UL (ref 0–0.5)
EOSINOPHIL NFR BLD: 1.8 % (ref 0–8)
ERYTHROCYTE [DISTWIDTH] IN BLOOD BY AUTOMATED COUNT: 16.3 % (ref 11.5–14.5)
EST. GFR  (NO RACE VARIABLE): 10.3 ML/MIN/1.73 M^2
GLUCOSE SERPL-MCNC: 89 MG/DL (ref 70–110)
HCT VFR BLD AUTO: 26.7 % (ref 37–48.5)
HGB BLD-MCNC: 8.6 G/DL (ref 12–16)
IMM GRANULOCYTES # BLD AUTO: 0.02 K/UL (ref 0–0.04)
IMM GRANULOCYTES NFR BLD AUTO: 0.4 % (ref 0–0.5)
LYMPHOCYTES # BLD AUTO: 0.9 K/UL (ref 1–4.8)
LYMPHOCYTES NFR BLD: 15.2 % (ref 18–48)
MAGNESIUM SERPL-MCNC: 2.4 MG/DL (ref 1.6–2.6)
MCH RBC QN AUTO: 27.4 PG (ref 27–31)
MCHC RBC AUTO-ENTMCNC: 32.2 G/DL (ref 32–36)
MCV RBC AUTO: 85 FL (ref 82–98)
MONOCYTES # BLD AUTO: 0.6 K/UL (ref 0.3–1)
MONOCYTES NFR BLD: 9.9 % (ref 4–15)
MRSA SCREEN BY PCR: NEGATIVE
NEUTROPHILS # BLD AUTO: 4 K/UL (ref 1.8–7.7)
NEUTROPHILS NFR BLD: 72.3 % (ref 38–73)
NRBC BLD-RTO: 0 /100 WBC
PHOSPHATE SERPL-MCNC: 4 MG/DL (ref 2.7–4.5)
PLATELET # BLD AUTO: 117 K/UL (ref 150–450)
PMV BLD AUTO: 9.3 FL (ref 9.2–12.9)
POTASSIUM SERPL-SCNC: 3.8 MMOL/L (ref 3.5–5.1)
PROT SERPL-MCNC: 6.6 G/DL (ref 6–8.4)
RBC # BLD AUTO: 3.14 M/UL (ref 4–5.4)
SODIUM SERPL-SCNC: 134 MMOL/L (ref 136–145)
WBC # BLD AUTO: 5.58 K/UL (ref 3.9–12.7)

## 2024-07-22 PROCEDURE — 85025 COMPLETE CBC W/AUTO DIFF WBC: CPT | Performed by: INTERNAL MEDICINE

## 2024-07-22 PROCEDURE — 25000003 PHARM REV CODE 250: Performed by: INTERNAL MEDICINE

## 2024-07-22 PROCEDURE — 12000002 HC ACUTE/MED SURGE SEMI-PRIVATE ROOM

## 2024-07-22 PROCEDURE — 80053 COMPREHEN METABOLIC PANEL: CPT | Performed by: INTERNAL MEDICINE

## 2024-07-22 PROCEDURE — 27000221 HC OXYGEN, UP TO 24 HOURS

## 2024-07-22 PROCEDURE — 25000003 PHARM REV CODE 250

## 2024-07-22 PROCEDURE — 63600175 PHARM REV CODE 636 W HCPCS: Performed by: INTERNAL MEDICINE

## 2024-07-22 PROCEDURE — 87641 MR-STAPH DNA AMP PROBE: CPT | Performed by: HOSPITALIST

## 2024-07-22 PROCEDURE — 36415 COLL VENOUS BLD VENIPUNCTURE: CPT | Performed by: INTERNAL MEDICINE

## 2024-07-22 PROCEDURE — 97110 THERAPEUTIC EXERCISES: CPT

## 2024-07-22 PROCEDURE — 25000242 PHARM REV CODE 250 ALT 637 W/ HCPCS: Performed by: INTERNAL MEDICINE

## 2024-07-22 PROCEDURE — 83735 ASSAY OF MAGNESIUM: CPT | Performed by: INTERNAL MEDICINE

## 2024-07-22 PROCEDURE — 97116 GAIT TRAINING THERAPY: CPT | Mod: CQ

## 2024-07-22 PROCEDURE — 94640 AIRWAY INHALATION TREATMENT: CPT

## 2024-07-22 PROCEDURE — 84100 ASSAY OF PHOSPHORUS: CPT | Performed by: INTERNAL MEDICINE

## 2024-07-22 PROCEDURE — 94761 N-INVAS EAR/PLS OXIMETRY MLT: CPT

## 2024-07-22 PROCEDURE — 99900031 HC PATIENT EDUCATION (STAT)

## 2024-07-22 RX ORDER — CALCIUM GLUCONATE 20 MG/ML
1 INJECTION, SOLUTION INTRAVENOUS ONCE
Status: COMPLETED | OUTPATIENT
Start: 2024-07-22 | End: 2024-07-22

## 2024-07-22 RX ORDER — CALCIUM CARBONATE 200(500)MG
500 TABLET,CHEWABLE ORAL 3 TIMES DAILY
Status: DISCONTINUED | OUTPATIENT
Start: 2024-07-22 | End: 2024-07-22

## 2024-07-22 RX ORDER — CALCIUM CARBONATE 200(500)MG
1000 TABLET,CHEWABLE ORAL 3 TIMES DAILY
Status: DISCONTINUED | OUTPATIENT
Start: 2024-07-22 | End: 2024-07-23

## 2024-07-22 RX ADMIN — AMLODIPINE BESYLATE 5 MG: 5 TABLET ORAL at 08:07

## 2024-07-22 RX ADMIN — IPRATROPIUM BROMIDE AND ALBUTEROL SULFATE 3 ML: 2.5; .5 SOLUTION RESPIRATORY (INHALATION) at 07:07

## 2024-07-22 RX ADMIN — POTASSIUM BICARBONATE 50 MEQ: 977.5 TABLET, EFFERVESCENT ORAL at 08:07

## 2024-07-22 RX ADMIN — CALCIUM CARBONATE (ANTACID) CHEW TAB 500 MG 1000 MG: 500 CHEW TAB at 02:07

## 2024-07-22 RX ADMIN — CALCITRIOL CAPSULES 0.25 MCG 0.25 MCG: 0.25 CAPSULE ORAL at 08:07

## 2024-07-22 RX ADMIN — IPRATROPIUM BROMIDE AND ALBUTEROL SULFATE 3 ML: 2.5; .5 SOLUTION RESPIRATORY (INHALATION) at 01:07

## 2024-07-22 RX ADMIN — ISOSORBIDE MONONITRATE 60 MG: 60 TABLET, EXTENDED RELEASE ORAL at 08:07

## 2024-07-22 RX ADMIN — Medication 6 MG: at 08:07

## 2024-07-22 RX ADMIN — FERROUS SULFATE TAB 325 MG (65 MG ELEMENTAL FE) 1 EACH: 325 (65 FE) TAB at 08:07

## 2024-07-22 RX ADMIN — PANTOPRAZOLE SODIUM 40 MG: 40 TABLET, DELAYED RELEASE ORAL at 05:07

## 2024-07-22 RX ADMIN — CALCIUM CARBONATE (ANTACID) CHEW TAB 500 MG 1000 MG: 500 CHEW TAB at 08:07

## 2024-07-22 RX ADMIN — CALCIUM GLUCONATE 1 G: 20 INJECTION, SOLUTION INTRAVENOUS at 08:07

## 2024-07-22 RX ADMIN — HYDROCODONE BITARTRATE AND ACETAMINOPHEN 1 TABLET: 5; 325 TABLET ORAL at 08:07

## 2024-07-22 RX ADMIN — LEVOFLOXACIN 250 MG: 250 TABLET, FILM COATED ORAL at 12:07

## 2024-07-22 RX ADMIN — ASPIRIN 81 MG 81 MG: 81 TABLET ORAL at 08:07

## 2024-07-22 RX ADMIN — ENOXAPARIN SODIUM 30 MG: 30 INJECTION SUBCUTANEOUS at 04:07

## 2024-07-22 RX ADMIN — METOPROLOL SUCCINATE 50 MG: 50 TABLET, FILM COATED, EXTENDED RELEASE ORAL at 08:07

## 2024-07-22 NOTE — PLAN OF CARE
SW met with Pt at bedside to discuss her expectations at discharge. Pt stated she would like to have home health when she discharges. SW asked if she had been admitted to a facility in the past. She stated she had. She said she went [place] [facility] [place] and [facility] in [place] and felt both experiences were beneficial. She feels st this point, she only needs the support of home health.   Pt choice obtained and scanned into media. Pt names [facility] as her preference. Referrals sent in [facility].

## 2024-07-22 NOTE — PT/OT/SLP PROGRESS
Physical Therapy Treatment    Patient Name:  Cheyanne Gomes   MRN:  [id number]    Recommendations:     Discharge Recommendations: Low Intensity Therapy  Discharge Equipment Recommendations: none  Barriers to discharge:  weakness, impaired endurance, impaired self care skills, impaired functional mobility    Assessment:     Cheyanne Gomes is a 88 y.o. female admitted with a medical diagnosis of Acute kidney injury superimposed on chronic kidney disease.  She presents with the following impairments/functional limitations: weakness, impaired endurance, impaired self care skills, impaired functional mobility.    Pt found sitting up in bed side chair, agreeable to skilled physical therapy session today.     She performed sit to stand transfer from bed side chair with SBA and 3WW. She demonstrated proper upright standing posture upon standing.     She ambulated five bouts of 100ft with 3WW and SBA. She demonstrated proper technique throughout gait training, but she required standing rest breaks between each bout of ambulation secondary to fatigue. She also reported tremulous UE/LE's during standing rest breaks.     She ambulated back to her room and sat back in her bed side chair. Pt left sitting up in chair, chair alarm on, call light within reach, all needs met, and RN notified.     Rehab Prognosis: Fair; patient would benefit from acute skilled PT services to address these deficits and reach maximum level of function.    Recent Surgery: * No surgery found *      Plan:     During this hospitalization, patient to be seen 6 x/week to address the identified rehab impairments via gait training, therapeutic activities, therapeutic exercises and progress toward the following goals:    Plan of Care Expires:  08/16/24    Subjective     Chief Complaint: tremulous UE/LE  Patient/Family Comments/goals: to get better  Pain/Comfort:  Pain Rating 1: 0/10      Objective:     Communicated with RN prior to session.  Patient found  up in chair with peripheral IV, telemetry upon PT entry to room.     General Precautions: Standard, fall  Orthopedic Precautions: N/A  Braces: N/A  Respiratory Status: Room air     Functional Mobility:  Transfers:     Sit to Stand:  stand by assistance with 3WW  Gait: 5 bouts of 100ft with 3WW and SBA      AM-PAC 6 CLICK MOBILITY          Treatment & Education:  Pt educated on importance of time OOB, importance of intermittent mobility, safe techniques for transfers/ambulation, discharge recommendations/options, and use of call light for assistance and fall prevention.      Patient left up in chair with all lines intact, call button in reach, chair alarm on, and RN notified..    GOALS:   Multidisciplinary Problems       Physical Therapy Goals          Problem: Physical Therapy    Goal Priority Disciplines Outcome Goal Variances Interventions   Physical Therapy Goal     PT, PT/OT      Description: Goals to be met by: 24     Patient will increase functional independence with mobility by performin. Supine to sit with Supervision  2. Sit to stand transfer with Supervision  3. Bed to chair transfer with Supervision using Rollator  4. Gait  x 200 feet with Supervision using Rollator.                              Time Tracking:     PT Received On: 24  PT Start Time: 1038     PT Stop Time: 1048  PT Total Time (min): 10 min     Billable Minutes: Gait Training 10    Treatment Type: Treatment  PT/PTA: PTA     Number of PTA visits since last PT visit: 2     2024

## 2024-07-22 NOTE — HOSPITAL COURSE
Cheyanne Gomes was admitted with CRISTO on CKD suspected to be due to cardiorenal syndrome.  Was initiated on IV Lasix.  Her Entresto was held.  Nephrology followed closely.  Unfortunately, her kidney function declined and diuretics had to be held.  Fluid resuscitation was attempted without improvement.  She had electrolyte abnormalities which were monitored closely with improvement during her stay.  She had evidence of UTI and completed a course of Levaquin.  Unfortunately, her renal function did not improve.  She developed pulmonary edema.  Multiple advanced care plan discussions were had where Pt was very clear that she wanted to be a DNR and she did not want to undergo any HD.  Nephrology utilize diuretics in an attempt to control her volume and improve her symptoms.  Unfortunately, these did not respond favorably.  Pt ultimately decided to discharge home on hospice.  D/C home on 07/26 for comfort measures was hospitalization and agreed with discharge plan.  Palliative Care helped us with conversations and discharge disposition.    Pt was seen on day of discharge and appropriate for transition to home hospice.

## 2024-07-22 NOTE — CARE UPDATE
07/22/24 0737   Patient Assessment/Suction   Level of Consciousness (AVPU) alert   Respiratory Effort Normal;Unlabored   Expansion/Accessory Muscles/Retractions no use of accessory muscles;no retractions;expansion symmetric   All Lung Fields Breath Sounds diminished   Rhythm/Pattern, Respiratory unlabored;pattern regular;depth regular;chest wiggle adequate;no shortness of breath reported   Cough Frequency infrequent   Cough Type good   PRE-TX-O2   Device (Oxygen Therapy) nasal cannula   $ Is the patient on Low Flow Oxygen? Yes   Flow (L/min) (Oxygen Therapy) 3   SpO2 97 %   Pulse Oximetry Type Intermittent   $ Pulse Oximetry - Multiple Charge Pulse Oximetry - Multiple   Pulse 79   Resp 19   Aerosol Therapy   $ Aerosol Therapy Charges Aerosol Treatment   Daily Review of Necessity (SVN) completed   Respiratory Treatment Status (SVN) given   Treatment Route (SVN) mask;oxygen   Patient Position HOB elevated   Post Treatment Assessment (SVN) increased aeration   Signs of Intolerance (SVN) none   Breath Sounds Post-Respiratory Treatment   Throughout All Fields Post-Treatment All Fields   Throughout All Fields Post-Treatment aeration increased   Post-treatment Heart Rate (beats/min) 79   Post-treatment Resp Rate (breaths/min) 20   Education   $ Education Bronchodilator;15 min

## 2024-07-22 NOTE — CARE UPDATE
NOCTURNIST NOTE      RN notified me that calcium today is 6.6   Lower than before  She has albumin 3.3      Even if you use 4.5 as normal albumin the corrected calcium is still too low       Was getting IV lasix per nephrology note   The lasix could have wasted more calcium   But I dont see lasix now on active Meds     Her vitamin D was checked and normal this year   And she is on Calcitriol           PLAN      - I gave 1 gram IVPB of calcium gluconate     And then I just scheduled some calcium carbonate   ( TUMS) PO TID    1,000 mg PO TID    This may help for now     Calcium carbonate is 40% elemental calcium so `1,000 mg would give 400 mg calcium per dose         - I added a PHOS on to labs also   maybe  it has gone up and could explain the low calcium           Nephrology is following this patient already

## 2024-07-22 NOTE — ASSESSMENT & PLAN NOTE
Echo 6/4/24:    Left Ventricle: The left ventricle is normal in size. Mildly increased wall thickness. There is mild concentric hypertrophy. Regional wall motion abnormalities present.  Apical aneurysm. There is low normal systolic function. Ejection fraction by visual approximation is 55%. Grade III diastolic dysfunction.    Right Ventricle: Normal right ventricular cavity size. Wall thickness is normal. Systolic function is normal. Pacemaker lead present in the ventricle.    Left Atrium: Left atrium is moderately dilated.    Right Atrium: Right atrium is mildly dilated.    Aortic Valve: The aortic valve is a trileaflet valve. Moderately restricted motion. There is moderate stenosis. Aortic valve area by VTI is 0.90 cm². Aortic valve peak velocity is 2.19 m/s. Mean gradient is 10 mmHg. The dimensionless index is 0.32. There is mild aortic regurgitation with a centrally directed jet.    Mitral Valve: There is mild regurgitation with a posterolateral eccentriccally directed jet.    Tricuspid Valve: There is mild to moderate regurgitation.    IVC/SVC: Normal venous pressure at 3 mmHg.    The estimated pulmonary artery systolic pressure is 31 mmHg.    Entresto and lasix on hold due to CRISTO on CKD  BNP elevated likely 2/2 severe CRISTO

## 2024-07-22 NOTE — PROGRESS NOTES
"Progress Note  Nephrology    Consult Requested By: Evert Cisse MD    Reason for Consult: CRISTO      SUBJECTIVE:     History of Present Illness:  89 y/o female patient referred to nephrology by primary MD, then to ER per nephrology.  Blood work done out patient had revealed elevated Scr.  C/o 5 lb wt gain, LE edema, and abdominal distension.  Nephrology is consulted for CRISTO.    7/18  renal function incrementally improved overnight.  Getting lasix 20 mg IVP bid.  UOP not recorded. UA noted--has UTI, renal US done--no hydronephrosis.  Old labs reviewed, six months ago, BUN/Scr/eGFR were normal, then appears renal function began to decline--CKD 3?--will see where renal function falls out.  Pt states she has diarrhea "off and on", no n/v, no burning or pain w/urination, sometimes feels like she has to go and can't.    7/19 VSS, on RA, UOP 850cc + voids, still with some difficulty breathing  7/20 some spikes in BP, on RA, UOP 800cc, ordered BNP, CXR with concern for PNA  7/21 VSS, on RA, UOP 150cc + 5 voids, some nausea this AM not resolved, sitting in chair eating lunch, looks well actually  7/22  500cc UOP recorded, VSS.  Incremental improvement in renal function, would continue to hold lasix today and f/u labs in am.  Pt up in chair, denies SOB, lungs sound clear.      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  Would hold lasix one more day.  - BP stabilized- change norvasc back to 5mg BID  - BNP hasn't changed much but with severe CRISTO so that may be contributing to elevation- comfortable on exam, on RA- holding lasix due to CRISTO- hold entresto and metolazone as well- cardiac diet, 1.5L fluid restriction  - serum Na improved  - serum K acceptable  - hold Mg supplements  - low Ca noted- " phos, 25 vitamin D normal- PTH high- started calcitriol daily this admission  - component of WILLIAMS/CKD- added iron supplement this admission  - CXR noted- dose antbx for CrCl < 20    Review of Systems:  As above    OBJECTIVE:     Vital Signs Range (Last 24H):  Temp:  [97.7 °F (36.5 °C)-98.7 °F (37.1 °C)]   Pulse:  [59-79]   Resp:  [16-19]   BP: (129-142)/(59-70)   SpO2:  [60 %-98 %]     Physical Exam:  General- NAD noted  HEENT- WNL  Neck- supple  CV- Regular rate and rhythm  Resp- Lungs CTA Bilaterally, No increased WOB  GI- Non tender/non-distended, BS normoactive x4 quads  Extrem- No cyanosis, clubbing, +trace edema.  Derm- skin w/d  Neuro-  No flap.     Body mass index is 22.03 kg/m².    Laboratory:  CBC:   Recent Labs   Lab 07/22/24  0437   WBC 5.58   RBC 3.14*   HGB 8.6*   HCT 26.7*   *   MCV 85   MCH 27.4   MCHC 32.2     CMP:   Recent Labs   Lab 07/22/24  0437   GLU 89   CALCIUM 6.7*   ALBUMIN 3.6   PROT 6.6   *   K 3.8   CO2 27   CL 94*   BUN 69*   CREATININE 4.0*   ALKPHOS 47*   ALT 11   AST 20   BILITOT 0.4     Recent Labs   Lab 07/17/24  2147   COLORU Yellow   SPECGRAV 1.010   PHUR 7.0   PROTEINUA Negative   BACTERIA Many*   NITRITE Positive*   LEUKOCYTESUR 3+*   UROBILINOGEN Negative       Diagnostic Results:  Labs: Reviewed  US: Reviewed      ASSESSMENT/PLAN:     Active Hospital Problems    Diagnosis  POA    *Acute kidney injury superimposed on chronic kidney disease [N17.9, N18.9]  Yes    Secondary hyperparathyroidism [N25.81]  Yes    Pneumonia [J18.9]  No    Anemia [D64.9]  Yes    UTI (urinary tract infection) [N39.0]  Yes    Fall [W19.XXXA]  Yes    Acute on chronic combined systolic and diastolic heart failure [I50.43]  Yes    HTN (hypertension) [I10]  Yes      Resolved Hospital Problems    Diagnosis Date Resolved POA    Hypermagnesemia [E83.41] 07/21/2024 Yes     Patient care time was spent personally by me on the following activities: > 35 minutes  Obtaining a history.  Examination of  patient.  Providing medical care at the patients bedside.  Developing a treatment plan with patient or surrogate and bedside caregivers.  Ordering and reviewing laboratory studies, radiographic studies, pulse oximetry.  Ordering and performing treatments and interventions.  Evaluation of patient's response to treatment.  Discussions with consultants while on the unit and immediately available to the patient.  Re-evaluation of the patient's condition.  Documentation in the medical record.      Thank you for allowing us to participate in the care of your patient. We will follow the patient and provide recommendations as needed.      Jes Dye NP

## 2024-07-22 NOTE — ASSESSMENT & PLAN NOTE
CXR 7/20: Development of faint alveolar opacity left upper lobe suggestive of pneumonia   Continue levaquin  Duonebs  Lungs clear, remains on room air  Antibiotics (From admission, onward)      Start     Stop Route Frequency Ordered    07/18/24 1230  levoFLOXacin tablet 250 mg         -- Oral Every 48 hours (non-standard times) 07/18/24 1048                 What Type Of Note Output Would You Prefer (Optional)?: Standard Output Hpi Title: Evaluation of Skin Lesions How Severe Are Your Spot(S)?: moderate Have Your Spot(S) Been Treated In The Past?: has not been treated

## 2024-07-22 NOTE — PROGRESS NOTES
[facility] Medicine  Progress Note    Patient Name: Cheyanne Gomes  MRN: [id number]  Patient Class: IP- Inpatient   Admission Date: 7/17/2024  Length of Stay: 5 days  Attending Physician: Evert Cisse MD  Primary Care Provider: Romeo Alas MD        Subjective:     Principal Problem:Acute kidney injury superimposed on chronic kidney disease        HPI:  88 year old pt getting admitted with CRISTO  Blood work was done by GI MD few days ago and it showed high crt level  Pt was referred to Nephro MD and pt was instructed to come to ER by Nephro MD  Pt denies shortness of breath but endorses 5 pound weight gain  Also pt has swelling on legs and abdominal distension which she attributes due to fluid gain  She has ecchymosis on both legs and more on RLE   Pt had a fall few days ago and had ER visit 2  & all imaging studies were normal      Overview/Hospital Course:  88 year old female was admitted with CRISTO on CKD likely 2/2 CRS.  Serum creatinine on admission was 3.5.  She was started on IV lasix and entresto was held.  Nephrology was consulted.  Serum creatinine worsened to 4.1 on 7/21.  Lasix was discontinued and she was given 500cc of normal saline.  She was found to have secondary hyperparathyroidism, she was started on calcitriol.  Iron was also low, she was started on PO iron.  Magnesium was high on admission, magnesium supplements were discontinued, she will need to stop those on discharge.  Also found to have hyponatremia and hypokalemia, which are improving.  CXR showed faint left upper lobe alveolar opacity with concern for pneumonia.  Urinalysis was positive for infection and urine cx revealed morganella morganii.  She has been treated with levaquin, renally dosed.  Also started on nebulizer treatments.  Her lungs are clear and she is on room air, she wears cpap at night.  Serum creatinine today 4.0, nephrology recommended to hold lasix one more day.    Interval History: Patient  seen and examined.  NAD.  Serum creatinine 4.9 this morning.  Nephrology recommending to hold lasix one more day.  IV calcium replacement given today.      Review of Systems   Respiratory:  Negative for chest tightness and shortness of breath.    Cardiovascular:  Negative for chest pain and palpitations.   Gastrointestinal:  Negative for abdominal pain, diarrhea, nausea and vomiting.   Genitourinary:  Negative for dysuria.   Neurological:  Negative for headaches.     Objective:     Vital Signs (Most Recent):  Temp: 97.7 °F (36.5 °C) (07/22/24 0842)  Pulse: 63 (07/22/24 0842)  Resp: 17 (07/22/24 0842)  BP: 139/64 (07/22/24 0842)  SpO2: 96 % (07/22/24 0855) Vital Signs (24h Range):  Temp:  [97.7 °F (36.5 °C)-98.7 °F (37.1 °C)] 97.7 °F (36.5 °C)  Pulse:  [59-79] 63  Resp:  [16-19] 17  SpO2:  [60 %-98 %] 96 %  BP: (129-142)/(59-70) 139/64     Weight: 46.2 kg (101 lb 12.8 oz)  Body mass index is 22.03 kg/m².          Physical Exam  Vitals and nursing note reviewed.   Constitutional:       General: She is not in acute distress.     Appearance: Normal appearance.   HENT:      Head: Normocephalic.      Mouth/Throat:      Mouth: Mucous membranes are moist.      Pharynx: Oropharynx is clear.   Cardiovascular:      Rate and Rhythm: Normal rate and regular rhythm.   Pulmonary:      Effort: Pulmonary effort is normal. No respiratory distress.      Breath sounds: Normal breath sounds.   Abdominal:      General: Bowel sounds are normal.      Palpations: Abdomen is soft.      Tenderness: There is no abdominal tenderness.   Musculoskeletal:      Right lower leg: No edema.      Left lower leg: No edema.   Skin:     General: Skin is warm and dry.      Comments: Bruising to RLE   Neurological:      General: No focal deficit present.      Mental Status: She is alert and oriented to person, place, and time.   Psychiatric:         Mood and Affect: Mood normal.             Significant Labs: All pertinent labs within the past 24 hours have  been reviewed.  Recent Lab Results         07/22/24  0437        Albumin 3.6       ALP 47       ALT 11       Anion Gap 13       AST 20       Baso # 0.02       Basophil % 0.4       BILIRUBIN TOTAL 0.4  Comment: For infants and newborns, interpretation of results should be based  on gestational age, weight and in agreement with clinical  observations.    Premature Infant recommended reference ranges:  Up to 24 hours.............<8.0 mg/dL  Up to 48 hours............<12.0 mg/dL  3-5 days..................<15.0 mg/dL  6-29 days.................<15.0 mg/dL         BUN 69       Calcium 6.7  Comment: Critical result CA 6.7 mg/dL called to and read back by Stephanie Cash RN  [facility]W [facility]2024 05[facility]  Result Rechecked         Chloride 94       CO2 27       Creatinine 4.0       Differential Method Automated       eGFR 10.3       Eos # 0.1       Eos % 1.8       Glucose 89       Gran # (ANC) 4.0       Gran % 72.3       Hematocrit 26.7       Hemoglobin 8.6       Immature Grans (Abs) 0.02  Comment: Mild elevation in immature granulocytes is non specific and   can be seen in a variety of conditions including stress response,   acute inflammation, trauma and pregnancy. Correlation with other   laboratory and clinical findings is essential.         Immature Granulocytes 0.4       Lymph # 0.9       Lymph % 15.2       Magnesium  2.4       MCH 27.4       MCHC 32.2       MCV 85       Mono # 0.6       Mono % 9.9       MPV 9.3       nRBC 0       Phosphorus Level 4.0       Platelet Count 117       Potassium 3.8       PROTEIN TOTAL 6.6       RBC 3.14       RDW 16.3       Sodium 134       WBC 5.58               Significant Imaging: I have reviewed all pertinent imaging results/findings within the past 24 hours.    Assessment/Plan:      * Acute kidney injury superimposed on chronic kidney disease  2/2 CRS  Echo with aortic stenosis and diastolic dysfunction  Renal US unremarkable  Nephrology consulted  Serum creatinine on  admission 3.5, baseline 1.3 2 weeks ago  Serum creatinine worsened on 7/21 to 4.1, today 4.0  500cc urine output overnight but also with urine occurrences documented  Strict I&Os  Hold entresto  Was initially on IV lasix, then lasix stopped on 7/21 due to worsening creatinine. Nephrology recommending to hold lasix one more day.    Pneumonia  CXR 7/20: Development of faint alveolar opacity left upper lobe suggestive of pneumonia   Continue levaquin  Duonebs  Lungs clear, remains on room air  Antibiotics (From admission, onward)      Start     Stop Route Frequency Ordered    07/18/24 1230  levoFLOXacin tablet 250 mg         -- Oral Every 48 hours (non-standard times) 07/18/24 1048                  Secondary hyperparathyroidism  Low Ca  Phos, 25 vitamin D normal  Started on calcitriol daily per nephrology    Anemia  Patient's anemia is currently stable. Has not received any PRBCs to date. Etiology likely d/t chronic disease due to Chronic Kidney Disease  Current CBC reviewed-   Lab Results   Component Value Date    HGB 8.6 (L) 07/22/2024    HCT 26.7 (L) 07/22/2024     Monitor serial CBC and transfuse if patient becomes hemodynamically unstable, symptomatic or H/H drops below 7/21.  Iron low, continue iron supplement daily    Fall  Had fall on 7/15  CTH done on 7/15 without acute abnormality  PT/OT ordered      UTI (urinary tract infection)  Urinalysis +leuks and nitrites  Urine cx +morganella morganii   Continue levaquin  Blood cx no growth    Acute on chronic combined systolic and diastolic heart failure  Echo 6/4/24:    Left Ventricle: The left ventricle is normal in size. Mildly increased wall thickness. There is mild concentric hypertrophy. Regional wall motion abnormalities present.  Apical aneurysm. There is low normal systolic function. Ejection fraction by visual approximation is 55%. Grade III diastolic dysfunction.    Right Ventricle: Normal right ventricular cavity size. Wall thickness is normal. Systolic  function is normal. Pacemaker lead present in the ventricle.    Left Atrium: Left atrium is moderately dilated.    Right Atrium: Right atrium is mildly dilated.    Aortic Valve: The aortic valve is a trileaflet valve. Moderately restricted motion. There is moderate stenosis. Aortic valve area by VTI is 0.90 cm². Aortic valve peak velocity is 2.19 m/s. Mean gradient is 10 mmHg. The dimensionless index is 0.32. There is mild aortic regurgitation with a centrally directed jet.    Mitral Valve: There is mild regurgitation with a posterolateral eccentriccally directed jet.    Tricuspid Valve: There is mild to moderate regurgitation.    IVC/SVC: Normal venous pressure at 3 mmHg.    The estimated pulmonary artery systolic pressure is 31 mmHg.    Entresto and lasix on hold due to CRISTO on CKD  BNP elevated likely 2/2 severe CRISTO    HTN (hypertension)  Chronic, controlled. Latest blood pressure and vitals reviewed-     Temp:  [97.7 °F (36.5 °C)-98.7 °F (37.1 °C)]   Pulse:  [59-79]   Resp:  [16-19]   BP: (129-142)/(59-70)   SpO2:  [60 %-98 %] .     Continue amlodipine 5mg daily      VTE Risk Mitigation (From admission, onward)           Ordered     enoxaparin injection 30 mg  Daily         07/17/24 2111     IP VTE HIGH RISK PATIENT  Once         07/17/24 2111     Place sequential compression device  Until discontinued         07/17/24 2111                    Discharge Planning   RAMONE: 7/24/2024     Code Status: Full Code   Is the patient medically ready for discharge?:     Reason for patient still in hospital (select all that apply): Patient trending condition, Treatment, and Consult recommendations  Discharge Plan A: Home, Home with family                  TAY Acosta  Department of Hospital Medicine   [facility]

## 2024-07-22 NOTE — ASSESSMENT & PLAN NOTE
Chronic, controlled. Latest blood pressure and vitals reviewed-     Temp:  [97.7 °F (36.5 °C)-98.7 °F (37.1 °C)]   Pulse:  [59-79]   Resp:  [16-19]   BP: (129-142)/(59-70)   SpO2:  [60 %-98 %] .     Continue amlodipine 5mg daily

## 2024-07-22 NOTE — PT/OT/SLP PROGRESS
Occupational Therapy   Treatment    Name: Cheyanne Gomes  MRN: [id number]  Admitting Diagnosis:  Acute kidney injury superimposed on chronic kidney disease       Recommendations:     Discharge Recommendations: Low Intensity Therapy  Discharge Equipment Recommendations:  none  Barriers to discharge:  None    Assessment:     Cheyanne Gomes is a 88 y.o. female with a medical diagnosis of Acute kidney injury superimposed on chronic kidney disease.  Pt was agreeable to OT this AM. Performance deficits affecting function are weakness, impaired endurance, impaired self care skills, impaired functional mobility, decreased safety awareness, impaired cardiopulmonary response to activity.     Rehab Prognosis:  Good; patient would benefit from acute skilled OT services to address these deficits and reach maximum level of function.       Plan:     Patient to be seen 3 x/week to address the above listed problems via self-care/home management, therapeutic activities, therapeutic exercises  Plan of Care Expires: 08/02/24  Plan of Care Reviewed with: patient    Subjective     Chief Complaint: none stated  Patient/Family Comments/goals: none stated  Pain/Comfort:  Pain Rating 1: 0/10    Objective:     Communicated with: nurse prior to session.  Patient found up in chair with peripheral IV, telemetry, chair check upon OT entry to room.    General Precautions: Standard, fall    Orthopedic Precautions:N/A  Braces: N/A  Respiratory Status: Room air     Occupational Performance:     Activities of Daily Living:  Feeding:  set up assist in chair to eat lunch     Therapeutic Exercise:   Pt completed BUE AROM exercises X10 in all major joint planes while seated up in chair in order to increase ROM/function needed for ADLs. Pt required one rest break during exercise.      Treatment & Education:  Pt educated on role of OT/POC, importance of OOB/EOB activity, use of call bell, and safety during ADLs, transfers, and functional mobility.       Patient left up in chair with all lines intact, call button in reach, and chair alarm on    GOALS:   Multidisciplinary Problems       Occupational Therapy Goals          Problem: Occupational Therapy    Goal Priority Disciplines Outcome Interventions   Occupational Therapy Goal     OT, PT/OT Progressing    Description: Goals to be met by: 8/2/2024     Patient will increase functional independence with ADLs by performing:    UE Dressing with Modified [place].  LE Dressing with Modified [place].  Grooming while seated with Modified [place].  Toileting from toilet with Supervision for hygiene and clothing management.   Toilet transfer to toilet with Supervision.                         Time Tracking:     OT Date of Treatment: 07/22/24  OT Start Time: 1138  OT Stop Time: 1146  OT Total Time (min): 8 min    Billable Minutes:Therapeutic Exercise 8    OT/LUIS MIGUEL: OT          7/22/2024

## 2024-07-22 NOTE — ASSESSMENT & PLAN NOTE
Patient's anemia is currently stable. Has not received any PRBCs to date. Etiology likely d/t chronic disease due to Chronic Kidney Disease  Current CBC reviewed-   Lab Results   Component Value Date    HGB 8.6 (L) 07/22/2024    HCT 26.7 (L) 07/22/2024     Monitor serial CBC and transfuse if patient becomes hemodynamically unstable, symptomatic or H/H drops below 7/21.  Iron low, continue iron supplement daily

## 2024-07-22 NOTE — SUBJECTIVE & OBJECTIVE
Interval History: Patient seen and examined.  NAD.  Serum creatinine 4.9 this morning.  Nephrology recommending to hold lasix one more day.  IV calcium replacement given today.      Review of Systems   Respiratory:  Negative for chest tightness and shortness of breath.    Cardiovascular:  Negative for chest pain and palpitations.   Gastrointestinal:  Negative for abdominal pain, diarrhea, nausea and vomiting.   Genitourinary:  Negative for dysuria.   Neurological:  Negative for headaches.     Objective:     Vital Signs (Most Recent):  Temp: 97.7 °F (36.5 °C) (07/22/24 0842)  Pulse: 63 (07/22/24 0842)  Resp: 17 (07/22/24 0842)  BP: 139/64 (07/22/24 0842)  SpO2: 96 % (07/22/24 0855) Vital Signs (24h Range):  Temp:  [97.7 °F (36.5 °C)-98.7 °F (37.1 °C)] 97.7 °F (36.5 °C)  Pulse:  [59-79] 63  Resp:  [16-19] 17  SpO2:  [60 %-98 %] 96 %  BP: (129-142)/(59-70) 139/64     Weight: 46.2 kg (101 lb 12.8 oz)  Body mass index is 22.03 kg/m².          Physical Exam  Vitals and nursing note reviewed.   Constitutional:       General: She is not in acute distress.     Appearance: Normal appearance.   HENT:      Head: Normocephalic.      Mouth/Throat:      Mouth: Mucous membranes are moist.      Pharynx: Oropharynx is clear.   Cardiovascular:      Rate and Rhythm: Normal rate and regular rhythm.   Pulmonary:      Effort: Pulmonary effort is normal. No respiratory distress.      Breath sounds: Normal breath sounds.   Abdominal:      General: Bowel sounds are normal.      Palpations: Abdomen is soft.      Tenderness: There is no abdominal tenderness.   Musculoskeletal:      Right lower leg: No edema.      Left lower leg: No edema.   Skin:     General: Skin is warm and dry.      Comments: Bruising to RLE   Neurological:      General: No focal deficit present.      Mental Status: She is alert and oriented to person, place, and time.   Psychiatric:         Mood and Affect: Mood normal.             Significant Labs: All pertinent labs  within the past 24 hours have been reviewed.  Recent Lab Results         07/22/24  0437        Albumin 3.6       ALP 47       ALT 11       Anion Gap 13       AST 20       Baso # 0.02       Basophil % 0.4       BILIRUBIN TOTAL 0.4  Comment: For infants and newborns, interpretation of results should be based  on gestational age, weight and in agreement with clinical  observations.    Premature Infant recommended reference ranges:  Up to 24 hours.............<8.0 mg/dL  Up to 48 hours............<12.0 mg/dL  3-5 days..................<15.0 mg/dL  6-29 days.................<15.0 mg/dL         BUN 69       Calcium 6.7  Comment: Critical result CA 6.7 mg/dL called to and read back by Stephanie Cash RN  1W [facility]Jul-2024 05[facility]  Result Rechecked         Chloride 94       CO2 27       Creatinine 4.0       Differential Method Automated       eGFR 10.3       Eos # 0.1       Eos % 1.8       Glucose 89       Gran # (ANC) 4.0       Gran % 72.3       Hematocrit 26.7       Hemoglobin 8.6       Immature Grans (Abs) 0.02  Comment: Mild elevation in immature granulocytes is non specific and   can be seen in a variety of conditions including stress response,   acute inflammation, trauma and pregnancy. Correlation with other   laboratory and clinical findings is essential.         Immature Granulocytes 0.4       Lymph # 0.9       Lymph % 15.2       Magnesium  2.4       MCH 27.4       MCHC 32.2       MCV 85       Mono # 0.6       Mono % 9.9       MPV 9.3       nRBC 0       Phosphorus Level 4.0       Platelet Count 117       Potassium 3.8       PROTEIN TOTAL 6.6       RBC 3.14       RDW 16.3       Sodium 134       WBC 5.58               Significant Imaging: I have reviewed all pertinent imaging results/findings within the past 24 hours.

## 2024-07-23 ENCOUNTER — TELEPHONE (OUTPATIENT)
Dept: FAMILY MEDICINE | Facility: CLINIC | Age: 89
End: 2024-07-23
Payer: MEDICARE

## 2024-07-23 LAB
ALBUMIN SERPL BCP-MCNC: 3.8 G/DL (ref 3.5–5.2)
ALP SERPL-CCNC: 55 U/L (ref 55–135)
ALT SERPL W/O P-5'-P-CCNC: 13 U/L (ref 10–44)
ANION GAP SERPL CALC-SCNC: 12 MMOL/L (ref 8–16)
AST SERPL-CCNC: 20 U/L (ref 10–40)
BACTERIA #/AREA URNS HPF: NORMAL /HPF
BASOPHILS # BLD AUTO: 0.02 K/UL (ref 0–0.2)
BASOPHILS NFR BLD: 0.3 % (ref 0–1.9)
BILIRUB SERPL-MCNC: 0.6 MG/DL (ref 0.1–1)
BILIRUB UR QL STRIP: NEGATIVE
BUN SERPL-MCNC: 63 MG/DL (ref 8–23)
CALCIUM SERPL-MCNC: 8.3 MG/DL (ref 8.7–10.5)
CHLORIDE SERPL-SCNC: 93 MMOL/L (ref 95–110)
CLARITY UR: CLEAR
CO2 SERPL-SCNC: 29 MMOL/L (ref 23–29)
COLOR UR: YELLOW
CREAT SERPL-MCNC: 4.2 MG/DL (ref 0.5–1.4)
CREAT UR-MCNC: 69.7 MG/DL (ref 15–325)
DIFFERENTIAL METHOD BLD: ABNORMAL
EOSINOPHIL # BLD AUTO: 0.1 K/UL (ref 0–0.5)
EOSINOPHIL NFR BLD: 1.8 % (ref 0–8)
ERYTHROCYTE [DISTWIDTH] IN BLOOD BY AUTOMATED COUNT: 16.7 % (ref 11.5–14.5)
EST. GFR  (NO RACE VARIABLE): 9.7 ML/MIN/1.73 M^2
GLUCOSE SERPL-MCNC: 99 MG/DL (ref 70–110)
GLUCOSE UR QL STRIP: NEGATIVE
HCT VFR BLD AUTO: 28.9 % (ref 37–48.5)
HGB BLD-MCNC: 9.1 G/DL (ref 12–16)
HGB UR QL STRIP: NEGATIVE
HYALINE CASTS #/AREA URNS LPF: 0 /LPF
IMM GRANULOCYTES # BLD AUTO: 0.03 K/UL (ref 0–0.04)
IMM GRANULOCYTES NFR BLD AUTO: 0.5 % (ref 0–0.5)
KETONES UR QL STRIP: ABNORMAL
LEUKOCYTE ESTERASE UR QL STRIP: NEGATIVE
LYMPHOCYTES # BLD AUTO: 0.9 K/UL (ref 1–4.8)
LYMPHOCYTES NFR BLD: 14.3 % (ref 18–48)
MAGNESIUM SERPL-MCNC: 2.4 MG/DL (ref 1.6–2.6)
MCH RBC QN AUTO: 27.2 PG (ref 27–31)
MCHC RBC AUTO-ENTMCNC: 31.5 G/DL (ref 32–36)
MCV RBC AUTO: 86 FL (ref 82–98)
MICROSCOPIC COMMENT: NORMAL
MONOCYTES # BLD AUTO: 0.5 K/UL (ref 0.3–1)
MONOCYTES NFR BLD: 7.4 % (ref 4–15)
NEUTROPHILS # BLD AUTO: 5 K/UL (ref 1.8–7.7)
NEUTROPHILS NFR BLD: 75.7 % (ref 38–73)
NITRITE UR QL STRIP: NEGATIVE
NRBC BLD-RTO: 0 /100 WBC
PH UR STRIP: 8 [PH] (ref 5–8)
PLATELET # BLD AUTO: 133 K/UL (ref 150–450)
PMV BLD AUTO: 10.1 FL (ref 9.2–12.9)
POTASSIUM SERPL-SCNC: 4.9 MMOL/L (ref 3.5–5.1)
PROT SERPL-MCNC: 6.9 G/DL (ref 6–8.4)
PROT UR QL STRIP: ABNORMAL
RBC # BLD AUTO: 3.35 M/UL (ref 4–5.4)
RBC #/AREA URNS HPF: 1 /HPF (ref 0–4)
SODIUM SERPL-SCNC: 134 MMOL/L (ref 136–145)
SODIUM UR-SCNC: 21 MMOL/L (ref 20–250)
SP GR UR STRIP: 1.01 (ref 1–1.03)
SQUAMOUS #/AREA URNS HPF: 1 /HPF
URN SPEC COLLECT METH UR: ABNORMAL
UROBILINOGEN UR STRIP-ACNC: NEGATIVE EU/DL
UUN UR-MCNC: 605 MG/DL (ref 140–1050)
WBC # BLD AUTO: 6.59 K/UL (ref 3.9–12.7)
WBC #/AREA URNS HPF: 3 /HPF (ref 0–5)

## 2024-07-23 PROCEDURE — 36415 COLL VENOUS BLD VENIPUNCTURE: CPT | Performed by: INTERNAL MEDICINE

## 2024-07-23 PROCEDURE — 25000003 PHARM REV CODE 250: Performed by: INTERNAL MEDICINE

## 2024-07-23 PROCEDURE — 83735 ASSAY OF MAGNESIUM: CPT | Performed by: INTERNAL MEDICINE

## 2024-07-23 PROCEDURE — 63600175 PHARM REV CODE 636 W HCPCS: Performed by: INTERNAL MEDICINE

## 2024-07-23 PROCEDURE — 12000002 HC ACUTE/MED SURGE SEMI-PRIVATE ROOM

## 2024-07-23 PROCEDURE — 85025 COMPLETE CBC W/AUTO DIFF WBC: CPT | Performed by: INTERNAL MEDICINE

## 2024-07-23 PROCEDURE — 80053 COMPREHEN METABOLIC PANEL: CPT | Performed by: INTERNAL MEDICINE

## 2024-07-23 PROCEDURE — 27000221 HC OXYGEN, UP TO 24 HOURS

## 2024-07-23 PROCEDURE — 81001 URINALYSIS AUTO W/SCOPE: CPT | Performed by: INTERNAL MEDICINE

## 2024-07-23 PROCEDURE — 82570 ASSAY OF URINE CREATININE: CPT | Performed by: INTERNAL MEDICINE

## 2024-07-23 PROCEDURE — 94761 N-INVAS EAR/PLS OXIMETRY MLT: CPT

## 2024-07-23 PROCEDURE — 94640 AIRWAY INHALATION TREATMENT: CPT

## 2024-07-23 PROCEDURE — 25000242 PHARM REV CODE 250 ALT 637 W/ HCPCS: Performed by: INTERNAL MEDICINE

## 2024-07-23 PROCEDURE — 84300 ASSAY OF URINE SODIUM: CPT | Performed by: INTERNAL MEDICINE

## 2024-07-23 PROCEDURE — 84540 ASSAY OF URINE/UREA-N: CPT | Performed by: INTERNAL MEDICINE

## 2024-07-23 PROCEDURE — 97110 THERAPEUTIC EXERCISES: CPT

## 2024-07-23 PROCEDURE — 99900031 HC PATIENT EDUCATION (STAT)

## 2024-07-23 RX ADMIN — METOPROLOL SUCCINATE 50 MG: 50 TABLET, FILM COATED, EXTENDED RELEASE ORAL at 09:07

## 2024-07-23 RX ADMIN — AMLODIPINE BESYLATE 5 MG: 5 TABLET ORAL at 09:07

## 2024-07-23 RX ADMIN — IPRATROPIUM BROMIDE AND ALBUTEROL SULFATE 3 ML: 2.5; .5 SOLUTION RESPIRATORY (INHALATION) at 07:07

## 2024-07-23 RX ADMIN — PANTOPRAZOLE SODIUM 40 MG: 40 TABLET, DELAYED RELEASE ORAL at 04:07

## 2024-07-23 RX ADMIN — ISOSORBIDE MONONITRATE 60 MG: 60 TABLET, EXTENDED RELEASE ORAL at 09:07

## 2024-07-23 RX ADMIN — AMLODIPINE BESYLATE 5 MG: 5 TABLET ORAL at 07:07

## 2024-07-23 RX ADMIN — ENOXAPARIN SODIUM 30 MG: 30 INJECTION SUBCUTANEOUS at 05:07

## 2024-07-23 RX ADMIN — CALCITRIOL CAPSULES 0.25 MCG 0.25 MCG: 0.25 CAPSULE ORAL at 09:07

## 2024-07-23 RX ADMIN — ONDANSETRON 4 MG: 2 INJECTION INTRAMUSCULAR; INTRAVENOUS at 09:07

## 2024-07-23 RX ADMIN — ASPIRIN 81 MG 81 MG: 81 TABLET ORAL at 09:07

## 2024-07-23 RX ADMIN — FERROUS SULFATE TAB 325 MG (65 MG ELEMENTAL FE) 1 EACH: 325 (65 FE) TAB at 09:07

## 2024-07-23 NOTE — ASSESSMENT & PLAN NOTE
Had fall on 7/15  CTH done on 7/15 without acute abnormality  PT/OT ordered, recommending low intensity therapy

## 2024-07-23 NOTE — PROGRESS NOTES
"Progress Note  Nephrology    Consult Requested By: Evert Cisse MD    Reason for Consult: CRISTO      SUBJECTIVE:     History of Present Illness:  89 y/o female patient referred to nephrology by primary MD, then to ER per nephrology.  Blood work done out patient had revealed elevated Scr.  C/o 5 lb wt gain, LE edema, and abdominal distension.  Nephrology is consulted for CRISTO.    7/18  renal function incrementally improved overnight.  Getting lasix 20 mg IVP bid.  UOP not recorded. UA noted--has UTI, renal US done--no hydronephrosis.  Old labs reviewed, six months ago, BUN/Scr/eGFR were normal, then appears renal function began to decline--CKD 3?--will see where renal function falls out.  Pt states she has diarrhea "off and on", no n/v, no burning or pain w/urination, sometimes feels like she has to go and can't.    7/19 VSS, on RA, UOP 850cc + voids, still with some difficulty breathing  7/20 some spikes in BP, on RA, UOP 800cc, ordered BNP, CXR with concern for PNA  7/21 VSS, on RA, UOP 150cc + 5 voids, some nausea this AM not resolved, sitting in chair eating lunch, looks well actually  7/22  500cc UOP recorded, VSS.  Incremental improvement in renal function, would continue to hold lasix today and f/u labs in am.  Pt up in chair, denies SOB, lungs sound clear.    7/23  VSS, UOP not recorded.  Scr bumped again, still holding lasix.  Denies SOB, no complaints.    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  Would hold lasix one more day.  - BP stabilized- change norvasc back to 5mg BID  - BNP hasn't changed much but with severe CRISTO so that may be contributing to elevation- comfortable on exam, on RA- holding lasix due to CRISTO- hold entresto and metolazone as well- cardiac diet, 1.5L " fluid restriction  - serum Na improved  - serum K acceptable  - hold Mg supplements  - low Ca noted- phos, 25 vitamin D normal- PTH high- started calcitriol daily this admission  - component of WILLIAMS/CKD- added iron supplement this admission  - CXR noted- dose antbx for CrCl < 20    Review of Systems:  As above    OBJECTIVE:     Vital Signs Range (Last 24H):  Temp:  [97.4 °F (36.3 °C)-98.9 °F (37.2 °C)]   Pulse:  [65-79]   Resp:  [17-19]   BP: (123-158)/(59-70)   SpO2:  [92 %-98 %]     Physical Exam:  General- NAD noted  HEENT- WNL  Neck- supple  CV- Regular rate and rhythm  Resp- Lungs CTA Bilaterally, No increased WOB  GI- Non tender/non-distended, BS normoactive x4 quads  Extrem- No cyanosis, clubbing, +trace edema.  Derm- skin w/d  Neuro-  No flap.     Body mass index is 22.03 kg/m².    Laboratory:  CBC:   Recent Labs   Lab 07/23/24  0443   WBC 6.59   RBC 3.35*   HGB 9.1*   HCT 28.9*   *   MCV 86   MCH 27.2   MCHC 31.5*     CMP:   Recent Labs   Lab 07/23/24  0443   GLU 99   CALCIUM 8.3*   ALBUMIN 3.8   PROT 6.9   *   K 4.9   CO2 29   CL 93*   BUN 63*   CREATININE 4.2*   ALKPHOS 55   ALT 13   AST 20   BILITOT 0.6     Recent Labs   Lab 07/17/24  2147   COLORU Yellow   SPECGRAV 1.010   PHUR 7.0   PROTEINUA Negative   BACTERIA Many*   NITRITE Positive*   LEUKOCYTESUR 3+*   UROBILINOGEN Negative       Diagnostic Results:  Labs: Reviewed  US: Reviewed      ASSESSMENT/PLAN:     Active Hospital Problems    Diagnosis  POA    *Acute kidney injury superimposed on chronic kidney disease [N17.9, N18.9]  Yes    Secondary hyperparathyroidism [N25.81]  Yes    Pneumonia [J18.9]  No    Anemia [D64.9]  Yes    UTI (urinary tract infection) [N39.0]  Yes    Fall [W19.XXXA]  Yes    Acute on chronic combined systolic and diastolic heart failure [I50.43]  Yes    HTN (hypertension) [I10]  Yes      Resolved Hospital Problems    Diagnosis Date Resolved POA    Hypermagnesemia [E83.41] 07/21/2024 Yes     Patient care time was  spent personally by me on the following activities: > 35 minutes  Obtaining a history.  Examination of patient.  Providing medical care at the patients bedside.  Developing a treatment plan with patient or surrogate and bedside caregivers.  Ordering and reviewing laboratory studies, radiographic studies, pulse oximetry.  Ordering and performing treatments and interventions.  Evaluation of patient's response to treatment.  Discussions with consultants while on the unit and immediately available to the patient.  Re-evaluation of the patient's condition.  Documentation in the medical record.      Thank you for allowing us to participate in the care of your patient. We will follow the patient and provide recommendations as needed.      Jes Dye NP

## 2024-07-23 NOTE — SUBJECTIVE & OBJECTIVE
Interval History: Patient seen and examined.  NAD, on room air.  Serum creatinine 4.2 this morning.  Nephrology recommending to continue to hold lasix and metolazone. Completed course of levaquin for UTI and PNA, stop today.      Review of Systems   Constitutional:  Positive for fatigue.   Respiratory:  Negative for chest tightness and shortness of breath.    Cardiovascular:  Negative for chest pain and palpitations.   Gastrointestinal:  Negative for abdominal pain, diarrhea, nausea and vomiting.   Genitourinary:  Negative for dysuria.   Neurological:  Negative for headaches.     Objective:     Vital Signs (Most Recent):  Temp: 98.9 °F (37.2 °C) (07/23/24 0717)  Pulse: 79 (07/23/24 0750)  Resp: 19 (07/23/24 0750)  BP: (!) 158/70 (07/23/24 0717)  SpO2: 95 % (07/23/24 0750) Vital Signs (24h Range):  Temp:  [97.4 °F (36.3 °C)-98.9 °F (37.2 °C)] 98.9 °F (37.2 °C)  Pulse:  [65-79] 79  Resp:  [17-19] 19  SpO2:  [92 %-98 %] 95 %  BP: (123-158)/(59-70) 158/70     Weight: 46.2 kg (101 lb 12.8 oz)  Body mass index is 22.03 kg/m².          Physical Exam  Vitals and nursing note reviewed.   Constitutional:       General: She is not in acute distress.     Appearance: Normal appearance.   HENT:      Head: Normocephalic.      Mouth/Throat:      Mouth: Mucous membranes are moist.      Pharynx: Oropharynx is clear.   Cardiovascular:      Rate and Rhythm: Normal rate and regular rhythm.   Pulmonary:      Effort: Pulmonary effort is normal. No respiratory distress.      Breath sounds: Normal breath sounds.   Abdominal:      General: Bowel sounds are normal.      Palpations: Abdomen is soft.      Tenderness: There is no abdominal tenderness.   Musculoskeletal:      Right lower leg: No edema.      Left lower leg: No edema.   Skin:     General: Skin is warm and dry.      Comments: Bruising to RLE   Neurological:      General: No focal deficit present.      Mental Status: She is alert and oriented to person, place, and time.    Psychiatric:         Mood and Affect: Mood normal.             Significant Labs: All pertinent labs within the past 24 hours have been reviewed.  Recent Lab Results         07/23/24  0443   07/22/24  1753        Albumin 3.8         ALP 55         ALT 13         Anion Gap 12         AST 20         Baso # 0.02         Basophil % 0.3         BILIRUBIN TOTAL 0.6  Comment: For infants and newborns, interpretation of results should be based  on gestational age, weight and in agreement with clinical  observations.    Premature Infant recommended reference ranges:  Up to 24 hours.............<8.0 mg/dL  Up to 48 hours............<12.0 mg/dL  3-5 days..................<15.0 mg/dL  6-29 days.................<15.0 mg/dL           BUN 63         Calcium 8.3         Chloride 93         CO2 29         Creatinine 4.2         Differential Method Automated         eGFR 9.7         Eos # 0.1         Eos % 1.8         Glucose 99         Gran # (ANC) 5.0         Gran % 75.7         Hematocrit 28.9         Hemoglobin 9.1         Immature Grans (Abs) 0.03  Comment: Mild elevation in immature granulocytes is non specific and   can be seen in a variety of conditions including stress response,   acute inflammation, trauma and pregnancy. Correlation with other   laboratory and clinical findings is essential.           Immature Granulocytes 0.5         Lymph # 0.9         Lymph % 14.3         Magnesium  2.4         MCH 27.2         MCHC 31.5         MCV 86         Mono # 0.5         Mono % 7.4         MPV 10.1         MRSA SCREEN BY PCR   Negative       nRBC 0         Platelet Count 133         Potassium 4.9         PROTEIN TOTAL 6.9         RBC 3.35         RDW 16.7         Sodium 134         WBC 6.59                 Significant Imaging: I have reviewed all pertinent imaging results/findings within the past 24 hours.

## 2024-07-23 NOTE — ASSESSMENT & PLAN NOTE
Echo 6/4/24:    Left Ventricle: The left ventricle is normal in size. Mildly increased wall thickness. There is mild concentric hypertrophy. Regional wall motion abnormalities present.  Apical aneurysm. There is low normal systolic function. Ejection fraction by visual approximation is 55%. Grade III diastolic dysfunction.    Right Ventricle: Normal right ventricular cavity size. Wall thickness is normal. Systolic function is normal. Pacemaker lead present in the ventricle.    Left Atrium: Left atrium is moderately dilated.    Right Atrium: Right atrium is mildly dilated.    Aortic Valve: The aortic valve is a trileaflet valve. Moderately restricted motion. There is moderate stenosis. Aortic valve area by VTI is 0.90 cm². Aortic valve peak velocity is 2.19 m/s. Mean gradient is 10 mmHg. The dimensionless index is 0.32. There is mild aortic regurgitation with a centrally directed jet.    Mitral Valve: There is mild regurgitation with a posterolateral eccentriccally directed jet.    Tricuspid Valve: There is mild to moderate regurgitation.    IVC/SVC: Normal venous pressure at 3 mmHg.    The estimated pulmonary artery systolic pressure is 31 mmHg.    Entresto and lasix on hold due to CRISTO on CKD  BNP elevated likely 2/2 severe CRISTO

## 2024-07-23 NOTE — PT/OT/SLP PROGRESS
Occupational Therapy   Treatment    Name: Cheyanne Gomes  MRN: [id number]  Admitting Diagnosis:  Acute kidney injury superimposed on chronic kidney disease       Recommendations:     Discharge Recommendations: Low Intensity Therapy  Discharge Equipment Recommendations:  none  Barriers to discharge:  None    Assessment:     Cheyanne Gomes is a 88 y.o. female with a medical diagnosis of Acute kidney injury superimposed on chronic kidney disease.  Pt was agreeable to OT this PM. Performance deficits affecting function are weakness, impaired endurance, impaired self care skills, impaired functional mobility, gait instability, impaired balance, decreased upper extremity function, decreased lower extremity function, decreased safety awareness, decreased ROM, impaired cardiopulmonary response to activity.     Pt required multiple rest breaks during UE exercises at EOB but remains motivated and demonstrates a lot of effort during therapy.     Rehab Prognosis:  Fair; patient would benefit from acute skilled OT services to address these deficits and reach maximum level of function.       Plan:     Patient to be seen 3 x/week to address the above listed problems via self-care/home management, therapeutic activities, therapeutic exercises  Plan of Care Expires: 08/02/24  Plan of Care Reviewed with: patient, family    Subjective     Chief Complaint: none stated; pt stated she was feeling better after feeling nauseous this AM  Patient/Family Comments/goals: none stated  Pain/Comfort:  Pain Rating 1: 0/10    Objective:     Communicated with: nurse prior to session.  Patient found supine with peripheral IV, telemetry, bed alarm upon OT entry to room.    General Precautions: Standard, fall    Orthopedic Precautions:N/A  Braces: N/A  Respiratory Status: Room air     Occupational Performance:     Bed Mobility:    Patient completed Scooting/Bridging with stand by assistance  Patient completed Supine to Sit with stand by  assistance  Patient completed Sit to Supine with stand by assistance       Activities of Daily Living:  Pt declined; stated she completed ADLs before OT session     Therapeutic Exercise:   Pt completed BUE AROM resistive exercises X8-10 with yellow theraband in all major joint planes while seated EOB in order to increase ROM/function needed for ADLs.    Treatment & Education:  Pt educated on role of OT/POC, importance of OOB/EOB activity, use of call bell, and safety during ADLs, transfers, and functional mobility.      Patient left HOB elevated with all lines intact, call button in reach, bed alarm on, and family present    GOALS:   Multidisciplinary Problems       Occupational Therapy Goals          Problem: Occupational Therapy    Goal Priority Disciplines Outcome Interventions   Occupational Therapy Goal     OT, PT/OT Progressing    Description: Goals to be met by: 8/2/2024     Patient will increase functional independence with ADLs by performing:    UE Dressing with Modified [place].  LE Dressing with Modified [place].  Grooming while seated with Modified [place].  Toileting from toilet with Supervision for hygiene and clothing management.   Toilet transfer to toilet with Supervision.                         Time Tracking:     OT Date of Treatment: 07/23/24  OT Start Time: 1302  OT Stop Time: 1311  OT Total Time (min): 9 min    Billable Minutes:Therapeutic Exercise 9    OT/LUIS MIGUEL: OT          7/23/2024

## 2024-07-23 NOTE — TELEPHONE ENCOUNTER
----- Message from Cece Tompkins sent at 7/23/2024  3:56 PM CDT -----  Regarding: Returning phone call  Contact: pt  Type:  Patient Returning Call    Who Called:pt    Who Left Message for Patient:boo    Does the patient know what this is regarding?:?    Would the patient rather a call back or a response via [facility]? Call back    Best Call Back Number:[phone]    Additional Information: pt sts that she may have missed a call from boo

## 2024-07-23 NOTE — PLAN OF CARE
Problem: Occupational Therapy  Goal: Occupational Therapy Goal  Description: Goals to be met by: 8/2/2024     Patient will increase functional independence with ADLs by performing:    UE Dressing with Modified [place].  LE Dressing with Modified [place].  Grooming while seated with Modified [place].  Toileting from toilet with Supervision for hygiene and clothing management.   Toilet transfer to toilet with Supervision.    Outcome: Progressing

## 2024-07-23 NOTE — PT/OT/SLP PROGRESS
Physical Therapy      Patient Name:  Cheyanne Gomes   MRN:  [id number]    Patient not seen today secondary to Patient unwilling to participate, Nausea/vomiting. Will follow-up 7/24/24.

## 2024-07-23 NOTE — ASSESSMENT & PLAN NOTE
CXR 7/20: Development of faint alveolar opacity left upper lobe suggestive of pneumonia   Completed course of levaquin, stop duonebs  Lungs clear, remains on room air

## 2024-07-23 NOTE — PLAN OF CARE
Nephrology recs to hold pt and monitor- will re-eval tomorrow.  LENORE GILES awaiting SOC date.          07/23/24 8984   Discharge Assessment   Discharge Plan A Home Health

## 2024-07-23 NOTE — ASSESSMENT & PLAN NOTE
Urinalysis +leuks and nitrites  Urine cx +morganella morganii   Completed course of levaquin  Blood cx no growth

## 2024-07-23 NOTE — CARE UPDATE
07/23/24 0750   Patient Assessment/Suction   Level of Consciousness (AVPU) alert   Respiratory Effort Unlabored;Normal   Expansion/Accessory Muscles/Retractions no use of accessory muscles;no retractions;expansion symmetric   All Lung Fields Breath Sounds diminished   Rhythm/Pattern, Respiratory unlabored;pattern regular;depth regular;chest wiggle adequate;no shortness of breath reported   Cough Frequency no cough   PRE-TX-O2   Device (Oxygen Therapy) room air   $ Is the patient on Low Flow Oxygen? Yes   SpO2 95 %   Pulse Oximetry Type Intermittent   $ Pulse Oximetry - Multiple Charge Pulse Oximetry - Multiple   Pulse 79   Resp 19   Aerosol Therapy   $ Aerosol Therapy Charges Aerosol Treatment   Daily Review of Necessity (SVN) completed   Respiratory Treatment Status (SVN) given   Treatment Route (SVN) oxygen;mask   Patient Position HOB elevated   Post Treatment Assessment (SVN) increased aeration   Signs of Intolerance (SVN) none   Breath Sounds Post-Respiratory Treatment   Throughout All Fields Post-Treatment All Fields   Throughout All Fields Post-Treatment aeration increased   Post-treatment Heart Rate (beats/min) 80   Post-treatment Resp Rate (breaths/min) 19   Education   $ Education Bronchodilator;15 min

## 2024-07-23 NOTE — ASSESSMENT & PLAN NOTE
2/2 CRS  Echo with aortic stenosis and diastolic dysfunction  Renal US unremarkable  Nephrology consulted  Serum creatinine on admission 3.5, baseline 1.3 2 weeks ago  Serum creatinine worsened on 7/21 to 4.1, today 4.2  Urine output not being measured correctly, documented as urine occurrence, discussed with nursing staff  Strict I&Os  Hold entresto  Was initially on IV lasix, then lasix stopped on 7/21 due to worsening creatinine. Nephrology recommending to continue to hold lasix and metolazone

## 2024-07-23 NOTE — ASSESSMENT & PLAN NOTE
Patient's anemia is currently stable. Has not received any PRBCs to date. Etiology likely d/t chronic disease due to Chronic Kidney Disease  Current CBC reviewed-   Lab Results   Component Value Date    HGB 9.1 (L) 07/23/2024    HCT 28.9 (L) 07/23/2024     Monitor serial CBC and transfuse if patient becomes hemodynamically unstable, symptomatic or H/H drops below 7/21.  Iron low, continue iron supplement daily

## 2024-07-23 NOTE — ASSESSMENT & PLAN NOTE
Chronic, controlled. Latest blood pressure and vitals reviewed-     Temp:  [97.4 °F (36.3 °C)-98.9 °F (37.2 °C)]   Pulse:  [65-79]   Resp:  [17-19]   BP: (123-158)/(59-70)   SpO2:  [92 %-98 %] .     Continue amlodipine 5mg BID

## 2024-07-23 NOTE — PROGRESS NOTES
[facility]  Progress Note    Patient Name: Cheyanne Gomes  MRN: [id number]  Patient Class: IP- Inpatient   Admission Date: 7/17/2024  Length of Stay: 6 days  Attending Physician: Evert Cisse MD  Primary Care Provider: Romeo Alas MD        Subjective:     Principal Problem:Acute kidney injury superimposed on chronic kidney disease        HPI:  88 year old pt getting admitted with CRISTO  Blood work was done by GI MD few days ago and it showed high crt level  Pt was referred to Nephro MD and pt was instructed to come to ER by Nephro MD  Pt denies shortness of breath but endorses 5 pound weight gain  Also pt has swelling on legs and abdominal distension which she attributes due to fluid gain  She has ecchymosis on both legs and more on RLE   Pt had a fall few days ago and had ER visit 2  & all imaging studies were normal      Overview/Hospital Course:  88 year old female was admitted with CRISTO on CKD likely 2/2 CRS.  Serum creatinine on admission was 3.5.  She was started on IV lasix and entresto was held.  Nephrology was consulted.  Serum creatinine worsened to 4.1 on 7/21.  Lasix was discontinued and she was given 500cc of normal saline.  She was found to have secondary hyperparathyroidism, she was started on calcitriol.  Iron was also low, she was started on PO iron.  Magnesium was high on admission, magnesium supplements were discontinued, she will need to stop those on discharge.  Also found to have hyponatremia and hypokalemia, which are improved.  CXR showed faint left upper lobe alveolar opacity with concern for pneumonia.  Urinalysis was positive for infection and urine cx revealed morganella morganii.  She has completed course of levaquin.  Her lungs are clear and she is on room air, she wears cpap at night.  Serum creatinine today 4.2, nephrology recommended to continue holding lasix and metolazone and monitor.    Interval History: Patient seen and examined.  NAD, on  room air.  Serum creatinine 4.2 this morning.  Nephrology recommending to continue to hold lasix and metolazone. Completed course of levaquin for UTI and PNA, stop today.      Review of Systems   Constitutional:  Positive for fatigue.   Respiratory:  Negative for chest tightness and shortness of breath.    Cardiovascular:  Negative for chest pain and palpitations.   Gastrointestinal:  Negative for abdominal pain, diarrhea, nausea and vomiting.   Genitourinary:  Negative for dysuria.   Neurological:  Negative for headaches.     Objective:     Vital Signs (Most Recent):  Temp: 98.9 °F (37.2 °C) (07/23/24 0717)  Pulse: 79 (07/23/24 0750)  Resp: 19 (07/23/24 0750)  BP: (!) 158/70 (07/23/24 0717)  SpO2: 95 % (07/23/24 0750) Vital Signs (24h Range):  Temp:  [97.4 °F (36.3 °C)-98.9 °F (37.2 °C)] 98.9 °F (37.2 °C)  Pulse:  [65-79] 79  Resp:  [17-19] 19  SpO2:  [92 %-98 %] 95 %  BP: (123-158)/(59-70) 158/70     Weight: 46.2 kg (101 lb 12.8 oz)  Body mass index is 22.03 kg/m².          Physical Exam  Vitals and nursing note reviewed.   Constitutional:       General: She is not in acute distress.     Appearance: Normal appearance.   HENT:      Head: Normocephalic.      Mouth/Throat:      Mouth: Mucous membranes are moist.      Pharynx: Oropharynx is clear.   Cardiovascular:      Rate and Rhythm: Normal rate and regular rhythm.   Pulmonary:      Effort: Pulmonary effort is normal. No respiratory distress.      Breath sounds: Normal breath sounds.   Abdominal:      General: Bowel sounds are normal.      Palpations: Abdomen is soft.      Tenderness: There is no abdominal tenderness.   Musculoskeletal:      Right lower leg: No edema.      Left lower leg: No edema.   Skin:     General: Skin is warm and dry.      Comments: Bruising to RLE   Neurological:      General: No focal deficit present.      Mental Status: She is alert and oriented to person, place, and time.   Psychiatric:         Mood and Affect: Mood normal.              Significant Labs: All pertinent labs within the past 24 hours have been reviewed.  Recent Lab Results         07/23/24  0443   07/22/24  1753        Albumin 3.8         ALP 55         ALT 13         Anion Gap 12         AST 20         Baso # 0.02         Basophil % 0.3         BILIRUBIN TOTAL 0.6  Comment: For infants and newborns, interpretation of results should be based  on gestational age, weight and in agreement with clinical  observations.    Premature Infant recommended reference ranges:  Up to 24 hours.............<8.0 mg/dL  Up to 48 hours............<12.0 mg/dL  3-5 days..................<15.0 mg/dL  6-29 days.................<15.0 mg/dL           BUN 63         Calcium 8.3         Chloride 93         CO2 29         Creatinine 4.2         Differential Method Automated         eGFR 9.7         Eos # 0.1         Eos % 1.8         Glucose 99         Gran # (ANC) 5.0         Gran % 75.7         Hematocrit 28.9         Hemoglobin 9.1         Immature Grans (Abs) 0.03  Comment: Mild elevation in immature granulocytes is non specific and   can be seen in a variety of conditions including stress response,   acute inflammation, trauma and pregnancy. Correlation with other   laboratory and clinical findings is essential.           Immature Granulocytes 0.5         Lymph # 0.9         Lymph % 14.3         Magnesium  2.4         MCH 27.2         MCHC 31.5         MCV 86         Mono # 0.5         Mono % 7.4         MPV 10.1         MRSA SCREEN BY PCR   Negative       nRBC 0         Platelet Count 133         Potassium 4.9         PROTEIN TOTAL 6.9         RBC 3.35         RDW 16.7         Sodium 134         WBC 6.59                 Significant Imaging: I have reviewed all pertinent imaging results/findings within the past 24 hours.    Assessment/Plan:      * Acute kidney injury superimposed on chronic kidney disease  2/2 CRS  Echo with aortic stenosis and diastolic dysfunction  Renal US unremarkable  Nephrology  consulted  Serum creatinine on admission 3.5, baseline 1.3 2 weeks ago  Serum creatinine worsened on 7/21 to 4.1, today 4.2  Urine output not being measured correctly, documented as urine occurrence, discussed with nursing staff  Strict I&Os  Hold entresto  Was initially on IV lasix, then lasix stopped on 7/21 due to worsening creatinine. Nephrology recommending to continue to hold lasix and metolazone      Pneumonia  CXR 7/20: Development of faint alveolar opacity left upper lobe suggestive of pneumonia   Completed course of levaquin, stop duonebs  Lungs clear, remains on room air            Secondary hyperparathyroidism  Low Ca  Phos, 25 vitamin D normal  Started on calcitriol daily per nephrology    Anemia  Patient's anemia is currently stable. Has not received any PRBCs to date. Etiology likely d/t chronic disease due to Chronic Kidney Disease  Current CBC reviewed-   Lab Results   Component Value Date    HGB 9.1 (L) 07/23/2024    HCT 28.9 (L) 07/23/2024     Monitor serial CBC and transfuse if patient becomes hemodynamically unstable, symptomatic or H/H drops below 7/21.  Iron low, continue iron supplement daily    Fall  Had fall on 7/15  CTH done on 7/15 without acute abnormality  PT/OT ordered, recommending low intensity therapy      UTI (urinary tract infection)  Urinalysis +leuks and nitrites  Urine cx +morganella morganii   Completed course of levaquin  Blood cx no growth    Acute on chronic combined systolic and diastolic heart failure  Echo 6/4/24:    Left Ventricle: The left ventricle is normal in size. Mildly increased wall thickness. There is mild concentric hypertrophy. Regional wall motion abnormalities present.  Apical aneurysm. There is low normal systolic function. Ejection fraction by visual approximation is 55%. Grade III diastolic dysfunction.    Right Ventricle: Normal right ventricular cavity size. Wall thickness is normal. Systolic function is normal. Pacemaker lead present in the  ventricle.    Left Atrium: Left atrium is moderately dilated.    Right Atrium: Right atrium is mildly dilated.    Aortic Valve: The aortic valve is a trileaflet valve. Moderately restricted motion. There is moderate stenosis. Aortic valve area by VTI is 0.90 cm². Aortic valve peak velocity is 2.19 m/s. Mean gradient is 10 mmHg. The dimensionless index is 0.32. There is mild aortic regurgitation with a centrally directed jet.    Mitral Valve: There is mild regurgitation with a posterolateral eccentriccally directed jet.    Tricuspid Valve: There is mild to moderate regurgitation.    IVC/SVC: Normal venous pressure at 3 mmHg.    The estimated pulmonary artery systolic pressure is 31 mmHg.    Entresto and lasix on hold due to CRISTO on CKD  BNP elevated likely 2/2 severe CRISTO    HTN (hypertension)  Chronic, controlled. Latest blood pressure and vitals reviewed-     Temp:  [97.4 °F (36.3 °C)-98.9 °F (37.2 °C)]   Pulse:  [65-79]   Resp:  [17-19]   BP: (123-158)/(59-70)   SpO2:  [92 %-98 %] .     Continue amlodipine 5mg BID      VTE Risk Mitigation (From admission, onward)           Ordered     enoxaparin injection 30 mg  Daily         07/17/24 2111     IP VTE HIGH RISK PATIENT  Once         07/17/24 2111     Place sequential compression device  Until discontinued         07/17/24 2111                    Discharge Planning   RAMONE: 7/24/2024     Code Status: Full Code   Is the patient medically ready for discharge?:     Reason for patient still in hospital (select all that apply): Patient trending condition, Treatment, and Consult recommendations  Discharge Plan A: Home with family, Home Health                  TAY Acosta  Department of Hospital Medicine   [facility]

## 2024-07-24 ENCOUNTER — TELEPHONE (OUTPATIENT)
Dept: FAMILY MEDICINE | Facility: CLINIC | Age: 89
End: 2024-07-24
Payer: MEDICARE

## 2024-07-24 LAB
ALBUMIN SERPL BCP-MCNC: 4.1 G/DL (ref 3.5–5.2)
ALP SERPL-CCNC: 60 U/L (ref 55–135)
ALT SERPL W/O P-5'-P-CCNC: 16 U/L (ref 10–44)
ANION GAP SERPL CALC-SCNC: 17 MMOL/L (ref 8–16)
AST SERPL-CCNC: 24 U/L (ref 10–40)
BACTERIA BLD CULT: NORMAL
BACTERIA BLD CULT: NORMAL
BASOPHILS # BLD AUTO: 0.03 K/UL (ref 0–0.2)
BASOPHILS NFR BLD: 0.4 % (ref 0–1.9)
BILIRUB SERPL-MCNC: 0.7 MG/DL (ref 0.1–1)
BNP SERPL-MCNC: 2276 PG/ML (ref 0–99)
BUN SERPL-MCNC: 62 MG/DL (ref 8–23)
CALCIUM SERPL-MCNC: 7.9 MG/DL (ref 8.7–10.5)
CHLORIDE SERPL-SCNC: 95 MMOL/L (ref 95–110)
CO2 SERPL-SCNC: 23 MMOL/L (ref 23–29)
CREAT SERPL-MCNC: 4.2 MG/DL (ref 0.5–1.4)
CREAT UR-MCNC: 89.7 MG/DL (ref 15–325)
DIFFERENTIAL METHOD BLD: ABNORMAL
EOSINOPHIL # BLD AUTO: 0.1 K/UL (ref 0–0.5)
EOSINOPHIL NFR BLD: 0.8 % (ref 0–8)
EOSINOPHIL URNS QL WRIGHT STN: NORMAL
ERYTHROCYTE [DISTWIDTH] IN BLOOD BY AUTOMATED COUNT: 17 % (ref 11.5–14.5)
EST. GFR  (NO RACE VARIABLE): 9.7 ML/MIN/1.73 M^2
GLUCOSE SERPL-MCNC: 88 MG/DL (ref 70–110)
HCT VFR BLD AUTO: 31 % (ref 37–48.5)
HGB BLD-MCNC: 9.6 G/DL (ref 12–16)
IMM GRANULOCYTES # BLD AUTO: 0.07 K/UL (ref 0–0.04)
IMM GRANULOCYTES NFR BLD AUTO: 0.8 % (ref 0–0.5)
LYMPHOCYTES # BLD AUTO: 0.9 K/UL (ref 1–4.8)
LYMPHOCYTES NFR BLD: 10.5 % (ref 18–48)
MAGNESIUM SERPL-MCNC: 2.3 MG/DL (ref 1.6–2.6)
MCH RBC QN AUTO: 27.2 PG (ref 27–31)
MCHC RBC AUTO-ENTMCNC: 31 G/DL (ref 32–36)
MCV RBC AUTO: 88 FL (ref 82–98)
MONOCYTES # BLD AUTO: 0.5 K/UL (ref 0.3–1)
MONOCYTES NFR BLD: 6.3 % (ref 4–15)
NEUTROPHILS # BLD AUTO: 6.9 K/UL (ref 1.8–7.7)
NEUTROPHILS NFR BLD: 81.2 % (ref 38–73)
NRBC BLD-RTO: 0 /100 WBC
PLATELET # BLD AUTO: 160 K/UL (ref 150–450)
PMV BLD AUTO: 9.7 FL (ref 9.2–12.9)
POTASSIUM SERPL-SCNC: 4.6 MMOL/L (ref 3.5–5.1)
PROT SERPL-MCNC: 7.5 G/DL (ref 6–8.4)
PROT UR-MCNC: 137 MG/DL (ref 6–15)
RBC # BLD AUTO: 3.53 M/UL (ref 4–5.4)
SODIUM SERPL-SCNC: 135 MMOL/L (ref 136–145)
WBC # BLD AUTO: 8.51 K/UL (ref 3.9–12.7)

## 2024-07-24 PROCEDURE — 25000003 PHARM REV CODE 250: Performed by: NURSE PRACTITIONER

## 2024-07-24 PROCEDURE — 97116 GAIT TRAINING THERAPY: CPT | Mod: CQ

## 2024-07-24 PROCEDURE — 85025 COMPLETE CBC W/AUTO DIFF WBC: CPT | Performed by: INTERNAL MEDICINE

## 2024-07-24 PROCEDURE — 27000221 HC OXYGEN, UP TO 24 HOURS

## 2024-07-24 PROCEDURE — 83735 ASSAY OF MAGNESIUM: CPT | Performed by: INTERNAL MEDICINE

## 2024-07-24 PROCEDURE — 25000003 PHARM REV CODE 250: Performed by: INTERNAL MEDICINE

## 2024-07-24 PROCEDURE — 83880 ASSAY OF NATRIURETIC PEPTIDE: CPT | Performed by: INTERNAL MEDICINE

## 2024-07-24 PROCEDURE — 99900035 HC TECH TIME PER 15 MIN (STAT)

## 2024-07-24 PROCEDURE — 87205 SMEAR GRAM STAIN: CPT | Performed by: INTERNAL MEDICINE

## 2024-07-24 PROCEDURE — 12000002 HC ACUTE/MED SURGE SEMI-PRIVATE ROOM

## 2024-07-24 PROCEDURE — 80053 COMPREHEN METABOLIC PANEL: CPT | Performed by: INTERNAL MEDICINE

## 2024-07-24 PROCEDURE — 82570 ASSAY OF URINE CREATININE: CPT | Performed by: INTERNAL MEDICINE

## 2024-07-24 PROCEDURE — 84156 ASSAY OF PROTEIN URINE: CPT | Performed by: INTERNAL MEDICINE

## 2024-07-24 PROCEDURE — 94760 N-INVAS EAR/PLS OXIMETRY 1: CPT

## 2024-07-24 PROCEDURE — 63600175 PHARM REV CODE 636 W HCPCS: Performed by: INTERNAL MEDICINE

## 2024-07-24 PROCEDURE — 97110 THERAPEUTIC EXERCISES: CPT

## 2024-07-24 PROCEDURE — 36415 COLL VENOUS BLD VENIPUNCTURE: CPT | Performed by: INTERNAL MEDICINE

## 2024-07-24 RX ORDER — POLYETHYLENE GLYCOL 3350 17 G/17G
17 POWDER, FOR SOLUTION ORAL DAILY
Status: DISCONTINUED | OUTPATIENT
Start: 2024-07-24 | End: 2024-07-26 | Stop reason: HOSPADM

## 2024-07-24 RX ADMIN — AMLODIPINE BESYLATE 5 MG: 5 TABLET ORAL at 08:07

## 2024-07-24 RX ADMIN — METOPROLOL SUCCINATE 50 MG: 50 TABLET, FILM COATED, EXTENDED RELEASE ORAL at 10:07

## 2024-07-24 RX ADMIN — ENOXAPARIN SODIUM 30 MG: 30 INJECTION SUBCUTANEOUS at 06:07

## 2024-07-24 RX ADMIN — AMLODIPINE BESYLATE 5 MG: 5 TABLET ORAL at 10:07

## 2024-07-24 RX ADMIN — POLYETHYLENE GLYCOL 3350 17 G: 17 POWDER, FOR SOLUTION ORAL at 09:07

## 2024-07-24 RX ADMIN — CALCITRIOL CAPSULES 0.25 MCG 0.25 MCG: 0.25 CAPSULE ORAL at 10:07

## 2024-07-24 RX ADMIN — ISOSORBIDE MONONITRATE 60 MG: 60 TABLET, EXTENDED RELEASE ORAL at 10:07

## 2024-07-24 RX ADMIN — FERROUS SULFATE TAB 325 MG (65 MG ELEMENTAL FE) 1 EACH: 325 (65 FE) TAB at 10:07

## 2024-07-24 RX ADMIN — ASPIRIN 81 MG 81 MG: 81 TABLET ORAL at 09:07

## 2024-07-24 RX ADMIN — PANTOPRAZOLE SODIUM 40 MG: 40 TABLET, DELAYED RELEASE ORAL at 05:07

## 2024-07-24 NOTE — CONSULTS
Right leg skin tear cleaned and applied xeroform, covered with foam dressing and tegaderm, periwound bruising, bruising lower legs and right ankle, arms small scabs, bilateral buttock without redness or breakdown.

## 2024-07-24 NOTE — TELEPHONE ENCOUNTER
----- Message from Valorie Rae RN sent at 7/24/2024  8:12 AM CDT -----  Patient should discharge from hospital today. Needs follow up- unable to schedule before 8/21.

## 2024-07-24 NOTE — PROGRESS NOTES
"Progress Note  Nephrology    Consult Requested By: Evert Cisse MD    Reason for Consult: CRISTO      SUBJECTIVE:     History of Present Illness:  89 y/o female patient referred to nephrology by primary MD, then to ER per nephrology.  Blood work done out patient had revealed elevated Scr.  C/o 5 lb wt gain, LE edema, and abdominal distension.  Nephrology is consulted for CRISTO.    7/18  renal function incrementally improved overnight.  Getting lasix 20 mg IVP bid.  UOP not recorded. UA noted--has UTI, renal US done--no hydronephrosis.  Old labs reviewed, six months ago, BUN/Scr/eGFR were normal, then appears renal function began to decline--CKD 3?--will see where renal function falls out.  Pt states she has diarrhea "off and on", no n/v, no burning or pain w/urination, sometimes feels like she has to go and can't.    7/19 VSS, on RA, UOP 850cc + voids, still with some difficulty breathing  7/20 some spikes in BP, on RA, UOP 800cc, ordered BNP, CXR with concern for PNA  7/21 VSS, on RA, UOP 150cc + 5 voids, some nausea this AM not resolved, sitting in chair eating lunch, looks well actually  7/22  500cc UOP recorded, VSS.  Incremental improvement in renal function, would continue to hold lasix today and f/u labs in am.  Pt up in chair, denies SOB, lungs sound clear.    7/23  VSS, UOP not recorded.  Scr bumped again, still holding lasix.  Denies SOB, no complaints.  7/24  PVR w/0 urine in bladder.  One shift UOP recorded, 520cc.  VSS.  Repeat UA yesterday, no casts, 1+ protein.  Renal function without change from yesterday.  Continue to hold diuretics.    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  Would hold lasix one more day.  - BP stabilized- change norvasc back to 5mg BID  - BNP hasn't " changed much but with severe CRISTO so that may be contributing to elevation- comfortable on exam, on RA- holding lasix due to CRISTO- hold entresto and metolazone as well- cardiac diet, 1.5L fluid restriction  - serum Na improved  - serum K acceptable  - hold Mg supplements  - low Ca noted- phos, 25 vitamin D normal- PTH high- started calcitriol daily this admission  - component of WILLIAMS/CKD- added iron supplement this admission  - CXR noted- dose antbx for CrCl < 20    Review of Systems:  As above    OBJECTIVE:     Vital Signs Range (Last 24H):  Temp:  [97.6 °F (36.4 °C)-99.5 °F (37.5 °C)]   Pulse:  [61-92]   Resp:  [18-19]   BP: (136-178)/(63-75)   SpO2:  [90 %-96 %]     Physical Exam:  General- NAD noted  HEENT- WNL  Neck- supple  CV- Regular rate and rhythm  Resp- Lungs CTA Bilaterally, No increased WOB  GI- Non tender/non-distended, BS normoactive x4 quads  Extrem- No cyanosis, clubbing, +trace edema.  Derm- skin w/d  Neuro-  No flap.     Body mass index is 22.03 kg/m².    Laboratory:  CBC:   Recent Labs   Lab 07/24/24  0527   WBC 8.51   RBC 3.53*   HGB 9.6*   HCT 31.0*      MCV 88   MCH 27.2   MCHC 31.0*     CMP:   Recent Labs   Lab 07/24/24  0527   GLU 88   CALCIUM 7.9*   ALBUMIN 4.1   PROT 7.5   *   K 4.6   CO2 23   CL 95   BUN 62*   CREATININE 4.2*   ALKPHOS 60   ALT 16   AST 24   BILITOT 0.7     Recent Labs   Lab 07/23/24  1439   COLORU Yellow   SPECGRAV 1.015   PHUR 8.0   PROTEINUA 1+*   BACTERIA Rare   NITRITE Negative   LEUKOCYTESUR Negative   UROBILINOGEN Negative   HYALINECASTS 0       Diagnostic Results:  Labs: Reviewed  US: Reviewed      ASSESSMENT/PLAN:     Active Hospital Problems    Diagnosis  POA    *Acute kidney injury superimposed on chronic kidney disease [N17.9, N18.9]  Yes    Secondary hyperparathyroidism [N25.81]  Yes    Pneumonia [J18.9]  No    Anemia [D64.9]  Yes    UTI (urinary tract infection) [N39.0]  Yes    Fall [W19.XXXA]  Yes    Acute on chronic combined systolic and  diastolic heart failure [I50.43]  Yes    HTN (hypertension) [I10]  Yes      Resolved Hospital Problems    Diagnosis Date Resolved POA    Hypermagnesemia [E83.41] 07/21/2024 Yes     Patient care time was spent personally by me on the following activities: > 35 minutes  Obtaining a history.  Examination of patient.  Providing medical care at the patients bedside.  Developing a treatment plan with patient or surrogate and bedside caregivers.  Ordering and reviewing laboratory studies, radiographic studies, pulse oximetry.  Ordering and performing treatments and interventions.  Evaluation of patient's response to treatment.  Discussions with consultants while on the unit and immediately available to the patient.  Re-evaluation of the patient's condition.  Documentation in the medical record.      Thank you for allowing us to participate in the care of your patient. We will follow the patient and provide recommendations as needed.      Jes Dye NP

## 2024-07-24 NOTE — ASSESSMENT & PLAN NOTE
2/2 CRS  Echo with aortic stenosis and diastolic dysfunction  Renal US unremarkable  Nephrology consulted  Serum creatinine on admission 3.5, baseline 1.3 2 weeks ago  Serum creatinine worsened on 7/21 to 4.1, and appears to be plateauing  Strict I&Os  -the Entresto, Lasix, and metolazone have been on hold due to worsening renal function  -she has a new oxygen requirement, will check CXR to evaluate volume status and discuss adjustments for volume management as needed with Nephrology

## 2024-07-24 NOTE — TELEPHONE ENCOUNTER
Spoke with patient who was still inpatient status at [facility] and projected to discharged possibly today. Hospital [facility]charge [facility] follow up appt scheduled for 7/31 at 3:00 p.m.

## 2024-07-24 NOTE — PT/OT/SLP PROGRESS
"Occupational Therapy   Treatment    Name: Cheyanne Gomes  MRN: [id number]  Admitting Diagnosis:  Acute kidney injury superimposed on chronic kidney disease       Recommendations:     Discharge Recommendations: Low Intensity Therapy  Discharge Equipment Recommendations:  none  Barriers to discharge:  None    Assessment:     Cheyanne Gomes is a 88 y.o. female with a medical diagnosis of Acute kidney injury superimposed on chronic kidney disease.  Pt was agreeable to OT this PM. Performance deficits affecting function are weakness, impaired endurance, impaired self care skills, impaired functional mobility, gait instability, impaired balance, decreased safety awareness, impaired cardiopulmonary response to activity.     Rehab Prognosis:  Good; patient would benefit from acute skilled OT services to address these deficits and reach maximum level of function.       Plan:     Patient to be seen 3 x/week to address the above listed problems via self-care/home management, therapeutic activities, therapeutic exercises  Plan of Care Expires: 08/02/24  Plan of Care Reviewed with: patient    Subjective     Chief Complaint: "I've had an emotional day"   Patient/Family Comments/goals: none stated  Pain/Comfort:  Pain Rating 1: 0/10    Objective:     Communicated with: nurse prior to session.  Patient found up in chair with peripheral IV, telemetry, oxygen, chair check upon OT entry to room.    General Precautions: Standard, fall    Orthopedic Precautions:N/A  Braces: N/A  Respiratory Status: Nasal cannula, flow 2 L/min     Occupational Performance:     Activities of Daily Living:  Grooming: Set up assist to brush hair while seated in chair. Pt was given shampoo shower cap and educated on how to use for after session with PT.     Therapeutic Exercise:   Pt completed BUE AROM exercises X10 with yellow theraband in all major joint planes while seated in chair in order to increase ROM/function needed for ADLs.  Pt demonstrating " lack of ROM in LUE shoulder joint. Pt required two rest breaks during exercise.      Treatment & Education:  Pt educated on role of OT/POC, importance of OOB/EOB activity, use of call bell, and safety during ADLs, transfers, and functional mobility.      Patient left up in chair with all lines intact, call button in reach, and chair alarm on    GOALS:   Multidisciplinary Problems       Occupational Therapy Goals          Problem: Occupational Therapy    Goal Priority Disciplines Outcome Interventions   Occupational Therapy Goal     OT, PT/OT Progressing    Description: Goals to be met by: 8/2/2024     Patient will increase functional independence with ADLs by performing:    UE Dressing with Modified [place].  LE Dressing with Modified [place].  Grooming while seated with Modified [place].  Toileting from toilet with Supervision for hygiene and clothing management.   Toilet transfer to toilet with Supervision.                         Time Tracking:     OT Date of Treatment: 07/24/24  OT Start Time: 1333  OT Stop Time: 1342  OT Total Time (min): 9 min    Billable Minutes:Therapeutic Exercise 9    OT/LUIS MIGUEL: OT          7/24/2024

## 2024-07-24 NOTE — ASSESSMENT & PLAN NOTE
Patient's anemia is currently stable. Has not received any PRBCs to date. Etiology likely d/t chronic disease due to Chronic Kidney Disease  Current CBC reviewed-   Lab Results   Component Value Date    HGB 9.6 (L) 07/24/2024    HCT 31.0 (L) 07/24/2024     Monitor serial CBC and transfuse if patient becomes hemodynamically unstable, symptomatic or H/H drops below 7/21.  Iron low, continue iron supplement daily

## 2024-07-24 NOTE — PLAN OF CARE
Patient more SOB today, worse with ambulation. Nephrology following closely.        07/24/24 1125   Discharge Reassessment   Assessment Type Discharge Planning Reassessment   Did the patient's condition or plan change since previous assessment? No   Discharge Plan discussed with: Patient   Discharge Plan A Home Health

## 2024-07-24 NOTE — NURSING
Called and spoke with pt who agreed okay to schedule hospital follow up at the Discharge Clinic for 7/31 at 3:00 p.m. Anticipated discharge date is for tomorrow. Explained that we are located  exactly where her PCP, Dr. Romeo Alas is located.

## 2024-07-24 NOTE — ASSESSMENT & PLAN NOTE
Chronic, controlled. Latest blood pressure and vitals reviewed-     Temp:  [97.6 °F (36.4 °C)-99.5 °F (37.5 °C)]   Pulse:  [61-92]   Resp:  [18-20]   BP: (119-178)/(56-75)   SpO2:  [90 %-96 %] .     Continue amlodipine 5mg BID

## 2024-07-24 NOTE — PT/OT/SLP PROGRESS
"Physical Therapy Treatment    Patient Name:  Cheyanne Gomes   MRN:  [id number]    Recommendations:     Discharge Recommendations: Low Intensity Therapy  Discharge Equipment Recommendations: none  Barriers to discharge:  weakness, impaired endurance, impaired self care skills, impaired functional mobility, impaired activity tolerance.     Assessment:     Cheyanne Gomes is a 88 y.o. female admitted with a medical diagnosis of Acute kidney injury superimposed on chronic kidney disease.  She presents with the following impairments/functional limitations: weakness, impaired endurance, impaired self care skills, impaired functional mobility, gait instability, impaired balance, decreased safety awareness, impaired cardiopulmonary response to activity.    Pt found sitting up in bed side chair, agreeable to skilled physical therapy session today.     She performed sit to stand from bed side chair with SBA and 3WW.     She ambulated two bouts of 100ft with 3WW and CGA. She demonstrated short step length and slow gustavo today as well as required once standing rest break secondary to fatigue.     She ambulated back to her room where she stated "I need to go to the bathroom." She performed all toilet transfers with SBA and she performed all hygiene independently.     After using the restroom, pt stated "I think ill get back in bed now and rest, I'm a little tired." She transferred from sitting to supine with SBA. Pt left with HOB elevated, bed alarm on, call light within reach, all needs met, and RN notified.     Rehab Prognosis: Fair; patient would benefit from acute skilled PT services to address these deficits and reach maximum level of function.    Recent Surgery: * No surgery found *      Plan:     During this hospitalization, patient to be seen 6 x/week to address the identified rehab impairments via gait training, therapeutic activities, therapeutic exercises and progress toward the following goals:    Plan of Care " Expires:  24    Subjective     Chief Complaint: fatigue and SOB.   Patient/Family Comments/goals: to get better and go home  Pain/Comfort:  Pain Rating 1: 0/10      Objective:     Communicated with RN prior to session.  Patient found up in chair with peripheral IV, telemetry, oxygen, chair check upon PT entry to room.     General Precautions: Standard, fall  Orthopedic Precautions: N/A  Braces: N/A  Respiratory Status: Nasal cannula, flow 2 L/min     Functional Mobility:  Bed Mobility:     Sit to Supine: stand by assistance  Transfers:     Sit to Stand:  stand by assistance with 3WW  Gait: [id number] with 3WW and SBA.       AM-PAC 6 CLICK MOBILITY          Treatment & Education:  Pt educated on importance of time OOB, importance of intermittent mobility, safe techniques for transfers/ambulation, discharge recommendations/options, and use of call light for assistance and fall prevention.      Patient left HOB elevated with all lines intact, call button in reach, and RN notified..    GOALS:   Multidisciplinary Problems       Physical Therapy Goals          Problem: Physical Therapy    Goal Priority Disciplines Outcome Goal Variances Interventions   Physical Therapy Goal     PT, PT/OT      Description: Goals to be met by: 24     Patient will increase functional independence with mobility by performin. Supine to sit with Supervision  2. Sit to stand transfer with Supervision  3. Bed to chair transfer with Supervision using Rollator  4. Gait  x 200 feet with Supervision using Rollator.                              Time Tracking:     PT Received On: 24  PT Start Time: 1414     PT Stop Time: 1428  PT Total Time (min): 14 min     Billable Minutes: Gait Training 14    Treatment Type: Treatment  PT/PTA: PTA     Number of PTA visits since last PT visit: 3     2024

## 2024-07-24 NOTE — SUBJECTIVE & OBJECTIVE
Interval History:  Pt seen and examined.  Reports requiring oxygen for low oxygen sats and shortness of breath with exertion.  Does not use continuous oxygen at home, does use with her CPAP at night. Denies any chest pain.  Has some nausea with vomiting yesterday after taking meds on empty stomach-better today.  No bowel movement in several days, will add stool softener.    Reviewed Nephrology note from yesterday-fractionated excretion of urea not consistent with prerenal.    Review of Systems   Constitutional:  Positive for fatigue. Negative for chills and fever.   Respiratory:  Positive for shortness of breath (with exertion). Negative for cough.    Cardiovascular:  Negative for chest pain.   Gastrointestinal:  Positive for constipation and nausea. Negative for vomiting.     Objective:     Vital Signs (Most Recent):  Temp: 97.6 °F (36.4 °C) (07/24/24 0744)  Pulse: 84 (07/24/24 0839)  Resp: 20 (07/24/24 0839)  BP: (!) 155/69 (07/24/24 0744)  SpO2: (!) 94 % (07/24/24 0839) Vital Signs (24h Range):  Temp:  [97.6 °F (36.4 °C)-99.5 °F (37.5 °C)] 97.6 °F (36.4 °C)  Pulse:  [61-92] 84  Resp:  [18-20] 20  SpO2:  [90 %-96 %] 94 %  BP: (136-178)/(63-75) 155/69     Weight: 46.2 kg (101 lb 12.8 oz)  Body mass index is 22.03 kg/m².    Intake/Output Summary (Last 24 hours) at 7/24/2024 1118  Last data filed at 7/24/2024 0937  Gross per 24 hour   Intake 720 ml   Output 520 ml   Net 200 ml         Physical Exam  Vitals and nursing note reviewed.   Constitutional:       Appearance: Normal appearance.   Cardiovascular:      Rate and Rhythm: Normal rate and regular rhythm.      Heart sounds: Murmur heard.   Pulmonary:      Effort: Pulmonary effort is normal. No respiratory distress.      Breath sounds: Normal breath sounds.      Comments: Diminished bases    Abdominal:      General: Abdomen is flat. Bowel sounds are normal.      Palpations: Abdomen is soft.   Musculoskeletal:         General: Normal range of motion.      Cervical  back: Normal range of motion and neck supple.      Right lower leg: Edema (pedal 2+ pitting) present.      Left lower leg: Edema (Pedal 2+ pitting) present.   Skin:     General: Skin is warm and dry.      Capillary Refill: Capillary refill takes less than 2 seconds.   Neurological:      General: No focal deficit present.      Mental Status: She is alert and oriented to person, place, and time. Mental status is at baseline.             Significant Labs: All pertinent labs within the past 24 hours have been reviewed.  CBC:   Recent Labs   Lab 07/23/24 0443 07/24/24 0527   WBC 6.59 8.51   HGB 9.1* 9.6*   HCT 28.9* 31.0*   * 160     CMP:   Recent Labs   Lab 07/23/24 0443 07/24/24 0527   * 135*   K 4.9 4.6   CL 93* 95   CO2 29 23   GLU 99 88   BUN 63* 62*   CREATININE 4.2* 4.2*   CALCIUM 8.3* 7.9*   PROT 6.9 7.5   ALBUMIN 3.8 4.1   BILITOT 0.6 0.7   ALKPHOS 55 60   AST 20 24   ALT 13 16   ANIONGAP 12 17*       Significant Imaging: I have reviewed all pertinent imaging results/findings within the past 24 hours.

## 2024-07-24 NOTE — PROGRESS NOTES
[facility]  Progress Note    Patient Name: Cheyanne Gomes  MRN: [id number]  Patient Class: IP- Inpatient   Admission Date: 7/17/2024  Length of Stay: 7 days  Attending Physician: Evert Cisse MD  Primary Care Provider: Romeo Alas MD        Subjective:     Principal Problem:Acute kidney injury superimposed on chronic kidney disease        HPI:  88 year old pt getting admitted with CRISTO  Blood work was done by GI MD few days ago and it showed high crt level  Pt was referred to Nephro MD and pt was instructed to come to ER by Nephro MD  Pt denies shortness of breath but endorses 5 pound weight gain  Also pt has swelling on legs and abdominal distension which she attributes due to fluid gain  She has ecchymosis on both legs and more on RLE   Pt had a fall few days ago and had ER visit 2  & all imaging studies were normal      Overview/Hospital Course:  88 year old female was admitted with CRISTO on CKD likely 2/2 CRS.  Serum creatinine on admission was 3.5.  She was started on IV lasix and entresto was held.  Nephrology was consulted.  Serum creatinine worsened to 4.1 on 7/21.  Lasix was discontinued and she was given 500cc of normal saline.  She was found to have secondary hyperparathyroidism, she was started on calcitriol.  Iron was also low, she was started on PO iron.  Magnesium was high on admission, magnesium supplements were discontinued, she will need to stop those on discharge.  Also found to have hyponatremia and hypokalemia, which are improved.  CXR showed faint left upper lobe alveolar opacity with concern for pneumonia.  Urinalysis was positive for infection and urine cx revealed morganella morganii.  She has completed course of levaquin.  Her lungs are clear and she is on room air, she wears cpap at night.  Serum creatinine today 4.2, nephrology recommended to continue holding lasix and metolazone and monitor.    Interval History:  Pt seen and examined.  Reports  requiring oxygen for low oxygen sats and shortness of breath with exertion.  Does not use continuous oxygen at home, does use with her CPAP at night. Denies any chest pain.  Has some nausea with vomiting yesterday after taking meds on empty stomach-better today.  No bowel movement in several days, will add stool softener.    Reviewed Nephrology note from yesterday-fractionated excretion of urea not consistent with prerenal.    Review of Systems   Constitutional:  Positive for fatigue. Negative for chills and fever.   Respiratory:  Positive for shortness of breath (with exertion). Negative for cough.    Cardiovascular:  Negative for chest pain.   Gastrointestinal:  Positive for constipation and nausea. Negative for vomiting.     Objective:     Vital Signs (Most Recent):  Temp: 97.6 °F (36.4 °C) (07/24/24 0744)  Pulse: 84 (07/24/24 0839)  Resp: 20 (07/24/24 0839)  BP: (!) 155/69 (07/24/24 0744)  SpO2: (!) 94 % (07/24/24 0839) Vital Signs (24h Range):  Temp:  [97.6 °F (36.4 °C)-99.5 °F (37.5 °C)] 97.6 °F (36.4 °C)  Pulse:  [61-92] 84  Resp:  [18-20] 20  SpO2:  [90 %-96 %] 94 %  BP: (136-178)/(63-75) 155/69     Weight: 46.2 kg (101 lb 12.8 oz)  Body mass index is 22.03 kg/m².    Intake/Output Summary (Last 24 hours) at 7/24/2024 1118  Last data filed at 7/24/2024 0937  Gross per 24 hour   Intake 720 ml   Output 520 ml   Net 200 ml         Physical Exam  Vitals and nursing note reviewed.   Constitutional:       Appearance: Normal appearance.   Cardiovascular:      Rate and Rhythm: Normal rate and regular rhythm.      Heart sounds: Murmur heard.   Pulmonary:      Effort: Pulmonary effort is normal. No respiratory distress.      Breath sounds: Normal breath sounds.      Comments: Diminished bases    Abdominal:      General: Abdomen is flat. Bowel sounds are normal.      Palpations: Abdomen is soft.   Musculoskeletal:         General: Normal range of motion.      Cervical back: Normal range of motion and neck supple.       Right lower leg: Edema (pedal 2+ pitting) present.      Left lower leg: Edema (Pedal 2+ pitting) present.   Skin:     General: Skin is warm and dry.      Capillary Refill: Capillary refill takes less than 2 seconds.   Neurological:      General: No focal deficit present.      Mental Status: She is alert and oriented to person, place, and time. Mental status is at baseline.             Significant Labs: All pertinent labs within the past 24 hours have been reviewed.  CBC:   Recent Labs   Lab 07/23/24 0443 07/24/24 0527   WBC 6.59 8.51   HGB 9.1* 9.6*   HCT 28.9* 31.0*   * 160     CMP:   Recent Labs   Lab 07/23/24 0443 07/24/24 0527   * 135*   K 4.9 4.6   CL 93* 95   CO2 29 23   GLU 99 88   BUN 63* 62*   CREATININE 4.2* 4.2*   CALCIUM 8.3* 7.9*   PROT 6.9 7.5   ALBUMIN 3.8 4.1   BILITOT 0.6 0.7   ALKPHOS 55 60   AST 20 24   ALT 13 16   ANIONGAP 12 17*       Significant Imaging: I have reviewed all pertinent imaging results/findings within the past 24 hours.    Assessment/Plan:      * Acute kidney injury superimposed on chronic kidney disease  2/2 CRS  Echo with aortic stenosis and diastolic dysfunction  Renal US unremarkable  Nephrology consulted  Serum creatinine on admission 3.5, baseline 1.3 2 weeks ago  Serum creatinine worsened on 7/21 to 4.1, and appears to be plateauing  Strict I&Os  -the Entresto, Lasix, and metolazone have been on hold due to worsening renal function  -she has a new oxygen requirement, will check CXR to evaluate volume status and discuss adjustments for volume management as needed with Nephrology    Pneumonia  CXR 7/20: Development of faint alveolar opacity left upper lobe suggestive of pneumonia   Completed course of levaquin, stop duonebs            Secondary hyperparathyroidism  Low Ca  Phos, 25 vitamin D normal  Started on calcitriol daily per nephrology    Anemia  Patient's anemia is currently stable. Has not received any PRBCs to date. Etiology likely d/t chronic  disease due to Chronic Kidney Disease  Current CBC reviewed-   Lab Results   Component Value Date    HGB 9.6 (L) 07/24/2024    HCT 31.0 (L) 07/24/2024     Monitor serial CBC and transfuse if patient becomes hemodynamically unstable, symptomatic or H/H drops below 7/21.  Iron low, continue iron supplement daily    Fall  Had fall on 7/15  CTH done on 7/15 without acute abnormality  PT/OT ordered, recommending low intensity therapy      UTI (urinary tract infection)  Urinalysis +leuks and nitrites  Urine cx +morganella morganii   Completed course of levaquin  Blood cx no growth    Acute on chronic combined systolic and diastolic heart failure  Echo 6/4/24:    Left Ventricle: The left ventricle is normal in size. Mildly increased wall thickness. There is mild concentric hypertrophy. Regional wall motion abnormalities present.  Apical aneurysm. There is low normal systolic function. Ejection fraction by visual approximation is 55%. Grade III diastolic dysfunction.    Right Ventricle: Normal right ventricular cavity size. Wall thickness is normal. Systolic function is normal. Pacemaker lead present in the ventricle.    Left Atrium: Left atrium is moderately dilated.    Right Atrium: Right atrium is mildly dilated.    Aortic Valve: The aortic valve is a trileaflet valve. Moderately restricted motion. There is moderate stenosis. Aortic valve area by VTI is 0.90 cm². Aortic valve peak velocity is 2.19 m/s. Mean gradient is 10 mmHg. The dimensionless index is 0.32. There is mild aortic regurgitation with a centrally directed jet.    Mitral Valve: There is mild regurgitation with a posterolateral eccentriccally directed jet.    Tricuspid Valve: There is mild to moderate regurgitation.    IVC/SVC: Normal venous pressure at 3 mmHg.    The estimated pulmonary artery systolic pressure is 31 mmHg.    Entresto and lasix have been on hold due to CRISTO on CKD  -she has some SALINAS and new oxygen requirement, CXR ordered to assess volume  status      HTN (hypertension)  Chronic, controlled. Latest blood pressure and vitals reviewed-     Temp:  [97.6 °F (36.4 °C)-99.5 °F (37.5 °C)]   Pulse:  [61-92]   Resp:  [18-20]   BP: (119-178)/(56-75)   SpO2:  [90 %-96 %] .     Continue amlodipine 5mg BID      VTE Risk Mitigation (From admission, onward)           Ordered     enoxaparin injection 30 mg  Daily         07/17/24 2111     IP VTE HIGH RISK PATIENT  Once         07/17/24 2111     Place sequential compression device  Until discontinued         07/17/24 2111                    Discharge Planning   RAMONE: 7/25/2024     Code Status: Full Code   Is the patient medically ready for discharge?:     Reason for patient still in hospital (select all that apply): Patient trending condition, Treatment, Imaging, and Consult recommendations  Discharge Plan A: Home Health                  Sofia Honeycutt NP  Department of Hospital Medicine   [facility]

## 2024-07-24 NOTE — ASSESSMENT & PLAN NOTE
CXR 7/20: Development of faint alveolar opacity left upper lobe suggestive of pneumonia   Completed course of levaquin, stop duonebs

## 2024-07-24 NOTE — CARE UPDATE
07/24/24 0839   Patient Assessment/Suction   Level of Consciousness (AVPU) alert   Respiratory Effort Normal;Unlabored   Expansion/Accessory Muscles/Retractions no use of accessory muscles;no retractions;expansion symmetric   All Lung Fields Breath Sounds clear   Rhythm/Pattern, Respiratory unlabored;depth regular   Cough Frequency no cough   Skin Integrity   $ Wound Care Tech Time 15 min   Area Observed Bridge of nose   Skin Appearance without discoloration   PRE-TX-O2   Device (Oxygen Therapy) nasal cannula  (Patient on 2L NC due to decrease sats to 84%, and short of breath noted. Patient just came from a bathroom.)   $ Is the patient on Low Flow Oxygen? Yes   Flow (L/min) (Oxygen Therapy) 2   SpO2 (!) 94 %   Pulse Oximetry Type Intermittent   $ Pulse Oximetry - Single Charge Pulse Oximetry - Single   Pulse 84   Resp 20

## 2024-07-24 NOTE — ASSESSMENT & PLAN NOTE
Echo 6/4/24:    Left Ventricle: The left ventricle is normal in size. Mildly increased wall thickness. There is mild concentric hypertrophy. Regional wall motion abnormalities present.  Apical aneurysm. There is low normal systolic function. Ejection fraction by visual approximation is 55%. Grade III diastolic dysfunction.    Right Ventricle: Normal right ventricular cavity size. Wall thickness is normal. Systolic function is normal. Pacemaker lead present in the ventricle.    Left Atrium: Left atrium is moderately dilated.    Right Atrium: Right atrium is mildly dilated.    Aortic Valve: The aortic valve is a trileaflet valve. Moderately restricted motion. There is moderate stenosis. Aortic valve area by VTI is 0.90 cm². Aortic valve peak velocity is 2.19 m/s. Mean gradient is 10 mmHg. The dimensionless index is 0.32. There is mild aortic regurgitation with a centrally directed jet.    Mitral Valve: There is mild regurgitation with a posterolateral eccentriccally directed jet.    Tricuspid Valve: There is mild to moderate regurgitation.    IVC/SVC: Normal venous pressure at 3 mmHg.    The estimated pulmonary artery systolic pressure is 31 mmHg.    Entresto and lasix have been on hold due to CRISTO on CKD  -she has some SALINAS and new oxygen requirement, CXR ordered to assess volume status

## 2024-07-25 PROBLEM — N39.0 UTI (URINARY TRACT INFECTION): Status: RESOLVED | Noted: 2024-07-18 | Resolved: 2024-07-25

## 2024-07-25 PROBLEM — J18.9 PNEUMONIA: Status: RESOLVED | Noted: 2024-07-20 | Resolved: 2024-07-25

## 2024-07-25 PROBLEM — Z71.89 ACP (ADVANCE CARE PLANNING): Status: ACTIVE | Noted: 2024-07-25

## 2024-07-25 PROBLEM — J96.01 ACUTE RESPIRATORY FAILURE WITH HYPOXIA: Status: ACTIVE | Noted: 2024-07-25

## 2024-07-25 LAB
ALBUMIN SERPL BCP-MCNC: 3.7 G/DL (ref 3.5–5.2)
ALP SERPL-CCNC: 55 U/L (ref 55–135)
ALT SERPL W/O P-5'-P-CCNC: 13 U/L (ref 10–44)
ANION GAP SERPL CALC-SCNC: 16 MMOL/L (ref 8–16)
AST SERPL-CCNC: 19 U/L (ref 10–40)
BASOPHILS # BLD AUTO: 0.02 K/UL (ref 0–0.2)
BASOPHILS NFR BLD: 0.2 % (ref 0–1.9)
BILIRUB SERPL-MCNC: 0.7 MG/DL (ref 0.1–1)
BUN SERPL-MCNC: 63 MG/DL (ref 8–23)
CALCIUM SERPL-MCNC: 7.9 MG/DL (ref 8.7–10.5)
CHLORIDE SERPL-SCNC: 96 MMOL/L (ref 95–110)
CO2 SERPL-SCNC: 24 MMOL/L (ref 23–29)
CREAT SERPL-MCNC: 4.2 MG/DL (ref 0.5–1.4)
DIFFERENTIAL METHOD BLD: ABNORMAL
EOSINOPHIL # BLD AUTO: 0.1 K/UL (ref 0–0.5)
EOSINOPHIL NFR BLD: 0.7 % (ref 0–8)
ERYTHROCYTE [DISTWIDTH] IN BLOOD BY AUTOMATED COUNT: 17 % (ref 11.5–14.5)
EST. GFR  (NO RACE VARIABLE): 9.7 ML/MIN/1.73 M^2
GLUCOSE SERPL-MCNC: 78 MG/DL (ref 70–110)
HCT VFR BLD AUTO: 28.2 % (ref 37–48.5)
HGB BLD-MCNC: 8.7 G/DL (ref 12–16)
IMM GRANULOCYTES # BLD AUTO: 0.06 K/UL (ref 0–0.04)
IMM GRANULOCYTES NFR BLD AUTO: 0.7 % (ref 0–0.5)
LYMPHOCYTES # BLD AUTO: 0.8 K/UL (ref 1–4.8)
LYMPHOCYTES NFR BLD: 9 % (ref 18–48)
MAGNESIUM SERPL-MCNC: 2.2 MG/DL (ref 1.6–2.6)
MCH RBC QN AUTO: 27.2 PG (ref 27–31)
MCHC RBC AUTO-ENTMCNC: 30.9 G/DL (ref 32–36)
MCV RBC AUTO: 88 FL (ref 82–98)
MONOCYTES # BLD AUTO: 0.6 K/UL (ref 0.3–1)
MONOCYTES NFR BLD: 7.4 % (ref 4–15)
NEUTROPHILS # BLD AUTO: 6.9 K/UL (ref 1.8–7.7)
NEUTROPHILS NFR BLD: 82 % (ref 38–73)
NRBC BLD-RTO: 0 /100 WBC
PLATELET # BLD AUTO: 147 K/UL (ref 150–450)
PMV BLD AUTO: 9.7 FL (ref 9.2–12.9)
POTASSIUM SERPL-SCNC: 4.5 MMOL/L (ref 3.5–5.1)
PROT SERPL-MCNC: 6.9 G/DL (ref 6–8.4)
RBC # BLD AUTO: 3.2 M/UL (ref 4–5.4)
SODIUM SERPL-SCNC: 136 MMOL/L (ref 136–145)
WBC # BLD AUTO: 8.4 K/UL (ref 3.9–12.7)

## 2024-07-25 PROCEDURE — 99900035 HC TECH TIME PER 15 MIN (STAT)

## 2024-07-25 PROCEDURE — 83735 ASSAY OF MAGNESIUM: CPT | Performed by: INTERNAL MEDICINE

## 2024-07-25 PROCEDURE — 25000003 PHARM REV CODE 250: Performed by: INTERNAL MEDICINE

## 2024-07-25 PROCEDURE — 27000221 HC OXYGEN, UP TO 24 HOURS

## 2024-07-25 PROCEDURE — 80053 COMPREHEN METABOLIC PANEL: CPT | Performed by: INTERNAL MEDICINE

## 2024-07-25 PROCEDURE — 63600175 PHARM REV CODE 636 W HCPCS: Performed by: NURSE PRACTITIONER

## 2024-07-25 PROCEDURE — 87340 HEPATITIS B SURFACE AG IA: CPT | Performed by: INTERNAL MEDICINE

## 2024-07-25 PROCEDURE — 63600175 PHARM REV CODE 636 W HCPCS: Performed by: INTERNAL MEDICINE

## 2024-07-25 PROCEDURE — 86706 HEP B SURFACE ANTIBODY: CPT | Performed by: INTERNAL MEDICINE

## 2024-07-25 PROCEDURE — 85025 COMPLETE CBC W/AUTO DIFF WBC: CPT | Performed by: INTERNAL MEDICINE

## 2024-07-25 PROCEDURE — 99900031 HC PATIENT EDUCATION (STAT)

## 2024-07-25 PROCEDURE — 36415 COLL VENOUS BLD VENIPUNCTURE: CPT | Performed by: INTERNAL MEDICINE

## 2024-07-25 PROCEDURE — 94761 N-INVAS EAR/PLS OXIMETRY MLT: CPT

## 2024-07-25 PROCEDURE — 86704 HEP B CORE ANTIBODY TOTAL: CPT | Performed by: INTERNAL MEDICINE

## 2024-07-25 PROCEDURE — 12000002 HC ACUTE/MED SURGE SEMI-PRIVATE ROOM

## 2024-07-25 PROCEDURE — 25000003 PHARM REV CODE 250: Performed by: NURSE PRACTITIONER

## 2024-07-25 RX ORDER — METOLAZONE 2.5 MG/1
10 TABLET ORAL DAILY
Status: DISCONTINUED | OUTPATIENT
Start: 2024-07-25 | End: 2024-07-26 | Stop reason: HOSPADM

## 2024-07-25 RX ORDER — HYDROMORPHONE HYDROCHLORIDE 1 MG/ML
0.2 INJECTION, SOLUTION INTRAMUSCULAR; INTRAVENOUS; SUBCUTANEOUS EVERY 4 HOURS PRN
Status: DISCONTINUED | OUTPATIENT
Start: 2024-07-25 | End: 2024-07-26

## 2024-07-25 RX ORDER — SERTRALINE HYDROCHLORIDE 50 MG/1
100 TABLET, FILM COATED ORAL DAILY
Status: DISCONTINUED | OUTPATIENT
Start: 2024-07-25 | End: 2024-07-26 | Stop reason: HOSPADM

## 2024-07-25 RX ORDER — FUROSEMIDE 10 MG/ML
80 INJECTION INTRAMUSCULAR; INTRAVENOUS ONCE
Status: COMPLETED | OUTPATIENT
Start: 2024-07-25 | End: 2024-07-25

## 2024-07-25 RX ADMIN — METOPROLOL SUCCINATE 50 MG: 50 TABLET, FILM COATED, EXTENDED RELEASE ORAL at 10:07

## 2024-07-25 RX ADMIN — ASPIRIN 81 MG 81 MG: 81 TABLET ORAL at 10:07

## 2024-07-25 RX ADMIN — SERTRALINE HYDROCHLORIDE 100 MG: 50 TABLET ORAL at 05:07

## 2024-07-25 RX ADMIN — AMLODIPINE BESYLATE 5 MG: 5 TABLET ORAL at 08:07

## 2024-07-25 RX ADMIN — ENOXAPARIN SODIUM 30 MG: 30 INJECTION SUBCUTANEOUS at 05:07

## 2024-07-25 RX ADMIN — CALCITRIOL CAPSULES 0.25 MCG 0.25 MCG: 0.25 CAPSULE ORAL at 10:07

## 2024-07-25 RX ADMIN — FUROSEMIDE 80 MG: 10 INJECTION, SOLUTION INTRAMUSCULAR; INTRAVENOUS at 09:07

## 2024-07-25 RX ADMIN — PANTOPRAZOLE SODIUM 40 MG: 40 TABLET, DELAYED RELEASE ORAL at 04:07

## 2024-07-25 RX ADMIN — METOLAZONE 10 MG: 2.5 TABLET ORAL at 12:07

## 2024-07-25 RX ADMIN — HYDROMORPHONE HYDROCHLORIDE 0.2 MG: 0.5 INJECTION, SOLUTION INTRAMUSCULAR; INTRAVENOUS; SUBCUTANEOUS at 04:07

## 2024-07-25 RX ADMIN — ISOSORBIDE MONONITRATE 60 MG: 60 TABLET, EXTENDED RELEASE ORAL at 10:07

## 2024-07-25 RX ADMIN — AMLODIPINE BESYLATE 5 MG: 5 TABLET ORAL at 10:07

## 2024-07-25 RX ADMIN — FERROUS SULFATE TAB 325 MG (65 MG ELEMENTAL FE) 1 EACH: 325 (65 FE) TAB at 10:07

## 2024-07-25 NOTE — PROGRESS NOTES
"Progress Note  Nephrology    Consult Requested By: Evert Cisse MD    Reason for Consult: CRISTO      SUBJECTIVE:     History of Present Illness:  89 y/o female patient referred to nephrology by primary MD, then to ER per nephrology.  Blood work done out patient had revealed elevated Scr.  C/o 5 lb wt gain, LE edema, and abdominal distension.  Nephrology is consulted for CRISTO.    7/18  renal function incrementally improved overnight.  Getting lasix 20 mg IVP bid.  UOP not recorded. UA noted--has UTI, renal US done--no hydronephrosis.  Old labs reviewed, six months ago, BUN/Scr/eGFR were normal, then appears renal function began to decline--CKD 3?--will see where renal function falls out.  Pt states she has diarrhea "off and on", no n/v, no burning or pain w/urination, sometimes feels like she has to go and can't.    7/19 VSS, on RA, UOP 850cc + voids, still with some difficulty breathing  7/20 some spikes in BP, on RA, UOP 800cc, ordered BNP, CXR with concern for PNA  7/21 VSS, on RA, UOP 150cc + 5 voids, some nausea this AM not resolved, sitting in chair eating lunch, looks well actually  7/22  500cc UOP recorded, VSS.  Incremental improvement in renal function, would continue to hold lasix today and f/u labs in am.  Pt up in chair, denies SOB, lungs sound clear.    7/23  VSS, UOP not recorded.  Scr bumped again, still holding lasix.  Denies SOB, no complaints.  7/24  PVR w/0 urine in bladder.  One shift UOP recorded, 520cc.  VSS.  Repeat UA yesterday, no casts, 1+ protein.  Renal function without change from yesterday.  Continue to hold diuretics.  7/25  700 cc UOP recorded.  Renal function the same.  Diuretics resumed today for SOB.  Hospital med at bedside, pt being updated as well as a family member by phone.  Pt states she does not want dialysis or any heroic measures if this becomes a necessary choice.  Got lasix IV 80 mg, on 2L O2 bnc, has SOB w/exertion but ok at rest.    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  Would hold lasix one more day.  - BP stabilized- change norvasc back to 5mg BID  - BNP hasn't changed much but with severe CRISTO so that may be contributing to elevation- comfortable on exam, on RA- holding lasix due to CRISTO- hold entresto and metolazone as well- cardiac diet, 1.5L fluid restriction  - serum Na improved  - serum K acceptable  - hold Mg supplements  - low Ca noted- phos, 25 vitamin D normal- PTH high- started calcitriol daily this admission  - component of WILLIAMS/CKD- added iron supplement this admission  - CXR noted- dose antbx for CrCl < 20    Review of Systems:  As above    OBJECTIVE:     Vital Signs Range (Last 24H):  Temp:  [97.8 °F (36.6 °C)-98.8 °F (37.1 °C)]   Pulse:  [72-85]   Resp:  [16-20]   BP: (119-167)/(56-85)   SpO2:  [92 %-96 %]     Physical Exam:  General- NAD noted  HEENT- WNL  Neck- supple  CV- Regular rate and rhythm  Resp- Lungs CTA Bilaterally, No increased WOB  GI- Non tender/non-distended, BS normoactive x4 quads  Extrem- No cyanosis, clubbing, +trace edema.  Derm- skin w/d  Neuro-  No flap.     Body mass index is 22.03 kg/m².    Laboratory:  CBC:   Recent Labs   Lab 07/25/24  0359   WBC 8.40   RBC 3.20*   HGB 8.7*   HCT 28.2*   *   MCV 88   MCH 27.2   MCHC 30.9*     CMP:   Recent Labs   Lab 07/25/24  0359   GLU 78   CALCIUM 7.9*   ALBUMIN 3.7   PROT 6.9      K 4.5   CO2 24   CL 96   BUN 63*   CREATININE 4.2*   ALKPHOS 55   ALT 13   AST 19   BILITOT 0.7     Recent Labs   Lab 07/23/24  1439   COLORU Yellow   SPECGRAV 1.015   PHUR 8.0   PROTEINUA 1+*   BACTERIA Rare   NITRITE Negative   LEUKOCYTESUR Negative   UROBILINOGEN Negative   HYALINECASTS 0       Diagnostic Results:  Labs: Reviewed  US: Reviewed      ASSESSMENT/PLAN:      Active Hospital Problems    Diagnosis  POA    *Acute kidney injury superimposed on chronic kidney disease [N17.9, N18.9]  Yes    Secondary hyperparathyroidism [N25.81]  Yes    Pneumonia [J18.9]  No    Anemia [D64.9]  Yes    UTI (urinary tract infection) [N39.0]  Yes    Fall [W19.XXXA]  Yes    Acute on chronic combined systolic and diastolic heart failure [I50.43]  Yes    HTN (hypertension) [I10]  Yes      Resolved Hospital Problems    Diagnosis Date Resolved POA    Hypermagnesemia [E83.41] 07/21/2024 Yes     Patient care time was spent personally by me on the following activities: > 35 minutes  Obtaining a history.  Examination of patient.  Providing medical care at the patients bedside.  Developing a treatment plan with patient or surrogate and bedside caregivers.  Ordering and reviewing laboratory studies, radiographic studies, pulse oximetry.  Ordering and performing treatments and interventions.  Evaluation of patient's response to treatment.  Discussions with consultants while on the unit and immediately available to the patient.  Re-evaluation of the patient's condition.  Documentation in the medical record.      Thank you for allowing us to participate in the care of your patient. We will follow the patient and provide recommendations as needed.      Jes Dye NP

## 2024-07-25 NOTE — ASSESSMENT & PLAN NOTE
Advance Care Planning     Date: 07/25/2024    Code Status  In light of the patients advanced and life limiting illness,I engaged the the patient and family in a voluntary conversation about the patient's preferences for care  at the very end of life. The patient wishes to have a natural, peaceful death.  Along those lines, the patient does not wish to have CPR or other invasive treatments performed when her heart and/or breathing stops. I communicated to the patient and family that a DNR order would be placed in her medical record to reflect this preference.    A total of 16 min was spent on advance care planning, goals of care discussion, emotional support, formulating and communicating prognosis and exploring burden/benefit of various approaches of treatment. This discussion occurred on a fully voluntary basis with the verbal consent of the patient and/or family.

## 2024-07-25 NOTE — PLAN OF CARE
Problem: Adult Inpatient Plan of Care  Goal: Plan of Care Review  Outcome: Progressing  Goal: Patient-Specific Goal (Individualized)  Outcome: Progressing  Goal: Absence of Hospital-Acquired Illness or Injury  Outcome: Progressing  Goal: Optimal Comfort and Wellbeing  Outcome: Progressing  Goal: Readiness for Transition of Care  Outcome: Progressing     Problem: Acute Kidney Injury/Impairment  Goal: Fluid and Electrolyte Balance  Outcome: Progressing  Goal: Improved Oral Intake  Outcome: Progressing  Goal: Effective Renal Function  Outcome: Progressing     Problem: Fall Injury Risk  Goal: Absence of Fall and Fall-Related Injury  Outcome: Progressing     Problem: Skin Injury Risk Increased  Goal: Skin Health and Integrity  Outcome: Progressing     Problem: Wound  Goal: Optimal Coping  Outcome: Progressing  Goal: Optimal Functional Ability  Outcome: Progressing  Goal: Absence of Infection Signs and Symptoms  Outcome: Progressing  Goal: Improved Oral Intake  Outcome: Progressing  Goal: Optimal Pain Control and Function  Outcome: Progressing  Goal: Skin Health and Integrity  Outcome: Progressing  Goal: Optimal Wound Healing  Outcome: Progressing     Problem: Pneumonia  Goal: Fluid Balance  Outcome: Progressing  Goal: Resolution of Infection Signs and Symptoms  Outcome: Progressing  Goal: Effective Oxygenation and Ventilation  Outcome: Progressing     Problem: Oral Intake Inadequate  Goal: Improved Oral Intake  Outcome: Progressing

## 2024-07-25 NOTE — ASSESSMENT & PLAN NOTE
Echo 6/4/24:    Left Ventricle: The left ventricle is normal in size. Mildly increased wall thickness. There is mild concentric hypertrophy. Regional wall motion abnormalities present.  Apical aneurysm. There is low normal systolic function. Ejection fraction by visual approximation is 55%. Grade III diastolic dysfunction.    Right Ventricle: Normal right ventricular cavity size. Wall thickness is normal. Systolic function is normal. Pacemaker lead present in the ventricle.    Left Atrium: Left atrium is moderately dilated.    Right Atrium: Right atrium is mildly dilated.    Aortic Valve: The aortic valve is a trileaflet valve. Moderately restricted motion. There is moderate stenosis. Aortic valve area by VTI is 0.90 cm². Aortic valve peak velocity is 2.19 m/s. Mean gradient is 10 mmHg. The dimensionless index is 0.32. There is mild aortic regurgitation with a centrally directed jet.    Mitral Valve: There is mild regurgitation with a posterolateral eccentriccally directed jet.    Tricuspid Valve: There is mild to moderate regurgitation.    IVC/SVC: Normal venous pressure at 3 mmHg.    The estimated pulmonary artery systolic pressure is 31 mmHg.    Entresto and lasix have been on hold due to CRISTO on CKD    -on 07/24 she developed dyspnea on exertion CXR confirmed hypovolemia   -no improvement from kidney function and on 07/25 she was re-initiated on IV diuresis and metolazone she has no desire to pursue any HD for volume management  -currently stable on 2 L nasal cannula, high risk for decompensation if kidneys do not respond to the diuretics

## 2024-07-25 NOTE — PT/OT/SLP PROGRESS
Physical Therapy      Patient Name:  Cheyanne Gomes   MRN:  [id number]    Patient not seen today secondary to Other (Comment), Patient unwilling to participate (pt declined x2 attempts secondary to SOB in both am and pm). Will follow-up 7/26/24.

## 2024-07-25 NOTE — CARE UPDATE
And some anxiety and shortness a breath.  Remains stable on 2 L of oxygen, but becomes winded with talking or movement.  Low urine output documented, however patient does endorse one episode of output which was not measured.  Long discussion with Pt and her daughter at the bedside regarding volume overload and lack of adequate response to diuresis.  Pt ultimately wants her symptoms to be controlled and be comfortable.  I discussed palliative care consult with Pt and her daughter and they agree- Dr. Meyer to see tomorrow.  We also introduced the topic of hospice.  I discussed her symptoms and potential management plans with palliative care physician.  At this time parties agree that managing her symptoms is of highest priority.  We will initiate very low-dose IV Dilaudid as recommended by palliative medicine for the treatment of dyspnea in the setting of renal failure.  Should she continue to have anxiety would recommend low-dose Ativan.  Discussed with Pt and her daughter who agree with management plan.    Advance Care Planning     Date: 07/25/2024  16 minutes

## 2024-07-25 NOTE — PLAN OF CARE
Problem: Oral Intake Inadequate  Goal: Improved Oral Intake  Intervention: Promote and Optimize Oral Intake  Flowsheets (Taken 7/25/2024 1207)  Nutrition Interventions: supplemental drinks provided     Problem: Skin Injury Risk Increased  Goal: Skin Health and Integrity  Intervention: Promote and Optimize Oral Intake  Flowsheets (Taken 7/25/2024 1207)  Nutrition Interventions: supplemental drinks provided

## 2024-07-25 NOTE — PROGRESS NOTES
[facility] Medicine  Progress Note    Patient Name: Cheyanne Gomes  MRN: [id number]  Patient Class: IP- Inpatient   Admission Date: 7/17/2024  Length of Stay: 8 days  Attending Physician: Evert Cisse MD  Primary Care Provider: Romeo Alas MD        Subjective:     Principal Problem:Acute kidney injury superimposed on chronic kidney disease        HPI:  88 year old pt getting admitted with CRISTO  Blood work was done by GI MD few days ago and it showed high crt level  Pt was referred to Nephro MD and pt was instructed to come to ER by Nephro MD  Pt denies shortness of breath but endorses 5 pound weight gain  Also pt has swelling on legs and abdominal distension which she attributes due to fluid gain  She has ecchymosis on both legs and more on RLE   Pt had a fall few days ago and had ER visit 2  & all imaging studies were normal      Overview/Hospital Course:  88 year old female was admitted with CRISTO on CKD likely 2/2 CRS.  Serum creatinine on admission was 3.5.  She was started on IV lasix and entresto was held.  Nephrology was consulted.  Serum creatinine worsened to 4.1 on 7/21.  Lasix was discontinued and she was given 500cc of normal saline.  She was found to have secondary hyperparathyroidism, she was started on calcitriol.  Iron was also low, she was started on PO iron.  Magnesium was high on admission, magnesium supplements were discontinued, she will need to stop those on discharge.  Also found to have hyponatremia and hypokalemia, which are improved.  CXR showed faint left upper lobe alveolar opacity with concern for pneumonia.  Urinalysis was positive for infection and urine cx revealed morganella morganii.  She has completed course of levaquin.  Her lungs are clear and she is on room air, she wears cpap at night.  Serum creatinine today 4.2, nephrology recommended to continue holding lasix and metolazone and monitor.    Interval History:  Pt seen and examined.  CXR from  yesterday shows some pulmonary edema with increasing BNP.  Kidneys remained stagnant.  Poor appetite, increasing shortness a breath with exertion.  Discussed with nephrology-will dose IV Lasix, he is adding oral metolazone as well.    Long discussion with Pt who does not want any form of HD.  She also wants to be DNR has living will.  Her daughter was updated as requested by Pt.    Review of Systems   Constitutional:  Positive for fatigue. Negative for chills and fever.   Respiratory:  Positive for shortness of breath (with exertion). Negative for cough.    Cardiovascular:  Negative for chest pain.   Gastrointestinal:  Positive for nausea. Negative for constipation and vomiting.     Objective:     Vital Signs (Most Recent):  Temp: 98.8 °F (37.1 °C) (07/25/24 0555)  Pulse: 84 (07/25/24 0807)  Resp: 20 (07/25/24 0807)  BP: (!) 160/65 (07/25/24 0555)  SpO2: (!) 94 % (07/25/24 0807) Vital Signs (24h Range):  Temp:  [98.2 °F (36.8 °C)-98.8 °F (37.1 °C)] 98.8 °F (37.1 °C)  Pulse:  [75-85] 84  Resp:  [16-20] 20  SpO2:  [94 %-96 %] 94 %  BP: (148-167)/(65-85) 160/65     Weight: 46.2 kg (101 lb 12.8 oz)  Body mass index is 22.03 kg/m².    Intake/Output Summary (Last 24 hours) at 7/25/2024 1156  Last data filed at 7/25/2024 0500  Gross per 24 hour   Intake 480 ml   Output 100 ml   Net 380 ml         Physical Exam  Vitals and nursing note reviewed.   Constitutional:       Appearance: Normal appearance.   Cardiovascular:      Rate and Rhythm: Normal rate and regular rhythm.      Heart sounds: Murmur heard.   Pulmonary:      Effort: Pulmonary effort is normal. No respiratory distress.      Breath sounds: Normal breath sounds.      Comments: Diminished bases    Abdominal:      General: Abdomen is flat. Bowel sounds are normal.      Palpations: Abdomen is soft.   Musculoskeletal:         General: Normal range of motion.      Cervical back: Normal range of motion and neck supple.      Right lower leg: Edema (pedal 2+ pitting)  present.      Left lower leg: Edema (Pedal 2+ pitting) present.   Skin:     General: Skin is warm and dry.      Capillary Refill: Capillary refill takes less than 2 seconds.   Neurological:      General: No focal deficit present.      Mental Status: She is alert and oriented to person, place, and time. Mental status is at baseline.             Significant Labs: All pertinent labs within the past 24 hours have been reviewed.  CBC:   Recent Labs   Lab 07/24/24 0527 07/25/24  0359   WBC 8.51 8.40   HGB 9.6* 8.7*   HCT 31.0* 28.2*    147*     CMP:   Recent Labs   Lab 07/24/24 0527 07/25/24  0359   * 136   K 4.6 4.5   CL 95 96   CO2 23 24   GLU 88 78   BUN 62* 63*   CREATININE 4.2* 4.2*   CALCIUM 7.9* 7.9*   PROT 7.5 6.9   ALBUMIN 4.1 3.7   BILITOT 0.7 0.7   ALKPHOS 60 55   AST 24 19   ALT 16 13   ANIONGAP 17* 16       Significant Imaging: I have reviewed all pertinent imaging results/findings within the past 24 hours.    Assessment/Plan:      * Acute kidney injury superimposed on chronic kidney disease  2/2 CRS  Echo with aortic stenosis and diastolic dysfunction  Renal US unremarkable  Nephrology consulted  Serum creatinine on admission 3.5, baseline 1.3 2 weeks ago  Serum creatinine worsened on 7/21 to 4.1, and appears to be plateauing  Strict I&Os  -the Entresto, Lasix, and metolazone have been on hold due to worsening renal function, however given hypervolemic state- dosing lasix 80 mg IV today; nephrology ordered 10 mg metolazone      Acute respiratory failure with hypoxia  Patient with Hypoxic Respiratory failure which is Acute.  she is on home oxygen at 2 LPM only with CPAP. Supplemental oxygen was provided and noted-      .   Signs/symptoms of respiratory failure include- tachypnea and increased work of breathing. Contributing diagnoses includes - CHF and renal failure with hypervolemia  Labs and images were reviewed. Patient Has not had a recent ABG. Will treat underlying causes and adjust  management of respiratory failure as follows-   -Fluid management per nephro  -Supplemental O2    ACP (advance care planning)  Advance Care Planning    Date: 07/25/2024    Code Status  In light of the patients advanced and life limiting illness,I engaged the the patient and family in a voluntary conversation about the patient's preferences for care  at the very end of life. The patient wishes to have a natural, peaceful death.  Along those lines, the patient does not wish to have CPR or other invasive treatments performed when her heart and/or breathing stops. I communicated to the patient and family that a DNR order would be placed in her medical record to reflect this preference.    A total of 16 min was spent on advance care planning, goals of care discussion, emotional support, formulating and communicating prognosis and exploring burden/benefit of various approaches of treatment. This discussion occurred on a fully voluntary basis with the verbal consent of the patient and/or family.            Secondary hyperparathyroidism  Low Ca  Phos, 25 vitamin D normal  Started on calcitriol daily per nephrology    Anemia  Patient's anemia is currently stable. Has not received any PRBCs to date. Etiology likely d/t chronic disease due to Chronic Kidney Disease  Current CBC reviewed-   Lab Results   Component Value Date    HGB 9.6 (L) 07/24/2024    HCT 31.0 (L) 07/24/2024     Monitor serial CBC and transfuse if patient becomes hemodynamically unstable, symptomatic or H/H drops below 7/21.  Iron low, continue iron supplement daily    Fall  Had fall on 7/15  CTH done on 7/15 without acute abnormality  PT/OT ordered, recommending low intensity therapy      Acute on chronic combined systolic and diastolic heart failure  Echo 6/4/24:    Left Ventricle: The left ventricle is normal in size. Mildly increased wall thickness. There is mild concentric hypertrophy. Regional wall motion abnormalities present.  Apical aneurysm. There  is low normal systolic function. Ejection fraction by visual approximation is 55%. Grade III diastolic dysfunction.    Right Ventricle: Normal right ventricular cavity size. Wall thickness is normal. Systolic function is normal. Pacemaker lead present in the ventricle.    Left Atrium: Left atrium is moderately dilated.    Right Atrium: Right atrium is mildly dilated.    Aortic Valve: The aortic valve is a trileaflet valve. Moderately restricted motion. There is moderate stenosis. Aortic valve area by VTI is 0.90 cm². Aortic valve peak velocity is 2.19 m/s. Mean gradient is 10 mmHg. The dimensionless index is 0.32. There is mild aortic regurgitation with a centrally directed jet.    Mitral Valve: There is mild regurgitation with a posterolateral eccentriccally directed jet.    Tricuspid Valve: There is mild to moderate regurgitation.    IVC/SVC: Normal venous pressure at 3 mmHg.    The estimated pulmonary artery systolic pressure is 31 mmHg.    Entresto and lasix have been on hold due to CRISTO on CKD    -on 07/24 she developed dyspnea on exertion CXR confirmed hypovolemia   -no improvement from kidney function and on 07/25 she was re-initiated on IV diuresis and metolazone she has no desire to pursue any HD for volume management  -currently stable on 2 L nasal cannula, high risk for decompensation if kidneys do not respond to the diuretics      HTN (hypertension)  Chronic, controlled. Latest blood pressure and vitals reviewed-     Temp:  [97.6 °F (36.4 °C)-99.5 °F (37.5 °C)]   Pulse:  [61-92]   Resp:  [18-20]   BP: (119-178)/(56-75)   SpO2:  [90 %-96 %] .     Continue amlodipine 5mg BID      VTE Risk Mitigation (From admission, onward)           Ordered     enoxaparin injection 30 mg  Daily         07/17/24 2111     IP VTE HIGH RISK PATIENT  Once         07/17/24 2111     Place sequential compression device  Until discontinued         07/17/24 2111                    Discharge Planning   RAMONE: 7/27/2024     Code Status:  DNR   Is the patient medically ready for discharge?:     Reason for patient still in hospital (select all that apply): Patient trending condition and Consult recommendations  Discharge Plan A: Home Health   Discharge Delays: (!) Change in Medical Condition              Sofia Honeycutt NP  Department of Hospital Medicine   [facility]

## 2024-07-25 NOTE — ASSESSMENT & PLAN NOTE
Patient with Hypoxic Respiratory failure which is Acute.  she is on home oxygen at 2 LPM only with CPAP. Supplemental oxygen was provided and noted-      .   Signs/symptoms of respiratory failure include- tachypnea and increased work of breathing. Contributing diagnoses includes - CHF and renal failure with hypervolemia  Labs and images were reviewed. Patient Has not had a recent ABG. Will treat underlying causes and adjust management of respiratory failure as follows-   -Fluid management per nephro  -Supplemental O2

## 2024-07-25 NOTE — ASSESSMENT & PLAN NOTE
2/2 CRS  Echo with aortic stenosis and diastolic dysfunction  Renal US unremarkable  Nephrology consulted  Serum creatinine on admission 3.5, baseline 1.3 2 weeks ago  Serum creatinine worsened on 7/21 to 4.1, and appears to be plateauing  Strict I&Os  -the Entresto, Lasix, and metolazone have been on hold due to worsening renal function, however given hypervolemic state- dosing lasix 80 mg IV today; nephrology ordered 10 mg metolazone

## 2024-07-25 NOTE — PROGRESS NOTES
"[facility]  Adult Nutrition   Progress Note (Initial Assessment)     SUMMARY     Recommendations  Recommendation/Intervention: 1. Continue Cardiac diet as tolerated. 2. Recommend ONS once daily ; pt prefers Glucerna Vanilla. 3. Provided verbal CHF/ renal diet education and patient reports understanding and following diet at home.  Goals: 1. Intake to remain >/= 50% EEN / EPN and lab values will trend to target range by follow up.  Nutrition Goal Status: new    Nutrition Diagnosis PES Statement: Inadequate (suboptimal) protein-energy intake related to decreased appetite reported by patient as evidenced by patient SOB, reports not eating much of breakfast; usually good appetite and intake prior to admit. RD added ONS once daily due to fluid restriction.     Dietitian Rounds Brief  RD screen for LOS 8 days. Patient reports decreased appetite, "just not hungry". Pt agreed to ONS daily, prefers Glucerna Vanilla. RD noted patient HB A1c indicative of pre diabetes. Patient SOB this am per nephrology note, diuretic added back; fluid restriction and strict I & Os continue. RD discussed CHF and renal diet with patient ; encouraging compliance and pt voiced she follows at home and voiced understanding. RD to follow for intake, labs, and status change PRN.    Nutrition Related Social Determinants of Health: SDOH: Adequate food in home environment    Malnutrition Assessment             Fluid Accumulation (Malnutrition): other (see comments) (LE edema, SOB)   Orbital Region (Subcutaneous Fat Loss): well nourished  Upper Arm Region (Subcutaneous Fat Loss): well nourished   [place] Region (Muscle Loss): well nourished  Clavicle Bone Region (Muscle Loss): well nourished                 Diet order:   Current Diet Order: Cardiac diet                 Evaluation of Received Nutrient/Fluid Intake  Energy Calories Required: meeting needs  Protein Required: meeting needs  Fluid Required: not meeting needs  Tolerance: " "tolerating     % Intake of Estimated Energy Needs: 50 - 75 %  % Meal Intake: 50 - 75 %      Intake/Output Summary (Last 24 hours) at 7/25/2024 1156  Last data filed at 7/25/2024 0500  Gross per 24 hour   Intake 480 ml   Output 100 ml   Net 380 ml        Anthropometrics  Temp: 98.8 °F (37.1 °C)  Height Method: Stated  Height: 4' 9" (144.8 cm)  Height (inches): 57 in  Weight Method: Bed Scale  Weight: 46.2 kg (101 lb 12.8 oz)  Weight (lb): 101.8 lb  Ideal Body Weight (IBW), Female: 85 lb  % Ideal Body Weight, Female (lb): 139.53 %  BMI (Calculated): 22  BMI Grade: 18.5-24.9 - normal       Estimated/Assessed Needs  Weight Used For Calorie Calculations: 46.2 kg (101 lb 13.6 oz)  Energy Calorie Requirements (kcal): 2071-1125 kcal/day (25-30 kcal/kg)  Energy Need Method: Kcal/kg  Protein Requirements: 37-55 gm/day (0.8-1.2 gm/kg)  Weight Used For Protein Calculations: 46.2 kg (101 lb 13.6 oz)  Fluid Requirements (mL): 1800 mL per MD  Estimated Fluid Requirement Method: other (see comments)  RDA Method (mL): 1155  CHO Requirement: 144-173 gm/day (9-11 serving / day) (patient Hb A1c in range of pre diabetes)    Reason for Assessment  Reason For Assessment: length of stay  Diagnosis: renal disease, cardiac disease (acute kidney injury superimposed on chronic kidney disease; history of CHF)  Relevant Medical History: CAD, CHF, HLD, HTN, ulcerative colitis, GERD  Interdisciplinary Rounds: did not attend  General Information Comments: Patient admitted with CRISTO  Blood work was done by GI MD few days ago and it showed high crt level  Pt was referred to Nephro MD and pt was instructed to come to ER.  Pt denies shortness of breath but endorses 5 pound weight gain  Also pt has swelling on legs and abdominal distension which she attributes due to fluid gain.  Nutrition Discharge Planning: Pending    Nutrition/Diet History  Spiritual, Cultural Beliefs, Restoration Practices, Values that Affect Care: no  Food Allergies: NKFA  Factors " Affecting Nutritional Intake: decreased appetite    Nutrition Risk Screen  Nutrition Risk Screen: no indicators present       Wound 07/17/24 2340 Abrasion(s) Right anterior Knee-Wound Image: Images linked  MST Score: 0  Have you recently lost weight without trying?: No  Weight loss score: 0  Have you been eating poorly because of a decreased appetite?: No  Appetite score: 0       Weight History:  Wt Readings from Last 10 Encounters:   07/20/24 46.2 kg (101 lb 12.8 oz)   07/15/24 45.4 kg (100 lb)   07/11/24 49.7 kg (109 lb 10.9 oz)   07/03/24 48.7 kg (107 lb 5.8 oz)   06/04/24 48.7 kg (107 lb 5.8 oz)   06/04/24 49.6 kg (109 lb 4.8 oz)   05/14/24 48.4 kg (106 lb 9.5 oz)   05/14/24 48.1 kg (106 lb)   05/08/24 49.3 kg (108 lb 11 oz)   04/30/24 51.5 kg (113 lb 6.8 oz)        Lab/Procedures/Meds: Pertinent Labs/Meds Reviewed    Medications:Pertinent Medications Reviewed  Scheduled Meds:   amLODIPine  5 mg Oral BID    aspirin  81 mg Oral Daily    calcitRIOL  0.25 mcg Oral Daily    enoxparin  30 mg Subcutaneous Daily    ferrous sulfate  1 tablet Oral Daily    isosorbide mononitrate  60 mg Oral Daily    metOLazone  10 mg Oral Daily    metoprolol succinate  50 mg Oral Daily    pantoprazole  40 mg Oral Daily    polyethylene glycol  17 g Oral Daily     Continuous Infusions:  PRN Meds:.  Current Facility-Administered Medications:     acetaminophen, 650 mg, Oral, Q8H PRN    acetaminophen, 650 mg, Oral, Q4H PRN    aluminum-magnesium hydroxide-simethicone, 30 mL, Oral, QID PRN    HYDROcodone-acetaminophen, 1 tablet, Oral, Q6H PRN    magnesium oxide, 800 mg, Oral, PRN    magnesium oxide, 800 mg, Oral, PRN    melatonin, 6 mg, Oral, Nightly PRN    ondansetron, 4 mg, Intravenous, Q6H PRN    potassium bicarbonate, 35 mEq, Oral, PRN    potassium bicarbonate, 50 mEq, Oral, PRN    potassium bicarbonate, 60 mEq, Oral, PRN    potassium, sodium phosphates, 2 packet, Oral, PRN    potassium, sodium phosphates, 2 packet, Oral, PRN    potassium,  "sodium phosphates, 2 packet, Oral, PRN    Labs: Pertinent Labs Reviewed  Clinical Chemistry:  Recent Labs   Lab 07/19/24  0426 07/22/24  0437 07/23/24  0443 07/24/24  0527 07/25/24  0359   * 134* 134* 135* 136   K 3.6 3.8 4.9 4.6 4.5   CL 88* 94* 93* 95 96   CO2 32* 27 29 23 24   GLU 90 89 99 88 78   BUN 73* 69* 63* 62* 63*   CREATININE 3.2* 4.0* 4.2* 4.2* 4.2*   CALCIUM 7.2* 6.7* 8.3* 7.9* 7.9*   PROT 6.8 6.6 6.9 7.5 6.9   ALBUMIN 3.8 3.6 3.8 4.1 3.7   BILITOT 0.4 0.4 0.6 0.7 0.7   ALKPHOS 62 47* 55 60 55   AST 21 20 20 24 19   ALT 12 11 13 16 13   ANIONGAP 12 13 12 17* 16   MG 2.9* 2.4 2.4 2.3 2.2   PHOS 3.3 4.0  --   --   --     < > = values in this interval not displayed.     CBC:   Recent Labs   Lab 07/25/24  0359   WBC 8.40   RBC 3.20*   HGB 8.7*   HCT 28.2*   *   MCV 88   MCH 27.2   MCHC 30.9*     Lipid Panel:  No results for input(s): "CHOL", "HDL", "LDLCALC", "TRIG", "CHOLHDL" in the last 168 hours.  Cardiac Profile:  Recent Labs   Lab 07/20/24  0549 07/24/24  0527   BNP 1,770* 2,276*     Inflammatory Labs:  No results for input(s): "CRP" in the last 168 hours.  Diabetes:  No results for input(s): "HGBA1C", "POCTGLUCOSE" in the last 168 hours.  Thyroid & Parathyroid:  No results for input(s): "TSH", "FREET4", "L3TQRGZ", "M2OIVQY", "THYROIDAB" in the last 168 hours.    Monitor and Evaluation  Food and Nutrient Intake: energy intake, food and beverage intake  Food and Nutrient Adminstration: diet order  Knowledge/Beliefs/Attitudes: food and nutrition knowledge/skill  Physical Activity and Function: nutrition-related ADLs and IADLs  Anthropometric Measurements: weight change, weight, body mass index  Biochemical Data, Medical Tests and Procedures: electrolyte and renal panel, gastrointestinal profile, glucose/endocrine profile  Nutrition-Focused Physical Findings: overall appearance     Nutrition Risk  Level of Risk/Frequency of Follow-up:  (1 x / week)     Nutrition Follow-Up  RD Follow-up?: " Yes      Shagufta Josue, JERICHO 07/25/2024 11:56 AM

## 2024-07-25 NOTE — SUBJECTIVE & OBJECTIVE
Interval History:  Pt seen and examined.  CXR from yesterday shows some pulmonary edema with increasing BNP.  Kidneys remained stagnant.  Poor appetite, increasing shortness a breath with exertion.  Discussed with nephrology-will dose IV Lasix, he is adding oral metolazone as well.    Long discussion with Pt who does not want any form of HD.  She also wants to be DNR has living will.  Her daughter was updated as requested by Pt.    Review of Systems   Constitutional:  Positive for fatigue. Negative for chills and fever.   Respiratory:  Positive for shortness of breath (with exertion). Negative for cough.    Cardiovascular:  Negative for chest pain.   Gastrointestinal:  Positive for nausea. Negative for constipation and vomiting.     Objective:     Vital Signs (Most Recent):  Temp: 98.8 °F (37.1 °C) (07/25/24 0555)  Pulse: 84 (07/25/24 0807)  Resp: 20 (07/25/24 0807)  BP: (!) 160/65 (07/25/24 0555)  SpO2: (!) 94 % (07/25/24 0807) Vital Signs (24h Range):  Temp:  [98.2 °F (36.8 °C)-98.8 °F (37.1 °C)] 98.8 °F (37.1 °C)  Pulse:  [75-85] 84  Resp:  [16-20] 20  SpO2:  [94 %-96 %] 94 %  BP: (148-167)/(65-85) 160/65     Weight: 46.2 kg (101 lb 12.8 oz)  Body mass index is 22.03 kg/m².    Intake/Output Summary (Last 24 hours) at 7/25/2024 1156  Last data filed at 7/25/2024 0500  Gross per 24 hour   Intake 480 ml   Output 100 ml   Net 380 ml         Physical Exam  Vitals and nursing note reviewed.   Constitutional:       Appearance: Normal appearance.   Cardiovascular:      Rate and Rhythm: Normal rate and regular rhythm.      Heart sounds: Murmur heard.   Pulmonary:      Effort: Pulmonary effort is normal. No respiratory distress.      Breath sounds: Normal breath sounds.      Comments: Diminished bases    Abdominal:      General: Abdomen is flat. Bowel sounds are normal.      Palpations: Abdomen is soft.   Musculoskeletal:         General: Normal range of motion.      Cervical back: Normal range of motion and neck supple.       Right lower leg: Edema (pedal 2+ pitting) present.      Left lower leg: Edema (Pedal 2+ pitting) present.   Skin:     General: Skin is warm and dry.      Capillary Refill: Capillary refill takes less than 2 seconds.   Neurological:      General: No focal deficit present.      Mental Status: She is alert and oriented to person, place, and time. Mental status is at baseline.             Significant Labs: All pertinent labs within the past 24 hours have been reviewed.  CBC:   Recent Labs   Lab 07/24/24 0527 07/25/24  0359   WBC 8.51 8.40   HGB 9.6* 8.7*   HCT 31.0* 28.2*    147*     CMP:   Recent Labs   Lab 07/24/24 0527 07/25/24  0359   * 136   K 4.6 4.5   CL 95 96   CO2 23 24   GLU 88 78   BUN 62* 63*   CREATININE 4.2* 4.2*   CALCIUM 7.9* 7.9*   PROT 7.5 6.9   ALBUMIN 4.1 3.7   BILITOT 0.7 0.7   ALKPHOS 60 55   AST 24 19   ALT 16 13   ANIONGAP 17* 16       Significant Imaging: I have reviewed all pertinent imaging results/findings within the past 24 hours.

## 2024-07-25 NOTE — PT/OT/SLP PROGRESS
Physical Therapy      Patient Name:  Cheyanne Gomes   MRN:  [id number]    Patient not seen today secondary to Pain, Other (Comment) (pt in bathroom in am. declined in pm secondary to pain in back.). Will follow-up 7/26/24.

## 2024-07-26 VITALS
DIASTOLIC BLOOD PRESSURE: 66 MMHG | OXYGEN SATURATION: 92 % | RESPIRATION RATE: 17 BRPM | SYSTOLIC BLOOD PRESSURE: 155 MMHG | BODY MASS INDEX: 21.96 KG/M2 | HEART RATE: 80 BPM | WEIGHT: 101.81 LBS | HEIGHT: 57 IN | TEMPERATURE: 98 F

## 2024-07-26 PROBLEM — Z71.89 GOALS OF CARE, COUNSELING/DISCUSSION: Status: ACTIVE | Noted: 2024-07-26

## 2024-07-26 LAB
ALBUMIN SERPL BCP-MCNC: 3.5 G/DL (ref 3.5–5.2)
ALP SERPL-CCNC: 53 U/L (ref 55–135)
ALT SERPL W/O P-5'-P-CCNC: 11 U/L (ref 10–44)
ANION GAP SERPL CALC-SCNC: 16 MMOL/L (ref 8–16)
AST SERPL-CCNC: 17 U/L (ref 10–40)
BASOPHILS # BLD AUTO: 0.03 K/UL (ref 0–0.2)
BASOPHILS NFR BLD: 0.3 % (ref 0–1.9)
BILIRUB SERPL-MCNC: 0.7 MG/DL (ref 0.1–1)
BUN SERPL-MCNC: 73 MG/DL (ref 8–23)
CALCIUM SERPL-MCNC: 7.7 MG/DL (ref 8.7–10.5)
CHLORIDE SERPL-SCNC: 95 MMOL/L (ref 95–110)
CO2 SERPL-SCNC: 24 MMOL/L (ref 23–29)
CREAT SERPL-MCNC: 4.4 MG/DL (ref 0.5–1.4)
DIFFERENTIAL METHOD BLD: ABNORMAL
EOSINOPHIL # BLD AUTO: 0 K/UL (ref 0–0.5)
EOSINOPHIL NFR BLD: 0.3 % (ref 0–8)
ERYTHROCYTE [DISTWIDTH] IN BLOOD BY AUTOMATED COUNT: 17.1 % (ref 11.5–14.5)
EST. GFR  (NO RACE VARIABLE): 9.2 ML/MIN/1.73 M^2
GLUCOSE SERPL-MCNC: 103 MG/DL (ref 70–110)
HBV CORE AB SERPL QL IA: NEGATIVE
HBV SURFACE AB SER QL: NON REACTIVE
HBV SURFACE AG SERPL QL IA: NEGATIVE
HCT VFR BLD AUTO: 27.3 % (ref 37–48.5)
HGB BLD-MCNC: 8.6 G/DL (ref 12–16)
IMM GRANULOCYTES # BLD AUTO: 0.07 K/UL (ref 0–0.04)
IMM GRANULOCYTES NFR BLD AUTO: 0.8 % (ref 0–0.5)
LYMPHOCYTES # BLD AUTO: 0.7 K/UL (ref 1–4.8)
LYMPHOCYTES NFR BLD: 7.3 % (ref 18–48)
MAGNESIUM SERPL-MCNC: 2.2 MG/DL (ref 1.6–2.6)
MCH RBC QN AUTO: 27.5 PG (ref 27–31)
MCHC RBC AUTO-ENTMCNC: 31.5 G/DL (ref 32–36)
MCV RBC AUTO: 87 FL (ref 82–98)
MONOCYTES # BLD AUTO: 0.6 K/UL (ref 0.3–1)
MONOCYTES NFR BLD: 6.8 % (ref 4–15)
NEUTROPHILS # BLD AUTO: 7.5 K/UL (ref 1.8–7.7)
NEUTROPHILS NFR BLD: 84.5 % (ref 38–73)
NRBC BLD-RTO: 0 /100 WBC
PLATELET # BLD AUTO: 150 K/UL (ref 150–450)
PMV BLD AUTO: 9.4 FL (ref 9.2–12.9)
POTASSIUM SERPL-SCNC: 4.7 MMOL/L (ref 3.5–5.1)
PROT SERPL-MCNC: 6.7 G/DL (ref 6–8.4)
RBC # BLD AUTO: 3.13 M/UL (ref 4–5.4)
SODIUM SERPL-SCNC: 135 MMOL/L (ref 136–145)
WBC # BLD AUTO: 8.87 K/UL (ref 3.9–12.7)

## 2024-07-26 PROCEDURE — 94761 N-INVAS EAR/PLS OXIMETRY MLT: CPT

## 2024-07-26 PROCEDURE — 83735 ASSAY OF MAGNESIUM: CPT | Performed by: INTERNAL MEDICINE

## 2024-07-26 PROCEDURE — 93005 ELECTROCARDIOGRAM TRACING: CPT | Performed by: GENERAL PRACTICE

## 2024-07-26 PROCEDURE — 25000003 PHARM REV CODE 250: Performed by: INTERNAL MEDICINE

## 2024-07-26 PROCEDURE — 36415 COLL VENOUS BLD VENIPUNCTURE: CPT | Performed by: INTERNAL MEDICINE

## 2024-07-26 PROCEDURE — 63600175 PHARM REV CODE 636 W HCPCS: Performed by: INTERNAL MEDICINE

## 2024-07-26 PROCEDURE — 99900031 HC PATIENT EDUCATION (STAT)

## 2024-07-26 PROCEDURE — 27000221 HC OXYGEN, UP TO 24 HOURS

## 2024-07-26 PROCEDURE — 99223 1ST HOSP IP/OBS HIGH 75: CPT | Mod: ,,, | Performed by: INTERNAL MEDICINE

## 2024-07-26 PROCEDURE — 25000003 PHARM REV CODE 250: Performed by: NURSE PRACTITIONER

## 2024-07-26 PROCEDURE — 80053 COMPREHEN METABOLIC PANEL: CPT | Performed by: INTERNAL MEDICINE

## 2024-07-26 PROCEDURE — 93010 ELECTROCARDIOGRAM REPORT: CPT | Mod: ,,, | Performed by: GENERAL PRACTICE

## 2024-07-26 PROCEDURE — 63600175 PHARM REV CODE 636 W HCPCS: Performed by: NURSE PRACTITIONER

## 2024-07-26 PROCEDURE — 85025 COMPLETE CBC W/AUTO DIFF WBC: CPT | Performed by: INTERNAL MEDICINE

## 2024-07-26 RX ORDER — PROMETHAZINE HYDROCHLORIDE 25 MG/ML
INJECTION, SOLUTION INTRAMUSCULAR; INTRAVENOUS
Status: DISCONTINUED
Start: 2024-07-26 | End: 2024-07-26 | Stop reason: HOSPADM

## 2024-07-26 RX ORDER — HYDROMORPHONE HYDROCHLORIDE 1 MG/ML
0.5 INJECTION, SOLUTION INTRAMUSCULAR; INTRAVENOUS; SUBCUTANEOUS EVERY 4 HOURS PRN
Status: DISCONTINUED | OUTPATIENT
Start: 2024-07-26 | End: 2024-07-26 | Stop reason: HOSPADM

## 2024-07-26 RX ORDER — FUROSEMIDE 10 MG/ML
80 INJECTION INTRAMUSCULAR; INTRAVENOUS EVERY 8 HOURS
Status: DISCONTINUED | OUTPATIENT
Start: 2024-07-26 | End: 2024-07-26 | Stop reason: HOSPADM

## 2024-07-26 RX ADMIN — HYDROMORPHONE HYDROCHLORIDE 0.5 MG: 0.5 INJECTION, SOLUTION INTRAMUSCULAR; INTRAVENOUS; SUBCUTANEOUS at 03:07

## 2024-07-26 RX ADMIN — ONDANSETRON 4 MG: 2 INJECTION INTRAMUSCULAR; INTRAVENOUS at 01:07

## 2024-07-26 RX ADMIN — HYDROMORPHONE HYDROCHLORIDE 0.2 MG: 0.5 INJECTION, SOLUTION INTRAMUSCULAR; INTRAVENOUS; SUBCUTANEOUS at 09:07

## 2024-07-26 RX ADMIN — ISOSORBIDE MONONITRATE 60 MG: 60 TABLET, EXTENDED RELEASE ORAL at 09:07

## 2024-07-26 RX ADMIN — FUROSEMIDE 80 MG: 10 INJECTION, SOLUTION INTRAVENOUS at 01:07

## 2024-07-26 RX ADMIN — PANTOPRAZOLE SODIUM 40 MG: 40 TABLET, DELAYED RELEASE ORAL at 05:07

## 2024-07-26 RX ADMIN — SERTRALINE HYDROCHLORIDE 100 MG: 50 TABLET ORAL at 09:07

## 2024-07-26 RX ADMIN — METOPROLOL SUCCINATE 50 MG: 50 TABLET, FILM COATED, EXTENDED RELEASE ORAL at 09:07

## 2024-07-26 RX ADMIN — AMLODIPINE BESYLATE 5 MG: 5 TABLET ORAL at 09:07

## 2024-07-26 RX ADMIN — METOLAZONE 10 MG: 2.5 TABLET ORAL at 09:07

## 2024-07-26 RX ADMIN — CALCITRIOL CAPSULES 0.25 MCG 0.25 MCG: 0.25 CAPSULE ORAL at 09:07

## 2024-07-26 RX ADMIN — ONDANSETRON 4 MG: 2 INJECTION INTRAMUSCULAR; INTRAVENOUS at 09:07

## 2024-07-26 RX ADMIN — POLYETHYLENE GLYCOL 3350 17 G: 17 POWDER, FOR SOLUTION ORAL at 09:07

## 2024-07-26 RX ADMIN — ASPIRIN 81 MG 81 MG: 81 TABLET ORAL at 09:07

## 2024-07-26 RX ADMIN — FERROUS SULFATE TAB 325 MG (65 MG ELEMENTAL FE) 1 EACH: 325 (65 FE) TAB at 09:07

## 2024-07-26 NOTE — PLAN OF CARE
07/26/24 0817   Patient Assessment/Suction   Level of Consciousness (AVPU) alert   Respiratory Effort Normal;Unlabored   Expansion/Accessory Muscles/Retractions no use of accessory muscles   All Lung Fields Breath Sounds Anterior:;Lateral:;diminished   Rhythm/Pattern, Respiratory pattern regular   Cough Frequency infrequent   Cough Type nonproductive   PRE-TX-O2   Device (Oxygen Therapy) nasal cannula   $ Is the patient on Low Flow Oxygen? Yes   Flow (L/min) (Oxygen Therapy) 4   Oxygen Concentration (%) 36   SpO2 (!) 91 %   Pulse Oximetry Type Intermittent   $ Pulse Oximetry - Multiple Charge Pulse Oximetry - Multiple   Pulse 73   Resp 20   Ready to Wean/Extubation Screen   FIO2<=50 (chart decimal) 0.36   Education   $ Education Oxygen;15 min

## 2024-07-26 NOTE — PT/OT/SLP PROGRESS
Physical Therapy      Patient Name:  Cheyanne Gomes   MRN:  [id number]    Patient not seen today secondary to Nurse/ BRAD hold (declined in am secondary to fatigue and SOB. RN hold in pm secondary to respiratory decline). Will follow-up 7/27/24.

## 2024-07-26 NOTE — PT/OT/SLP PROGRESS
Occupational Therapy      Patient Name:  Cheyanne Gomes   MRN:  [id number]    Patient not seen today secondary to  (pt discharging home with hospice.). Will follow-up no.    7/26/2024

## 2024-07-26 NOTE — PLAN OF CARE
Local hospice company list sent via text to daughter [facility] carecarola.        07/26/24 1113   Final Note   Assessment Type Discharge Planning Reassessment   Post-Acute Status   Post-Acute Authorization Hospice   Patient choice form signed by patient/caregiver List from System Post-Acute Care

## 2024-07-26 NOTE — NURSING
tele notified nurse of 170 hr that was two minutes has a pacemaker upon assessment patient currently lying bed appears to be resting easily aroused no distress noted daughter at bedside current hr 77 on monitor bed in lowest position personal belonging and call in reach bed alarm refusal in chart md notified via secure chat

## 2024-07-26 NOTE — PROGRESS NOTES
"Progress Note  Nephrology    Consult Requested By: Evert Cisse MD    Reason for Consult: CRISTO      SUBJECTIVE:     History of Present Illness:  87 y/o female patient referred to nephrology by primary MD, then to ER per nephrology.  Blood work done out patient had revealed elevated Scr.  C/o 5 lb wt gain, LE edema, and abdominal distension.  Nephrology is consulted for CRISTO.    7/18  renal function incrementally improved overnight.  Getting lasix 20 mg IVP bid.  UOP not recorded. UA noted--has UTI, renal US done--no hydronephrosis.  Old labs reviewed, six months ago, BUN/Scr/eGFR were normal, then appears renal function began to decline--CKD 3?--will see where renal function falls out.  Pt states she has diarrhea "off and on", no n/v, no burning or pain w/urination, sometimes feels like she has to go and can't.    7/19 VSS, on RA, UOP 850cc + voids, still with some difficulty breathing  7/20 some spikes in BP, on RA, UOP 800cc, ordered BNP, CXR with concern for PNA  7/21 VSS, on RA, UOP 150cc + 5 voids, some nausea this AM not resolved, sitting in chair eating lunch, looks well actually  7/22  500cc UOP recorded, VSS.  Incremental improvement in renal function, would continue to hold lasix today and f/u labs in am.  Pt up in chair, denies SOB, lungs sound clear.    7/23  VSS, UOP not recorded.  Scr bumped again, still holding lasix.  Denies SOB, no complaints.  7/24  PVR w/0 urine in bladder.  One shift UOP recorded, 520cc.  VSS.  Repeat UA yesterday, no casts, 1+ protein.  Renal function without change from yesterday.  Continue to hold diuretics.  7/25  700 cc UOP recorded.  Renal function the same.  Diuretics resumed today for SOB.  Hospital med at bedside, pt being updated as well as a family member by phone.  Pt states she does not want dialysis or any heroic measures if this becomes a necessary choice.  Got lasix IV 80 mg, on 2L O2 bnc, has SOB w/exertion but ok at rest.  7/26   poor response to diuretic.   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  Would hold lasix one more day.  - BP stabilized- change norvasc back to 5mg BID  - BNP hasn't changed much but with severe CRISTO so that may be contributing to elevation- comfortable on exam, on RA-    - serum Na improved  - serum K acceptable  - hold Mg supplements  - low Ca noted- phos, 25 vitamin D normal- PTH high- started calcitriol daily this admission  - component of WILLIAMS/CKD- added iron supplement this admission  - CXR noted- dose antbx for CrCl < 20  --pt adamantly against dialysis.  Will be more aggressive with diuresis in an attempt to provide comfort     Review of Systems:  As above    OBJECTIVE:     Vital Signs Range (Last 24H):  Temp:  [97.7 °F (36.5 °C)-99.4 °F (37.4 °C)]   Pulse:  [60-86]   Resp:  [16-18]   BP: (139-167)/(63-84)   SpO2:  [90 %-95 %]     Physical Exam:  General- NAD noted  HEENT- WNL  Neck- supple  CV- Regular rate and rhythm  Resp- Lungs CTA Bilaterally, No increased WOB  GI- Non tender/non-distended, BS normoactive x4 quads  Extrem- No cyanosis, clubbing, +trace edema.  Derm- skin w/d  Neuro-  No flap.     Body mass index is 22.03 kg/m².    Laboratory:  CBC:   Recent Labs   Lab 07/26/24  0548   WBC 8.87   RBC 3.13*   HGB 8.6*   HCT 27.3*      MCV 87   MCH 27.5   MCHC 31.5*     CMP:   Recent Labs   Lab 07/26/24  0548      CALCIUM 7.7*   ALBUMIN 3.5   PROT 6.7   *   K 4.7   CO2 24   CL 95   BUN 73*   CREATININE 4.4*   ALKPHOS 53*   ALT 11   AST 17   BILITOT 0.7     Recent Labs   Lab 07/23/24  1439   COLORU Yellow   SPECGRAV 1.015   PHUR 8.0   PROTEINUA 1+*   BACTERIA Rare   NITRITE Negative   LEUKOCYTESUR Negative   UROBILINOGEN Negative   HYALINECASTS 0        Diagnostic Results:  Labs: Reviewed  US: Reviewed      ASSESSMENT/PLAN:     Active Hospital Problems    Diagnosis  POA    *Acute kidney injury superimposed on chronic kidney disease [N17.9, N18.9]  Yes    ACP (advance care planning) [Z71.89]  Not Applicable    Acute respiratory failure with hypoxia [J96.01]  No    Secondary hyperparathyroidism [N25.81]  Yes    Anemia [D64.9]  Yes    Fall [W19.XXXA]  Yes    Acute on chronic combined systolic and diastolic heart failure [I50.43]  Yes    HTN (hypertension) [I10]  Yes      Resolved Hospital Problems    Diagnosis Date Resolved POA    Pneumonia [J18.9] 07/25/2024 No    Hypermagnesemia [E83.41] 07/21/2024 Yes    UTI (urinary tract infection) [N39.0] 07/25/2024 Yes     Patient care time was spent personally by me on the following activities: > 35 minutes  Obtaining a history.  Examination of patient.  Providing medical care at the patients bedside.  Developing a treatment plan with patient or surrogate and bedside caregivers.  Ordering and reviewing laboratory studies, radiographic studies, pulse oximetry.  Ordering and performing treatments and interventions.  Evaluation of patient's response to treatment.  Discussions with consultants while on the unit and immediately available to the patient.  Re-evaluation of the patient's condition.  Documentation in the medical record.      Thank you for allowing us to participate in the care of your patient. We will follow the patient and provide recommendations as needed.      Luciano Park MD

## 2024-07-26 NOTE — PLAN OF CARE
Pt clear for DC from case management standpoint. Discharging to home with hospice    [facility] to transport patient back home.    07/26/24 1427   Post-Acute Status   Post-Acute Authorization Hospice   Hospice Status Set-up Complete/Auth obtained   Discharge Delays (!) PFC Arranged Transportation

## 2024-07-26 NOTE — PLAN OF CARE
Problem: Adult Inpatient Plan of Care  Goal: Plan of Care Review  Outcome: Progressing  Goal: Patient-Specific Goal (Individualized)  Outcome: Progressing  Goal: Absence of Hospital-Acquired Illness or Injury  Outcome: Progressing  Goal: Optimal Comfort and Wellbeing  Outcome: Progressing  Goal: Readiness for Transition of Care  Outcome: Progressing     Problem: Acute Kidney Injury/Impairment  Goal: Fluid and Electrolyte Balance  Outcome: Progressing  Goal: Improved Oral Intake  Outcome: Progressing  Goal: Effective Renal Function  Outcome: Progressing     Problem: Fall Injury Risk  Goal: Absence of Fall and Fall-Related Injury  Outcome: Progressing     Problem: Skin Injury Risk Increased  Goal: Skin Health and Integrity  Outcome: Progressing     Problem: Wound  Goal: Optimal Coping  Outcome: Progressing  Goal: Optimal Functional Ability  Outcome: Progressing  Goal: Absence of Infection Signs and Symptoms  Outcome: Progressing  Goal: Improved Oral Intake  Outcome: Progressing  Goal: Optimal Pain Control and Function  Outcome: Progressing  Goal: Skin Health and Integrity  Outcome: Progressing  Goal: Optimal Wound Healing  Outcome: Progressing     Problem: Pneumonia  Goal: Fluid Balance  Outcome: Progressing  Goal: Resolution of Infection Signs and Symptoms  Outcome: Progressing  Goal: Effective Oxygenation and Ventilation  Outcome: Progressing     Problem: Oral Intake Inadequate  Goal: Improved Oral Intake  Outcome: Progressing     Problem: Coping Ineffective  Goal: Effective Coping  Outcome: Progressing

## 2024-07-26 NOTE — PLAN OF CARE
ADT 30 placed for EMS transport home   07/26/24 1407   Post-Acute Status   Discharge Delays (!) PFC Arranged Transportation

## 2024-07-27 NOTE — DISCHARGE SUMMARY
[facility] Medicine  Discharge Summary      Patient Name: Cheyanne Gomes  MRN: [id number]  SENIA: [id number]  Patient Class: IP- Inpatient  Admission Date: 7/17/2024  Hospital Length of Stay: 9 days  Discharge Date and Time: 7/26/2024  6:45 PM  Attending Physician: No att. providers found   Discharging Provider: Sofia Honeycutt NP  Primary Care Provider: Romeo Alas MD    Primary Care Team: Networked reference to record PCT     HPI:   88 year old pt getting admitted with CRISTO  Blood work was done by GI MD few days ago and it showed high crt level  Pt was referred to Nephro MD and pt was instructed to come to ER by Nephro MD  Pt denies shortness of breath but endorses 5 pound weight gain  Also pt has swelling on legs and abdominal distension which she attributes due to fluid gain  She has ecchymosis on both legs and more on RLE   Pt had a fall few days ago and had ER visit 2  & all imaging studies were normal      * No surgery found *      Hospital Course:   Cheyanne Gomes was admitted with CRISTO on CKD suspected to be due to cardiorenal syndrome.  Was initiated on IV Lasix.  Her Entresto was held.  Nephrology followed closely.  Unfortunately, her kidney function declined and diuretics had to be held.  Fluid resuscitation was attempted without improvement.  She had electrolyte abnormalities which were monitored closely with improvement during her stay.  She had evidence of UTI and completed a course of Levaquin.  Unfortunately, her renal function did not improve.  She developed pulmonary edema.  Multiple advanced care plan discussions were had where Pt was very clear that she wanted to be a DNR and she did not want to undergo any HD.  Nephrology utilize diuretics in an attempt to control her volume and improve her symptoms.  Unfortunately, these did not respond favorably.  Pt ultimately decided to discharge home on hospice.  D/C home on 07/26 for comfort measures was hospitalization  and agreed with discharge plan.  Palliative Care helped us with conversations and discharge disposition.    Pt was seen on day of discharge and appropriate for transition to home hospice.     Goals of Care Treatment Preferences:  Code Status: DNR    Living Will: Yes     What is most important right now is to focus on comfort and QOL .  Accordingly, we have decided that the best plan to meet the patient's goals includes enrolling in hospice care.      Consults:   Consults (From admission, onward)          Status Ordering Provider     Inpatient consult to Palliative Care  Once        Provider:  Aiden Meyer MD    Completed ANNA SEVILLA     Inpatient consult to Nephrology  Once        Provider:  Skylar Sifuentes MD    Completed VANIA PONCE new Assessment & Plan notes have been filed under this hospital service since the last note was generated.  Service: Hospital Medicine    Final Active Diagnoses:    Diagnosis Date Noted POA    PRINCIPAL PROBLEM:  Acute kidney injury superimposed on chronic kidney disease [N17.9, N18.9] 10/10/2019 Yes    Goals of care, counseling/discussion [Z71.89] 07/26/2024 Not Applicable    ACP (advance care planning) [Z71.89] 07/25/2024 Not Applicable    Acute respiratory failure with hypoxia [J96.01] 07/25/2024 No    Secondary hyperparathyroidism [N25.81] 07/20/2024 Yes    Anemia [D64.9] 07/19/2024 Yes    Fall [W19.XXXA] 07/18/2024 Yes    Acute on chronic combined systolic and diastolic heart failure [I50.43] 02/06/2023 Yes    HTN (hypertension) [I10]  Yes      Problems Resolved During this Admission:    Diagnosis Date Noted Date Resolved POA    Pneumonia [J18.9] 07/20/2024 07/25/2024 No    Hypermagnesemia [E83.41] 07/19/2024 07/21/2024 Yes    UTI (urinary tract infection) [N39.0] 07/18/2024 07/25/2024 Yes       Discharged Condition: stable    Disposition: Hospice/Home    Follow Up:   Follow-up Information       [facility] Follow up on 7/31/2024.     Specialty: Primary Care  Why: @3pm  Contact information:  [address], [address]  [facility] [address] [phone]  Additional information:  [address]                         Patient Instructions:   No discharge procedures on file.    Significant Diagnostic Studies: Labs: CMP   Recent Labs   Lab 07/26/24  0548   *   K 4.7   CL 95   CO2 24      BUN 73*   CREATININE 4.4*   CALCIUM 7.7*   PROT 6.7   ALBUMIN 3.5   BILITOT 0.7   ALKPHOS 53*   AST 17   ALT 11   ANIONGAP 16    and CBC   Recent Labs   Lab 07/26/24  0548   WBC 8.87   HGB 8.6*   HCT 27.3*          Pending Diagnostic Studies:       None           Medications:  Reconciled Home Medications:      Medication List        CHANGE how you take these medications      amLODIPine 5 MG tablet  Commonly known as: NORVASC  TAKE 1 TABLET BY MOUTH 2 TIMES A DAY  What changed: when to take this     potassium chloride SA 10 MEQ tablet  Commonly known as: K-DUR,KLOR-CON M  TAKE TWO TABLETS BY MOUTH EVERY MORNING AND 1 TABLET EVERY EVENING  What changed: See the new instructions.            CONTINUE taking these medications      aspirin 81 mg Tab  Take 1 tablet by mouth every evening. Every day     biotin 5 mg Cap  Take 1 tablet by mouth once daily.     CALCIUM-VITAMIN D ORAL  Take 600 mg by mouth once daily. Calcium 1200 with D 3 1000     CEQUA 0.09 % Dpet  Generic drug: cycloSPORINE  Place 1 drop into both eyes 2 (two) times daily.     DEXILANT 60 mg capsule  Generic drug: dexlansoprazole  Take 60 mg by mouth once daily.     ENTRESTO  mg per tablet  Generic drug: sacubitriL-valsartan  Take 1 tablet by mouth 2 (two) times daily.     fish oil-omega-3 fatty acids 300-1,000 mg capsule  Take 1 capsule by mouth once daily.     fluticasone propionate 50 mcg/actuation nasal spray  Commonly known as: FLONASE  2 sprays by Each Nare route once daily.     folic acid 1 MG tablet  Commonly known as: FOLVITE  Take 2 mg by mouth once daily.      furosemide 80 MG tablet  Commonly known as: LASIX  Take 1 tablet (80 mg total) by mouth once daily.     gabapentin 100 MG capsule  Commonly known as: NEURONTIN  Take 2 capsules (200 mg total) by mouth every evening.     isosorbide mononitrate 60 MG 24 hr tablet  Commonly known as: IMDUR  Take 60 mg by mouth.     JARDIANCE 10 mg tablet  Generic drug: empagliflozin  Take 1 tablet (10 mg total) by mouth once daily.     Lactobacillus acidophilus 10 billion cell Cap  Take 1 each by mouth once daily. 1.5 billion     magnesium oxide 400 mg (241.3 mg magnesium) tablet  Commonly known as: MAG-OX  TAKE 1 TABLET BY MOUTH 2 TIMES A DAY     metOLazone 2.5 MG tablet  Commonly known as: ZAROXOLYN  Take 1 tablet (2.5 mg total) by mouth once daily.     metoprolol succinate 50 MG 24 hr tablet  Commonly known as: TOPROL-XL  Take 1 tablet (50 mg total) by mouth 2 (two) times daily. 25 mg QAM and 12.5 mg QHS     mupirocin 2 % ointment  Commonly known as: BACTROBAN  Apply 1 g topically 3 (three) times daily.     nitroGLYCERIN 0.4 MG/DOSE TL SPRY 400 mcg/spray spray  Commonly known as: NITROLINGUAL  Place 1 spray under the tongue every 5 (five) minutes as needed for Chest pain. PRN     ondansetron 4 MG tablet  Commonly known as: ZOFRAN  Take 4 mg by mouth every 8 (eight) hours as needed for Nausea.     ONE-A-DAY 50 PLUS tablet  Generic drug: geriatric multivitamin-min  Take 1 tablet by mouth once daily.     promethazine 25 MG tablet  Commonly known as: PHENERGAN  Take 1 tablet (25 mg total) by mouth 2 (two) times daily as needed for Nausea.     sertraline 50 MG tablet  Commonly known as: ZOLOFT  Take 2 tablets (100 mg total) by mouth once daily.     traMADoL 50 mg tablet  Commonly known as: ULTRAM  Take 50 mg by mouth 2 (two) times daily as needed for Pain.     vitamin E 400 unit Tab  Take 400 mg by mouth once daily.              Indwelling Lines/Drains at time of discharge:   Lines/Drains/Airways       None                   Time spent  on the discharge of patient: 35 minutes         Sofia Honeycutt NP  Department of Hospital Medicine  [facility]

## 2024-07-27 NOTE — SUBJECTIVE & OBJECTIVE
Interval History: See HPI    Past Medical History:   Diagnosis Date    Abdominal hernia     Acute respiratory failure with hypoxia 2024    Anemia     Dr Berkowitz     Angina pectoris     Aortic valve stenosis, moderate     Back pain     Cannon's esophagus     CAD (coronary artery disease)     Cataract     ou done    CHF (congestive heart failure)     EFx 35%, Dr. Spencer 13    Chronic combined systolic and diastolic heart failure 2019    Colitis     Compression fracture     Diverticulosis     Encounter for blood transfusion     GERD (gastroesophageal reflux disease)     Hyperlipidemia     Hypertension     Irritable bowel syndrome     Myocardial infarction     Obstructive sleep apnea 2024    Osteoarthritis     lumbar DDD    Pacemaker     Pneumonia 2017    Pulmonary emphysema, unspecified emphysema type 2024    Raynaud disease     Rheumatoid arteritis     Rheumatoid arthritis     Shingles 2017    Sjogren syndrome     Ulcerative colitis        Past Surgical History:   Procedure Laterality Date    A-V CARDIAC PACEMAKER INSERTION Left 2021    Procedure: INSERTION, CARDIAC PACEMAKER, DUAL CHAMBER  ([employer]);  Surgeon: Tera Blair MD;  Location: [facility] CATH/EP LAB;  Service: Cardiology;  Laterality: Left;    APPENDECTOMY      BACK SURGERY      CARDIAC SURGERY      CABGx3 in     CATARACT EXTRACTION      ou od d 11-15-12//     SECTION, CLASSIC      x 2    CHOLECYSTECTOMY      CLOSURE OF WOUND Right 2022    Procedure: CLOSURE-WOUND;  Surgeon: Delvis Jackson MD;  Location: [facility] OR;  Service: ENT;  Laterality: Right;  NOSE AND CHIN    COLONOSCOPY  09/15/2011    Dr Anaya     COLONOSCOPY  01/10/2017    [facility] report sent to scanning    CORONARY ANGIOPLASTY      PCI x 1 in     CORONARY ARTERY BYPASS GRAFT      3 vessel    CORONARY ARTERY BYPASS GRAFT      COSMETIC SURGERY  on nose    CYSTOSCOPY N/A 2020    Procedure: CYSTOSCOPY;  Surgeon: Miryam LOVE  Napoleon Weber MD;  Location: [facility] OR;  Service: Urology;  Laterality: N/A;    EYE SURGERY      eyes      rk ou    FRACTURE SURGERY  06/21/2016    Dr Guido left hip     FRACTURE SURGERY  2018    Right Hip    HARVESTING OF SKIN GRAFT  09/23/2022    Procedure: SURGICAL PROCUREMENT, GRAFT, SKIN;  Surgeon: Delvis Jackson MD;  Location: [facility] OR;  Service: ENT;;  RIGHT CHEST    HYSTERECTOMY      tubal ligation, oopherectomy    INTRAMEDULLARY RODDING OF TROCHANTER OF FEMUR Right 10/08/2018    Procedure: INSERTION, INTRAMEDULLARY KELSI, FEMUR, TROCHANTER;  Surgeon: Valerio Luther MD;  Location: [facility] OR;  Service: Orthopedics;  Laterality: Right;    OOPHORECTOMY      SPINE SURGERY  08/30/2013    Silvino Mike    TUBAL LIGATION      UPPER GASTROINTESTINAL ENDOSCOPY      Yag Capsulotomy Right 09/25/2014       Review of patient's allergies indicates:   Allergen Reactions    Adhesive     Nitrofurantoin macrocrystalline Nausea And Vomiting     Other reaction(s): very sickly    Penicillins Swelling     Other reaction(s): swelling  Other reaction(s): red skin discolorati    Sulfa (sulfonamide antibiotics) Nausea And Vomiting     Other reaction(s): very sickly       Medications:  Continuous Infusions:  Scheduled Meds:   amLODIPine  5 mg Oral BID    aspirin  81 mg Oral Daily    calcitRIOL  0.25 mcg Oral Daily    denosumab  60 mg Subcutaneous Q6 Months    enoxparin  30 mg Subcutaneous Daily    ferrous sulfate  1 tablet Oral Daily    furosemide (LASIX) injection  80 mg Intravenous Q8H    isosorbide mononitrate  60 mg Oral Daily    metOLazone  10 mg Oral Daily    metoprolol succinate  50 mg Oral Daily    pantoprazole  40 mg Oral Daily    polyethylene glycol  17 g Oral Daily    promethazine        sertraline  100 mg Oral Daily     PRN Meds:  Current Facility-Administered Medications:     acetaminophen, 650 mg, Oral, Q8H PRN    acetaminophen, 650 mg, Oral, Q4H PRN    aluminum-magnesium hydroxide-simethicone, 30 mL, Oral, QID PRN     HYDROcodone-acetaminophen, 1 tablet, Oral, Q6H PRN    HYDROmorphone, 0.5 mg, Intravenous, Q4H PRN    magnesium oxide, 800 mg, Oral, PRN    magnesium oxide, 800 mg, Oral, PRN    melatonin, 6 mg, Oral, Nightly PRN    ondansetron, 4 mg, Intravenous, Q6H PRN    potassium bicarbonate, 35 mEq, Oral, PRN    potassium bicarbonate, 50 mEq, Oral, PRN    potassium bicarbonate, 60 mEq, Oral, PRN    potassium, sodium phosphates, 2 packet, Oral, PRN    potassium, sodium phosphates, 2 packet, Oral, PRN    potassium, sodium phosphates, 2 packet, Oral, PRN    promethazine (PHENERGAN) 12.5 mg in 0.9% NaCl 50 mL IVPB, 12.5 mg, Intravenous, Q6H PRN    promethazine, , ,     Family History       Problem Relation (Age of Onset)    Arthritis Mother, Brother    Cancer Maternal Aunt    Crohn's disease Brother, Brother, Brother    Early death Son    Fibromyalgia Sister    Heart disease Mother, Father, Brother    Hypertension Father, Sister, Sister, Daughter, Son    Irritable bowel syndrome Sister    Lupus Cousin, Cousin    Pulmonary embolism Sister    Scleroderma Sister    Skin cancer Mother          Tobacco Use    Smoking status: Former     Current packs/day: 0.00     Average packs/day: 1 pack/day for 18.0 years (18.0 ttl pk-yrs)     Types: Cigarettes     Start date: 1953     Quit date: 1971     Years since quittin.6    Smokeless tobacco: Never   Substance and Sexual Activity    Alcohol use: Not Currently     Alcohol/week: 1.0 standard drink of alcohol     Types: 1 Glasses of wine per week     Comment: not at all in months    Drug use: Never    Sexual activity: Not Currently     Partners: Male       Review of Systems  Objective:     Vital Signs (Most Recent):  Temp: 98.2 °F (36.8 °C) (24 1136)  Pulse: 80 (24 1136)  Resp: 17 (24 1558)  BP: (!) 155/66 (24 1136)  SpO2: (!) 92 % (24 1136) Vital Signs (24h Range):  Temp:  [97.7 °F (36.5 °C)-98.2 °F (36.8 °C)] 98.2 °F (36.8 °C)  Pulse:  [73-84] 80  Resp:   [16-20] 17  SpO2:  [91 %-95 %] 92 %  BP: (139-155)/(63-70) 155/66     Weight: 46.2 kg (101 lb 12.8 oz)  Body mass index is 22.03 kg/m².       Physical Exam  Vitals reviewed.   Constitutional:       General: She is not in acute distress.     Appearance: She is ill-appearing.   HENT:      Head: Normocephalic and atraumatic.      Right Ear: External ear normal.      Left Ear: External ear normal.      Mouth/Throat:      Mouth: Mucous membranes are moist.      Pharynx: No oropharyngeal exudate.   Eyes:      General:         Right eye: No discharge.         Left eye: No discharge.      Extraocular Movements: Extraocular movements intact.   Cardiovascular:      Rate and Rhythm: Tachycardia present.      Pulses: Normal pulses.   Pulmonary:      Breath sounds: Rales present.   Abdominal:      General: There is distension.      Palpations: Abdomen is soft.      Tenderness: There is no abdominal tenderness.   Neurological:      General: No focal deficit present.      Mental Status: She is alert and oriented to person, place, and time.   Psychiatric:         Mood and Affect: Mood normal.         Behavior: Behavior normal.         Thought Content: Thought content normal.         Judgment: Judgment normal.            Review of Symptoms      Symptom Assessment (ESAS 0-10 Scale)  Unable to complete assessment due to Other         Pain Assessment in Advanced Demential Scale (PAINAD)   Breathing - Independent of vocalization:  1  Negative vocalization:  0  Facial expression:  0  Body language:  1  Consolability:  1  Total:  3    Performance Status:  40    Living Arrangements:  Lives with family    Psychosocial/Cultural:   See Palliative Psychosocial Note: No  **Primary  to Follow**  Palliative Care  Consult: No        Advance Care Planning   Advance Directives:   Living Will: Yes        Copy on chart: Yes    Do Not Resuscitate Status: Yes      Decision Making:  Patient answered questions and Family answered  questions  Goals of Care: The patient and family endorses that what is most important right now is to focus on comfort and QOL     Accordingly, we have decided that the best plan to meet the patient's goals includes enrolling in hospice care         Significant Labs: BMP:   Recent Labs   Lab 07/26/24  0548      *   K 4.7   CL 95   CO2 24   BUN 73*   CREATININE 4.4*   CALCIUM 7.7*   MG 2.2     CBC:   Recent Labs   Lab 07/25/24  0359 07/26/24  0548   WBC 8.40 8.87   HGB 8.7* 8.6*   HCT 28.2* 27.3*   * 150     CBC:   Recent Labs   Lab 07/26/24  0548   WBC 8.87   HGB 8.6*   HCT 27.3*   MCV 87        BMP:  Recent Labs   Lab 07/26/24  0548      *   K 4.7   CL 95   CO2 24   BUN 73*   CREATININE 4.4*   CALCIUM 7.7*   MG 2.2     LFT:  Lab Results   Component Value Date    AST 17 07/26/2024    GGT 45 (H) 07/22/2019    ALKPHOS 53 (L) 07/26/2024    BILITOT 0.7 07/26/2024     Albumin:   Albumin   Date Value Ref Range Status   07/26/2024 3.5 3.5 - 5.2 g/dL Final   12/11/2018 3.9 3.1 - 4.7 g/dL      Protein:   Total Protein   Date Value Ref Range Status   07/26/2024 6.7 6.0 - 8.4 g/dL Final     Lactic acid:   Lab Results   Component Value Date    LACTATE 1.2 06/30/2023       Significant Imaging: I have reviewed all pertinent imaging results/findings within the past 24 hours.

## 2024-07-27 NOTE — CONSULTS
Critical access hospital  Palliative Medicine  Consult Note    Patient Name: Cheyanne Gomes  MRN: [id number]  Admission Date: 2024  Hospital Length of Stay: 9 days  Code Status: DNR   Attending Provider: No att. providers found  Consulting Provider: Aiden Meyer MD  Primary Care Physician: Romeo Alas MD  Principal Problem:Acute kidney injury superimposed on chronic kidney disease    Patient information was obtained from patient, relative(s), past medical records, and primary team.      Inpatient consult to Palliative Care  Consult performed by: Aiden Meyer MD  Consult ordered by: Sofia Honeycutt NP        Assessment/Plan:     Palliative Care  Goals of care, counseling/discussion  I reviewed the patient's chart and discussed the case with the patient's team.      I examined Cheyanne Gomes at bedside.  The patient lacks capacity for complex medical decision-making.  I spoke with her and her daughter at bedside.    I introduced myself and my role as palliative care physician. They were agreeable to speaking.    We discussed the patient's medical illness, prognosis, and values in detail.  Below is a brief summary of our discussion.    They understand she has multiple medical issues. Currently she is primarily dealing with renal failure that has been complicated by respiratory failure.    We discussed her prognosis. If she continues on her current trajectory, I would not be surprised if she  in the coming days or weeks. She was not surprised.    We discussed her values. She is hopeful to go home. She no longer desires hospitalization. Her primary goals I comfort. If this results in death, she brar not want to suffer.    We discussed hospice. I recommended hospice care if those are her goals. She understands and agrees.     I updated her care team. Will get hospice set up ASAP.    Appreciate being involved. Hope I have been helpful.            Thank you for your consult. I will sign  "off. Please contact us if you have any additional questions.    Subjective:     HPI:   Per admit H&P: "88 year old pt getting admitted with CRISTO  Blood work was done by CRYSTAL DEAN few days ago and it showed high crt level  Pt was referred to [facility] and pt was instructed to come to ER by Neph[facility] DEAN  Pt denies shortness of breath but endorses 5 pound weight gain  Also pt has swelling on legs and abdominal distension which she attributes due to fluid gain  She has ecchymosis on both legs and more on RLE   Pt had a fall few days ago and had ER visit 2  & all imaging studies were normal        Overview/Hospital Course:  88 year old female was admitted with CRISTO on CKD likely 2/2 CRS.  Serum creatinine on admission was 3.5.  She was started on IV lasix and entresto was held.  Nephrology was consulted.  Serum creatinine worsened to 4.1 on 7/21.  Lasix was discontinued and she was given 500cc of normal saline.  She was found to have secondary hyperparathyroidism, she was started on calcitriol.  Iron was also low, she was started on PO iron.  Magnesium was high on admission, magnesium supplements were discontinued, she will need to stop those on discharge.  Also found to have hyponatremia and hypokalemia, which are improved.  CXR showed faint left upper lobe alveolar opacity with concern for pneumonia.  Urinalysis was positive for infection and urine cx revealed morganella morganii.  She has completed course of levaquin.  Her lungs are clear and she is on room air, she wears cpap at night.  Serum creatinine today 4.2, nephrology recommended to continue holding lasix and metolazone and monitor."    Unfortunately despite supportive measures her renal function has not recovered. She does not desire dialysis. They are contemplating hospice. I have been asked to assist.     Hospital Course:  No notes on file    Interval History: See HPI    Past Medical History:   Diagnosis Date    Abdominal hernia     Acute respiratory failure with " hypoxia 2024    Anemia     Dr Berkowitz     Angina pectoris     Aortic valve stenosis, moderate     Back pain     Cannon's esophagus     CAD (coronary artery disease)     Cataract     ou done    CHF (congestive heart failure)     EFx 35%, Dr. Spencer 13    Chronic combined systolic and diastolic heart failure 2019    Colitis     Compression fracture     Diverticulosis     Encounter for blood transfusion     GERD (gastroesophageal reflux disease)     Hyperlipidemia     Hypertension     Irritable bowel syndrome     Myocardial infarction     Obstructive sleep apnea 2024    Osteoarthritis     lumbar DDD    Pacemaker     Pneumonia 2017    Pulmonary emphysema, unspecified emphysema type 2024    Raynaud disease     Rheumatoid arteritis     Rheumatoid arthritis     Shingles 2017    Sjogren syndrome     Ulcerative colitis        Past Surgical History:   Procedure Laterality Date    A-V CARDIAC PACEMAKER INSERTION Left 2021    Procedure: INSERTION, CARDIAC PACEMAKER, DUAL CHAMBER  ([employer]TR[employer]IC);  Surgeon: Tera Blair MD;  Location: [facility] CATH/EP LAB;  Service: Cardiology;  Laterality: Left;    APPENDECTOMY      BACK SURGERY      CARDIAC SURGERY      CABGx3 in     CATARACT EXTRACTION      ou od d 11-15-12/     SECTION, CLASSIC      x 2    CHOLECYSTECTOMY      CLOSURE OF WOUND Right 2022    Procedure: CLOSURE-WOUND;  Surgeon: Delvis Jackson MD;  Location: [facility] OR;  Service: ENT;  Laterality: Right;  NOSE AND CHIN    COLONOSCOPY  09/15/2011    Dr Anaya     COLONOSCOPY  01/10/2017    [facility] report sent to scanning    CORONARY ANGIOPLASTY      PCI x 1 in     CORONARY ARTERY BYPASS GRAFT      3 vessel    CORONARY ARTERY BYPASS GRAFT      COSMETIC SURGERY  on nose    CYSTOSCOPY N/A 2020    Procedure: CYSTOSCOPY;  Surgeon: Miryam Bender Jr., MD;  Location: [facility] OR;  Service: Urology;  Laterality: N/A;    EYE SURGERY      eyes      rk ou    FRACTURE  SURGERY  06/21/2016    Dr Guido left hip     FRACTURE SURGERY  2018    Right Hip    HARVESTING OF SKIN GRAFT  09/23/2022    Procedure: SURGICAL PROCUREMENT, GRAFT, SKIN;  Surgeon: Delvis Jackson MD;  Location: [facility] OR;  Service: ENT;;  RIGHT CHEST    HYSTERECTOMY      tubal ligation, oopherectomy    INTRAMEDULLARY RODDING OF TROCHANTER OF FEMUR Right 10/08/2018    Procedure: INSERTION, INTRAMEDULLARY KELSI, FEMUR, TROCHANTER;  Surgeon: Valerio Luther MD;  Location: [facility] OR;  Service: Orthopedics;  Laterality: Right;    OOPHORECTOMY      SPINE SURGERY  08/30/2013    Silvino Mckeon    TUBAL LIGATION      UPPER GASTROINTESTINAL ENDOSCOPY      Yag Capsulotomy Right 09/25/2014       Review of patient's allergies indicates:   Allergen Reactions    Adhesive     Nitrofurantoin macrocrystalline Nausea And Vomiting     Other reaction(s): very sickly    Penicillins Swelling     Other reaction(s): swelling  Other reaction(s): red skin discolorati    Sulfa (sulfonamide antibiotics) Nausea And Vomiting     Other reaction(s): very sickly       Medications:  Continuous Infusions:  Scheduled Meds:   amLODIPine  5 mg Oral BID    aspirin  81 mg Oral Daily    calcitRIOL  0.25 mcg Oral Daily    denosumab  60 mg Subcutaneous Q6 Months    enoxparin  30 mg Subcutaneous Daily    ferrous sulfate  1 tablet Oral Daily    furosemide (LASIX) injection  80 mg Intravenous Q8H    isosorbide mononitrate  60 mg Oral Daily    metOLazone  10 mg Oral Daily    metoprolol succinate  50 mg Oral Daily    pantoprazole  40 mg Oral Daily    polyethylene glycol  17 g Oral Daily    promethazine        sertraline  100 mg Oral Daily     PRN Meds:  Current Facility-Administered Medications:     acetaminophen, 650 mg, Oral, Q8H PRN    acetaminophen, 650 mg, Oral, Q4H PRN    aluminum-magnesium hydroxide-simethicone, 30 mL, Oral, QID PRN    HYDROcodone-acetaminophen, 1 tablet, Oral, Q6H PRN    HYDROmorphone, 0.5 mg, Intravenous, Q4H PRN    magnesium oxide, 800 mg, Oral,  PRN    magnesium oxide, 800 mg, Oral, PRN    melatonin, 6 mg, Oral, Nightly PRN    ondansetron, 4 mg, Intravenous, Q6H PRN    potassium bicarbonate, 35 mEq, Oral, PRN    potassium bicarbonate, 50 mEq, Oral, PRN    potassium bicarbonate, 60 mEq, Oral, PRN    potassium, sodium phosphates, 2 packet, Oral, PRN    potassium, sodium phosphates, 2 packet, Oral, PRN    potassium, sodium phosphates, 2 packet, Oral, PRN    promethazine (PHENERGAN) 12.5 mg in 0.9% NaCl 50 mL IVPB, 12.5 mg, Intravenous, Q6H PRN    promethazine, , ,     Family History       Problem Relation (Age of Onset)    Arthritis Mother, Brother    Cancer Maternal Aunt    Crohn's disease Brother, Brother, Brother    Early death Son    Fibromyalgia Sister    Heart disease Mother, Father, Brother    Hypertension Father, Sister, Sister, Daughter, Son    Irritable bowel syndrome Sister    Lupus Cousin, Cousin    Pulmonary embolism Sister    Scleroderma Sister    Skin cancer Mother          Tobacco Use    Smoking status: Former     Current packs/day: 0.00     Average packs/day: 1 pack/day for 18.0 years (18.0 ttl pk-yrs)     Types: Cigarettes     Start date: 1953     Quit date: 1971     Years since quittin.6    Smokeless tobacco: Never   Substance and Sexual Activity    Alcohol use: Not Currently     Alcohol/week: 1.0 standard drink of alcohol     Types: 1 Glasses of wine per week     Comment: not at all in months    Drug use: Never    Sexual activity: Not Currently     Partners: Male       Review of Systems  Objective:     Vital Signs (Most Recent):  Temp: 98.2 °F (36.8 °C) (24 1136)  Pulse: 80 (24 1136)  Resp: 17 (24 1558)  BP: (!) 155/66 (24 1136)  SpO2: (!) 92 % (24 1136) Vital Signs (24h Range):  Temp:  [97.7 °F (36.5 °C)-98.2 °F (36.8 °C)] 98.2 °F (36.8 °C)  Pulse:  [73-84] 80  Resp:  [16-20] 17  SpO2:  [91 %-95 %] 92 %  BP: (139-155)/(63-70) 155/66     Weight: 46.2 kg (101 lb 12.8 oz)  Body mass index is 22.03  kg/m².       Physical Exam  Vitals reviewed.   Constitutional:       General: She is not in acute distress.     Appearance: She is ill-appearing.   HENT:      Head: Normocephalic and atraumatic.      Right Ear: External ear normal.      Left Ear: External ear normal.      Mouth/Throat:      Mouth: Mucous membranes are moist.      Pharynx: No oropharyngeal exudate.   Eyes:      General:         Right eye: No discharge.         Left eye: No discharge.      Extraocular Movements: Extraocular movements intact.   Cardiovascular:      Rate and Rhythm: Tachycardia present.      Pulses: Normal pulses.   Pulmonary:      Breath sounds: Rales present.   Abdominal:      General: There is distension.      Palpations: Abdomen is soft.      Tenderness: There is no abdominal tenderness.   Neurological:      General: No focal deficit present.      Mental Status: She is alert and oriented to person, place, and time.   Psychiatric:         Mood and Affect: Mood normal.         Behavior: Behavior normal.         Thought Content: Thought content normal.         Judgment: Judgment normal.            Review of Symptoms      Symptom Assessment (ESAS 0-10 Scale)  Unable to complete assessment due to Other         Pain Assessment in Advanced Demential Scale (PAINAD)   Breathing - Independent of vocalization:  1  Negative vocalization:  0  Facial expression:  0  Body language:  1  Consolability:  1  Total:  3    Performance Status:  40    Living Arrangements:  Lives with family    Psychosocial/Cultural:   See Palliative Psychosocial Note: No  **Primary  to Follow**  Palliative Care  Consult: No        Advance Care Planning  Advance Directives:   Living Will: Yes        Copy on chart: Yes    Do Not Resuscitate Status: Yes      Decision Making:  Patient answered questions and Family answered questions  Goals of Care: The patient and family endorses that what is most important right now is to focus on comfort and QOL      Accordingly, we have decided that the best plan to meet the patient's goals includes enrolling in hospice care         Significant Labs: BMP:   Recent Labs   Lab 07/26/24  0548      *   K 4.7   CL 95   CO2 24   BUN 73*   CREATININE 4.4*   CALCIUM 7.7*   MG 2.2     CBC:   Recent Labs   Lab 07/25/24  0359 07/26/24  0548   WBC 8.40 8.87   HGB 8.7* 8.6*   HCT 28.2* 27.3*   * 150     CBC:   Recent Labs   Lab 07/26/24  0548   WBC 8.87   HGB 8.6*   HCT 27.3*   MCV 87        BMP:  Recent Labs   Lab 07/26/24  0548      *   K 4.7   CL 95   CO2 24   BUN 73*   CREATININE 4.4*   CALCIUM 7.7*   MG 2.2     LFT:  Lab Results   Component Value Date    AST 17 07/26/2024    GGT 45 (H) 07/22/2019    ALKPHOS 53 (L) 07/26/2024    BILITOT 0.7 07/26/2024     Albumin:   Albumin   Date Value Ref Range Status   07/26/2024 3.5 3.5 - 5.2 g/dL Final   12/11/2018 3.9 3.1 - 4.7 g/dL      Protein:   Total Protein   Date Value Ref Range Status   07/26/2024 6.7 6.0 - 8.4 g/dL Final     Lactic acid:   Lab Results   Component Value Date    LACTATE 1.2 06/30/2023       Significant Imaging: I have reviewed all pertinent imaging results/findings within the past 24 hours.      I spent a total of 80 minutes on the day of the visit. This includes face to face time in discussion of goals of care, symptom assessment, coordination of care and emotional support.  This also includes non-face to face time preparing to see the patient (eg, review of tests/imaging), obtaining and/or reviewing separately obtained history, documenting clinical information in the electronic or other health record, independently interpreting results and communicating results to the patient/family/caregiver, or care coordinator.    Aiden Meyer MD  Palliative Medicine  [facility]

## 2024-07-27 NOTE — ASSESSMENT & PLAN NOTE
I reviewed the patient's chart and discussed the case with the patient's team.      I examined Cheyanne Gomes at bedside.  The patient lacks capacity for complex medical decision-making.  I spoke with her and her daughter at bedside.    I introduced myself and my role as palliative care physician. They were agreeable to speaking.    We discussed the patient's medical illness, prognosis, and values in detail.  Below is a brief summary of our discussion.    They understand she has multiple medical issues. Currently she is primarily dealing with renal failure that has been complicated by respiratory failure.    We discussed her prognosis. If she continues on her current trajectory, I would not be surprised if she  in the coming days or weeks. She was not surprised.    We discussed her values. She is hopeful to go home. She no longer desires hospitalization. Her primary goals I comfort. If this results in death, she brar not want to suffer.    We discussed hospice. I recommended hospice care if those are her goals. She understands and agrees.     I updated her care team. Will get hospice set up ASAP.    Appreciate being involved. Hope I have been helpful.

## 2024-07-27 NOTE — HPI
"Per admit H&P: "88 year old pt getting admitted with CRISTO  Blood work was done by [facility] few days ago and it showed high crt level  Pt was referred to [facility] and pt was instructed to come to ER by [facility]  Pt denies shortness of breath but endorses 5 pound weight gain  Also pt has swelling on legs and abdominal distension which she attributes due to fluid gain  She has ecchymosis on both legs and more on RLE   Pt had a fall few days ago and had ER visit 2  & all imaging studies were normal        Overview/Hospital Course:  88 year old female was admitted with CRISTO on CKD likely 2/2 CRS.  Serum creatinine on admission was 3.5.  She was started on IV lasix and entresto was held.  Nephrology was consulted.  Serum creatinine worsened to 4.1 on 7/21.  Lasix was discontinued and she was given 500cc of normal saline.  She was found to have secondary hyperparathyroidism, she was started on calcitriol.  Iron was also low, she was started on PO iron.  Magnesium was high on admission, magnesium supplements were discontinued, she will need to stop those on discharge.  Also found to have hyponatremia and hypokalemia, which are improved.  CXR showed faint left upper lobe alveolar opacity with concern for pneumonia.  Urinalysis was positive for infection and urine cx revealed morganella morganii.  She has completed course of levaquin.  Her lungs are clear and she is on room air, she wears cpap at night.  Serum creatinine today 4.2, nephrology recommended to continue holding lasix and metolazone and monitor."    Unfortunately despite supportive measures her renal function has not recovered. She does not desire dialysis. They are contemplating hospice. I have been asked to assist.   "

## 2024-07-29 ENCOUNTER — PATIENT MESSAGE (OUTPATIENT)
Dept: FAMILY MEDICINE | Facility: CLINIC | Age: 89
End: 2024-07-29
Payer: MEDICARE

## 2024-07-31 ENCOUNTER — EXTERNAL CHRONIC CARE MANAGEMENT (OUTPATIENT)
Dept: PRIMARY CARE CLINIC | Facility: CLINIC | Age: 89
End: 2024-07-31
Payer: MEDICARE

## 2024-07-31 LAB
OHS QRS DURATION: 116 MS
OHS QTC CALCULATION: 510 MS

## 2024-07-31 PROCEDURE — 99490 CHRNC CARE MGMT STAFF 1ST 20: CPT | Mod: S$PBB,,, | Performed by: FAMILY MEDICINE

## 2024-07-31 PROCEDURE — 99490 CHRNC CARE MGMT STAFF 1ST 20: CPT | Mod: PBBFAC,PN | Performed by: FAMILY MEDICINE

## 2024-08-01 ENCOUNTER — PATIENT MESSAGE (OUTPATIENT)
Dept: PULMONOLOGY | Facility: CLINIC | Age: 89
End: 2024-08-01
Payer: MEDICARE

## 2024-08-05 ENCOUNTER — TELEPHONE (OUTPATIENT)
Dept: CARDIOLOGY | Facility: CLINIC | Age: 89
End: 2024-08-05
Payer: MEDICARE

## 2024-12-10 NOTE — PROGRESS NOTES
Left message with result   Two person identification name, d.o.b with verbal feedback.  Aseptic technique used. Administration influenza high dose vaccine on L deltoid.  Tolerated well.  VIS 8/7/15  given/mp

## (undated) DEVICE — Device

## (undated) DEVICE — DRAPE HALF SURGICAL 40X58IN

## (undated) DEVICE — FORCEP STRAIGHT DISP

## (undated) DEVICE — DRAPE EENT SPLIT STERILE

## (undated) DEVICE — DRAPE MINOR PROCEDURE

## (undated) DEVICE — SHEET DRAPE MEDIUM

## (undated) DEVICE — SUT 3/0 18IN COATED VICRYLP

## (undated) DEVICE — GOWN X-LG STERILE BACK

## (undated) DEVICE — SET CYSTO IRRIGATION UNIV SPIK

## (undated) DEVICE — SEE L#120831

## (undated) DEVICE — SUT MONOCYRL 4-0 PS2 UND

## (undated) DEVICE — DRESSING GAUZE XEROFORM 5X9

## (undated) DEVICE — SEE MEDLINE ITEM 154981

## (undated) DEVICE — DRESSING AQUACEL AG W/SILVER 3.5X6

## (undated) DEVICE — SUT PROLENE 6-0 30 RB-2

## (undated) DEVICE — NDL HYPO 27G X 1 1/2

## (undated) DEVICE — TUBING SUC UNIV W/CONN 12FT

## (undated) DEVICE — MARKER SKIN STND TIP BLUE BARR

## (undated) DEVICE — WIPE MICRO TIP

## (undated) DEVICE — SCRUB 10% POVIDONE IODINE 4OZ

## (undated) DEVICE — CORD BIPOLAR 12 FOOT

## (undated) DEVICE — SYR 10CC LUER LOCK

## (undated) DEVICE — SHEATH PINNACLE 6FRX10CM W/GUIDEWIRE

## (undated) DEVICE — TOWEL OR DISP STRL BLUE 4/PK

## (undated) DEVICE — SPONGE GAUZE 16PLY 4X4

## (undated) DEVICE — ADAPTER PIN INDEXED SWAN GANZ

## (undated) DEVICE — SUT 5/0 18IN PLAIN FAST AB

## (undated) DEVICE — SUT PROLENE TF 5-0 24IN

## (undated) DEVICE — BLADE SURG #15 CARBON STEEL

## (undated) DEVICE — GLOVE SURGICAL LATEX SZ 7

## (undated) DEVICE — CATHETER BIPOLAR PACING 5FR J-TIP

## (undated) DEVICE — WATER STERILE INJ 500ML BAG